# Patient Record
Sex: MALE | Race: WHITE | NOT HISPANIC OR LATINO | Employment: UNEMPLOYED | URBAN - METROPOLITAN AREA
[De-identification: names, ages, dates, MRNs, and addresses within clinical notes are randomized per-mention and may not be internally consistent; named-entity substitution may affect disease eponyms.]

---

## 2017-03-04 ENCOUNTER — HOSPITAL ENCOUNTER (EMERGENCY)
Facility: MEDICAL CENTER | Age: 52
End: 2017-03-04
Attending: EMERGENCY MEDICINE
Payer: MEDICAID

## 2017-03-04 VITALS
RESPIRATION RATE: 16 BRPM | HEIGHT: 76 IN | DIASTOLIC BLOOD PRESSURE: 64 MMHG | WEIGHT: 260.36 LBS | TEMPERATURE: 98.1 F | OXYGEN SATURATION: 97 % | HEART RATE: 93 BPM | BODY MASS INDEX: 31.71 KG/M2 | SYSTOLIC BLOOD PRESSURE: 109 MMHG

## 2017-03-04 DIAGNOSIS — F41.9 ANXIETY: ICD-10-CM

## 2017-03-04 PROCEDURE — 99284 EMERGENCY DEPT VISIT MOD MDM: CPT

## 2017-03-04 PROCEDURE — A9270 NON-COVERED ITEM OR SERVICE: HCPCS | Performed by: EMERGENCY MEDICINE

## 2017-03-04 PROCEDURE — 700102 HCHG RX REV CODE 250 W/ 637 OVERRIDE(OP): Performed by: EMERGENCY MEDICINE

## 2017-03-04 RX ORDER — LORAZEPAM 1 MG/1
1 TABLET ORAL EVERY 6 HOURS PRN
Qty: 20 TAB | Refills: 0 | Status: SHIPPED | OUTPATIENT
Start: 2017-03-04 | End: 2018-03-30

## 2017-03-04 RX ORDER — LORAZEPAM 1 MG/1
2 TABLET ORAL ONCE
Status: COMPLETED | OUTPATIENT
Start: 2017-03-04 | End: 2017-03-04

## 2017-03-04 RX ADMIN — LORAZEPAM 2 MG: 1 TABLET ORAL at 12:17

## 2017-03-04 ASSESSMENT — PAIN SCALES - GENERAL: PAINLEVEL_OUTOF10: 0

## 2017-03-04 NOTE — ED NOTES
Chief Complaint   Patient presents with   • Anxiety     Pt BIB self to triage for c/o anxiety on and off and pt reports it to be interrupting sleep.      Explained to pt triage process, made pt aware to tell this RN of any changes/concerns, pt verbalized understanding of process and instructions given. Pt to ER markie.

## 2017-03-04 NOTE — ED AVS SNAPSHOT
Shopperception Access Code: [email or web address]  Expires: 4/3/2017 12:44 PM    Your email address is not on file at [facility].  Email Addresses are required for you to sign up for Shopperception, please contact [phone] to verify your personal information and to provide your email address prior to attempting to register for Shopperception.    Wilbert Kennith Yeomans .  [address]  [place] [address]    Shopperception  A secure, online tool to manage your health information     [facility]’s Shopperception® is a secure, online tool that connects you to your personalized health information from the privacy of your home -- day or night - making it very easy for you to manage your healthcare. Once the activation process is completed, you can even access your medical information using the Shopperception shivani, which is available for free in the Apple Shivani store or Google Play store.     To learn more about Shopperception, visit [email or web address]    There are two levels of access available (as shown below):   My Chart Features  [facility] Primary Care Doctor [facility]  Specialists [facility]  Urgent  Care Non-[facility] Primary Care Doctor   Email your healthcare team securely and privately 24/7 X X X    Manage appointments: schedule your next appointment; view details of past/upcoming appointments X      Request prescription refills. X      View recent personal medical records, including lab and immunizations X X X X   View health record, including health history, allergies, medications X X X X   Read reports about your outpatient visits, procedures, consult and ER notes X X X X   See your discharge summary, which is a recap of your hospital and/or ER visit that includes your diagnosis, lab results, and care plan X X  X     How to register for Shopperception:  Once your e-mail address has been verified, follow the following steps to sign up for Shopperception.     1. Go to  [email or web address]  2. Click on the Sign Up Now box, which takes you to the New Member Sign Up page.  You will need to provide the following information:  a. Enter your Appstarter Access Code exactly as it appears at the top of this page. (You will not need to use this code after you’ve completed the sign-up process. If you do not sign up before the expiration date, you must request a new code.)   b. Enter your date of birth.   c. Enter your home email address.   d. Click Submit, and follow the next screen’s instructions.  3. Create a Sportistict ID. This will be your Appstarter login ID and cannot be changed, so think of one that is secure and easy to remember.  4. Create a Appstarter password. You can change your password at any time.  5. Enter your Password Reset Question and Answer. This can be used at a later time if you forget your password.   6. Enter your e-mail address. This allows you to receive e-mail notifications when new information is available in Appstarter.  7. Click Sign Up. You can now view your health information.    For assistance activating your Appstarter account, call [phone]

## 2017-03-04 NOTE — ED AVS SNAPSHOT
Home Care Instructions                                                                                                                Wilbert Kennith Yeomans Jr.   MRN: [id number]    Department:  [facility], Emergency Dept   Date of Visit:  3/4/2017            [facility], Emergency Dept    [address] [address]    Phone:  [phone]      You were seen by     Zachariah Espino M.D.      Your Diagnosis Was     Anxiety     F41.9       These are the medications you received during your hospitalization from 03/04/2017 1111 to 03/04/2017 1244     Date/Time Order Dose Route Action    03/04/2017 1217 lorazepam (ATIVAN) tablet 2 mg 2 mg Oral Given      Follow-up Information     1. Follow up with [facility], Emergency Dept.    Specialty:  Emergency Medicine    Why:  If symptoms worsen    Contact information    [address] [place] [id number] [phone]        2. Follow up with [facility].    Why:  call for follow up    Contact information    [address] [place] [address] [phone]      Medication Information     Review all of your home medications and newly ordered medications with your primary doctor and/or pharmacist as soon as possible. Follow medication instructions as directed by your doctor and/or pharmacist.     Please keep your complete medication list with you and share with your physician. Update the information when medications are discontinued, doses are changed, or new medications (including over-the-counter products) are added; and carry medication information at all times in the event of emergency situations.               Medication List      ASK your doctor about these medications        Instructions    ciprofloxacin 750 MG Tabs   Commonly known as:  CIPRO    Take 1 Tab by mouth 2 times a day.   Dose:  750 mg       hydrochlorothiazide 25 MG Tabs   Commonly known as:  HYDRODIURIL    Take 25 mg by  mouth every day.   Dose:  25 mg       ibuprofen 800 MG Tabs   Commonly known as:  MOTRIN    Take 800 mg by mouth every 8 hours as needed for Moderate Pain.   Dose:  800 mg       * lorazepam 1 MG Tabs   What changed:  Another medication with the same name was added. Make sure you understand how and when to take each.   Commonly known as:  ATIVAN   Ask about: Which instructions should I use?    Take 1 Tab by mouth every 6 hours as needed for Anxiety.   Dose:  1 mg       * lorazepam 1 MG Tabs   What changed:  Another medication with the same name was added. Make sure you understand how and when to take each.   Commonly known as:  ATIVAN   Ask about: Which instructions should I use?    Take 1 Tab by mouth every four hours as needed for Anxiety.   Dose:  1 mg       * lorazepam 1 MG Tabs   What changed:  You were already taking a medication with the same name, and this prescription was added. Make sure you understand how and when to take each.   Commonly known as:  ATIVAN   Ask about: Which instructions should I use?    Take 1 Tab by mouth every 6 hours as needed for Anxiety.   Dose:  1 mg       naltrexone 50 MG Tabs   Commonly known as:  KARIN    Doctor's comments:  Hold this medicine for 4 days then resume per your primary care doctor   Take 1 Tab by mouth every day.   Dose:  50 mg       naproxen 500 MG Tabs   Commonly known as:  NAPROSYN    Take 500 mg by mouth every day.   Dose:  500 mg       oxycodone-acetaminophen 7.5-325 MG per tablet   Commonly known as:  PERCOCET    Take 1 Tab by mouth every four hours as needed.   Dose:  1 Tab       simvastatin 40 MG Tabs   Commonly known as:  ZOCOR    Take 40 mg by mouth every evening.   Dose:  40 mg       * Notice:  This list has 3 medication(s) that are the same as other medications prescribed for you. Read the directions carefully, and ask your doctor or other care provider to review them with you.              Discharge Instructions       Panic Attacks  Panic attacks are  sudden, short feelings of great fear or discomfort. You may have them for no reason when you are relaxed, when you are uneasy (anxious), or when you are sleeping.   HOME CARE  · Take all your medicines as told.  · Check with your doctor before starting new medicines.  · Keep all doctor visits.  GET HELP IF:  · You are not able to take your medicines as told.  · Your symptoms do not get better.  · Your symptoms get worse.  GET HELP RIGHT AWAY IF:  · Your attacks seem different than your normal attacks.  · You have thoughts about hurting yourself or others.  · You take panic attack medicine and you have a side effect.  MAKE SURE YOU:  · Understand these instructions.  · Will watch your condition.  · Will get help right away if you are not doing well or get worse.     This information is not intended to replace advice given to you by your health care provider. Make sure you discuss any questions you have with your health care provider.     Document Released: 01/20/2012 Document Revised: 10/08/2014 Document Reviewed: 08/01/2014  ElseFanzila Interactive Patient Education ©2016 [employer]            Patient Information     Patient Information    Following emergency treatment: all patient requiring follow-up care must return either to a private physician or a clinic if your condition worsens before you are able to obtain further medical attention, please return to the emergency room.     Billing Information    At [facility], we work to make the billing process streamlined for our patients.  Our Representatives are here to answer any questions you may have regarding your hospital bill.  If you have insurance coverage and have supplied your insurance information to us, we will submit a claim to your insurer on your behalf.  Should you have any questions regarding your bill, we can be reached online or by phone as follows:  Online: You are able pay your bills online or live chat with our representatives about any billing  questions you may have. We are here to help Monday - Friday from 8:00am to 7:30pm and 9:00am - 12:00pm on Saturdays.  Please visit [email or web address]  for more information.   Phone:  [phone] or [phone]    Please note that your emergency physician, surgeon, pathologist, radiologist, anesthesiologist, and other specialists are not employed by [facility] and will therefore bill separately for their services.  Please contact them directly for any questions concerning their bills at the numbers below:     Emergency Physician Services:  [phone]  [facility]:  [phone]  [facility]:  [phone]  [facility]:  [phone]    1. Your final bill may vary from the amount quoted upon discharge if all procedures are not complete at that time, or if your doctor has additional procedures of which we are not aware. You will receive an additional bill if you return to the Emergency Department at [facility] for suture removal regardless of the facility of which the sutures were placed.     2. Please arrange for settlement of this account at the emergency registration.    3. All self-pay accounts are due in full at the time of treatment.  If you are unable to meet this obligation then payment is expected within 4-5 days.     4. If you have had radiology studies (CT, X-ray, Ultrasound, MRI), you have received a preliminary result during your emergency department visit. Please contact the radiology department [phone] to receive a copy of your final result. Please discuss the Final result with your primary physician or with the follow up physician provided.     Crisis Hotline:  National Crisis Hotline:  4-996-HGCHTLA or 1-494.554.1497  [place] Crisis Hotline:    1-182.442.7262 or 820-738-6639         ED Discharge Follow Up Questions    1. In order to provide you with very good care, we would like to follow up with a phone call  in the next few days.  May we have your permission to contact you?     YES /  NO    2. What is the best phone number to call you? (       )_____-__________    3. What is the best time to call you?      Morning  /  Afternoon  /  Evening                   Patient Signature:  ____________________________________________________________    Date:  ____________________________________________________________

## 2017-03-04 NOTE — DISCHARGE INSTRUCTIONS
Panic Attacks  Panic attacks are sudden, short feelings of great fear or discomfort. You may have them for no reason when you are relaxed, when you are uneasy (anxious), or when you are sleeping.   HOME CARE  · Take all your medicines as told.  · Check with your doctor before starting new medicines.  · Keep all doctor visits.  GET HELP IF:  · You are not able to take your medicines as told.  · Your symptoms do not get better.  · Your symptoms get worse.  GET HELP RIGHT AWAY IF:  · Your attacks seem different than your normal attacks.  · You have thoughts about hurting yourself or others.  · You take panic attack medicine and you have a side effect.  MAKE SURE YOU:  · Understand these instructions.  · Will watch your condition.  · Will get help right away if you are not doing well or get worse.     This information is not intended to replace advice given to you by your health care provider. Make sure you discuss any questions you have with your health care provider.     Document Released: 01/20/2012 Document Revised: 10/08/2014 Document Reviewed: 08/01/2014  Elsevier Interactive Patient Education ©2016 Elsevier Inc.

## 2017-03-04 NOTE — ED PROVIDER NOTES
ED Provider Note    Scribed for Zachariah Espino M.D. by Bronwyn Macias. 3/4/2017, 12:00 PM.    Primary care provider: Pcp Pt States None  Means of arrival: walk-in  History obtained from: patient  History limited by: none    CHIEF COMPLAINT  Chief Complaint   Patient presents with   • Anxiety     Pt BIB self to triage for c/o anxiety on and off and pt reports it to be interrupting sleep.        HPI  Wilbert Kennith Yeomans Jr. is a 51 y.o. male who presents to the Emergency Department with complaints of anxiety. The patient reports anxiety on and off for the past several months and came in today because he is having difficulty sleeping. He denies any suicidal ideation or homicidal ideation. The patient does not have a primary care provider and does not see a psychologist. He has scheduled an appointment with the [facility] for 03/13/2017.    REVIEW OF SYSTEMS  Pertinent positives include anxiety, insomnia. Pertinent negatives include no suicidal ideation, homicidal ideation. E  See HPI for further details.     PAST MEDICAL HISTORY   has a past medical history of Hypertension; Hypercholesteremia; Hepatitis C (1997); Bronchitis; Diabetes (CMS-HCC); and Dental disorder.    SURGICAL HISTORY   has past surgical history that includes ventral hernia repair laparoscopic (3/30/2016).    SOCIAL HISTORY  Social History   Substance Use Topics   • Smoking status: Current Every Day Smoker -- 1.00 packs/day for 30 years     Types: Cigarettes   • Smokeless tobacco: Never Used   • Alcohol Use: No      Comment: None since 1-5-2016      History   Drug Use No     Comment: denies       FAMILY HISTORY  No family history noted    CURRENT MEDICATIONS  No current facility-administered medications on file prior to encounter.     Current Outpatient Prescriptions on File Prior to Encounter   Medication Sig Dispense Refill   • lorazepam (ATIVAN) 1 MG Tab Take 1 Tab by mouth every four hours as needed for Anxiety. 20 Tab 0   • lorazepam  "(ATIVAN) 1 MG Tab Take 1 Tab by mouth every 6 hours as needed for Anxiety. 15 Tab 0   • naproxen (NAPROSYN) 500 MG Tab Take 500 mg by mouth every day.     • naltrexone (DEPADE) 50 MG Tab Take 1 Tab by mouth every day. 30 Tab 0   • oxycodone-acetaminophen (PERCOCET) 7.5-325 MG per tablet Take 1 Tab by mouth every four hours as needed. 45 Tab 0   • ciprofloxacin (CIPRO) 750 MG Tab Take 1 Tab by mouth 2 times a day. 14 Tab 0   • ibuprofen (MOTRIN) 800 MG Tab Take 800 mg by mouth every 8 hours as needed for Moderate Pain.     • hydrochlorothiazide (HYDRODIURIL) 25 MG Tab Take 25 mg by mouth every day.     • simvastatin (ZOCOR) 40 MG Tab Take 40 mg by mouth every evening.         ALLERGIES  No Known Allergies    PHYSICAL EXAM  VITAL SIGNS: /80 mmHg  Pulse 118  Temp(Src) 36.7 °C (98.1 °F)  Resp 18  Ht 1.93 m (6' 3.98\")  Wt 118.1 kg (260 lb 5.8 oz)  BMI 31.71 kg/m2  SpO2 97%  Vitals reviewed.  Constitutional: Alert in no apparent distress.  HENT: No signs of trauma, Bilateral external ears normal, Nose normal.   Eyes: Pupils are equal and reactive, Conjunctiva normal, Non-icteric.   Neck: Normal range of motion, No tenderness, Supple, No stridor.   Lymphatic: No lymphadenopathy noted.   Cardiovascular: Regular rate and rhythm, no murmurs.   Thorax & Lungs: Normal breath sounds, No respiratory distress, No wheezing, No chest tenderness.   Abdomen: Soft, No tenderness, No peritoneal signs, No masses, No pulsatile masses.   Skin: Warm, Dry, No erythema, No rash.   Extremities: Intact distal pulses, No edema, No tenderness, No cyanosis  Musculoskeletal: Good range of motion in all major joints. No tenderness to palpation or major deformities noted.   Neurologic: Alert , Normal motor function, Normal sensory function, No focal deficits noted.   Psychiatric: Anxious affect, Judgment normal, Mood normal.       COURSE & MEDICAL DECISION MAKING  Nursing notes, VS, PMSFHx reviewed in chart.    Obtained and reviewed past " medical records, which show he has been seen for anxiety twice before in the past 4 months, once by myself.    12:00 PM Patient seen and examined at bedside. He presents with anxiety. He does not have any thoughts of suicide. Patient will be treated with 2 mg Ativan PO for his symptoms. The patient has made an appointment at the [facility], where they also have counseling, as I suggested the first time I saw him. Since his appointment is not for another nine days, however, I will prescribe Ativan. The patient will be discharged and will follow up with the [facility] as scheduled. He will return for any new or worsening symptoms. He verbalizes understanding and agrees.    I reviewed prescription monitoring program for patient's narcotic use before prescribing a scheduled drug.The patient will not drink alcohol nor drive with prescribed medications. The patient will return for new or worsening symptoms and is stable at the time of discharge.    The patient is referred to a primary physician for blood pressure management, diabetic screening, and for all other preventative health concerns.    DISPOSITION:  Patient will be discharged home in stable condition.    FOLLOW UP:  No follow-up provider specified.    OUTPATIENT MEDICATIONS:  New Prescriptions    LORAZEPAM (ATIVAN) 1 MG TAB    Take 1 Tab by mouth every 6 hours as needed for Anxiety.     FINAL IMPRESSION  1. Anxiety          Bronwyn GAUTAM (C[employer]), am scribing for, and in the presence of, Zachariah Espino M.D..    Electronically signed by: Bronwyn Macias (Cricket[employer]), 3/4/2017    Zachariah GAUTAM M.D. personally performed the services described in this documentation, as scribed by Bronwyn Macias in my presence, and it is both accurate and complete.    The note accurately reflects work and decisions made by me.  Zachariah Espino  3/4/2017  12:12 PM

## 2017-03-04 NOTE — ED AVS SNAPSHOT
3/4/2017          Wilbert Kennith Yeomans Jr.  [address]  [place] [address]    Dear Louie:    [facility] wants to ensure your discharge home is safe and you or your loved ones have had all your questions answered regarding your care after you leave the hospital.    You may receive a telephone call within two days of your discharge.  This call is to make certain you understand your discharge instructions as well as ensure we provided you with the best care possible during your stay with us.     The call will only last approximately 3-5 minutes and will be done by a nurse.    Once again, we want to ensure your discharge home is safe and that you have a clear understanding of any next steps in your care.  If you have any questions or concerns, please do not hesitate to contact us, we are here for you.  Thank you for choosing [facility] for your healthcare needs.    Sincerely,    Oli Kelly    [facility]

## 2017-03-04 NOTE — ED NOTES
Pt much more calm at this time.  States he is an established pt at the [facility] and will follow up.  Given d/c instructions and rx.  Ambulated out.

## 2017-03-09 ENCOUNTER — HOSPITAL ENCOUNTER (EMERGENCY)
Facility: MEDICAL CENTER | Age: 52
End: 2017-03-09
Attending: EMERGENCY MEDICINE
Payer: MEDICAID

## 2017-03-09 VITALS
WEIGHT: 257.94 LBS | BODY MASS INDEX: 31.41 KG/M2 | DIASTOLIC BLOOD PRESSURE: 86 MMHG | OXYGEN SATURATION: 97 % | HEART RATE: 114 BPM | SYSTOLIC BLOOD PRESSURE: 102 MMHG | TEMPERATURE: 98.2 F | RESPIRATION RATE: 20 BRPM

## 2017-03-09 DIAGNOSIS — F41.9 ANXIETY: ICD-10-CM

## 2017-03-09 DIAGNOSIS — F11.10: ICD-10-CM

## 2017-03-09 PROCEDURE — 99283 EMERGENCY DEPT VISIT LOW MDM: CPT

## 2017-03-09 PROCEDURE — 700102 HCHG RX REV CODE 250 W/ 637 OVERRIDE(OP): Performed by: EMERGENCY MEDICINE

## 2017-03-09 PROCEDURE — 36415 COLL VENOUS BLD VENIPUNCTURE: CPT

## 2017-03-09 PROCEDURE — A9270 NON-COVERED ITEM OR SERVICE: HCPCS | Performed by: EMERGENCY MEDICINE

## 2017-03-09 RX ORDER — LORAZEPAM 1 MG/1
1 TABLET ORAL EVERY 6 HOURS PRN
Qty: 4 TAB | Refills: 0 | Status: SHIPPED | OUTPATIENT
Start: 2017-03-09 | End: 2018-03-30

## 2017-03-09 RX ORDER — LORAZEPAM 1 MG/1
1 TABLET ORAL ONCE
Status: COMPLETED | OUTPATIENT
Start: 2017-03-09 | End: 2017-03-09

## 2017-03-09 RX ADMIN — LORAZEPAM 1 MG: 1 TABLET ORAL at 17:15

## 2017-03-09 ASSESSMENT — PAIN SCALES - GENERAL: PAINLEVEL_OUTOF10: 0

## 2017-03-09 ASSESSMENT — ENCOUNTER SYMPTOMS: NERVOUS/ANXIOUS: 1

## 2017-03-09 NOTE — ED AVS SNAPSHOT
Flavours Access Code: [email or web address]  Expires: 4/3/2017 12:44 PM    Your email address is not on file at [facility].  Email Addresses are required for you to sign up for Flavours, please contact [phone] to verify your personal information and to provide your email address prior to attempting to register for Flavours.    Wilbert Kennith Yeomans .  [address]  [place] [address]    Flavours  A secure, online tool to manage your health information     [facility]’s Flavours® is a secure, online tool that connects you to your personalized health information from the privacy of your home -- day or night - making it very easy for you to manage your healthcare. Once the activation process is completed, you can even access your medical information using the Flavours shivani, which is available for free in the Apple Shivani store or Google Play store.     To learn more about Flavours, visit [email or web address]    There are two levels of access available (as shown below):   My Chart Features  [facility] Primary Care Doctor [facility]  Specialists [facility]  Urgent  Care Non-[facility] Primary Care Doctor   Email your healthcare team securely and privately 24/7 X X X    Manage appointments: schedule your next appointment; view details of past/upcoming appointments X      Request prescription refills. X      View recent personal medical records, including lab and immunizations X X X X   View health record, including health history, allergies, medications X X X X   Read reports about your outpatient visits, procedures, consult and ER notes X X X X   See your discharge summary, which is a recap of your hospital and/or ER visit that includes your diagnosis, lab results, and care plan X X  X     How to register for Flavours:  Once your e-mail address has been verified, follow the following steps to sign up for Flavours.     1. Go to  [email or web address]  2. Click on the Sign Up Now box, which takes you to the New Member Sign Up page.  You will need to provide the following information:  a. Enter your PolicyGenius Access Code exactly as it appears at the top of this page. (You will not need to use this code after you’ve completed the sign-up process. If you do not sign up before the expiration date, you must request a new code.)   b. Enter your date of birth.   c. Enter your home email address.   d. Click Submit, and follow the next screen’s instructions.  3. Create a Saffron Technologyt ID. This will be your PolicyGenius login ID and cannot be changed, so think of one that is secure and easy to remember.  4. Create a PolicyGenius password. You can change your password at any time.  5. Enter your Password Reset Question and Answer. This can be used at a later time if you forget your password.   6. Enter your e-mail address. This allows you to receive e-mail notifications when new information is available in PolicyGenius.  7. Click Sign Up. You can now view your health information.    For assistance activating your PolicyGenius account, call [phone]

## 2017-03-09 NOTE — ED AVS SNAPSHOT
Home Care Instructions                                                                                                                Wilbert Kennith Yeomans Jr.   MRN: [id number]    Department:  [facility], Emergency Dept   Date of Visit:  3/9/2017            [facility], Emergency Dept    [address] [facility] [address] [address]    Phone:  [phone]      You were seen by     Zachariah Correa M.D.      Your Diagnosis Was     Anxiety     F41.9 Without homicidal or suicidal ideation      Follow-up Information     1. Follow up with [facility] ([facility]) In 1 day.    Specialties:  Psychiatry, Behavioral Health    Contact information    [address] [place] [address]  [phone]      Medication Information     Review all of your home medications and newly ordered medications with your primary doctor and/or pharmacist as soon as possible. Follow medication instructions as directed by your doctor and/or pharmacist.     Please keep your complete medication list with you and share with your physician. Update the information when medications are discontinued, doses are changed, or new medications (including over-the-counter products) are added; and carry medication information at all times in the event of emergency situations.               Medication List      ASK your doctor about these medications        Instructions    Morning Afternoon Evening Bedtime    ciprofloxacin 750 MG Tabs   Commonly known as:  CIPRO        Take 1 Tab by mouth 2 times a day.   Dose:  750 mg                        hydrochlorothiazide 25 MG Tabs   Commonly known as:  HYDRODIURIL        Take 25 mg by mouth every day.   Dose:  25 mg                        ibuprofen 800 MG Tabs   Commonly known as:  MOTRIN        Take 800 mg by mouth every 8 hours as needed for Moderate Pain.   Dose:  800 mg                        * lorazepam 1 MG Tabs   What changed:  Another  medication with the same name was added. Make sure you understand how and when to take each.   Commonly known as:  ATIVAN   Ask about: Which instructions should I use?        Take 1 Tab by mouth every 6 hours as needed for Anxiety.   Dose:  1 mg                        * lorazepam 1 MG Tabs   What changed:  Another medication with the same name was added. Make sure you understand how and when to take each.   Commonly known as:  ATIVAN   Ask about: Which instructions should I use?        Take 1 Tab by mouth every four hours as needed for Anxiety.   Dose:  1 mg                        * lorazepam 1 MG Tabs   What changed:  Another medication with the same name was added. Make sure you understand how and when to take each.   Commonly known as:  ATIVAN   Ask about: Which instructions should I use?        Take 1 Tab by mouth every 6 hours as needed for Anxiety.   Dose:  1 mg                        * lorazepam 1 MG Tabs   What changed:  You were already taking a medication with the same name, and this prescription was added. Make sure you understand how and when to take each.   Commonly known as:  ATIVAN   Ask about: Which instructions should I use?        Take 1 Tab by mouth every 6 hours as needed for Anxiety.   Dose:  1 mg                        naltrexone 50 MG Tabs   Commonly known as:  KARIN        Doctor's comments:  Hold this medicine for 4 days then resume per your primary care doctor   Take 1 Tab by mouth every day.   Dose:  50 mg                        naproxen 500 MG Tabs   Commonly known as:  NAPROSYN        Take 500 mg by mouth every day.   Dose:  500 mg                        oxycodone-acetaminophen 7.5-325 MG per tablet   Commonly known as:  PERCOCET        Take 1 Tab by mouth every four hours as needed.   Dose:  1 Tab                        simvastatin 40 MG Tabs   Commonly known as:  ZOCOR        Take 40 mg by mouth every evening.   Dose:  40 mg                        * Notice:  This list has 4  medication(s) that are the same as other medications prescribed for you. Read the directions carefully, and ask your doctor or other care provider to review them with you.         Where to Get Your Medications      You can get these medications from any pharmacy     Bring a paper prescription for each of these medications    - lorazepam 1 MG Tabs              Discharge Instructions       Panic Attacks  Panic attacks are sudden, short feelings of great fear or discomfort. You may have them for no reason when you are relaxed, when you are uneasy (anxious), or when you are sleeping.   HOME CARE  · Take all your medicines as told.  · Check with your doctor before starting new medicines.  · Keep all doctor visits.  GET HELP IF:  · You are not able to take your medicines as told.  · Your symptoms do not get better.  · Your symptoms get worse.  GET HELP RIGHT AWAY IF:  · Your attacks seem different than your normal attacks.  · You have thoughts about hurting yourself or others.  · You take panic attack medicine and you have a side effect.  MAKE SURE YOU:  · Understand these instructions.  · Will watch your condition.  · Will get help right away if you are not doing well or get worse.     This information is not intended to replace advice given to you by your health care provider. Make sure you discuss any questions you have with your health care provider.    Walk-in appointment at [facility] tomorrow morning.     Document Released: 01/20/2012 Document Revised: 10/08/2014 Document Reviewed: 08/01/2014  Elsevier Interactive Patient Education ©2016 [employer]            Patient Information     Patient Information    Following emergency treatment: all patient requiring follow-up care must return either to a private physician or a clinic if your condition worsens before you are able to obtain further medical attention, please return to the emergency room.     Billing Information    At [facility], we work to make  the billing process streamlined for our patients.  Our Representatives are here to answer any questions you may have regarding your hospital bill.  If you have insurance coverage and have supplied your insurance information to us, we will submit a claim to your insurer on your behalf.  Should you have any questions regarding your bill, we can be reached online or by phone as follows:  Online: You are able pay your bills online or live chat with our representatives about any billing questions you may have. We are here to help Monday - Friday from 8:00am to 7:30pm and 9:00am - 12:00pm on Saturdays.  Please visit [email or web address]  for more information.   Phone:  [phone] or [phone]    Please note that your emergency physician, surgeon, pathologist, radiologist, anesthesiologist, and other specialists are not employed by [facility] and will therefore bill separately for their services.  Please contact them directly for any questions concerning their bills at the numbers below:     Emergency Physician Services:  [phone]  [facility]:  [phone]  [facility]:  [phone]  [facility]:  [phone]    1. Your final bill may vary from the amount quoted upon discharge if all procedures are not complete at that time, or if your doctor has additional procedures of which we are not aware. You will receive an additional bill if you return to the Emergency Department at [facility] for suture removal regardless of the facility of which the sutures were placed.     2. Please arrange for settlement of this account at the emergency registration.    3. All self-pay accounts are due in full at the time of treatment.  If you are unable to meet this obligation then payment is expected within 4-5 days.     4. If you have had radiology studies (CT, X-ray, Ultrasound, MRI), you have received a preliminary result during your emergency  department visit. Please contact the radiology department [phone] to receive a copy of your final result. Please discuss the Final result with your primary physician or with the follow up physician provided.     Crisis Hotline:  [place] Crisis Hotline:  3-038-GXYCVGB or 1-878.919.9632  [place] Crisis Hotline:    1-823.483.6374 or 415-792-1534         ED Discharge Follow Up Questions    1. In order to provide you with very good care, we would like to follow up with a phone call in the next few days.  May we have your permission to contact you?     YES /  NO    2. What is the best phone number to call you? (       )_____-__________    3. What is the best time to call you?      Morning  /  Afternoon  /  Evening                   Patient Signature:  ____________________________________________________________    Date:  ____________________________________________________________

## 2017-03-09 NOTE — ED AVS SNAPSHOT
3/9/2017          Wilbert Kennith Yeomans Jr.  [address]  [place] [address]    Dear Louie:    [facility] wants to ensure your discharge home is safe and you or your loved ones have had all your questions answered regarding your care after you leave the hospital.    You may receive a telephone call within two days of your discharge.  This call is to make certain you understand your discharge instructions as well as ensure we provided you with the best care possible during your stay with us.     The call will only last approximately 3-5 minutes and will be done by a nurse.    Once again, we want to ensure your discharge home is safe and that you have a clear understanding of any next steps in your care.  If you have any questions or concerns, please do not hesitate to contact us, we are here for you.  Thank you for choosing [facility] for your healthcare needs.    Sincerely,    Oli Kelly    [facility]

## 2017-03-10 NOTE — DISCHARGE INSTRUCTIONS
Panic Attacks  Panic attacks are sudden, short feelings of great fear or discomfort. You may have them for no reason when you are relaxed, when you are uneasy (anxious), or when you are sleeping.   HOME CARE  · Take all your medicines as told.  · Check with your doctor before starting new medicines.  · Keep all doctor visits.  GET HELP IF:  · You are not able to take your medicines as told.  · Your symptoms do not get better.  · Your symptoms get worse.  GET HELP RIGHT AWAY IF:  · Your attacks seem different than your normal attacks.  · You have thoughts about hurting yourself or others.  · You take panic attack medicine and you have a side effect.  MAKE SURE YOU:  · Understand these instructions.  · Will watch your condition.  · Will get help right away if you are not doing well or get worse.     This information is not intended to replace advice given to you by your health care provider. Make sure you discuss any questions you have with your health care provider.    Walk-in appointment at [facility] tomorrow morning.     Document Released: 01/20/2012 Document Revised: 10/08/2014 Document Reviewed: 08/01/2014  Elsevier Interactive Patient Education ©2016 [employer]

## 2017-03-10 NOTE — ED PROVIDER NOTES
"ED Provider Note    Scribed for Zachariah Correa M.D. by Dane Robles. 3/9/2017, 4:42 PM.    Primary care provider: Pcp Pt States None  Means of arrival: Private vehicle  History obtained from: Patient  History limited by: None    CHIEF COMPLAINT  Chief Complaint   Patient presents with   • Anxiety       HPI  Wilbert Kennith Yeomans Jr. is a 51 y.o. male who presents to the Emergency Department with worsened anxiety. The patient was seen in this ED and [place]'s previously for similar symptoms and has been given Ativan medication. He states he has been compliant with medications, taking it every 6 hours, but is experiencing worsened anxiety, stating he cannot stand or sit still and \"needs something stronger\". He has been seen at [facility] and has an appointment on 3/13/2017. Patient states he has been given narcotic prescriptions for \"bone spurs\". He denies any suicidal or homicidal ideation.     Review of chart shows this is patient's 4th visit for anxiety since December 13. Seen for anxiety December 31 and 5 days ago.  He was given 20x 1mg Ativan 4 days ago and 15x 1mg Ativan on 2/19/2017. There are 535 narcotic prescriptions in mid-December 2016, which he states he takes for \"bone spurs\".      REVIEW OF SYSTEMS  Review of Systems   Psychiatric/Behavioral: Negative for suicidal ideas. The patient is nervous/anxious.         Denies homicidal ideation   All other systems reviewed and are negative.  C.    PAST MEDICAL HISTORY   has a past medical history of Hypertension; Hypercholesteremia; Hepatitis C (1997); Bronchitis; Diabetes (CMS-HCC); and Dental disorder.    SURGICAL HISTORY   has past surgical history that includes ventral hernia repair laparoscopic (3/30/2016).    SOCIAL HISTORY  Social History   Substance Use Topics   • Smoking status: Current Every Day Smoker -- 0.25 packs/day for 30 years     Types: Cigarettes   • Smokeless tobacco: Never Used   • Alcohol Use: No      Comment: None since 1-5-2016    "   History   Drug Use No     Comment: denies       FAMILY HISTORY  No family history noted    CURRENT MEDICATIONS  No current facility-administered medications for this encounter.    Current outpatient prescriptions:   •  lorazepam (ATIVAN) 1 MG Tab, Take 1 Tab by mouth every 6 hours as needed for Anxiety., Disp: 20 Tab, Rfl: 0  •  lorazepam (ATIVAN) 1 MG Tab, Take 1 Tab by mouth every four hours as needed for Anxiety., Disp: 20 Tab, Rfl: 0  •  lorazepam (ATIVAN) 1 MG Tab, Take 1 Tab by mouth every 6 hours as needed for Anxiety., Disp: 15 Tab, Rfl: 0  •  naproxen (NAPROSYN) 500 MG Tab, Take 500 mg by mouth every day., Disp: , Rfl:   •  naltrexone (DEPADE) 50 MG Tab, Take 1 Tab by mouth every day., Disp: 30 Tab, Rfl: 0  •  oxycodone-acetaminophen (PERCOCET) 7.5-325 MG per tablet, Take 1 Tab by mouth every four hours as needed., Disp: 45 Tab, Rfl: 0  •  ciprofloxacin (CIPRO) 750 MG Tab, Take 1 Tab by mouth 2 times a day., Disp: 14 Tab, Rfl: 0  •  ibuprofen (MOTRIN) 800 MG Tab, Take 800 mg by mouth every 8 hours as needed for Moderate Pain., Disp: , Rfl:   •  hydrochlorothiazide (HYDRODIURIL) 25 MG Tab, Take 25 mg by mouth every day., Disp: , Rfl:   •  simvastatin (ZOCOR) 40 MG Tab, Take 40 mg by mouth every evening., Disp: , Rfl:     ALLERGIES  No Known Allergies    PHYSICAL EXAM  VITAL SIGNS: /86 mmHg  Pulse 114  Temp(Src) 36.8 °C (98.2 °F)  Resp 20  Wt 117 kg (257 lb 15 oz)  SpO2 97%    Constitutional: Alert and oriented x3. Non-toxic appearance.   HENT: Normocephalic, atraumatic, ears normal bilaterally, normal TMs, posterior pharynx clear with no exudate  Eyes: Conjunctiva normal, No discharge.   Neck: Supple, normal ROM, no adenopathy  Cardiovascular: Normal heart rate, Normal rhythm, No murmurs, No rubs, No gallops.   Thorax & Lungs: Normal breath sounds, No respiratory distress, No wheezing, No chest tenderness.   Abdomen: Soft, No tenderness, No masses, No pulsatile masses.   Skin: Warm, Dry, No  "erythema, No rash.   Extremities: Intact distal pulses, No edema, No tenderness, No cyanosis, No clubbing.   Musculoskeletal: Normal ROM, no deformities  Neurologic: Alert & oriented x 3, Normal motor function, No focal deficits noted.  Psychiatric: Pacing around room, poor eye contact, denies SI and HI. Does not believe he needs admission.     COURSE & MEDICAL DECISION MAKING  Nursing notes, VS, PMSFHx reviewed in chart.    Review of chart shows this is patient's 4th visit for anxiety since December 13. Seen for anxiety December 31 and 5 days ago. He was given 20x 1mg Ativan 4 days ago and 15x 1mg Ativan on 2/19/2017. There are 535 narcotic prescriptions in mid-December 2016, which he states he takes for \"bone spurs\".      4:42 PM - Patient seen and examined at bedside. Recommended the patient follow up with [facility] tomorrow for follow up regarding his anxiety, which he understands. The patient will be discharged with Ativan and should return if symptoms worsen or if new symptoms arise. The patient understands and agrees to plan.      I reviewed prescription monitoring program for patient's narcotic use before prescribing a scheduled drug.The patient will not drink alcohol nor drive with prescribed medications.} The patient will return for new or worsening symptoms and is stable at the time of discharge.    The patient is referred to a primary physician for blood pressure management, diabetic screening, and for all other preventative health concerns.    DISPOSITION:  Patient will be discharged home in stable condition.    FOLLOW UP:  [facility] ([facility])  [address] [address] [phone]  In 1 day        OUTPATIENT MEDICATIONS:  New Prescriptions    LORAZEPAM (ATIVAN) 1 MG TAB    Take 1 Tab by mouth every 6 hours as needed for Anxiety.     FINAL IMPRESSION  1. Anxiety    2. Narcotic abuse, episodic       I, Dane Robles (Scribe), am scribing for, and in the " presence of, Zachariah Correa M.D..    Electronically signed by: Dane Robles (Scribe), 3/9/2017    I, Zachariah Correa M.D. personally performed the services described in this documentation, as scribed by Dane Robles in my presence, and it is both accurate and complete.    The note accurately reflects work and decisions made by me.  Zachariah Correa  3/9/2017  7:01 PM

## 2017-03-10 NOTE — ED NOTES
"PT ambulated to triage c/o anxiety.  PT has been seen here several times for the same. PT reports he is out of medications. \"I need something really strong that's going to help me\"  Chief Complaint   Patient presents with   • Anxiety     Blood pressure 102/86, pulse 114, temperature 36.8 °C (98.2 °F), resp. rate 20, weight 117 kg (257 lb 15 oz), SpO2 97 %.    "

## 2017-03-22 ENCOUNTER — HOSPITAL ENCOUNTER (EMERGENCY)
Facility: MEDICAL CENTER | Age: 52
End: 2017-03-22
Attending: EMERGENCY MEDICINE
Payer: MEDICAID

## 2017-03-22 VITALS
TEMPERATURE: 98.1 F | HEART RATE: 95 BPM | RESPIRATION RATE: 16 BRPM | OXYGEN SATURATION: 98 % | WEIGHT: 254.63 LBS | HEIGHT: 76 IN | SYSTOLIC BLOOD PRESSURE: 121 MMHG | BODY MASS INDEX: 31.01 KG/M2 | DIASTOLIC BLOOD PRESSURE: 81 MMHG

## 2017-03-22 DIAGNOSIS — G89.29 OTHER CHRONIC PAIN: ICD-10-CM

## 2017-03-22 DIAGNOSIS — F41.9 ANXIETY: ICD-10-CM

## 2017-03-22 PROCEDURE — 99284 EMERGENCY DEPT VISIT MOD MDM: CPT

## 2017-03-22 PROCEDURE — 700102 HCHG RX REV CODE 250 W/ 637 OVERRIDE(OP): Performed by: EMERGENCY MEDICINE

## 2017-03-22 PROCEDURE — A9270 NON-COVERED ITEM OR SERVICE: HCPCS | Performed by: EMERGENCY MEDICINE

## 2017-03-22 RX ORDER — DIAZEPAM 2 MG/1
2 TABLET ORAL EVERY 6 HOURS PRN
COMMUNITY
End: 2018-03-30

## 2017-03-22 RX ORDER — LORAZEPAM 1 MG/1
2 TABLET ORAL ONCE
Status: COMPLETED | OUTPATIENT
Start: 2017-03-22 | End: 2017-03-22

## 2017-03-22 RX ADMIN — LORAZEPAM 2 MG: 1 TABLET ORAL at 08:37

## 2017-03-22 ASSESSMENT — PAIN SCALES - GENERAL: PAINLEVEL_OUTOF10: ASSUMED PAIN PRESENT

## 2017-03-22 ASSESSMENT — LIFESTYLE VARIABLES: DO YOU DRINK ALCOHOL: NO

## 2017-03-22 NOTE — ED AVS SNAPSHOT
Home Care Instructions                                                                                                                Wilbert Kennith Yeomans Jr.   MRN: [id number]    Department:  [facility], Emergency Dept   Date of Visit:  3/22/2017            [facility], Emergency Dept    [address]    [place] [address]    Phone:  [phone]      You were seen by     Nicanor Ramirez M.D.      Your Diagnosis Was     Anxiety     F41.9       These are the medications you received during your hospitalization from 03/22/2017 0750 to 03/22/2017 1033     Date/Time Order Dose Route Action    03/22/2017 0837 lorazepam (ATIVAN) tablet 2 mg 2 mg Oral Given      Follow-up Information     1. Follow up with Your Physician. Schedule an appointment as soon as possible for a visit in 2 days.    Specialty:  Emergency Medicine    Contact information    Varies        Medication Information     Review all of your home medications and newly ordered medications with your primary doctor and/or pharmacist as soon as possible. Follow medication instructions as directed by your doctor and/or pharmacist.     Please keep your complete medication list with you and share with your physician. Update the information when medications are discontinued, doses are changed, or new medications (including over-the-counter products) are added; and carry medication information at all times in the event of emergency situations.               Medication List      ASK your doctor about these medications        Instructions    Morning Afternoon Evening Bedtime    ciprofloxacin 750 MG Tabs   Commonly known as:  CIPRO        Take 1 Tab by mouth 2 times a day.   Dose:  750 mg                        diazepam 2 MG Tabs   Commonly known as:  VALIUM        Take 2 mg by mouth every 6 hours as needed for Anxiety.   Dose:  2 mg                        hydrochlorothiazide 25 MG Tabs   Commonly known as:  HYDRODIURIL        Take 25 mg by mouth every day.   Dose:  25 mg                        ibuprofen 800 MG Tabs   Commonly known as:  MOTRIN        Take 800 mg by mouth every 8 hours as needed for Moderate Pain.   Dose:  800 mg                        * lorazepam 1 MG Tabs   Last time this was given:  2 mg on 3/22/2017  8:37 AM   Commonly known as:  ATIVAN        Take 1 Tab by mouth every 6 hours as needed for Anxiety.   Dose:  1 mg                        * lorazepam 1 MG Tabs   Last time this was given:  2 mg on 3/22/2017  8:37 AM   Commonly known as:  ATIVAN        Take 1 Tab by mouth every four hours as needed for Anxiety.   Dose:  1 mg                        * lorazepam 1 MG Tabs   Last time this was given:  2 mg on 3/22/2017  8:37 AM   Commonly known as:  ATIVAN        Take 1 Tab by mouth every 6 hours as needed for Anxiety.   Dose:  1 mg                        * lorazepam 1 MG Tabs   Last time this was given:  2 mg on 3/22/2017  8:37 AM   Commonly known as:  ATIVAN        Take 1 Tab by mouth every 6 hours as needed for Anxiety.   Dose:  1 mg                        naltrexone 50 MG Tabs   Commonly known as:  KARIN        Doctor's comments:  Hold this medicine for 4 days then resume per your primary care doctor   Take 1 Tab by mouth every day.   Dose:  50 mg                        naproxen 500 MG Tabs   Commonly known as:  NAPROSYN        Take 500 mg by mouth every day.   Dose:  500 mg                        oxycodone-acetaminophen 7.5-325 MG per tablet   Commonly known as:  PERCOCET        Take 1 Tab by mouth every four hours as needed.   Dose:  1 Tab                        simvastatin 40 MG Tabs   Commonly known as:  ZOCOR        Take 40 mg by mouth every evening.   Dose:  40 mg                        * Notice:  This list has 4 medication(s) that are the same as other medications prescribed for you. Read the directions carefully, and ask your doctor or other care provider to review them with you.              Discharge  Instructions       Take your medications as prescribed. Return to the ER for any new medical problems or concerns. See your doctor.      Generalized Anxiety Disorder  Generalized anxiety disorder (MIKE) is a mental disorder. It interferes with life functions, including relationships, work, and school.  MIKE is different from normal anxiety, which everyone experiences at some point in their lives in response to specific life events and activities. Normal anxiety actually helps us prepare for and get through these life events and activities. Normal anxiety goes away after the event or activity is over.   MIKE causes anxiety that is not necessarily related to specific events or activities. It also causes excess anxiety in proportion to specific events or activities. The anxiety associated with MIKE is   also difficult to control. MIKE can vary from mild to severe. People with severe MIKE can have intense waves of anxiety with physical symptoms (panic attacks).   SYMPTOMS  The anxiety and worry associated with MIKE are difficult to control. This anxiety and worry are related to many life events and activities and also occur more days than not for 6 months or longer. People with MIKE also have three or more of the following symptoms (one or more in children):  · Restlessness.    · Fatigue.  · Difficulty concentrating.    · Irritability.  · Muscle tension.  · Difficulty sleeping or unsatisfying sleep.  DIAGNOSIS  MIKE is diagnosed through an assessment by your health care provider. Your health care provider will ask you questions about your mood, physical symptoms, and events in your life. Your health care provider may ask you about your medical history and use of alcohol or drugs, including prescription medicines. Your health care provider may also do a physical exam and blood tests. Certain medical conditions and the use of certain substances can cause symptoms similar to those associated with MIKE. Your health care provider may  refer you to a mental health specialist for further evaluation.  TREATMENT  The following therapies are usually used to treat MIKE:   · Medication. Antidepressant medication usually is prescribed for long-term daily control. Antianxiety medicines may be added in severe cases, especially when panic attacks occur.    · Talk therapy (psychotherapy). Certain types of talk therapy can be helpful in treating MIKE by providing support, education, and guidance. A form of talk therapy called cognitive behavioral therapy can teach you healthy ways to think about and react to daily life events and activities.  · Stress management techniques. These include yoga, meditation, and exercise and can be very helpful when they are practiced regularly.  A mental health specialist can help determine which treatment is best for you. Some people see improvement with one therapy. However, other people require a combination of therapies.     This information is not intended to replace advice given to you by your health care provider. Make sure you discuss any questions you have with your health care provider.     Document Released: 04/14/2014 Document Revised: 01/08/2016 Document Reviewed: 04/14/2014  [employer] Patient Education ©2016 [employer]            Patient Information     Patient Information    Following emergency treatment: all patient requiring follow-up care must return either to a private physician or a clinic if your condition worsens before you are able to obtain further medical attention, please return to the emergency room.     Billing Information    [facility] we work to make the billing process streamlined for our patients.  Our Representatives are here to answer any questions you may have regarding your hospital bill.  If you have insurance coverage and have supplied your insurance information to us, we will submit a claim to your insurer on your behalf.  Should you have any questions regarding your  bill, we can be reached online or by phone as follows:  Online: You are able pay your bills online or live chat with our representatives about any billing questions you may have. We are here to help Monday - Friday from 8:00am to 7:30pm and 9:00am - 12:00pm on Saturdays.  Please visit [email or web address]  for more information.   Phone:  [phone] or [phone]    Please note that your emergency physician, surgeon, pathologist, radiologist, anesthesiologist, and other specialists are not employed by [facility] and will therefore bill separately for their services.  Please contact them directly for any questions concerning their bills at the numbers below:     Emergency Physician Services:  [phone]  [facility]:  [phone]  [facility]:  [phone]  [facility]:  [phone]    1. Your final bill may vary from the amount quoted upon discharge if all procedures are not complete at that time, or if your doctor has additional procedures of which we are not aware. You will receive an additional bill if you return to the Emergency Department at [facility] for suture removal regardless of the facility of which the sutures were placed.     2. Please arrange for settlement of this account at the emergency registration.    3. All self-pay accounts are due in full at the time of treatment.  If you are unable to meet this obligation then payment is expected within 4-5 days.     4. If you have had radiology studies (CT, X-ray, Ultrasound, MRI), you have received a preliminary result during your emergency department visit. Please contact the radiology department [phone] to receive a copy of your final result. Please discuss the Final result with your primary physician or with the follow up physician provided.     Crisis Hotline:  [place] Crisis Hotline:  0-826-XCZKPQP or 1-611.914.1642  [place] Crisis Hotline:     9-492-317-5421 or 740-281-5623         ED Discharge Follow Up Questions    1. In order to provide you with very good care, we would like to follow up with a phone call in the next few days.  May we have your permission to contact you?     YES /  NO    2. What is the best phone number to call you? (       )_____-__________    3. What is the best time to call you?      Morning  /  Afternoon  /  Evening                   Patient Signature:  ____________________________________________________________    Date:  ____________________________________________________________

## 2017-03-22 NOTE — ED PROVIDER NOTES
"ED Provider Note    CHIEF COMPLAINT  Chief Complaint   Patient presents with   • Anxiety       HPI  Wilbert Kennith Yeomans Jr. is a 51 y.o. male who presents to the ER complaining of anxiety.  The patient long history of anxiety.  Prescribed Valium is not working, is requesting something for anxiety.  Denies any other acute medical problems or complaints.  Denies suicidal or homicidal ideation.    REVIEW OF SYSTEMS  See HPI for further details.  Constitutional: No fevers or chills.  Cardiovascular chest pain or shortness of breath.  PAST MEDICAL HISTORY  Past Medical History   Diagnosis Date   • Hypertension    • Hypercholesteremia    • Hepatitis C 1997   • Bronchitis      has had 3-4 times   • Diabetes (CMS-HCC)      \"borderline\"   • Dental disorder      upper partial, but lost it       FAMILY HISTORY  History reviewed. No pertinent family history.    SOCIAL HISTORY  Social History     Social History   • Marital Status: Single     Spouse Name: N/A   • Number of Children: N/A   • Years of Education: N/A     Social History Main Topics   • Smoking status: Current Every Day Smoker -- 0.25 packs/day for 30 years     Types: Cigarettes   • Smokeless tobacco: Never Used   • Alcohol Use: No      Comment: None since 1-5-2016   • Drug Use: No      Comment: denies   • Sexual Activity: Not Asked     Other Topics Concern   • None     Social History Narrative       SURGICAL HISTORY  Past Surgical History   Procedure Laterality Date   • Ventral hernia repair laparoscopic  3/30/2016     Procedure: VENTRAL HERNIA REPAIR LAPAROSCOPIC WITH MESH;  Surgeon: Caden Cat M.D.;  Location: SURGERY [facility];  Service:        CURRENT MEDICATIONS  Home Medications     Reviewed by Johnie Woodward R.N. (Registered Nurse) on 03/22/17 at 0812  Med List Status: Partial    Medication Last Dose Status    ciprofloxacin (CIPRO) 750 MG Tab  Active    diazepam (VALIUM) 2 MG Tab  Active    hydrochlorothiazide (HYDRODIURIL) 25 MG Tab  Active    " "ibuprofen (MOTRIN) 800 MG Tab  Active    lorazepam (ATIVAN) 1 MG Tab  Active    lorazepam (ATIVAN) 1 MG Tab  Active    lorazepam (ATIVAN) 1 MG Tab  Active    lorazepam (ATIVAN) 1 MG Tab  Active    naltrexone (DEPADE) 50 MG Tab  Active    naproxen (NAPROSYN) 500 MG Tab  Active    oxycodone-acetaminophen (PERCOCET) 7.5-325 MG per tablet  Active    simvastatin (ZOCOR) 40 MG Tab  Active                ALLERGIES  No Known Allergies    PHYSICAL EXAM  VITAL SIGNS: /91 mmHg  Pulse 117  Temp(Src) 36.7 °C (98.1 °F) (Temporal)  Resp 16  Ht 1.93 m (6' 3.98\")  Wt 115.5 kg (254 lb 10.1 oz)  BMI 31.01 kg/m2  SpO2 98%     Constitutional: Awake, alert, nontoxic No acute distress, Non-toxic appearance.   HENT: Normocephalic, Atraumatic,  Eyes: PERRL, EOMI, Conjunctiva normal, No discharge.   Neck: Normal range of motion, No tenderness, Supple, No stridor.    Cardiovascular: Normal heart rate, Normal rhythm, No murmurs, No rubs, No gallops.   Thorax & Lungs: Normal breath sounds, No respiratory distress, No wheezing,   Abdomen: Bowel sounds normal, Soft, No tenderness,  Skin: Warm, Dry, No erythema, No rash.   Back: No tenderness, No CVA tenderness. .   Musculoskeletal: Good range of motion in all major joints.   Neurologic: Alert & oriented x 3, No focal deficits noted.   Psychiatric: Affect anxious      COURSE & MEDICAL DECISION MAKING  Pertinent Labs & Imaging studies reviewed. (See chart for details)  The patient has anxiety is a chronic problem.  He is given a dose of Ativan.  His  is reviewed.  He is receiving frequent numbers of prescriptions for benzodiazepines, that should be lasting longer than they are.  I'm concerned about the potential for abuse.  He will be referred back to his doctor.    The patient is quite unhappy that we are not giving him IM Ativan.  Her prescribing him additional benzos.  He has a prescription for Valium and Risperdal, which she is prescribed by his doctor.  This is fairly recently " filled.  He has appropriate supply of medications.  I counseled her that we will not continue to prescribe additional Ativan here in the ER.  He is follow-up with his doctor for change in medications.  The ER physician U medical problems or complaints.  Has not prescribed additional Ativan.  His  is reviewed.  He has multiple Ativan prescriptions and multiple other prescriptions from other providers.  We will not continue to support the dangers however.  He is aware of this.  He is medically stable and he has a physician's appointment and an adequate supply of his medications.    FINAL IMPRESSION  1. Anxiety    2. Other chronic pain        2.   3.         Electronically signed by: Nicanor Ramirez, 3/22/2017 8:30 AM

## 2017-03-22 NOTE — ED NOTES
"Pt comes in complaining of \"shakiness\" pt stating trouble sleeping as well due to anxiety.  Stating hx of the same, denies SI/HI  "

## 2017-03-22 NOTE — ED NOTES
"Pt presents with c/o anxiety and shaking in his feet for months. Pt recently seen at [facility] on 3-13 and reports he was given valium but that its not strong enough. Pt has follow up scheduled with [facility] on 3-30. Pt asking for \"something given in my bloodstream to calm me down\".   "

## 2017-03-22 NOTE — DISCHARGE INSTRUCTIONS
Take your medications as prescribed. Return to the ER for any new medical problems or concerns. See your doctor.      Generalized Anxiety Disorder  Generalized anxiety disorder (MIKE) is a mental disorder. It interferes with life functions, including relationships, work, and school.  MIKE is different from normal anxiety, which everyone experiences at some point in their lives in response to specific life events and activities. Normal anxiety actually helps us prepare for and get through these life events and activities. Normal anxiety goes away after the event or activity is over.   MIKE causes anxiety that is not necessarily related to specific events or activities. It also causes excess anxiety in proportion to specific events or activities. The anxiety associated with MIKE is   also difficult to control. MIKE can vary from mild to severe. People with severe MIKE can have intense waves of anxiety with physical symptoms (panic attacks).   SYMPTOMS  The anxiety and worry associated with MIKE are difficult to control. This anxiety and worry are related to many life events and activities and also occur more days than not for 6 months or longer. People with MIKE also have three or more of the following symptoms (one or more in children):  · Restlessness.    · Fatigue.  · Difficulty concentrating.    · Irritability.  · Muscle tension.  · Difficulty sleeping or unsatisfying sleep.  DIAGNOSIS  MIKE is diagnosed through an assessment by your health care provider. Your health care provider will ask you questions about your mood, physical symptoms, and events in your life. Your health care provider may ask you about your medical history and use of alcohol or drugs, including prescription medicines. Your health care provider may also do a physical exam and blood tests. Certain medical conditions and the use of certain substances can cause symptoms similar to those associated with MIKE. Your health care provider may refer you to a  mental health specialist for further evaluation.  TREATMENT  The following therapies are usually used to treat MIKE:   · Medication. Antidepressant medication usually is prescribed for long-term daily control. Antianxiety medicines may be added in severe cases, especially when panic attacks occur.    · Talk therapy (psychotherapy). Certain types of talk therapy can be helpful in treating MIKE by providing support, education, and guidance. A form of talk therapy called cognitive behavioral therapy can teach you healthy ways to think about and react to daily life events and activities.  · Stress management techniques. These include yoga, meditation, and exercise and can be very helpful when they are practiced regularly.  A mental health specialist can help determine which treatment is best for you. Some people see improvement with one therapy. However, other people require a combination of therapies.     This information is not intended to replace advice given to you by your health care provider. Make sure you discuss any questions you have with your health care provider.     Document Released: 04/14/2014 Document Revised: 01/08/2016 Document Reviewed: 04/14/2014  [employer] Interactive Patient Education ©2016 [employer]

## 2017-03-22 NOTE — ED AVS SNAPSHOT
[employer] Access Code: [email or web address]  Expires: 4/3/2017  1:44 PM    Your email address is not on file at [employer].  Email Addresses are required for you to sign up for [employer], please contact [phone] to verify your personal information and to provide your email address prior to attempting to register for [employer].    Wilbert Kennith Yeomans .  [address]  [place] [address]    [employer]  A secure, online tool to manage your health information     [employer]’s [employer]® is a secure, online tool that connects you to your personalized health information from the privacy of your home -- day or night - making it very easy for you to manage your healthcare. Once the activation process is completed, you can even access your medical information using the [employer] shivani, which is available for free in the Apple Shivani store or Google Play store.     To learn more about Unique Solutions, visit [email or web address]    There are two levels of access available (as shown below):   My Chart Features  [facility] Primary Care Doctor [facility]  Specialists [facility]  Urgent  Care Non-[facility] Primary Care Doctor   Email your healthcare team securely and privately 24/7 X X X    Manage appointments: schedule your next appointment; view details of past/upcoming appointments X      Request prescription refills. X      View recent personal medical records, including lab and immunizations X X X X   View health record, including health history, allergies, medications X X X X   Read reports about your outpatient visits, procedures, consult and ER notes X X X X   See your discharge summary, which is a recap of your hospital and/or ER visit that includes your diagnosis, lab results, and care plan X X  X     How to register for [employer]:  Once your e-mail address has been verified, follow the following steps to sign up for [employer].     1. Go to  [email or web address]  2. Click on the Sign Up Now box, which takes you to the New Member Sign Up page.  You will need to provide the following information:  a. Enter your [employer] Access Code exactly as it appears at the top of this page. (You will not need to use this code after you’ve completed the sign-up process. If you do not sign up before the expiration date, you must request a new code.)   b. Enter your date of birth.   c. Enter your home email address.   d. Click Submit, and follow the next screen’s instructions.  3. Create a [employer] ID. This will be your [employer] login ID and cannot be changed, so think of one that is secure and easy to remember.  4. Create a [employer] password. You can change your password at any time.  5. Enter your Password Reset Question and Answer. This can be used at a later time if you forget your password.   6. Enter your e-mail address. This allows you to receive e-mail notifications when new information is available in [employer].  7. Click Sign Up. You can now view your health information.    For assistance activating your [employer] account, call [phone]

## 2017-03-22 NOTE — ED AVS SNAPSHOT
3/22/2017          Wilbert Kennith Yeomans Jr.  [address]  [place] [address]    Dear Louie:    [facility] wants to ensure your discharge home is safe and you or your loved ones have had all your questions answered regarding your care after you leave the hospital.    You may receive a telephone call within two days of your discharge.  This call is to make certain you understand your discharge instructions as well as ensure we provided you with the best care possible during your stay with us.     The call will only last approximately 3-5 minutes and will be done by a nurse.    Once again, we want to ensure your discharge home is safe and that you have a clear understanding of any next steps in your care.  If you have any questions or concerns, please do not hesitate to contact us, we are here for you.  Thank you for choosing [facility] for your healthcare needs.    Sincerely,    Oli Kelly    [facility]

## 2017-03-29 ENCOUNTER — HOSPITAL ENCOUNTER (EMERGENCY)
Facility: MEDICAL CENTER | Age: 52
End: 2017-03-29
Attending: EMERGENCY MEDICINE
Payer: MEDICAID

## 2017-03-29 VITALS
WEIGHT: 253.53 LBS | RESPIRATION RATE: 16 BRPM | TEMPERATURE: 97.2 F | OXYGEN SATURATION: 98 % | HEART RATE: 79 BPM | HEIGHT: 76 IN | DIASTOLIC BLOOD PRESSURE: 91 MMHG | BODY MASS INDEX: 30.87 KG/M2 | SYSTOLIC BLOOD PRESSURE: 131 MMHG

## 2017-03-29 DIAGNOSIS — F41.9 ANXIETY: ICD-10-CM

## 2017-03-29 PROCEDURE — 700102 HCHG RX REV CODE 250 W/ 637 OVERRIDE(OP): Performed by: EMERGENCY MEDICINE

## 2017-03-29 PROCEDURE — A9270 NON-COVERED ITEM OR SERVICE: HCPCS | Performed by: EMERGENCY MEDICINE

## 2017-03-29 PROCEDURE — 99284 EMERGENCY DEPT VISIT MOD MDM: CPT

## 2017-03-29 RX ORDER — HYDROXYZINE HYDROCHLORIDE 25 MG/1
25 TABLET, FILM COATED ORAL ONCE
Status: COMPLETED | OUTPATIENT
Start: 2017-03-29 | End: 2017-03-29

## 2017-03-29 RX ADMIN — HYDROXYZINE HYDROCHLORIDE 25 MG: 25 TABLET ORAL at 07:13

## 2017-03-29 ASSESSMENT — LIFESTYLE VARIABLES: DO YOU DRINK ALCOHOL: NO

## 2017-03-29 NOTE — ED AVS SNAPSHOT
Outcome Referrals Access Code: [email or web address]  Expires: 4/3/2017  1:44 PM    Your email address is not on file at [facility].  Email Addresses are required for you to sign up for Outcome Referrals, please contact [phone] to verify your personal information and to provide your email address prior to attempting to register for Outcome Referrals.    Wilbert Kennith Yeomans .  [address]  [place] [address]    Outcome Referrals  A secure, online tool to manage your health information     [facility]’s Outcome Referrals® is a secure, online tool that connects you to your personalized health information from the privacy of your home -- day or night - making it very easy for you to manage your healthcare. Once the activation process is completed, you can even access your medical information using the Outcome Referrals shivani, which is available for free in the Apple Shivani store or Google Play store.     To learn more about Outcome Referrals, visit [email or web address]    There are two levels of access available (as shown below):   My Chart Features  [facility] Primary Care Doctor [facility]  Specialists [facility]  Urgent  Care Non-[facility] Primary Care Doctor   Email your healthcare team securely and privately 24/7 X X X    Manage appointments: schedule your next appointment; view details of past/upcoming appointments X      Request prescription refills. X      View recent personal medical records, including lab and immunizations X X X X   View health record, including health history, allergies, medications X X X X   Read reports about your outpatient visits, procedures, consult and ER notes X X X X   See your discharge summary, which is a recap of your hospital and/or ER visit that includes your diagnosis, lab results, and care plan X X  X     How to register for Outcome Referrals:  Once your e-mail address has been verified, follow the following steps to sign up for Outcome Referrals.     1. Go to  [email or web address]  2. Click on the Sign Up Now box, which takes you to the New Member Sign Up page.  You will need to provide the following information:  a. Enter your [employer] Access Code exactly as it appears at the top of this page. (You will not need to use this code after you’ve completed the sign-up process. If you do not sign up before the expiration date, you must request a new code.)   b. Enter your date of birth.   c. Enter your home email address.   d. Click Submit, and follow the next screen’s instructions.  3. Create a [employer] ID. This will be your Aircrm login ID and cannot be changed, so think of one that is secure and easy to remember.  4. Create a Aircrm password. You can change your password at any time.  5. Enter your Password Reset Question and Answer. This can be used at a later time if you forget your password.   6. Enter your e-mail address. This allows you to receive e-mail notifications when new information is available in [employer].  7. Click Sign Up. You can now view your health information.    For assistance activating your [employer] account, call [phone]

## 2017-03-29 NOTE — ED AVS SNAPSHOT
Home Care Instructions                                                                                                                Wilbert Kennith Yeomans Jr.   MRN: [id number]    Department:  [facility], Emergency Dept   Date of Visit:  3/29/2017            [facility], Emergency Dept    [address] [address]    Phone:  [phone]      You were seen by     Avi Lehman M.D.      Your Diagnosis Was     Anxiety     F41.9       Follow-up Information     1. Follow up with [facility].    Contact information    [address] [address] [phone]      Medication Information     Review all of your home medications and newly ordered medications with your primary doctor and/or pharmacist as soon as possible. Follow medication instructions as directed by your doctor and/or pharmacist.     Please keep your complete medication list with you and share with your physician. Update the information when medications are discontinued, doses are changed, or new medications (including over-the-counter products) are added; and carry medication information at all times in the event of emergency situations.               Medication List      ASK your doctor about these medications        Instructions    Morning Afternoon Evening Bedtime    ciprofloxacin 750 MG Tabs   Commonly known as:  CIPRO        Take 1 Tab by mouth 2 times a day.   Dose:  750 mg                        diazepam 2 MG Tabs   Commonly known as:  VALIUM        Take 2 mg by mouth every 6 hours as needed for Anxiety.   Dose:  2 mg                        hydrochlorothiazide 25 MG Tabs   Commonly known as:  HYDRODIURIL        Take 25 mg by mouth every day.   Dose:  25 mg                        ibuprofen 800 MG Tabs   Commonly known as:  MOTRIN        Take 800 mg by mouth every 8 hours as needed for Moderate Pain.   Dose:  800 mg                        * lorazepam 1 MG Tabs   Commonly  known as:  ATIVAN        Take 1 Tab by mouth every 6 hours as needed for Anxiety.   Dose:  1 mg                        * lorazepam 1 MG Tabs   Commonly known as:  ATIVAN        Take 1 Tab by mouth every four hours as needed for Anxiety.   Dose:  1 mg                        * lorazepam 1 MG Tabs   Commonly known as:  ATIVAN        Take 1 Tab by mouth every 6 hours as needed for Anxiety.   Dose:  1 mg                        * lorazepam 1 MG Tabs   Commonly known as:  ATIVAN        Take 1 Tab by mouth every 6 hours as needed for Anxiety.   Dose:  1 mg                        naltrexone 50 MG Tabs   Commonly known as:  ADELEADE        Doctor's comments:  Hold this medicine for 4 days then resume per your primary care doctor   Take 1 Tab by mouth every day.   Dose:  50 mg                        naproxen 500 MG Tabs   Commonly known as:  NAPROSYN        Take 500 mg by mouth every day.   Dose:  500 mg                        oxycodone-acetaminophen 7.5-325 MG per tablet   Commonly known as:  PERCOCET        Take 1 Tab by mouth every four hours as needed.   Dose:  1 Tab                        simvastatin 40 MG Tabs   Commonly known as:  ZOCOR        Take 40 mg by mouth every evening.   Dose:  40 mg                        * Notice:  This list has 4 medication(s) that are the same as other medications prescribed for you. Read the directions carefully, and ask your doctor or other care provider to review them with you.              Discharge Instructions       Panic Attacks  Panic attacks are sudden, short feelings of great fear or discomfort. You may have them for no reason when you are relaxed, when you are uneasy (anxious), or when you are sleeping.   HOME CARE  · Take all your medicines as told.  · Check with your doctor before starting new medicines.  · Keep all doctor visits.  GET HELP IF:  · You are not able to take your medicines as told.  · Your symptoms do not get better.  · Your symptoms get worse.  GET HELP RIGHT AWAY  IF:  · Your attacks seem different than your normal attacks.  · You have thoughts about hurting yourself or others.  · You take panic attack medicine and you have a side effect.  MAKE SURE YOU:  · Understand these instructions.  · Will watch your condition.  · Will get help right away if you are not doing well or get worse.     This information is not intended to replace advice given to you by your health care provider. Make sure you discuss any questions you have with your health care provider.     Document Released: 01/20/2012 Document Revised: 10/08/2014 Document Reviewed: 08/01/2014  [employer] Interactive Patient Education ©2016 [employer]            Patient Information     Patient Information    Following emergency treatment: all patient requiring follow-up care must return either to a private physician or a clinic if your condition worsens before you are able to obtain further medical attention, please return to the emergency room.     Billing Information    At [facility], we work to make the billing process streamlined for our patients.  Our Representatives are here to answer any questions you may have regarding your hospital bill.  If you have insurance coverage and have supplied your insurance information to us, we will submit a claim to your insurer on your behalf.  Should you have any questions regarding your bill, we can be reached online or by phone as follows:  Online: You are able pay your bills online or live chat with our representatives about any billing questions you may have. We are here to help Monday - Friday from 8:00am to 7:30pm and 9:00am - 12:00pm on Saturdays.  Please visit [email or web address]  for more information.   Phone:  [phone] or [phone]    Please note that your emergency physician, surgeon, pathologist, radiologist, anesthesiologist, and other specialists are not employed by [facility] and will therefore bill separately for their  services.  Please contact them directly for any questions concerning their bills at the numbers below:     Emergency Physician Services:  [phone]  [facility]:  [phone]  [facility]:  [phone]  [facility]:  [phone]    1. Your final bill may vary from the amount quoted upon discharge if all procedures are not complete at that time, or if your doctor has additional procedures of which we are not aware. You will receive an additional bill if you return to the Emergency Department at [facility] for suture removal regardless of the facility of which the sutures were placed.     2. Please arrange for settlement of this account at the emergency registration.    3. All self-pay accounts are due in full at the time of treatment.  If you are unable to meet this obligation then payment is expected within 4-5 days.     4. If you have had radiology studies (CT, X-ray, Ultrasound, MRI), you have received a preliminary result during your emergency department visit. Please contact the radiology department [phone] to receive a copy of your final result. Please discuss the Final result with your primary physician or with the follow up physician provided.     Crisis Hotline:  [place] Crisis Hotline:  9-840-TKLFAVO or 1-823.586.9092  [place] Crisis Hotline:    1-967.973.9605 or 325-765-2795         ED Discharge Follow Up Questions    1. In order to provide you with very good care, we would like to follow up with a phone call in the next few days.  May we have your permission to contact you?     YES /  NO    2. What is the best phone number to call you? (       )_____-__________    3. What is the best time to call you?      Morning  /  Afternoon  /  Evening                   Patient Signature:  ____________________________________________________________    Date:  ____________________________________________________________

## 2017-03-29 NOTE — ED AVS SNAPSHOT
3/29/2017          Wilbert Kennith Yeomans Jr.  [address]  [place] [address]    Dear Louie:    [facility] wants to ensure your discharge home is safe and you or your loved ones have had all your questions answered regarding your care after you leave the hospital.    You may receive a telephone call within two days of your discharge.  This call is to make certain you understand your discharge instructions as well as ensure we provided you with the best care possible during your stay with us.     The call will only last approximately 3-5 minutes and will be done by a nurse.    Once again, we want to ensure your discharge home is safe and that you have a clear understanding of any next steps in your care.  If you have any questions or concerns, please do not hesitate to contact us, we are here for you.  Thank you for choosing [facility] for your healthcare needs.    Sincerely,    Oli Kelly    [facility]

## 2017-03-29 NOTE — ED NOTES
"Wilbert Kennith Yeomans Jr.  51 y.o. male  Chief Complaint   Patient presents with   • Panic Attack     Since 2000 last night.      Pt amb to triage with steady gait for above complaint. Pt speaking in an even tone, pt is calm and cooperative, resp are even and unlabored. NAD.  Pt occasionally moans and states, \"I just can't feel good, the doctor doesn't do anything.\"   Pt placed in lobby. Pt educated on triage process. Pt encouraged to alert staff for any changes.    "

## 2017-03-29 NOTE — ED NOTES
"Pt states \"I need something to help me really bad, I got an appointment with [facility] tomorrow to get me something different to help me sleep, I can't stay in my room no more so I had to come get some help, I'm really tore up and you need to get me something now\", explained to pt that he has to be seen by the doctor and the doctor is the only person that can order medications, pt upset stating \"I been waiting out there for hours and now you want me to wait longer, this is ridiculous cause I need something to help me now\".  Pt states that he is \"out of my ativan\".  Pt closed curtain to room and is laying on bed with eyes closed.  "

## 2017-03-29 NOTE — ED PROVIDER NOTES
"ED Provider Note    CHIEF COMPLAINT  Chief Complaint   Patient presents with   • Panic Attack     Since 2000 last night.        HPI  Wilbert Kennith Yeomans Jr. is a 51 y.o. male who presents to the emergency Avi reporting panic attack. Patient has a history of anxiety and frequent panic attacks. He states he couldn't sleep. He just had to walk around was anxious nervous and just could not feel good. This is his 4th visit to the emergency department this month for anxiety. He denies having any chest pain or shortness of breath. No abdominal pain nausea or vomiting he just feels nervous. He ran out of his Ativan and requests this.    Chart reviewed. CMP yesterday was normal. Patient was advised during his most recent visit that we will not continue to prescribe Ativan from the emergency department.    Patient has an appointment tomorrow at the [facility].    REVIEW OF SYSTEMS  Pertinent negative: As above    PAST MEDICAL HISTORY  Past Medical History   Diagnosis Date   • Hypertension    • Hypercholesteremia    • Hepatitis C 1997   • Bronchitis      has had 3-4 times   • Diabetes (CMS-HCC)      \"borderline\"   • Dental disorder      upper partial, but lost it   • Anxiety        SOCIAL HISTORY  Social History   Substance Use Topics   • Smoking status: Current Every Day Smoker -- 0.25 packs/day for 30 years     Types: Cigarettes   • Smokeless tobacco: Never Used   • Alcohol Use: No      Comment: None since 1-5-2016       SURGICAL HISTORY  Past Surgical History   Procedure Laterality Date   • Ventral hernia repair laparoscopic  3/30/2016     Procedure: VENTRAL HERNIA REPAIR LAPAROSCOPIC WITH MESH;  Surgeon: Caden Cat M.D.;  Location: SURGERY [facility];  Service:        ALLERGIES  No Known Allergies    PHYSICAL EXAM  VITAL SIGNS: /91 mmHg  Pulse 79  Temp(Src) 36.2 °C (97.2 °F)  Resp 16  Ht 1.93 m (6' 4\")  Wt 115 kg (253 lb 8.5 oz)  BMI 30.87 kg/m2  SpO2 98%   Constitutional: Awake and alert. " Nontoxic  HENT:  Grossly normal  Eyes: Grossly normal  Neck: Normal range of motion  Cardiovascular: Normal heart rate   Thorax & Lungs: No respiratory distress  Abdomen: Nontender  Skin:  No pathologic rash.   Extremities: Well perfused  Psychiatric: Anxious    COURSE & MEDICAL DECISION MAKING  Patient presents with anxiety. His vital signs are normal. Physical exam is unremarkable. Chart reviewed as summarized above. He does not have any acute medical complaints. No indication for emergency workup. He was given Atarax orally in the ER. He has an appointment tomorrow. I encouraged him to keep this appointment. He was advised to return to the ER for any emergent medical concerns.    Patient referred to primary provider for blood pressure management    FINAL IMPRESSION  1. Anxiety      Disposition: home in good condition      This dictation was created using voice recognition software. The accuracy of the dictation is limited to the abilities of the software.  The nursing notes were reviewed and certain aspects of this information were incorporated into this note.      Electronically signed by: Avi Lehman, 3/29/2017 6:07 AM

## 2018-03-30 ENCOUNTER — RESOLUTE PROFESSIONAL BILLING HOSPITAL PROF FEE (OUTPATIENT)
Dept: HOSPITALIST | Facility: MEDICAL CENTER | Age: 53
End: 2018-03-30
Payer: MEDICAID

## 2018-03-30 ENCOUNTER — APPOINTMENT (OUTPATIENT)
Dept: RADIOLOGY | Facility: MEDICAL CENTER | Age: 53
DRG: 854 | End: 2018-03-30
Attending: ORTHOPAEDIC SURGERY
Payer: MEDICAID

## 2018-03-30 ENCOUNTER — APPOINTMENT (OUTPATIENT)
Dept: RADIOLOGY | Facility: MEDICAL CENTER | Age: 53
DRG: 854 | End: 2018-03-30
Attending: EMERGENCY MEDICINE
Payer: MEDICAID

## 2018-03-30 ENCOUNTER — HOSPITAL ENCOUNTER (INPATIENT)
Facility: MEDICAL CENTER | Age: 53
LOS: 5 days | DRG: 854 | End: 2018-04-04
Attending: EMERGENCY MEDICINE | Admitting: INTERNAL MEDICINE
Payer: MEDICAID

## 2018-03-30 DIAGNOSIS — E11.42 TYPE 2 DIABETES MELLITUS WITH DIABETIC POLYNEUROPATHY, WITHOUT LONG-TERM CURRENT USE OF INSULIN (HCC): ICD-10-CM

## 2018-03-30 DIAGNOSIS — M86.9 TOE OSTEOMYELITIS (HCC): ICD-10-CM

## 2018-03-30 PROBLEM — E87.1 HYPONATREMIA: Status: ACTIVE | Noted: 2018-03-30

## 2018-03-30 PROBLEM — A41.9 SEPSIS (HCC): Status: ACTIVE | Noted: 2018-03-30

## 2018-03-30 PROBLEM — N17.9 ACUTE KIDNEY INJURY (HCC): Status: ACTIVE | Noted: 2018-03-30

## 2018-03-30 PROBLEM — E11.9 TYPE 2 DIABETES MELLITUS (HCC): Status: ACTIVE | Noted: 2018-03-30

## 2018-03-30 LAB
ALBUMIN SERPL BCP-MCNC: 3.6 G/DL (ref 3.2–4.9)
ALBUMIN/GLOB SERPL: 1.1 G/DL
ALP SERPL-CCNC: 62 U/L (ref 30–99)
ALT SERPL-CCNC: 15 U/L (ref 2–50)
ANION GAP SERPL CALC-SCNC: 12 MMOL/L (ref 0–11.9)
APPEARANCE UR: CLEAR
AST SERPL-CCNC: 26 U/L (ref 12–45)
BASOPHILS # BLD AUTO: 0.3 % (ref 0–1.8)
BASOPHILS # BLD: 0.05 K/UL (ref 0–0.12)
BILIRUB SERPL-MCNC: 2 MG/DL (ref 0.1–1.5)
BILIRUB UR QL STRIP.AUTO: NEGATIVE
BUN SERPL-MCNC: 23 MG/DL (ref 8–22)
CALCIUM SERPL-MCNC: 9 MG/DL (ref 8.5–10.5)
CHLORIDE SERPL-SCNC: 98 MMOL/L (ref 96–112)
CO2 SERPL-SCNC: 17 MMOL/L (ref 20–33)
COLOR UR: YELLOW
CREAT SERPL-MCNC: 1.72 MG/DL (ref 0.5–1.4)
EOSINOPHIL # BLD AUTO: 0 K/UL (ref 0–0.51)
EOSINOPHIL NFR BLD: 0 % (ref 0–6.9)
ERYTHROCYTE [DISTWIDTH] IN BLOOD BY AUTOMATED COUNT: 37.7 FL (ref 35.9–50)
ERYTHROCYTE [SEDIMENTATION RATE] IN BLOOD BY WESTERGREN METHOD: 75 MM/HOUR (ref 0–20)
EST. AVERAGE GLUCOSE BLD GHB EST-MCNC: 120 MG/DL
GLOBULIN SER CALC-MCNC: 3.2 G/DL (ref 1.9–3.5)
GLUCOSE SERPL-MCNC: 258 MG/DL (ref 65–99)
GLUCOSE UR STRIP.AUTO-MCNC: NEGATIVE MG/DL
HBA1C MFR BLD: 5.8 % (ref 0–5.6)
HCT VFR BLD AUTO: 35.2 % (ref 42–52)
HGB BLD-MCNC: 12 G/DL (ref 14–18)
IMM GRANULOCYTES # BLD AUTO: 0.19 K/UL (ref 0–0.11)
IMM GRANULOCYTES NFR BLD AUTO: 1.3 % (ref 0–0.9)
KETONES UR STRIP.AUTO-MCNC: NEGATIVE MG/DL
LACTATE BLD-SCNC: 1.1 MMOL/L (ref 0.5–2)
LACTATE BLD-SCNC: 2.5 MMOL/L (ref 0.5–2)
LEUKOCYTE ESTERASE UR QL STRIP.AUTO: NEGATIVE
LYMPHOCYTES # BLD AUTO: 0.98 K/UL (ref 1–4.8)
LYMPHOCYTES NFR BLD: 6.8 % (ref 22–41)
MCH RBC QN AUTO: 30.3 PG (ref 27–33)
MCHC RBC AUTO-ENTMCNC: 34.1 G/DL (ref 33.7–35.3)
MCV RBC AUTO: 88.9 FL (ref 81.4–97.8)
MICRO URNS: NORMAL
MONOCYTES # BLD AUTO: 1.16 K/UL (ref 0–0.85)
MONOCYTES NFR BLD AUTO: 8 % (ref 0–13.4)
NEUTROPHILS # BLD AUTO: 12.07 K/UL (ref 1.82–7.42)
NEUTROPHILS NFR BLD: 83.6 % (ref 44–72)
NITRITE UR QL STRIP.AUTO: NEGATIVE
NRBC # BLD AUTO: 0 K/UL
NRBC BLD-RTO: 0 /100 WBC
PH UR STRIP.AUTO: 5.5 [PH]
PLATELET # BLD AUTO: 202 K/UL (ref 164–446)
PMV BLD AUTO: 10.2 FL (ref 9–12.9)
POTASSIUM SERPL-SCNC: 4 MMOL/L (ref 3.6–5.5)
PROT SERPL-MCNC: 6.8 G/DL (ref 6–8.2)
PROT UR QL STRIP: NEGATIVE MG/DL
RBC # BLD AUTO: 3.96 M/UL (ref 4.7–6.1)
RBC UR QL AUTO: NEGATIVE
SODIUM SERPL-SCNC: 127 MMOL/L (ref 135–145)
SP GR UR STRIP.AUTO: 1.01
UROBILINOGEN UR STRIP.AUTO-MCNC: 2 MG/DL
WBC # BLD AUTO: 14.5 K/UL (ref 4.8–10.8)

## 2018-03-30 PROCEDURE — 99285 EMERGENCY DEPT VISIT HI MDM: CPT

## 2018-03-30 PROCEDURE — 80053 COMPREHEN METABOLIC PANEL: CPT

## 2018-03-30 PROCEDURE — 700102 HCHG RX REV CODE 250 W/ 637 OVERRIDE(OP): Performed by: INTERNAL MEDICINE

## 2018-03-30 PROCEDURE — 71045 X-RAY EXAM CHEST 1 VIEW: CPT

## 2018-03-30 PROCEDURE — A9270 NON-COVERED ITEM OR SERVICE: HCPCS | Performed by: INTERNAL MEDICINE

## 2018-03-30 PROCEDURE — 87086 URINE CULTURE/COLONY COUNT: CPT

## 2018-03-30 PROCEDURE — 73660 X-RAY EXAM OF TOE(S): CPT | Mod: LT

## 2018-03-30 PROCEDURE — 99223 1ST HOSP IP/OBS HIGH 75: CPT | Performed by: INTERNAL MEDICINE

## 2018-03-30 PROCEDURE — 83605 ASSAY OF LACTIC ACID: CPT | Mod: 91

## 2018-03-30 PROCEDURE — 700105 HCHG RX REV CODE 258: Performed by: INTERNAL MEDICINE

## 2018-03-30 PROCEDURE — 96367 TX/PROPH/DG ADDL SEQ IV INF: CPT

## 2018-03-30 PROCEDURE — 770001 HCHG ROOM/CARE - MED/SURG/GYN PRIV*

## 2018-03-30 PROCEDURE — 94760 N-INVAS EAR/PLS OXIMETRY 1: CPT

## 2018-03-30 PROCEDURE — A9270 NON-COVERED ITEM OR SERVICE: HCPCS | Performed by: EMERGENCY MEDICINE

## 2018-03-30 PROCEDURE — 700102 HCHG RX REV CODE 250 W/ 637 OVERRIDE(OP): Performed by: EMERGENCY MEDICINE

## 2018-03-30 PROCEDURE — 84300 ASSAY OF URINE SODIUM: CPT

## 2018-03-30 PROCEDURE — 96365 THER/PROPH/DIAG IV INF INIT: CPT

## 2018-03-30 PROCEDURE — 700111 HCHG RX REV CODE 636 W/ 250 OVERRIDE (IP): Performed by: INTERNAL MEDICINE

## 2018-03-30 PROCEDURE — 700111 HCHG RX REV CODE 636 W/ 250 OVERRIDE (IP): Performed by: EMERGENCY MEDICINE

## 2018-03-30 PROCEDURE — 82962 GLUCOSE BLOOD TEST: CPT

## 2018-03-30 PROCEDURE — 87040 BLOOD CULTURE FOR BACTERIA: CPT

## 2018-03-30 PROCEDURE — 81003 URINALYSIS AUTO W/O SCOPE: CPT

## 2018-03-30 PROCEDURE — 36415 COLL VENOUS BLD VENIPUNCTURE: CPT

## 2018-03-30 PROCEDURE — 96366 THER/PROPH/DIAG IV INF ADDON: CPT

## 2018-03-30 PROCEDURE — 83036 HEMOGLOBIN GLYCOSYLATED A1C: CPT

## 2018-03-30 PROCEDURE — 85025 COMPLETE CBC W/AUTO DIFF WBC: CPT

## 2018-03-30 PROCEDURE — 85652 RBC SED RATE AUTOMATED: CPT

## 2018-03-30 PROCEDURE — 83935 ASSAY OF URINE OSMOLALITY: CPT

## 2018-03-30 PROCEDURE — 700105 HCHG RX REV CODE 258: Performed by: EMERGENCY MEDICINE

## 2018-03-30 RX ORDER — POLYETHYLENE GLYCOL 3350 17 G/17G
1 POWDER, FOR SOLUTION ORAL
Status: DISCONTINUED | OUTPATIENT
Start: 2018-03-30 | End: 2018-04-04 | Stop reason: HOSPADM

## 2018-03-30 RX ORDER — NICOTINE 21 MG/24HR
21 PATCH, TRANSDERMAL 24 HOURS TRANSDERMAL
Status: DISCONTINUED | OUTPATIENT
Start: 2018-03-31 | End: 2018-04-04 | Stop reason: HOSPADM

## 2018-03-30 RX ORDER — SODIUM CHLORIDE 9 MG/ML
INJECTION, SOLUTION INTRAVENOUS CONTINUOUS
Status: DISCONTINUED | OUTPATIENT
Start: 2018-03-30 | End: 2018-03-31

## 2018-03-30 RX ORDER — HEPARIN SODIUM 5000 [USP'U]/ML
5000 INJECTION, SOLUTION INTRAVENOUS; SUBCUTANEOUS EVERY 12 HOURS
Status: DISCONTINUED | OUTPATIENT
Start: 2018-03-31 | End: 2018-04-04 | Stop reason: HOSPADM

## 2018-03-30 RX ORDER — ACETAMINOPHEN 325 MG/1
650 TABLET ORAL EVERY 6 HOURS PRN
Status: DISCONTINUED | OUTPATIENT
Start: 2018-03-30 | End: 2018-04-04 | Stop reason: HOSPADM

## 2018-03-30 RX ORDER — HYDROCODONE BITARTRATE AND ACETAMINOPHEN 5; 325 MG/1; MG/1
2 TABLET ORAL ONCE
Status: COMPLETED | OUTPATIENT
Start: 2018-03-30 | End: 2018-03-30

## 2018-03-30 RX ORDER — BISACODYL 10 MG
10 SUPPOSITORY, RECTAL RECTAL
Status: DISCONTINUED | OUTPATIENT
Start: 2018-03-30 | End: 2018-04-04 | Stop reason: HOSPADM

## 2018-03-30 RX ORDER — L. ACIDOPHILUS/L.BULGARICUS 100MM CELL
1 GRANULES IN PACKET (EA) ORAL
Status: DISCONTINUED | OUTPATIENT
Start: 2018-03-30 | End: 2018-04-04 | Stop reason: HOSPADM

## 2018-03-30 RX ORDER — SODIUM CHLORIDE 9 MG/ML
500 INJECTION, SOLUTION INTRAVENOUS
Status: COMPLETED | OUTPATIENT
Start: 2018-03-30 | End: 2018-03-31

## 2018-03-30 RX ORDER — SODIUM CHLORIDE 9 MG/ML
1000 INJECTION, SOLUTION INTRAVENOUS ONCE
Status: COMPLETED | OUTPATIENT
Start: 2018-03-30 | End: 2018-03-30

## 2018-03-30 RX ORDER — AMOXICILLIN 250 MG
2 CAPSULE ORAL 2 TIMES DAILY
Status: DISCONTINUED | OUTPATIENT
Start: 2018-03-30 | End: 2018-04-04 | Stop reason: HOSPADM

## 2018-03-30 RX ORDER — DEXTROSE MONOHYDRATE 25 G/50ML
25 INJECTION, SOLUTION INTRAVENOUS
Status: DISCONTINUED | OUTPATIENT
Start: 2018-03-30 | End: 2018-04-04 | Stop reason: HOSPADM

## 2018-03-30 RX ADMIN — SODIUM CHLORIDE 1000 ML: 9 INJECTION, SOLUTION INTRAVENOUS at 16:22

## 2018-03-30 RX ADMIN — AMPICILLIN SODIUM AND SULBACTAM SODIUM 3 G: 2; 1 INJECTION, POWDER, FOR SOLUTION INTRAMUSCULAR; INTRAVENOUS at 16:34

## 2018-03-30 RX ADMIN — HYDROCODONE BITARTRATE AND ACETAMINOPHEN 2 TABLET: 5; 325 TABLET ORAL at 16:21

## 2018-03-30 RX ADMIN — VANCOMYCIN HYDROCHLORIDE 2800 MG: 100 INJECTION, POWDER, LYOPHILIZED, FOR SOLUTION INTRAVENOUS at 17:10

## 2018-03-30 RX ADMIN — SODIUM CHLORIDE 1000 ML: 9 INJECTION, SOLUTION INTRAVENOUS at 16:17

## 2018-03-30 RX ADMIN — INSULIN HUMAN 2 UNITS: 100 INJECTION, SOLUTION PARENTERAL at 23:30

## 2018-03-30 RX ADMIN — AMPICILLIN SODIUM AND SULBACTAM SODIUM 3 G: 2; 1 INJECTION, POWDER, FOR SOLUTION INTRAMUSCULAR; INTRAVENOUS at 23:36

## 2018-03-30 RX ADMIN — SODIUM CHLORIDE: 9 INJECTION, SOLUTION INTRAVENOUS at 21:48

## 2018-03-30 ASSESSMENT — PAIN SCALES - GENERAL: PAINLEVEL_OUTOF10: 0

## 2018-03-30 NOTE — ED PROVIDER NOTES
ED Provider Note    CHIEF COMPLAINT  Chief Complaint   Patient presents with   • Dizziness   • Fall     denies injury   • Toe Pain     left foot   • Wound Infection       HPI  Wilbert Kennith Yeomans Jr. is a 52 y.o. male who presents to the emergency department she complained of one month of worsening left toe injury and infection. He states that the pain has become unbearable over the last few days and he started to feel feverish. He denies any nausea or vomiting but states he felt his heart racing and felt very anxious. She denies any medications being only takes her blood pressure and anxiety. Not currently smoking cigarettes a known history of diabetes that he has been diagnosed here. Patient states the wound started started very small and continues to worsen over the last few weeks to the point that it's now begun to climb up his leg. Pain is sharp and worse with any movement or palpation of the left great toe radiating up the leg and currently is 6 out of 10 nothing is making it better home in it is not radiating    REVIEW OF SYSTEMS  Positives as above. Pertinent negatives include weakness numbness tingling headache nausea vomiting easy bleeding or bruising weight loss weight gain vision changes  All other review of systems are negative    PAST MEDICAL HISTORY   has a past medical history of Anxiety; Bronchitis; Dental disorder; Diabetes (CMS-[facility]); Hepatitis C (1997); Hypercholesteremia; and Hypertension.    SOCIAL HISTORY  Social History     Social History Main Topics   • Smoking status: Current Every Day Smoker     Packs/day: 0.25     Years: 30.00     Types: Cigarettes   • Smokeless tobacco: Never Used   • Alcohol use No      Comment: None since 1-5-2016   • Drug use: No      Comment: denies   • Sexual activity: Not on file       SURGICAL HISTORY   has a past surgical history that includes ventral hernia repair laparoscopic (3/30/2016).    CURRENT MEDICATIONS  Home Medications     Reviewed by Alondra DEL ROSARIO  CARIDAD Lei (Registered Nurse) on 03/30/18 at 1557  Med List Status: Partial   Medication Last Dose Status   ciprofloxacin (CIPRO) 750 MG Tab  Active   diazepam (VALIUM) 2 MG Tab  Active   hydrochlorothiazide (HYDRODIURIL) 25 MG Tab  Active   ibuprofen (MOTRIN) 800 MG Tab  Active   lorazepam (ATIVAN) 1 MG Tab  Active   lorazepam (ATIVAN) 1 MG Tab  Active   lorazepam (ATIVAN) 1 MG Tab  Active   lorazepam (ATIVAN) 1 MG Tab  Active   naltrexone (DEPADE) 50 MG Tab  Active   naproxen (NAPROSYN) 500 MG Tab  Active   oxycodone-acetaminophen (PERCOCET) 7.5-325 MG per tablet  Active   simvastatin (ZOCOR) 40 MG Tab  Active                ALLERGIES  No Known Allergies    PHYSICAL EXAM  VITAL SIGNS: /83   Pulse (!) 111   Temp (!) 38.2 °C (100.7 °F) (Oral)   Resp 18   Wt 113.4 kg (250 lb)   SpO2 96%   BMI 30.43 kg/m²    Pulse ox interpretation: I interpret this pulse ox as normal.  Constitutional: Alert in mild distress, warm to touch  HENT: Normocephalic atraumatic, MMM  Eyes: PER, Conjunctiva normal, Non-icteric.   Neck: Normal range of motion, No tenderness, Supple, No stridor.   Lymphatic: No lymphadenopathy noted.   Cardiovascular: Regular, mild tachycardia no murmurs.   Thorax & Lungs: Normal breath sounds, No respiratory distress, No wheezing, No chest tenderness.   Abdomen: Bowel sounds normal, Soft, No tenderness, No pulsatile masses. No peritoneal signs.  Skin: Warm, Dry, No erythema, No rash.   Back: No bony tenderness, No CVA tenderness.   Extremities: Right DP pulse 2+, left DP pulse. He sequentially 1+ but PT pulses 2+ on that side, open wound at the left great toe with questionable small amount of bone exposed the toe is erythematous swollen edematous without sensation to light touch, Refill about 3 seconds at the base of the toe there are no black or ecchymotic areas, erythema extends up the dorsal aspect of the foot to the mid shin  Musculoskeletal: Good range of motion in all major joints. No  tenderness to palpation or major deformities noted.   Neurologic: Alert and oriented x3, No focal deficits noted.   Psychiatric: Affect normal, Judgment normal, Mood normal.       DIFFERENTIAL DIAGNOSIS AND WORK UP PLAN    This is a 52 y.o. male who presents with chief complaint evidence likely of a necrotic toe with possible osteo-with any ascending cellulitis of the lower extremity. We'll perform laboratory analysis including ESR x-ray of the lower extremity treated with antibiotics him in the Hospital with likely orthopedic consult    DIAGNOSTIC STUDIES / PROCEDURES      LABS  Pertinent Lab Findings  White blood cell count elevated with a left shift with a mildly elevated lactate at 2.5, sodium of 127 and with a low bicarb and elevated anion gap likely secondary lactate with evidence of elevated glucose and a mild HUGO sed rate also elevated at 75      RADIOLOGY  DX-TOE(S) 2+ LEFT   Final Result      Cortical irregularity in the lateral base of the first distal phalanx with overlying soft tissue swelling and foci of air. Findings are highly suspicious for osteomyelitis. If clinically indicated, MRI may be helpful for confirmation.      DX-CHEST-PORTABLE (1 VIEW)   Final Result      1.  No acute cardiac or pulmonary abnormality is noted.      2.  Cardiomegaly. Prominence of the ascending aorta.      DX-FOOT-COMPLETE 3+ LEFT    (Results Pending)     The radiologist's interpretation of all radiological studies have been reviewed by me.      COURSE & MEDICAL DECISION MAKING  Pertinent Labs & Imaging studies reviewed. (See chart for details)    5:25 PM  Spoke babs Claros for admission and he has accepted the patient for admission - after xray will discus the case with orthopedics     5:45 PM  Spoke w Dr Talbot with orthopedics - he will come to assess the patient this evening.     6:34 PM  Re-paged ortho to discus small foci of air on xray - [place] out and did not hear back from him however he did discuss filled come see  the patient the hospital this evening.    DISPOSITION:  Patient will be admitted to Dr Claros in guarded condition.    Patient presented with evidence of likely osteo-of the left toe as well and has small foci of air there is no blistering he is doing better after IV fluids but he will need debridement and I did repeat orthopedics. They will come to see the patient at bedside this evening. Broad spectrum antibiotics and fluids secondary to his mild sepsis secondary to his osteo    /75   Pulse 66   Temp 36 °C (96.8 °F)   Resp 18   Wt 113.4 kg (250 lb)   SpO2 99%   BMI 30.43 kg/m²       FINAL IMPRESSION  1. Osteomyelitis L great toe  2. Acute renal injury  3. Lactic acidosis  4. Hyponatremia        Electronically signed by: Awa Hood, 3/30/2018 4:00 PM    This dictation has been created using voice recognition software and/or scribes. The accuracy of the dictation is limited by the abilities of the software and the expertise of the scribes. I expect there may be some errors of grammar and possibly content. I made every attempt to manually correct the errors within my dictation. However, errors related to voice recognition software and/or scribes may still exist and should be interpreted within the appropriate context.

## 2018-03-30 NOTE — ED TRIAGE NOTES
.  Chief Complaint   Patient presents with   • Dizziness   • Fall     denies injury   • Toe Pain     left foot   • Wound Infection     Ambulated to triage pt anxious. Reports became dizzy and fell. Pt also concerned about wound to toe x 3 days. Pt febrile, sepsis score 3.

## 2018-03-31 ENCOUNTER — APPOINTMENT (OUTPATIENT)
Dept: RADIOLOGY | Facility: MEDICAL CENTER | Age: 53
DRG: 854 | End: 2018-03-31
Attending: ORTHOPAEDIC SURGERY
Payer: MEDICAID

## 2018-03-31 ENCOUNTER — APPOINTMENT (OUTPATIENT)
Dept: RADIOLOGY | Facility: MEDICAL CENTER | Age: 53
DRG: 854 | End: 2018-03-31
Attending: NURSE PRACTITIONER
Payer: MEDICAID

## 2018-03-31 LAB
ANION GAP SERPL CALC-SCNC: 7 MMOL/L (ref 0–11.9)
BUN SERPL-MCNC: 17 MG/DL (ref 8–22)
CALCIUM SERPL-MCNC: 8.4 MG/DL (ref 8.5–10.5)
CHLORIDE SERPL-SCNC: 106 MMOL/L (ref 96–112)
CO2 SERPL-SCNC: 21 MMOL/L (ref 20–33)
CREAT SERPL-MCNC: 1.03 MG/DL (ref 0.5–1.4)
CRP SERPL HS-MCNC: 17.02 MG/DL (ref 0–0.75)
ERYTHROCYTE [DISTWIDTH] IN BLOOD BY AUTOMATED COUNT: 40.1 FL (ref 35.9–50)
GLUCOSE BLD-MCNC: 118 MG/DL (ref 65–99)
GLUCOSE BLD-MCNC: 121 MG/DL (ref 65–99)
GLUCOSE BLD-MCNC: 128 MG/DL (ref 65–99)
GLUCOSE BLD-MCNC: 142 MG/DL (ref 65–99)
GLUCOSE BLD-MCNC: 196 MG/DL (ref 65–99)
GLUCOSE SERPL-MCNC: 149 MG/DL (ref 65–99)
GRAM STN SPEC: NORMAL
HCT VFR BLD AUTO: 33.8 % (ref 42–52)
HGB BLD-MCNC: 11.2 G/DL (ref 14–18)
MCH RBC QN AUTO: 30.9 PG (ref 27–33)
MCHC RBC AUTO-ENTMCNC: 33.1 G/DL (ref 33.7–35.3)
MCV RBC AUTO: 93.1 FL (ref 81.4–97.8)
OSMOLALITY SERPL: 287 MOSM/KG H2O (ref 278–298)
OSMOLALITY UR: 254 MOSM/KG H2O (ref 300–900)
PLATELET # BLD AUTO: 148 K/UL (ref 164–446)
PMV BLD AUTO: 10.1 FL (ref 9–12.9)
POTASSIUM SERPL-SCNC: 3.9 MMOL/L (ref 3.6–5.5)
RBC # BLD AUTO: 3.63 M/UL (ref 4.7–6.1)
SIGNIFICANT IND 70042: NORMAL
SITE SITE: NORMAL
SODIUM SERPL-SCNC: 134 MMOL/L (ref 135–145)
SODIUM UR-SCNC: 16 MMOL/L
SOURCE SOURCE: NORMAL
TSH SERPL DL<=0.005 MIU/L-ACNC: 0.55 UIU/ML (ref 0.38–5.33)
VANCOMYCIN SERPL-MCNC: 12.1 UG/ML
WBC # BLD AUTO: 9.2 K/UL (ref 4.8–10.8)

## 2018-03-31 PROCEDURE — 700105 HCHG RX REV CODE 258: Performed by: HOSPITALIST

## 2018-03-31 PROCEDURE — 770001 HCHG ROOM/CARE - MED/SURG/GYN PRIV*

## 2018-03-31 PROCEDURE — 87070 CULTURE OTHR SPECIMN AEROBIC: CPT | Mod: 91

## 2018-03-31 PROCEDURE — 87186 SC STD MICRODIL/AGAR DIL: CPT

## 2018-03-31 PROCEDURE — 82962 GLUCOSE BLOOD TEST: CPT | Mod: 91

## 2018-03-31 PROCEDURE — 87077 CULTURE AEROBIC IDENTIFY: CPT

## 2018-03-31 PROCEDURE — 700102 HCHG RX REV CODE 250 W/ 637 OVERRIDE(OP): Performed by: HOSPITALIST

## 2018-03-31 PROCEDURE — 87205 SMEAR GRAM STAIN: CPT

## 2018-03-31 PROCEDURE — 700111 HCHG RX REV CODE 636 W/ 250 OVERRIDE (IP): Performed by: HOSPITALIST

## 2018-03-31 PROCEDURE — 80202 ASSAY OF VANCOMYCIN: CPT

## 2018-03-31 PROCEDURE — 80048 BASIC METABOLIC PNL TOTAL CA: CPT

## 2018-03-31 PROCEDURE — 83930 ASSAY OF BLOOD OSMOLALITY: CPT

## 2018-03-31 PROCEDURE — 700102 HCHG RX REV CODE 250 W/ 637 OVERRIDE(OP): Performed by: INTERNAL MEDICINE

## 2018-03-31 PROCEDURE — 73630 X-RAY EXAM OF FOOT: CPT | Mod: LT

## 2018-03-31 PROCEDURE — A9270 NON-COVERED ITEM OR SERVICE: HCPCS | Performed by: INTERNAL MEDICINE

## 2018-03-31 PROCEDURE — 84443 ASSAY THYROID STIM HORMONE: CPT

## 2018-03-31 PROCEDURE — 36415 COLL VENOUS BLD VENIPUNCTURE: CPT

## 2018-03-31 PROCEDURE — 85027 COMPLETE CBC AUTOMATED: CPT

## 2018-03-31 PROCEDURE — A9270 NON-COVERED ITEM OR SERVICE: HCPCS | Performed by: HOSPITALIST

## 2018-03-31 PROCEDURE — 73630 X-RAY EXAM OF FOOT: CPT | Mod: RT

## 2018-03-31 PROCEDURE — 99232 SBSQ HOSP IP/OBS MODERATE 35: CPT | Performed by: HOSPITALIST

## 2018-03-31 PROCEDURE — 700111 HCHG RX REV CODE 636 W/ 250 OVERRIDE (IP): Performed by: INTERNAL MEDICINE

## 2018-03-31 PROCEDURE — 86140 C-REACTIVE PROTEIN: CPT

## 2018-03-31 PROCEDURE — 700105 HCHG RX REV CODE 258: Performed by: INTERNAL MEDICINE

## 2018-03-31 RX ORDER — HYDROCODONE BITARTRATE AND ACETAMINOPHEN 5; 325 MG/1; MG/1
1-2 TABLET ORAL EVERY 6 HOURS PRN
Status: DISCONTINUED | OUTPATIENT
Start: 2018-03-31 | End: 2018-04-04 | Stop reason: HOSPADM

## 2018-03-31 RX ADMIN — LACTOBACILLUS ACIDOPHILUS / LACTOBACILLUS BULGARICUS 1 PACKET: 100 MILLION CFU STRENGTH GRANULES at 16:59

## 2018-03-31 RX ADMIN — HYDROCODONE BITARTRATE AND ACETAMINOPHEN 1 TABLET: 5; 325 TABLET ORAL at 10:29

## 2018-03-31 RX ADMIN — LACTOBACILLUS ACIDOPHILUS / LACTOBACILLUS BULGARICUS 1 PACKET: 100 MILLION CFU STRENGTH GRANULES at 12:24

## 2018-03-31 RX ADMIN — ACETAMINOPHEN 650 MG: 325 TABLET, FILM COATED ORAL at 18:37

## 2018-03-31 RX ADMIN — LACTOBACILLUS ACIDOPHILUS / LACTOBACILLUS BULGARICUS 1 PACKET: 100 MILLION CFU STRENGTH GRANULES at 09:28

## 2018-03-31 RX ADMIN — HYDROCODONE BITARTRATE AND ACETAMINOPHEN 2 TABLET: 5; 325 TABLET ORAL at 21:21

## 2018-03-31 RX ADMIN — VANCOMYCIN HYDROCHLORIDE 1600 MG: 100 INJECTION, POWDER, LYOPHILIZED, FOR SOLUTION INTRAVENOUS at 12:59

## 2018-03-31 RX ADMIN — ACETAMINOPHEN 650 MG: 325 TABLET, FILM COATED ORAL at 09:28

## 2018-03-31 RX ADMIN — AMPICILLIN SODIUM AND SULBACTAM SODIUM 3 G: 2; 1 INJECTION, POWDER, FOR SOLUTION INTRAMUSCULAR; INTRAVENOUS at 17:00

## 2018-03-31 RX ADMIN — HEPARIN SODIUM 5000 UNITS: 5000 INJECTION, SOLUTION INTRAVENOUS; SUBCUTANEOUS at 09:28

## 2018-03-31 RX ADMIN — AMPICILLIN SODIUM AND SULBACTAM SODIUM 3 G: 2; 1 INJECTION, POWDER, FOR SOLUTION INTRAMUSCULAR; INTRAVENOUS at 05:32

## 2018-03-31 RX ADMIN — AMPICILLIN SODIUM AND SULBACTAM SODIUM 3 G: 2; 1 INJECTION, POWDER, FOR SOLUTION INTRAMUSCULAR; INTRAVENOUS at 12:24

## 2018-03-31 RX ADMIN — NICOTINE TRANSDERMAL SYSTEM 21 MG: 21 PATCH, EXTENDED RELEASE TRANSDERMAL at 05:33

## 2018-03-31 RX ADMIN — STANDARDIZED SENNA CONCENTRATE AND DOCUSATE SODIUM 2 TABLET: 8.6; 5 TABLET, FILM COATED ORAL at 09:28

## 2018-03-31 ASSESSMENT — ENCOUNTER SYMPTOMS
NAUSEA: 0
HEADACHES: 0
PALPITATIONS: 0
FEVER: 0
NERVOUS/ANXIOUS: 1
FOCAL WEAKNESS: 0
SENSORY CHANGE: 1
ABDOMINAL PAIN: 0
CHILLS: 0
DIZZINESS: 1
VOMITING: 0
SHORTNESS OF BREATH: 0

## 2018-03-31 ASSESSMENT — PATIENT HEALTH QUESTIONNAIRE - PHQ9
8. MOVING OR SPEAKING SO SLOWLY THAT OTHER PEOPLE COULD HAVE NOTICED. OR THE OPPOSITE, BEING SO FIGETY OR RESTLESS THAT YOU HAVE BEEN MOVING AROUND A LOT MORE THAN USUAL: NOT AT ALL
5. POOR APPETITE OR OVEREATING: NOT AT ALL
6. FEELING BAD ABOUT YOURSELF - OR THAT YOU ARE A FAILURE OR HAVE LET YOURSELF OR YOUR FAMILY DOWN: NOT AL ALL
2. FEELING DOWN, DEPRESSED, IRRITABLE, OR HOPELESS: NEARLY EVERY DAY
1. LITTLE INTEREST OR PLEASURE IN DOING THINGS: NOT AT ALL
SUM OF ALL RESPONSES TO PHQ9 QUESTIONS 1 AND 2: 3
7. TROUBLE CONCENTRATING ON THINGS, SUCH AS READING THE NEWSPAPER OR WATCHING TELEVISION: NOT AT ALL
4. FEELING TIRED OR HAVING LITTLE ENERGY: SEVERAL DAYS
SUM OF ALL RESPONSES TO PHQ QUESTIONS 1-9: 4
9. THOUGHTS THAT YOU WOULD BE BETTER OFF DEAD, OR OF HURTING YOURSELF: NOT AT ALL
3. TROUBLE FALLING OR STAYING ASLEEP OR SLEEPING TOO MUCH: NOT AT ALL

## 2018-03-31 ASSESSMENT — LIFESTYLE VARIABLES
ALCOHOL_USE: NO
EVER_SMOKED: YES

## 2018-03-31 ASSESSMENT — PAIN SCALES - GENERAL
PAINLEVEL_OUTOF10: 8
PAINLEVEL_OUTOF10: 7

## 2018-03-31 NOTE — ASSESSMENT & PLAN NOTE
Resolved. Na low at 127 on admission, hypovolemic hyponatremia. Improved with normal saline and now normalized. Asymptomatic.  - continue to trend

## 2018-03-31 NOTE — ASSESSMENT & PLAN NOTE
Toe osteo with gangrene of multiple other toes bilaterally. Leukocytosis resolved. CRP and ESR elevated. S/p toe amputation on 4/1.  - Ortho and ID recs greatly appreciated  - abx ceftriaxone per ID recs  - pain control  - LPS following

## 2018-03-31 NOTE — PROGRESS NOTES
Pharmacy Kinetics 52 y.o. male on vancomycin day # 2   3/31/2018    Currently on Vancomycin Pulse Dosing:   Vancomycin 2,800 mg iv x1; 3/30/18@1710     Indication for Treatment: osteomyelitis of the left toe      Pertinent history per medical record: Admitted on 3/30/2018 for left toe osteomyelitis. The patient is a 52 y.o. male who presented to the [facility] ER with left toe pain and a wound infection s/p injury. The patient also reports subjective fevers. His PMHx includes DM. His foot X-Ray was suspicious for osteomyelitis of the L great toe. He was noted to have a leukocytosis with a mild lactic acidosis on presentation.       Other antibiotics: ampicillin/sulbactam 3 g IV Q6h     Allergies: Patient has no known allergies.      List concerns for renal function: mild HUGO (SCr above baseline), age, BMI ~30.4 m2     Pertinent cultures to date:   18:PBCx2:NGTD    Recent Labs      18   1504  18   0034   WBC  14.5*  9.2   NEUTSPOLYS  83.60*   --      Recent Labs      18   1504  18   0034   BUN  23*  17   CREATININE  1.72*  1.03   ALBUMIN  3.6   --      No results for input(s): VANCOTROUGH, VANCOPEAK, VANCORANDOM in the last 72 hours.No intake or output data in the 24 hours ending 18 0829   Blood pressure 103/64, pulse 86, temperature 37.3 °C (99.1 °F), resp. rate 17, weight 113.4 kg (250 lb), SpO2 93 %. Temp (24hrs), Av.9 °C (98.4 °F), Min:36 °C (96.8 °F), Max:38.2 °C (100.7 °F)      A/P   1. Vancomycin dose change: Pulse dosing   2. Next vancomycin level: 18@1100  3. Goal trough: 12-16 mcg/mL   4. Comments: Renal indices improving, level today. WBC decreased over interval, tmax 38.2. Cx NGTD. Continue same.    Sandoval Reynolds PharmD BCPS     Pateint cleared load , vancomycin 1600 mg iv q12hr (1230 0030)    Sandoval AlbertsD BCPS

## 2018-03-31 NOTE — PROGRESS NOTES
"LIMB PRESERVATION SERVICE NOTE:    Wound(s): Bilateral Feet Partial Thickness Wounds with Necrotic Edges, Left Great Toe Fx with +OM. Right Foot - 1st MTH Medial Aspect - DFU  Allergies: Patient has no known allergies.  Start of Care: 3/31/2018  Room/Bed  T337/00      Subjective:      History of Present Illness:   Past Medical History:   Diagnosis Date   • Anxiety    • Bronchitis     has had 3-4 times   • Dental disorder     upper partial, but lost it   • Diabetes (CMS-HCC)     \"borderline\"   • Hepatitis C 1997   • Hypercholesteremia    • Hypertension        Patient is a 52 y.o. male. Admitted for Toe osteomyelitis (CMS-HCC),        Patient presented with Bilateral Feet Partial Thickness Wounds with Necrotic Edges, Left Great Toe Fx with highly probable OM, Right Foot - 1st MTH Medial Aspect - DFU. Pt states the bilateral necrosis appeared in days and that he did not hit the left great toe or injure it in any way. Pt says the Left Toe has been infected for a month though. Pt states they are occasional smoker \" Once in awhile\".   Small necrotic areas with surrounding erythema and swelling both feet on the distal aspects of the toes. Eschar is stable and dry.                  Patient denies fevers chills, nausea, vomiting.     Pain:        Patient resting comfortably        Objective:        PHYSICAL EXAMINATION:     General Appearance:  Well developed,  nourished, in no acute distress    Sensory Assessment       Patient sensation insensate bilaterally with light touch 10 of 10 points        Vascular Assessment       +2 PT bilaterally  +1 DP on Left Foot  Unable to Palpate DP on Right Foot - Found on Doppler      Orthotic, protective, supportive devices:     Offloading  Offloading Shoe ordered - offloading Left Great Toe/1st MTH - Mens size 13    Vitals    /64   Pulse 86   Temp 37.3 °C (99.1 °F)   Resp 17   Wt 113.4 kg (250 lb)   SpO2 93%   BMI 30.43 kg/m²      Wound Characteristics                         "                            Location: Left Great Toe - Necrotic distal tip and Toe nail beds Initial Evaluation  Date:3/31/2018   Tissue Type and %: %100 Stable Black Eschar   Periwound: Red/Callus   Drainage: Scant purulent   Exposed structures None   Wound Edges:   Open   Odor: Malodorous   S&S of Infection:   Edema/Erythema   Edema: Localized at Site   Sensation: Insensate               Measurements: Initial Evaluation  Date:3/31/2018   Length (cm)    Width (cm)    Depth (cm)    Tract/undermine      Location: Left Foot - Digits 2-5 - Necrotic distal tips, webbing between Toes and Toe nail beds Initial Evaluation  Date:3/31/2018   Tissue Type and %: %100 Stable Black Eschar   Periwound: Red/Callus   Drainage: None   Exposed structures None   Wound Edges:   Open   Odor: None   S&S of Infection:   Edema/Erythema   Edema: Localized at Site   Sensation: Insensate               Measurements: Initial Evaluation  Date:3/31/2018   Length (cm)    Width (cm)    Depth (cm)    Tract/undermine              Location: Right Great Toe - Necrotic medial aspect and Toe nail beds Initial Evaluation  Date:3/31/2018   Tissue Type and %: %100 Stable Black Eschar   Periwound: Red/Callus   Drainage: Scant serosanginous   Exposed structures None   Wound Edges:   Open   Odor: None   S&S of Infection:   Edema/Erythema   Edema: Localized at Site   Sensation: Insensate               Measurements: Initial Evaluation  Date:3/31/2018   Length (cm)    Width (cm)    Depth (cm)    Tract/undermine            Location: Right Foot - Digits 2-5 - Necrotic distal tip and Toe nail beds Initial Evaluation  Date:3/31/2018   Tissue Type and %: %100 Stable Black Eschar   Periwound: Red/Callus   Drainage: None   Exposed structures None   Wound Edges:   Open   Odor: None   S&S of Infection:   Edema/Erythema   Edema: Localized at Site   Sensation: Insensate               Measurements: Initial Evaluation  Date:3/31/2018   Length (cm) 0.3   Width (cm) 1   Depth  (cm) 0.2   Tract/undermine                Location: Right Foot - 1st MTH Medial Aspect - DFU Initial Evaluation  Date:3/31/2018   Tissue Type and %: %100 Yellow/Brown Slough   Periwound: Callus   Drainage: None   Exposed structures None   Wound Edges:   Attached   Odor: None   S&S of Infection:   None   Edema: Localized at Site   Sensation: Insensate               Measurements: Initial Evaluation  Date:3/31/2018   Length (cm) 1.5   Width (cm) 1.5   Depth (cm)    Tract/undermine                Tests and Measures:    Labs  Recent Labs      03/30/18   1504  03/31/18   0034   WBC  14.5*  9.2   RBC  3.96*  3.63*   HEMOGLOBIN  12.0*  11.2*   HEMATOCRIT  35.2*  33.8*   MCV  88.9  93.1   MCH  30.3  30.9   MCHC  34.1  33.1*   RDW  37.7  40.1   PLATELETCT  202  148*   MPV  10.2  10.1     Recent Labs      03/30/18   1504  03/31/18   0034   SODIUM  127*  134*   POTASSIUM  4.0  3.9   CHLORIDE  98  106   CO2  17*  21   GLUCOSE  258*  149*   BUN  23*  17       A1C 5.8% controlled diabetic  ESR: 75  CRP: NA    CELIO    ordered  R:  L:     RLE:    LLE:      Imaging    X-Ray: Left Foot 3/31/18    Impression       Fracture of the base of the distal phalanx of the first digit.    Lucency within the distal phalanx could be related to osteomyelitis.    Soft tissue swelling and soft tissue air is seen within the first digit.    Soft tissue swelling extends into the foot.     Right Foot Ordered    Infection Management  Microbiology pending    Antibiotics per ID, unasyn, vanco       Procedures:     Debridement :  NA   Cleansed with:     NS                                                                     Periwound protected with: skin prep   Primary dressing: betadine 3%, AQAG   Secondary Dressing: foam   Other:     Procedures/Frequency:    NURSING TO CHANGE LEFT GREAT TOE DRESSING EVERY 72 HOURS AND PRN FOR SATURATION OR DISLODGEMENT  Nursing to cleanse wound/periwound with Normal Saline (NS).  Pat periwound dry and apply skin prep/No  Sting to periwound.  Let air dry for 1-2 minutes.  Apply a piece of AqAg (Hydrofiber Silver), cut to size, to the wound bed.  Cover with non adhesive foam, cut to size, and secure with hypafix tape.  Please take Weekly Wound Photos. Notify wound team if wound deteriorates or fails to progress.    NURSING TO PAINT BETADINE 3% SOLUTION TO BILATERAL FEET NECROTIC WOUND BEDs EVERY SHIFT AND PRN   Nursing to cleanse wound/periwound with Normal Saline (NS).  Paint Wound Beds with betadine 3% Solution. Leave Open to Air, Cover with non adhesive foam, cut to size, and secure with hypafix tape if drainage occurs.  Please take Weekly Wound Photos. Notify wound team if wound deteriorates or fails to progress.     Patient Education: Implications of loss of protective sensation (LOPS) discussed with patient- including increased risk for amputation.  Advised to check foot at least daily, moisturize foot, and to always wear protective foot wear.    Professional Collaboration: IM, Bedside RN, Dr Talbot and Dr Baez, ID Dr Feng      Assessment:      Wound etiology: Cellulitis, DFUs    Wound Progress:  Initial Assessment    Rationale for Treatment:Aquacel Ag (Silver):  AgAq - Hydrofiber Silver to manage bioburden, absorb exudate, and maintain moist wound environment without laterally wicking exudate therefore reducing sen-wound maceration. Betadine 3%: Decrease Bioburden- stabilize and dry eschar    Patient tolerance/compliance: pt willing to comply and agrees to surgical Tx of OM and Toes    Complicating factors: homelessness, infection     Need for ongoing  Wound Care: Nursing to complete wound care, LPS to follow    Goals: Decreased wound size 2% each week -- 100% granulation in 2 weeks    Plan:      Treatment Plan and Recommendations:    1. Wound:     Bilateral Feet Partial Thickness Wounds with Necrotic Edges, Left Great Toe Fx with   +OM. Right Foot - 1st MTH Medial Aspect - DFU    2. Labs\Imaging:    A1C -  Reviewed    ESR - Reviewed    CRP - Ordered    CELIO - Reviewed    X-Ray(s) - Left Foot Reviewed    Right Foot ordered    3. Treatment:  Pt NPO at midnight for Left Great Toe/possible Ray Amputation with [place] at   0830 hours On 4/1/18    Wound Care by Nursing, LPS to Follow.                          Dressing Orders Updated. Skin Care Updated.     Nursing to change every 72 and PRN for Saturation or Dislodgement pt betadine 3%   every shift      4. Neuropathy:      Implications of loss of protective sensation (LOPS) discussed with    patient- including increased risk for amputation.  Advised to check feet at     least daily, moisturize feet, and to always wear protective foot wear.     5. Collaboration:     Ortho Techs involved: Offloading shoe ordered    IV Antibiotics per ID    6. Orthotics/Prosthetics:     pt to be evaluated for orthotics as OP Abilty will be involved at LPS rounds, pt to wear   shoes that do not rub on Wound sites. Offloading shoe ordered    Anticipated discharge plans (X):  SNF:           Home Care:           Outpatient Wound Center:          X LPS ROUNDS 4/20/18 for suture removal  Self Care:            Other:           TBD:    Patient requires skilled therapeutic intervention for debridement, product selection and application, education, wound bed preparation and assessment.    D/C with OP Wound Care and LPS rounds

## 2018-03-31 NOTE — WOUND TEAM
LPS service has been consulted for this patient and will oversee wound care as indicated.  No involvement by wound team at this time.

## 2018-03-31 NOTE — H&P
"Hospital Medicine History and Physical    Date of Service  3/30/2018    Chief Complaint  Chief Complaint   Patient presents with   • Dizziness   • Fall     denies injury   • Toe Pain     left foot   • Wound Infection       History of Presenting Illness  52 y.o. male who presented 3/30/2018 with Dizziness; Fall (denies injury); Toe Pain (left foot); and Wound Infection  Poor historian. He mentioned he does not know what happened, he denied fall or dizziness he admits that he has problems with his toes and foot wounds. He is very guarded in his history. He was uninterested with answering questions but when I told him he can eat, he perked up. Staff notes reveal that he has had L toe injury and infection for about a month and that it is worsening, he is starting to feel malaised and has subjective fevers. He has diabetes (he denied that to me) and does smoke.   At the ED, he had a low grade temp but hemodynamically stable. CXR showed no acute process. Foot Xray high suspicious for osteomyelitis L great toe. Leukocytosis. Mild lactic acidosis. Hyponatremia. ED physician consulted Orthopedics (see her note).  When I saw him at the ED, no acute distress. Seems to be malaised and tired.   Small ischemic areas with surrounding erythema and swelling both L and R great toes and base of those toes, but L more swollen, more tender and more ischemic areas than the right.  Primary Care Physician  Pcp Pt States None    Consultants  Orthopedics    Code Status  full    Review of Systems  Review of Systems   Unable to perform ROS: Other   Unreliable.     Past Medical History  Past Medical History:   Diagnosis Date   • Hepatitis C 1997   • Anxiety    • Bronchitis     has had 3-4 times   • Dental disorder     upper partial, but lost it   • Diabetes (CMS-HCC)     \"borderline\"   • Hypercholesteremia    • Hypertension        Surgical History  Past Surgical History:   Procedure Laterality Date   • VENTRAL HERNIA REPAIR LAPAROSCOPIC  " 3/30/2016    Procedure: VENTRAL HERNIA REPAIR LAPAROSCOPIC WITH MESH;  Surgeon: Caden Cat M.D.;  Location: SURGERY [facility];  Service:        Medications  No current facility-administered medications on file prior to encounter.      No current outpatient prescriptions on file prior to encounter.     Current Facility-Administered Medications:   •  senna-docusate **AND** polyethylene glycol/lytes **AND** magnesium hydroxide **AND** bisacodyl  •  Respiratory Care per Protocol  •  heparin  •  acetaminophen  •  INITIATE NICOTINE REPLACEMENT PROTOCOL  **AND** nicotine **AND** Protocol 205 PATIENT EDUCATION MATERIALS **AND** Protocol 205 Rotate nicotine patch application sites daily  **AND** nicotine polacrilex  •  NS  •  ampicillin-sulbactam (UNASYN) IV  •  MD ALERT... vancomycin  •  lactobacillus granules  •  insulin regular **AND** Accu-Chek ACHS **AND** NOTIFY MD and PharmD **AND** glucose 4 g **AND** dextrose 50%      Family History  History reviewed. No pertinent family history.    Social History  Social History   Substance Use Topics   • Smoking status: Current Every Day Smoker     Packs/day: 0.25     Years: 30.00     Types: Cigarettes   • Smokeless tobacco: Never Used   • Alcohol use No      Comment: None since 2016       Allergies  No Known Allergies     Physical Exam  Laboratory   Hemodynamics  Temp (24hrs), Av.6 °C (99.7 °F), Min:37 °C (98.6 °F), Max:38.2 °C (100.7 °F)   Temperature: 37 °C (98.6 °F)  Pulse  Av.8  Min: 66  Max: 111    Blood Pressure: 104/62      Respiratory      Respiration: 18, Pulse Oximetry: 100 %, O2 Daily Delivery Respiratory : Silicone Nasal Cannula     Work Of Breathing / Effort: Mild  RUL Breath Sounds: Clear, RML Breath Sounds: Diminished, RLL Breath Sounds: Diminished, LORENA Breath Sounds: Clear, LLL Breath Sounds: Diminished    Physical Exam   Constitutional: He appears well-developed and well-nourished.   HENT:   Head: Normocephalic and atraumatic.   Eyes:  Conjunctivae and EOM are normal. No scleral icterus.   Neck: Normal range of motion. Neck supple.   Cardiovascular: Normal rate and regular rhythm.  Exam reveals no gallop and no friction rub.    No murmur heard.  Pulmonary/Chest: Effort normal and breath sounds normal. No respiratory distress. He has no wheezes. He has no rales.   Abdominal: Soft. Bowel sounds are normal. He exhibits no distension. There is no tenderness. There is no rebound and no guarding.   Musculoskeletal: He exhibits edema and tenderness.   L great toe especially, some areas on R great toe   Neurological: He is alert.   Cognitive deficit   Skin: Skin is warm. There is erythema.   L>R great toe  Ischemic area distally  No purulence   Psychiatric:   Flat affect       Recent Labs      03/30/18   1504   WBC  14.5*   RBC  3.96*   HEMOGLOBIN  12.0*   HEMATOCRIT  35.2*   MCV  88.9   MCH  30.3   MCHC  34.1   RDW  37.7   PLATELETCT  202   MPV  10.2     Recent Labs      03/30/18   1504   SODIUM  127*   POTASSIUM  4.0   CHLORIDE  98   CO2  17*   GLUCOSE  258*   BUN  23*   CREATININE  1.72*   CALCIUM  9.0     Recent Labs      03/30/18   1504   ALTSGPT  15   ASTSGOT  26   ALKPHOSPHAT  62   TBILIRUBIN  2.0*   GLUCOSE  258*                 No results found for: TROPONINI  Urinalysis:    Lab Results  Component Value Date/Time   SPECGRAVITY 1.011 03/30/2018 1832   GLUCOSEUR Negative 03/30/2018 1832   KETONES Negative 03/30/2018 1832   NITRITE Negative 03/30/2018 1832        Imaging  Dx-chest-portable (1 View)    Result Date: 3/30/2018  3/30/2018 5:57 PM HISTORY/REASON FOR EXAM:  Shortness of breath. Sepsis. Left foot open wounds. Diabetes. TECHNIQUE/EXAM DESCRIPTION AND NUMBER OF VIEWS: Single portable view of the chest. COMPARISON:  None FINDINGS: The chest is slightly rotated. The heart is enlarged. There is prominence of the ascending aorta which may be accentuated by rotation. No lobar consolidation is present. No pleural effusion is noted.     1.  No  acute cardiac or pulmonary abnormality is noted. 2.  Cardiomegaly. Prominence of the ascending aorta.    Dx-toe(s) 2+ Left    Result Date: 3/30/2018  3/30/2018 5:57 PM HISTORY/REASON FOR EXAM:  Open left foot wounds. TECHNIQUE/EXAM DESCRIPTION AND NUMBER OF VIEWS:  3 views of the LEFT toes. COMPARISON: None FINDINGS: There is irregularity in the lateral aspect of the base of the first distal phalanx. There is extensive soft tissue edema with evidence of an overlying soft tissue plane in soft tissue air. Radiopaque densities may be external to the patient.     Cortical irregularity in the lateral base of the first distal phalanx with overlying soft tissue swelling and foci of air. Findings are highly suspicious for osteomyelitis. If clinically indicated, MRI may be helpful for confirmation.     Assessment/Plan     I anticipate this patient will require at least two midnights for appropriate medical management, necessitating inpatient admission.    * Toe osteomyelitis (CMS-HCC)   Assessment & Plan    Ordered ESR, CRP  IV antibiotics, IVF  Orthopedics consulted by Dr. Hood, ED  Ordered LPS, wound consult  Consider ID consultation in the AM          Type 2 diabetes mellitus (CMS-HCC)   Assessment & Plan    Add correctional insulin  Order A1c        Hyponatremia   Assessment & Plan    COuld be acute hyponatremia since 2 days ago normal sodium  Ordered serum sodium studies.   Ordered STAT serum sodium; nursing communication given to report to hospitalist result of follow up sodium  Ordered TSH        Acute kidney injury (CMS-HCC)   Assessment & Plan    Mildly elevated creatinine  Ordered urinalysis  Giving IVF  Trend Cr-        Sepsis (CMS-HCC)   Assessment & Plan    This is sepsis (without associated acute organ dysfunction).   Leukocytosis, mild lactic acidosis  May have toe osteomyelitis or infection  IVF, IV antibiotics  Resolved with IVF                VTE prophylaxis: pharmacologic.    I spent 73 minutes, reviewing  the chart, notes, vitals, labs, imaging, ordering labs, evaluating Wilbert Kennith Yeomans Jr. for assessment, enacting the plan above. 50% of the time was spent in counseling Wilbert Kennith Yeomans Jr. And answering questions. Discussed with ED physician. Time was devoted to counseling and coordinating care including review of records, pertinent lab data and studies, as well as discussing diagnostic evaluation and work up, planned therapeutic interventions and future disposition of care. Where indicated, the assessment and plan reflect discussion of patient with consultants, other healthcare providers, family members, and additional research needed to obtain further information in formulating the plan of care for Wilbert Kennith Yeomans Jr..

## 2018-03-31 NOTE — ASSESSMENT & PLAN NOTE
Resolved. This is sepsis (without associated acute organ dysfunction).  Leukocytosis, mild lactic acidosis resolved. 2/2 osteomyelitis   - management as above

## 2018-03-31 NOTE — PROGRESS NOTES
Pharmacy Kinetics 52 y.o. male on vancomycin day # 1 3/30/2018    Receive Vancomycin 2,800 mg IV loading dose on 3/30/18 at 17:10 PM    Indication for Treatment: osteomyelitis of the left toe     Pertinent history per medical record: Admitted on 3/30/2018 for left toe osteomyelitis. The patient is a 52 y.o. male who presented to the [facility] ER with left toe pain and a wound infection s/p injury. The patient also reports subjective fevers. His PMHx includes DM. His foot X-Ray was suspicious for osteomyelitis of the L great toe. He was noted to have a leukocytosis with a mild lactic acidosis on presentation.      Other antibiotics: ampicillin/sulbactam 3 g IV Q6h    Allergies: Patient has no known allergies.     List concerns for renal function: mild HUGO (SCr above baseline), age, BMI ~30.4 m2    Pertinent cultures to date:   3/30/2018 Blood culture x2 - in process  3/30/2018 Urine culture - in process    Recent Labs      18   1504   WBC  14.5*   NEUTSPOLYS  83.60*     Recent Labs      18   1504   BUN  23*   CREATININE  1.72*   ALBUMIN  3.6     Blood pressure 108/75, pulse 66, temperature 36 °C (96.8 °F), resp. rate 18, weight 113.4 kg (250 lb), SpO2 99 %. Temp (24hrs), Av.8 °C (98.3 °F), Min:36 °C (96.8 °F), Max:38.2 °C (100.7 °F)    A/P   1. Vancomycin dose change: No other doses ordered at this time due to mild HUGO   2. Next vancomycin level: Random level ordered for 11 AM (~18h post loading dose)  3. Goal trough: 12-16 mcg/mL  4. Comments: Anticipate improvement in patient's renal function with fluids. If renal function improves, recommend a vancomycin maintenance dose of 17 mg/kg Q12h (~1,900 mg). Pharmacy will continue to follow and recommend de-escalation of antibiotics as appropriate.     Shelly Kim, PharmD, BCPS

## 2018-03-31 NOTE — ED NOTES
Med rec updated and complete.  Allergies reviewed.  Pt has multi little manila envelopes that are sealed  With what appears to have tablets in them.  All the manila envelopes have handwritten days and times  On them.  Pt unable to state what are in the manila envelopes.  Pt states that he got them from his home pharmacy that way. Then pt states  That he is currently taking his medications.  Pharmacist/nurse informed.

## 2018-03-31 NOTE — ED NOTES
Patient out of bed and urinated over bed sheet. Patient redirected and clean up. Back on monitor. All needs met.

## 2018-03-31 NOTE — PROGRESS NOTES
Pt arrived to floor at 2135. Pt very lethargic. Pt would only stay awake about 15 secs. Now pt is more with it. Pt was orientated to the floor procedure. Pt taught how to use call light remote.

## 2018-03-31 NOTE — ASSESSMENT & PLAN NOTE
Resolved. Elevated creatinine on admission, likely pre-renal. Improved with IVF. Cr up to 1.72 and now normalized to 0.71  - encourage PO fluid intake  - avoid nephrotoxic agents, renally dose medications

## 2018-03-31 NOTE — CARE PLAN
Problem: Venous Thromboembolism (VTW)/Deep Vein Thrombosis (DVT) Prevention:  Goal: Patient will participate in Venous Thrombosis (VTE)/Deep Vein Thrombosis (DVT)Prevention Measures  Outcome: PROGRESSING AS EXPECTED  No s/s of DVT. Pt has SCDs onm. Pt will start heparin tomorrow. Pt moves around in bed.    Problem: Bowel/Gastric:  Goal: Normal bowel function is maintained or improved  Outcome: PROGRESSING AS EXPECTED  Pts last BM PTA. Unable to assess when. Pts belly is not distended or firm.

## 2018-04-01 PROBLEM — E87.6 HYPOKALEMIA: Status: ACTIVE | Noted: 2018-04-01

## 2018-04-01 LAB
ANION GAP SERPL CALC-SCNC: 5 MMOL/L (ref 0–11.9)
BACTERIA UR CULT: NORMAL
BUN SERPL-MCNC: 13 MG/DL (ref 8–22)
CALCIUM SERPL-MCNC: 7.9 MG/DL (ref 8.5–10.5)
CHLORIDE SERPL-SCNC: 108 MMOL/L (ref 96–112)
CO2 SERPL-SCNC: 24 MMOL/L (ref 20–33)
CREAT SERPL-MCNC: 0.78 MG/DL (ref 0.5–1.4)
ERYTHROCYTE [DISTWIDTH] IN BLOOD BY AUTOMATED COUNT: 41.3 FL (ref 35.9–50)
GLUCOSE BLD-MCNC: 126 MG/DL (ref 65–99)
GLUCOSE BLD-MCNC: 133 MG/DL (ref 65–99)
GLUCOSE SERPL-MCNC: 116 MG/DL (ref 65–99)
GRAM STN SPEC: NORMAL
HCT VFR BLD AUTO: 30.9 % (ref 42–52)
HGB BLD-MCNC: 10 G/DL (ref 14–18)
MCH RBC QN AUTO: 30.1 PG (ref 27–33)
MCHC RBC AUTO-ENTMCNC: 32.4 G/DL (ref 33.7–35.3)
MCV RBC AUTO: 93.1 FL (ref 81.4–97.8)
PLATELET # BLD AUTO: 143 K/UL (ref 164–446)
PMV BLD AUTO: 10.4 FL (ref 9–12.9)
POTASSIUM SERPL-SCNC: 3.4 MMOL/L (ref 3.6–5.5)
RBC # BLD AUTO: 3.32 M/UL (ref 4.7–6.1)
SIGNIFICANT IND 70042: NORMAL
SIGNIFICANT IND 70042: NORMAL
SITE SITE: NORMAL
SITE SITE: NORMAL
SODIUM SERPL-SCNC: 137 MMOL/L (ref 135–145)
SOURCE SOURCE: NORMAL
SOURCE SOURCE: NORMAL
VANCOMYCIN TROUGH SERPL-MCNC: 6.8 UG/ML (ref 10–20)
WBC # BLD AUTO: 6.9 K/UL (ref 4.8–10.8)

## 2018-04-01 PROCEDURE — A9270 NON-COVERED ITEM OR SERVICE: HCPCS | Performed by: INTERNAL MEDICINE

## 2018-04-01 PROCEDURE — 99232 SBSQ HOSP IP/OBS MODERATE 35: CPT | Performed by: HOSPITALIST

## 2018-04-01 PROCEDURE — 700111 HCHG RX REV CODE 636 W/ 250 OVERRIDE (IP)

## 2018-04-01 PROCEDURE — 160039 HCHG SURGERY MINUTES - EA ADDL 1 MIN LEVEL 3: Performed by: ORTHOPAEDIC SURGERY

## 2018-04-01 PROCEDURE — 501838 HCHG SUTURE GENERAL: Performed by: ORTHOPAEDIC SURGERY

## 2018-04-01 PROCEDURE — 85027 COMPLETE CBC AUTOMATED: CPT

## 2018-04-01 PROCEDURE — 87070 CULTURE OTHR SPECIMN AEROBIC: CPT

## 2018-04-01 PROCEDURE — A9270 NON-COVERED ITEM OR SERVICE: HCPCS

## 2018-04-01 PROCEDURE — 160009 HCHG ANES TIME/MIN: Performed by: ORTHOPAEDIC SURGERY

## 2018-04-01 PROCEDURE — 700111 HCHG RX REV CODE 636 W/ 250 OVERRIDE (IP): Performed by: HOSPITALIST

## 2018-04-01 PROCEDURE — 500881 HCHG PACK, EXTREMITY: Performed by: ORTHOPAEDIC SURGERY

## 2018-04-01 PROCEDURE — 700105 HCHG RX REV CODE 258: Performed by: INTERNAL MEDICINE

## 2018-04-01 PROCEDURE — 80048 BASIC METABOLIC PNL TOTAL CA: CPT

## 2018-04-01 PROCEDURE — 88305 TISSUE EXAM BY PATHOLOGIST: CPT

## 2018-04-01 PROCEDURE — 93922 UPR/L XTREMITY ART 2 LEVELS: CPT

## 2018-04-01 PROCEDURE — 700105 HCHG RX REV CODE 258: Performed by: HOSPITALIST

## 2018-04-01 PROCEDURE — 93922 UPR/L XTREMITY ART 2 LEVELS: CPT | Mod: 26 | Performed by: SURGERY

## 2018-04-01 PROCEDURE — 82962 GLUCOSE BLOOD TEST: CPT

## 2018-04-01 PROCEDURE — 160035 HCHG PACU - 1ST 60 MINS PHASE I: Performed by: ORTHOPAEDIC SURGERY

## 2018-04-01 PROCEDURE — 700111 HCHG RX REV CODE 636 W/ 250 OVERRIDE (IP): Performed by: INTERNAL MEDICINE

## 2018-04-01 PROCEDURE — 160036 HCHG PACU - EA ADDL 30 MINS PHASE I: Performed by: ORTHOPAEDIC SURGERY

## 2018-04-01 PROCEDURE — 0Y6Q0Z1 DETACHMENT AT LEFT 1ST TOE, HIGH, OPEN APPROACH: ICD-10-PCS | Performed by: ORTHOPAEDIC SURGERY

## 2018-04-01 PROCEDURE — 87075 CULTR BACTERIA EXCEPT BLOOD: CPT

## 2018-04-01 PROCEDURE — 160048 HCHG OR STATISTICAL LEVEL 1-5: Performed by: ORTHOPAEDIC SURGERY

## 2018-04-01 PROCEDURE — 700102 HCHG RX REV CODE 250 W/ 637 OVERRIDE(OP)

## 2018-04-01 PROCEDURE — 770001 HCHG ROOM/CARE - MED/SURG/GYN PRIV*

## 2018-04-01 PROCEDURE — 700102 HCHG RX REV CODE 250 W/ 637 OVERRIDE(OP): Performed by: HOSPITALIST

## 2018-04-01 PROCEDURE — 36415 COLL VENOUS BLD VENIPUNCTURE: CPT

## 2018-04-01 PROCEDURE — 700101 HCHG RX REV CODE 250

## 2018-04-01 PROCEDURE — 160002 HCHG RECOVERY MINUTES (STAT): Performed by: ORTHOPAEDIC SURGERY

## 2018-04-01 PROCEDURE — A9270 NON-COVERED ITEM OR SERVICE: HCPCS | Performed by: HOSPITALIST

## 2018-04-01 PROCEDURE — 700102 HCHG RX REV CODE 250 W/ 637 OVERRIDE(OP): Performed by: INTERNAL MEDICINE

## 2018-04-01 PROCEDURE — 160028 HCHG SURGERY MINUTES - 1ST 30 MINS LEVEL 3: Performed by: ORTHOPAEDIC SURGERY

## 2018-04-01 PROCEDURE — 88311 DECALCIFY TISSUE: CPT

## 2018-04-01 PROCEDURE — 80202 ASSAY OF VANCOMYCIN: CPT

## 2018-04-01 PROCEDURE — 87205 SMEAR GRAM STAIN: CPT

## 2018-04-01 RX ORDER — MIDAZOLAM HYDROCHLORIDE 1 MG/ML
INJECTION INTRAMUSCULAR; INTRAVENOUS
Status: DISPENSED
Start: 2018-04-01 | End: 2018-04-01

## 2018-04-01 RX ORDER — OXYCODONE HCL 5 MG/5 ML
SOLUTION, ORAL ORAL
Status: COMPLETED
Start: 2018-04-01 | End: 2018-04-01

## 2018-04-01 RX ADMIN — AMPICILLIN SODIUM AND SULBACTAM SODIUM 3 G: 2; 1 INJECTION, POWDER, FOR SOLUTION INTRAMUSCULAR; INTRAVENOUS at 17:50

## 2018-04-01 RX ADMIN — VANCOMYCIN HYDROCHLORIDE 1400 MG: 100 INJECTION, POWDER, LYOPHILIZED, FOR SOLUTION INTRAVENOUS at 20:38

## 2018-04-01 RX ADMIN — OXYCODONE HYDROCHLORIDE 10 MG: 5 SOLUTION ORAL at 11:35

## 2018-04-01 RX ADMIN — AMPICILLIN SODIUM AND SULBACTAM SODIUM 3 G: 2; 1 INJECTION, POWDER, FOR SOLUTION INTRAMUSCULAR; INTRAVENOUS at 05:48

## 2018-04-01 RX ADMIN — AMPICILLIN SODIUM AND SULBACTAM SODIUM 3 G: 2; 1 INJECTION, POWDER, FOR SOLUTION INTRAMUSCULAR; INTRAVENOUS at 00:00

## 2018-04-01 RX ADMIN — VANCOMYCIN HYDROCHLORIDE 1600 MG: 100 INJECTION, POWDER, LYOPHILIZED, FOR SOLUTION INTRAVENOUS at 13:59

## 2018-04-01 RX ADMIN — VANCOMYCIN HYDROCHLORIDE 1600 MG: 100 INJECTION, POWDER, LYOPHILIZED, FOR SOLUTION INTRAVENOUS at 00:30

## 2018-04-01 RX ADMIN — AMPICILLIN SODIUM AND SULBACTAM SODIUM 3 G: 2; 1 INJECTION, POWDER, FOR SOLUTION INTRAMUSCULAR; INTRAVENOUS at 12:29

## 2018-04-01 RX ADMIN — LACTOBACILLUS ACIDOPHILUS / LACTOBACILLUS BULGARICUS 1 PACKET: 100 MILLION CFU STRENGTH GRANULES at 17:50

## 2018-04-01 RX ADMIN — ACETAMINOPHEN 650 MG: 325 TABLET, FILM COATED ORAL at 18:44

## 2018-04-01 RX ADMIN — LACTOBACILLUS ACIDOPHILUS / LACTOBACILLUS BULGARICUS 1 PACKET: 100 MILLION CFU STRENGTH GRANULES at 12:29

## 2018-04-01 RX ADMIN — HEPARIN SODIUM 5000 UNITS: 5000 INJECTION, SOLUTION INTRAVENOUS; SUBCUTANEOUS at 20:38

## 2018-04-01 RX ADMIN — HYDROCODONE BITARTRATE AND ACETAMINOPHEN 2 TABLET: 5; 325 TABLET ORAL at 05:48

## 2018-04-01 RX ADMIN — HYDROCODONE BITARTRATE AND ACETAMINOPHEN 2 TABLET: 5; 325 TABLET ORAL at 18:35

## 2018-04-01 ASSESSMENT — ENCOUNTER SYMPTOMS
SENSORY CHANGE: 1
SHORTNESS OF BREATH: 0
HEADACHES: 0
NAUSEA: 0
DIZZINESS: 0
CHILLS: 0
PALPITATIONS: 0
NERVOUS/ANXIOUS: 1
FOCAL WEAKNESS: 0
FEVER: 0
VOMITING: 0
ABDOMINAL PAIN: 0

## 2018-04-01 ASSESSMENT — PAIN SCALES - GENERAL
PAINLEVEL_OUTOF10: 4
PAINLEVEL_OUTOF10: 8
PAINLEVEL_OUTOF10: 0
PAINLEVEL_OUTOF10: 0
PAINLEVEL_OUTOF10: 4
PAINLEVEL_OUTOF10: 0

## 2018-04-01 NOTE — PROGRESS NOTES
Pharmacy Kinetics 52 y.o. male on vancomycin day # 3     2018    Currently on vancomycin 1600 mg iv q12hr (1230 0030)    Indication for Treatment: osteomyelitis of the left toe      Pertinent history per medical record: Admitted on 3/30/2018 for left toe osteomyelitis. The patient is a 52 y.o. male who presented to the [facility] [place] ER with left toe pain and a wound infection s/p injury. The patient also reports subjective fevers. His PMHx includes DM. His foot X-Ray was suspicious for osteomyelitis of the L great toe. He was noted to have a leukocytosis with a mild lactic acidosis on presentation.       Other antibiotics: ampicillin/sulbactam 3 g IV Q6h     Allergies: Patient has no known allergies.      List concerns for renal function: mild HUGO (SCr above baseline), age, BMI ~30.4 m2     Pertinent cultures to date:   18:PBCx2:NGTD  18:UR:NGTD  18:Lt Toe:Many Gram positive cocci. Many Gram positive rods.    Recent Labs      18   1504  18   0034  18   0350   WBC  14.5*  9.2  6.9   NEUTSPOLYS  83.60*   --    --      Recent Labs      18   1504  18   0034  18   0350   BUN  23*  17  13   CREATININE  1.72*  1.03  0.78   ALBUMIN  3.6   --    --      Recent Labs      18   1102   VANCORANDOM  12.1     Intake/Output Summary (Last 24 hours) at 18 1246  Last data filed at 18 1130   Gross per 24 hour   Intake             1150 ml   Output                0 ml   Net             1150 ml      Blood pressure 108/80, pulse 86, temperature 36.3 °C (97.3 °F), resp. rate (!) 22, weight 113.4 kg (250 lb), SpO2 100 %. Temp (24hrs), Av.4 °C (99.4 °F), Min:36.3 °C (97.3 °F), Max:38.7 °C (101.6 °F)      A/P        1. Vancomycin dose change: Pulse dosing   2. Next vancomycin level: 18@1200  3. Goal trough: 12-16 mcg/mL   4. Comments: Renal indices improving, level today. Febrile.  WBC wnl, Cx NGTD, s/p amputation. ID following, continue same.     Sandoval AlbertsD  BCPS      4/1/2018 12:49   Vancomycin Trough 6.8 (L)       Increase vancomycin 1400 mg iv q8hr ([id number])  Level: 04/02/18@1130      Sandoval Reynolds PharmD BCPS

## 2018-04-01 NOTE — CONSULTS
DATE OF SERVICE:  03/31/2018    REASON FOR CONSULT:  Gangrene and cellulitis.    CONSULTING PHYSICIAN:  Dr. Ellington and Limb Preservation Service.    HISTORY OF PRESENT ILLNESS:  Please note majority of history is obtained from   the chart as patient is a poor historian, but this is a 52-year-old white male   who was admitted to the hospital on 03/30/2018 with dizziness, ground level   fall and foot pain.  He states he has no history of diabetes or vascular   disease, but does smoke.  He is not sure exactly what happened to his feet,   but was told by a friend of his, he needed to go to the hospital.  Per   admission H and P, he has left toe injury approximately a month prior to   admission that has progressed, apparently he does actually have diabetes even   though he denies it, he was noted to have a low grade temperature and x-ray   was suspicious as well as physical findings were suspicious for osteomyelitis.    He did have leukocytosis, mild lactic acidosis.  He has been started   empirically on Unasyn and vancomycin and infectious disease is consulted for   antibiotic recommendations and management.  Limb preservation   service/orthopedics is also to see and evaluate the patient.    REVIEW OF SYSTEMS:  Unobtainable due to patient's psychiatric disorder.    ALLERGIES:  He has no known antibiotic allergies.    PAST MEDICAL HISTORY:  Hepatitis C, diabetes, hyperlipidemia, hypertension.    FAMILY HISTORY:  Unknown.    SOCIAL HISTORY:  He is a smoker.  He says his use varies from a pack a day to   a pack a week.  Denies any current alcohol or illicit drug use.    PAST SURGICAL HISTORY:  He does have a past surgical history of ventral hernia   repair.  He does perseverate on the amount of pain that he had after his   prior surgery.    PHYSICAL EXAMINATION:  VITAL SIGNS:  He was febrile at 100.7, current temperature is 99.1.  Blood   pressure 103/64, pulse is 86, respiratory rate 17, oxygen saturation 93% on 2    liters nasal cannula and weighs 113 kilos.  GENERAL:  He is disheveled and looks significantly older than his stated age.  HEENT:  Normocephalic, atraumatic.  Pupils are equal, round, reactive to   light.  Extraocular movements intact.  Oropharynx is clear.  He has poor   dentition.  He is missing most of his teeth.  NECK:  Supple.  CARDIOVASCULAR:  Regular rate and rhythm.  CHEST:  Grossly clear to auscultation bilaterally, unlabored.  EXTREMITIES:  Show no cyanosis or clubbing.  He has foul smelling lesions of   bilateral feet.  He has medial ulceration on his right great toe measuring   approximately 1 cm with some associated what appears to be more superficial   necrosis.  In addition to that, he has a medial first MTP ulceration and   multiple necrotic appearing ulcerations on his other digits on his left foot.    His right great toe has a deeper more chronic appearing ulceration infecting   the entire distal tip and dorsum.  Has a tinea pedis and fissures.  NEUROLOGIC:  He is awake.  He perseverates Evasive    CURRENT LABORATORY DATA:  Initial white blood cell count 14.5, now 9.2, H and   H 11.2 and 33.8, and platelets of 148.  Sodium 134, potassium is 3.9, chloride   106, bicarbonate 21, glucose 141, BUN 17 and creatinine of 1.  Lactic acid of   2.5 on admission, now resolved at 1.1.  Glycosylated hemoglobin was 5.8.  His   admitting hemoglobin was _____.  Urinalysis was negative.  Blood cultures and   urine culture are negative.  X-ray of the left foot showed fracture at the   base of the first digit, lucency in the distal phalanx consistent with   osteomyelitis.    ASSESSMENT AND PLAN:  1.  A 52-year-old male admitted with chronic diabetic foot ulcerations and   findings.  X-rays concerning for osteomyelitis.  His initial fever has   resolved.  His initial leukocytosis and lactic acidosis have resolved.    Orthopedics and limb preservation service are to see the patient.  He is   currently receiving  vancomycin and Unasyn, which should cover the most common   pathogens, and monitor renal function closely as he had evidence of acute   kidney injury on admission while he was on vancomycin.  2.  Diabetes.  His blood sugars were elevated on admission; however, his   glycosylated hemoglobin was only 5.8, continue aggressive blood sugar control.  3.  Acute kidney injury, this is consistent with prerenal azotemia, has   resolved with hydration.  Continue to monitor closely while on vancomycin and   final antibiotic recommendations per culture results and extent of surgical   intervention.  He is not a candidate for outpatient infusion due to his   underlying neuropsychiatric disorder.    Thank you and we will follow with you. Discussed with LPS       ____________________________________     MD LEESA BOYKIN / BRANDY    DD:  03/31/2018 15:52:10  DT:  03/31/2018 20:47:18    D#:  [id number]  Job#:  [id number]

## 2018-04-01 NOTE — PROGRESS NOTES
[facility] Hospitalist Progress Note    Date of Service: 3/31/2018    Chief Complaint  52 y.o. male admitted 3/30/2018 with left toe pain.    Interval Problem Update  Anxious about being in pain and undergoing an amputation. No acute overnight events.     Consultants/Specialty  Ortho  ID    Disposition  Pending medical and surgical clearance.        Review of Systems   Constitutional: Negative for chills and fever.   Respiratory: Negative for shortness of breath.    Cardiovascular: Negative for chest pain, palpitations and leg swelling.   Gastrointestinal: Negative for abdominal pain, nausea and vomiting.   Genitourinary: Negative for dysuria.   Musculoskeletal: Positive for joint pain.   Neurological: Positive for dizziness and sensory change. Negative for focal weakness and headaches.   Psychiatric/Behavioral: The patient is nervous/anxious.    All other systems reviewed and are negative.     Physical Exam  Laboratory/Imaging   Hemodynamics  Temp (24hrs), Av.4 °C (99.3 °F), Min:36 °C (96.8 °F), Max:38.7 °C (101.6 °F)   Temperature: (!) 38.7 °C (101.6 °F) (RN notified)  Pulse  Av.3  Min: 66  Max: 111    Blood Pressure: 112/74      Respiratory      Respiration: 16, Pulse Oximetry: 94 %        RUL Breath Sounds: Clear, RML Breath Sounds: Diminished, RLL Breath Sounds: Diminished, LORENA Breath Sounds: Clear, LLL Breath Sounds: Diminished    Fluids  No intake or output data in the 24 hours ending 18 2208    Nutrition  Orders Placed This Encounter   Procedures   • Diet Order     Standing Status:   Standing     Number of Occurrences:   1     Order Specific Question:   Diet:     Answer:   Diabetic [3]   • DIET NPO     Standing Status:   Standing     Number of Occurrences:   8     Order Specific Question:   Restrict to:     Answer:   Sips with Medications [3]     Physical Exam   Constitutional: He is oriented to person, place, and time. He appears well-developed. No distress.   HENT:   Head: Normocephalic.   Eyes:  EOM are normal. No scleral icterus.   Neck: Normal range of motion. Neck supple.   Cardiovascular: Normal rate, regular rhythm and intact distal pulses.    Pulmonary/Chest: Breath sounds normal. No respiratory distress. He has no rales.   Abdominal: Soft. He exhibits no distension. There is no tenderness.   Musculoskeletal: He exhibits no edema.   Neurological: He is alert and oriented to person, place, and time.   Skin: Skin is warm and dry.   Psychiatric:   Odd affect   Vitals reviewed.      Recent Labs      03/30/18   1504  03/31/18   0034   WBC  14.5*  9.2   RBC  3.96*  3.63*   HEMOGLOBIN  12.0*  11.2*   HEMATOCRIT  35.2*  33.8*   MCV  88.9  93.1   MCH  30.3  30.9   MCHC  34.1  33.1*   RDW  37.7  40.1   PLATELETCT  202  148*   MPV  10.2  10.1     Recent Labs      03/30/18   1504  03/31/18   0034   SODIUM  127*  134*   POTASSIUM  4.0  3.9   CHLORIDE  98  106   CO2  17*  21   GLUCOSE  258*  149*   BUN  23*  17   CREATININE  1.72*  1.03   CALCIUM  9.0  8.4*                      Assessment/Plan     * Toe osteomyelitis (CMS-HCC)- (present on admission)   Assessment & Plan    Toe osteo with gangrene of multiple other toes bilaterally. Leukocytosis resolved. CRP elevated  - IV antibiotics, IVF  - Ortho and ID consulted, greatly appreciate  - LPS following  - surgery tomorrow        Type 2 diabetes mellitus (CMS-HCC)- (present on admission)   Assessment & Plan    A1C 5.8%  - correctional insulin        Hyponatremia- (present on admission)   Assessment & Plan    Na low at 127 on admission, hypovolemic hyponatremia. Improved with normal saline.  - continue to trend        Acute kidney injury (CMS-[facility])- (present on admission)   Assessment & Plan    Elevated creatinine on admission, likely pre-renal. Improved with IVF  - encourage PO fluid intake  - avoid nephrotoxic agents        Sepsis (CMS-[facility])- (present on admission)   Assessment & Plan    Resolving. This is sepsis (without associated acute organ dysfunction).   Leukocytosis, mild lactic acidosis resolved. 2/2 osteomyelitis   - management as above          Quality-Core Measures   Reviewed items::  Radiology images reviewed, Labs reviewed and Medications reviewed  Olsen catheter::  No Olsen  DVT prophylaxis pharmacological::  Heparin  DVT prophylaxis - mechanical:  SCDs  Ulcer Prophylaxis::  Not indicated

## 2018-04-01 NOTE — PROGRESS NOTES
[facility] Hospitalist Progress Note    Date of Service: 2018    Chief Complaint  52 y.o. male admitted 3/30/2018 with left toe pain.    Interval Problem Update  Going down for amputation this morning. Asks about pain as he is leaving. Overnight, had a temp of 38.7.    Consultants/Specialty  Ortho  ID    Disposition  Pending medical and surgical clearance.        Review of Systems   Constitutional: Negative for chills and fever.   Respiratory: Negative for shortness of breath.    Cardiovascular: Negative for chest pain, palpitations and leg swelling.   Gastrointestinal: Negative for abdominal pain, nausea and vomiting.   Genitourinary: Negative for dysuria.   Musculoskeletal: Positive for joint pain.   Neurological: Positive for sensory change. Negative for dizziness, focal weakness and headaches.   Psychiatric/Behavioral: The patient is nervous/anxious.    All other systems reviewed and are negative.     Physical Exam  Laboratory/Imaging   Hemodynamics  Temp (24hrs), Av.7 °C (99.8 °F), Min:37 °C (98.6 °F), Max:38.7 °C (101.6 °F)   Temperature: 37.2 °C (98.9 °F)  Pulse  Av.7  Min: 66  Max: 111 Heart Rate (Monitored): 87  Blood Pressure: 108/80      Respiratory      Respiration: 18, Pulse Oximetry: 95 %        RUL Breath Sounds: Clear, RML Breath Sounds: Diminished, RLL Breath Sounds: Diminished, LORENA Breath Sounds: Clear, LLL Breath Sounds: Diminished    Fluids  No intake or output data in the 24 hours ending 18 1023    Nutrition  Orders Placed This Encounter   Procedures   • DIET NPO     Standing Status:   Standing     Number of Occurrences:   8     Order Specific Question:   Restrict to:     Answer:   Sips with Medications [3]     Physical Exam   Constitutional: He is oriented to person, place, and time. He appears well-developed. No distress.   HENT:   Head: Normocephalic.   Mouth/Throat: Oropharynx is clear and moist.   Eyes: EOM are normal. No scleral icterus.   Neck: Normal range of motion. Neck  supple.   Cardiovascular: Normal rate, regular rhythm and intact distal pulses.    Pulmonary/Chest: Breath sounds normal. No respiratory distress. He has no rales.   Abdominal: Soft. He exhibits no distension. There is no tenderness.   Musculoskeletal: He exhibits no edema.   Neurological: He is alert and oriented to person, place, and time.   Skin: Skin is warm and dry.   Psychiatric:   Odd affect   Vitals reviewed.      Recent Labs      03/30/18   1504  03/31/18   0034  04/01/18   0350   WBC  14.5*  9.2  6.9   RBC  3.96*  3.63*  3.32*   HEMOGLOBIN  12.0*  11.2*  10.0*   HEMATOCRIT  35.2*  33.8*  30.9*   MCV  88.9  93.1  93.1   MCH  30.3  30.9  30.1   MCHC  34.1  33.1*  32.4*   RDW  37.7  40.1  41.3   PLATELETCT  202  148*  143*   MPV  10.2  10.1  10.4     Recent Labs      03/30/18   1504  03/31/18   0034  04/01/18   0350   SODIUM  127*  134*  137   POTASSIUM  4.0  3.9  3.4*   CHLORIDE  98  106  108   CO2  17*  21  24   GLUCOSE  258*  149*  116*   BUN  23*  17  13   CREATININE  1.72*  1.03  0.78   CALCIUM  9.0  8.4*  7.9*                      Assessment/Plan     * Toe osteomyelitis (CMS-HCC)- (present on admission)   Assessment & Plan    Toe osteo with gangrene of multiple other toes bilaterally. Leukocytosis resolved. CRP and ESR elevated  - Ortho and ID consulted, greatly appreciate   - continue with vanc and unasyn  - pain control  - LPS following  - surgery today        Hypokalemia   Assessment & Plan    K low at 3.4 this AM.   - monitor and replete   - check magnesium in the AM        Type 2 diabetes mellitus (CMS-HCC)- (present on admission)   Assessment & Plan    A1C 5.8%  - sliding scale and hypoglycemia protocol         Hyponatremia- (present on admission)   Assessment & Plan    Resolved. Na low at 127 on admission, hypovolemic hyponatremia. Improved with normal saline and now normalized. Asymptomatic.  - continue to trend        Acute kidney injury (CMS-HCC)- (present on admission)   Assessment & Plan     Elevated creatinine on admission, likely pre-renal. Improved with IVF. Cr up to 1.72 and now normalized to 0.78  - encourage PO fluid intake  - avoid nephrotoxic agents, renally dose medications        Sepsis (CMS-HCC)- (present on admission)   Assessment & Plan    Resolving. This is sepsis (without associated acute organ dysfunction).  Leukocytosis, mild lactic acidosis resolved. 2/2 osteomyelitis   - management as above          Quality-Core Measures   Reviewed items::  Labs reviewed and Medications reviewed  Olsen catheter::  No Olsen  DVT prophylaxis pharmacological::  Heparin  DVT prophylaxis - mechanical:  SCDs  Ulcer Prophylaxis::  Not indicated

## 2018-04-01 NOTE — OP REPORT
DATE OF SERVICE:  04/01/2018    PREOPERATIVE DIAGNOSIS:  Left great toe osteomyelitis.    POSTOPERATIVE DIAGNOSIS:  Left great toe osteomyelitis.    PROCEDURE:  Left great toe amputation through proximal phalanx.    SURGEON:  Salomón Rodríguez MD    ASSISTANT:  Walt Benedict PA-C    ESTIMATED BLOOD LOSS:  Minimal.    INDICATIONS:  This is a 52-year-old male who presented with osteomyelitis and   a purulent-draining, foul-smelling great toe.  Risks and benefits of   amputation were discussed which include but not limited to bleeding,   infection, neurovascular damage, pain, stiffness, and need for further   surgery.  He understands all these risks and wishes to proceed.    DESCRIPTION OF PROCEDURE:  Patient was sedated with LMA anesthesia and left   lower extremity was prepped in the usual sterile fashion.  His great toe was   amputated through the proximal phalanx to good healthy bone.  There was a   large amount of surrounding purulence which was debrided in excisional fashion   and skin, subcutaneous tissue, and underlying muscle, and bone with a knife   and rongeur, then irrigated with copious amounts of normal saline solution and   closed without tension using 2-0 nylon suture.  Sterile dressings were   applied.  Patient tolerated the procedure well.    POSTOPERATIVE PLAN:  Patient will be admitted under medicine service, IV   antibiotics per the infectious disease service.  He may or may not need future   surgery depending on how he recovers from this.       ____________________________________     SALOMÓN RODRÍGUEZ MD PLA / NTS    DD:  04/01/2018 10:31:51  DT:  04/01/2018 10:56:03    D#:  [id number]  Job#:  [id number]

## 2018-04-02 LAB
ANION GAP SERPL CALC-SCNC: 5 MMOL/L (ref 0–11.9)
BACTERIA WND AEROBE CULT: ABNORMAL
BUN SERPL-MCNC: 10 MG/DL (ref 8–22)
CALCIUM SERPL-MCNC: 8 MG/DL (ref 8.5–10.5)
CHLORIDE SERPL-SCNC: 107 MMOL/L (ref 96–112)
CO2 SERPL-SCNC: 24 MMOL/L (ref 20–33)
CREAT SERPL-MCNC: 0.71 MG/DL (ref 0.5–1.4)
ERYTHROCYTE [DISTWIDTH] IN BLOOD BY AUTOMATED COUNT: 42.5 FL (ref 35.9–50)
GLUCOSE BLD-MCNC: 123 MG/DL (ref 65–99)
GLUCOSE BLD-MCNC: 128 MG/DL (ref 65–99)
GLUCOSE SERPL-MCNC: 119 MG/DL (ref 65–99)
GRAM STN SPEC: ABNORMAL
HCT VFR BLD AUTO: 31.4 % (ref 42–52)
HGB BLD-MCNC: 10.2 G/DL (ref 14–18)
MAGNESIUM SERPL-MCNC: 1.9 MG/DL (ref 1.5–2.5)
MCH RBC QN AUTO: 30.8 PG (ref 27–33)
MCHC RBC AUTO-ENTMCNC: 32.5 G/DL (ref 33.7–35.3)
MCV RBC AUTO: 94.9 FL (ref 81.4–97.8)
PLATELET # BLD AUTO: 141 K/UL (ref 164–446)
PMV BLD AUTO: 10.3 FL (ref 9–12.9)
POTASSIUM SERPL-SCNC: 3.4 MMOL/L (ref 3.6–5.5)
RBC # BLD AUTO: 3.31 M/UL (ref 4.7–6.1)
SIGNIFICANT IND 70042: ABNORMAL
SIGNIFICANT IND 70042: ABNORMAL
SITE SITE: ABNORMAL
SITE SITE: ABNORMAL
SODIUM SERPL-SCNC: 136 MMOL/L (ref 135–145)
SOURCE SOURCE: ABNORMAL
SOURCE SOURCE: ABNORMAL
VANCOMYCIN TROUGH SERPL-MCNC: 9.9 UG/ML (ref 10–20)
WBC # BLD AUTO: 5.1 K/UL (ref 4.8–10.8)

## 2018-04-02 PROCEDURE — 700111 HCHG RX REV CODE 636 W/ 250 OVERRIDE (IP): Performed by: INTERNAL MEDICINE

## 2018-04-02 PROCEDURE — 700105 HCHG RX REV CODE 258: Performed by: INTERNAL MEDICINE

## 2018-04-02 PROCEDURE — G8980 MOBILITY D/C STATUS: HCPCS | Mod: CI

## 2018-04-02 PROCEDURE — G8978 MOBILITY CURRENT STATUS: HCPCS | Mod: CI

## 2018-04-02 PROCEDURE — 82962 GLUCOSE BLOOD TEST: CPT

## 2018-04-02 PROCEDURE — 36415 COLL VENOUS BLD VENIPUNCTURE: CPT

## 2018-04-02 PROCEDURE — 700102 HCHG RX REV CODE 250 W/ 637 OVERRIDE(OP): Performed by: HOSPITALIST

## 2018-04-02 PROCEDURE — 770001 HCHG ROOM/CARE - MED/SURG/GYN PRIV*

## 2018-04-02 PROCEDURE — 700105 HCHG RX REV CODE 258

## 2018-04-02 PROCEDURE — G8979 MOBILITY GOAL STATUS: HCPCS | Mod: CI

## 2018-04-02 PROCEDURE — 700111 HCHG RX REV CODE 636 W/ 250 OVERRIDE (IP): Performed by: HOSPITALIST

## 2018-04-02 PROCEDURE — A9270 NON-COVERED ITEM OR SERVICE: HCPCS | Performed by: HOSPITALIST

## 2018-04-02 PROCEDURE — 80048 BASIC METABOLIC PNL TOTAL CA: CPT

## 2018-04-02 PROCEDURE — 700105 HCHG RX REV CODE 258: Performed by: HOSPITALIST

## 2018-04-02 PROCEDURE — 99232 SBSQ HOSP IP/OBS MODERATE 35: CPT | Performed by: HOSPITALIST

## 2018-04-02 PROCEDURE — 80202 ASSAY OF VANCOMYCIN: CPT

## 2018-04-02 PROCEDURE — 700102 HCHG RX REV CODE 250 W/ 637 OVERRIDE(OP): Performed by: INTERNAL MEDICINE

## 2018-04-02 PROCEDURE — 97162 PT EVAL MOD COMPLEX 30 MIN: CPT

## 2018-04-02 PROCEDURE — 85027 COMPLETE CBC AUTOMATED: CPT

## 2018-04-02 PROCEDURE — A9270 NON-COVERED ITEM OR SERVICE: HCPCS | Performed by: INTERNAL MEDICINE

## 2018-04-02 PROCEDURE — 83735 ASSAY OF MAGNESIUM: CPT

## 2018-04-02 RX ORDER — SODIUM CHLORIDE 9 MG/ML
INJECTION, SOLUTION INTRAVENOUS
Status: COMPLETED
Start: 2018-04-02 | End: 2018-04-02

## 2018-04-02 RX ADMIN — HEPARIN SODIUM 5000 UNITS: 5000 INJECTION, SOLUTION INTRAVENOUS; SUBCUTANEOUS at 21:49

## 2018-04-02 RX ADMIN — AMPICILLIN SODIUM AND SULBACTAM SODIUM 3 G: 2; 1 INJECTION, POWDER, FOR SOLUTION INTRAMUSCULAR; INTRAVENOUS at 00:00

## 2018-04-02 RX ADMIN — HYDROCODONE BITARTRATE AND ACETAMINOPHEN 2 TABLET: 5; 325 TABLET ORAL at 21:48

## 2018-04-02 RX ADMIN — NICOTINE TRANSDERMAL SYSTEM 21 MG: 21 PATCH, EXTENDED RELEASE TRANSDERMAL at 06:00

## 2018-04-02 RX ADMIN — HYDROCODONE BITARTRATE AND ACETAMINOPHEN 2 TABLET: 5; 325 TABLET ORAL at 15:47

## 2018-04-02 RX ADMIN — VANCOMYCIN HYDROCHLORIDE 1400 MG: 100 INJECTION, POWDER, LYOPHILIZED, FOR SOLUTION INTRAVENOUS at 04:00

## 2018-04-02 RX ADMIN — HYDROCODONE BITARTRATE AND ACETAMINOPHEN 2 TABLET: 5; 325 TABLET ORAL at 09:26

## 2018-04-02 RX ADMIN — AMPICILLIN SODIUM AND SULBACTAM SODIUM 3 G: 2; 1 INJECTION, POWDER, FOR SOLUTION INTRAMUSCULAR; INTRAVENOUS at 11:08

## 2018-04-02 RX ADMIN — CEFTRIAXONE 2 G: 2 INJECTION, POWDER, FOR SOLUTION INTRAMUSCULAR; INTRAVENOUS at 15:47

## 2018-04-02 RX ADMIN — HEPARIN SODIUM 5000 UNITS: 5000 INJECTION, SOLUTION INTRAVENOUS; SUBCUTANEOUS at 09:27

## 2018-04-02 RX ADMIN — VANCOMYCIN HYDROCHLORIDE 1400 MG: 100 INJECTION, POWDER, LYOPHILIZED, FOR SOLUTION INTRAVENOUS at 13:45

## 2018-04-02 RX ADMIN — SODIUM CHLORIDE 500 ML: 9 INJECTION, SOLUTION INTRAVENOUS at 11:31

## 2018-04-02 RX ADMIN — AMPICILLIN SODIUM AND SULBACTAM SODIUM 3 G: 2; 1 INJECTION, POWDER, FOR SOLUTION INTRAMUSCULAR; INTRAVENOUS at 06:00

## 2018-04-02 ASSESSMENT — GAIT ASSESSMENTS
DISTANCE (FEET): 100
GAIT LEVEL OF ASSIST: STAND BY ASSIST
DEVIATION: STEP TO

## 2018-04-02 ASSESSMENT — ENCOUNTER SYMPTOMS
SHORTNESS OF BREATH: 0
VOMITING: 0
DIARRHEA: 0
HEADACHES: 0
PALPITATIONS: 0
MYALGIAS: 1
NERVOUS/ANXIOUS: 1
ABDOMINAL PAIN: 0
SENSORY CHANGE: 1
DIZZINESS: 0
CHILLS: 0
NAUSEA: 0
FEVER: 1
FOCAL WEAKNESS: 0

## 2018-04-02 ASSESSMENT — COPD QUESTIONNAIRES
COPD SCREENING SCORE: 3
DO YOU EVER COUGH UP ANY MUCUS OR PHLEGM?: NO/ONLY WITH OCCASIONAL COLDS OR INFECTIONS
DURING THE PAST 4 WEEKS HOW MUCH DID YOU FEEL SHORT OF BREATH: NONE/LITTLE OF THE TIME
HAVE YOU SMOKED AT LEAST 100 CIGARETTES IN YOUR ENTIRE LIFE: YES

## 2018-04-02 ASSESSMENT — PAIN SCALES - GENERAL
PAINLEVEL_OUTOF10: 8
PAINLEVEL_OUTOF10: 7

## 2018-04-02 ASSESSMENT — COGNITIVE AND FUNCTIONAL STATUS - GENERAL
CLIMB 3 TO 5 STEPS WITH RAILING: A LITTLE
SUGGESTED CMS G CODE MODIFIER MOBILITY: CI
MOBILITY SCORE: 23
SUGGESTED CMS G CODE MODIFIER DAILY ACTIVITY: CH
DAILY ACTIVITIY SCORE: 24

## 2018-04-02 NOTE — PROGRESS NOTES
Patient refused bed alarms . Educated patient on fall risk. Walks without walker.Calls out appropriately for medication.

## 2018-04-02 NOTE — THERAPY
Occupational therapy order received. Attempted to see pt for eval.  Pt initally appeared agreeable, but then refused eval. Pt reports that he is able to get to the bathroom and complete ADLs while heel weightbearing. Pt reports he has no problems completing any of his ADLs and refuses OT eval. No OT eval completed and will cancel OT order at this time

## 2018-04-02 NOTE — PROGRESS NOTES
Patient non-compliant with alarms and impulsive with getting up. Educated patient with fall risk.Had procedure on 4/1/18 Left great toe amputation through proximal phalanx, Left great toe osteomyelitis. Dressing clean and dry and intact.

## 2018-04-02 NOTE — THERAPY
"Physical Therapy Evaluation completed.   Bed Mobility:  Supine to Sit: Supervised  Transfers: Sit to Stand: Stand by Assist  Gait: Level Of Assist: Stand by Assist with No Equipment Needed       Plan of Care: Patient with no further skilled PT needs in the acute care setting at this time  Discharge Recommendations: Equipment: No Equipment Needed. Post-acute therapy Currently anticipate no further skilled therapy needs once patient is discharged from the inpatient setting.    See \"Rehab Therapy-Acute\" Patient Summary Report for complete documentation.     "

## 2018-04-02 NOTE — PROGRESS NOTES
Infectious Disease Progress Note    Author: Juliane Vaughn M.D. Date & Time of service: 2018  1:53 PM    Chief Complaint:  FU left great toe osteomyelitis    Interval History:   Tmax 102.8, WBC 5.1, s/p left great toe amp yesterday, pain controlled, tolerating abx without issues  Labs Reviewed, Medications Reviewed, Radiology Reviewed and Wound Reviewed.    Review of Systems:  Review of Systems   Constitutional: Positive for fever. Negative for chills.   Gastrointestinal: Negative for abdominal pain, diarrhea, nausea and vomiting.   Musculoskeletal: Positive for joint pain and myalgias.       Hemodynamics:  Temp (24hrs), Av.8 °C (100.1 °F), Min:36.2 °C (97.2 °F), Max:39.3 °C (102.8 °F)  Temperature: 37.6 °C (99.6 °F)  Pulse  Av.3  Min: 66  Max: 111   Blood Pressure: 104/72       Physical Exam:  Physical Exam   Constitutional: He appears well-developed.   HENT:   Head: Normocephalic and atraumatic.   Eyes: EOM are normal. Pupils are equal, round, and reactive to light.   Neck: Neck supple.   Cardiovascular: Normal rate and regular rhythm.    Pulmonary/Chest: Effort normal and breath sounds normal.   Abdominal: Soft. There is no tenderness.   Musculoskeletal:   Evidence of left great toe amp. Surgical dressing in place   Neurological: He is alert.   Skin: No rash noted.       Meds:    Current Facility-Administered Medications:   •  vancomycin  •  HYDROcodone-acetaminophen  •  senna-docusate **AND** polyethylene glycol/lytes **AND** magnesium hydroxide **AND** bisacodyl  •  Respiratory Care per Protocol  •  heparin  •  acetaminophen  •  INITIATE NICOTINE REPLACEMENT PROTOCOL  **AND** nicotine **AND** Protocol 205 PATIENT EDUCATION MATERIALS **AND** Protocol 205 Rotate nicotine patch application sites daily  **AND** nicotine polacrilex  •  ampicillin-sulbactam (UNASYN) IV  •  MD ALERT... vancomycin  •  lactobacillus granules  •  insulin regular **AND** Accu-Chek ACHS **AND** NOTIFY MD and PharmD  **AND** glucose 4 g **AND** dextrose 50%    Labs:  Recent Labs      03/30/18   1504  03/31/18   0034  04/01/18   0350  04/02/18   0524   WBC  14.5*  9.2  6.9  5.1   RBC  3.96*  3.63*  3.32*  3.31*   HEMOGLOBIN  12.0*  11.2*  10.0*  10.2*   HEMATOCRIT  35.2*  33.8*  30.9*  31.4*   MCV  88.9  93.1  93.1  94.9   MCH  30.3  30.9  30.1  30.8   RDW  37.7  40.1  41.3  42.5   PLATELETCT  202  148*  143*  141*   MPV  10.2  10.1  10.4  10.3   NEUTSPOLYS  83.60*   --    --    --    LYMPHOCYTES  6.80*   --    --    --    MONOCYTES  8.00   --    --    --    EOSINOPHILS  0.00   --    --    --    BASOPHILS  0.30   --    --    --      Recent Labs      03/31/18   0034  04/01/18   0350  04/02/18   0524   SODIUM  134*  137  136   POTASSIUM  3.9  3.4*  3.4*   CHLORIDE  106  108  107   CO2  21  24  24   GLUCOSE  149*  116*  119*   BUN  17  13  10     Recent Labs      03/30/18   1504  03/31/18   0034  04/01/18 0350 04/02/18   0524   ALBUMIN  3.6   --    --    --    TBILIRUBIN  2.0*   --    --    --    ALKPHOSPHAT  62   --    --    --    TOTPROTEIN  6.8   --    --    --    ALTSGPT  15   --    --    --    ASTSGOT  26   --    --    --    CREATININE  1.72*  1.03  0.78  0.71       Imaging:  Dx-chest-portable (1 View)    Result Date: 3/30/2018  3/30/2018 5:57 PM HISTORY/REASON FOR EXAM:  Shortness of breath. Sepsis. Left foot open wounds. Diabetes. TECHNIQUE/EXAM DESCRIPTION AND NUMBER OF VIEWS: Single portable view of the chest. COMPARISON:  None FINDINGS: The chest is slightly rotated. The heart is enlarged. There is prominence of the ascending aorta which may be accentuated by rotation. No lobar consolidation is present. No pleural effusion is noted.     1.  No acute cardiac or pulmonary abnormality is noted. 2.  Cardiomegaly. Prominence of the ascending aorta.    Dx-foot-complete 3+ Right    Result Date: 3/31/2018  3/31/2018 8:49 PM HISTORY/REASON FOR EXAM:  RIGHT great toe infection. TECHNIQUE/EXAM DESCRIPTION AND NUMBER OF VIEWS:  3  views of the  RIGHT foot. COMPARISON: None FINDINGS: MINERALIZATION: Mineralization is unremarkable for age. INJURY: No acute fracture or gross malalignment is seen. JOINTS: No erosive arthropathy is evident.     1.  No radiographic evidence of bony erosion or aggressive periosteal reaction. 2.  Soft tissue irregularity along the medial aspect of the great toe compatible with the provided clinical history of soft tissue wound    Dx-foot-complete 3+ Left    Result Date: 3/31/2018  3/31/2018 10:15 AM HISTORY/REASON FOR EXAM:  Evaluate for osteoarthritis TECHNIQUE/EXAM DESCRIPTION AND NUMBER OF VIEWS: 3 nonweightbearing views of the LEFT foot. COMPARISON:  3/30/2018 FINDINGS: There is an intra-articular fracture of the distal phalanx of the first digit. There is lucency within the distal phalanx of the first digit with may be related to osteomyelitis. There is soft tissue swelling and soft tissue air. No dislocation is identified. Radiopaque densities may be external to the patient.     Fracture of the base of the distal phalanx of the first digit. Lucency within the distal phalanx could be related to osteomyelitis. Soft tissue swelling and soft tissue air is seen within the first digit. Soft tissue swelling extends into the foot.    Dx-toe(s) 2+ Left    Result Date: 3/30/2018  3/30/2018 5:57 PM HISTORY/REASON FOR EXAM:  Open left foot wounds. TECHNIQUE/EXAM DESCRIPTION AND NUMBER OF VIEWS:  3 views of the LEFT toes. COMPARISON: None FINDINGS: There is irregularity in the lateral aspect of the base of the first distal phalanx. There is extensive soft tissue edema with evidence of an overlying soft tissue plane in soft tissue air. Radiopaque densities may be external to the patient.     Cortical irregularity in the lateral base of the first distal phalanx with overlying soft tissue swelling and foci of air. Findings are highly suspicious for osteomyelitis. If clinically indicated, MRI may be helpful for  confirmation.    Le Art/kaila    Result Date: 2018   Vascular Laboratory  Conclusions  Bilateral.  No evidence of arterial insufficiency.  YEOMANS, WILBERT  Age:    52    Gender:     M  MRN:    [id number]  :    1965      BSA:  Exam Date:     2018 12:33  Room #:     Inpatient  Priority:     Routine  Ht (in):             Wt (lb):  Ordering Physician:        NIR PIMENTEL  Referring Physician:       NIR PIMENTEL  Sonographer:               Maribel Roberts RVT  Study Type:                Limited Bilateral  Technical Quality:         Adequate  Indications:     Ulceration of LE  CPT Codes:       72940  ICD Codes:       L97  History:         Ulcerations of the bilateral great toes. Left great toe                   amputation 2018. No prior exam for comparison.  Limitations:                 RIGHT  Waveform            Systolic BPs (mmHg)                             124           Brachial  Triphasic                                Common Femoral  Triphasic                  168           Posterior Tibial  Triphasic                  140           Dorsalis Pedis                                           Peroneal                             1.32          KAILA                                           TBI                       LEFT  Waveform        Systolic BPs (mmHg)                             127           Brachial  Triphasic                                Common Femoral  Triphasic                  162           Posterior Tibial  Triphasic                  124           Dorsalis Pedis                                           Peroneal                             1.28          KAILA                                           TBI  Findings  Right.  There is no evidence of arterial disease demonstrated (KAILA is .9-1.0) on  the right side.  Doppler waveform of the common femoral and popliteal arteries are of high  amplitude and triphasic.  Doppler waveforms at the ankle are brisk and triphasic.  Left.  There is no  evidence of arterial disease demonstrated (CELIO is .9-1.0) on  the left side.  Doppler waveform of the common femoral and popliteal arteries are of high  amplitude and triphasic.  Doppler waveforms at the ankle are brisk and triphasic.  Additional testing was not performed in accordance with lower extremity  arterial evaluation protocol.  Wilberto Damon M.D.  (Electronically Signed)  Final Date:      01 April 2018                   22:27      Micro:  Results     Procedure Component Value Units Date/Time    CULTURE WOUND W/ GRAM STAIN [[id number]]  (Abnormal) Collected:  04/01/18 1012    Order Status:  Completed Specimen:  Wound Updated:  04/02/18 1239     Significant Indicator POS (POS)     Source WND     Site Left Great Toe     Culture Result Wound Moderate growth mixed skin christine. (A)     Gram Stain Result Many WBCs.  Moderate Gram positive cocci.  Few Gram positive rods.       Culture Result Wound Beta Streptococcus Group G  ;* Moderate growth   (A)    CULTURE WOUND W/ GRAM STAIN [[id number]]  (Abnormal)  (Susceptibility) Collected:  03/31/18 1230    Order Status:  Completed Specimen:  Wound from Toe Updated:  04/02/18 1125     Significant Indicator POS (POS)     Source WND     Site Left Toe     Culture Result Wound Heavy growth mixed skin christine. (A)     Gram Stain Result Rare WBCs.  Many Gram positive cocci.  Many Gram positive rods.       Culture Result Wound Beta Streptococcus Group G  Heavy growth   (A)      Morganella morganii  Light growth   (A)    Narrative:       Collected By:[id number] PRISCILLA LYMAN  Left Foot - Great Toe    Culture & Susceptibility     MORGANELLA MORGANII     Antibiotic Sensitivity Microscan Unit Status    Ampicillin Resistant >16 mcg/mL Final    Cefepime Sensitive <=8 mcg/mL Final    Cefotaxime Sensitive 8 mcg/mL Final    Cefotetan Sensitive <=16 mcg/mL Final    Ceftazidime Sensitive <=1 mcg/mL Final    Ceftriaxone Sensitive <=8 mcg/mL Final    Cefuroxime Resistant >16 mcg/mL Final     Ciprofloxacin Sensitive <=1 mcg/mL Final    Ertapenem Sensitive <=1 mcg/mL Final    Gentamicin Sensitive <=4 mcg/mL Final    Pip/Tazobactam Sensitive <=16 mcg/mL Final    Tigecycline Sensitive <=2 mcg/mL Final    Tobramycin Sensitive <=4 mcg/mL Final    Trimeth/Sulfa Sensitive <=2/38 mcg/mL Final                       CULTURE WOUND W/O GRAM STAIN [[id number]]  (Abnormal) Collected:  03/31/18 1230    Order Status:  Completed Specimen:  Wound from Toe Updated:  04/02/18 1123     Significant Indicator POS (POS)     Source WND     Site Right toe     Culture Result Wound -- (A)      Beta Streptococcus Group G  Heavy growth   (A)    Narrative:       Collected By:[id number] PRISCILLA LYMAN  Right Foot - Great Toe    GRAM STAIN [[id number]] Collected:  04/01/18 1012    Order Status:  Completed Specimen:  Wound Updated:  04/01/18 1622     Significant Indicator .     Source WND     Site Left Great Toe     Gram Stain Result Many WBCs.  Moderate Gram positive cocci.  Few Gram positive rods.      ANAEROBIC CULTURE [[id number]] Collected:  04/01/18 1012    Order Status:  Completed Specimen:  Other Updated:  04/01/18 1046    URINE CULTURE(NEW) [[id number]] Collected:  03/30/18 1832    Order Status:  Completed Specimen:  Urine Updated:  04/01/18 0822     Significant Indicator NEG     Source UR     Site --     Urine Culture No growth at 48 hours.    Narrative:       Indication for culture:->Emergency Room Patient    GRAM STAIN [[id number]] Collected:  03/31/18 1230    Order Status:  Completed Specimen:  Wound Updated:  03/31/18 1803     Significant Indicator .     Source WND     Site Left Toe     Gram Stain Result Rare WBCs.  Many Gram positive cocci.  Many Gram positive rods.      Narrative:       Collected By:[id number] PRISCILLA LYMAN  Left Foot - Great Toe    BLOOD CULTURE [[id number]] Collected:  03/30/18 1504    Order Status:  Completed Specimen:  Blood from Peripheral Updated:  03/31/18 0656     Significant Indicator NEG     Source  "BLD     Site PERIPHERAL     Blood Culture No Growth    Note: Blood cultures are incubated for 5 days and  are monitored continuously.Positive blood cultures  are called to the RN and reported as soon as  they are identified.      Narrative:       Per Hospital Policy: Only change Specimen Src: to \"Line\" if  specified by physician order.    BLOOD CULTURE [[id number]] Collected:  03/30/18 1614    Order Status:  Completed Specimen:  Blood from Peripheral Updated:  03/31/18 0656     Significant Indicator NEG     Source BLD     Site PERIPHERAL     Blood Culture No Growth    Note: Blood cultures are incubated for 5 days and  are monitored continuously.Positive blood cultures  are called to the RN and reported as soon as  they are identified.      Narrative:       Per Hospital Policy: Only change Specimen Src: to \"Line\" if  specified by physician order.    URINALYSIS [[id number]] Collected:  03/30/18 1832    Order Status:  Completed Specimen:  Urine Updated:  03/30/18 1843     Color Yellow     Character Clear     Specific Gravity 1.011     Ph 5.5     Glucose Negative mg/dL      Ketones Negative mg/dL      Protein Negative mg/dL      Bilirubin Negative     Urobilinogen, Urine 2.0     Nitrite Negative     Leukocyte Esterase Negative     Occult Blood Negative     Micro Urine Req see below     Comment: Microscopic examination not performed when specimen is clear  and chemically negative for protein, blood, leukocyte esterase  and nitrite.         Narrative:       Indication for culture:->Emergency Room Patient    Blood Culture [[id number]]     Order Status:  Canceled Specimen:  Blood from Peripheral     Blood Culture [[id number]]     Order Status:  Canceled Specimen:  Blood from Peripheral     Urinalysis [[id number]]     Order Status:  Sent Specimen:  Urine           Assessment:  Active Hospital Problems    Diagnosis   • *Toe osteomyelitis (CMS-HCC) [M86.9]   • Hypokalemia [E87.6]   • Sepsis (CMS-HCC) [A41.9]   • Acute kidney injury " (CMS-[facility]) [N17.9]   • Hyponatremia [E87.1]   • Type 2 diabetes mellitus (CMS-HCC) [E11.9]       Plan:  Left great toe osteomyelitis  Febrile  Leukocytosis resolved  S/p left great amp through proximal phalanx on 4/1 for source control  Wound cx - group G strep, morganella   OR gram stain +GPC, GPR  DC unasyn given amp R  DC vanco as no MRSA isolated  Transition to ceftriaxone  Anticipate short course of abx post op if no signs of surgical site infection    DM2  HgA1c 5.8  Maintain BS less than 150       Discussed with internal medicine.

## 2018-04-02 NOTE — PROGRESS NOTES
Patient seen and examined  Very purulent infection yesterday  Polymicrobial infection from microbiology    Blood pressure 100/68, pulse 83, temperature 36.2 °C (97.2 °F), resp. rate 16, weight 113.4 kg (250 lb), SpO2 91 %.    Recent Labs      03/31/18   0034  04/01/18   0350  04/02/18   0524   WBC  9.2  6.9  5.1   RBC  3.63*  3.32*  3.31*   HEMOGLOBIN  11.2*  10.0*  10.2*   HEMATOCRIT  33.8*  30.9*  31.4*   MCV  93.1  93.1  94.9   MCH  30.9  30.1  30.8   MCHC  33.1*  32.4*  32.5*   RDW  40.1  41.3  42.5   PLATELETCT  148*  143*  141*   MPV  10.1  10.4  10.3       No acute distress  Dressing clean dry and intact  Neurovascularly intact    POD#1    Plan:  DVT Prophylaxis- TEDS/SCDs  Weight Bearing Status-WBAT through heel  PT/OT  Antibiotics: per ID  Case Coordination

## 2018-04-02 NOTE — PROGRESS NOTES
Ambulated patient to bathroom. Patient refusing to use FWW. Patient refusing for staff to be present in bathroom with him. Education provided by bedside RN and Charge RN regarding staff present in bathroom for his safety. Patient becoming angry and agitated and continues to refuse for staff to be in bathroom with him.

## 2018-04-03 LAB
ANION GAP SERPL CALC-SCNC: 5 MMOL/L (ref 0–11.9)
BUN SERPL-MCNC: 10 MG/DL (ref 8–22)
CALCIUM SERPL-MCNC: 8.1 MG/DL (ref 8.4–10.2)
CHLORIDE SERPL-SCNC: 109 MMOL/L (ref 96–112)
CO2 SERPL-SCNC: 26 MMOL/L (ref 20–33)
CREAT SERPL-MCNC: 0.68 MG/DL (ref 0.5–1.4)
GLUCOSE BLD-MCNC: 120 MG/DL (ref 65–99)
GLUCOSE BLD-MCNC: 125 MG/DL (ref 65–99)
GLUCOSE BLD-MCNC: 136 MG/DL (ref 65–99)
GLUCOSE SERPL-MCNC: 114 MG/DL (ref 65–99)
POTASSIUM SERPL-SCNC: 3.3 MMOL/L (ref 3.6–5.5)
SODIUM SERPL-SCNC: 140 MMOL/L (ref 135–145)

## 2018-04-03 PROCEDURE — 700102 HCHG RX REV CODE 250 W/ 637 OVERRIDE(OP): Performed by: HOSPITALIST

## 2018-04-03 PROCEDURE — 700111 HCHG RX REV CODE 636 W/ 250 OVERRIDE (IP): Performed by: INTERNAL MEDICINE

## 2018-04-03 PROCEDURE — 82962 GLUCOSE BLOOD TEST: CPT | Mod: 91

## 2018-04-03 PROCEDURE — 700102 HCHG RX REV CODE 250 W/ 637 OVERRIDE(OP): Performed by: INTERNAL MEDICINE

## 2018-04-03 PROCEDURE — 99232 SBSQ HOSP IP/OBS MODERATE 35: CPT | Performed by: INTERNAL MEDICINE

## 2018-04-03 PROCEDURE — 36415 COLL VENOUS BLD VENIPUNCTURE: CPT

## 2018-04-03 PROCEDURE — A9270 NON-COVERED ITEM OR SERVICE: HCPCS | Performed by: INTERNAL MEDICINE

## 2018-04-03 PROCEDURE — 770001 HCHG ROOM/CARE - MED/SURG/GYN PRIV*

## 2018-04-03 PROCEDURE — A9270 NON-COVERED ITEM OR SERVICE: HCPCS | Performed by: HOSPITALIST

## 2018-04-03 PROCEDURE — 80048 BASIC METABOLIC PNL TOTAL CA: CPT

## 2018-04-03 RX ORDER — POTASSIUM CHLORIDE 20 MEQ/1
40 TABLET, EXTENDED RELEASE ORAL DAILY
Status: DISCONTINUED | OUTPATIENT
Start: 2018-04-03 | End: 2018-04-04 | Stop reason: HOSPADM

## 2018-04-03 RX ADMIN — HYDROCODONE BITARTRATE AND ACETAMINOPHEN 2 TABLET: 5; 325 TABLET ORAL at 07:00

## 2018-04-03 RX ADMIN — HEPARIN SODIUM 5000 UNITS: 5000 INJECTION, SOLUTION INTRAVENOUS; SUBCUTANEOUS at 10:15

## 2018-04-03 RX ADMIN — NICOTINE TRANSDERMAL SYSTEM 21 MG: 21 PATCH, EXTENDED RELEASE TRANSDERMAL at 06:21

## 2018-04-03 RX ADMIN — POTASSIUM CHLORIDE 40 MEQ: 1500 TABLET, EXTENDED RELEASE ORAL at 10:14

## 2018-04-03 RX ADMIN — HYDROCODONE BITARTRATE AND ACETAMINOPHEN 2 TABLET: 5; 325 TABLET ORAL at 16:33

## 2018-04-03 RX ADMIN — ACETAMINOPHEN 650 MG: 325 TABLET, FILM COATED ORAL at 10:16

## 2018-04-03 RX ADMIN — STANDARDIZED SENNA CONCENTRATE AND DOCUSATE SODIUM 2 TABLET: 8.6; 5 TABLET, FILM COATED ORAL at 23:06

## 2018-04-03 RX ADMIN — CEFTRIAXONE 2 G: 2 INJECTION, POWDER, FOR SOLUTION INTRAMUSCULAR; INTRAVENOUS at 10:15

## 2018-04-03 RX ADMIN — HEPARIN SODIUM 5000 UNITS: 5000 INJECTION, SOLUTION INTRAVENOUS; SUBCUTANEOUS at 23:06

## 2018-04-03 RX ADMIN — HYDROCODONE BITARTRATE AND ACETAMINOPHEN 2 TABLET: 5; 325 TABLET ORAL at 23:06

## 2018-04-03 ASSESSMENT — ENCOUNTER SYMPTOMS
MYALGIAS: 0
NERVOUS/ANXIOUS: 0
CHILLS: 0
DIZZINESS: 0
FOCAL WEAKNESS: 0
FEVER: 0
NAUSEA: 0
ABDOMINAL PAIN: 0
SENSORY CHANGE: 1
SHORTNESS OF BREATH: 0
DIARRHEA: 0
PALPITATIONS: 0
HEADACHES: 0
VOMITING: 0

## 2018-04-03 ASSESSMENT — PAIN SCALES - GENERAL
PAINLEVEL_OUTOF10: 5
PAINLEVEL_OUTOF10: 8
PAINLEVEL_OUTOF10: 6
PAINLEVEL_OUTOF10: 7

## 2018-04-03 NOTE — PROGRESS NOTES
Orthopaedic PA Progress Note    Interval changes:did well overnight    ROS - Patient denies any new issues. No chest pain, dyspnea, or fever.  Pain well controlled.    Blood pressure 121/67, pulse 65, temperature 37.3 °C (99.2 °F), resp. rate 15, weight 113.4 kg (250 lb), SpO2 95 %.    Patient seen and examined  No acute distress  Breathing non labored  RRR  Left foot dressing dry and intact. Toes DF/PF/SILT/immediate cap refill    Recent Labs      04/01/18   0350  04/02/18   0524   WBC  6.9  5.1   RBC  3.32*  3.31*   HEMOGLOBIN  10.0*  10.2*   HEMATOCRIT  30.9*  31.4*   MCV  93.1  94.9   MCH  30.1  30.8   MCHC  32.4*  32.5*   RDW  41.3  42.5   PLATELETCT  143*  141*   MPV  10.4  10.3       Active Hospital Problems    Diagnosis   • Toe osteomyelitis (CMS-[facility]) [M86.9]     Priority: High   • Sepsis (CMS-[facility]) [A41.9]     Priority: High   • Hypokalemia [E87.6]   • Acute kidney injury (CMS-[facility]) [N17.9]   • Hyponatremia [E87.1]   • Type 2 diabetes mellitus (CMS-[facility]) [E11.9]       Assessment/Plan:  POD#2 S/P Left great toe amputation through proximal phalanx  Wt bearing status - AT through the heel  PT/OT-initiated  Wound care:dressing left in place  Drains - no  Olsen-no  Sutures/Staples out- 10-14 days post operatively  Antibiotics: per ID  DVT Prophylaxis- TEDS/SCDs/Foot pumps.   UFH q8h per med team  Future Procedures - not anticipated  Case Coordination for Discharge Planning - Disposition per med team/ID

## 2018-04-03 NOTE — CARE PLAN
Problem: Pain Management  Goal: Pain level will decrease to patient's comfort goal    Intervention: Educate and implement non-pharmacologic comfort measures. Examples: relaxation, distration, play therapy, activity therapy, massage, etc.  Room dark and door closed to promote relaxing environment.

## 2018-04-03 NOTE — PROGRESS NOTES
Infectious Disease Progress Note    Author: Juliane Vaughn M.D. Date & Time of service: 4/3/2018  12:40 PM    Chief Complaint:  FU left great toe osteomyelitis    Interval History:   Tmax 102.8, WBC 5.1, s/p left great toe amp yesterday, pain controlled, tolerating abx without issues  4/3 AF, no CBC, resting comfortably, denies any post op pain, tolerating abx without issues  Labs Reviewed, Medications Reviewed, Radiology Reviewed and Wound Reviewed.    Review of Systems:  Review of Systems   Constitutional: Negative for chills and fever.   Gastrointestinal: Negative for abdominal pain, diarrhea, nausea and vomiting.   Musculoskeletal: Negative for joint pain and myalgias.       Hemodynamics:  Temp (24hrs), Av.9 °C (98.5 °F), Min:36.5 °C (97.7 °F), Max:37.4 °C (99.3 °F)  Temperature: 36.5 °C (97.7 °F)  Pulse  Av.7  Min: 65  Max: 111   Blood Pressure: 124/74       Physical Exam:  Physical Exam   Constitutional: He appears well-developed.   Older than stated age   HENT:   Head: Normocephalic and atraumatic.   Eyes: EOM are normal. Pupils are equal, round, and reactive to light.   Neck: Neck supple.   Cardiovascular: Normal rate and regular rhythm.    Pulmonary/Chest: Effort normal and breath sounds normal.   Abdominal: Soft. There is no tenderness.   Musculoskeletal:   Evidence of left great toe amp. Surgical dressing in place   Neurological: He is alert.   Skin: No rash noted.   Psychiatric:   Flat affect       Meds:    Current Facility-Administered Medications:   •  potassium chloride SA  •  cefTRIAXone (ROCEPHIN) IV  •  HYDROcodone-acetaminophen  •  senna-docusate **AND** polyethylene glycol/lytes **AND** magnesium hydroxide **AND** bisacodyl  •  Respiratory Care per Protocol  •  heparin  •  acetaminophen  •  INITIATE NICOTINE REPLACEMENT PROTOCOL  **AND** nicotine **AND** Protocol 205 PATIENT EDUCATION MATERIALS **AND** Protocol 205 Rotate nicotine patch application sites daily  **AND** nicotine  polacrilex  •  lactobacillus granules  •  insulin regular **AND** Accu-Chek ACHS **AND** NOTIFY MD and PharmD **AND** glucose 4 g **AND** dextrose 50%    Labs:  Recent Labs      04/01/18   0350  04/02/18   0524   WBC  6.9  5.1   RBC  3.32*  3.31*   HEMOGLOBIN  10.0*  10.2*   HEMATOCRIT  30.9*  31.4*   MCV  93.1  94.9   MCH  30.1  30.8   RDW  41.3  42.5   PLATELETCT  143*  141*   MPV  10.4  10.3     Recent Labs      04/01/18   0350  04/02/18   0524  04/03/18   0346   SODIUM  137  136  140   POTASSIUM  3.4*  3.4*  3.3*   CHLORIDE  108  107  109   CO2  24  24  26   GLUCOSE  116*  119*  114*   BUN  13  10  10     Recent Labs      04/01/18   0350 04/02/18   0524  04/03/18   0346   CREATININE  0.78  0.71  0.68       Imaging:  Dx-chest-portable (1 View)    Result Date: 3/30/2018  3/30/2018 5:57 PM HISTORY/REASON FOR EXAM:  Shortness of breath. Sepsis. Left foot open wounds. Diabetes. TECHNIQUE/EXAM DESCRIPTION AND NUMBER OF VIEWS: Single portable view of the chest. COMPARISON:  None FINDINGS: The chest is slightly rotated. The heart is enlarged. There is prominence of the ascending aorta which may be accentuated by rotation. No lobar consolidation is present. No pleural effusion is noted.     1.  No acute cardiac or pulmonary abnormality is noted. 2.  Cardiomegaly. Prominence of the ascending aorta.    Dx-foot-complete 3+ Right    Result Date: 3/31/2018  3/31/2018 8:49 PM HISTORY/REASON FOR EXAM:  RIGHT great toe infection. TECHNIQUE/EXAM DESCRIPTION AND NUMBER OF VIEWS:  3 views of the  RIGHT foot. COMPARISON: None FINDINGS: MINERALIZATION: Mineralization is unremarkable for age. INJURY: No acute fracture or gross malalignment is seen. JOINTS: No erosive arthropathy is evident.     1.  No radiographic evidence of bony erosion or aggressive periosteal reaction. 2.  Soft tissue irregularity along the medial aspect of the great toe compatible with the provided clinical history of soft tissue wound    Dx-foot-complete 3+  Left    Result Date: 3/31/2018  3/31/2018 10:15 AM HISTORY/REASON FOR EXAM:  Evaluate for osteoarthritis TECHNIQUE/EXAM DESCRIPTION AND NUMBER OF VIEWS: 3 nonweightbearing views of the LEFT foot. COMPARISON:  3/30/2018 FINDINGS: There is an intra-articular fracture of the distal phalanx of the first digit. There is lucency within the distal phalanx of the first digit with may be related to osteomyelitis. There is soft tissue swelling and soft tissue air. No dislocation is identified. Radiopaque densities may be external to the patient.     Fracture of the base of the distal phalanx of the first digit. Lucency within the distal phalanx could be related to osteomyelitis. Soft tissue swelling and soft tissue air is seen within the first digit. Soft tissue swelling extends into the foot.    Dx-toe(s) 2+ Left    Result Date: 3/30/2018  3/30/2018 5:57 PM HISTORY/REASON FOR EXAM:  Open left foot wounds. TECHNIQUE/EXAM DESCRIPTION AND NUMBER OF VIEWS:  3 views of the LEFT toes. COMPARISON: None FINDINGS: There is irregularity in the lateral aspect of the base of the first distal phalanx. There is extensive soft tissue edema with evidence of an overlying soft tissue plane in soft tissue air. Radiopaque densities may be external to the patient.     Cortical irregularity in the lateral base of the first distal phalanx with overlying soft tissue swelling and foci of air. Findings are highly suspicious for osteomyelitis. If clinically indicated, MRI may be helpful for confirmation.    Le Art/kaila    Result Date: 2018   Vascular Laboratory  Conclusions  Bilateral.  No evidence of arterial insufficiency.  YEOMANS, WILBERT  Age:    52    Gender:     M  MRN:    [id number]  :    1965      BSA:  Exam Date:     2018 12:33  Room #:     Inpatient  Priority:     Routine  Ht (in):             Wt (lb):  Ordering Physician:        NIR PIMENTEL  Referring Physician:       NIR PIMENTEL  Sonographer:               Maribel  EPIFANIO Roberts  Study Type:                Limited Bilateral  Technical Quality:         Adequate  Indications:     Ulceration of LE  CPT Codes:       48546  ICD Codes:       L97  History:         Ulcerations of the bilateral great toes. Left great toe                   amputation 4/1/2018. No prior exam for comparison.  Limitations:                 RIGHT  Waveform            Systolic BPs (mmHg)                             124           Brachial  Triphasic                                Common Femoral  Triphasic                  168           Posterior Tibial  Triphasic                  140           Dorsalis Pedis                                           Peroneal                             1.32          CELIO                                           TBI                       LEFT  Waveform        Systolic BPs (mmHg)                             127           Brachial  Triphasic                                Common Femoral  Triphasic                  162           Posterior Tibial  Triphasic                  124           Dorsalis Pedis                                           Peroneal                             1.28          CELIO                                           TBI  Findings  Right.  There is no evidence of arterial disease demonstrated (CELOI is .9-1.0) on  the right side.  Doppler waveform of the common femoral and popliteal arteries are of high  amplitude and triphasic.  Doppler waveforms at the ankle are brisk and triphasic.  Left.  There is no evidence of arterial disease demonstrated (CELIO is .9-1.0) on  the left side.  Doppler waveform of the common femoral and popliteal arteries are of high  amplitude and triphasic.  Doppler waveforms at the ankle are brisk and triphasic.  Additional testing was not performed in accordance with lower extremity  arterial evaluation protocol.  Wilberto Damon M.D.  (Electronically Signed)  Final Date:      01 April 2018                   22:27      Micro:  Results      Procedure Component Value Units Date/Time    CULTURE WOUND W/ GRAM STAIN [[id number]]  (Abnormal) Collected:  04/01/18 1012    Order Status:  Completed Specimen:  Wound Updated:  04/03/18 1052     Significant Indicator POS (POS)     Source WND     Site Left Great Toe     Culture Result Wound Light growth mixed skin christine. (A)     Gram Stain Result Many WBCs.  Moderate Gram positive cocci.  Few Gram positive rods.       Culture Result Wound Beta Streptococcus Group G  ;* Moderate growth   (A)      Diphtheroids  ;* Heavy growth   (A)    ANAEROBIC CULTURE [[id number]] Collected:  04/01/18 1012    Order Status:  Completed Specimen:  Wound Updated:  04/03/18 1052     Significant Indicator NEG     Source WND     Site Left Great Toe     Anaerobic Culture, Culture Res Culture in progress.    CULTURE WOUND W/ GRAM STAIN [[id number]]  (Abnormal)  (Susceptibility) Collected:  03/31/18 1230    Order Status:  Completed Specimen:  Wound from Toe Updated:  04/02/18 1125     Significant Indicator POS (POS)     Source WND     Site Left Toe     Culture Result Wound Heavy growth mixed skin christine. (A)     Gram Stain Result Rare WBCs.  Many Gram positive cocci.  Many Gram positive rods.       Culture Result Wound Beta Streptococcus Group G  Heavy growth   (A)      Morganella morganii  Light growth   (A)    Narrative:       Collected By:[id number] PRISCILLA LYMAN  Left Foot - Great Toe    Culture & Susceptibility     MORGANELLA MORGANII     Antibiotic Sensitivity Microscan Unit Status    Ampicillin Resistant >16 mcg/mL Final    Cefepime Sensitive <=8 mcg/mL Final    Cefotaxime Sensitive 8 mcg/mL Final    Cefotetan Sensitive <=16 mcg/mL Final    Ceftazidime Sensitive <=1 mcg/mL Final    Ceftriaxone Sensitive <=8 mcg/mL Final    Cefuroxime Resistant >16 mcg/mL Final    Ciprofloxacin Sensitive <=1 mcg/mL Final    Ertapenem Sensitive <=1 mcg/mL Final    Gentamicin Sensitive <=4 mcg/mL Final    Pip/Tazobactam Sensitive <=16 mcg/mL Final     Tigecycline Sensitive <=2 mcg/mL Final    Tobramycin Sensitive <=4 mcg/mL Final    Trimeth/Sulfa Sensitive <=2/38 mcg/mL Final                       CULTURE WOUND W/O GRAM STAIN [[id number]]  (Abnormal) Collected:  03/31/18 1230    Order Status:  Completed Specimen:  Wound from Toe Updated:  04/02/18 1123     Significant Indicator POS (POS)     Source WND     Site Right toe     Culture Result Wound -- (A)      Beta Streptococcus Group G  Heavy growth   (A)    Narrative:       Collected By:[id number] PRISCILLA LYMAN  Right Foot - Great Toe    GRAM STAIN [[id number]] Collected:  04/01/18 1012    Order Status:  Completed Specimen:  Wound Updated:  04/01/18 1622     Significant Indicator .     Source WND     Site Left Great Toe     Gram Stain Result Many WBCs.  Moderate Gram positive cocci.  Few Gram positive rods.      URINE CULTURE(NEW) [[id number]] Collected:  03/30/18 1832    Order Status:  Completed Specimen:  Urine Updated:  04/01/18 0822     Significant Indicator NEG     Source UR     Site --     Urine Culture No growth at 48 hours.    Narrative:       Indication for culture:->Emergency Room Patient    GRAM STAIN [[id number]] Collected:  03/31/18 1230    Order Status:  Completed Specimen:  Wound Updated:  03/31/18 1803     Significant Indicator .     Source WND     Site Left Toe     Gram Stain Result Rare WBCs.  Many Gram positive cocci.  Many Gram positive rods.      Narrative:       Collected By:[id number] PRISCILLA LYMAN  Left Foot - Great Toe    BLOOD CULTURE [[id number]] Collected:  03/30/18 1504    Order Status:  Completed Specimen:  Blood from Peripheral Updated:  03/31/18 0656     Significant Indicator NEG     Source BLD     Site PERIPHERAL     Blood Culture No Growth    Note: Blood cultures are incubated for 5 days and  are monitored continuously.Positive blood cultures  are called to the RN and reported as soon as  they are identified.      Narrative:       Per Hospital Policy: Only change Specimen Src: to  "\"Line\" if  specified by physician order.    BLOOD CULTURE [[id number]] Collected:  03/30/18 1614    Order Status:  Completed Specimen:  Blood from Peripheral Updated:  03/31/18 0656     Significant Indicator NEG     Source BLD     Site PERIPHERAL     Blood Culture No Growth    Note: Blood cultures are incubated for 5 days and  are monitored continuously.Positive blood cultures  are called to the RN and reported as soon as  they are identified.      Narrative:       Per Hospital Policy: Only change Specimen Src: to \"Line\" if  specified by physician order.    URINALYSIS [[id number]] Collected:  03/30/18 1832    Order Status:  Completed Specimen:  Urine Updated:  03/30/18 1843     Color Yellow     Character Clear     Specific Gravity 1.011     Ph 5.5     Glucose Negative mg/dL      Ketones Negative mg/dL      Protein Negative mg/dL      Bilirubin Negative     Urobilinogen, Urine 2.0     Nitrite Negative     Leukocyte Esterase Negative     Occult Blood Negative     Micro Urine Req see below     Comment: Microscopic examination not performed when specimen is clear  and chemically negative for protein, blood, leukocyte esterase  and nitrite.         Narrative:       Indication for culture:->Emergency Room Patient    Blood Culture [[id number]]     Order Status:  Canceled Specimen:  Blood from Peripheral     Blood Culture [[id number]]     Order Status:  Canceled Specimen:  Blood from Peripheral     Urinalysis [[id number]] Collected:  03/30/18 0000    Order Status:  Canceled Specimen:  Urine           Assessment:  Active Hospital Problems    Diagnosis   • *Toe osteomyelitis (CMS-[facility]) [M86.9]   • Hypokalemia [E87.6]   • Sepsis (CMS-[facility]) [A41.9]   • Acute kidney injury (CMS-[facility]) [N17.9]   • Hyponatremia [E87.1]   • Type 2 diabetes mellitus (CMS-[facility]) [E11.9]       Plan:  Left great toe osteomyelitis  Fever resolved  Leukocytosis resolved  S/p left great amp through proximal phalanx on 4/1 for source control  Wound cx - group G " strep, morganella   OR gram stain +GPC, GPR  Continue ceftriaxone  Anticipate short course of abx post op if no signs of surgical site infection    DM2  HgA1c 5.8  Maintain BS less than 150     Discussed with internal medicine.

## 2018-04-03 NOTE — PROGRESS NOTES
[facility] Hospitalist Progress Note    Date of Service: 4/3/2018    Chief Complaint  52 y.o. male admitted 3/30/2018 with left toe pain.    Interval Problem Update  No fever or chills overnight.  Pain controlled from left foot.     Consultants/Specialty  Ortho  ID    Disposition  Pending medical and surgical clearance.        Review of Systems   Constitutional: Positive for malaise/fatigue. Negative for chills and fever.   Respiratory: Negative for shortness of breath.    Cardiovascular: Negative for chest pain, palpitations and leg swelling.   Gastrointestinal: Negative for abdominal pain, nausea and vomiting.   Genitourinary: Negative for dysuria.   Musculoskeletal: Positive for joint pain.   Neurological: Positive for sensory change. Negative for dizziness, focal weakness and headaches.   Psychiatric/Behavioral: The patient is not nervous/anxious.    All other systems reviewed and are negative.     Physical Exam  Laboratory/Imaging   Hemodynamics  Temp (24hrs), Av.1 °C (98.7 °F), Min:36.5 °C (97.7 °F), Max:37.4 °C (99.3 °F)   Temperature: 36.5 °C (97.7 °F)  Pulse  Av.7  Min: 65  Max: 111    Blood Pressure: 124/74      Respiratory      Respiration: 18, Pulse Oximetry: 94 %     Work Of Breathing / Effort: Mild  RUL Breath Sounds: Clear, RML Breath Sounds: Diminished, RLL Breath Sounds: Diminished, LORENA Breath Sounds: Clear, LLL Breath Sounds: Diminished    Fluids    Intake/Output Summary (Last 24 hours) at 18 1513  Last data filed at 18 0500   Gross per 24 hour   Intake             1150 ml   Output              600 ml   Net              550 ml       Nutrition  Orders Placed This Encounter   Procedures   • DIET ORDER     Standing Status:   Standing     Number of Occurrences:   1     Order Specific Question:   Diet:     Answer:   Diabetic [3]     Physical Exam   Constitutional: He is oriented to person, place, and time. He appears well-developed. No distress.   HENT:   Head: Normocephalic.    Mouth/Throat: Oropharynx is clear and moist.   Eyes: EOM are normal. No scleral icterus.   Neck: Normal range of motion. Neck supple.   Cardiovascular: Normal rate, regular rhythm and intact distal pulses.    Pulmonary/Chest: Breath sounds normal. No respiratory distress. He has no rales.   Abdominal: Soft. He exhibits no distension. There is no tenderness.   Musculoskeletal: He exhibits no edema.   Neurological: He is alert and oriented to person, place, and time.   Skin: Skin is warm and dry.   Left foot bandaged, no drainage; right great toe with dried blood and ?gangrene   Psychiatric:   Odd affect   Vitals reviewed.      Recent Labs      04/01/18   0350  04/02/18   0524   WBC  6.9  5.1   RBC  3.32*  3.31*   HEMOGLOBIN  10.0*  10.2*   HEMATOCRIT  30.9*  31.4*   MCV  93.1  94.9   MCH  30.1  30.8   MCHC  32.4*  32.5*   RDW  41.3  42.5   PLATELETCT  143*  141*   MPV  10.4  10.3     Recent Labs      04/01/18   0350  04/02/18   0524  04/03/18   0346   SODIUM  137  136  140   POTASSIUM  3.4*  3.4*  3.3*   CHLORIDE  108  107  109   CO2  24  24  26   GLUCOSE  116*  119*  114*   BUN  13  10  10   CREATININE  0.78  0.71  0.68   CALCIUM  7.9*  8.0*  8.1*                      Assessment/Plan     * Toe osteomyelitis (CMS-HCC)- (present on admission)   Assessment & Plan    Toe osteo with gangrene of multiple other toes bilaterally. Leukocytosis resolved. CRP and ESR elevated. S/p toe amputation on 4/1.  - Ortho and ID recs greatly appreciated  - abx ceftriaxone per ID recs  - pain control  - LPS following        Sepsis (CMS-HCC)- (present on admission)   Assessment & Plan    Resolved. This is sepsis (without associated acute organ dysfunction).  Leukocytosis, mild lactic acidosis resolved. 2/2 osteomyelitis   - management as above        Hypokalemia   Assessment & Plan    - monitor and replete         Type 2 diabetes mellitus (CMS-HCC)- (present on admission)   Assessment & Plan    A1C 5.8%  - sliding scale and hypoglycemia  protocol         Hyponatremia- (present on admission)   Assessment & Plan    Resolved. Na low at 127 on admission, hypovolemic hyponatremia. Improved with normal saline and now normalized. Asymptomatic.  - continue to trend        Acute kidney injury (CMS-HCC)- (present on admission)   Assessment & Plan    Resolved. Elevated creatinine on admission, likely pre-renal. Improved with IVF. Cr up to 1.72 and now normalized to 0.71  - encourage PO fluid intake  - avoid nephrotoxic agents, renally dose medications          Quality-Core Measures   Reviewed items::  Labs reviewed and Medications reviewed  Olsen catheter::  No Olsen  DVT prophylaxis pharmacological::  Heparin  DVT prophylaxis - mechanical:  SCDs  Ulcer Prophylaxis::  Not indicated

## 2018-04-03 NOTE — PROGRESS NOTES
Patient seen and examined    Blood pressure 124/74, pulse 67, temperature 36.5 °C (97.7 °F), resp. rate 18, weight 113.4 kg (250 lb), SpO2 94 %.    Recent Labs      04/01/18   0350  04/02/18   0524   WBC  6.9  5.1   RBC  3.32*  3.31*   HEMOGLOBIN  10.0*  10.2*   HEMATOCRIT  30.9*  31.4*   MCV  93.1  94.9   MCH  30.1  30.8   MCHC  32.4*  32.5*   RDW  41.3  42.5   PLATELETCT  143*  141*   MPV  10.4  10.3       No acute distress  Dressing clean dry and intact  Neurovascularly intact    POD#2    Plan:  DVT Prophylaxis- TEDS/SCDs  Weight Bearing Status-WBAT through heel  PT/OT  Antibiotics: per ID  Case Coordination

## 2018-04-03 NOTE — CARE PLAN
Problem: Fluid Volume:  Goal: Will maintain balanced intake and output    Intervention: Monitor, educate, and encourage compliance with therapeutic intake of liquids  Fresh ice water to bedside. Patient encouraged to drink.

## 2018-04-03 NOTE — PROGRESS NOTES
[facility] Hospitalist Progress Note    Date of Service: 2018    Chief Complaint  52 y.o. male admitted 3/30/2018 with left toe pain.    Interval Problem Update  S/p toe amputation yesterday. Doing well today, denies any significant pain. Had a temp of 39.3.    Consultants/Specialty  Ortho  ID    Disposition  Pending medical and surgical clearance.        Review of Systems   Constitutional: Positive for fever and malaise/fatigue. Negative for chills.   Respiratory: Negative for shortness of breath.    Cardiovascular: Negative for chest pain, palpitations and leg swelling.   Gastrointestinal: Negative for abdominal pain, nausea and vomiting.   Genitourinary: Negative for dysuria.   Musculoskeletal: Positive for joint pain.   Neurological: Positive for sensory change. Negative for dizziness, focal weakness and headaches.   Psychiatric/Behavioral: The patient is nervous/anxious.    All other systems reviewed and are negative.     Physical Exam  Laboratory/Imaging   Hemodynamics  Temp (24hrs), Av.9 °C (98.5 °F), Min:36.2 °C (97.2 °F), Max:37.6 °C (99.6 °F)   Temperature: 37.4 °C (99.3 °F)  Pulse  Av.2  Min: 66  Max: 111    Blood Pressure: 116/88      Respiratory      Respiration: 16, Pulse Oximetry: 93 %     Work Of Breathing / Effort: Mild  RUL Breath Sounds: Clear, RML Breath Sounds: Diminished, RLL Breath Sounds: Diminished, LORENA Breath Sounds: Clear, LLL Breath Sounds: Diminished    Fluids    Intake/Output Summary (Last 24 hours) at 18 2306  Last data filed at 18 1600   Gross per 24 hour   Intake             1320 ml   Output              600 ml   Net              720 ml       Nutrition  Orders Placed This Encounter   Procedures   • DIET ORDER     Standing Status:   Standing     Number of Occurrences:   1     Order Specific Question:   Diet:     Answer:   Diabetic [3]     Physical Exam   Constitutional: He is oriented to person, place, and time. He appears well-developed. No distress.   HENT:    Head: Normocephalic.   Mouth/Throat: Oropharynx is clear and moist.   Eyes: EOM are normal. No scleral icterus.   Neck: Normal range of motion. Neck supple.   Cardiovascular: Normal rate, regular rhythm and intact distal pulses.    Pulmonary/Chest: Breath sounds normal. No respiratory distress. He has no rales.   Abdominal: Soft. He exhibits no distension. There is no tenderness.   Musculoskeletal: He exhibits no edema.   Neurological: He is alert and oriented to person, place, and time.   Skin: Skin is warm and dry.   Left foot bandaged, no drainage; right great toe with dried blood and ?gangrene   Psychiatric:   Odd affect   Vitals reviewed.      Recent Labs      03/31/18 0034 04/01/18   0350  04/02/18   0524   WBC  9.2  6.9  5.1   RBC  3.63*  3.32*  3.31*   HEMOGLOBIN  11.2*  10.0*  10.2*   HEMATOCRIT  33.8*  30.9*  31.4*   MCV  93.1  93.1  94.9   MCH  30.9  30.1  30.8   MCHC  33.1*  32.4*  32.5*   RDW  40.1  41.3  42.5   PLATELETCT  148*  143*  141*   MPV  10.1  10.4  10.3     Recent Labs      03/31/18 0034  04/01/18 0350  04/02/18   0524   SODIUM  134*  137  136   POTASSIUM  3.9  3.4*  3.4*   CHLORIDE  106  108  107   CO2  21  24  24   GLUCOSE  149*  116*  119*   BUN  17  13  10   CREATININE  1.03  0.78  0.71   CALCIUM  8.4*  7.9*  8.0*                      Assessment/Plan     * Toe osteomyelitis (CMS-[facility], located within [facility])- (present on admission)   Assessment & Plan    Toe osteo with gangrene of multiple other toes bilaterally. Leukocytosis resolved. CRP and ESR elevated. S/p toe amputation on 4/1.  - Ortho and ID consulted, greatly appreciate   - abx changed to ceftriaxone  - pain control  - LPS following        Hypokalemia   Assessment & Plan    K low at 3.4 this AM.   - monitor and replete         Type 2 diabetes mellitus (CMS-[facility] located within [facility])- (present on admission)   Assessment & Plan    A1C 5.8%  - sliding scale and hypoglycemia protocol         Hyponatremia- (present on admission)   Assessment & Plan    Resolved. Na low at  127 on admission, hypovolemic hyponatremia. Improved with normal saline and now normalized. Asymptomatic.  - continue to trend        Acute kidney injury (CMS-[facility])- (present on admission)   Assessment & Plan    Resolved. Elevated creatinine on admission, likely pre-renal. Improved with IVF. Cr up to 1.72 and now normalized to 0.71  - encourage PO fluid intake  - avoid nephrotoxic agents, renally dose medications        Sepsis (CMS-HCC)- (present on admission)   Assessment & Plan    Resolving. This is sepsis (without associated acute organ dysfunction).  Leukocytosis, mild lactic acidosis resolved. 2/2 osteomyelitis   - management as above          Quality-Core Measures   Reviewed items::  Labs reviewed and Medications reviewed  Olsen catheter::  No Olsen  DVT prophylaxis pharmacological::  Heparin  DVT prophylaxis - mechanical:  SCDs  Ulcer Prophylaxis::  Not indicated

## 2018-04-04 VITALS
SYSTOLIC BLOOD PRESSURE: 145 MMHG | WEIGHT: 250 LBS | BODY MASS INDEX: 30.43 KG/M2 | TEMPERATURE: 98.8 F | OXYGEN SATURATION: 96 % | RESPIRATION RATE: 17 BRPM | DIASTOLIC BLOOD PRESSURE: 94 MMHG | HEART RATE: 59 BPM

## 2018-04-04 PROBLEM — M86.9 TOE OSTEOMYELITIS (HCC): Status: RESOLVED | Noted: 2018-03-30 | Resolved: 2018-04-04

## 2018-04-04 PROBLEM — N17.9 ACUTE KIDNEY INJURY (HCC): Status: RESOLVED | Noted: 2018-03-30 | Resolved: 2018-04-04

## 2018-04-04 PROBLEM — A41.9 SEPSIS (HCC): Status: RESOLVED | Noted: 2018-03-30 | Resolved: 2018-04-04

## 2018-04-04 PROBLEM — E87.1 HYPONATREMIA: Status: RESOLVED | Noted: 2018-03-30 | Resolved: 2018-04-04

## 2018-04-04 PROBLEM — Z89.412 STATUS POST AMPUTATION OF GREAT TOE, LEFT (HCC): Status: ACTIVE | Noted: 2018-04-04

## 2018-04-04 LAB
ANION GAP SERPL CALC-SCNC: 6 MMOL/L (ref 0–11.9)
ANISOCYTOSIS BLD QL SMEAR: ABNORMAL
BACTERIA BLD CULT: NORMAL
BACTERIA BLD CULT: NORMAL
BACTERIA SPEC ANAEROBE CULT: NORMAL
BACTERIA WND AEROBE CULT: ABNORMAL
BASOPHILS # BLD AUTO: 0 % (ref 0–1.8)
BASOPHILS # BLD: 0 K/UL (ref 0–0.12)
BUN SERPL-MCNC: 14 MG/DL (ref 8–22)
CALCIUM SERPL-MCNC: 8.2 MG/DL (ref 8.5–10.5)
CHLORIDE SERPL-SCNC: 110 MMOL/L (ref 96–112)
CO2 SERPL-SCNC: 23 MMOL/L (ref 20–33)
CREAT SERPL-MCNC: 0.66 MG/DL (ref 0.5–1.4)
EOSINOPHIL # BLD AUTO: 0.25 K/UL (ref 0–0.51)
EOSINOPHIL NFR BLD: 5.2 % (ref 0–6.9)
ERYTHROCYTE [DISTWIDTH] IN BLOOD BY AUTOMATED COUNT: 41.7 FL (ref 35.9–50)
GLUCOSE BLD-MCNC: 105 MG/DL (ref 65–99)
GLUCOSE BLD-MCNC: 113 MG/DL (ref 65–99)
GLUCOSE BLD-MCNC: 118 MG/DL (ref 65–99)
GLUCOSE SERPL-MCNC: 112 MG/DL (ref 65–99)
GRAM STN SPEC: ABNORMAL
HCT VFR BLD AUTO: 31.7 % (ref 42–52)
HGB BLD-MCNC: 10.1 G/DL (ref 14–18)
LYMPHOCYTES # BLD AUTO: 1.32 K/UL (ref 1–4.8)
LYMPHOCYTES NFR BLD: 26.9 % (ref 22–41)
MACROCYTES BLD QL SMEAR: ABNORMAL
MANUAL DIFF BLD: NORMAL
MCH RBC QN AUTO: 30 PG (ref 27–33)
MCHC RBC AUTO-ENTMCNC: 31.9 G/DL (ref 33.7–35.3)
MCV RBC AUTO: 94.1 FL (ref 81.4–97.8)
MONOCYTES # BLD AUTO: 0.04 K/UL (ref 0–0.85)
MONOCYTES NFR BLD AUTO: 0.9 % (ref 0–13.4)
MORPHOLOGY BLD-IMP: NORMAL
MYELOCYTES NFR BLD MANUAL: 0.9 %
NEUTROPHILS # BLD AUTO: 3.24 K/UL (ref 1.82–7.42)
NEUTROPHILS NFR BLD: 66.1 % (ref 44–72)
NRBC # BLD AUTO: 0 K/UL
NRBC BLD-RTO: 0 /100 WBC
PLATELET # BLD AUTO: 171 K/UL (ref 164–446)
PLATELET BLD QL SMEAR: NORMAL
PMV BLD AUTO: 10.6 FL (ref 9–12.9)
POTASSIUM SERPL-SCNC: 3.4 MMOL/L (ref 3.6–5.5)
RBC # BLD AUTO: 3.37 M/UL (ref 4.7–6.1)
RBC BLD AUTO: PRESENT
SIGNIFICANT IND 70042: ABNORMAL
SIGNIFICANT IND 70042: NORMAL
SITE SITE: ABNORMAL
SITE SITE: NORMAL
SODIUM SERPL-SCNC: 139 MMOL/L (ref 135–145)
SOURCE SOURCE: ABNORMAL
SOURCE SOURCE: NORMAL
WBC # BLD AUTO: 4.9 K/UL (ref 4.8–10.8)

## 2018-04-04 PROCEDURE — A9270 NON-COVERED ITEM OR SERVICE: HCPCS | Performed by: HOSPITALIST

## 2018-04-04 PROCEDURE — 36415 COLL VENOUS BLD VENIPUNCTURE: CPT

## 2018-04-04 PROCEDURE — 82962 GLUCOSE BLOOD TEST: CPT | Mod: 91

## 2018-04-04 PROCEDURE — 700102 HCHG RX REV CODE 250 W/ 637 OVERRIDE(OP): Performed by: HOSPITALIST

## 2018-04-04 PROCEDURE — A9270 NON-COVERED ITEM OR SERVICE: HCPCS | Performed by: INTERNAL MEDICINE

## 2018-04-04 PROCEDURE — 700111 HCHG RX REV CODE 636 W/ 250 OVERRIDE (IP): Performed by: INTERNAL MEDICINE

## 2018-04-04 PROCEDURE — 99239 HOSP IP/OBS DSCHRG MGMT >30: CPT | Performed by: INTERNAL MEDICINE

## 2018-04-04 PROCEDURE — 700102 HCHG RX REV CODE 250 W/ 637 OVERRIDE(OP): Performed by: INTERNAL MEDICINE

## 2018-04-04 PROCEDURE — 85007 BL SMEAR W/DIFF WBC COUNT: CPT

## 2018-04-04 PROCEDURE — 85027 COMPLETE CBC AUTOMATED: CPT

## 2018-04-04 PROCEDURE — 80048 BASIC METABOLIC PNL TOTAL CA: CPT

## 2018-04-04 RX ORDER — AMOXICILLIN AND CLAVULANATE POTASSIUM 875; 125 MG/1; MG/1
1 TABLET, FILM COATED ORAL 2 TIMES DAILY
Qty: 14 TAB | Refills: 0 | Status: SHIPPED | OUTPATIENT
Start: 2018-04-04 | End: 2018-04-11

## 2018-04-04 RX ORDER — HYDROCODONE BITARTRATE AND ACETAMINOPHEN 5; 325 MG/1; MG/1
1-2 TABLET ORAL EVERY 6 HOURS PRN
Qty: 20 TAB | Refills: 0 | Status: SHIPPED | OUTPATIENT
Start: 2018-04-04 | End: 2018-04-09

## 2018-04-04 RX ADMIN — NICOTINE TRANSDERMAL SYSTEM 21 MG: 21 PATCH, EXTENDED RELEASE TRANSDERMAL at 06:32

## 2018-04-04 RX ADMIN — HYDROCODONE BITARTRATE AND ACETAMINOPHEN 2 TABLET: 5; 325 TABLET ORAL at 18:42

## 2018-04-04 RX ADMIN — STANDARDIZED SENNA CONCENTRATE AND DOCUSATE SODIUM 2 TABLET: 8.6; 5 TABLET, FILM COATED ORAL at 10:20

## 2018-04-04 RX ADMIN — HYDROCODONE BITARTRATE AND ACETAMINOPHEN 2 TABLET: 5; 325 TABLET ORAL at 13:36

## 2018-04-04 RX ADMIN — CEFTRIAXONE 2 G: 2 INJECTION, POWDER, FOR SOLUTION INTRAMUSCULAR; INTRAVENOUS at 10:21

## 2018-04-04 RX ADMIN — POTASSIUM CHLORIDE 40 MEQ: 1500 TABLET, EXTENDED RELEASE ORAL at 10:20

## 2018-04-04 RX ADMIN — HYDROCODONE BITARTRATE AND ACETAMINOPHEN 2 TABLET: 5; 325 TABLET ORAL at 06:33

## 2018-04-04 RX ADMIN — HEPARIN SODIUM 5000 UNITS: 5000 INJECTION, SOLUTION INTRAVENOUS; SUBCUTANEOUS at 10:21

## 2018-04-04 ASSESSMENT — ENCOUNTER SYMPTOMS
MYALGIAS: 0
CHILLS: 0
VOMITING: 0
ABDOMINAL PAIN: 0
FEVER: 0
DIARRHEA: 0
NAUSEA: 0

## 2018-04-04 ASSESSMENT — PAIN SCALES - GENERAL
PAINLEVEL_OUTOF10: 8
PAINLEVEL_OUTOF10: 0

## 2018-04-04 NOTE — PROGRESS NOTES
Infectious Disease Progress Note    Author: Juliane Vaughn M.D. Date & Time of service: 2018  11:28 AM    Chief Complaint:  FU left great toe osteomyelitis    Interval History:   Tmax 102.8, WBC 5.1, s/p left great toe amp yesterday, pain controlled, tolerating abx without issues  4/3 AF, no CBC, resting comfortably, denies any post op pain, tolerating abx without issues  4/4 AF WBC 4.9, anxious to go home, no pain, asking when sutures will be removed  Labs Reviewed, Medications Reviewed, Radiology Reviewed and Wound Reviewed.    Review of Systems:  Review of Systems   Constitutional: Negative for chills and fever.   Gastrointestinal: Negative for abdominal pain, diarrhea, nausea and vomiting.   Musculoskeletal: Negative for joint pain and myalgias.       Hemodynamics:  Temp (24hrs), Av.1 °C (98.8 °F), Min:37.1 °C (98.8 °F), Max:37.1 °C (98.8 °F)  Temperature: 37.1 °C (98.8 °F)  Pulse  Av  Min: 59  Max: 111   Blood Pressure: 145/94       Physical Exam:  Physical Exam   Constitutional: He appears well-developed.   Older than stated age   HENT:   Head: Normocephalic and atraumatic.   Eyes: EOM are normal. Pupils are equal, round, and reactive to light.   Neck: Neck supple.   Cardiovascular: Normal rate and regular rhythm.    Pulmonary/Chest: Effort normal and breath sounds normal.   Abdominal: Soft. There is no tenderness.   Musculoskeletal:   Left great toe amp site with sutures in place. No drainage  +Edema   Neurological: He is alert.   Skin: No rash noted.   Psychiatric:   Flat affect       Meds:    Current Facility-Administered Medications:   •  potassium chloride SA  •  cefTRIAXone (ROCEPHIN) IV  •  HYDROcodone-acetaminophen  •  senna-docusate **AND** polyethylene glycol/lytes **AND** magnesium hydroxide **AND** bisacodyl  •  Respiratory Care per Protocol  •  heparin  •  acetaminophen  •  INITIATE NICOTINE REPLACEMENT PROTOCOL  **AND** nicotine **AND** Protocol 205 PATIENT EDUCATION MATERIALS  **AND** Protocol 205 Rotate nicotine patch application sites daily  **AND** nicotine polacrilex  •  lactobacillus granules  •  insulin regular **AND** Accu-Chek ACHS **AND** NOTIFY MD and PharmD **AND** glucose 4 g **AND** dextrose 50%    Labs:  Recent Labs      04/02/18   0524  04/04/18   0136   WBC  5.1  4.9   RBC  3.31*  3.37*   HEMOGLOBIN  10.2*  10.1*   HEMATOCRIT  31.4*  31.7*   MCV  94.9  94.1   MCH  30.8  30.0   RDW  42.5  41.7   PLATELETCT  141*  171   MPV  10.3  10.6   NEUTSPOLYS   --   66.10   LYMPHOCYTES   --   26.90   MONOCYTES   --   0.90   EOSINOPHILS   --   5.20   BASOPHILS   --   0.00   RBCMORPHOLO   --   Present     Recent Labs      04/02/18   0524  04/03/18   0346  04/04/18   0136   SODIUM  136  140  139   POTASSIUM  3.4*  3.3*  3.4*   CHLORIDE  107  109  110   CO2  24  26  23   GLUCOSE  119*  114*  112*   BUN  10  10  14     Recent Labs      04/02/18   0524  04/03/18   0346  04/04/18   0136   CREATININE  0.71  0.68  0.66       Imaging:  Dx-chest-portable (1 View)    Result Date: 3/30/2018  3/30/2018 5:57 PM HISTORY/REASON FOR EXAM:  Shortness of breath. Sepsis. Left foot open wounds. Diabetes. TECHNIQUE/EXAM DESCRIPTION AND NUMBER OF VIEWS: Single portable view of the chest. COMPARISON:  None FINDINGS: The chest is slightly rotated. The heart is enlarged. There is prominence of the ascending aorta which may be accentuated by rotation. No lobar consolidation is present. No pleural effusion is noted.     1.  No acute cardiac or pulmonary abnormality is noted. 2.  Cardiomegaly. Prominence of the ascending aorta.    Dx-foot-complete 3+ Right    Result Date: 3/31/2018  3/31/2018 8:49 PM HISTORY/REASON FOR EXAM:  RIGHT great toe infection. TECHNIQUE/EXAM DESCRIPTION AND NUMBER OF VIEWS:  3 views of the  RIGHT foot. COMPARISON: None FINDINGS: MINERALIZATION: Mineralization is unremarkable for age. INJURY: No acute fracture or gross malalignment is seen. JOINTS: No erosive arthropathy is evident.     1.   No radiographic evidence of bony erosion or aggressive periosteal reaction. 2.  Soft tissue irregularity along the medial aspect of the great toe compatible with the provided clinical history of soft tissue wound    Dx-foot-complete 3+ Left    Result Date: 3/31/2018  3/31/2018 10:15 AM HISTORY/REASON FOR EXAM:  Evaluate for osteoarthritis TECHNIQUE/EXAM DESCRIPTION AND NUMBER OF VIEWS: 3 nonweightbearing views of the LEFT foot. COMPARISON:  3/30/2018 FINDINGS: There is an intra-articular fracture of the distal phalanx of the first digit. There is lucency within the distal phalanx of the first digit with may be related to osteomyelitis. There is soft tissue swelling and soft tissue air. No dislocation is identified. Radiopaque densities may be external to the patient.     Fracture of the base of the distal phalanx of the first digit. Lucency within the distal phalanx could be related to osteomyelitis. Soft tissue swelling and soft tissue air is seen within the first digit. Soft tissue swelling extends into the foot.    Dx-toe(s) 2+ Left    Result Date: 3/30/2018  3/30/2018 5:57 PM HISTORY/REASON FOR EXAM:  Open left foot wounds. TECHNIQUE/EXAM DESCRIPTION AND NUMBER OF VIEWS:  3 views of the LEFT toes. COMPARISON: None FINDINGS: There is irregularity in the lateral aspect of the base of the first distal phalanx. There is extensive soft tissue edema with evidence of an overlying soft tissue plane in soft tissue air. Radiopaque densities may be external to the patient.     Cortical irregularity in the lateral base of the first distal phalanx with overlying soft tissue swelling and foci of air. Findings are highly suspicious for osteomyelitis. If clinically indicated, MRI may be helpful for confirmation.    Le Art/kaila    Result Date: 2018   Vascular Laboratory  Conclusions  Bilateral.  No evidence of arterial insufficiency.  YEOMANS, WILBERT  Age:    52    Gender:     M  MRN:    [id number]  :    1965      BSA:   Exam Date:     04/01/2018 12:33  Room #:     Inpatient  Priority:     Routine  Ht (in):             Wt (lb):  Ordering Physician:        NIR PIMENTEL  Referring Physician:       NIR PIMENTEL  Sonographer:               Maribel Roberts RVT  Study Type:                Limited Bilateral  Technical Quality:         Adequate  Indications:     Ulceration of LE  CPT Codes:       27790  ICD Codes:       L97  History:         Ulcerations of the bilateral great toes. Left great toe                   amputation 4/1/2018. No prior exam for comparison.  Limitations:                 RIGHT  Waveform            Systolic BPs (mmHg)                             124           Brachial  Triphasic                                Common Femoral  Triphasic                  168           Posterior Tibial  Triphasic                  140           Dorsalis Pedis                                           Peroneal                             1.32          CELIO                                           TBI                       LEFT  Waveform        Systolic BPs (mmHg)                             127           Brachial  Triphasic                                Common Femoral  Triphasic                  162           Posterior Tibial  Triphasic                  124           Dorsalis Pedis                                           Peroneal                             1.28          CELIO                                           TBI  Findings  Right.  There is no evidence of arterial disease demonstrated (CELIO is .9-1.0) on  the right side.  Doppler waveform of the common femoral and popliteal arteries are of high  amplitude and triphasic.  Doppler waveforms at the ankle are brisk and triphasic.  Left.  There is no evidence of arterial disease demonstrated (CELIO is .9-1.0) on  the left side.  Doppler waveform of the common femoral and popliteal arteries are of high  amplitude and triphasic.  Doppler waveforms at the ankle are brisk and triphasic.   Additional testing was not performed in accordance with lower extremity  arterial evaluation protocol.  Wilberto Damon M.D.  (Electronically Signed)  Final Date:      01 April 2018                   22:27      Micro:  Results     Procedure Component Value Units Date/Time    ANAEROBIC CULTURE [[id number]] Collected:  04/01/18 1012    Order Status:  Completed Specimen:  Wound Updated:  04/04/18 1006     Significant Indicator NEG     Source WND     Site Left Great Toe     Anaerobic Culture, Culture Res Culture in progress.    CULTURE WOUND W/ GRAM STAIN [[id number]]  (Abnormal) Collected:  04/01/18 1012    Order Status:  Completed Specimen:  Wound Updated:  04/04/18 1006     Significant Indicator POS (POS)     Source WND     Site Left Great Toe     Culture Result Wound Light growth mixed skin christine. (A)     Gram Stain Result Many WBCs.  Moderate Gram positive cocci.  Few Gram positive rods.       Culture Result Wound Beta Streptococcus Group G  ;* Moderate growth   (A)      Diphtheroids  ;* Heavy growth   (A)    CULTURE WOUND W/ GRAM STAIN [[id number]]  (Abnormal)  (Susceptibility) Collected:  03/31/18 1230    Order Status:  Completed Specimen:  Wound from Toe Updated:  04/02/18 1125     Significant Indicator POS (POS)     Source WND     Site Left Toe     Culture Result Wound Heavy growth mixed skin christine. (A)     Gram Stain Result Rare WBCs.  Many Gram positive cocci.  Many Gram positive rods.       Culture Result Wound Beta Streptococcus Group G  Heavy growth   (A)      Morganella morganii  Light growth   (A)    Narrative:       Collected By:[id number] PRISCILLA LYMAN  Left Foot - Great Toe    Culture & Susceptibility     MORGANELLA MORGANII     Antibiotic Sensitivity Microscan Unit Status    Ampicillin Resistant >16 mcg/mL Final    Cefepime Sensitive <=8 mcg/mL Final    Cefotaxime Sensitive 8 mcg/mL Final    Cefotetan Sensitive <=16 mcg/mL Final    Ceftazidime Sensitive <=1 mcg/mL Final    Ceftriaxone Sensitive <=8  mcg/mL Final    Cefuroxime Resistant >16 mcg/mL Final    Ciprofloxacin Sensitive <=1 mcg/mL Final    Ertapenem Sensitive <=1 mcg/mL Final    Gentamicin Sensitive <=4 mcg/mL Final    Pip/Tazobactam Sensitive <=16 mcg/mL Final    Tigecycline Sensitive <=2 mcg/mL Final    Tobramycin Sensitive <=4 mcg/mL Final    Trimeth/Sulfa Sensitive <=2/38 mcg/mL Final                       CULTURE WOUND W/O GRAM STAIN [[id number]]  (Abnormal) Collected:  03/31/18 1230    Order Status:  Completed Specimen:  Wound from Toe Updated:  04/02/18 1123     Significant Indicator POS (POS)     Source WND     Site Right toe     Culture Result Wound -- (A)      Beta Streptococcus Group G  Heavy growth   (A)    Narrative:       Collected By:[id number] PRISCILLA LYMAN  Right Foot - Great Toe    GRAM STAIN [[id number]] Collected:  04/01/18 1012    Order Status:  Completed Specimen:  Wound Updated:  04/01/18 1622     Significant Indicator .     Source WND     Site Left Great Toe     Gram Stain Result Many WBCs.  Moderate Gram positive cocci.  Few Gram positive rods.      URINE CULTURE(NEW) [[id number]] Collected:  03/30/18 1832    Order Status:  Completed Specimen:  Urine Updated:  04/01/18 0822     Significant Indicator NEG     Source UR     Site --     Urine Culture No growth at 48 hours.    Narrative:       Indication for culture:->Emergency Room Patient    GRAM STAIN [[id number]] Collected:  03/31/18 1230    Order Status:  Completed Specimen:  Wound Updated:  03/31/18 1803     Significant Indicator .     Source WND     Site Left Toe     Gram Stain Result Rare WBCs.  Many Gram positive cocci.  Many Gram positive rods.      Narrative:       Collected By:[id number] PRISCILLA LYMAN  Left Foot - Great Toe    BLOOD CULTURE [[id number]] Collected:  03/30/18 1504    Order Status:  Completed Specimen:  Blood from Peripheral Updated:  03/31/18 0656     Significant Indicator NEG     Source BLD     Site PERIPHERAL     Blood Culture No Growth    Note: Blood  "cultures are incubated for 5 days and  are monitored continuously.Positive blood cultures  are called to the RN and reported as soon as  they are identified.      Narrative:       Per Hospital Policy: Only change Specimen Src: to \"Line\" if  specified by physician order.    BLOOD CULTURE [[id number]] Collected:  03/30/18 1614    Order Status:  Completed Specimen:  Blood from Peripheral Updated:  03/31/18 0656     Significant Indicator NEG     Source BLD     Site PERIPHERAL     Blood Culture No Growth    Note: Blood cultures are incubated for 5 days and  are monitored continuously.Positive blood cultures  are called to the RN and reported as soon as  they are identified.      Narrative:       Per Hospital Policy: Only change Specimen Src: to \"Line\" if  specified by physician order.    URINALYSIS [[id number]] Collected:  03/30/18 1832    Order Status:  Completed Specimen:  Urine Updated:  03/30/18 1843     Color Yellow     Character Clear     Specific Gravity 1.011     Ph 5.5     Glucose Negative mg/dL      Ketones Negative mg/dL      Protein Negative mg/dL      Bilirubin Negative     Urobilinogen, Urine 2.0     Nitrite Negative     Leukocyte Esterase Negative     Occult Blood Negative     Micro Urine Req see below     Comment: Microscopic examination not performed when specimen is clear  and chemically negative for protein, blood, leukocyte esterase  and nitrite.         Narrative:       Indication for culture:->Emergency Room Patient    Blood Culture [[id number]]     Order Status:  Canceled Specimen:  Blood from Peripheral     Blood Culture [[id number]]     Order Status:  Canceled Specimen:  Blood from Peripheral     Urinalysis [[id number]] Collected:  03/30/18 0000    Order Status:  Canceled Specimen:  Urine           Assessment:  Active Hospital Problems    Diagnosis   • *Toe osteomyelitis (CMS-HCC) [M86.9]   • Hypokalemia [E87.6]   • Sepsis (CMS-HCC) [A41.9]   • Acute kidney injury (CMS-HCC) [N17.9]   • Hyponatremia " [E87.1]   • Type 2 diabetes mellitus (CMS-HCC) [E11.9]       Plan:  Left great toe osteomyelitis  Fever resolved  Leukocytosis resolved  S/p left great amp through proximal phalanx on 4/1 for source control  Wound cx - group G strep, morganella , diphtheroids  OR gram stain +GPC, GPR  OR cx (left toe)  -group C strep, diphth  Continue ceftriaxone inpatient  Transition to PO augmentin x 7 days    DM2  HgA1c 5.8  Maintain BS less than 150     No ID clinic FU needed    Discussed with internal medicine/Dr Lechuga. ID signing off.

## 2018-04-04 NOTE — FACE TO FACE
Face to Face Supporting Documentation - Home Health    The encounter with this patient was in whole or in part the primary reason for home health admission.    Date of encounter:   Patient:                    MRN:                       YOB: 2019  Wilbert Kennith Yeomans Jr.  [id number]  1965     Home health to see patient for:  Skilled Nursing care for assessment, interventions & education and Wound Care    Skilled need for:  Surgical Aftercare Left toe Amputation    Skilled nursing interventions to include:  Wound Care    Homebound status evidenced by:  Need the aid of supportive devices such as crutches, canes, wheelchairs or walkers. Leaving home requires a considerable and taxing effort. There is a normal inability to leave the home.    Community Physician to provide follow up care: Pcp Pt States None     Optional Interventions? No      I certify the face to face encounter for this home health care referral meets the CMS requirements and the encounter/clinical assessment with the patient was, in whole, or in part, for the medical condition(s) listed above, which is the primary reason for home health care. Based on my clinical findings: the service(s) are medically necessary, support the need for home health care, and the homebound criteria are met.  I certify that this patient has had a face to face encounter by myself.  Gregor Lechuga M.D. - NPI: [id number]

## 2018-04-04 NOTE — PROGRESS NOTES
"Assumed care of pt at 1845. Pt in bed watching tv. Bed is low and locked. Call light is in reach. Pt denies need. Pt complains of not seeing doctors and seemingly having no plan of care. Plan of care explained to pt and this RN assured pt that doctors would see him tomorrow.     Pt was questioned about his medications and could not tell me why he was taking a single med. Education provided. Pt's demeanor is pleasant. Pt needs more education on plan of care and how diabetes can affect his health. Pt states he has \"borderline\" diabetes and obviously does not know that it is causing his foot ulcers and the subsequent amputation that he is here for. Diabetic nurse consult ordered.    "

## 2018-04-04 NOTE — CARE PLAN
Problem: Safety  Goal: Will remain free from falls  Outcome: PROGRESSING AS EXPECTED      Problem: Venous Thromboembolism (VTW)/Deep Vein Thrombosis (DVT) Prevention:  Goal: Patient will participate in Venous Thrombosis (VTE)/Deep Vein Thrombosis (DVT)Prevention Measures  Outcome: PROGRESSING AS EXPECTED      Problem: Skin Integrity  Goal: Risk for impaired skin integrity will decrease  Outcome: PROGRESSING AS EXPECTED      Problem: Pain Management  Goal: Pain level will decrease to patient's comfort goal  Outcome: PROGRESSING AS EXPECTED

## 2018-04-04 NOTE — CARE PLAN
Problem: Safety  Goal: Will remain free from injury    Intervention: Provide assistance with mobility  Pt encouraged to call when in need of assistance. Pt verbalizes understanding.      Problem: Pain Management  Goal: Pain level will decrease to patient's comfort goal    Intervention: Follow pain managment plan developed in collaboration with patient and Interdisciplinary Team  Pt encouraged to verbalize need for pain medication. Pt verbalizes understanding.

## 2018-04-04 NOTE — DISCHARGE SUMMARY
DISCHARGE SUMMARY     ADMIT DATE:  3/30/2018         DISCHARGE DATE:  4/4/2018    PATIENT ID:  Name: Yeomans , Wilbert Kennith   YOB: 1965  Age: 52 y.o. male   MRN: [id number]  Address: [address] [place] [address]  Phone: [phone] (home)    DISCHARGE DIAGNOSIS:  Severe Sepsis due to Left Great Toe Osteomyelitis  Acute Kidney Injury  Diabetes Mellitus Type II  Hyponatremia  Hypokalemia    CONSULTANTS:  Orthopedic Surgery  Infectious Disease    CONDITION:Stable    DISPOSITION:Home    DIET: ADA    ACTIVITY: As tolerated     HPI/HOSPITAL COURSE:  Please see H&P for details regarding initial hospital presentation.  In summary, 53yo M with PMHx of borderline DM II was admitted to the hospital for severe sepsis due to left great toe osteomyelitis.  Workup was completed including X-ray of left foot which confirmed diagnosis of OM.  Patient was started on broad-spec antibitotics.  ID and Ortho were consulted.  Orthopedics performed left great toe amputation through proximal phalanx on 4/1 with no complications.  Wound care was provided for patient post operatively.  ID managed antibiotics and de-escalated as appropriate.  Patient will complete antibiotic course with oral Augmentin for 7d upon discharge.  HHC was attempted to be arranged prior to discharge home to assist with wound care management but patient declined and educated on how to take care of surgical wound.  Weight bearing recommendations were provided by Orthopedics.  Patient was started on Metformin upon discharge and will f/u with [facility] regarding mgmt of DM II.  At this time, patient is stable for discharge home.      Physical exam:  Vitals:    04/03/18 0400 04/03/18 0700 04/03/18 1110 04/04/18 0700   BP: 122/76 121/67 124/74 145/94   Pulse: 67 65 67 (!) 59   Resp: 16 15 18 17   Temp: 36.9 °C (98.4 °F) 37.3 °C (99.2 °F) 36.5 °C (97.7 °F) 37.1 °C (98.8 °F)   TempSrc:       SpO2: 93% 95% 94% 96%   Weight:         Weight/BMI: Body mass  index is 30.43 kg/m².  Pulse Oximetry: 96 %, O2 (LPM): 0, O2 Delivery: None (Room Air)     Constitutional: He is oriented to person, place, and time. He appears well-developed. No distress.   HENT:   Head: Normocephalic.   Mouth/Throat: Oropharynx is clear and moist.   Eyes: EOM are normal. No scleral icterus.   Neck: Normal range of motion. Neck supple.   Cardiovascular: Normal rate, regular rhythm and intact distal pulses.    Pulmonary/Chest: Breath sounds normal. No respiratory distress. He has no rales.   Abdominal: Soft. He exhibits no distension. There is no tenderness.   Musculoskeletal: He exhibits no edema.   Neurological: He is alert and oriented to person, place, and time.   Skin: Skin is warm and dry.   Left foot bandaged, no drainage; right great toe with dried blood   Psychiatric:   Odd affect     PROCEDURES:  Left great toe amputation through proximal phalanx (4/1/18)    RADIOLOGY:  LE ART/CELIO   Final Result      DX-FOOT-COMPLETE 3+ RIGHT   Final Result      1.  No radiographic evidence of bony erosion or aggressive periosteal reaction.   2.  Soft tissue irregularity along the medial aspect of the great toe compatible with the provided clinical history of soft tissue wound      DX-FOOT-COMPLETE 3+ LEFT   Final Result      Fracture of the base of the distal phalanx of the first digit.      Lucency within the distal phalanx could be related to osteomyelitis.      Soft tissue swelling and soft tissue air is seen within the first digit.      Soft tissue swelling extends into the foot.      DX-TOE(S) 2+ LEFT   Final Result      Cortical irregularity in the lateral base of the first distal phalanx with overlying soft tissue swelling and foci of air. Findings are highly suspicious for osteomyelitis. If clinically indicated, MRI may be helpful for confirmation.      DX-CHEST-PORTABLE (1 VIEW)   Final Result      1.  No acute cardiac or pulmonary abnormality is noted.      2.  Cardiomegaly. Prominence of the  ascending aorta.          DISCHARGE LABS:    Recent Labs      04/02/18 0524 04/04/18 0136   WBC  5.1  4.9   RBC  3.31*  3.37*   HEMOGLOBIN  10.2*  10.1*   HEMATOCRIT  31.4*  31.7*   MCV  94.9  94.1   MCH  30.8  30.0   RDW  42.5  41.7   PLATELETCT  141*  171   MPV  10.3  10.6   NEUTSPOLYS   --   66.10   LYMPHOCYTES   --   26.90   MONOCYTES   --   0.90   EOSINOPHILS   --   5.20   BASOPHILS   --   0.00   RBCMORPHOLO   --   Present     No results found for: [id number], FOLATE, FERRITIN, IRON, TOTIRONBC    Estimated GFR/CRCL = CrCl cannot be calculated (Unknown ideal weight.).  Recent Labs      04/02/18 0524 04/03/18 0346  04/04/18 0136   SODIUM  136  140  139   POTASSIUM  3.4*  3.3*  3.4*   CHLORIDE  107  109  110   CO2  24  26  23   BUN  10  10  14   CREATININE  0.71  0.68  0.66   CALCIUM  8.0*  8.1*  8.2*   MAGNESIUM  1.9   --    --        Recent Labs      04/04/18 0136   MACROCYTOSIS  1+       @PNLABRCNT(GLUCOSE:3,POCGLUCOSE:3)  )  Lab Results   Component Value Date    HBA1C 5.8 (H) 03/30/2018       DISCHARGE MEDICATIONS:  (X)  Medication Reconciliation Completed     Medication List      START taking these medications      Instructions   amoxicillin-clavulanate 875-125 MG Tabs  Commonly known as:  AUGMENTIN   Take 1 Tab by mouth 2 times a day for 7 days.  Dose:  1 Tab     HYDROcodone-acetaminophen 5-325 MG Tabs per tablet  Commonly known as:  NORCO   Take 1-2 Tabs by mouth every 6 hours as needed for up to 5 days.  Dose:  1-2 Tab     metFORMIN 500 MG Tabs  Commonly known as:  GLUCOPHAGE   Take 1 Tab by mouth 2 times a day, with meals.  Dose:  500 mg            INSTRUCTIONS:   The patient was instructed to return to the ER in the event of worsening symptoms. I have counseled the patient on the importance of compliance and the patient has agreed to proceed with all medical recommendations and follow up plan indicated above.   The patient understands that all medications come with benefits and risks. Risks  may include permanent injury or death and these risks can be minimized with close reassessment and monitoring.        Follow up appointment details :     F/U with Orthopedics in 1 week  F/U with PCP in 1-2 weeks    PENDING STUDIES:  NONE    Primary Care Provider: [facility]      Time spent on discharge day patient visit, preparing discharge paperwork and arranging for patient follow up 45 mins.

## 2018-04-04 NOTE — PROGRESS NOTES
Patient seen and examined  Complains of pain    Blood pressure 124/74, pulse 67, temperature 36.5 °C (97.7 °F), resp. rate 18, weight 113.4 kg (250 lb), SpO2 94 %.    Recent Labs      04/02/18   0524  04/04/18   0136   WBC  5.1  4.9   RBC  3.31*  3.37*   HEMOGLOBIN  10.2*  10.1*   HEMATOCRIT  31.4*  31.7*   MCV  94.9  94.1   MCH  30.8  30.0   MCHC  32.5*  31.9*   RDW  42.5  41.7   PLATELETCT  141*  171   MPV  10.3  10.6       No acute distress  Dressing clean dry and intact  Neurovascularly intact    POD# 3 left great toe amp    Plan:  DVT Prophylaxis- TEDS/SCDs  Weight Bearing Status- WBAT thru heel  PT/OT  Antibiotics: per ID  Case Coordination
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
## 2018-04-05 NOTE — DISCHARGE INSTRUCTIONS
Discharge Instructions    Discharged to home by car with self. Discharged via wheelchair, hospital escort: Yes.  Special equipment needed: Off-loading shoe    Be sure to schedule a follow-up appointment with your primary care doctor or any specialists as instructed.     Discharge Plan:   Influenza Vaccine Indication: Not indicated: Previously immunized this influenza season and > 8 years of age    I understand that a diet low in cholesterol, fat, and sodium is recommended for good health. Unless I have been given specific instructions below for another diet, I accept this instruction as my diet prescription.   Other diet: Diabetic    Special Instructions: Discharge instructions for the Orthopedic Patient    Follow up with Primary Care Physician within 2 weeks of discharge to home, regarding:  Review of medications and diagnostic testing.  Surveillance for medical complications.  Workup and treatment of osteoporosis, if appropriate.     -Is this a Joint Replacement patient? No    -Is this patient being discharged with medication to prevent blood clots?  No    · Is patient discharged on Warfarin / Coumadin?   No     Depression / Suicide Risk    As you are discharged from this [facility] facility, it is important to learn how to keep safe from harming yourself.    Recognize the warning signs:  · Abrupt changes in personality, positive or negative- including increase in energy   · Giving away possessions  · Change in eating patterns- significant weight changes-  positive or negative  · Change in sleeping patterns- unable to sleep or sleeping all the time   · Unwillingness or inability to communicate  · Depression  · Unusual sadness, discouragement and loneliness  · Talk of wanting to die  · Neglect of personal appearance   · Rebelliousness- reckless behavior  · Withdrawal from people/activities they love  · Confusion- inability to concentrate     If you or a loved one observes any of these behaviors or has concerns  about self-harm, here's what you can do:  · Talk about it- your feelings and reasons for harming yourself  · Remove any means that you might use to hurt yourself (examples: pills, rope, extension cords, firearm)  · Get professional help from the community (Mental Health, Substance Abuse, psychological counseling)  · Do not be alone:Call your Safe Contact- someone whom you trust who will be there for you.  · Call your local CRISIS HOTLINE 345-1828 or 601-311-8494  · Call your local Children's Mobile Crisis Response Team [place] (863) 594-4545 or www."[employer]  · Call the toll free National Suicide Prevention Hotlines   · National Suicide Prevention Lifeline 300-026-SWTO (7982)  · National Hope Line Network 800-SUICIDE (883-2695)

## 2018-04-05 NOTE — PROGRESS NOTES
"Gave discharge education to patient, including new prescriptions, wound care, and follow-up visits with repeated emphasis on importance of [facility] up with both [facility] and the [facility] clinic with somewhat limited understanding noted. Pt is very shaky, anxious, pacing. Pt refuses to take extra wound care supplies with him, stating \"i dont want to look like I just got out of the hospital, I already have these shoes on.\" Pt refuses wheelchair; this nurse escorts patient downstairs to exit hospital via [facility] entrance. Pt is anxious but medically stable and has discharge paperwork with prescriptions in pants pocket.  "

## 2018-04-05 NOTE — PROGRESS NOTES
LIMB PRESERVATION SERVICE     53 y/o male with DM, HTN, peripheral neuropathy, depression-sees psychiatrist at [facility]. Admitted for left great toe infection. Also presented with ulcer to R great toe.   a1c 5.8%      POD # 3 s/p L great toe amputation by Dr. Baez  /94   Pulse (!) 59   Temp 37.1 °C (98.8 °F)   Resp 17   Wt 113.4 kg (250 lb)   SpO2 96%   BMI 30.43 kg/m²   Denies pain   anxious, pacing, repeating phrases    Bilateral pedal pulses +2  insensate to both feet to light touch        L great toe amp:   Mod sang drainage on dressing previously changed this am  Incision approximated, but surrounding periskin macerated and incisional discoloration noted to distal aspect of incision    Incision cleaned with NS, patted dry with gauze. Applied aquacel ag to incision, followed by non adhesive foam and dry gauze next to 2nd toe. Covered with kerlix and ace wrap.      R great toe:   Thick black callus with fissure  Using scissors and forceps, debrided callus revealing wound.  total area debrided ~12cm2 to dermis tissue layer. Scant sang drainage, subsided with manual pressure. Wound bed 100% red. Measures 1.5 x 1.5 x 0.2cm.   Wound cleaned with NS, applied aquacel ag, adhesive foam and hypafix tape to secure.           Pt with cognitive deficit. Wound care instructions given several times. Pt unable to comprehend. Pt stated he will f/u with [facility] clinic tomorrow for wound check.   He also does not have a phone, so will be unable to receive wound care appt time at wound clinic. Address given to pt and phone number. Pt plans to come to clinic in person to schedule appts.       PLAN  Dressings changed to L great toe amp site and applied to R great toe wound  Pt to f/u [facility] clinic with RN tomorrow for dressing check  Referral to [facility] wound clinic in place  Bilateral offloading shoes given.   HWB to LLE  abx per ID  Rx given for diabetic shoes and inserts to [facility] once incision and wound have  healed.

## 2018-04-06 NOTE — DOCUMENTATION QUERY
"DOCUMENTATION QUERY    PROVIDERS: Please select “Cosign w/ note”to reply to query.    To better represent the severity of illness of your patient, please review the following information and exercise your independent professional judgment in responding to this query.     \"Severe sepsis\" and \"acute kidney injury\" is documented in the Discharge Summary. Based upon the clinical findings, risk factors, and treatment, can the relationship between these two conditions be further specified?    • Acute kidney injury is related to sepsis (severe sepsis)  • Acute kidney injury is not related to sepsis (sepsis)  • Other explanation of clinical findings  • Unable to determine    The medical record reflects the following:   Clinical Findings 3/30 Admit WBC's 14.5; BUN: 23; Creatinine: 1.72; GFR: 42; Lactic Acid: 2.5  3/31 Wound Culture Right Toe: Positive - beta streptococcus group G - heavy growth  3/31 Wound Culture Left Toe: Positive - beta streptococcus group G - heavy growth and morganella morganii - light growth   Treatment IVNS; unasyn; rocephin; vancomycin; lab testing   Risk Factors Sepsis; left great toe osteomyelitis; diabetes   Location within medical record Discharge Summary, Lab Results and MAR     Thank you,   Monica Reyes RN  Clinical   [phone]          "

## 2018-04-20 ENCOUNTER — RESOLUTE PROFESSIONAL BILLING HOSPITAL PROF FEE (OUTPATIENT)
Dept: HOSPITALIST | Facility: MEDICAL CENTER | Age: 53
End: 2018-04-20
Payer: MEDICAID

## 2018-04-20 ENCOUNTER — HOSPITAL ENCOUNTER (INPATIENT)
Facility: MEDICAL CENTER | Age: 53
LOS: 5 days | DRG: 464 | End: 2018-04-25
Attending: EMERGENCY MEDICINE | Admitting: HOSPITALIST
Payer: MEDICAID

## 2018-04-20 ENCOUNTER — OFFICE VISIT (OUTPATIENT)
Dept: WOUND CARE | Facility: MEDICAL CENTER | Age: 53
End: 2018-04-20
Attending: NURSE PRACTITIONER
Payer: MEDICAID

## 2018-04-20 ENCOUNTER — APPOINTMENT (OUTPATIENT)
Dept: RADIOLOGY | Facility: MEDICAL CENTER | Age: 53
DRG: 464 | End: 2018-04-20
Attending: EMERGENCY MEDICINE
Payer: MEDICAID

## 2018-04-20 DIAGNOSIS — L97.529 DIABETIC ULCER OF TOE OF LEFT FOOT ASSOCIATED WITH TYPE 2 DIABETES MELLITUS, UNSPECIFIED ULCER STAGE (HCC): ICD-10-CM

## 2018-04-20 DIAGNOSIS — E11.621 DIABETIC ULCER OF TOE OF LEFT FOOT ASSOCIATED WITH TYPE 2 DIABETES MELLITUS, UNSPECIFIED ULCER STAGE (HCC): ICD-10-CM

## 2018-04-20 DIAGNOSIS — L03.119 CELLULITIS OF FOOT: ICD-10-CM

## 2018-04-20 DIAGNOSIS — Z89.412 STATUS POST AMPUTATION OF GREAT TOE, LEFT (HCC): ICD-10-CM

## 2018-04-20 DIAGNOSIS — M86.9 OSTEOMYELITIS OF LEFT FOOT, UNSPECIFIED TYPE (HCC): ICD-10-CM

## 2018-04-20 PROBLEM — F99 PSYCHIATRIC DISORDER: Status: ACTIVE | Noted: 2018-04-20

## 2018-04-20 PROBLEM — L97.509 DIABETIC FOOT ULCER (HCC): Status: ACTIVE | Noted: 2018-04-20

## 2018-04-20 LAB
ALBUMIN SERPL BCP-MCNC: 3.5 G/DL (ref 3.2–4.9)
ALBUMIN/GLOB SERPL: 1.1 G/DL
ALP SERPL-CCNC: 92 U/L (ref 30–99)
ALT SERPL-CCNC: 18 U/L (ref 2–50)
ANION GAP SERPL CALC-SCNC: 5 MMOL/L (ref 0–11.9)
APTT PPP: 29.6 SEC (ref 24.7–36)
AST SERPL-CCNC: 18 U/L (ref 12–45)
BASOPHILS # BLD AUTO: 1 % (ref 0–1.8)
BASOPHILS # BLD: 0.05 K/UL (ref 0–0.12)
BILIRUB SERPL-MCNC: 0.5 MG/DL (ref 0.1–1.5)
BUN SERPL-MCNC: 13 MG/DL (ref 8–22)
CALCIUM SERPL-MCNC: 8.7 MG/DL (ref 8.5–10.5)
CHLORIDE SERPL-SCNC: 109 MMOL/L (ref 96–112)
CO2 SERPL-SCNC: 25 MMOL/L (ref 20–33)
CREAT SERPL-MCNC: 0.8 MG/DL (ref 0.5–1.4)
CRP SERPL HS-MCNC: 0.34 MG/DL (ref 0–0.75)
EOSINOPHIL # BLD AUTO: 0.1 K/UL (ref 0–0.51)
EOSINOPHIL NFR BLD: 2 % (ref 0–6.9)
ERYTHROCYTE [DISTWIDTH] IN BLOOD BY AUTOMATED COUNT: 43 FL (ref 35.9–50)
ERYTHROCYTE [SEDIMENTATION RATE] IN BLOOD BY WESTERGREN METHOD: 26 MM/HOUR (ref 0–20)
GLOBULIN SER CALC-MCNC: 3.2 G/DL (ref 1.9–3.5)
GLUCOSE BLD-MCNC: 116 MG/DL (ref 65–99)
GLUCOSE SERPL-MCNC: 91 MG/DL (ref 65–99)
HCT VFR BLD AUTO: 37.5 % (ref 42–52)
HGB BLD-MCNC: 12.3 G/DL (ref 14–18)
IMM GRANULOCYTES # BLD AUTO: 0.03 K/UL (ref 0–0.11)
IMM GRANULOCYTES NFR BLD AUTO: 0.6 % (ref 0–0.9)
INR PPP: 1.07 (ref 0.87–1.13)
LACTATE BLD-SCNC: 0.9 MMOL/L (ref 0.5–2)
LYMPHOCYTES # BLD AUTO: 2.39 K/UL (ref 1–4.8)
LYMPHOCYTES NFR BLD: 48.5 % (ref 22–41)
MCH RBC QN AUTO: 30.1 PG (ref 27–33)
MCHC RBC AUTO-ENTMCNC: 32.8 G/DL (ref 33.7–35.3)
MCV RBC AUTO: 91.7 FL (ref 81.4–97.8)
MONOCYTES # BLD AUTO: 0.33 K/UL (ref 0–0.85)
MONOCYTES NFR BLD AUTO: 6.7 % (ref 0–13.4)
NEUTROPHILS # BLD AUTO: 2.03 K/UL (ref 1.82–7.42)
NEUTROPHILS NFR BLD: 41.2 % (ref 44–72)
NRBC # BLD AUTO: 0 K/UL
NRBC BLD-RTO: 0 /100 WBC
PLATELET # BLD AUTO: 162 K/UL (ref 164–446)
PMV BLD AUTO: 9.7 FL (ref 9–12.9)
POTASSIUM SERPL-SCNC: 4 MMOL/L (ref 3.6–5.5)
PROT SERPL-MCNC: 6.7 G/DL (ref 6–8.2)
PROTHROMBIN TIME: 13.6 SEC (ref 12–14.6)
RBC # BLD AUTO: 4.09 M/UL (ref 4.7–6.1)
SODIUM SERPL-SCNC: 139 MMOL/L (ref 135–145)
WBC # BLD AUTO: 4.9 K/UL (ref 4.8–10.8)

## 2018-04-20 PROCEDURE — 85730 THROMBOPLASTIN TIME PARTIAL: CPT

## 2018-04-20 PROCEDURE — 700102 HCHG RX REV CODE 250 W/ 637 OVERRIDE(OP): Performed by: HOSPITALIST

## 2018-04-20 PROCEDURE — 86140 C-REACTIVE PROTEIN: CPT

## 2018-04-20 PROCEDURE — 99221 1ST HOSP IP/OBS SF/LOW 40: CPT | Performed by: HOSPITALIST

## 2018-04-20 PROCEDURE — 87040 BLOOD CULTURE FOR BACTERIA: CPT

## 2018-04-20 PROCEDURE — 99213 OFFICE O/P EST LOW 20 MIN: CPT | Performed by: ORTHOPAEDIC SURGERY

## 2018-04-20 PROCEDURE — 36415 COLL VENOUS BLD VENIPUNCTURE: CPT

## 2018-04-20 PROCEDURE — 700105 HCHG RX REV CODE 258: Performed by: HOSPITALIST

## 2018-04-20 PROCEDURE — 96366 THER/PROPH/DIAG IV INF ADDON: CPT

## 2018-04-20 PROCEDURE — 83605 ASSAY OF LACTIC ACID: CPT

## 2018-04-20 PROCEDURE — 85610 PROTHROMBIN TIME: CPT

## 2018-04-20 PROCEDURE — 96367 TX/PROPH/DG ADDL SEQ IV INF: CPT

## 2018-04-20 PROCEDURE — 96365 THER/PROPH/DIAG IV INF INIT: CPT

## 2018-04-20 PROCEDURE — 96375 TX/PRO/DX INJ NEW DRUG ADDON: CPT

## 2018-04-20 PROCEDURE — 85025 COMPLETE CBC W/AUTO DIFF WBC: CPT

## 2018-04-20 PROCEDURE — 700111 HCHG RX REV CODE 636 W/ 250 OVERRIDE (IP): Performed by: EMERGENCY MEDICINE

## 2018-04-20 PROCEDURE — A9270 NON-COVERED ITEM OR SERVICE: HCPCS | Performed by: HOSPITALIST

## 2018-04-20 PROCEDURE — 770006 HCHG ROOM/CARE - MED/SURG/GYN SEMI*

## 2018-04-20 PROCEDURE — 99213 OFFICE O/P EST LOW 20 MIN: CPT

## 2018-04-20 PROCEDURE — 99285 EMERGENCY DEPT VISIT HI MDM: CPT

## 2018-04-20 PROCEDURE — 73620 X-RAY EXAM OF FOOT: CPT | Mod: LT

## 2018-04-20 PROCEDURE — 700111 HCHG RX REV CODE 636 W/ 250 OVERRIDE (IP): Performed by: HOSPITALIST

## 2018-04-20 PROCEDURE — 80053 COMPREHEN METABOLIC PANEL: CPT

## 2018-04-20 PROCEDURE — 700105 HCHG RX REV CODE 258: Performed by: EMERGENCY MEDICINE

## 2018-04-20 PROCEDURE — 85652 RBC SED RATE AUTOMATED: CPT

## 2018-04-20 PROCEDURE — 82962 GLUCOSE BLOOD TEST: CPT

## 2018-04-20 RX ORDER — SODIUM CHLORIDE 9 MG/ML
INJECTION, SOLUTION INTRAVENOUS CONTINUOUS
Status: DISCONTINUED | OUTPATIENT
Start: 2018-04-20 | End: 2018-04-21

## 2018-04-20 RX ORDER — BISACODYL 10 MG
10 SUPPOSITORY, RECTAL RECTAL
Status: DISCONTINUED | OUTPATIENT
Start: 2018-04-20 | End: 2018-04-25 | Stop reason: HOSPADM

## 2018-04-20 RX ORDER — PALIPERIDONE 3 MG/1
3 TABLET, EXTENDED RELEASE ORAL EVERY MORNING
COMMUNITY
End: 2018-08-10

## 2018-04-20 RX ORDER — FLUOXETINE HYDROCHLORIDE 20 MG/1
60 CAPSULE ORAL DAILY
COMMUNITY
End: 2018-08-10

## 2018-04-20 RX ORDER — ONDANSETRON 2 MG/ML
4 INJECTION INTRAMUSCULAR; INTRAVENOUS EVERY 4 HOURS PRN
Status: DISCONTINUED | OUTPATIENT
Start: 2018-04-20 | End: 2018-04-25 | Stop reason: HOSPADM

## 2018-04-20 RX ORDER — LORAZEPAM 0.5 MG/1
0.5 TABLET ORAL 2 TIMES DAILY PRN
Status: DISCONTINUED | OUTPATIENT
Start: 2018-04-20 | End: 2018-04-25 | Stop reason: HOSPADM

## 2018-04-20 RX ORDER — DEXTROSE MONOHYDRATE 25 G/50ML
25 INJECTION, SOLUTION INTRAVENOUS
Status: DISCONTINUED | OUTPATIENT
Start: 2018-04-20 | End: 2018-04-25 | Stop reason: HOSPADM

## 2018-04-20 RX ORDER — OXYCODONE HYDROCHLORIDE 5 MG/1
5 TABLET ORAL
Status: DISCONTINUED | OUTPATIENT
Start: 2018-04-20 | End: 2018-04-25 | Stop reason: HOSPADM

## 2018-04-20 RX ORDER — OXYCODONE HYDROCHLORIDE 5 MG/1
2.5 TABLET ORAL
Status: DISCONTINUED | OUTPATIENT
Start: 2018-04-20 | End: 2018-04-25 | Stop reason: HOSPADM

## 2018-04-20 RX ORDER — BENZTROPINE MESYLATE 1 MG/1
1 TABLET ORAL 2 TIMES DAILY
Status: DISCONTINUED | OUTPATIENT
Start: 2018-04-20 | End: 2018-04-25 | Stop reason: HOSPADM

## 2018-04-20 RX ORDER — PALIPERIDONE 3 MG/1
3 TABLET, EXTENDED RELEASE ORAL EVERY MORNING
Status: DISCONTINUED | OUTPATIENT
Start: 2018-04-20 | End: 2018-04-20

## 2018-04-20 RX ORDER — PROMETHAZINE HYDROCHLORIDE 25 MG/1
12.5-25 TABLET ORAL EVERY 4 HOURS PRN
Status: DISCONTINUED | OUTPATIENT
Start: 2018-04-20 | End: 2018-04-25 | Stop reason: HOSPADM

## 2018-04-20 RX ORDER — BENZTROPINE MESYLATE 1 MG/1
1 TABLET ORAL 2 TIMES DAILY
COMMUNITY
End: 2018-08-10

## 2018-04-20 RX ORDER — AMOXICILLIN 250 MG
2 CAPSULE ORAL 2 TIMES DAILY
Status: DISCONTINUED | OUTPATIENT
Start: 2018-04-20 | End: 2018-04-25 | Stop reason: HOSPADM

## 2018-04-20 RX ORDER — ACETAMINOPHEN 325 MG/1
650 TABLET ORAL EVERY 6 HOURS PRN
Status: DISCONTINUED | OUTPATIENT
Start: 2018-04-20 | End: 2018-04-25 | Stop reason: HOSPADM

## 2018-04-20 RX ORDER — QUETIAPINE FUMARATE 100 MG/1
100 TABLET, FILM COATED ORAL 2 TIMES DAILY
Status: DISCONTINUED | OUTPATIENT
Start: 2018-04-20 | End: 2018-04-25 | Stop reason: HOSPADM

## 2018-04-20 RX ORDER — QUETIAPINE FUMARATE 100 MG/1
100 TABLET, FILM COATED ORAL 3 TIMES DAILY
COMMUNITY

## 2018-04-20 RX ORDER — PROMETHAZINE HYDROCHLORIDE 25 MG/1
12.5-25 SUPPOSITORY RECTAL EVERY 4 HOURS PRN
Status: DISCONTINUED | OUTPATIENT
Start: 2018-04-20 | End: 2018-04-25 | Stop reason: HOSPADM

## 2018-04-20 RX ORDER — LORAZEPAM 0.5 MG/1
0.5 TABLET ORAL 2 TIMES DAILY PRN
COMMUNITY

## 2018-04-20 RX ORDER — METRONIDAZOLE 500 MG/1
500 TABLET ORAL EVERY 8 HOURS
Status: DISCONTINUED | OUTPATIENT
Start: 2018-04-20 | End: 2018-04-25

## 2018-04-20 RX ORDER — ONDANSETRON 4 MG/1
4 TABLET, ORALLY DISINTEGRATING ORAL EVERY 4 HOURS PRN
Status: DISCONTINUED | OUTPATIENT
Start: 2018-04-20 | End: 2018-04-25 | Stop reason: HOSPADM

## 2018-04-20 RX ORDER — FLUOXETINE 10 MG/1
60 CAPSULE ORAL DAILY
Status: DISCONTINUED | OUTPATIENT
Start: 2018-04-21 | End: 2018-04-25 | Stop reason: HOSPADM

## 2018-04-20 RX ORDER — POLYETHYLENE GLYCOL 3350 17 G/17G
1 POWDER, FOR SOLUTION ORAL
Status: DISCONTINUED | OUTPATIENT
Start: 2018-04-20 | End: 2018-04-25 | Stop reason: HOSPADM

## 2018-04-20 RX ORDER — QUETIAPINE FUMARATE 100 MG/1
200 TABLET, FILM COATED ORAL EVERY EVENING
Status: DISCONTINUED | OUTPATIENT
Start: 2018-04-20 | End: 2018-04-25 | Stop reason: HOSPADM

## 2018-04-20 RX ADMIN — VANCOMYCIN HYDROCHLORIDE 2700 MG: 100 INJECTION, POWDER, LYOPHILIZED, FOR SOLUTION INTRAVENOUS at 11:32

## 2018-04-20 RX ADMIN — QUETIAPINE FUMARATE 100 MG: 100 TABLET ORAL at 17:46

## 2018-04-20 RX ADMIN — CEFTRIAXONE 2 G: 2 INJECTION, POWDER, FOR SOLUTION INTRAMUSCULAR; INTRAVENOUS at 15:00

## 2018-04-20 RX ADMIN — METRONIDAZOLE 500 MG: 500 TABLET ORAL at 21:29

## 2018-04-20 RX ADMIN — QUETIAPINE FUMARATE 200 MG: 100 TABLET ORAL at 20:13

## 2018-04-20 RX ADMIN — OXYCODONE HYDROCHLORIDE 5 MG: 5 TABLET ORAL at 21:29

## 2018-04-20 RX ADMIN — AMPICILLIN SODIUM AND SULBACTAM SODIUM 3 G: 2; 1 INJECTION, POWDER, FOR SOLUTION INTRAMUSCULAR; INTRAVENOUS at 10:50

## 2018-04-20 RX ADMIN — ENOXAPARIN SODIUM 40 MG: 100 INJECTION SUBCUTANEOUS at 20:14

## 2018-04-20 RX ADMIN — SODIUM CHLORIDE: 9 INJECTION, SOLUTION INTRAVENOUS at 15:01

## 2018-04-20 RX ADMIN — OXYCODONE HYDROCHLORIDE 5 MG: 5 TABLET ORAL at 17:54

## 2018-04-20 RX ADMIN — SENNOSIDES AND DOCUSATE SODIUM 2 TABLET: 8.6; 5 TABLET ORAL at 20:14

## 2018-04-20 RX ADMIN — BENZTROPINE MESYLATE 1 MG: 1 TABLET ORAL at 20:14

## 2018-04-20 RX ADMIN — METRONIDAZOLE 500 MG: 500 TABLET ORAL at 15:06

## 2018-04-20 ASSESSMENT — PATIENT HEALTH QUESTIONNAIRE - PHQ9
SUM OF ALL RESPONSES TO PHQ9 QUESTIONS 1 AND 2: 3
9. THOUGHTS THAT YOU WOULD BE BETTER OFF DEAD, OR OF HURTING YOURSELF: NOT AT ALL
8. MOVING OR SPEAKING SO SLOWLY THAT OTHER PEOPLE COULD HAVE NOTICED. OR THE OPPOSITE, BEING SO FIGETY OR RESTLESS THAT YOU HAVE BEEN MOVING AROUND A LOT MORE THAN USUAL: NOT AT ALL
SUM OF ALL RESPONSES TO PHQ QUESTIONS 1-9: 15
5. POOR APPETITE OR OVEREATING: NOT AT ALL
4. FEELING TIRED OR HAVING LITTLE ENERGY: NEARLY EVERY DAY
7. TROUBLE CONCENTRATING ON THINGS, SUCH AS READING THE NEWSPAPER OR WATCHING TELEVISION: NEARLY EVERY DAY
1. LITTLE INTEREST OR PLEASURE IN DOING THINGS: NOT AT ALL
3. TROUBLE FALLING OR STAYING ASLEEP OR SLEEPING TOO MUCH: NEARLY EVERY DAY
2. FEELING DOWN, DEPRESSED, IRRITABLE, OR HOPELESS: NEARLY EVERY DAY
6. FEELING BAD ABOUT YOURSELF - OR THAT YOU ARE A FAILURE OR HAVE LET YOURSELF OR YOUR FAMILY DOWN: NEARLY EVERY DAY

## 2018-04-20 ASSESSMENT — LIFESTYLE VARIABLES
ALCOHOL_USE: NO
EVER_SMOKED: YES
DO YOU DRINK ALCOHOL: NO

## 2018-04-20 ASSESSMENT — PAIN SCALES - GENERAL
PAINLEVEL_OUTOF10: 0
PAINLEVEL_OUTOF10: 6
PAINLEVEL_OUTOF10: 6

## 2018-04-20 NOTE — WOUND TEAM
Pt seen during ortho rounds with [facility] team for left great toe amputation incision/wound and right dorsal foot wound.  Following physician assessment, pt was advised to be seen in ER for left forefoot infection.     Left great toe amputation site: Wound was evaluated and dressed with skin prep to sen wound, aquacel ag, secured with kerlix/hypafix.  Pt applied own footwear.        Right dorsal foot wound: Wound was evaluated and dressed with skin prep to sen wound, small silicone adhesive foam to cover wound.

## 2018-04-20 NOTE — PROGRESS NOTES
"Pharmacy Kinetics 52 y.o. male on vancomycin day # 1 2018    Currently on Vancomycin 2,700 mg IV x1dose at 11:30    Indication for Treatment: Skin and soft tissue infection    Pertinent history per medical record:  Comes to ED from wound care today for evaluation for possible infection of left big toe.  Patient was discharged 2 weeks ago following left great toe amputation due to osteomyeltiis and severe sepsis.    Other antibiotics: ceftriaxone 2 gm IV q24h  ( to current) Metronidazole 500mg OP Q8h (-current), Unasyn 3g IV x1dose in ED    Allergies: Patient has no known allergies.       Pertinent cultures to date:   18 Blood Culture x2: Pending      Recent Labs      18   1008   WBC  4.9   NEUTSPOLYS  41.20*     Recent Labs      18   1008   BUN  13   CREATININE  0.80   ALBUMIN  3.5     No intake or output data in the 24 hours ending 18 1331   Blood pressure 129/75, pulse (!) 51, temperature 36.1 °C (97 °F), temperature source Temporal, resp. rate 15, height 1.93 m (6' 4\"), weight 107.2 kg (236 lb 5.3 oz), SpO2 99 %. Temp (24hrs), Av.1 °C (97 °F), Min:36.1 °C (97 °F), Max:36.1 °C (97 °F)    A/P   1. Vancomycin dose change: 1,800mg IV q12h  2. Next vancomycin level:  at 10:30, prior to 3rd dose.    3. Goal trough: 12-16mcg/ml  4. Comments: Close monitoring of renal function, as patient recently had acute kidney injury with Scr 1.72mg/dl on 3/30.    Awa Landin Pharm.D.    Discussed with Padmaja Moore Pharm.D.    "

## 2018-04-20 NOTE — ED PROVIDER NOTES
"ED Provider Note    CHIEF COMPLAINT  Chief Complaint   Patient presents with   • Wound Infection     left big toe. pt seen by wound care today and told to come to ED for possible admission        HPI  Wilbert Kennith Yeomans Jr. is a 52 y.o. male who indicates to the emergency department from wound clinic for further evaluation of possible wound infection. Patient was recently discharged after left great toe amputation for osteomyelitis and severe sepsis. Patient has not filled or taken antibiotics since discharge. Today was his 1st day since discharge more than 2 weeks ago to follow-up at the wound clinic. Patient describes swelling and redness worsen discharge. Mild persistent discomfort. Denies drainage. Denies fevers.    REVIEW OF SYSTEMS  See HPI for further details. All other systems are negative.     PAST MEDICAL HISTORY   has a past medical history of Anxiety; Bronchitis; Dental disorder; Diabetes (CMS-[facility]); Hepatitis C (1997); Hypercholesteremia; and Hypertension.    SOCIAL HISTORY  Social History     Social History Main Topics   • Smoking status: Current Some Day Smoker     Packs/day: 0.25     Years: 30.00     Types: Cigarettes   • Smokeless tobacco: Never Used   • Alcohol use No   • Drug use: No   • Sexual activity: Not on file       SURGICAL HISTORY   has a past surgical history that includes ventral hernia repair laparoscopic (3/30/2016) and toe amputation (Left, 4/1/2018).    CURRENT MEDICATIONS  Home Medications     Reviewed by Bre Ramirez R.N. (Registered Nurse) on 04/20/18 at 0927  Med List Status: Partial   Medication Last Dose Status   metFORMIN (GLUCOPHAGE) 500 MG Tab 4/20/2018 Active                ALLERGIES  No Known Allergies    PHYSICAL EXAM  VITAL SIGNS: /75   Pulse 62   Temp 36.1 °C (97 °F) (Temporal)   Resp 18   Ht 1.93 m (6' 4\")   Wt 107.2 kg (236 lb 5.3 oz)   SpO2 98%   BMI 28.77 kg/m²   Pulse ox interpretation: I interpret this pulse ox as normal.  Constitutional: Alert " in no apparent distress.  HENT: Normocephalic, atraumatic. Bilateral external ears normal, Nose normal. Moist mucous membranes.    Eyes: Pupils are equal and reactive, Conjunctiva normal.   Neck: Normal range of motion, Supple.   Lymphatic: No lymphadenopathy noted.   Cardiovascular: Normal peripheral perfusion.  Thorax & Lungs: Nonlabored respirations.  Skin: Warm, Dry.  Left 1st distal metatarsal stump.  Musculoskeletal: Good range of motion in all major joints. No major deformities noted.   Neurologic: Alert , no gross focal deficit noted.  Psychiatric: Affect normal, Judgment normal, Mood normal.       DIAGNOSTIC STUDIES / PROCEDURES    LABS  Results for orders placed or performed during the hospital encounter of 04/20/18   CBC WITH DIFFERENTIAL   Result Value Ref Range    WBC 4.9 4.8 - 10.8 K/uL    RBC 4.09 (L) 4.70 - 6.10 M/uL    Hemoglobin 12.3 (L) 14.0 - 18.0 g/dL    Hematocrit 37.5 (L) 42.0 - 52.0 %    MCV 91.7 81.4 - 97.8 fL    MCH 30.1 27.0 - 33.0 pg    MCHC 32.8 (L) 33.7 - 35.3 g/dL    RDW 43.0 35.9 - 50.0 fL    Platelet Count 162 (L) 164 - 446 K/uL    MPV 9.7 9.0 - 12.9 fL    Neutrophils-Polys 41.20 (L) 44.00 - 72.00 %    Lymphocytes 48.50 (H) 22.00 - 41.00 %    Monocytes 6.70 0.00 - 13.40 %    Eosinophils 2.00 0.00 - 6.90 %    Basophils 1.00 0.00 - 1.80 %    Immature Granulocytes 0.60 0.00 - 0.90 %    Nucleated RBC 0.00 /100 WBC    Neutrophils (Absolute) 2.03 1.82 - 7.42 K/uL    Lymphs (Absolute) 2.39 1.00 - 4.80 K/uL    Monos (Absolute) 0.33 0.00 - 0.85 K/uL    Eos (Absolute) 0.10 0.00 - 0.51 K/uL    Baso (Absolute) 0.05 0.00 - 0.12 K/uL    Immature Granulocytes (abs) 0.03 0.00 - 0.11 K/uL    NRBC (Absolute) 0.00 K/uL   COMP METABOLIC PANEL   Result Value Ref Range    Sodium 139 135 - 145 mmol/L    Potassium 4.0 3.6 - 5.5 mmol/L    Chloride 109 96 - 112 mmol/L    Co2 25 20 - 33 mmol/L    Anion Gap 5.0 0.0 - 11.9    Glucose 91 65 - 99 mg/dL    Bun 13 8 - 22 mg/dL    Creatinine 0.80 0.50 - 1.40 mg/dL     Calcium 8.7 8.5 - 10.5 mg/dL    AST(SGOT) 18 12 - 45 U/L    ALT(SGPT) 18 2 - 50 U/L    Alkaline Phosphatase 92 30 - 99 U/L    Total Bilirubin 0.5 0.1 - 1.5 mg/dL    Albumin 3.5 3.2 - 4.9 g/dL    Total Protein 6.7 6.0 - 8.2 g/dL    Globulin 3.2 1.9 - 3.5 g/dL    A-G Ratio 1.1 g/dL   LACTIC ACID   Result Value Ref Range    Lactic Acid 0.9 0.5 - 2.0 mmol/L   APTT   Result Value Ref Range    APTT 29.6 24.7 - 36.0 sec   PROTHROMBIN TIME   Result Value Ref Range    PT 13.6 12.0 - 14.6 sec    INR 1.07 0.87 - 1.13   CRP QUANTITIVE (NON-CARDIAC)   Result Value Ref Range    Stat C-Reactive Protein 0.34 0.00 - 0.75 mg/dL   WESTERGREN SED RATE   Result Value Ref Range    Sed Rate Westergren 26 (H) 0 - 20 mm/hour   ESTIMATED GFR   Result Value Ref Range    GFR If African American >60 >60 mL/min/1.73 m 2    GFR If Non African American >60 >60 mL/min/1.73 m 2     RADIOLOGY  DX-FOOT-2- LEFT   Final Result      Post surgical changes status post amputation at the level of the proximal phalanx of the first digit. Linear amorphous calcification adjacent to the amputation may be dystrophic. There is minimal osseous irregularity at the level of the amputation where    it is difficult to exclude osteomyelitis.      Soft tissue swelling of the forefoot and remainder of the first digit.             COURSE & MEDICAL DECISION MAKING  Nursing notes and vital signs were reviewed. (See chart for details)  The patients records were reviewed, history was obtained from the patient ;     Medical record review: Discharge from this facility 4/4/18 after treatment for severe sepsis due to left great toe osteomyelitis requiring left great toe amputation. Acute kidney injury, diabetes, hyponatremia, hypokalemia. Discharge for wound care follow-up, refused home health, and with Augmentin.    ED evaluation was consistent with left great toe stump infection, cellulitis, cannot exclude extending osteomyelitis. No clinical evidence for sepsis today. No  leukocytosis, left shift or bandemia. No lactic acidosis. Normal CRP.No electronic derangement. Patient given Unasyn and vancomycin on arrival. Blood cultures pending. Vital signs stable without fever or tachycardia. Patient was never hypotensive. He will be admitted to the hospital for IV antibiotics until cultures result, wound care. He is wearing findings and agreeable to the disposition plan.    11:31 AM Dr. Burris is aware the patient is agreeable to admission.    FINAL IMPRESSION  (T81.4XXA) Postoperative wound infection, initial encounter  (Z89.412) Status post amputation of great toe, left (CMS-HCC)  (M86.9) Osteomyelitis of left foot, unspecified type (CMS-[facility])      Electronically signed by: Awa Grover, 4/20/2018 11:06 AM      This dictation was created using voice recognition software. The accuracy of the dictation is limited to the abilities of the software. I expect there may be some errors of grammar and possibly content. The nursing notes were reviewed and certain aspects of this information were incorporated into this note.

## 2018-04-20 NOTE — ED TRIAGE NOTES
Pt to triage .  Chief Complaint   Patient presents with   • Wound Infection     left big toe. pt seen by wound care today and told to come to ED for possible admission

## 2018-04-20 NOTE — ED NOTES
1300> Pt watching tv. No needs at this time.  1400> Continue to await bed assignment. Pt updated to POC.

## 2018-04-21 LAB
ANION GAP SERPL CALC-SCNC: 4 MMOL/L (ref 0–11.9)
BASOPHILS # BLD AUTO: 1.3 % (ref 0–1.8)
BASOPHILS # BLD: 0.06 K/UL (ref 0–0.12)
BUN SERPL-MCNC: 17 MG/DL (ref 8–22)
CALCIUM SERPL-MCNC: 8.5 MG/DL (ref 8.5–10.5)
CHLORIDE SERPL-SCNC: 108 MMOL/L (ref 96–112)
CO2 SERPL-SCNC: 26 MMOL/L (ref 20–33)
CREAT SERPL-MCNC: 0.88 MG/DL (ref 0.5–1.4)
EOSINOPHIL # BLD AUTO: 0.12 K/UL (ref 0–0.51)
EOSINOPHIL NFR BLD: 2.7 % (ref 0–6.9)
ERYTHROCYTE [DISTWIDTH] IN BLOOD BY AUTOMATED COUNT: 43.9 FL (ref 35.9–50)
GLUCOSE BLD-MCNC: 114 MG/DL (ref 65–99)
GLUCOSE BLD-MCNC: 114 MG/DL (ref 65–99)
GLUCOSE BLD-MCNC: 127 MG/DL (ref 65–99)
GLUCOSE BLD-MCNC: 99 MG/DL (ref 65–99)
GLUCOSE SERPL-MCNC: 122 MG/DL (ref 65–99)
HCT VFR BLD AUTO: 36.3 % (ref 42–52)
HGB BLD-MCNC: 11.7 G/DL (ref 14–18)
IMM GRANULOCYTES # BLD AUTO: 0.01 K/UL (ref 0–0.11)
IMM GRANULOCYTES NFR BLD AUTO: 0.2 % (ref 0–0.9)
LYMPHOCYTES # BLD AUTO: 2.31 K/UL (ref 1–4.8)
LYMPHOCYTES NFR BLD: 51.4 % (ref 22–41)
MCH RBC QN AUTO: 29.8 PG (ref 27–33)
MCHC RBC AUTO-ENTMCNC: 32.2 G/DL (ref 33.7–35.3)
MCV RBC AUTO: 92.6 FL (ref 81.4–97.8)
MONOCYTES # BLD AUTO: 0.28 K/UL (ref 0–0.85)
MONOCYTES NFR BLD AUTO: 6.2 % (ref 0–13.4)
NEUTROPHILS # BLD AUTO: 1.71 K/UL (ref 1.82–7.42)
NEUTROPHILS NFR BLD: 38.2 % (ref 44–72)
NRBC # BLD AUTO: 0 K/UL
NRBC BLD-RTO: 0 /100 WBC
PLATELET # BLD AUTO: 130 K/UL (ref 164–446)
PMV BLD AUTO: 10.1 FL (ref 9–12.9)
POTASSIUM SERPL-SCNC: 3.8 MMOL/L (ref 3.6–5.5)
RBC # BLD AUTO: 3.92 M/UL (ref 4.7–6.1)
SODIUM SERPL-SCNC: 138 MMOL/L (ref 135–145)
VANCOMYCIN TROUGH SERPL-MCNC: 17.8 UG/ML (ref 10–20)
WBC # BLD AUTO: 4.5 K/UL (ref 4.8–10.8)

## 2018-04-21 PROCEDURE — 700111 HCHG RX REV CODE 636 W/ 250 OVERRIDE (IP): Performed by: FAMILY MEDICINE

## 2018-04-21 PROCEDURE — A9270 NON-COVERED ITEM OR SERVICE: HCPCS | Performed by: HOSPITALIST

## 2018-04-21 PROCEDURE — 770006 HCHG ROOM/CARE - MED/SURG/GYN SEMI*

## 2018-04-21 PROCEDURE — 700105 HCHG RX REV CODE 258: Performed by: FAMILY MEDICINE

## 2018-04-21 PROCEDURE — 700111 HCHG RX REV CODE 636 W/ 250 OVERRIDE (IP): Performed by: HOSPITALIST

## 2018-04-21 PROCEDURE — 85025 COMPLETE CBC W/AUTO DIFF WBC: CPT

## 2018-04-21 PROCEDURE — 700102 HCHG RX REV CODE 250 W/ 637 OVERRIDE(OP): Performed by: HOSPITALIST

## 2018-04-21 PROCEDURE — 80048 BASIC METABOLIC PNL TOTAL CA: CPT

## 2018-04-21 PROCEDURE — 82962 GLUCOSE BLOOD TEST: CPT | Mod: 91

## 2018-04-21 PROCEDURE — 700105 HCHG RX REV CODE 258: Performed by: HOSPITALIST

## 2018-04-21 PROCEDURE — 36415 COLL VENOUS BLD VENIPUNCTURE: CPT

## 2018-04-21 PROCEDURE — 80202 ASSAY OF VANCOMYCIN: CPT

## 2018-04-21 PROCEDURE — 99232 SBSQ HOSP IP/OBS MODERATE 35: CPT | Performed by: FAMILY MEDICINE

## 2018-04-21 PROCEDURE — A6209 FOAM DRSG <=16 SQ IN W/O BDR: HCPCS | Performed by: NURSE PRACTITIONER

## 2018-04-21 RX ADMIN — OXYCODONE HYDROCHLORIDE 5 MG: 5 TABLET ORAL at 06:05

## 2018-04-21 RX ADMIN — OXYCODONE HYDROCHLORIDE 5 MG: 5 TABLET ORAL at 08:39

## 2018-04-21 RX ADMIN — FLUOXETINE 60 MG: 10 CAPSULE ORAL at 08:39

## 2018-04-21 RX ADMIN — SENNOSIDES AND DOCUSATE SODIUM 2 TABLET: 8.6; 5 TABLET ORAL at 20:03

## 2018-04-21 RX ADMIN — VANCOMYCIN HYDROCHLORIDE 1800 MG: 100 INJECTION, POWDER, LYOPHILIZED, FOR SOLUTION INTRAVENOUS at 11:59

## 2018-04-21 RX ADMIN — BENZTROPINE MESYLATE 1 MG: 1 TABLET ORAL at 20:02

## 2018-04-21 RX ADMIN — VANCOMYCIN HYDROCHLORIDE 1800 MG: 100 INJECTION, POWDER, LYOPHILIZED, FOR SOLUTION INTRAVENOUS at 00:34

## 2018-04-21 RX ADMIN — BENZTROPINE MESYLATE 1 MG: 1 TABLET ORAL at 08:40

## 2018-04-21 RX ADMIN — OXYCODONE HYDROCHLORIDE 5 MG: 5 TABLET ORAL at 17:54

## 2018-04-21 RX ADMIN — QUETIAPINE FUMARATE 100 MG: 100 TABLET ORAL at 15:00

## 2018-04-21 RX ADMIN — METRONIDAZOLE 500 MG: 500 TABLET ORAL at 06:05

## 2018-04-21 RX ADMIN — METRONIDAZOLE 500 MG: 500 TABLET ORAL at 21:12

## 2018-04-21 RX ADMIN — VANCOMYCIN HYDROCHLORIDE 1400 MG: 100 INJECTION, POWDER, LYOPHILIZED, FOR SOLUTION INTRAVENOUS at 21:13

## 2018-04-21 RX ADMIN — SODIUM CHLORIDE: 9 INJECTION, SOLUTION INTRAVENOUS at 00:34

## 2018-04-21 RX ADMIN — QUETIAPINE FUMARATE 100 MG: 100 TABLET ORAL at 08:39

## 2018-04-21 RX ADMIN — OXYCODONE HYDROCHLORIDE 5 MG: 5 TABLET ORAL at 14:10

## 2018-04-21 RX ADMIN — SENNOSIDES AND DOCUSATE SODIUM 2 TABLET: 8.6; 5 TABLET ORAL at 08:40

## 2018-04-21 RX ADMIN — QUETIAPINE FUMARATE 200 MG: 100 TABLET ORAL at 20:02

## 2018-04-21 RX ADMIN — CEFTRIAXONE 2 G: 2 INJECTION, POWDER, FOR SOLUTION INTRAMUSCULAR; INTRAVENOUS at 08:40

## 2018-04-21 RX ADMIN — OXYCODONE HYDROCHLORIDE 5 MG: 5 TABLET ORAL at 21:12

## 2018-04-21 RX ADMIN — ENOXAPARIN SODIUM 40 MG: 100 INJECTION SUBCUTANEOUS at 21:00

## 2018-04-21 RX ADMIN — METRONIDAZOLE 500 MG: 500 TABLET ORAL at 14:00

## 2018-04-21 ASSESSMENT — ENCOUNTER SYMPTOMS
HEADACHES: 0
PALPITATIONS: 0
DOUBLE VISION: 0
VOMITING: 0
ORTHOPNEA: 0
NECK PAIN: 0
CHILLS: 0
HEMOPTYSIS: 0
BACK PAIN: 0
WEIGHT LOSS: 0
BLURRED VISION: 0
PHOTOPHOBIA: 0
NAUSEA: 0
DIZZINESS: 0
FEVER: 0
COUGH: 0
SPUTUM PRODUCTION: 0
TINGLING: 0
HEARTBURN: 0

## 2018-04-21 ASSESSMENT — PAIN SCALES - GENERAL
PAINLEVEL_OUTOF10: 4
PAINLEVEL_OUTOF10: 4
PAINLEVEL_OUTOF10: 7
PAINLEVEL_OUTOF10: 8
PAINLEVEL_OUTOF10: 6
PAINLEVEL_OUTOF10: 7
PAINLEVEL_OUTOF10: 7
PAINLEVEL_OUTOF10: 3

## 2018-04-21 ASSESSMENT — LIFESTYLE VARIABLES: DO YOU DRINK ALCOHOL: NO

## 2018-04-21 NOTE — CARE PLAN
Problem: Venous Thromboembolism (VTW)/Deep Vein Thrombosis (DVT) Prevention:  Goal: Patient will participate in Venous Thrombosis (VTE)/Deep Vein Thrombosis (DVT)Prevention Measures  Ensuring pt is mobile as a preventative measure.     Problem: Knowledge Deficit  Goal: Knowledge of disease process/condition, treatment plan, diagnostic tests, and medications will improve  POC discussed. All questions and concerns addressed.

## 2018-04-21 NOTE — PROGRESS NOTES
Infectious Disease Progress Note    Author: Amanda Feng M.D. Date & Time of service: 2018  2:57 PM    Chief Complaint:  left foot osteomyelitis    Interval History:  52 y.o. WM admitted 2018 for left foot infection at amp site   AF irritable today    Labs Reviewed, Medications Reviewed, Radiology Reviewed and Wound Reviewed.    Review of Systems:  Review of Systems   All other systems reviewed and are negative.  limited by mood    Hemodynamics:  Temp (24hrs), Av.7 °C (98 °F), Min:36 °C (96.8 °F), Max:37.4 °C (99.3 °F)  Temperature: 36.1 °C (97 °F)  Pulse  Av.6  Min: 51  Max: 111   Blood Pressure: 156/70       Physical Exam:  Physical Exam   Constitutional: He appears well-developed. No distress.   HENT:   Head: Normocephalic and atraumatic.   Eyes: EOM are normal. Pupils are equal, round, and reactive to light.   Neck: Neck supple.   Cardiovascular: Normal rate.    Pulmonary/Chest: Effort normal and breath sounds normal.   Abdominal: Bowel sounds are normal.   Musculoskeletal: He exhibits edema.   Neurological: He is alert.   Skin: No rash noted.   Psychiatric:   Flat affect   Nursing note and vitals reviewed.      Meds:    Current Facility-Administered Medications:   •  benztropine  •  FLUoxetine  •  LORazepam  •  QUEtiapine  •  senna-docusate **AND** polyethylene glycol/lytes **AND** magnesium hydroxide **AND** bisacodyl  •  enoxaparin  •  acetaminophen  •  Notify provider if pain remains uncontrolled **AND** Use the numeric rating scale (NRS-11) on regular floors and Critical-Care Pain Observation Tool (CPOT) on ICUs/Trauma to assess pain **AND** Pulse Ox (Oximetry) **AND** Pharmacy Consult Request **AND** If patient difficult to arouse and/or has respiratory depression, stop any opiates that are currently infusing and call a Rapid Response. **AND** oxyCODONE immediate-release **AND** oxyCODONE immediate-release **AND** HYDROmorphone  •  MD ALERT... vancomycin **AND** cefTRIAXone  (ROCEPHIN) IV **AND** metroNIDAZOLE  •  insulin regular **AND** Accu-Chek ACHS **AND** NOTIFY MD and PharmD **AND** glucose 4 g **AND** dextrose 50%  •  ondansetron  •  ondansetron  •  promethazine  •  promethazine  •  prochlorperazine  •  QUEtiapine  •  vancomycin    Labs:  Recent Labs      04/20/18   1008  04/21/18   0133   WBC  4.9  4.5*   RBC  4.09*  3.92*   HEMOGLOBIN  12.3*  11.7*   HEMATOCRIT  37.5*  36.3*   MCV  91.7  92.6   MCH  30.1  29.8   RDW  43.0  43.9   PLATELETCT  162*  130*   MPV  9.7  10.1   NEUTSPOLYS  41.20*  38.20*   LYMPHOCYTES  48.50*  51.40*   MONOCYTES  6.70  6.20   EOSINOPHILS  2.00  2.70   BASOPHILS  1.00  1.30     Recent Labs      04/20/18   1008  04/21/18   0133   SODIUM  139  138   POTASSIUM  4.0  3.8   CHLORIDE  109  108   CO2  25  26   GLUCOSE  91  122*   BUN  13  17     Recent Labs      04/20/18   1008  04/21/18   0133   ALBUMIN  3.5   --    TBILIRUBIN  0.5   --    ALKPHOSPHAT  92   --    TOTPROTEIN  6.7   --    ALTSGPT  18   --    ASTSGOT  18   --    CREATININE  0.80  0.88       Imaging:  Dx-chest-portable (1 View)    Result Date: 3/30/2018  3/30/2018 5:57 PM HISTORY/REASON FOR EXAM:  Shortness of breath. Sepsis. Left foot open wounds. Diabetes. TECHNIQUE/EXAM DESCRIPTION AND NUMBER OF VIEWS: Single portable view of the chest. COMPARISON:  None FINDINGS: The chest is slightly rotated. The heart is enlarged. There is prominence of the ascending aorta which may be accentuated by rotation. No lobar consolidation is present. No pleural effusion is noted.     1.  No acute cardiac or pulmonary abnormality is noted. 2.  Cardiomegaly. Prominence of the ascending aorta.    Dx-foot-2- Left    Result Date: 4/20/2018 4/20/2018 10:19 AM HISTORY/REASON FOR EXAM:  Pain/Deformity Following Trauma Weeping great toe TECHNIQUE/EXAM DESCRIPTION AND NUMBER OF VIEWS: 2 nonweightbearing views of the LEFT foot. COMPARISON:  3/31/2018 FINDINGS: Patient is status post amputation of the first digit at the  level of the proximal phalanx. There is minimal osseous irregularity at the level of the amputation. There is faint adjacent calcification which may be dystrophic.  There are mild degenerative changes of the first tarsometatarsal joint. No acute fracture or dislocation is seen. There is soft tissue swelling about the medial forefoot and remainder of the first digit.     Post surgical changes status post amputation at the level of the proximal phalanx of the first digit. Linear amorphous calcification adjacent to the amputation may be dystrophic. There is minimal osseous irregularity at the level of the amputation where it is difficult to exclude osteomyelitis. Soft tissue swelling of the forefoot and remainder of the first digit.     Dx-foot-complete 3+ Right    Result Date: 3/31/2018  3/31/2018 8:49 PM HISTORY/REASON FOR EXAM:  RIGHT great toe infection. TECHNIQUE/EXAM DESCRIPTION AND NUMBER OF VIEWS:  3 views of the  RIGHT foot. COMPARISON: None FINDINGS: MINERALIZATION: Mineralization is unremarkable for age. INJURY: No acute fracture or gross malalignment is seen. JOINTS: No erosive arthropathy is evident.     1.  No radiographic evidence of bony erosion or aggressive periosteal reaction. 2.  Soft tissue irregularity along the medial aspect of the great toe compatible with the provided clinical history of soft tissue wound    Dx-foot-complete 3+ Left    Result Date: 3/31/2018  3/31/2018 10:15 AM HISTORY/REASON FOR EXAM:  Evaluate for osteoarthritis TECHNIQUE/EXAM DESCRIPTION AND NUMBER OF VIEWS: 3 nonweightbearing views of the LEFT foot. COMPARISON:  3/30/2018 FINDINGS: There is an intra-articular fracture of the distal phalanx of the first digit. There is lucency within the distal phalanx of the first digit with may be related to osteomyelitis. There is soft tissue swelling and soft tissue air. No dislocation is identified. Radiopaque densities may be external to the patient.     Fracture of the base of the  distal phalanx of the first digit. Lucency within the distal phalanx could be related to osteomyelitis. Soft tissue swelling and soft tissue air is seen within the first digit. Soft tissue swelling extends into the foot.    Dx-toe(s) 2+ Left    Result Date: 3/30/2018  3/30/2018 5:57 PM HISTORY/REASON FOR EXAM:  Open left foot wounds. TECHNIQUE/EXAM DESCRIPTION AND NUMBER OF VIEWS:  3 views of the LEFT toes. COMPARISON: None FINDINGS: There is irregularity in the lateral aspect of the base of the first distal phalanx. There is extensive soft tissue edema with evidence of an overlying soft tissue plane in soft tissue air. Radiopaque densities may be external to the patient.     Cortical irregularity in the lateral base of the first distal phalanx with overlying soft tissue swelling and foci of air. Findings are highly suspicious for osteomyelitis. If clinically indicated, MRI may be helpful for confirmation.    Le Art/kaila    Result Date: 2018   Vascular Laboratory  Conclusions  Bilateral.  No evidence of arterial insufficiency.  YEOMANS, WILBERT  Age:    52    Gender:     M  MRN:    [id number]  :    1965      BSA:  Exam Date:     2018 12:33  Room #:     Inpatient  Priority:     Routine  Ht (in):             Wt (lb):  Ordering Physician:        NIR PIMENTEL  Referring Physician:       NIR PIMENTEL  Sonographer:               Maribel Roberts RVT  Study Type:                Limited Bilateral  Technical Quality:         Adequate  Indications:     Ulceration of LE  CPT Codes:       48498  ICD Codes:       L97  History:         Ulcerations of the bilateral great toes. Left great toe                   amputation 2018. No prior exam for comparison.  Limitations:                 RIGHT  Waveform            Systolic BPs (mmHg)                             124           Brachial  Triphasic                                Common Femoral  Triphasic                  168           Posterior Tibial  Triphasic    "               140           Dorsalis Pedis                                           Peroneal                             1.32          CELIO                                           TBI                       LEFT  Waveform        Systolic BPs (mmHg)                             127           Brachial  Triphasic                                Common Femoral  Triphasic                  162           Posterior Tibial  Triphasic                  124           Dorsalis Pedis                                           Peroneal                             1.28          CELIO                                           TBI  Findings  Right.  There is no evidence of arterial disease demonstrated (CELIO is .9-1.0) on  the right side.  Doppler waveform of the common femoral and popliteal arteries are of high  amplitude and triphasic.  Doppler waveforms at the ankle are brisk and triphasic.  Left.  There is no evidence of arterial disease demonstrated (CELIO is .9-1.0) on  the left side.  Doppler waveform of the common femoral and popliteal arteries are of high  amplitude and triphasic.  Doppler waveforms at the ankle are brisk and triphasic.  Additional testing was not performed in accordance with lower extremity  arterial evaluation protocol.  Wilberto Damon M.D.  (Electronically Signed)  Final Date:      01 April 2018                   22:27      Micro:  Results     Procedure Component Value Units Date/Time    BLOOD CULTURE [[id number]] Collected:  04/20/18 1008    Order Status:  Completed Specimen:  Blood from Peripheral Updated:  04/21/18 0850     Significant Indicator NEG     Source BLD     Site PERIPHERAL     Blood Culture No Growth    Note: Blood cultures are incubated for 5 days and  are monitored continuously.Positive blood cultures  are called to the RN and reported as soon as  they are identified.      Narrative:       1 of 2 for Blood Culture x 2 sites order. Per Hospital  Policy: Only change Specimen Src: to \"Line\" if " "specified by  physician order.    BLOOD CULTURE [[id number]] Collected:  04/20/18 1027    Order Status:  Completed Specimen:  Blood from Peripheral Updated:  04/21/18 0850     Significant Indicator NEG     Source BLD     Site PERIPHERAL     Blood Culture No Growth    Note: Blood cultures are incubated for 5 days and  are monitored continuously.Positive blood cultures  are called to the RN and reported as soon as  they are identified.      Narrative:       2 of 2 blood culture x2  Sites order. Per Hospital Policy:  Only change Specimen Src: to \"Line\" if specified by physician  order.          Assessment:  Active Hospital Problems    Diagnosis   • Psychiatric disorder [F99]   • Diabetic foot ulcer (CMS-HCC) [E11.621, L97.509]   • Status post amputation of great toe, left (CMS-HCC) [Z89.412]   • Type 2 diabetes mellitus (CMS-HCC) [E11.9]       Plan:  Left foot osteomyelitis  Afebrile  No leukocytosis   S/p left great amp through proximal phalanx on 4/1  Wound cx - group G and C strep, morganella , diphtheroids  Blood cxs neg  Likely will require additional debridement, possiblyTMA  Continue current abx for now  Not a candidate for outpatient treatment due to noncompliance     DM2  HgA1c 5.8  Maintain BS less than 150      "

## 2018-04-21 NOTE — PROGRESS NOTES
[facility] Hospitalist Progress Note    Date of Service: 2018    Chief Complaint  52 y.o. male admitted 2018 with left 1st toe osteomyelitis    Interval Problem Update  none    Consultants/Specialty  I.D    Disposition  none        Review of Systems   Constitutional: Negative for chills, fever and weight loss.   HENT: Negative for ear pain, hearing loss and tinnitus.    Eyes: Negative for blurred vision, double vision and photophobia.   Respiratory: Negative for cough, hemoptysis and sputum production.    Cardiovascular: Negative for chest pain, palpitations and orthopnea.   Gastrointestinal: Negative for heartburn, nausea and vomiting.   Genitourinary: Negative for dysuria, frequency and urgency.   Musculoskeletal: Positive for joint pain (on the 1st toe). Negative for back pain and neck pain.   Skin: Negative for itching and rash.   Neurological: Negative for dizziness, tingling and headaches.      Physical Exam  Laboratory/Imaging   Hemodynamics  Temp (24hrs), Av.7 °C (98 °F), Min:36 °C (96.8 °F), Max:37.4 °C (99.3 °F)   Temperature: 36.1 °C (97 °F)  Pulse  Av.6  Min: 51  Max: 111    Blood Pressure: 156/70, NIBP: (!) 99/57      Respiratory      Respiration: 17, Pulse Oximetry: 94 %        RUL Breath Sounds: Clear, RML Breath Sounds: Clear, RLL Breath Sounds: Diminished, LORENA Breath Sounds: Clear, LLL Breath Sounds: Diminished    Fluids  No intake or output data in the 24 hours ending 18 1230    Nutrition  Orders Placed This Encounter   Procedures   • DIET ORDER     Standing Status:   Standing     Number of Occurrences:   1     Order Specific Question:   Diet:     Answer:   Diabetic [3]   • DIET NPO     Standing Status:   Standing     Number of Occurrences:   8     Order Specific Question:   Restrict to:     Answer:   Sips with Medications [3]     Physical Exam   Constitutional: He is oriented to person, place, and time. He appears well-developed and well-nourished.   HENT:   Head:  Normocephalic and atraumatic.   Eyes: Conjunctivae are normal. Pupils are equal, round, and reactive to light.   Neck: Normal range of motion. Neck supple.   Cardiovascular: Normal rate and regular rhythm.    Pulmonary/Chest: Effort normal and breath sounds normal.   Abdominal: Soft. Bowel sounds are normal.   Musculoskeletal:   Dressing looks clean,the amputation site on the 1st toe is noted   Neurological: He is alert and oriented to person, place, and time.   Skin: Skin is warm and dry.       Recent Labs      04/20/18   1008  04/21/18   0133   WBC  4.9  4.5*   RBC  4.09*  3.92*   HEMOGLOBIN  12.3*  11.7*   HEMATOCRIT  37.5*  36.3*   MCV  91.7  92.6   MCH  30.1  29.8   MCHC  32.8*  32.2*   RDW  43.0  43.9   PLATELETCT  162*  130*   MPV  9.7  10.1     Recent Labs      04/20/18   1008  04/21/18   0133   SODIUM  139  138   POTASSIUM  4.0  3.8   CHLORIDE  109  108   CO2  25  26   GLUCOSE  91  122*   BUN  13  17   CREATININE  0.80  0.88   CALCIUM  8.7  8.5     Recent Labs      04/20/18   1008   APTT  29.6   INR  1.07                  Assessment/Plan     Diabetic foot ulcer (CMS-HCC)   Assessment & Plan    S/p amputation  I.D input is noted  Flagyl  Rocephin  Vancomycin  Possibly needs further  Amputation  Foot xray is noted          Psychiatric disorder   Assessment & Plan    Pt stated that he feels bad that  He had to come back to the hospital  He denied any depression issues  He also denied any suicidal ideation  Will continue with home meds        Type 2 diabetes mellitus (CMS-HCC)- (present on admission)   Assessment & Plan    S.s.inbsulin  hema1c 5.8          Quality-Core Measures   Olsen catheter::  No Olsen  DVT prophylaxis pharmacological::  Enoxaparin (Lovenox)

## 2018-04-21 NOTE — PROGRESS NOTES
"LIMB PRESERVATION SERVICE NOTE:    Subjective:      Reason for Consultation:  L great toe amputation site dehiscence and a 1st MTH Dorsal wound.    Patient is well known to [facility] and outpatient wound care services.    Patient is a 52 y.o. male with a past medical history that includes borderline diabetes, anxiety, HTN, Hep C and is admitted to [facility] via [facility] for his S/P L great toe amputation site and a new R great toe wound. The s/p left great toe amputation site was from 4/1/18 by Dr. Baez. Pt did not follow up with the [facility] clinic for dressing care and he did not receive oral antibiotics. He also did not follow up with his PCP at [facility].   The R dorsal 1st MTH ulcer is new and started a couple of days ago. Pt is not wearing the previously provided offloading shoes.  A1c this admission is 5.8.  Pt adamantly does not want additional amputations or invasive procedures. It was explained to pt that a TMA could be a possibility and that debridement will probably be necessary to decrease the bioburden and that antibiotics alone will not suffice when the wound probes to bone.     Patient denies fevers chills, nausea, vomiting.     Pain:        Patient resting comfortably    Vitals  /79   Pulse (!) 58   Temp 36 °C (96.8 °F)   Resp 17   Ht 1.93 m (6' 3.98\")   Wt 107.2 kg (236 lb 5.3 oz)   SpO2 92%   BMI 28.78 kg/m²       Objective:         PHYSICAL EXAMINATION:      General Appearance:  Well developed,  nourished, in no acute distress     Sensory Assessment        Patient sensation insensate bilaterally with light touch 10 of 10 points        Vascular Assessment        +2 PT bilaterally  +1 DP on Left Foot  Unable to Palpate DP on Right Foot - Found on Doppler      Orthotic, protective, supportive devices:      Offloading    Offloading Shoe ordered but does not like to wear them    Wound Characteristics                                                    Location: Right Dorsal 1st MTH Partial Thickness  " Initial Evaluation  Date:4/21/2018   Tissue Type and %: 100% Red   Periwound: Red   Drainage: Scant Serous   Exposed structures None   Wound Edges:   Open   Odor: None   S&S of Infection:   Edema/Erythema   Edema: Localized at Site   Sensation: Insensate               Measurements: Initial Evaluation  Date:4/21/2018   Length (cm) 1.5   Width (cm) 1.5   Depth (cm) 0.1   Tract/undermine          Wound Characteristics                                                    Location:Left Great Toe Amp Dehiscence Initial Evaluation  Date:4/21/2018   Tissue Type and %: 100% Black   Periwound: Red   Drainage: Scant Purulent   Exposed structures Probes to Bone   Wound Edges:   Open   Odor: Malodorous   S&S of Infection:   Edema/Erythema   Edema: Localized at Site   Sensation: Insensate               Measurements: Initial Evaluation  Date:4/21/2018   Length (cm) 4.5   Width (cm) 6.5   Depth (cm) Bone   Tract/undermine              Tests and Measures:    Labs  Recent Labs      04/20/18   1008  04/21/18   0133   WBC  4.9  4.5*   RBC  4.09*  3.92*   HEMOGLOBIN  12.3*  11.7*   HEMATOCRIT  37.5*  36.3*   MCV  91.7  92.6   MCH  30.1  29.8   MCHC  32.8*  32.2*   RDW  43.0  43.9   PLATELETCT  162*  130*   MPV  9.7  10.1     Recent Labs      04/20/18   1008  04/21/18   0133   SODIUM  139  138   POTASSIUM  4.0  3.8   CHLORIDE  109  108   CO2  25  26   GLUCOSE  91  122*   BUN  13  17       A1C 5.8%  ESR: 26  CRP: 0.34    CELIO    R: 1.32  L: 1.28    RLE: Triphasic/triphasic   LLE: Triphasic/triphasic     Imaging    X-Ray: Left 4/20/18  Impression       Post surgical changes status post amputation at the level of the proximal phalanx of the first digit. Linear amorphous calcification adjacent to the amputation may be dystrophic. There is minimal osseous irregularity at the level of the amputation where   it is difficult to exclude osteomyelitis.    Soft tissue swelling of the forefoot and remainder of the first digit.     Infection  Management  Microbiology Neg Blood 4/20/18    Procedures:        Debridement :  NA     Cleansed with:     NS                                                                       Periwound protected with: skin prep     Primary dressing: hydro fera blue     Secondary Dressing: foam     Other:     NURSING TO CHANGE BILATERAL FEET DRESSINGS EVERY 72 HOURS AND PRN FOR SATURATION OR DISLODGEMENT  Nursing to cleanse wound/periwound with Normal Saline.  Pat periwound dry and apply skin prep/No Sting to periwound.  Let air dry for 1-2 minutes. Apply Hydrofera Blue Ready with print side facing away from the patient.  Cover with non adhesive foam, cut to size, and secure with hypafix tape.  Please take Weekly Wound Photos. Notify wound team if wound deteriorates or fails to progress.    Patient Education: Implications of loss of protective sensation (LOPS) discussed with patient- including increased risk for amputation.  Advised to check foot at least daily, moisturize foot, and to always wear protective foot wear.    Plan:       Treatment Plan and Recommendations:    1. Labs\Imaging: Reviewed       2. Treatment:   Pt NPO @ Midnight 4/22/18 pending possible I and D/TMA 4/23/18 Will D/W Dr Easton Monday. Pt. Refusing TMA at this point.      Wound Care by Nursing, LPS to Follow.                           Dressing Orders Updated.       Nursing to change every 72 hrs and PRN for Saturation or Dislodgement     Antibiotics per ID, flagyl and rocephin    Anticipated discharge plans (X):   SNF:  X        Home Care:            Outpatient Wound Center:        Self Care:             Other:            TBD:    Professional Collaboration: D/W:  Dr Easton

## 2018-04-21 NOTE — ASSESSMENT & PLAN NOTE
Pt stated that he feels bad that  He had to come back to the hospital  He denied any depression issues  He also denied any suicidal ideation  Will continue with home meds

## 2018-04-21 NOTE — PROGRESS NOTES
AOx4. VS stable. Reports pain on left foot medicated with oxy. Dressing on foot in place. Denies nausea. Denies SOB. Calls appropriately. Ambulates self to the bathroom steady. Sleeping. No other concerns at this time.

## 2018-04-21 NOTE — CARE PLAN
Problem: Infection  Goal: Will remain free from infection  Outcome: PROGRESSING AS EXPECTED  Antibiotics administered. Pt's wound cleaned and dressing changed.    Problem: Pain Management  Goal: Pain level will decrease to patient's comfort goal  Outcome: PROGRESSING AS EXPECTED  Pain on left great toe medicated with oxy 5

## 2018-04-21 NOTE — PROGRESS NOTES
Pt is AAO x4.  Pt reports a 7/10 L foot pain level.  Medicated per MAR.  VS WNL.  L foot dressing in place, CDI.  R medial foot dressing in place, CDI.  R PIV patent, running fluids.  POC discussed.  All needs met at this time.  Bed in low position.  Call light within reach.  Rounding in place.

## 2018-04-21 NOTE — PROGRESS NOTES
"Pharmacy Kinetics 52 y.o. male on vancomycin day # 2   2018    Currently on Vancomycin 1,400mg iv q12hr (1100, 2300)    Indication for Treatment: SSTI    Pertinent history per medical record: Admitted on 2018 for left toe wound infection s/p L toe amputation secondary to osteomyelitis.     Other antibiotics: Ceftriaxone and Metronidazole     Allergies: Patient has no known allergies.     List concerns for renal function: Diabetes     Pertinent cultures to date:   18: PBCs x 2 = NGTD     Recent Labs      18   1008  18   0133   WBC  4.9  4.5*   NEUTSPOLYS  41.20*  38.20*     Recent Labs      18   1008  18   0133   BUN  13  17   CREATININE  0.80  0.88   ALBUMIN  3.5   --      Recent Labs      18   1033   VANCOTROUGH  17.8   No intake or output data in the 24 hours ending 18 1605   Blood pressure 156/70, pulse 86, temperature 36.1 °C (97 °F), resp. rate 17, height 1.93 m (6' 3.98\"), weight 107.2 kg (236 lb 5.3 oz), SpO2 94 %. Temp (24hrs), Av.7 °C (98 °F), Min:36 °C (96.8 °F), Max:37.4 °C (99.3 °F)      A/P   1. Vancomycin dose change: Decreased dose 1,800mg IV q12h to 1,400mg IV q12h   2. Next vancomycin level: ~ 2 days (not yet ordered)   3. Goal trough: 12 - 16 mcg/mL   Comments: Vancomycin trough level resulted this morning at 17.8 mcg/mL which is above desired range as outlined above. Not quite a true trough level with previous dose administered ~ 1.5 hours late, however, patient still indicated for dose decrease to target lower end of goal range to reduce risk of adverse effects and associated toxicities. Level was also drawn prior to drug achieving steady state concentrations, so drug will soon begin to accumulate. Anticipate a normal to higher volume of distribution of drug d/t overweight BMI.  Reduced dose from 1,800mg to 1,400mg every 12 hours based on linear kinetics. Peripheral blood cultures are no growth to date. No other cultures currently in process " - may need additional debridement possible TMA per ID consult - continue current antibiotics. Renal indices with a slight increase today, will continue to monitor closely - CMP already ordered for tomorrow.     Yue Scherer, PharmD

## 2018-04-21 NOTE — ED NOTES
1615> Pt given milk and crackers as requested.  1714> Report to RN. Pt transferred to T3 by ED tech.

## 2018-04-21 NOTE — PROGRESS NOTES
LIMB PRESERVATION SERVICE ROUNDS    Patient seen in collaboration with interdisciplinary team during LPS rounds in wound clinic for L great toe amputation site and R great toe wound. Pt s/p left great toe amputation site 4/1/18 by Dr. Baez. Pt did not follow up with [facility] for dressing care and he did not receive oral antibiotics. He also did not follow up with PCP at [facility] when the pharmacy told him they did not have a prescription for antibiotics. Pt has been washing his foot daily and leaving it open to air.   The right great toe ulcer has resolved but he has developed a new R dorsal 1st MTH ulcer that he said started about 2 days ago. He is wearing tennis shoes. Says offloading shoes in hospital were too loose.      Denies fevers, chills    OBJECTIVE FINDINGS:   L great toe amp:  Sutures in place, slough, eschar, and s/s drainage from wound. Erythema and edema extending proximally up foot. Foot hot to touch.    R dorsal 1st MTH ulcer:   Red, partial thickness      Labs/diagnostic reviewed: n/a    PROCEDURE   Wound care completed by  Hamida Bray RN      PLAN:   pt to go to ED for L great toe amputation infection for IV abx and possible surgery.   Recommend pt go to SNF at discharge as he has failed outpt treatment.  Pt concerned about paying his rent. Recommend he be seen by social work.

## 2018-04-21 NOTE — PROGRESS NOTES
Pt accepted from the E.R at 1717, a&ox4, vss, wound to left great toe with gauze dressing, wound to right dorsal foot, pictures in media. Went over POC.

## 2018-04-21 NOTE — ASSESSMENT & PLAN NOTE
S/p amputation of the left big toe amputation site dehiscence with surrounding cellulitis and necrosis  I.D following.  On Flagyl, Rocephin, Vancomycin.  As per I.D pt is not a candidate for outpt IV therapy because of compliance.  F/u OR culture of Big toe.

## 2018-04-21 NOTE — DIETARY
Nutrition Services: Consult DM diet education    Pt is a 52 y.o. Male with Dx: Diabetic foot ulcer (CMS-HCC)    Admit day 1.   H/o borderline DM with HA1c 5.8% last month.  S/p recent L great toe amputation but did not F/u @ [facility] per note. Pt has new DM ulcer to R great toe.   Pt on a diabetic diet. Negative nutritional admit screen.  Attempted diet education, but pt was asleep.    Left diet handouts on bedside table; information includes recommended food lists, sample menu, healthy snacks, and MyPlate portions.  RD will F/u to discuss dietary recommendations.

## 2018-04-22 LAB
ALBUMIN SERPL BCP-MCNC: 3.2 G/DL (ref 3.2–4.9)
ALBUMIN/GLOB SERPL: 1 G/DL
ALP SERPL-CCNC: 81 U/L (ref 30–99)
ALT SERPL-CCNC: 14 U/L (ref 2–50)
ANION GAP SERPL CALC-SCNC: 6 MMOL/L (ref 0–11.9)
AST SERPL-CCNC: 14 U/L (ref 12–45)
BASOPHILS # BLD AUTO: 1.3 % (ref 0–1.8)
BASOPHILS # BLD: 0.06 K/UL (ref 0–0.12)
BILIRUB SERPL-MCNC: 0.4 MG/DL (ref 0.1–1.5)
BUN SERPL-MCNC: 14 MG/DL (ref 8–22)
CALCIUM SERPL-MCNC: 8.6 MG/DL (ref 8.5–10.5)
CHLORIDE SERPL-SCNC: 108 MMOL/L (ref 96–112)
CO2 SERPL-SCNC: 25 MMOL/L (ref 20–33)
CREAT SERPL-MCNC: 0.81 MG/DL (ref 0.5–1.4)
CRP SERPL HS-MCNC: 0.15 MG/DL (ref 0–0.75)
EOSINOPHIL # BLD AUTO: 0.17 K/UL (ref 0–0.51)
EOSINOPHIL NFR BLD: 3.7 % (ref 0–6.9)
ERYTHROCYTE [DISTWIDTH] IN BLOOD BY AUTOMATED COUNT: 44.8 FL (ref 35.9–50)
ERYTHROCYTE [SEDIMENTATION RATE] IN BLOOD BY WESTERGREN METHOD: 22 MM/HOUR (ref 0–20)
GLOBULIN SER CALC-MCNC: 3.1 G/DL (ref 1.9–3.5)
GLUCOSE BLD-MCNC: 103 MG/DL (ref 65–99)
GLUCOSE BLD-MCNC: 106 MG/DL (ref 65–99)
GLUCOSE BLD-MCNC: 113 MG/DL (ref 65–99)
GLUCOSE BLD-MCNC: 91 MG/DL (ref 65–99)
GLUCOSE SERPL-MCNC: 105 MG/DL (ref 65–99)
HCT VFR BLD AUTO: 38.3 % (ref 42–52)
HGB BLD-MCNC: 12.3 G/DL (ref 14–18)
IMM GRANULOCYTES # BLD AUTO: 0.01 K/UL (ref 0–0.11)
IMM GRANULOCYTES NFR BLD AUTO: 0.2 % (ref 0–0.9)
LYMPHOCYTES # BLD AUTO: 2.26 K/UL (ref 1–4.8)
LYMPHOCYTES NFR BLD: 49.8 % (ref 22–41)
MCH RBC QN AUTO: 29.6 PG (ref 27–33)
MCHC RBC AUTO-ENTMCNC: 32.1 G/DL (ref 33.7–35.3)
MCV RBC AUTO: 92.3 FL (ref 81.4–97.8)
MONOCYTES # BLD AUTO: 0.27 K/UL (ref 0–0.85)
MONOCYTES NFR BLD AUTO: 5.9 % (ref 0–13.4)
NEUTROPHILS # BLD AUTO: 1.77 K/UL (ref 1.82–7.42)
NEUTROPHILS NFR BLD: 39.1 % (ref 44–72)
NRBC # BLD AUTO: 0 K/UL
NRBC BLD-RTO: 0 /100 WBC
PLATELET # BLD AUTO: 129 K/UL (ref 164–446)
PMV BLD AUTO: 9.5 FL (ref 9–12.9)
POTASSIUM SERPL-SCNC: 4.1 MMOL/L (ref 3.6–5.5)
PREALB SERPL-MCNC: 20 MG/DL (ref 18–38)
PROT SERPL-MCNC: 6.3 G/DL (ref 6–8.2)
RBC # BLD AUTO: 4.15 M/UL (ref 4.7–6.1)
SODIUM SERPL-SCNC: 139 MMOL/L (ref 135–145)
WBC # BLD AUTO: 4.5 K/UL (ref 4.8–10.8)

## 2018-04-22 PROCEDURE — 85652 RBC SED RATE AUTOMATED: CPT

## 2018-04-22 PROCEDURE — 86140 C-REACTIVE PROTEIN: CPT

## 2018-04-22 PROCEDURE — 700102 HCHG RX REV CODE 250 W/ 637 OVERRIDE(OP): Performed by: HOSPITALIST

## 2018-04-22 PROCEDURE — 82962 GLUCOSE BLOOD TEST: CPT

## 2018-04-22 PROCEDURE — 700105 HCHG RX REV CODE 258: Performed by: FAMILY MEDICINE

## 2018-04-22 PROCEDURE — 80053 COMPREHEN METABOLIC PANEL: CPT

## 2018-04-22 PROCEDURE — 700111 HCHG RX REV CODE 636 W/ 250 OVERRIDE (IP): Performed by: FAMILY MEDICINE

## 2018-04-22 PROCEDURE — 84134 ASSAY OF PREALBUMIN: CPT

## 2018-04-22 PROCEDURE — 99232 SBSQ HOSP IP/OBS MODERATE 35: CPT | Performed by: FAMILY MEDICINE

## 2018-04-22 PROCEDURE — 770006 HCHG ROOM/CARE - MED/SURG/GYN SEMI*

## 2018-04-22 PROCEDURE — 36415 COLL VENOUS BLD VENIPUNCTURE: CPT

## 2018-04-22 PROCEDURE — 700111 HCHG RX REV CODE 636 W/ 250 OVERRIDE (IP): Performed by: HOSPITALIST

## 2018-04-22 PROCEDURE — 85025 COMPLETE CBC W/AUTO DIFF WBC: CPT

## 2018-04-22 PROCEDURE — A9270 NON-COVERED ITEM OR SERVICE: HCPCS | Performed by: HOSPITALIST

## 2018-04-22 RX ADMIN — BENZTROPINE MESYLATE 1 MG: 1 TABLET ORAL at 22:49

## 2018-04-22 RX ADMIN — VANCOMYCIN HYDROCHLORIDE 1400 MG: 100 INJECTION, POWDER, LYOPHILIZED, FOR SOLUTION INTRAVENOUS at 21:39

## 2018-04-22 RX ADMIN — OXYCODONE HYDROCHLORIDE 5 MG: 5 TABLET ORAL at 10:28

## 2018-04-22 RX ADMIN — METRONIDAZOLE 500 MG: 500 TABLET ORAL at 15:10

## 2018-04-22 RX ADMIN — ENOXAPARIN SODIUM 40 MG: 100 INJECTION SUBCUTANEOUS at 21:38

## 2018-04-22 RX ADMIN — OXYCODONE HYDROCHLORIDE 5 MG: 5 TABLET ORAL at 15:10

## 2018-04-22 RX ADMIN — SENNOSIDES AND DOCUSATE SODIUM 2 TABLET: 8.6; 5 TABLET ORAL at 21:35

## 2018-04-22 RX ADMIN — METRONIDAZOLE 500 MG: 500 TABLET ORAL at 06:14

## 2018-04-22 RX ADMIN — OXYCODONE HYDROCHLORIDE 5 MG: 5 TABLET ORAL at 21:34

## 2018-04-22 RX ADMIN — QUETIAPINE FUMARATE 100 MG: 100 TABLET ORAL at 15:11

## 2018-04-22 RX ADMIN — QUETIAPINE FUMARATE 200 MG: 100 TABLET ORAL at 21:34

## 2018-04-22 RX ADMIN — OXYCODONE HYDROCHLORIDE 5 MG: 5 TABLET ORAL at 06:13

## 2018-04-22 RX ADMIN — METRONIDAZOLE 500 MG: 500 TABLET ORAL at 21:35

## 2018-04-22 RX ADMIN — CEFTRIAXONE 2 G: 2 INJECTION, POWDER, FOR SOLUTION INTRAMUSCULAR; INTRAVENOUS at 10:28

## 2018-04-22 ASSESSMENT — ENCOUNTER SYMPTOMS
BACK PAIN: 0
NECK PAIN: 0
PHOTOPHOBIA: 0
WHEEZING: 0
SENSORY CHANGE: 0
ORTHOPNEA: 0
CLAUDICATION: 0
SPUTUM PRODUCTION: 0
FLANK PAIN: 0
DIAPHORESIS: 0
TREMORS: 0
CONSTIPATION: 0
EYE DISCHARGE: 0
EYE PAIN: 0
SHORTNESS OF BREATH: 0
WEAKNESS: 0
DIARRHEA: 0
ABDOMINAL PAIN: 0
SPEECH CHANGE: 0

## 2018-04-22 ASSESSMENT — PAIN SCALES - GENERAL
PAINLEVEL_OUTOF10: 3
PAINLEVEL_OUTOF10: 3
PAINLEVEL_OUTOF10: 5
PAINLEVEL_OUTOF10: 7
PAINLEVEL_OUTOF10: 3
PAINLEVEL_OUTOF10: 7

## 2018-04-22 ASSESSMENT — PATIENT HEALTH QUESTIONNAIRE - PHQ9
1. LITTLE INTEREST OR PLEASURE IN DOING THINGS: NOT AT ALL
SUM OF ALL RESPONSES TO PHQ9 QUESTIONS 1 AND 2: 0

## 2018-04-22 ASSESSMENT — LIFESTYLE VARIABLES: DO YOU DRINK ALCOHOL: NO

## 2018-04-22 NOTE — PROGRESS NOTES
Aox4. Flat affect. VS stable. Pain controlled with oxy 5. NPO since midnight. Dressing to BLE clean, dry and intact. Sleeping. No other concerns at this time.

## 2018-04-22 NOTE — PROGRESS NOTES
A&Ox4, flat affect. Pt reports anxiety at times. R PIV assessed, patent, no s/s of infection/infiltration,fluids running. L toe dressing, CDI. R lateral foot dressing CDI. Pt report mild pain in L toe, no need for medication at this time. POC discussed, including need to Ortho surgeon to come and assess pt, and to continue IV abx at this time, all questions and concerns were addressed. Pt drinking an voiding adequately. Last BM 04/19/18, pt decline stool softener despite education. Up self, tolerates well. All needs met at this time per pt.

## 2018-04-22 NOTE — CARE PLAN
Problem: Communication  Goal: The ability to communicate needs accurately and effectively will improve  Outcome: PROGRESSING AS EXPECTED  Educated on the plan of care for NPO midnight and possible surgery tomorrow. As well as dressing changes.    Problem: Pain Management  Goal: Pain level will decrease to patient's comfort goal  Outcome: PROGRESSING AS EXPECTED  Pain on BLE controlled with oxy 5 mg.

## 2018-04-22 NOTE — PROGRESS NOTES
"Pharmacy Kinetics 52 y.o. male on vancomycin day # 3   2018    Currently on Vancomycin 1,400mg iv q12hr (1100, 2300)     Indication for Treatment: SSTI     Pertinent history per medical record: Admitted on 2018 for left toe wound infection s/p L toe amputation secondary to osteomyelitis.      Other antibiotics: Ceftriaxone and Metronidazole      Allergies: Patient has no known allergies.      List concerns for renal function: Diabetes      Pertinent cultures to date:   18: PBCs x 2 = NGTD     Recent Labs      18   1008  18   0133  18   0159   WBC  4.9  4.5*  4.5*   NEUTSPOLYS  41.20*  38.20*  39.10*     Recent Labs      18   1008  18   0133  18   0159   BUN  13  17  14   CREATININE  0.80  0.88  0.81   ALBUMIN  3.5   --   3.2     Recent Labs      18   1033   VANCOTROUGH  17.8     Intake/Output Summary (Last 24 hours) at 18 1010  Last data filed at 18 1700   Gross per 24 hour   Intake             1879 ml   Output                0 ml   Net             1879 ml      Blood pressure 141/86, pulse 66, temperature 36.8 °C (98.3 °F), resp. rate 17, height 1.93 m (6' 3.98\"), weight 107.2 kg (236 lb 5.3 oz), SpO2 94 %. Temp (24hrs), Av.4 °C (97.6 °F), Min:36.1 °C (97 °F), Max:36.8 °C (98.3 °F)      A/P   1. Vancomycin dose change: not indicated, dose changed yesterday 18.   2. Next vancomycin level: Tomorrow,  at 1030 AM   3. Goal trough: 12 - 16 mcg/mL   4. Comments: Vancomycin trough level resulted yesterday morning at 17.8 mcg/mL which is above desired range as outlined above. Not quite a true trough level with previous dose administered ~ 1.5 hours late, however, patient still indicated for dose decrease to target lower end of goal range to reduce risk of adverse effects and associated toxicities. Peripheral blood cultures remain no growth to date. No other cultures currently in process - may need additional debridement possible TMA per " ID consult - awaiting ortho surgery consult. Renal indices are stable, trend downward in renal indices today.     Yue Scherer, PharmD

## 2018-04-22 NOTE — PROGRESS NOTES
[facility] Hospitalist Progress Note    Date of Service: 2018    Chief Complaint  52 y.o. male admitted 2018 with left 1st toe osteomyelitis    Interval Problem Update  none    Consultants/Specialty  I.D    Disposition  none        Review of Systems   Constitutional: Negative for diaphoresis and malaise/fatigue.   HENT: Negative for ear discharge, ear pain and nosebleeds.    Eyes: Negative for photophobia, pain and discharge.   Respiratory: Negative for sputum production, shortness of breath and wheezing.    Cardiovascular: Negative for orthopnea and claudication.   Gastrointestinal: Negative for abdominal pain, constipation and diarrhea.   Genitourinary: Negative for flank pain, frequency and hematuria.   Musculoskeletal: Positive for joint pain (on the 1st toe). Negative for back pain and neck pain.   Skin: Negative for itching and rash.   Neurological: Negative for tremors, sensory change, speech change and weakness.      Physical Exam  Laboratory/Imaging   Hemodynamics  Temp (24hrs), Av.6 °C (97.8 °F), Min:36.3 °C (97.3 °F), Max:36.8 °C (98.3 °F)   Temperature: 36.8 °C (98.3 °F)  Pulse  Av.4  Min: 51  Max: 111    Blood Pressure: 141/86      Respiratory      Respiration: 17, Pulse Oximetry: 94 %     Work Of Breathing / Effort: Mild  RUL Breath Sounds: Clear, RML Breath Sounds: Clear, RLL Breath Sounds: Diminished, LORENA Breath Sounds: Clear, LLL Breath Sounds: Diminished    Fluids    Intake/Output Summary (Last 24 hours) at 18 1142  Last data filed at 18 1700   Gross per 24 hour   Intake             1879 ml   Output                0 ml   Net             1879 ml       Nutrition  Orders Placed This Encounter   Procedures   • DIET ORDER     Standing Status:   Standing     Number of Occurrences:   1     Order Specific Question:   Diet:     Answer:   Diabetic [3]   • DIET NPO     Standing Status:   Standing     Number of Occurrences:   8     Order Specific Question:   Restrict to:     Answer:    Sips with Medications [3]     Physical Exam   Constitutional: He is oriented to person, place, and time. No distress.   HENT:   Right Ear: External ear normal.   Left Ear: External ear normal.   Eyes: Right eye exhibits no discharge. Left eye exhibits no discharge.   Neck: No tracheal deviation present. No thyromegaly present.   Cardiovascular: Normal heart sounds.    Pulmonary/Chest: No respiratory distress. He has no wheezes. He has no rales.   Abdominal: He exhibits no distension. There is no tenderness. There is no rebound.   Musculoskeletal:   Dressing looks clean,the amputation site on the 1st toe is noted   Neurological: He is alert and oriented to person, place, and time.   Skin: Skin is warm and dry. He is not diaphoretic.       Recent Labs      04/20/18   1008  04/21/18   0133  04/22/18   0159   WBC  4.9  4.5*  4.5*   RBC  4.09*  3.92*  4.15*   HEMOGLOBIN  12.3*  11.7*  12.3*   HEMATOCRIT  37.5*  36.3*  38.3*   MCV  91.7  92.6  92.3   MCH  30.1  29.8  29.6   MCHC  32.8*  32.2*  32.1*   RDW  43.0  43.9  44.8   PLATELETCT  162*  130*  129*   MPV  9.7  10.1  9.5     Recent Labs      04/20/18   1008  04/21/18   0133  04/22/18   0159   SODIUM  139  138  139   POTASSIUM  4.0  3.8  4.1   CHLORIDE  109  108  108   CO2  25  26  25   GLUCOSE  91  122*  105*   BUN  13  17  14   CREATININE  0.80  0.88  0.81   CALCIUM  8.7  8.5  8.6     Recent Labs      04/20/18   1008   APTT  29.6   INR  1.07                  Assessment/Plan     Diabetic foot ulcer (CMS-[facility])   Assessment & Plan    S/p amputation of the toe  I.D input is noted  Flagyl  Rocephin  Vancomycin  Will consult ortho/dr parada for further amputation  Foot xray is noted  D/w the pt about further amputation          Psychiatric disorder   Assessment & Plan    Pt stated that he feels bad that  He had to come back to the hospital  He denied any depression issues  He also denied any suicidal ideation  Will continue with home meds        Type 2 diabetes mellitus  (CMS-HCC)- (present on admission)   Assessment & Plan    S.s.insulin  hema1c 5.8          Quality-Core Measures   Olsen catheter::  No Olsen  DVT prophylaxis pharmacological::  Enoxaparin (Lovenox)

## 2018-04-22 NOTE — PROGRESS NOTES
Infectious Disease Progress Note    Author: Amanda Feng M.D. Date & Time of service: 2018  3:09 PM    Chief Complaint:  left foot osteomyelitis    Interval History:  52 y.o. WM admitted 2018 for left foot infection at amp site   AF irritable today   AF WBC 4.5 remains irritable-refused TMA  Labs Reviewed, Medications Reviewed, Radiology Reviewed and Wound Reviewed.    Review of Systems:  Review of Systems   All other systems reviewed and are negative.  limited by mood    Hemodynamics:  Temp (24hrs), Av.6 °C (97.8 °F), Min:36.3 °C (97.3 °F), Max:36.8 °C (98.3 °F)  Temperature: 36.8 °C (98.3 °F)  Pulse  Av.4  Min: 51  Max: 111   Blood Pressure: 141/86       Physical Exam:  Physical Exam   Constitutional: He appears well-developed. No distress.   HENT:   Head: Normocephalic and atraumatic.   Eyes: EOM are normal. Pupils are equal, round, and reactive to light.   Neck: Neck supple.   Cardiovascular: Normal rate.    Pulmonary/Chest: Effort normal and breath sounds normal.   Abdominal: Bowel sounds are normal.   Musculoskeletal: He exhibits edema.   Neurological: He is alert.   Skin: No rash noted.   Psychiatric:   Flat affect   Nursing note and vitals reviewed.      Meds:    Current Facility-Administered Medications:   •  vancomycin  •  benztropine  •  FLUoxetine  •  LORazepam  •  QUEtiapine  •  senna-docusate **AND** polyethylene glycol/lytes **AND** magnesium hydroxide **AND** bisacodyl  •  enoxaparin  •  acetaminophen  •  Notify provider if pain remains uncontrolled **AND** Use the numeric rating scale (NRS-11) on regular floors and Critical-Care Pain Observation Tool (CPOT) on ICUs/Trauma to assess pain **AND** Pulse Ox (Oximetry) **AND** Pharmacy Consult Request **AND** If patient difficult to arouse and/or has respiratory depression, stop any opiates that are currently infusing and call a Rapid Response. **AND** oxyCODONE immediate-release **AND** oxyCODONE immediate-release  **AND** HYDROmorphone  •  MD ALERT... vancomycin **AND** cefTRIAXone (ROCEPHIN) IV **AND** metroNIDAZOLE  •  insulin regular **AND** Accu-Chek ACHS **AND** NOTIFY MD and PharmD **AND** glucose 4 g **AND** dextrose 50%  •  ondansetron  •  ondansetron  •  promethazine  •  promethazine  •  prochlorperazine  •  QUEtiapine    Labs:  Recent Labs      04/20/18   1008  04/21/18   0133  04/22/18   0159   WBC  4.9  4.5*  4.5*   RBC  4.09*  3.92*  4.15*   HEMOGLOBIN  12.3*  11.7*  12.3*   HEMATOCRIT  37.5*  36.3*  38.3*   MCV  91.7  92.6  92.3   MCH  30.1  29.8  29.6   RDW  43.0  43.9  44.8   PLATELETCT  162*  130*  129*   MPV  9.7  10.1  9.5   NEUTSPOLYS  41.20*  38.20*  39.10*   LYMPHOCYTES  48.50*  51.40*  49.80*   MONOCYTES  6.70  6.20  5.90   EOSINOPHILS  2.00  2.70  3.70   BASOPHILS  1.00  1.30  1.30     Recent Labs      04/20/18   1008  04/21/18   0133  04/22/18   0159   SODIUM  139  138  139   POTASSIUM  4.0  3.8  4.1   CHLORIDE  109  108  108   CO2  25  26  25   GLUCOSE  91  122*  105*   BUN  13  17  14     Recent Labs      04/20/18   1008  04/21/18   0133  04/22/18   0159   ALBUMIN  3.5   --   3.2   TBILIRUBIN  0.5   --   0.4   ALKPHOSPHAT  92   --   81   TOTPROTEIN  6.7   --   6.3   ALTSGPT  18   --   14   ASTSGOT  18   --   14   CREATININE  0.80  0.88  0.81       Imaging:  Dx-chest-portable (1 View)    Result Date: 3/30/2018  3/30/2018 5:57 PM HISTORY/REASON FOR EXAM:  Shortness of breath. Sepsis. Left foot open wounds. Diabetes. TECHNIQUE/EXAM DESCRIPTION AND NUMBER OF VIEWS: Single portable view of the chest. COMPARISON:  None FINDINGS: The chest is slightly rotated. The heart is enlarged. There is prominence of the ascending aorta which may be accentuated by rotation. No lobar consolidation is present. No pleural effusion is noted.     1.  No acute cardiac or pulmonary abnormality is noted. 2.  Cardiomegaly. Prominence of the ascending aorta.    Dx-foot-2- Left    Result Date: 4/20/2018 4/20/2018 10:19 AM  HISTORY/REASON FOR EXAM:  Pain/Deformity Following Trauma Weeping great toe TECHNIQUE/EXAM DESCRIPTION AND NUMBER OF VIEWS: 2 nonweightbearing views of the LEFT foot. COMPARISON:  3/31/2018 FINDINGS: Patient is status post amputation of the first digit at the level of the proximal phalanx. There is minimal osseous irregularity at the level of the amputation. There is faint adjacent calcification which may be dystrophic.  There are mild degenerative changes of the first tarsometatarsal joint. No acute fracture or dislocation is seen. There is soft tissue swelling about the medial forefoot and remainder of the first digit.     Post surgical changes status post amputation at the level of the proximal phalanx of the first digit. Linear amorphous calcification adjacent to the amputation may be dystrophic. There is minimal osseous irregularity at the level of the amputation where it is difficult to exclude osteomyelitis. Soft tissue swelling of the forefoot and remainder of the first digit.     Dx-foot-complete 3+ Right    Result Date: 3/31/2018  3/31/2018 8:49 PM HISTORY/REASON FOR EXAM:  RIGHT great toe infection. TECHNIQUE/EXAM DESCRIPTION AND NUMBER OF VIEWS:  3 views of the  RIGHT foot. COMPARISON: None FINDINGS: MINERALIZATION: Mineralization is unremarkable for age. INJURY: No acute fracture or gross malalignment is seen. JOINTS: No erosive arthropathy is evident.     1.  No radiographic evidence of bony erosion or aggressive periosteal reaction. 2.  Soft tissue irregularity along the medial aspect of the great toe compatible with the provided clinical history of soft tissue wound    Dx-foot-complete 3+ Left    Result Date: 3/31/2018  3/31/2018 10:15 AM HISTORY/REASON FOR EXAM:  Evaluate for osteoarthritis TECHNIQUE/EXAM DESCRIPTION AND NUMBER OF VIEWS: 3 nonweightbearing views of the LEFT foot. COMPARISON:  3/30/2018 FINDINGS: There is an intra-articular fracture of the distal phalanx of the first digit. There is  lucency within the distal phalanx of the first digit with may be related to osteomyelitis. There is soft tissue swelling and soft tissue air. No dislocation is identified. Radiopaque densities may be external to the patient.     Fracture of the base of the distal phalanx of the first digit. Lucency within the distal phalanx could be related to osteomyelitis. Soft tissue swelling and soft tissue air is seen within the first digit. Soft tissue swelling extends into the foot.    Dx-toe(s) 2+ Left    Result Date: 3/30/2018  3/30/2018 5:57 PM HISTORY/REASON FOR EXAM:  Open left foot wounds. TECHNIQUE/EXAM DESCRIPTION AND NUMBER OF VIEWS:  3 views of the LEFT toes. COMPARISON: None FINDINGS: There is irregularity in the lateral aspect of the base of the first distal phalanx. There is extensive soft tissue edema with evidence of an overlying soft tissue plane in soft tissue air. Radiopaque densities may be external to the patient.     Cortical irregularity in the lateral base of the first distal phalanx with overlying soft tissue swelling and foci of air. Findings are highly suspicious for osteomyelitis. If clinically indicated, MRI may be helpful for confirmation.    Le Art/kaila    Result Date: 2018   Vascular Laboratory  Conclusions  Bilateral.  No evidence of arterial insufficiency.  YEOMANS, WILBERT  Age:    52    Gender:     M  MRN:    [id number]  :    1965      BSA:  Exam Date:     2018 12:33  Room #:     Inpatient  Priority:     Routine  Ht (in):             Wt (lb):  Ordering Physician:        NIR PIMENTEL  Referring Physician:       NIR PIMENTEL  Sonographer:               Maribel Roberts RVT  Study Type:                Limited Bilateral  Technical Quality:         Adequate  Indications:     Ulceration of LE  CPT Codes:       15651  ICD Codes:       L97  History:         Ulcerations of the bilateral great toes. Left great toe                   amputation 2018. No prior exam for comparison.   Limitations:                 RIGHT  Waveform            Systolic BPs (mmHg)                             124           Brachial  Triphasic                                Common Femoral  Triphasic                  168           Posterior Tibial  Triphasic                  140           Dorsalis Pedis                                           Peroneal                             1.32          CELIO                                           TBI                       LEFT  Waveform        Systolic BPs (mmHg)                             127           Brachial  Triphasic                                Common Femoral  Triphasic                  162           Posterior Tibial  Triphasic                  124           Dorsalis Pedis                                           Peroneal                             1.28          CELIO                                           TBI  Findings  Right.  There is no evidence of arterial disease demonstrated (CELIO is .9-1.0) on  the right side.  Doppler waveform of the common femoral and popliteal arteries are of high  amplitude and triphasic.  Doppler waveforms at the ankle are brisk and triphasic.  Left.  There is no evidence of arterial disease demonstrated (CELIO is .9-1.0) on  the left side.  Doppler waveform of the common femoral and popliteal arteries are of high  amplitude and triphasic.  Doppler waveforms at the ankle are brisk and triphasic.  Additional testing was not performed in accordance with lower extremity  arterial evaluation protocol.  Wilberto Damon M.D.  (Electronically Signed)  Final Date:      01 April 2018                   22:27      Micro:  Results     Procedure Component Value Units Date/Time    BLOOD CULTURE [[id number]] Collected:  04/20/18 1008    Order Status:  Completed Specimen:  Blood from Peripheral Updated:  04/21/18 0850     Significant Indicator NEG     Source BLD     Site PERIPHERAL     Blood Culture No Growth    Note: Blood cultures are incubated for 5  "days and  are monitored continuously.Positive blood cultures  are called to the RN and reported as soon as  they are identified.      Narrative:       1 of 2 for Blood Culture x 2 sites order. Per Hospital  Policy: Only change Specimen Src: to \"Line\" if specified by  physician order.    BLOOD CULTURE [[id number]] Collected:  04/20/18 1027    Order Status:  Completed Specimen:  Blood from Peripheral Updated:  04/21/18 0850     Significant Indicator NEG     Source BLD     Site PERIPHERAL     Blood Culture No Growth    Note: Blood cultures are incubated for 5 days and  are monitored continuously.Positive blood cultures  are called to the RN and reported as soon as  they are identified.      Narrative:       2 of 2 blood culture x2  Sites order. Per Hospital Policy:  Only change Specimen Src: to \"Line\" if specified by physician  order.          Assessment:  Active Hospital Problems    Diagnosis   • Psychiatric disorder [F99]   • Diabetic foot ulcer (CMS-HCC) [E11.621, L97.509]   • Status post amputation of great toe, left (CMS-HCC) [Z89.412]   • Type 2 diabetes mellitus (CMS-HCC) [E11.9]       Plan:  Left foot osteomyelitis  Afebrile  No leukocytosis   S/p left great amp through proximal phalanx on 4/1  Wound cx - group G and C strep, morganella , diphtheroids  Blood cxs neg  Likely will require additional debridement, possiblyTMA  Continue current abx for now  Not a candidate for outpatient treatment due to noncompliance     Leukopenia,  Monitor    DM2  HgA1c 5.8  Maintain BS less than 150    DW RN  "

## 2018-04-23 LAB
ALBUMIN SERPL BCP-MCNC: 3.2 G/DL (ref 3.2–4.9)
ALBUMIN/GLOB SERPL: 1 G/DL
ALP SERPL-CCNC: 68 U/L (ref 30–99)
ALT SERPL-CCNC: 11 U/L (ref 2–50)
ANION GAP SERPL CALC-SCNC: 7 MMOL/L (ref 0–11.9)
AST SERPL-CCNC: 15 U/L (ref 12–45)
BASOPHILS # BLD AUTO: 0.7 % (ref 0–1.8)
BASOPHILS # BLD: 0.03 K/UL (ref 0–0.12)
BILIRUB SERPL-MCNC: 0.4 MG/DL (ref 0.1–1.5)
BUN SERPL-MCNC: 16 MG/DL (ref 8–22)
CALCIUM SERPL-MCNC: 8.8 MG/DL (ref 8.5–10.5)
CHLORIDE SERPL-SCNC: 106 MMOL/L (ref 96–112)
CO2 SERPL-SCNC: 23 MMOL/L (ref 20–33)
CREAT SERPL-MCNC: 0.72 MG/DL (ref 0.5–1.4)
EKG IMPRESSION: NORMAL
EOSINOPHIL # BLD AUTO: 0.2 K/UL (ref 0–0.51)
EOSINOPHIL NFR BLD: 4.4 % (ref 0–6.9)
ERYTHROCYTE [DISTWIDTH] IN BLOOD BY AUTOMATED COUNT: 42.2 FL (ref 35.9–50)
GLOBULIN SER CALC-MCNC: 3.2 G/DL (ref 1.9–3.5)
GLUCOSE BLD-MCNC: 115 MG/DL (ref 65–99)
GLUCOSE BLD-MCNC: 117 MG/DL (ref 65–99)
GLUCOSE BLD-MCNC: 90 MG/DL (ref 65–99)
GLUCOSE BLD-MCNC: 96 MG/DL (ref 65–99)
GLUCOSE SERPL-MCNC: 116 MG/DL (ref 65–99)
HCT VFR BLD AUTO: 37.9 % (ref 42–52)
HGB BLD-MCNC: 12.5 G/DL (ref 14–18)
IMM GRANULOCYTES # BLD AUTO: 0.01 K/UL (ref 0–0.11)
IMM GRANULOCYTES NFR BLD AUTO: 0.2 % (ref 0–0.9)
LYMPHOCYTES # BLD AUTO: 2.21 K/UL (ref 1–4.8)
LYMPHOCYTES NFR BLD: 48.7 % (ref 22–41)
MCH RBC QN AUTO: 30 PG (ref 27–33)
MCHC RBC AUTO-ENTMCNC: 33 G/DL (ref 33.7–35.3)
MCV RBC AUTO: 90.9 FL (ref 81.4–97.8)
MONOCYTES # BLD AUTO: 0.32 K/UL (ref 0–0.85)
MONOCYTES NFR BLD AUTO: 7 % (ref 0–13.4)
NEUTROPHILS # BLD AUTO: 1.77 K/UL (ref 1.82–7.42)
NEUTROPHILS NFR BLD: 39 % (ref 44–72)
NRBC # BLD AUTO: 0 K/UL
NRBC BLD-RTO: 0 /100 WBC
PLATELET # BLD AUTO: 135 K/UL (ref 164–446)
PMV BLD AUTO: 9.7 FL (ref 9–12.9)
POTASSIUM SERPL-SCNC: 3.9 MMOL/L (ref 3.6–5.5)
PROT SERPL-MCNC: 6.4 G/DL (ref 6–8.2)
RBC # BLD AUTO: 4.17 M/UL (ref 4.7–6.1)
SODIUM SERPL-SCNC: 136 MMOL/L (ref 135–145)
VANCOMYCIN TROUGH SERPL-MCNC: 16.2 UG/ML (ref 10–20)
WBC # BLD AUTO: 4.5 K/UL (ref 4.8–10.8)

## 2018-04-23 PROCEDURE — 93010 ELECTROCARDIOGRAM REPORT: CPT | Performed by: INTERNAL MEDICINE

## 2018-04-23 PROCEDURE — A9270 NON-COVERED ITEM OR SERVICE: HCPCS

## 2018-04-23 PROCEDURE — 0Y6Q0Z0 DETACHMENT AT LEFT 1ST TOE, COMPLETE, OPEN APPROACH: ICD-10-PCS | Performed by: ORTHOPAEDIC SURGERY

## 2018-04-23 PROCEDURE — 160035 HCHG PACU - 1ST 60 MINS PHASE I: Performed by: ORTHOPAEDIC SURGERY

## 2018-04-23 PROCEDURE — 700105 HCHG RX REV CODE 258: Performed by: FAMILY MEDICINE

## 2018-04-23 PROCEDURE — 82962 GLUCOSE BLOOD TEST: CPT | Mod: 91

## 2018-04-23 PROCEDURE — 500881 HCHG PACK, EXTREMITY: Performed by: ORTHOPAEDIC SURGERY

## 2018-04-23 PROCEDURE — 160048 HCHG OR STATISTICAL LEVEL 1-5: Performed by: ORTHOPAEDIC SURGERY

## 2018-04-23 PROCEDURE — 160009 HCHG ANES TIME/MIN: Performed by: ORTHOPAEDIC SURGERY

## 2018-04-23 PROCEDURE — 700111 HCHG RX REV CODE 636 W/ 250 OVERRIDE (IP): Performed by: FAMILY MEDICINE

## 2018-04-23 PROCEDURE — 85025 COMPLETE CBC W/AUTO DIFF WBC: CPT

## 2018-04-23 PROCEDURE — 770006 HCHG ROOM/CARE - MED/SURG/GYN SEMI*

## 2018-04-23 PROCEDURE — 160002 HCHG RECOVERY MINUTES (STAT): Performed by: ORTHOPAEDIC SURGERY

## 2018-04-23 PROCEDURE — 87070 CULTURE OTHR SPECIMN AEROBIC: CPT

## 2018-04-23 PROCEDURE — A6223 GAUZE >16<=48 NO W/SAL W/O B: HCPCS | Performed by: ORTHOPAEDIC SURGERY

## 2018-04-23 PROCEDURE — 0JBR0ZZ EXCISION OF LEFT FOOT SUBCUTANEOUS TISSUE AND FASCIA, OPEN APPROACH: ICD-10-PCS | Performed by: ORTHOPAEDIC SURGERY

## 2018-04-23 PROCEDURE — A9270 NON-COVERED ITEM OR SERVICE: HCPCS | Performed by: HOSPITALIST

## 2018-04-23 PROCEDURE — 80202 ASSAY OF VANCOMYCIN: CPT

## 2018-04-23 PROCEDURE — 700111 HCHG RX REV CODE 636 W/ 250 OVERRIDE (IP): Performed by: HOSPITALIST

## 2018-04-23 PROCEDURE — 160039 HCHG SURGERY MINUTES - EA ADDL 1 MIN LEVEL 3: Performed by: ORTHOPAEDIC SURGERY

## 2018-04-23 PROCEDURE — 87015 SPECIMEN INFECT AGNT CONCNTJ: CPT

## 2018-04-23 PROCEDURE — 501838 HCHG SUTURE GENERAL: Performed by: ORTHOPAEDIC SURGERY

## 2018-04-23 PROCEDURE — 700101 HCHG RX REV CODE 250

## 2018-04-23 PROCEDURE — 93005 ELECTROCARDIOGRAM TRACING: CPT | Performed by: ORTHOPAEDIC SURGERY

## 2018-04-23 PROCEDURE — 99232 SBSQ HOSP IP/OBS MODERATE 35: CPT | Performed by: FAMILY MEDICINE

## 2018-04-23 PROCEDURE — 500423 HCHG DRESSING, ABD COMBINE: Performed by: ORTHOPAEDIC SURGERY

## 2018-04-23 PROCEDURE — 700102 HCHG RX REV CODE 250 W/ 637 OVERRIDE(OP)

## 2018-04-23 PROCEDURE — 36415 COLL VENOUS BLD VENIPUNCTURE: CPT

## 2018-04-23 PROCEDURE — 160028 HCHG SURGERY MINUTES - 1ST 30 MINS LEVEL 3: Performed by: ORTHOPAEDIC SURGERY

## 2018-04-23 PROCEDURE — 700111 HCHG RX REV CODE 636 W/ 250 OVERRIDE (IP)

## 2018-04-23 PROCEDURE — 87205 SMEAR GRAM STAIN: CPT

## 2018-04-23 PROCEDURE — 87075 CULTR BACTERIA EXCEPT BLOOD: CPT

## 2018-04-23 PROCEDURE — 700102 HCHG RX REV CODE 250 W/ 637 OVERRIDE(OP): Performed by: HOSPITALIST

## 2018-04-23 PROCEDURE — 80053 COMPREHEN METABOLIC PANEL: CPT

## 2018-04-23 RX ORDER — LIDOCAINE HYDROCHLORIDE 10 MG/ML
INJECTION, SOLUTION INFILTRATION; PERINEURAL
Status: DISCONTINUED | OUTPATIENT
Start: 2018-04-23 | End: 2018-04-23 | Stop reason: HOSPADM

## 2018-04-23 RX ORDER — OXYCODONE HCL 5 MG/5 ML
SOLUTION, ORAL ORAL
Status: COMPLETED
Start: 2018-04-23 | End: 2018-04-23

## 2018-04-23 RX ORDER — BUPIVACAINE HYDROCHLORIDE 5 MG/ML
INJECTION, SOLUTION EPIDURAL; INTRACAUDAL
Status: DISCONTINUED | OUTPATIENT
Start: 2018-04-23 | End: 2018-04-23 | Stop reason: HOSPADM

## 2018-04-23 RX ORDER — VANCOMYCIN HYDROCHLORIDE 500 MG/10ML
INJECTION, POWDER, LYOPHILIZED, FOR SOLUTION INTRAVENOUS
Status: DISCONTINUED | OUTPATIENT
Start: 2018-04-23 | End: 2018-04-23 | Stop reason: HOSPADM

## 2018-04-23 RX ADMIN — VANCOMYCIN HYDROCHLORIDE 1400 MG: 100 INJECTION, POWDER, LYOPHILIZED, FOR SOLUTION INTRAVENOUS at 11:42

## 2018-04-23 RX ADMIN — OXYCODONE HYDROCHLORIDE 5 MG: 5 TABLET ORAL at 22:09

## 2018-04-23 RX ADMIN — OXYCODONE HYDROCHLORIDE 5 MG: 5 TABLET ORAL at 08:47

## 2018-04-23 RX ADMIN — QUETIAPINE FUMARATE 200 MG: 100 TABLET ORAL at 21:54

## 2018-04-23 RX ADMIN — OXYCODONE HYDROCHLORIDE 10 MG: 5 SOLUTION ORAL at 19:40

## 2018-04-23 RX ADMIN — VANCOMYCIN HYDROCHLORIDE 1300 MG: 100 INJECTION, POWDER, LYOPHILIZED, FOR SOLUTION INTRAVENOUS at 22:49

## 2018-04-23 RX ADMIN — ENOXAPARIN SODIUM 40 MG: 100 INJECTION SUBCUTANEOUS at 21:54

## 2018-04-23 RX ADMIN — OXYCODONE HYDROCHLORIDE 5 MG: 5 TABLET ORAL at 05:41

## 2018-04-23 RX ADMIN — CEFTRIAXONE 2 G: 2 INJECTION, POWDER, FOR SOLUTION INTRAMUSCULAR; INTRAVENOUS at 08:23

## 2018-04-23 RX ADMIN — METRONIDAZOLE 500 MG: 500 TABLET ORAL at 05:41

## 2018-04-23 RX ADMIN — BENZTROPINE MESYLATE 1 MG: 1 TABLET ORAL at 21:54

## 2018-04-23 RX ADMIN — METRONIDAZOLE 500 MG: 500 TABLET ORAL at 21:54

## 2018-04-23 ASSESSMENT — ENCOUNTER SYMPTOMS
FEVER: 0
NECK PAIN: 0
CHILLS: 0
VOMITING: 0
NAUSEA: 0
TINGLING: 1
DIARRHEA: 0
MYALGIAS: 1
DIZZINESS: 0
SPUTUM PRODUCTION: 0
PALPITATIONS: 0
BLURRED VISION: 0
PHOTOPHOBIA: 0
BACK PAIN: 0
DOUBLE VISION: 0
TINGLING: 0
COUGH: 0
HEMOPTYSIS: 0
ABDOMINAL PAIN: 0
HEARTBURN: 0
ORTHOPNEA: 0
WEIGHT LOSS: 0
HEADACHES: 0

## 2018-04-23 ASSESSMENT — PAIN SCALES - GENERAL
PAINLEVEL_OUTOF10: 0
PAINLEVEL_OUTOF10: 5
PAINLEVEL_OUTOF10: 0
PAINLEVEL_OUTOF10: 7

## 2018-04-23 NOTE — THERAPY
OT orders received. Per RN, pt is up self and currently does not have any acute OT needs. OT eval not completed at this time.

## 2018-04-23 NOTE — PROGRESS NOTES
[facility]    Date of Service: 2018    Chief Complaint  52 y.o. male admitted 2018 with left 1st toe osteomyelitis    Interval Problem Update  none    Consultants/Specialty  I.D  Ortho    Disposition  none        Review of Systems   Constitutional: Negative for chills, fever and weight loss.   HENT: Negative for ear pain, hearing loss and tinnitus.    Eyes: Negative for blurred vision, double vision and photophobia.   Respiratory: Negative for cough, hemoptysis and sputum production.    Cardiovascular: Negative for chest pain, palpitations and orthopnea.   Gastrointestinal: Negative for heartburn, nausea and vomiting.   Genitourinary: Negative for dysuria, frequency and urgency.   Musculoskeletal: Negative for back pain, joint pain (on the 1st toe) and neck pain.   Skin: Negative for itching and rash.   Neurological: Negative for dizziness, tingling and headaches.      Physical Exam  Laboratory/Imaging   Hemodynamics  Temp (24hrs), Av.8 °C (98.2 °F), Min:36.6 °C (97.8 °F), Max:37.1 °C (98.8 °F)   Temperature: 36.6 °C (97.9 °F)  Pulse  Av.5  Min: 51  Max: 111    Blood Pressure: 126/75      Respiratory      Respiration: 16, Pulse Oximetry: 93 %     Work Of Breathing / Effort: Mild  RUL Breath Sounds: Clear, RML Breath Sounds: Clear, RLL Breath Sounds: Diminished, LORENA Breath Sounds: Clear, LLL Breath Sounds: Diminished    Fluids  No intake or output data in the 24 hours ending 18 1229    Nutrition  Orders Placed This Encounter   Procedures   • DIET NPO     Standing Status:   Standing     Number of Occurrences:   8     Order Specific Question:   Restrict to:     Answer:   Sips with Medications [3]     Physical Exam   Constitutional: He is oriented to person, place, and time. He appears well-developed and well-nourished.   HENT:   Head: Normocephalic and atraumatic.   Eyes: Conjunctivae are normal. Pupils are equal, round, and reactive to light.   Neck: Normal range of  motion. Neck supple.   Cardiovascular: Normal rate and regular rhythm.    Pulmonary/Chest: Effort normal and breath sounds normal.   Abdominal: Soft. Bowel sounds are normal.   Musculoskeletal: He exhibits no tenderness.   Wound looks clean on the left  1st toe   Neurological: He is alert and oriented to person, place, and time.   Skin: Skin is warm and dry.       Recent Labs      04/21/18 0133  04/22/18   0159  04/23/18   0118   WBC  4.5*  4.5*  4.5*   RBC  3.92*  4.15*  4.17*   HEMOGLOBIN  11.7*  12.3*  12.5*   HEMATOCRIT  36.3*  38.3*  37.9*   MCV  92.6  92.3  90.9   MCH  29.8  29.6  30.0   MCHC  32.2*  32.1*  33.0*   RDW  43.9  44.8  42.2   PLATELETCT  130*  129*  135*   MPV  10.1  9.5  9.7     Recent Labs      04/21/18 0133 04/22/18   0159  04/23/18   0118   SODIUM  138  139  136   POTASSIUM  3.8  4.1  3.9   CHLORIDE  108  108  106   CO2  26  25  23   GLUCOSE  122*  105*  116*   BUN  17  14  16   CREATININE  0.88  0.81  0.72   CALCIUM  8.5  8.6  8.8                      Assessment/Plan     Diabetic foot ulcer (CMS-HCC)   Assessment & Plan    S/p amputation of the toe  I.D input is noted  Flagyl  Rocephin  Vancomycin  Called ortho/dr parada for further amputation  Foot xray is noted  D/w the pt about further amputation this morning  As per I.D pt is not a candidate for outpt IV therapy          Psychiatric disorder   Assessment & Plan    Pt stated that he feels bad that  He had to come back to the hospital  He denied any depression issues  He also denied any suicidal ideation  Will continue with home meds        Type 2 diabetes mellitus (CMS-HCC)- (present on admission)   Assessment & Plan    S.s.insulin  hema1c 5.8          Quality-Core Measures   Olsen catheter::  No Olsen  DVT prophylaxis pharmacological::  Enoxaparin (Lovenox)

## 2018-04-23 NOTE — PROGRESS NOTES
Infectious Disease Progress Note    Author: Juliane Vaughn M.D. Date & Time of service: 2018  10:19 AM    Chief Complaint:  left foot osteomyelitis    Interval History:  52 y.o. WM admitted 2018 for left foot infection at amp site   AF irritable today   AF WBC 4.5 remains irritable-refused TMA   AFWBC 4.5, has tingling in his left foot, states he is going to surgery but does not appear to be scheduled in [facility]  Labs Reviewed, Medications Reviewed, Radiology Reviewed and Wound Reviewed.    Review of Systems:  Review of Systems   Constitutional: Negative for chills and fever.   Gastrointestinal: Negative for abdominal pain, diarrhea, nausea and vomiting.   Musculoskeletal: Positive for myalgias.   Neurological: Positive for tingling.   All other systems reviewed and are negative.      Hemodynamics:  Temp (24hrs), Av.8 °C (98.2 °F), Min:36.6 °C (97.8 °F), Max:37.1 °C (98.8 °F)  Temperature: 36.6 °C (97.9 °F)  Pulse  Av.5  Min: 51  Max: 111   Blood Pressure: 126/75       Physical Exam:  Physical Exam   Constitutional: He appears well-developed. No distress.   HENT:   Head: Normocephalic and atraumatic.   Eyes: EOM are normal. Pupils are equal, round, and reactive to light.   Neck: Neck supple.   Cardiovascular: Normal rate.    Pulmonary/Chest: Effort normal and breath sounds normal.   Abdominal: Bowel sounds are normal.   Musculoskeletal: He exhibits edema.   Neurological: He is alert.   Skin: No rash noted.   Psychiatric:   Flat affect   Nursing note and vitals reviewed.      Meds:    Current Facility-Administered Medications:   •  vancomycin  •  benztropine  •  FLUoxetine  •  LORazepam  •  QUEtiapine  •  senna-docusate **AND** polyethylene glycol/lytes **AND** magnesium hydroxide **AND** bisacodyl  •  enoxaparin  •  acetaminophen  •  Notify provider if pain remains uncontrolled **AND** Use the numeric rating scale (NRS-11) on regular floors and Critical-Care Pain Observation Tool  (CPOT) on ICUs/Trauma to assess pain **AND** Pulse Ox (Oximetry) **AND** Pharmacy Consult Request **AND** If patient difficult to arouse and/or has respiratory depression, stop any opiates that are currently infusing and call a Rapid Response. **AND** oxyCODONE immediate-release **AND** oxyCODONE immediate-release **AND** HYDROmorphone  •  MD ALERT... vancomycin **AND** cefTRIAXone (ROCEPHIN) IV **AND** metroNIDAZOLE  •  insulin regular **AND** Accu-Chek ACHS **AND** NOTIFY MD and PharmD **AND** glucose 4 g **AND** dextrose 50%  •  ondansetron  •  ondansetron  •  promethazine  •  promethazine  •  prochlorperazine  •  QUEtiapine    Labs:  Recent Labs      04/21/18 0133 04/22/18 0159 04/23/18   0118   WBC  4.5*  4.5*  4.5*   RBC  3.92*  4.15*  4.17*   HEMOGLOBIN  11.7*  12.3*  12.5*   HEMATOCRIT  36.3*  38.3*  37.9*   MCV  92.6  92.3  90.9   MCH  29.8  29.6  30.0   RDW  43.9  44.8  42.2   PLATELETCT  130*  129*  135*   MPV  10.1  9.5  9.7   NEUTSPOLYS  38.20*  39.10*  39.00*   LYMPHOCYTES  51.40*  49.80*  48.70*   MONOCYTES  6.20  5.90  7.00   EOSINOPHILS  2.70  3.70  4.40   BASOPHILS  1.30  1.30  0.70     Recent Labs      04/21/18 0133 04/22/18   0159 04/23/18   0118   SODIUM  138  139  136   POTASSIUM  3.8  4.1  3.9   CHLORIDE  108  108  106   CO2  26  25  23   GLUCOSE  122*  105*  116*   BUN  17  14  16     Recent Labs      04/21/18 0133 04/22/18   0159 04/23/18   0118   ALBUMIN   --   3.2  3.2   TBILIRUBIN   --   0.4  0.4   ALKPHOSPHAT   --   81  68   TOTPROTEIN   --   6.3  6.4   ALTSGPT   --   14  11   ASTSGOT   --   14  15   CREATININE  0.88  0.81  0.72       Imaging:  Dx-chest-portable (1 View)    Result Date: 3/30/2018  3/30/2018 5:57 PM HISTORY/REASON FOR EXAM:  Shortness of breath. Sepsis. Left foot open wounds. Diabetes. TECHNIQUE/EXAM DESCRIPTION AND NUMBER OF VIEWS: Single portable view of the chest. COMPARISON:  None FINDINGS: The chest is slightly rotated. The heart is enlarged. There is  prominence of the ascending aorta which may be accentuated by rotation. No lobar consolidation is present. No pleural effusion is noted.     1.  No acute cardiac or pulmonary abnormality is noted. 2.  Cardiomegaly. Prominence of the ascending aorta.    Dx-foot-2- Left    Result Date: 4/20/2018 4/20/2018 10:19 AM HISTORY/REASON FOR EXAM:  Pain/Deformity Following Trauma Weeping great toe TECHNIQUE/EXAM DESCRIPTION AND NUMBER OF VIEWS: 2 nonweightbearing views of the LEFT foot. COMPARISON:  3/31/2018 FINDINGS: Patient is status post amputation of the first digit at the level of the proximal phalanx. There is minimal osseous irregularity at the level of the amputation. There is faint adjacent calcification which may be dystrophic.  There are mild degenerative changes of the first tarsometatarsal joint. No acute fracture or dislocation is seen. There is soft tissue swelling about the medial forefoot and remainder of the first digit.     Post surgical changes status post amputation at the level of the proximal phalanx of the first digit. Linear amorphous calcification adjacent to the amputation may be dystrophic. There is minimal osseous irregularity at the level of the amputation where it is difficult to exclude osteomyelitis. Soft tissue swelling of the forefoot and remainder of the first digit.     Dx-foot-complete 3+ Right    Result Date: 3/31/2018  3/31/2018 8:49 PM HISTORY/REASON FOR EXAM:  RIGHT great toe infection. TECHNIQUE/EXAM DESCRIPTION AND NUMBER OF VIEWS:  3 views of the  RIGHT foot. COMPARISON: None FINDINGS: MINERALIZATION: Mineralization is unremarkable for age. INJURY: No acute fracture or gross malalignment is seen. JOINTS: No erosive arthropathy is evident.     1.  No radiographic evidence of bony erosion or aggressive periosteal reaction. 2.  Soft tissue irregularity along the medial aspect of the great toe compatible with the provided clinical history of soft tissue wound    Dx-foot-complete 3+  Left    Result Date: 3/31/2018  3/31/2018 10:15 AM HISTORY/REASON FOR EXAM:  Evaluate for osteoarthritis TECHNIQUE/EXAM DESCRIPTION AND NUMBER OF VIEWS: 3 nonweightbearing views of the LEFT foot. COMPARISON:  3/30/2018 FINDINGS: There is an intra-articular fracture of the distal phalanx of the first digit. There is lucency within the distal phalanx of the first digit with may be related to osteomyelitis. There is soft tissue swelling and soft tissue air. No dislocation is identified. Radiopaque densities may be external to the patient.     Fracture of the base of the distal phalanx of the first digit. Lucency within the distal phalanx could be related to osteomyelitis. Soft tissue swelling and soft tissue air is seen within the first digit. Soft tissue swelling extends into the foot.    Dx-toe(s) 2+ Left    Result Date: 3/30/2018  3/30/2018 5:57 PM HISTORY/REASON FOR EXAM:  Open left foot wounds. TECHNIQUE/EXAM DESCRIPTION AND NUMBER OF VIEWS:  3 views of the LEFT toes. COMPARISON: None FINDINGS: There is irregularity in the lateral aspect of the base of the first distal phalanx. There is extensive soft tissue edema with evidence of an overlying soft tissue plane in soft tissue air. Radiopaque densities may be external to the patient.     Cortical irregularity in the lateral base of the first distal phalanx with overlying soft tissue swelling and foci of air. Findings are highly suspicious for osteomyelitis. If clinically indicated, MRI may be helpful for confirmation.    Le Art/kaila    Result Date: 2018   Vascular Laboratory  Conclusions  Bilateral.  No evidence of arterial insufficiency.  YEOMANS, WILBERT  Age:    52    Gender:     M  MRN:    [id number]  :    1965      BSA:  Exam Date:     2018 12:33  Room #:     Inpatient  Priority:     Routine  Ht (in):             Wt (lb):  Ordering Physician:        NIR PIMENTEL  Referring Physician:       NIR PIMENTEL  Sonographer:               Maribel  EPIFANIO Roberts  Study Type:                Limited Bilateral  Technical Quality:         Adequate  Indications:     Ulceration of LE  CPT Codes:       99114  ICD Codes:       L97  History:         Ulcerations of the bilateral great toes. Left great toe                   amputation 4/1/2018. No prior exam for comparison.  Limitations:                 RIGHT  Waveform            Systolic BPs (mmHg)                             124           Brachial  Triphasic                                Common Femoral  Triphasic                  168           Posterior Tibial  Triphasic                  140           Dorsalis Pedis                                           Peroneal                             1.32          CELIO                                           TBI                       LEFT  Waveform        Systolic BPs (mmHg)                             127           Brachial  Triphasic                                Common Femoral  Triphasic                  162           Posterior Tibial  Triphasic                  124           Dorsalis Pedis                                           Peroneal                             1.28          CELIO                                           TBI  Findings  Right.  There is no evidence of arterial disease demonstrated (CELIO is .9-1.0) on  the right side.  Doppler waveform of the common femoral and popliteal arteries are of high  amplitude and triphasic.  Doppler waveforms at the ankle are brisk and triphasic.  Left.  There is no evidence of arterial disease demonstrated (CELIO is .9-1.0) on  the left side.  Doppler waveform of the common femoral and popliteal arteries are of high  amplitude and triphasic.  Doppler waveforms at the ankle are brisk and triphasic.  Additional testing was not performed in accordance with lower extremity  arterial evaluation protocol.  Wilberto Damon M.D.  (Electronically Signed)  Final Date:      01 April 2018                   22:27      Micro:  Results      "Procedure Component Value Units Date/Time    BLOOD CULTURE [[id number]] Collected:  04/20/18 1008    Order Status:  Completed Specimen:  Blood from Peripheral Updated:  04/21/18 0850     Significant Indicator NEG     Source BLD     Site PERIPHERAL     Blood Culture No Growth    Note: Blood cultures are incubated for 5 days and  are monitored continuously.Positive blood cultures  are called to the RN and reported as soon as  they are identified.      Narrative:       1 of 2 for Blood Culture x 2 sites order. Per Hospital  Policy: Only change Specimen Src: to \"Line\" if specified by  physician order.    BLOOD CULTURE [[id number]] Collected:  04/20/18 1027    Order Status:  Completed Specimen:  Blood from Peripheral Updated:  04/21/18 0850     Significant Indicator NEG     Source BLD     Site PERIPHERAL     Blood Culture No Growth    Note: Blood cultures are incubated for 5 days and  are monitored continuously.Positive blood cultures  are called to the RN and reported as soon as  they are identified.      Narrative:       2 of 2 blood culture x2  Sites order. Per Hospital Policy:  Only change Specimen Src: to \"Line\" if specified by physician  order.          Assessment:  Active Hospital Problems    Diagnosis   • Psychiatric disorder [F99]   • Diabetic foot ulcer (CMS-HCC) [E11.621, L97.509]   • Status post amputation of great toe, left (CMS-HCC) [Z89.412]   • Type 2 diabetes mellitus (CMS-HCC) [E11.9]       Plan:  Left foot osteomyelitis  Afebrile  No leukocytosis   S/p left great amp through proximal phalanx on 4/1  Wound cx - group G and C strep, morganella , diphtheroids  Blood cxs neg  Likely will require additional debridement, possibly TMA ? TBD  Continue current abx for now  Not a candidate for outpatient treatment due to noncompliance     Leukopenia  Monitor    DM2  HgA1c 5.8  Maintain BS less than 150    DW RN  "

## 2018-04-23 NOTE — PROGRESS NOTES
Flat affect. Irritable at times especially when educated on diabetes. Pain on right foot medicated with oxy 5. Up to the bathroom independently. Dressing to BLE clean, dry, and intact. NPO midnight. Sleeping.

## 2018-04-23 NOTE — THERAPY
PT eval not complete. Per RN and chart, pt is up self in room. Should he undergo amputation, please re-order. As of now, pt does not have skilled need and order will be DC'd.

## 2018-04-24 LAB
ALBUMIN SERPL BCP-MCNC: 3.4 G/DL (ref 3.2–4.9)
ALBUMIN/GLOB SERPL: 1.1 G/DL
ALP SERPL-CCNC: 78 U/L (ref 30–99)
ALT SERPL-CCNC: 13 U/L (ref 2–50)
ANION GAP SERPL CALC-SCNC: 7 MMOL/L (ref 0–11.9)
AST SERPL-CCNC: 15 U/L (ref 12–45)
BASOPHILS # BLD AUTO: 0.7 % (ref 0–1.8)
BASOPHILS # BLD: 0.03 K/UL (ref 0–0.12)
BILIRUB SERPL-MCNC: 0.5 MG/DL (ref 0.1–1.5)
BUN SERPL-MCNC: 15 MG/DL (ref 8–22)
CALCIUM SERPL-MCNC: 8.7 MG/DL (ref 8.5–10.5)
CHLORIDE SERPL-SCNC: 106 MMOL/L (ref 96–112)
CO2 SERPL-SCNC: 25 MMOL/L (ref 20–33)
CREAT SERPL-MCNC: 0.86 MG/DL (ref 0.5–1.4)
EOSINOPHIL # BLD AUTO: 0.09 K/UL (ref 0–0.51)
EOSINOPHIL NFR BLD: 2.1 % (ref 0–6.9)
ERYTHROCYTE [DISTWIDTH] IN BLOOD BY AUTOMATED COUNT: 41.5 FL (ref 35.9–50)
GLOBULIN SER CALC-MCNC: 3.1 G/DL (ref 1.9–3.5)
GLUCOSE BLD-MCNC: 118 MG/DL (ref 65–99)
GLUCOSE BLD-MCNC: 122 MG/DL (ref 65–99)
GLUCOSE BLD-MCNC: 150 MG/DL (ref 65–99)
GLUCOSE BLD-MCNC: 197 MG/DL (ref 65–99)
GLUCOSE SERPL-MCNC: 109 MG/DL (ref 65–99)
GRAM STN SPEC: NORMAL
HCT VFR BLD AUTO: 35.7 % (ref 42–52)
HGB BLD-MCNC: 12.2 G/DL (ref 14–18)
IMM GRANULOCYTES # BLD AUTO: 0.01 K/UL (ref 0–0.11)
IMM GRANULOCYTES NFR BLD AUTO: 0.2 % (ref 0–0.9)
LYMPHOCYTES # BLD AUTO: 1.79 K/UL (ref 1–4.8)
LYMPHOCYTES NFR BLD: 42.4 % (ref 22–41)
MCH RBC QN AUTO: 30.3 PG (ref 27–33)
MCHC RBC AUTO-ENTMCNC: 34.2 G/DL (ref 33.7–35.3)
MCV RBC AUTO: 88.8 FL (ref 81.4–97.8)
MONOCYTES # BLD AUTO: 0.32 K/UL (ref 0–0.85)
MONOCYTES NFR BLD AUTO: 7.6 % (ref 0–13.4)
NEUTROPHILS # BLD AUTO: 1.98 K/UL (ref 1.82–7.42)
NEUTROPHILS NFR BLD: 47 % (ref 44–72)
NRBC # BLD AUTO: 0 K/UL
NRBC BLD-RTO: 0 /100 WBC
PLATELET # BLD AUTO: 139 K/UL (ref 164–446)
PMV BLD AUTO: 10.2 FL (ref 9–12.9)
POTASSIUM SERPL-SCNC: 3.7 MMOL/L (ref 3.6–5.5)
PROT SERPL-MCNC: 6.5 G/DL (ref 6–8.2)
RBC # BLD AUTO: 4.02 M/UL (ref 4.7–6.1)
SIGNIFICANT IND 70042: NORMAL
SITE SITE: NORMAL
SODIUM SERPL-SCNC: 138 MMOL/L (ref 135–145)
SOURCE SOURCE: NORMAL
VANCOMYCIN TROUGH SERPL-MCNC: 17.9 UG/ML (ref 10–20)
WBC # BLD AUTO: 4.2 K/UL (ref 4.8–10.8)

## 2018-04-24 PROCEDURE — A9270 NON-COVERED ITEM OR SERVICE: HCPCS | Performed by: HOSPITALIST

## 2018-04-24 PROCEDURE — 770006 HCHG ROOM/CARE - MED/SURG/GYN SEMI*

## 2018-04-24 PROCEDURE — 700105 HCHG RX REV CODE 258: Performed by: HOSPITALIST

## 2018-04-24 PROCEDURE — 85025 COMPLETE CBC W/AUTO DIFF WBC: CPT

## 2018-04-24 PROCEDURE — 36415 COLL VENOUS BLD VENIPUNCTURE: CPT

## 2018-04-24 PROCEDURE — 700102 HCHG RX REV CODE 250 W/ 637 OVERRIDE(OP): Performed by: HOSPITALIST

## 2018-04-24 PROCEDURE — 80053 COMPREHEN METABOLIC PANEL: CPT

## 2018-04-24 PROCEDURE — 700111 HCHG RX REV CODE 636 W/ 250 OVERRIDE (IP): Performed by: HOSPITALIST

## 2018-04-24 PROCEDURE — 82962 GLUCOSE BLOOD TEST: CPT

## 2018-04-24 PROCEDURE — 80202 ASSAY OF VANCOMYCIN: CPT

## 2018-04-24 RX ADMIN — OXYCODONE HYDROCHLORIDE 5 MG: 5 TABLET ORAL at 21:42

## 2018-04-24 RX ADMIN — BENZTROPINE MESYLATE 1 MG: 1 TABLET ORAL at 08:37

## 2018-04-24 RX ADMIN — CEFTRIAXONE 2 G: 2 INJECTION, POWDER, FOR SOLUTION INTRAMUSCULAR; INTRAVENOUS at 09:34

## 2018-04-24 RX ADMIN — QUETIAPINE FUMARATE 100 MG: 100 TABLET ORAL at 08:39

## 2018-04-24 RX ADMIN — QUETIAPINE FUMARATE 100 MG: 100 TABLET ORAL at 14:10

## 2018-04-24 RX ADMIN — SENNOSIDES AND DOCUSATE SODIUM 2 TABLET: 8.6; 5 TABLET ORAL at 08:39

## 2018-04-24 RX ADMIN — METRONIDAZOLE 500 MG: 500 TABLET ORAL at 14:09

## 2018-04-24 RX ADMIN — MAGNESIUM HYDROXIDE 30 ML: 400 SUSPENSION ORAL at 08:43

## 2018-04-24 RX ADMIN — OXYCODONE HYDROCHLORIDE 5 MG: 5 TABLET ORAL at 08:43

## 2018-04-24 RX ADMIN — VANCOMYCIN HYDROCHLORIDE 2000 MG: 100 INJECTION, POWDER, LYOPHILIZED, FOR SOLUTION INTRAVENOUS at 21:43

## 2018-04-24 RX ADMIN — METRONIDAZOLE 500 MG: 500 TABLET ORAL at 21:42

## 2018-04-24 RX ADMIN — QUETIAPINE FUMARATE 200 MG: 100 TABLET ORAL at 21:43

## 2018-04-24 RX ADMIN — METRONIDAZOLE 500 MG: 500 TABLET ORAL at 05:32

## 2018-04-24 RX ADMIN — FLUOXETINE 60 MG: 10 CAPSULE ORAL at 08:38

## 2018-04-24 RX ADMIN — BENZTROPINE MESYLATE 1 MG: 1 TABLET ORAL at 21:42

## 2018-04-24 ASSESSMENT — ENCOUNTER SYMPTOMS
CHILLS: 0
NAUSEA: 0
BACK PAIN: 0
TINGLING: 1
SHORTNESS OF BREATH: 0
FEVER: 0
SEIZURES: 0
SPUTUM PRODUCTION: 0
VOMITING: 0
EYE DISCHARGE: 0
PHOTOPHOBIA: 0
PALPITATIONS: 0
TINGLING: 0
WEAKNESS: 0
HEADACHES: 0
MYALGIAS: 1
DIARRHEA: 0
ABDOMINAL PAIN: 0
SINUS PAIN: 0
WHEEZING: 0
NECK PAIN: 0

## 2018-04-24 ASSESSMENT — PAIN SCALES - GENERAL
PAINLEVEL_OUTOF10: 2
PAINLEVEL_OUTOF10: 7
PAINLEVEL_OUTOF10: 2
PAINLEVEL_OUTOF10: 4
PAINLEVEL_OUTOF10: 2
PAINLEVEL_OUTOF10: 6
PAINLEVEL_OUTOF10: 0
PAINLEVEL_OUTOF10: 2
PAINLEVEL_OUTOF10: 3
PAINLEVEL_OUTOF10: 3

## 2018-04-24 NOTE — H&P
"Physician H&P    Patient ID:  Wilbert Kennith Yeomans Jr.  [id number]  52 y.o. male  1965    History:  Primary Diagnosis: L toe infection    HPI:  S/p I+D, with worsening wound    Past Medical History:  has a past medical history of Anxiety; Bronchitis; Dental disorder; Diabetes (CMS-[facility]); Hepatitis C (1997); Hypercholesteremia; and Hypertension.  Past Surgical History:  has a past surgical history that includes ventral hernia repair laparoscopic (3/30/2016) and toe amputation (Left, 4/1/2018).  Past Social History:  reports that he has been smoking Cigarettes.  He has a 7.50 pack-year smoking history. He has never used smokeless tobacco. He reports that he does not drink alcohol or use drugs.  Past Family History: History reviewed. No pertinent family history.  Allergies: Patient has no known allergies.    Current Medications:  Prior to Admission medications    Medication Sig Start Date End Date Taking? Authorizing Provider   benztropine (COGENTIN) 1 MG Tab Take 1 mg by mouth 2 times a day.   Yes Physician Outpatient   FLUoxetine (PROZAC) 20 MG Cap Take 60 mg by mouth every day.   Yes Physician Outpatient   paliperidone (INVEGA) 3 MG ER tablet Take 3 mg by mouth every morning.   Yes Physician Outpatient   LORazepam (ATIVAN) 0.5 MG Tab Take 0.5 mg by mouth 2 times a day as needed for Anxiety.   Yes Physician Outpatient   QUEtiapine (SEROQUEL) 100 MG Tab Take 100-200 mg by mouth 3 times a day. 100 mg in morning  100 mg at noon  200 mg at bedtime   Yes Physician Outpatient   metFORMIN (GLUCOPHAGE) 500 MG Tab Take 1 Tab by mouth 2 times a day, with meals. 4/4/18   Gregor Lechuga M.D.       Review of Systems:  ROS  Blood pressure 147/99, pulse 78, temperature 37.5 °C (99.5 °F), resp. rate 20, height 1.93 m (6' 3.98\"), weight 107.2 kg (236 lb 5.3 oz), SpO2 96 %.    Physical Examination:  Physical Exam    Impression:  L great toe infection    Plan:  Revision amputation, I+D.  R/B discussed    Pre Procedure " Assessment:  <EXAMSHORT2>      Pre Procedure update:   No changes.    Aramis Easton  4/23/2018

## 2018-04-24 NOTE — CARE PLAN
Problem: Safety  Goal: Will remain free from falls    Intervention: Implement fall precautions  Call light in reach, hourly rounding in practice.       Problem: Mobility  Goal: Risk for activity intolerance will decrease    Intervention: Encourage patient to increase activity level in collaboration with Interdisciplinary Team  Pt will get OOB for all meals.

## 2018-04-24 NOTE — OP REPORT
DATE OF SERVICE:  04/20/2018    PREOPERATIVE DIAGNOSES:  Left great toe amputation site dehiscence with   surrounding cellulitis and necrosis.    POSTOPERATIVE  DIAGNOSIS:  Left great toe amputation site dehiscence with   surrounding cellulitis and necrosis.    PROCEDURE PERFORMED:  1.  Left first toe amputation through the metatarsophalangeal joint.  2.  Left toe irrigation and debridement dorsal and plantar components.    SURGEON:  Aramis Easton MD    FIRST ASSISTANT:  Jaswinder Reynolds MD    SECOND ASSISTANT:  Yolanda Gasac.    ANESTHESIA:  General endotracheal with popliteal block per my request for   postoperative pain management.    ESTIMATED BLOOD LOSS:  None.    COMPLICATIONS:  None.    POSTOPERATIVE PLAN:  1.  Offloading sandal.  2.  Antibiotics per infectious disease.    INDICATIONS:  The patient is a 52-year-old male who has had problems with his   left foot.  The above diagnoses were made and options were discussed with him   including operative and nonoperative.  Risks and benefits were all discussed.    Site was marked by myself prior to receiving psychotropic medicines.    PROCEDURE IN DETAIL:  The patient was brought into the operating room.  He   underwent sedation.  His left lower extremity was prepped and draped in a   standard fashion.  Positive site verification confirmed his left lower   extremity as well as the above procedure and confirmation that he received   preoperative antibiotics.  Esmarch was used to exsanguinate and was used as an   ankle tourniquet.  Using a 15 blade, this was used directly down to bone   around his great toe circumferentially.  This went back into good clean   margins.  Once all the necrotic tissue was removed, further debridement was   necessary in the deeper layer to get down to good quality tissue.  At that   time disarticulation was performed.  Additional debridement of the skin and   deep tissue was performed, which did leave _____ nice clean margins.     Tourniquet was released.  Hemostasis was obtained.  His wound was then   meticulously closed with interrupted 3-0 nylon.  Sterile dressings were   applied.  Deep tissue that was sent after the initial debridement was sent off   as culture.       ____________________________________     MD BASILIO GARCIA / BRANDY    DD:  04/23/2018 19:49:23  DT:  04/23/2018 20:32:55    D#:  [id number]  Job#:  [id number]    cc: [facility]

## 2018-04-24 NOTE — OP REPORT
DATE OF SERVICE:  04/23/2018    PREOPERATIVE DIAGNOSES:  Left great toe amputation site dehiscence with   surrounding cellulitis and necrosis.    POSTOPERATIVE  DIAGNOSIS:  Left great toe amputation site dehiscence with   surrounding cellulitis and necrosis.    PROCEDURE PERFORMED:  1.  Left first toe amputation through the metatarsophalangeal joint.  2.  Left toe irrigation and debridement dorsal and plantar components.    SURGEON:  Aramis Easton MD    FIRST ASSISTANT:  Jaswinder Reyonlds MD    SECOND ASSISTANT:  Yolanda Gasca.    ANESTHESIA:  General endotracheal with popliteal block per my request for   postoperative pain management.    ESTIMATED BLOOD LOSS:  None.    COMPLICATIONS:  None.    POSTOPERATIVE PLAN:  1.  Offloading sandal.  2.  Antibiotics per infectious disease.    INDICATIONS:  The patient is a 52-year-old male who has had problems with his   left foot.  The above diagnoses were made and options were discussed with him   including operative and nonoperative.  Risks and benefits were all discussed.    Site was marked by myself prior to receiving psychotropic medicines.    PROCEDURE IN DETAIL:  The patient was brought into the operating room.  He   underwent sedation.  His left lower extremity was prepped and draped in a   standard fashion.  Positive site verification confirmed his left lower   extremity as well as the above procedure and confirmation that he received   preoperative antibiotics.  Esmarch was used to exsanguinate and was used as an   ankle tourniquet.  Using a 15 blade, this was used directly down to bone   around his great toe circumferentially.  This went back into good clean   margins.  Once all the necrotic tissue was removed, further debridement was   necessary in the deeper layer to get down to good quality tissue.  At that   time disarticulation was performed.  Additional debridement of the skin and   deep tissue was performed, which did leave _____ nice clean margins.     Tourniquet was released.  Hemostasis was obtained.  His wound was then   meticulously closed with interrupted 3-0 nylon.  Sterile dressings were   applied.  Deep tissue that was sent after the initial debridement was sent off   as culture.       ____________________________________     MD BASILIO GARCIA / BRANDY    DD:  04/23/2018 19:49:23  DT:  04/23/2018 20:32:55    D#:  [id number]  Job#:  [id number]    cc: [facility]

## 2018-04-24 NOTE — CARE PLAN
Problem: Infection  Goal: Will remain free from infection    Intervention: Assess signs and symptoms of infection  Wound assessed, IV abx given per MAR, lab monitored, VSS, CTM      Problem: Venous Thromboembolism (VTW)/Deep Vein Thrombosis (DVT) Prevention:  Goal: Patient will participate in Venous Thrombosis (VTE)/Deep Vein Thrombosis (DVT)Prevention Measures  Outcome: PROGRESSING AS EXPECTED  Pt back from surgery, lovenox given per MAR, no s/s complication, ctm

## 2018-04-24 NOTE — PROGRESS NOTES
"Pharmacy Kinetics 52 y.o. male on vancomycin day # 4    2018    Currently on Vancomycin 1,300mg iv q12hr (1100, 2300)     Indication for Treatment: SSTI     Pertinent history per medical record: Admitted on 2018 for left toe wound infection s/p L toe amputation secondary to osteomyelitis.      Other antibiotics: Ceftriaxone and Metronidazole      Allergies: Patient has no known allergies.      List concerns for renal function: Diabetes      Pertinent cultures to date:   18: PBCs x 2 = NGTD     Recent Labs      18   0133  18   0159  18   0118   WBC  4.5*  4.5*  4.5*   NEUTSPOLYS  38.20*  39.10*  39.00*     Recent Labs      18   0133  18   0159  18   0118   BUN  17  14  16   CREATININE  0.88  0.81  0.72   ALBUMIN   --   3.2  3.2     Recent Labs      18   1033  18   1028   VANCOTROUGH  17.8  16.2   No intake or output data in the 24 hours ending 18 1722   Blood pressure 136/88, pulse 77, temperature 36.6 °C (97.9 °F), resp. rate 18, height 1.93 m (6' 3.98\"), weight 107.2 kg (236 lb 5.3 oz), SpO2 94 %. Temp (24hrs), Av.6 °C (97.9 °F), Min:36.6 °C (97.8 °F), Max:36.6 °C (97.9 °F)      A/P   1. Vancomycin dose change: yes   2. Next vancomycin level: Tomorrow,  at 1030 AM   3. Goal trough: 12 - 16 mcg/mL   4. Comments: Repeat trough level following dose decrease on 18 resulted @ 16.2 mcg/mL. Very minor dose decrease to 1,300mg IV q12h. Will need a repeat trough level in a day or two d/t missed dose mistakenly held by nursing on the morning of 18.     Yue Scherer, PharmD      "

## 2018-04-24 NOTE — OR NURSING
The pt is awake and oriented. Respirations are regular and easy. Pain is controlled, the pt is comfortable. Dressing dry and intact.

## 2018-04-24 NOTE — CONSULTS
"Orthopedic Physician Assistant Note    Subjective:  Patient doing well at this time, no pain.    Objective:  LLE: bandage clean and dry  BCR to toes    Blood pressure 133/76, pulse 67, temperature 36.4 °C (97.5 °F), resp. rate 16, height 1.93 m (6' 3.98\"), weight 107.2 kg (236 lb 5.3 oz), SpO2 99 %.    Labs:  Recent Labs      04/22/18   0159  04/23/18   0118  04/24/18   0205   WBC  4.5*  4.5*  4.2*   RBC  4.15*  4.17*  4.02*   HEMOGLOBIN  12.3*  12.5*  12.2*   HEMATOCRIT  38.3*  37.9*  35.7*   MCV  92.3  90.9  88.8   MCH  29.6  30.0  30.3   RDW  44.8  42.2  41.5   PLATELETCT  129*  135*  139*   MPV  9.5  9.7  10.2   NEUTSPOLYS  39.10*  39.00*  47.00   LYMPHOCYTES  49.80*  48.70*  42.40*   MONOCYTES  5.90  7.00  7.60   EOSINOPHILS  3.70  4.40  2.10   BASOPHILS  1.30  0.70  0.70         Recent Labs      04/22/18   0159  04/23/18   0118  04/24/18   0205   SODIUM  139  136  138   POTASSIUM  4.1  3.9  3.7   CHLORIDE  108  106  106   CO2  25  23  25   GLUCOSE  105*  116*  109*   BUN  14  16  15       Results     Procedure Component Value Units Date/Time    GRAM STAIN [[id number]] Collected:  04/23/18 1900    Order Status:  Completed Specimen:  Tissue Updated:  04/24/18 0649     Significant Indicator .     Source TISS     Site Proximal Phalynx     Gram Stain Result Few WBCs.  No organisms seen.      ANAEROBIC CULTURE [[id number]] Collected:  04/23/18 1900    Order Status:  Completed Specimen:  Other Updated:  04/23/18 1931    CULTURE TISSUE W/ GRM STAIN [[id number]] Collected:  04/23/18 1900    Order Status:  Completed Specimen:  Other Updated:  04/23/18 1931    BLOOD CULTURE [[id number]] Collected:  04/20/18 1008    Order Status:  Completed Specimen:  Blood from Peripheral Updated:  04/21/18 0850     Significant Indicator NEG     Source BLD     Site PERIPHERAL     Blood Culture No Growth    Note: Blood cultures are incubated for 5 days and  are monitored continuously.Positive blood cultures  are called to the RN and reported " "as soon as  they are identified.      Narrative:       1 of 2 for Blood Culture x 2 sites order. Per Hospital  Policy: Only change Specimen Src: to \"Line\" if specified by  physician order.    BLOOD CULTURE [[id number]] Collected:  04/20/18 1027    Order Status:  Completed Specimen:  Blood from Peripheral Updated:  04/21/18 0850     Significant Indicator NEG     Source BLD     Site PERIPHERAL     Blood Culture No Growth    Note: Blood cultures are incubated for 5 days and  are monitored continuously.Positive blood cultures  are called to the RN and reported as soon as  they are identified.      Narrative:       2 of 2 blood culture x2  Sites order. Per Hospital Policy:  Only change Specimen Src: to \"Line\" if specified by physician  order.          Assessment:  S/p L great toe revision    Plan:  Will allow patient heel weight bearing only for now  F/u wound care clinic Friday for wound check  Increase nutrition/dietary supplementation  Will follow      Jaswinder Conde F&A fellow  Cell: [phone]  04/24/18            "

## 2018-04-24 NOTE — CONSULTS
DATE OF SERVICE:  04/23/2018    CHIEF COMPLAINT:  Left toe wound.    HISTORY OF PRESENT ILLNESS:  The patient presented to the emergency room and   was subsequently admitted for worsening left toe wound.  He had diabetes.  He   has osteomyelitis.  He underwent amputation of the left toe.  He was getting   better, but then he was discharged home on Augmentin.  He ultimately did not   fill this prescription.  He has had no followup with his primary care or   wound.  He has been wearing his offloading boot, he states.  He did show up at   the wound care and they noted his wound had dehisced, concerning for   infection.  He was referred to the emergency room.  He denies any   constitutional symptoms such as fevers or chills.  He denies any nausea or   vomiting.  Since he has been in the hospital, he states he has been feeling   better.    Review of systems, past medical history, past surgical history, social   history, family history, current medications and allergies are reviewed on his   admit note dated 04/20/2018 in his hospital chart.    PHYSICAL EXAMINATION:  VITAL SIGNS:  Afebrile, vital signs are stable.  GENERAL:  Well-appearing male, in no acute distress.  PSYCHIATRIC:  Pleasant demeanor, normal affect.  EYES:  Pupils are equal and round.  RESPIRATIONS:  Regular rate, unlabored breathing.  CARDIOVASCULAR:  He does have palpable pulses.  NEUROLOGIC:  Sensation is intact.  He has diminished sensation in his toes.    Muscle strength is globally intact.  SKIN:  Shows a wound dehiscence over his great toe.  He has some erythema in   the area.  No bone is exposed.  MUSCULOSKELETAL:  He has a toe amputation on the left side.  This has a little   bit of clawing on his toes.  He has no other ulcerations or risks.  He has   good overall foot alignment with good range of motion.  There is no gross   instability.  He is nonambulatory.    LABORATORY DATA:  Reviewed and demonstrate glucose of 116, white blood cell   count  4.5, sed rate 22, C-reactive protein 15 and prealbumin 20.  Foot x-rays dated 04/20/2018 reveals a toe amputation, left great toe.  There   is no evidence for active osteomyelitis.    IMPRESSION:  1.  Diabetes.  2.  Left great toe wound dehiscence with some difficulty with followup.    PLAN:  I had a long discussion with the patient regarding his options   including operative and nonoperative.  He elected to proceed with operative   intervention.  I recommended a left revision great toe amputation at this time   with disarticulation of his MTPJ.  The procedure and postoperative course   were discussed in detail.  All questions were answered.  Risks of surgery were   explained which include but not limited to wound problems, infection, nerve   injury, vascular injury, need for further surgery.  He understands and accepts   these risks and agrees to proceed.  I will schedule this on the next   available.       ____________________________________     MD BASILIO GARCIA / BRANDY    DD:  04/23/2018 19:45:59  DT:  04/23/2018 22:21:14    D#:  [id number]  Job#:  [id number]    cc: [facility]

## 2018-04-24 NOTE — PROGRESS NOTES
Infectious Disease Progress Note    Author: Juliane Vaughn M.D. Date & Time of service: 2018  11:45 AM    Chief Complaint:  left foot osteomyelitis    Interval History:  52 y.o. WM admitted 2018 for left foot infection at amp site   AF irritable today   AF WBC 4.5 remains irritable-refused TMA   AFWBC 4.5, has tingling in his left foot, states he is going to surgery but does not appear to be scheduled in [facility]   Tmax 99.5 WBC 4.2. Denies any post op pain, s/p left 1st toe amp down to MTP joint, clean margins achieved per op note  Labs Reviewed, Medications Reviewed, Radiology Reviewed and Wound Reviewed.    Review of Systems:  Review of Systems   Constitutional: Negative for chills and fever.   Gastrointestinal: Negative for abdominal pain, diarrhea, nausea and vomiting.   Musculoskeletal: Positive for myalgias.   Neurological: Positive for tingling.   All other systems reviewed and are negative.      Hemodynamics:  Temp (24hrs), Av.8 °C (98.3 °F), Min:36.3 °C (97.3 °F), Max:37.5 °C (99.5 °F)  Temperature: 36.3 °C (97.3 °F)  Pulse  Av.9  Min: 51  Max: 111Heart Rate (Monitored): 63  Blood Pressure: 120/81, NIBP: 147/89       Physical Exam:  Physical Exam   Constitutional: He appears well-developed. No distress.   HENT:   Head: Normocephalic and atraumatic.   Eyes: EOM are normal. Pupils are equal, round, and reactive to light.   Neck: Neck supple.   Cardiovascular: Normal rate.    Pulmonary/Chest: Effort normal and breath sounds normal.   Abdominal: Bowel sounds are normal.   Musculoskeletal: He exhibits edema.   Left great toe amp site with sutures in place. Well approximated. Some bleeding at site with edema   Neurological: He is alert.   Skin: No rash noted.   Psychiatric:   Flat affect   Nursing note and vitals reviewed.      Meds:    Current Facility-Administered Medications:   •  vancomycin  •  benztropine  •  FLUoxetine  •  LORazepam  •  QUEtiapine  •  senna-docusate  **AND** polyethylene glycol/lytes **AND** magnesium hydroxide **AND** bisacodyl  •  enoxaparin  •  acetaminophen  •  Notify provider if pain remains uncontrolled **AND** Use the numeric rating scale (NRS-11) on regular floors and Critical-Care Pain Observation Tool (CPOT) on ICUs/Trauma to assess pain **AND** Pulse Ox (Oximetry) **AND** Pharmacy Consult Request **AND** If patient difficult to arouse and/or has respiratory depression, stop any opiates that are currently infusing and call a Rapid Response. **AND** oxyCODONE immediate-release **AND** oxyCODONE immediate-release **AND** HYDROmorphone  •  MD ALERT... vancomycin **AND** cefTRIAXone (ROCEPHIN) IV **AND** metroNIDAZOLE  •  insulin regular **AND** Accu-Chek ACHS **AND** NOTIFY MD and PharmD **AND** glucose 4 g **AND** dextrose 50%  •  ondansetron  •  ondansetron  •  promethazine  •  promethazine  •  prochlorperazine  •  QUEtiapine    Labs:  Recent Labs      04/22/18 0159 04/23/18 0118 04/24/18   0205   WBC  4.5*  4.5*  4.2*   RBC  4.15*  4.17*  4.02*   HEMOGLOBIN  12.3*  12.5*  12.2*   HEMATOCRIT  38.3*  37.9*  35.7*   MCV  92.3  90.9  88.8   MCH  29.6  30.0  30.3   RDW  44.8  42.2  41.5   PLATELETCT  129*  135*  139*   MPV  9.5  9.7  10.2   NEUTSPOLYS  39.10*  39.00*  47.00   LYMPHOCYTES  49.80*  48.70*  42.40*   MONOCYTES  5.90  7.00  7.60   EOSINOPHILS  3.70  4.40  2.10   BASOPHILS  1.30  0.70  0.70     Recent Labs      04/22/18 0159 04/23/18   0118  04/24/18   0205   SODIUM  139  136  138   POTASSIUM  4.1  3.9  3.7   CHLORIDE  108  106  106   CO2  25  23  25   GLUCOSE  105*  116*  109*   BUN  14  16  15     Recent Labs      04/22/18 0159  04/23/18   0118  04/24/18   0205   ALBUMIN  3.2  3.2  3.4   TBILIRUBIN  0.4  0.4  0.5   ALKPHOSPHAT  81  68  78   TOTPROTEIN  6.3  6.4  6.5   ALTSGPT  14  11  13   ASTSGOT  14  15  15   CREATININE  0.81  0.72  0.86       Imaging:  Dx-chest-portable (1 View)    Result Date: 3/30/2018  3/30/2018 5:57 PM  HISTORY/REASON FOR EXAM:  Shortness of breath. Sepsis. Left foot open wounds. Diabetes. TECHNIQUE/EXAM DESCRIPTION AND NUMBER OF VIEWS: Single portable view of the chest. COMPARISON:  None FINDINGS: The chest is slightly rotated. The heart is enlarged. There is prominence of the ascending aorta which may be accentuated by rotation. No lobar consolidation is present. No pleural effusion is noted.     1.  No acute cardiac or pulmonary abnormality is noted. 2.  Cardiomegaly. Prominence of the ascending aorta.    Dx-foot-2- Left    Result Date: 4/20/2018 4/20/2018 10:19 AM HISTORY/REASON FOR EXAM:  Pain/Deformity Following Trauma Weeping great toe TECHNIQUE/EXAM DESCRIPTION AND NUMBER OF VIEWS: 2 nonweightbearing views of the LEFT foot. COMPARISON:  3/31/2018 FINDINGS: Patient is status post amputation of the first digit at the level of the proximal phalanx. There is minimal osseous irregularity at the level of the amputation. There is faint adjacent calcification which may be dystrophic.  There are mild degenerative changes of the first tarsometatarsal joint. No acute fracture or dislocation is seen. There is soft tissue swelling about the medial forefoot and remainder of the first digit.     Post surgical changes status post amputation at the level of the proximal phalanx of the first digit. Linear amorphous calcification adjacent to the amputation may be dystrophic. There is minimal osseous irregularity at the level of the amputation where it is difficult to exclude osteomyelitis. Soft tissue swelling of the forefoot and remainder of the first digit.     Dx-foot-complete 3+ Right    Result Date: 3/31/2018  3/31/2018 8:49 PM HISTORY/REASON FOR EXAM:  RIGHT great toe infection. TECHNIQUE/EXAM DESCRIPTION AND NUMBER OF VIEWS:  3 views of the  RIGHT foot. COMPARISON: None FINDINGS: MINERALIZATION: Mineralization is unremarkable for age. INJURY: No acute fracture or gross malalignment is seen. JOINTS: No erosive  arthropathy is evident.     1.  No radiographic evidence of bony erosion or aggressive periosteal reaction. 2.  Soft tissue irregularity along the medial aspect of the great toe compatible with the provided clinical history of soft tissue wound    Dx-foot-complete 3+ Left    Result Date: 3/31/2018  3/31/2018 10:15 AM HISTORY/REASON FOR EXAM:  Evaluate for osteoarthritis TECHNIQUE/EXAM DESCRIPTION AND NUMBER OF VIEWS: 3 nonweightbearing views of the LEFT foot. COMPARISON:  3/30/2018 FINDINGS: There is an intra-articular fracture of the distal phalanx of the first digit. There is lucency within the distal phalanx of the first digit with may be related to osteomyelitis. There is soft tissue swelling and soft tissue air. No dislocation is identified. Radiopaque densities may be external to the patient.     Fracture of the base of the distal phalanx of the first digit. Lucency within the distal phalanx could be related to osteomyelitis. Soft tissue swelling and soft tissue air is seen within the first digit. Soft tissue swelling extends into the foot.    Dx-toe(s) 2+ Left    Result Date: 3/30/2018  3/30/2018 5:57 PM HISTORY/REASON FOR EXAM:  Open left foot wounds. TECHNIQUE/EXAM DESCRIPTION AND NUMBER OF VIEWS:  3 views of the LEFT toes. COMPARISON: None FINDINGS: There is irregularity in the lateral aspect of the base of the first distal phalanx. There is extensive soft tissue edema with evidence of an overlying soft tissue plane in soft tissue air. Radiopaque densities may be external to the patient.     Cortical irregularity in the lateral base of the first distal phalanx with overlying soft tissue swelling and foci of air. Findings are highly suspicious for osteomyelitis. If clinically indicated, MRI may be helpful for confirmation.    Le Art/kaila    Result Date: 4/1/2018   Vascular Laboratory  Conclusions  Bilateral.  No evidence of arterial insufficiency.  YEOMANSVERONIQUE DEL TORO  Age:    52    Gender:     M  MRN:     [id number]  :    1965      BSA:  Exam Date:     2018 12:33  Room #:     Inpatient  Priority:     Routine  Ht (in):             Wt (lb):  Ordering Physician:        NIR PIMENTEL  Referring Physician:       NIR PIMENTEL  Sonographer:               Maribel Roberts RVT  Study Type:                Limited Bilateral  Technical Quality:         Adequate  Indications:     Ulceration of LE  CPT Codes:       49232  ICD Codes:       L97  History:         Ulcerations of the bilateral great toes. Left great toe                   amputation 2018. No prior exam for comparison.  Limitations:                 RIGHT  Waveform            Systolic BPs (mmHg)                             124           Brachial  Triphasic                                Common Femoral  Triphasic                  168           Posterior Tibial  Triphasic                  140           Dorsalis Pedis                                           Peroneal                             1.32          CELIO                                           TBI                       LEFT  Waveform        Systolic BPs (mmHg)                             127           Brachial  Triphasic                                Common Femoral  Triphasic                  162           Posterior Tibial  Triphasic                  124           Dorsalis Pedis                                           Peroneal                             1.28          CELIO                                           TBI  Findings  Right.  There is no evidence of arterial disease demonstrated (CELIO is .9-1.0) on  the right side.  Doppler waveform of the common femoral and popliteal arteries are of high  amplitude and triphasic.  Doppler waveforms at the ankle are brisk and triphasic.  Left.  There is no evidence of arterial disease demonstrated (CELIO is .9-1.0) on  the left side.  Doppler waveform of the common femoral and popliteal arteries are of high  amplitude and triphasic.  Doppler waveforms  "at the ankle are brisk and triphasic.  Additional testing was not performed in accordance with lower extremity  arterial evaluation protocol.  Wilberto Damon M.D.  (Electronically Signed)  Final Date:      01 April 2018                   22:27      Micro:  Results     Procedure Component Value Units Date/Time    GRAM STAIN [[id number]] Collected:  04/23/18 1900    Order Status:  Completed Specimen:  Tissue Updated:  04/24/18 0649     Significant Indicator .     Source TISS     Site Proximal Phalynx     Gram Stain Result Few WBCs.  No organisms seen.      ANAEROBIC CULTURE [[id number]] Collected:  04/23/18 1900    Order Status:  Completed Specimen:  Other Updated:  04/23/18 1931    CULTURE TISSUE W/ GRM STAIN [[id number]] Collected:  04/23/18 1900    Order Status:  Completed Specimen:  Other Updated:  04/23/18 1931    BLOOD CULTURE [[id number]] Collected:  04/20/18 1008    Order Status:  Completed Specimen:  Blood from Peripheral Updated:  04/21/18 0850     Significant Indicator NEG     Source BLD     Site PERIPHERAL     Blood Culture No Growth    Note: Blood cultures are incubated for 5 days and  are monitored continuously.Positive blood cultures  are called to the RN and reported as soon as  they are identified.      Narrative:       1 of 2 for Blood Culture x 2 sites order. Per Hospital  Policy: Only change Specimen Src: to \"Line\" if specified by  physician order.    BLOOD CULTURE [[id number]] Collected:  04/20/18 1027    Order Status:  Completed Specimen:  Blood from Peripheral Updated:  04/21/18 0850     Significant Indicator NEG     Source BLD     Site PERIPHERAL     Blood Culture No Growth    Note: Blood cultures are incubated for 5 days and  are monitored continuously.Positive blood cultures  are called to the RN and reported as soon as  they are identified.      Narrative:       2 of 2 blood culture x2  Sites order. Per Hospital Policy:  Only change Specimen Src: to \"Line\" if specified by physician  order.    "       Assessment:  Active Hospital Problems    Diagnosis   • Psychiatric disorder [F99]   • Diabetic foot ulcer (CMS-HCC) [E11.621, L97.509]   • Status post amputation of great toe, left (CMS-HCC) [Z89.412]   • Type 2 diabetes mellitus (CMS-HCC) [E11.9]       Plan:  Left foot osteomyelitis  Afebrile  No leukocytosis   S/p left great amp through proximal phalanx on 4/1  Wound cx - group G and C strep, morganella , diphtheroids  Blood cxs neg  S/p left great toe amp down to MTP joint on 4/23. Clean margins obtained per OP note  OR cx (proximal bone) - pending, no organism on gram stain  DC vanco  Continue ceftriaxone and flagyl for now pending OR cultures  Not a candidate for outpatient treatment due to noncompliance  Transition to PO abx (augmentin) for one week post op if proximal bone cx neg  Wound care     Leukopenia  Monitor    DM2  HgA1c 5.8  Maintain BS less than 150    FU with [facility]    FU ID clinic PRN    DW Dr Casey

## 2018-04-24 NOTE — PROGRESS NOTES
Internal Medicine Interval Note  Note Author: Constance Enciso M.D.     Name Yeomans, Wilbert Kennith     1965   Age/Sex 52 y.o. male   MRN [id number]   Code Status FULL     After 5PM or if no immediate response to page, please call for cross-coverage  Attending:  Dr Casey    Resident : Dr Enciso          Reason for interval visit  (Principal Problem)   <principal problem not specified>     Left Foot  Osteomyelitis.      Interval Problem Daily Status Update  (24 hours)     No acute events overnight.  S/p left  Big Toe Amputation site dehiscence with surrounding cellulitis and necrosis. Day #1.  He denies any nauseau, vomiting , fevers, abdominal pain or chest pain.  ID recs: to continue C3 and flagyl pending  OR bone cultures of left big Toe.        Review of Systems   Constitutional: Negative for chills and fever.   HENT: Negative for congestion and sinus pain.    Eyes: Negative for photophobia and discharge.   Respiratory: Negative for sputum production, shortness of breath and wheezing.    Cardiovascular: Negative for chest pain, palpitations and leg swelling.   Gastrointestinal: Negative for abdominal pain, nausea and vomiting.   Genitourinary: Negative for frequency and urgency.   Musculoskeletal: Negative for back pain, joint pain and neck pain.   Skin: Negative for itching.   Neurological: Negative for tingling, seizures, weakness and headaches.     Consultants/Specialty  Orthopedic surgery  Infectious diseases.    Disposition  Floor    Quality Measures  Quality-Core Measures        Physical Exam       Vitals:    18 0000 18 0400 18 0800 18 1542   BP: 116/74 133/76 120/81 106/88   Pulse: 60 67 87 68   Resp: 16 16 16 18   Temp: 36.6 °C (97.9 °F) 36.4 °C (97.5 °F) 36.3 °C (97.3 °F) 36.8 °C (98.2 °F)   TempSrc:       SpO2: 94% 99% 91% 95%   Weight:       Height:         Body mass index is 28.78 kg/m².    Oxygen Therapy:  Pulse Oximetry: 95 %, O2 (LPM): 0, O2 Delivery: None (Room  Air)    Physical Exam   Constitutional: He is oriented to person, place, and time and well-developed, well-nourished, and in no distress.   HENT:   Head: Normocephalic and atraumatic.   Eyes: Conjunctivae and EOM are normal. Pupils are equal, round, and reactive to light.   Neck: Neck supple. No JVD present.   Cardiovascular: Normal rate, regular rhythm and normal heart sounds.  Exam reveals no gallop.    No murmur heard.  Pulmonary/Chest: Effort normal and breath sounds normal. No respiratory distress. He has no wheezes. He has no rales.   Abdominal: Soft. Bowel sounds are normal. He exhibits no distension. There is no tenderness. There is no rebound.   Musculoskeletal:   Left foot dressing clean dry and intact   Neurological: He is alert and oriented to person, place, and time.         Lab Data Review:         4/24/2018  4:41 PM    Recent Labs      04/22/18 0159 04/23/18 0118 04/24/18 0205   SODIUM  139  136  138   POTASSIUM  4.1  3.9  3.7   CHLORIDE  108  106  106   CO2  25  23  25   BUN  14  16  15   CREATININE  0.81  0.72  0.86   CALCIUM  8.6  8.8  8.7       Recent Labs      04/22/18 0159 04/22/18   0902  04/23/18   0118  04/24/18   0205   ALTSGPT  14   --   11  13   ASTSGOT  14   --   15  15   ALKPHOSPHAT  81   --   68  78   TBILIRUBIN  0.4   --   0.4  0.5   PREALBUMIN   --   20.0   --    --    GLUCOSE  105*   --   116*  109*       Recent Labs      04/22/18 0159 04/23/18 0118 04/24/18   0205   RBC  4.15*  4.17*  4.02*   HEMOGLOBIN  12.3*  12.5*  12.2*   HEMATOCRIT  38.3*  37.9*  35.7*   PLATELETCT  129*  135*  139*       Recent Labs      04/22/18 0159 04/23/18 0118 04/24/18   0205   WBC  4.5*  4.5*  4.2*   NEUTSPOLYS  39.10*  39.00*  47.00   LYMPHOCYTES  49.80*  48.70*  42.40*   MONOCYTES  5.90  7.00  7.60   EOSINOPHILS  3.70  4.40  2.10   BASOPHILS  1.30  0.70  0.70   ASTSGOT  14  15  15   ALTSGPT  14  11  13   ALKPHOSPHAT  81  68  78   TBILIRUBIN  0.4  0.4  0.5            Assessment/Plan     Diabetic foot ulcer (CMS-HCC)   Assessment & Plan    S/p amputation of the left big toe amputation site dehiscence with surrounding cellulitis and necrosis  I.D following.  On Flagyl, Rocephin, Vancomycin.  As per I.D pt is not a candidate for outpt IV therapy because of compliance.  F/u OR culture of Big toe.           Psychiatric disorder   Assessment & Plan    Pt stated that he feels bad that  He had to come back to the hospital  He denied any depression issues  He also denied any suicidal ideation  Will continue with home meds        Type 2 diabetes mellitus (CMS-HCC)- (present on admission)   Assessment & Plan    S.s.insulin  hema1c 5.8

## 2018-04-24 NOTE — PROGRESS NOTES
"Pharmacy Kinetics 52 y.o. male on vancomycin day # 5  4/24/2018    Currently Dose: Vancomycin 1300 mg iv q12hr (~12 mg/kg/dose)    Indication for Treatment: SSTI  ID Service Following: Yes    Pertinent history per medical record: Admitted on 4/20/2018 for diabetic foot ulcer after recent amputation (secondary to OM). ID and surgery services consulted. Admitted to orthopedics.    Other antibiotics: ceftriaxone 2 gm iv q24h; metronidazole 500 mg po q8h    Allergies: Patient has no known allergies.     List concerns for accumulation of vancomycin: age 52, DM    Pertinent cultures to date:   Results     Procedure Component Value Units Date/Time    GRAM STAIN [[id number]] Collected:  04/23/18 1900    Order Status:  Completed Specimen:  Tissue Updated:  04/24/18 0649     Significant Indicator .     Source TISS     Site Proximal Phalynx     Gram Stain Result Few WBCs.  No organisms seen.      ANAEROBIC CULTURE [[id number]] Collected:  04/23/18 1900    Order Status:  Completed Specimen:  Other Updated:  04/23/18 1931    CULTURE TISSUE W/ GRM STAIN [[id number]] Collected:  04/23/18 1900    Order Status:  Completed Specimen:  Other Updated:  04/23/18 1931    BLOOD CULTURE [[id number]] Collected:  04/20/18 1008    Order Status:  Completed Specimen:  Blood from Peripheral Updated:  04/21/18 0850     Significant Indicator NEG     Source BLD     Site PERIPHERAL     Blood Culture No Growth    Note: Blood cultures are incubated for 5 days and  are monitored continuously.Positive blood cultures  are called to the RN and reported as soon as  they are identified.      Narrative:       1 of 2 for Blood Culture x 2 sites order. Per Hospital  Policy: Only change Specimen Src: to \"Line\" if specified by  physician order.    BLOOD CULTURE [[id number]] Collected:  04/20/18 1027    Order Status:  Completed Specimen:  Blood from Peripheral Updated:  04/21/18 0850     Significant Indicator NEG     Source BLD     Site PERIPHERAL     Blood Culture No " "Growth    Note: Blood cultures are incubated for 5 days and  are monitored continuously.Positive blood cultures  are called to the RN and reported as soon as  they are identified.      Narrative:       2 of 2 blood culture x2  Sites order. Per Hospital Policy:  Only change Specimen Src: to \"Line\" if specified by physician  order.        Recent Labs      18   0159  18   0118  18   0205   WBC  4.5*  4.5*  4.2*   NEUTSPOLYS  39.10*  39.00*  47.00     Recent Labs      18   0159  18   0118  18   0205   BUN  14  16  15   CREATININE  0.81  0.72  0.86   ALBUMIN  3.2  3.2  3.4     Recent Labs      18   1033  18   1028   VANCOTROUGH  17.8  16.2      2018 10:10   Vancomycin Trough 17.9     Intake/Output Summary (Last 24 hours) at 18 0904  Last data filed at 18 0800   Gross per 24 hour   Intake              830 ml   Output             1425 ml   Net             -595 ml      Blood pressure 120/81, pulse 87, temperature 36.3 °C (97.3 °F), resp. rate 16, height 1.93 m (6' 3.98\"), weight 107.2 kg (236 lb 5.3 oz), SpO2 91 %. Temp (24hrs), Av.8 °C (98.3 °F), Min:36.3 °C (97.3 °F), Max:37.5 °C (99.5 °F)    Estimated Creatinine Clearance: 133.5 mL/min (by C-G formula based on SCr of 0.86 mg/dL).    A/P   1. Vancomycin dose change: 2000 mg iv q24h  2. Next vancomycin level: 18 @  3. Goal trough: 12-16 mcg/mL  4. Comments: VS stable. Afebrile. WBC stable/low. CrCl ~134 mL/min (likely inaccurate due to body habitus). No new microbiology. ID consulted. Factors for accumulation noted. Most recent trough noted and above goal. Will dose adjust via linear kinetics for goal trough of ~13.5 mcg/mL. Likely to repeat trough in ~2-3 days. Pharmacy will follow and continue to adjust as appropriate.    Min Meza, PharmD  "

## 2018-04-25 VITALS
TEMPERATURE: 98.8 F | OXYGEN SATURATION: 94 % | RESPIRATION RATE: 16 BRPM | HEART RATE: 68 BPM | WEIGHT: 236.33 LBS | HEIGHT: 76 IN | SYSTOLIC BLOOD PRESSURE: 93 MMHG | DIASTOLIC BLOOD PRESSURE: 64 MMHG | BODY MASS INDEX: 28.78 KG/M2

## 2018-04-25 LAB
ANION GAP SERPL CALC-SCNC: 14 MMOL/L (ref 0–11.9)
BACTERIA BLD CULT: NORMAL
BACTERIA BLD CULT: NORMAL
BACTERIA TISS AEROBE CULT: NORMAL
BASOPHILS # BLD AUTO: 0.8 % (ref 0–1.8)
BASOPHILS # BLD: 0.04 K/UL (ref 0–0.12)
BUN SERPL-MCNC: 17 MG/DL (ref 8–22)
CALCIUM SERPL-MCNC: 9.3 MG/DL (ref 8.5–10.5)
CHLORIDE SERPL-SCNC: 110 MMOL/L (ref 96–112)
CO2 SERPL-SCNC: 15 MMOL/L (ref 20–33)
CREAT SERPL-MCNC: 1.08 MG/DL (ref 0.5–1.4)
EOSINOPHIL # BLD AUTO: 0.17 K/UL (ref 0–0.51)
EOSINOPHIL NFR BLD: 3.5 % (ref 0–6.9)
ERYTHROCYTE [DISTWIDTH] IN BLOOD BY AUTOMATED COUNT: 43.7 FL (ref 35.9–50)
GLUCOSE BLD-MCNC: 105 MG/DL (ref 65–99)
GLUCOSE BLD-MCNC: 98 MG/DL (ref 65–99)
GLUCOSE SERPL-MCNC: 115 MG/DL (ref 65–99)
GRAM STN SPEC: NORMAL
HCT VFR BLD AUTO: 39.6 % (ref 42–52)
HGB BLD-MCNC: 13.2 G/DL (ref 14–18)
IMM GRANULOCYTES # BLD AUTO: 0.01 K/UL (ref 0–0.11)
IMM GRANULOCYTES NFR BLD AUTO: 0.2 % (ref 0–0.9)
LYMPHOCYTES # BLD AUTO: 2.25 K/UL (ref 1–4.8)
LYMPHOCYTES NFR BLD: 46.2 % (ref 22–41)
MCH RBC QN AUTO: 30.6 PG (ref 27–33)
MCHC RBC AUTO-ENTMCNC: 33.3 G/DL (ref 33.7–35.3)
MCV RBC AUTO: 91.7 FL (ref 81.4–97.8)
MONOCYTES # BLD AUTO: 0.33 K/UL (ref 0–0.85)
MONOCYTES NFR BLD AUTO: 6.8 % (ref 0–13.4)
NEUTROPHILS # BLD AUTO: 2.07 K/UL (ref 1.82–7.42)
NEUTROPHILS NFR BLD: 42.5 % (ref 44–72)
NRBC # BLD AUTO: 0 K/UL
NRBC BLD-RTO: 0 /100 WBC
PLATELET # BLD AUTO: 140 K/UL (ref 164–446)
PMV BLD AUTO: 10.2 FL (ref 9–12.9)
POTASSIUM SERPL-SCNC: 4.4 MMOL/L (ref 3.6–5.5)
RBC # BLD AUTO: 4.32 M/UL (ref 4.7–6.1)
SIGNIFICANT IND 70042: NORMAL
SITE SITE: NORMAL
SODIUM SERPL-SCNC: 139 MMOL/L (ref 135–145)
SOURCE SOURCE: NORMAL
VANCOMYCIN TROUGH SERPL-MCNC: 31.2 UG/ML (ref 10–20)
WBC # BLD AUTO: 4.9 K/UL (ref 4.8–10.8)

## 2018-04-25 PROCEDURE — 700102 HCHG RX REV CODE 250 W/ 637 OVERRIDE(OP): Performed by: HOSPITALIST

## 2018-04-25 PROCEDURE — A9270 NON-COVERED ITEM OR SERVICE: HCPCS | Performed by: INTERNAL MEDICINE

## 2018-04-25 PROCEDURE — 700102 HCHG RX REV CODE 250 W/ 637 OVERRIDE(OP): Performed by: INTERNAL MEDICINE

## 2018-04-25 PROCEDURE — A9270 NON-COVERED ITEM OR SERVICE: HCPCS | Performed by: HOSPITALIST

## 2018-04-25 PROCEDURE — 80048 BASIC METABOLIC PNL TOTAL CA: CPT

## 2018-04-25 PROCEDURE — 85025 COMPLETE CBC W/AUTO DIFF WBC: CPT

## 2018-04-25 PROCEDURE — 80202 ASSAY OF VANCOMYCIN: CPT

## 2018-04-25 PROCEDURE — 82962 GLUCOSE BLOOD TEST: CPT

## 2018-04-25 PROCEDURE — 36415 COLL VENOUS BLD VENIPUNCTURE: CPT

## 2018-04-25 PROCEDURE — 700111 HCHG RX REV CODE 636 W/ 250 OVERRIDE (IP): Performed by: HOSPITALIST

## 2018-04-25 RX ORDER — AMOXICILLIN AND CLAVULANATE POTASSIUM 875; 125 MG/1; MG/1
1 TABLET, FILM COATED ORAL EVERY 12 HOURS
Qty: 14 TAB | Refills: 0 | Status: SHIPPED | OUTPATIENT
Start: 2018-04-25 | End: 2018-05-02

## 2018-04-25 RX ORDER — AMOXICILLIN AND CLAVULANATE POTASSIUM 875; 125 MG/1; MG/1
1 TABLET, FILM COATED ORAL EVERY 12 HOURS
Qty: 14 TAB | Refills: 0 | Status: SHIPPED | OUTPATIENT
Start: 2018-04-25 | End: 2018-04-25

## 2018-04-25 RX ORDER — AMOXICILLIN AND CLAVULANATE POTASSIUM 875; 125 MG/1; MG/1
1 TABLET, FILM COATED ORAL EVERY 12 HOURS
Status: DISCONTINUED | OUTPATIENT
Start: 2018-04-25 | End: 2018-04-25 | Stop reason: HOSPADM

## 2018-04-25 RX ADMIN — QUETIAPINE FUMARATE 100 MG: 100 TABLET ORAL at 08:28

## 2018-04-25 RX ADMIN — OXYCODONE HYDROCHLORIDE 5 MG: 5 TABLET ORAL at 06:22

## 2018-04-25 RX ADMIN — CEFTRIAXONE 2 G: 2 INJECTION, POWDER, FOR SOLUTION INTRAMUSCULAR; INTRAVENOUS at 08:38

## 2018-04-25 RX ADMIN — FLUOXETINE 60 MG: 10 CAPSULE ORAL at 08:31

## 2018-04-25 RX ADMIN — BENZTROPINE MESYLATE 1 MG: 1 TABLET ORAL at 08:28

## 2018-04-25 RX ADMIN — AMOXICILLIN AND CLAVULANATE POTASSIUM 1 TABLET: 875; 125 TABLET, FILM COATED ORAL at 11:16

## 2018-04-25 RX ADMIN — METRONIDAZOLE 500 MG: 500 TABLET ORAL at 06:22

## 2018-04-25 ASSESSMENT — ENCOUNTER SYMPTOMS
NAUSEA: 0
ABDOMINAL PAIN: 0
CHILLS: 0
FEVER: 0
TINGLING: 1
VOMITING: 0
MYALGIAS: 1
DIARRHEA: 0

## 2018-04-25 NOTE — PROGRESS NOTES
Infectious Disease Progress Note    Author: Juliane Vaughn M.D. Date & Time of service: 2018  10:56 AM    Chief Complaint:  left foot osteomyelitis    Interval History:  52 y.o. WM admitted 2018 for left foot infection at amp site   AF irritable today   AF WBC 4.5 remains irritable-refused TMA   AFWBC 4.5, has tingling in his left foot, states he is going to surgery but does not appear to be scheduled in [facility]   Tmax 99.5 WBC 4.2. Denies any post op pain, s/p left 1st toe amp down to MTP joint, clean margins achieved per op note   AF WBC 4.9, feels well, OR cx skin christine, no new issues, states he will try to keep surgical site clean at home   Labs Reviewed, Medications Reviewed, Radiology Reviewed and Wound Reviewed.    Review of Systems:  Review of Systems   Constitutional: Negative for chills and fever.   Gastrointestinal: Negative for abdominal pain, diarrhea, nausea and vomiting.   Musculoskeletal: Positive for myalgias.   Neurological: Positive for tingling.   All other systems reviewed and are negative.      Hemodynamics:  Temp (24hrs), Av.8 °C (98.3 °F), Min:36.3 °C (97.3 °F), Max:37.1 °C (98.8 °F)  Temperature: 37.1 °C (98.8 °F)  Pulse  Av.2  Min: 51  Max: 111   Blood Pressure: (!) 93/64       Physical Exam:  Physical Exam   Constitutional: He appears well-developed. No distress.   HENT:   Head: Normocephalic and atraumatic.   Eyes: EOM are normal. Pupils are equal, round, and reactive to light.   Neck: Neck supple.   Cardiovascular: Normal rate.    Pulmonary/Chest: Effort normal and breath sounds normal.   Abdominal: Bowel sounds are normal.   Musculoskeletal: He exhibits edema.   Left great toe amp site with sutures in place. Well approximated. +edema   Neurological: He is alert.   Skin: No rash noted.   Psychiatric:   Flat affect   Nursing note and vitals reviewed.      Meds:    Current Facility-Administered Medications:   •  vancomycin  •  benztropine  •   FLUoxetine  •  LORazepam  •  QUEtiapine  •  senna-docusate **AND** polyethylene glycol/lytes **AND** magnesium hydroxide **AND** bisacodyl  •  enoxaparin  •  acetaminophen  •  Notify provider if pain remains uncontrolled **AND** Use the numeric rating scale (NRS-11) on regular floors and Critical-Care Pain Observation Tool (CPOT) on ICUs/Trauma to assess pain **AND** Pulse Ox (Oximetry) **AND** Pharmacy Consult Request **AND** If patient difficult to arouse and/or has respiratory depression, stop any opiates that are currently infusing and call a Rapid Response. **AND** oxyCODONE immediate-release **AND** oxyCODONE immediate-release **AND** HYDROmorphone  •  MD ALERT... vancomycin **AND** cefTRIAXone (ROCEPHIN) IV **AND** metroNIDAZOLE  •  insulin regular **AND** Accu-Chek ACHS **AND** NOTIFY MD and PharmD **AND** glucose 4 g **AND** dextrose 50%  •  ondansetron  •  ondansetron  •  promethazine  •  promethazine  •  prochlorperazine  •  QUEtiapine    Labs:  Recent Labs      04/23/18 0118 04/24/18 0205 04/25/18   0256   WBC  4.5*  4.2*  4.9   RBC  4.17*  4.02*  4.32*   HEMOGLOBIN  12.5*  12.2*  13.2*   HEMATOCRIT  37.9*  35.7*  39.6*   MCV  90.9  88.8  91.7   MCH  30.0  30.3  30.6   RDW  42.2  41.5  43.7   PLATELETCT  135*  139*  140*   MPV  9.7  10.2  10.2   NEUTSPOLYS  39.00*  47.00  42.50*   LYMPHOCYTES  48.70*  42.40*  46.20*   MONOCYTES  7.00  7.60  6.80   EOSINOPHILS  4.40  2.10  3.50   BASOPHILS  0.70  0.70  0.80     Recent Labs      04/23/18 0118 04/24/18 0205 04/25/18   0256   SODIUM  136  138  139   POTASSIUM  3.9  3.7  4.4   CHLORIDE  106  106  110   CO2  23  25  15*   GLUCOSE  116*  109*  115*   BUN  16  15  17     Recent Labs      04/23/18   0118  04/24/18   0205  04/25/18   0256   ALBUMIN  3.2  3.4   --    TBILIRUBIN  0.4  0.5   --    ALKPHOSPHAT  68  78   --    TOTPROTEIN  6.4  6.5   --    ALTSGPT  11  13   --    ASTSGOT  15  15   --    CREATININE  0.72  0.86  1.08        Imaging:  Dx-chest-portable (1 View)    Result Date: 3/30/2018  3/30/2018 5:57 PM HISTORY/REASON FOR EXAM:  Shortness of breath. Sepsis. Left foot open wounds. Diabetes. TECHNIQUE/EXAM DESCRIPTION AND NUMBER OF VIEWS: Single portable view of the chest. COMPARISON:  None FINDINGS: The chest is slightly rotated. The heart is enlarged. There is prominence of the ascending aorta which may be accentuated by rotation. No lobar consolidation is present. No pleural effusion is noted.     1.  No acute cardiac or pulmonary abnormality is noted. 2.  Cardiomegaly. Prominence of the ascending aorta.    Dx-foot-2- Left    Result Date: 4/20/2018 4/20/2018 10:19 AM HISTORY/REASON FOR EXAM:  Pain/Deformity Following Trauma Weeping great toe TECHNIQUE/EXAM DESCRIPTION AND NUMBER OF VIEWS: 2 nonweightbearing views of the LEFT foot. COMPARISON:  3/31/2018 FINDINGS: Patient is status post amputation of the first digit at the level of the proximal phalanx. There is minimal osseous irregularity at the level of the amputation. There is faint adjacent calcification which may be dystrophic.  There are mild degenerative changes of the first tarsometatarsal joint. No acute fracture or dislocation is seen. There is soft tissue swelling about the medial forefoot and remainder of the first digit.     Post surgical changes status post amputation at the level of the proximal phalanx of the first digit. Linear amorphous calcification adjacent to the amputation may be dystrophic. There is minimal osseous irregularity at the level of the amputation where it is difficult to exclude osteomyelitis. Soft tissue swelling of the forefoot and remainder of the first digit.     Dx-foot-complete 3+ Right    Result Date: 3/31/2018  3/31/2018 8:49 PM HISTORY/REASON FOR EXAM:  RIGHT great toe infection. TECHNIQUE/EXAM DESCRIPTION AND NUMBER OF VIEWS:  3 views of the  RIGHT foot. COMPARISON: None FINDINGS: MINERALIZATION: Mineralization is unremarkable for  age. INJURY: No acute fracture or gross malalignment is seen. JOINTS: No erosive arthropathy is evident.     1.  No radiographic evidence of bony erosion or aggressive periosteal reaction. 2.  Soft tissue irregularity along the medial aspect of the great toe compatible with the provided clinical history of soft tissue wound    Dx-foot-complete 3+ Left    Result Date: 3/31/2018  3/31/2018 10:15 AM HISTORY/REASON FOR EXAM:  Evaluate for osteoarthritis TECHNIQUE/EXAM DESCRIPTION AND NUMBER OF VIEWS: 3 nonweightbearing views of the LEFT foot. COMPARISON:  3/30/2018 FINDINGS: There is an intra-articular fracture of the distal phalanx of the first digit. There is lucency within the distal phalanx of the first digit with may be related to osteomyelitis. There is soft tissue swelling and soft tissue air. No dislocation is identified. Radiopaque densities may be external to the patient.     Fracture of the base of the distal phalanx of the first digit. Lucency within the distal phalanx could be related to osteomyelitis. Soft tissue swelling and soft tissue air is seen within the first digit. Soft tissue swelling extends into the foot.    Dx-toe(s) 2+ Left    Result Date: 3/30/2018  3/30/2018 5:57 PM HISTORY/REASON FOR EXAM:  Open left foot wounds. TECHNIQUE/EXAM DESCRIPTION AND NUMBER OF VIEWS:  3 views of the LEFT toes. COMPARISON: None FINDINGS: There is irregularity in the lateral aspect of the base of the first distal phalanx. There is extensive soft tissue edema with evidence of an overlying soft tissue plane in soft tissue air. Radiopaque densities may be external to the patient.     Cortical irregularity in the lateral base of the first distal phalanx with overlying soft tissue swelling and foci of air. Findings are highly suspicious for osteomyelitis. If clinically indicated, MRI may be helpful for confirmation.    Le Art/kaila    Result Date: 4/1/2018   Vascular Laboratory  Conclusions  Bilateral.  No evidence of  arterial insufficiency.  YEOMANS, WILBERT  Age:    52    Gender:     M  MRN:    [id number]  :    1965      BSA:  Exam Date:     2018 12:33  Room #:     Inpatient  Priority:     Routine  Ht (in):             Wt (lb):  Ordering Physician:        NIR PIMENTEL  Referring Physician:       NIR PIMENTEL  Sonographer:               Maribel Roberts RVT  Study Type:                Limited Bilateral  Technical Quality:         Adequate  Indications:     Ulceration of LE  CPT Codes:       95814  ICD Codes:       L97  History:         Ulcerations of the bilateral great toes. Left great toe                   amputation 2018. No prior exam for comparison.  Limitations:                 RIGHT  Waveform            Systolic BPs (mmHg)                             124           Brachial  Triphasic                                Common Femoral  Triphasic                  168           Posterior Tibial  Triphasic                  140           Dorsalis Pedis                                           Peroneal                             1.32          CELIO                                           TBI                       LEFT  Waveform        Systolic BPs (mmHg)                             127           Brachial  Triphasic                                Common Femoral  Triphasic                  162           Posterior Tibial  Triphasic                  124           Dorsalis Pedis                                           Peroneal                             1.28          CELIO                                           TBI  Findings  Right.  There is no evidence of arterial disease demonstrated (CELIO is .9-1.0) on  the right side.  Doppler waveform of the common femoral and popliteal arteries are of high  amplitude and triphasic.  Doppler waveforms at the ankle are brisk and triphasic.  Left.  There is no evidence of arterial disease demonstrated (CELIO is .9-1.0) on  the left side.  Doppler waveform of the common femoral  "and popliteal arteries are of high  amplitude and triphasic.  Doppler waveforms at the ankle are brisk and triphasic.  Additional testing was not performed in accordance with lower extremity  arterial evaluation protocol.  Wilberto Damon M.D.  (Electronically Signed)  Final Date:      01 April 2018                   22:27      Micro:  Results     Procedure Component Value Units Date/Time    ANAEROBIC CULTURE [[id number]] Collected:  04/23/18 1900    Order Status:  Completed Specimen:  Tissue Updated:  04/25/18 1040     Significant Indicator NEG     Source TISS     Site Proximal Phalynx     Anaerobic Culture, Culture Res Culture in progress.    CULTURE TISSUE W/ GRM STAIN [[id number]] Collected:  04/23/18 1900    Order Status:  Completed Specimen:  Tissue Updated:  04/25/18 1040     Gram Stain Result Few WBCs.  No organisms seen.       Significant Indicator NEG     Source TISS     Site Proximal Phalynx     Tissue Culture Rare growth  mixed skin christine, consisting of coagulase  negative Staphylococcus species, mixed morphologies.      GRAM STAIN [[id number]] Collected:  04/23/18 1900    Order Status:  Completed Specimen:  Tissue Updated:  04/24/18 0649     Significant Indicator .     Source TISS     Site Proximal Phalynx     Gram Stain Result Few WBCs.  No organisms seen.      BLOOD CULTURE [[id number]] Collected:  04/20/18 1008    Order Status:  Completed Specimen:  Blood from Peripheral Updated:  04/21/18 0850     Significant Indicator NEG     Source BLD     Site PERIPHERAL     Blood Culture No Growth    Note: Blood cultures are incubated for 5 days and  are monitored continuously.Positive blood cultures  are called to the RN and reported as soon as  they are identified.      Narrative:       1 of 2 for Blood Culture x 2 sites order. Per Hospital  Policy: Only change Specimen Src: to \"Line\" if specified by  physician order.    BLOOD CULTURE [[id number]] Collected:  04/20/18 1027    Order Status:  Completed Specimen:  " "Blood from Peripheral Updated:  04/21/18 0850     Significant Indicator NEG     Source BLD     Site PERIPHERAL     Blood Culture No Growth    Note: Blood cultures are incubated for 5 days and  are monitored continuously.Positive blood cultures  are called to the RN and reported as soon as  they are identified.      Narrative:       2 of 2 blood culture x2  Sites order. Per Hospital Policy:  Only change Specimen Src: to \"Line\" if specified by physician  order.          Assessment:  Active Hospital Problems    Diagnosis   • Psychiatric disorder [F99]   • Diabetic foot ulcer (CMS-HCC) [E11.621, L97.509]   • Status post amputation of great toe, left (CMS-HCC) [Z89.412]   • Type 2 diabetes mellitus (CMS-HCC) [E11.9]       Plan:  Left foot osteomyelitis  Afebrile  No leukocytosis   S/p left great amp through proximal phalanx on 4/1  Wound cx - group G and C strep, morganella , diphtheroids  Blood cxs neg  S/p left great toe amp down to MTP joint on 4/23. Clean margins obtained per OP note  OR cx (proximal bone) - mixed skin christine  DC ceftriaxone and flagyl  Transition to PO abx (augmentin) at discharge for one week post op   Wound care     Leukopenia. Resolved  Monitor    DM2  HgA1c 5.8  Maintain BS less than 150    FU with [facility]    FU ID clinic PRN    Plan for DC home today    RAFFAELE Casey. ID signing off  "

## 2018-04-25 NOTE — DISCHARGE SUMMARY
CHIEF COMPLAINT ON ADMISSION  Chief Complaint   Patient presents with   • Wound Infection     left big toe. pt seen by wound care today and told to come to ED for possible admission        CODE STATUS  Full Code    HPI & HOSPITAL COURSE  This is a 52 y.o. male with a history of diabetes and osteomyelitis for which he had an amputation of the left  Big toe on 04/01/2018. He was clinically improving, discharged home on PO  Augmentin. He presented on 4/20/2018 with left toe infection due to noncompliance with follow-up with the wound clinic and his antibiotic. He had dehiscence of the amputated site with surrounding cellulitis and necrosis. He was started on  broad-spectrum antibiotics, IV vancomycin, ceftriaxone and Flagyl . Ortho was consulted, and patient had  amputation of the left toe through the metatarsophalangeal joint ,with toe irrigation and debridement of the dorsal and plantar component. Cultures  Were collected in the OR.    Patient was continued on IV antibiotics. Cultures from wound site and bone collected in the OR were nbegative . Blood cultures also  had  no growth.    Patient is being discharged on PO abx Augmentin for 1 week per ID recs and to follow up with wound clinic.        The patient met 2-midnight criteria for an inpatient stay at the time of discharge.    Therefore, he is discharged in good and stable condition with close outpatient follow-up.    SPECIFIC OUTPATIENT FOLLOW-UP  Follow up with wound clinic on 04/26/18. He has a scheduled appointment.  Follow up with [facility] PCP at [facility].  Called the clinic to schedule the appointment. Patient is scheduled for follow up on  05/05/2018 at 9.20 am.    DISCHARGE PROBLEM LIST.    Active Problems:    Type 2 diabetes mellitus (CMS-HCC) POA: Yes    Status post amputation of great toe, left ([facility]) POA: Yes    Psychiatric disorder POA: Unknown    Diabetic foot ulcer (CMS-HCC) POA: Unknown  Resolved Problems:    * No resolved hospital problems.  *      FOLLOW UP  Scheduled for 04/26/ 2018.  [facility]  [address]  [place] [address]  [phone]    In 1 week  For wound re-check      MEDICATIONS ON DISCHARGE   Yeomans, Wilbert Kennith Jr.   Home Medication Instructions BI:[id number]    Printed on:04/25/18 104   Medication Information                      benztropine (COGENTIN) 1 MG Tab  Take 1 mg by mouth 2 times a day.             FLUoxetine (PROZAC) 20 MG Cap  Take 60 mg by mouth every day.             LORazepam (ATIVAN) 0.5 MG Tab  Take 0.5 mg by mouth 2 times a day as needed for Anxiety.             metFORMIN (GLUCOPHAGE) 500 MG Tab  Take 1 Tab by mouth 2 times a day, with meals.             paliperidone (INVEGA) 3 MG ER tablet  Take 3 mg by mouth every morning.             QUEtiapine (SEROQUEL) 100 MG Tab  Take 100-200 mg by mouth 3 times a day. 100 mg in morning  100 mg at noon  200 mg at bedtime               - Augmentin 1 BID for 7 days.        DIET  Orders Placed This Encounter   Procedures   • DIET ORDER     Standing Status:   Standing     Number of Occurrences:   1     Order Specific Question:   Diet:     Answer:   Diabetic [3]       ACTIVITY  As tolerated.  Non-weight bearing LEFT leg. Heel weight bearing only for now as per ortho recs.      CONSULTATIONS  Orthopedic surgery    PROCEDURES.  1.  Left first toe amputation through the metatarsophalangeal joint.  2.  Left toe irrigation and debridement dorsal and plantar components.      LABORATORY  Lab Results   Component Value Date/Time    SODIUM 139 04/25/2018 02:56 AM    POTASSIUM 4.4 04/25/2018 02:56 AM    CHLORIDE 110 04/25/2018 02:56 AM    CO2 15 (L) 04/25/2018 02:56 AM    GLUCOSE 115 (H) 04/25/2018 02:56 AM    BUN 17 04/25/2018 02:56 AM    CREATININE 1.08 04/25/2018 02:56 AM        Lab Results   Component Value Date/Time    WBC 4.9 04/25/2018 02:56 AM    HEMOGLOBIN 13.2 (L) 04/25/2018 02:56 AM    HEMATOCRIT 39.6 (L) 04/25/2018 02:56 AM    PLATELETCT 140 (L) 04/25/2018 02:56 AM         Total time of the discharge process exceeds 32 minutes

## 2018-04-25 NOTE — PROGRESS NOTES
RN went back into pt room after d/c and saw script for po antibiotic on bedside table. Script placed in pt chart. No home phone number listed to call pt to come get script. Will call hospitalist and let them know about antibiotic. Will ask doctor if they can call in the script to the patient's pharmacy.

## 2018-04-25 NOTE — PROGRESS NOTES
Spoke with Dr. Maurice, hospitalist, and let him know that pt did not take is antibiotic script with him.

## 2018-04-25 NOTE — DISCHARGE INSTRUCTIONS
Discharge Instructions    Discharged to home by car with relative. Discharged via wheelchair, hospital escort: Refused.  Special equipment needed: Not Applicable    Be sure to schedule a follow-up appointment with your primary care doctor or any specialists as instructed.     Discharge Plan:   Influenza Vaccine Indication: Patient Refuses    I understand that a diet low in cholesterol, fat, and sodium is recommended for good health. Unless I have been given specific instructions below for another diet, I accept this instruction as my diet prescription.   Other diet: Diabetic    Special Instructions: None    · Is patient discharged on Warfarin / Coumadin?   No     Depression / Suicide Risk    As you are discharged from this [facility] facility, it is important to learn how to keep safe from harming yourself.    Recognize the warning signs:  · Abrupt changes in personality, positive or negative- including increase in energy   · Giving away possessions  · Change in eating patterns- significant weight changes-  positive or negative  · Change in sleeping patterns- unable to sleep or sleeping all the time   · Unwillingness or inability to communicate  · Depression  · Unusual sadness, discouragement and loneliness  · Talk of wanting to die  · Neglect of personal appearance   · Rebelliousness- reckless behavior  · Withdrawal from people/activities they love  · Confusion- inability to concentrate     If you or a loved one observes any of these behaviors or has concerns about self-harm, here's what you can do:  · Talk about it- your feelings and reasons for harming yourself  · Remove any means that you might use to hurt yourself (examples: pills, rope, extension cords, firearm)  · Get professional help from the community (Mental Health, Substance Abuse, psychological counseling)  · Do not be alone:Call your Safe Contact- someone whom you trust who will be there for you.  · Call your local CRISIS HOTLINE 289-4485 or  934-149-8702  · Call your local Children's Mobile Crisis Response Team [place] (607) 819-5509 or www.[employer].SongAfter  · Call the toll free National Suicide Prevention Hotlines   · National Suicide Prevention Lifeline 770-624-CWCV (8409)  · National [employer] Line Network 800-SUICIDE (494-6298)

## 2018-04-25 NOTE — PROGRESS NOTES
"LIMB PRESERVATION SERVICE     Date of Consultation: 4/24/2018    Interval History:    52 y.o. male with a past medical history that includes borderline diabetes, anxiety, HTN, Hep C, and recent left great toe amputation , admitted to [facility] from LPS rounds at wound clinic on 4/20 due to deterioration of his  L great toe amputation site and a new R great toe wound.     4/24- POD #1 s/p Left first toe amputation through the metatarsophalangeal joint, and Left toe irrigation and debridement dorsal and plantar components.  ID managing abx    Review of Systems:   GENERAL:  he denies any fever, chills.  Pain well controlled   PULMONARY: he denies shortness of breath or cough  GASTROINTESTINAL: he has no nausea, vomiting, diarrhea or constipation.   LOWER EXTREMITIES: he denies claudication. He has had some swelling of his foot  NEUROLOGIC: denies numbness or tingling in feet,    Physical Exam:   VITAL SIGNS: Blood pressure 106/88, pulse 68, temperature 36.8 °C (98.2 °F), resp. rate 18, height 1.93 m (6' 3.98\"), weight 107.2 kg (236 lb 5.3 oz), SpO2 95 %.  GENERAL: Well developed, well nourished, in no acute distress.   LOWER EXTREMITIES: Dressing intact to left foot, toes warm, brisk capillary refill   NEUROLOGIC: He is alert and oriented. Flat affect     Labs:   Recent Labs      04/22/18 0159 04/23/18   0118  04/24/18   0205   WBC  4.5*  4.5*  4.2*   RBC  4.15*  4.17*  4.02*   HEMOGLOBIN  12.3*  12.5*  12.2*   HEMATOCRIT  38.3*  37.9*  35.7*   MCV  92.3  90.9  88.8   MCH  29.6  30.0  30.3   MCHC  32.1*  33.0*  34.2   RDW  44.8  42.2  41.5   PLATELETCT  129*  135*  139*   MPV  9.5  9.7  10.2     Recent Labs      04/22/18   0159 04/23/18   0118  04/24/18   0205   SODIUM  139  136  138   POTASSIUM  4.1  3.9  3.7   CHLORIDE  108  106  106   CO2  25  23  25   GLUCOSE  105*  116*  109*   BUN  14  16  15         Assessment/Plan:      1. Surgical wound dehiscence- Dehiscence of L great toe amputation site, s/p amputation " through proximal phalanx on 4/1 by Dr. Baez.  Amputation through MTPJ and I&D on 4/23 by Dr. Easton.  Surgical dressing change tomorrow.  Offloading shoe ordered. HWB in shoe    2. Wound infection- Abx per ID.  Op cultures pending.

## 2018-04-25 NOTE — PROGRESS NOTES
Pt discharged to home today. Discharge paperwork gone over with patient and script given for po antibiotic. Pt has no questions at this time. Pt refused to wear the post op shoe, and will not take it with him.

## 2018-04-25 NOTE — PROGRESS NOTES
Pharmacy Kinetics 52 y.o. male on vancomycin day # 6  4/25/2018    Currently Dose: Vancomycin 2000 mg iv q24hr (~19 mg/kg/dose)    Indication for Treatment: SSTI  ID Service Following: Yes     Pertinent history per medical record: Admitted on 4/20/2018 for diabetic foot ulcer after recent amputation (secondary to OM). ID and surgery services consulted. Admitted to orthopedics.     Other antibiotics: ceftriaxone 2 gm iv q24h; metronidazole 500 mg po q8h     Allergies: Patient has no known allergies.      List concerns for accumulation of vancomycin: age 52, DM     Pertinent cultures to date:   Results     Procedure Component Value Units Date/Time    CULTURE TISSUE W/ GRM STAIN [[id number]] Collected:  04/23/18 1900    Order Status:  Completed Specimen:  Tissue Updated:  04/24/18 1608     Significant Indicator NEG     Source TISS     Site Proximal Phalynx     Tissue Culture Rare growth  mixed skin christine.     Gram Stain Result Few WBCs.  No organisms seen.      ANAEROBIC CULTURE [[id number]] Collected:  04/23/18 1900    Order Status:  Completed Specimen:  Tissue Updated:  04/24/18 1608     Significant Indicator NEG     Source TISS     Site Proximal Phalynx     Anaerobic Culture, Culture Res Culture in progress.    GRAM STAIN [[id number]] Collected:  04/23/18 1900    Order Status:  Completed Specimen:  Tissue Updated:  04/24/18 0649     Significant Indicator .     Source TISS     Site Proximal Phalynx     Gram Stain Result Few WBCs.  No organisms seen.      BLOOD CULTURE [[id number]] Collected:  04/20/18 1008    Order Status:  Completed Specimen:  Blood from Peripheral Updated:  04/21/18 0850     Significant Indicator NEG     Source BLD     Site PERIPHERAL     Blood Culture No Growth    Note: Blood cultures are incubated for 5 days and  are monitored continuously.Positive blood cultures  are called to the RN and reported as soon as  they are identified.      Narrative:       1 of 2 for Blood Culture x 2 sites order. Per  "Hospital  Policy: Only change Specimen Src: to \"Line\" if specified by  physician order.    BLOOD CULTURE [[id number]] Collected:  18 1027    Order Status:  Completed Specimen:  Blood from Peripheral Updated:  18 0850     Significant Indicator NEG     Source BLD     Site PERIPHERAL     Blood Culture No Growth    Note: Blood cultures are incubated for 5 days and  are monitored continuously.Positive blood cultures  are called to the RN and reported as soon as  they are identified.      Narrative:       2 of 2 blood culture x2  Sites order. Per Hospital Policy:  Only change Specimen Src: to \"Line\" if specified by physician  order.        Recent Labs      18   0118  18   0205  18   0256   WBC  4.5*  4.2*  4.9   NEUTSPOLYS  39.00*  47.00  42.50*     Recent Labs      18   0118  18   0205  18   0256   BUN  16  15  17   CREATININE  0.72  0.86  1.08   ALBUMIN  3.2  3.4   --      Recent Labs      18   1028  18   1010  18   0256   VANCOTROUGH  16.2  17.9  31.2* (drawn ~4 hours post infusion)     Intake/Output Summary (Last 24 hours) at 18 0847  Last data filed at 18 1806   Gross per 24 hour   Intake              480 ml   Output             1300 ml   Net             -820 ml      Blood pressure (!) 93/64, pulse 68, temperature 37.1 °C (98.8 °F), resp. rate 16, height 1.93 m (6' 3.98\"), weight 107.2 kg (236 lb 5.3 oz), SpO2 94 %. Temp (24hrs), Av.8 °C (98.3 °F), Min:36.3 °C (97.3 °F), Max:37.1 °C (98.8 °F)    Estimated Creatinine Clearance: 106.3 mL/min (by C-G formula based on SCr of 1.08 mg/dL).    A/P   1. Vancomycin dose change: not indicated   2. Next vancomycin level: 18 @  3. Goal trough: 12-16 mcg/mL  4. Comments: Hypotensive. Afebrile. WBC stable. CrCl ~106 mL/min (SCr with another large increase overnight). No new microbiology. Factors for accumulation noted. Large body habitus noted. Errant trough level noted (drawn 18). Given " SCr trend repeat trough ordered 4/25/18 prior to PM dose. Pharmacy will follow and continue to adjust as appropriate.    Min Meza, PharmD

## 2018-04-25 NOTE — PROGRESS NOTES
Off-loading shoe was delivered and fitted to patient LLE.  If any further assistance needed, please call extension [phone] or place order for Ortho Technician assistance as a communication order in [place].

## 2018-04-26 LAB
BACTERIA SPEC ANAEROBE CULT: NORMAL
SIGNIFICANT IND 70042: NORMAL
SITE SITE: NORMAL
SOURCE SOURCE: NORMAL

## 2018-05-09 ENCOUNTER — HOSPITAL ENCOUNTER (EMERGENCY)
Facility: MEDICAL CENTER | Age: 53
End: 2018-05-09
Attending: EMERGENCY MEDICINE
Payer: MEDICAID

## 2018-05-09 VITALS
HEART RATE: 102 BPM | DIASTOLIC BLOOD PRESSURE: 92 MMHG | HEIGHT: 75 IN | TEMPERATURE: 96.5 F | RESPIRATION RATE: 18 BRPM | WEIGHT: 228.4 LBS | BODY MASS INDEX: 28.4 KG/M2 | SYSTOLIC BLOOD PRESSURE: 138 MMHG | OXYGEN SATURATION: 98 %

## 2018-05-09 DIAGNOSIS — F41.9 ANXIETY: Primary | ICD-10-CM

## 2018-05-09 PROCEDURE — 99283 EMERGENCY DEPT VISIT LOW MDM: CPT

## 2018-05-09 PROCEDURE — A9270 NON-COVERED ITEM OR SERVICE: HCPCS | Performed by: EMERGENCY MEDICINE

## 2018-05-09 PROCEDURE — 700102 HCHG RX REV CODE 250 W/ 637 OVERRIDE(OP): Performed by: EMERGENCY MEDICINE

## 2018-05-09 RX ORDER — LORAZEPAM 1 MG/1
1 TABLET ORAL ONCE
Status: COMPLETED | OUTPATIENT
Start: 2018-05-09 | End: 2018-05-09

## 2018-05-09 RX ADMIN — LORAZEPAM 1 MG: 1 TABLET ORAL at 04:33

## 2018-05-09 NOTE — ED PROVIDER NOTES
"ED Provider Note    CHIEF COMPLAINT  Chief Complaint   Patient presents with   • Anxiety     started late this afternoon, pt is out of lorazepam       HPI  Wilbert Kennith Yeomans Jr. is a 52 y.o. male who ambulates to the emergency department for anxiety. Patient describes long history of anxiety states worse over the last few days as he is out of his \"Ativan and propranolol.\"  Patient states he has not been able to sleep \"I walk a lot anyway but now I feel like I have to keep moving.\" He is awaiting refill date the middle of this month for his medications, his very poor historian and admittedly only poorly compliant with his other antipsychotic medications. He does have follow-up at the [facility]. He denies any suicidal or homicidal ideation. Denies hallucinations.    REVIEW OF SYSTEMS  See HPI for further details.     PAST MEDICAL HISTORY   has a past medical history of Anxiety; Bronchitis; Dental disorder; Diabetes (HCC); Hepatitis C (1997); Hypercholesteremia; Hypertension; and Psychiatric disorder.    SOCIAL HISTORY  Social History     Social History Main Topics   • Smoking status: Current Some Day Smoker     Packs/day: 0.25     Years: 30.00     Types: Cigarettes   • Smokeless tobacco: Never Used   • Alcohol use No   • Drug use: No      Comment: last time used 10+ years   • Sexual activity: Not on file       SURGICAL HISTORY   has a past surgical history that includes ventral hernia repair laparoscopic (3/30/2016); toe amputation (Left, 4/1/2018); and toe amputation (Left, 4/23/2018).    CURRENT MEDICATIONS  Home Medications    **Home medications have not yet been reviewed for this encounter**         ALLERGIES  No Known Allergies    PHYSICAL EXAM  VITAL SIGNS: /122 Comment: Pt is shaking and unable to hold still  Pulse (!) 116   Temp 35.8 °C (96.5 °F)   Resp 18   Ht 1.905 m (6' 3\")   Wt 103.6 kg (228 lb 6.3 oz)   SpO2 98%   BMI 28.55 kg/m²   Pulse ox interpretation: I interpret this pulse ox " as normal.  Constitutional: Alert. Restless, anxious. Pacing in the room. Fidgets with his hands.  HENT: Normocephalic, atraumatic. Bilateral external ears normal, Nose normal. Moist mucous membranes.    Eyes: Pupils are equal and reactive, Conjunctiva normal.   Neck: Normal range of motion, Supple  Cardiovascular: Normal peripheral perfusion.  Thorax & Lungs: Nonlabored respirations.  Skin: Warm, Dry  Musculoskeletal: Good range of motion in all major joints. Ambulates without difficulty.  Neurologic: Alert. Moves 4 extremities spontaneously. Ambulates without difficulty.  Psychiatric: Restless, anxious. Pacing and room. Fidgets with hands. Pressured speech. Cooperative. Denies suicidal or homicidal ideation.        COURSE & MEDICAL DECISION MAKING  Evaluation for anxiety is clinically appropriate with this. However patient describes long history for the same, and although he is a poor historian, suspect other behavioral or psychiatric diagnoses given his list of medications to include Seroquel, Cogentin, Invega and Prozac.  reviewed, patient does have recurring monthly prescriptions for Ativan, however it is noted that his dose was decreased at 0.5 mg twice daily on the last fill and I suspect this is why he ran out early. I do not think is appropriate to refill this medication and not others at this time, therefore patient has been given a 1 time oral dose 1 mg Ativan and is encouraged to walk into the [facility] clinic for additional evaluation today.    Patient reevaluated approximately 45 minutes after Ativan was given, he is no longer pacing in the room, he does continue to fidget with his hands but resting in bed. He appears quite competent to care for himself and agrees to follow up today with primary care. No indication for legal hold. No suicidal or homicidal ideation. Active hallucination.    Patient is stable for discharge at this time, anticipatory guidance provided, close follow-up is encouraged, and  strict ED return instructions have been detailed. Patient is agreeable to the disposition and plan.    Patient's blood pressure was elevated in the emergency department, and has been referred to primary care for close monitoring.      FINAL IMPRESSION  (F41.9) Anxiety  (primary encounter diagnosis)      Electronically signed by: Awa Grover, 5/9/2018 5:05 AM      This dictation was created using voice recognition software. The accuracy of the dictation is limited to the abilities of the software. I expect there may be some errors of grammar and possibly content. The nursing notes were reviewed and certain aspects of this information were incorporated into this note.

## 2018-05-09 NOTE — ED NOTES
"Wilbert Kennith Yeomans Jr.  52 y.o.  Blood Pressure: 142/122 (Pt is shaking and unable to hold still), Pulse: (!) 116, Respiration: 18, Temperature: 35.8 °C (96.5 °F), Height: 190.5 cm (6' 3\"), Weight: 103.6 kg (228 lb 6.3 oz), BMI (Calculated): 28.55, BSA (Calculated): 2.3, Pulse Oximetry: 98 %, O2 Delivery: None (Room Air)    Chief Complaint   Patient presents with   • Anxiety     started late this afternoon, pt is out of lorazepam     Pt is anxious with increased arm and leg movements. Pt speaks full sentences, A&Ox4. Pt safe to be returned to lobby, educated on triage process and wait times, instructed to notify any staff of worsening/changing of symptoms.        "

## 2018-05-09 NOTE — ED NOTES
Pt Given discharge instructions/  home care instructions, Pt verbalized understanding of instructions given, pt ambulatory to [facility].

## 2018-05-09 NOTE — DISCHARGE INSTRUCTIONS
Follow-up with primary care at the [facility] TODAY for reevaluation and medication management as well as close blood pressure monitoring.    Return to the emergency department for intractable anxiety, suicidal or homicidal ideation, hallucinations or other new concerns.    Panic Attacks  Panic attacks are sudden, short feelings of great fear or discomfort. You may have them for no reason when you are relaxed, when you are uneasy (anxious), or when you are sleeping.  Follow these instructions at home:  · Take all your medicines as told.  · Check with your doctor before starting new medicines.  · Keep all doctor visits.  Contact a doctor if:  · You are not able to take your medicines as told.  · Your symptoms do not get better.  · Your symptoms get worse.  Get help right away if:  · Your attacks seem different than your normal attacks.  · You have thoughts about hurting yourself or others.  · You take panic attack medicine and you have a side effect.  This information is not intended to replace advice given to you by your health care provider. Make sure you discuss any questions you have with your health care provider.  Document Released: 01/20/2012 Document Revised: 05/25/2017 Document Reviewed: 08/01/2014  Elsevier Interactive Patient Education © 2017 [employer]

## 2018-05-11 ENCOUNTER — HOSPITAL ENCOUNTER (INPATIENT)
Facility: MEDICAL CENTER | Age: 53
LOS: 46 days | DRG: 516 | End: 2018-06-26
Attending: HOSPITALIST | Admitting: HOSPITALIST
Payer: MEDICAID

## 2018-05-11 ENCOUNTER — HOSPITAL ENCOUNTER (OUTPATIENT)
Facility: MEDICAL CENTER | Age: 53
End: 2018-05-11

## 2018-05-11 ENCOUNTER — OFFICE VISIT (OUTPATIENT)
Dept: WOUND CARE | Facility: MEDICAL CENTER | Age: 53
End: 2018-05-11
Attending: NURSE PRACTITIONER
Payer: MEDICAID

## 2018-05-11 DIAGNOSIS — E11.621 DIABETIC ULCER OF LEFT MIDFOOT ASSOCIATED WITH TYPE 2 DIABETES MELLITUS, WITH MUSCLE INVOLVEMENT WITHOUT EVIDENCE OF NECROSIS (HCC): ICD-10-CM

## 2018-05-11 DIAGNOSIS — T14.8XXA WOUND INFECTION: ICD-10-CM

## 2018-05-11 DIAGNOSIS — L97.529 DIABETIC ULCER OF TOE OF LEFT FOOT ASSOCIATED WITH TYPE 2 DIABETES MELLITUS, UNSPECIFIED ULCER STAGE (HCC): ICD-10-CM

## 2018-05-11 DIAGNOSIS — Z89.412 STATUS POST AMPUTATION OF GREAT TOE, LEFT (HCC): ICD-10-CM

## 2018-05-11 DIAGNOSIS — T81.31XD POSTOPERATIVE WOUND DEHISCENCE, SUBSEQUENT ENCOUNTER: ICD-10-CM

## 2018-05-11 DIAGNOSIS — L97.425 DIABETIC ULCER OF LEFT MIDFOOT ASSOCIATED WITH TYPE 2 DIABETES MELLITUS, WITH MUSCLE INVOLVEMENT WITHOUT EVIDENCE OF NECROSIS (HCC): ICD-10-CM

## 2018-05-11 DIAGNOSIS — E11.40 TYPE 2 DIABETES MELLITUS WITH DIABETIC NEUROPATHY, WITHOUT LONG-TERM CURRENT USE OF INSULIN (HCC): ICD-10-CM

## 2018-05-11 DIAGNOSIS — E11.42 DIABETIC PERIPHERAL NEUROPATHY (HCC): ICD-10-CM

## 2018-05-11 DIAGNOSIS — L08.9 WOUND INFECTION: ICD-10-CM

## 2018-05-11 DIAGNOSIS — F79 MENTAL DISABILITY: ICD-10-CM

## 2018-05-11 DIAGNOSIS — E11.621 DIABETIC ULCER OF TOE OF LEFT FOOT ASSOCIATED WITH TYPE 2 DIABETES MELLITUS, UNSPECIFIED ULCER STAGE (HCC): ICD-10-CM
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
## 2018-05-11 PROBLEM — L97.525 DIABETIC ULCER OF LEFT FOOT ASSOCIATED WITH TYPE 2 DIABETES MELLITUS, WITH MUSCLE INVOLVEMENT WITHOUT EVIDENCE OF NECROSIS (HCC): Status: ACTIVE | Noted: 2018-04-20

## 2018-05-11 PROBLEM — T81.31XA DEHISCENCE OF SURGICAL WOUND: Status: ACTIVE | Noted: 2018-05-11

## 2018-05-11 PROBLEM — E11.49 TYPE 2 DIABETES MELLITUS WITH NEUROLOGIC COMPLICATION, WITHOUT LONG-TERM CURRENT USE OF INSULIN (HCC): Status: ACTIVE | Noted: 2018-03-30

## 2018-05-11 LAB — GLUCOSE BLD-MCNC: 144 MG/DL (ref 65–99)

## 2018-05-11 PROCEDURE — 99223 1ST HOSP IP/OBS HIGH 75: CPT | Performed by: HOSPITALIST

## 2018-05-11 PROCEDURE — 700105 HCHG RX REV CODE 258: Performed by: HOSPITALIST

## 2018-05-11 PROCEDURE — 99213 OFFICE O/P EST LOW 20 MIN: CPT | Performed by: NURSE PRACTITIONER

## 2018-05-11 PROCEDURE — 770006 HCHG ROOM/CARE - MED/SURG/GYN SEMI*

## 2018-05-11 PROCEDURE — 82962 GLUCOSE BLOOD TEST: CPT

## 2018-05-11 PROCEDURE — 700102 HCHG RX REV CODE 250 W/ 637 OVERRIDE(OP): Performed by: HOSPITALIST

## 2018-05-11 PROCEDURE — A9270 NON-COVERED ITEM OR SERVICE: HCPCS | Performed by: HOSPITALIST

## 2018-05-11 PROCEDURE — 700111 HCHG RX REV CODE 636 W/ 250 OVERRIDE (IP): Performed by: HOSPITALIST

## 2018-05-11 RX ORDER — POLYETHYLENE GLYCOL 3350 17 G/17G
1 POWDER, FOR SOLUTION ORAL
Status: DISCONTINUED | OUTPATIENT
Start: 2018-05-11 | End: 2018-06-12

## 2018-05-11 RX ORDER — OXYCODONE HYDROCHLORIDE 5 MG/1
5 TABLET ORAL EVERY 4 HOURS PRN
Status: DISCONTINUED | OUTPATIENT
Start: 2018-05-11 | End: 2018-05-16

## 2018-05-11 RX ORDER — SODIUM CHLORIDE 9 MG/ML
INJECTION, SOLUTION INTRAVENOUS CONTINUOUS
Status: DISCONTINUED | OUTPATIENT
Start: 2018-05-11 | End: 2018-05-18

## 2018-05-11 RX ORDER — LORAZEPAM 0.5 MG/1
0.5 TABLET ORAL 2 TIMES DAILY PRN
Status: DISCONTINUED | OUTPATIENT
Start: 2018-05-11 | End: 2018-05-13

## 2018-05-11 RX ORDER — BISACODYL 10 MG
10 SUPPOSITORY, RECTAL RECTAL
Status: DISCONTINUED | OUTPATIENT
Start: 2018-05-11 | End: 2018-06-12

## 2018-05-11 RX ORDER — QUETIAPINE FUMARATE 100 MG/1
100-200 TABLET, FILM COATED ORAL 3 TIMES DAILY
Status: DISCONTINUED | OUTPATIENT
Start: 2018-05-11 | End: 2018-05-11

## 2018-05-11 RX ORDER — QUETIAPINE FUMARATE 100 MG/1
100 TABLET, FILM COATED ORAL 2 TIMES DAILY
Status: DISCONTINUED | OUTPATIENT
Start: 2018-05-12 | End: 2018-06-12

## 2018-05-11 RX ORDER — DEXTROSE MONOHYDRATE 25 G/50ML
25 INJECTION, SOLUTION INTRAVENOUS
Status: DISCONTINUED | OUTPATIENT
Start: 2018-05-11 | End: 2018-05-29

## 2018-05-11 RX ORDER — OXYCODONE HYDROCHLORIDE 10 MG/1
10 TABLET ORAL EVERY 4 HOURS PRN
Status: DISCONTINUED | OUTPATIENT
Start: 2018-05-11 | End: 2018-05-16

## 2018-05-11 RX ORDER — AMOXICILLIN 250 MG
2 CAPSULE ORAL 2 TIMES DAILY
Status: DISCONTINUED | OUTPATIENT
Start: 2018-05-11 | End: 2018-06-12

## 2018-05-11 RX ORDER — HYDROCODONE BITARTRATE AND ACETAMINOPHEN 5; 325 MG/1; MG/1
1-2 TABLET ORAL EVERY 4 HOURS PRN
COMMUNITY
End: 2018-08-10

## 2018-05-11 RX ORDER — QUETIAPINE FUMARATE 100 MG/1
200 TABLET, FILM COATED ORAL EVERY EVENING
Status: DISCONTINUED | OUTPATIENT
Start: 2018-05-11 | End: 2018-06-12

## 2018-05-11 RX ORDER — BENZTROPINE MESYLATE 1 MG/1
1 TABLET ORAL 2 TIMES DAILY
Status: DISCONTINUED | OUTPATIENT
Start: 2018-05-11 | End: 2018-06-12

## 2018-05-11 RX ORDER — GABAPENTIN 100 MG/1
100 CAPSULE ORAL 3 TIMES DAILY
Status: DISCONTINUED | OUTPATIENT
Start: 2018-05-11 | End: 2018-05-16

## 2018-05-11 RX ORDER — MORPHINE SULFATE 4 MG/ML
2 INJECTION, SOLUTION INTRAMUSCULAR; INTRAVENOUS EVERY 4 HOURS PRN
Status: DISCONTINUED | OUTPATIENT
Start: 2018-05-11 | End: 2018-05-16

## 2018-05-11 RX ORDER — FLUOXETINE HYDROCHLORIDE 20 MG/1
60 CAPSULE ORAL DAILY
Status: DISCONTINUED | OUTPATIENT
Start: 2018-05-12 | End: 2018-06-12

## 2018-05-11 RX ORDER — PALIPERIDONE 3 MG/1
3 TABLET, EXTENDED RELEASE ORAL EVERY MORNING
Status: DISCONTINUED | OUTPATIENT
Start: 2018-05-12 | End: 2018-05-11

## 2018-05-11 RX ADMIN — METFORMIN HYDROCHLORIDE 500 MG: 500 TABLET, FILM COATED ORAL at 22:31

## 2018-05-11 RX ADMIN — MORPHINE SULFATE 2 MG: 4 INJECTION INTRAVENOUS at 20:33

## 2018-05-11 RX ADMIN — SODIUM CHLORIDE 1000 ML: 900 INJECTION INTRAVENOUS at 18:30

## 2018-05-11 RX ADMIN — AMPICILLIN SODIUM AND SULBACTAM SODIUM 3 G: 2; 1 INJECTION, POWDER, FOR SOLUTION INTRAMUSCULAR; INTRAVENOUS at 20:18

## 2018-05-11 RX ADMIN — SENNOSIDES AND DOCUSATE SODIUM 2 TABLET: 8.6; 5 TABLET ORAL at 22:32

## 2018-05-11 RX ADMIN — BENZTROPINE MESYLATE 1 MG: 1 TABLET ORAL at 22:32

## 2018-05-11 RX ADMIN — QUETIAPINE FUMARATE 200 MG: 100 TABLET ORAL at 22:31

## 2018-05-11 RX ADMIN — GABAPENTIN 100 MG: 100 CAPSULE ORAL at 22:31

## 2018-05-11 RX ADMIN — VANCOMYCIN HYDROCHLORIDE 2600 MG: 100 INJECTION, POWDER, LYOPHILIZED, FOR SOLUTION INTRAVENOUS at 18:30

## 2018-05-11 RX ADMIN — OXYCODONE HYDROCHLORIDE 10 MG: 10 TABLET ORAL at 18:20

## 2018-05-11 RX ADMIN — GABAPENTIN 100 MG: 100 CAPSULE ORAL at 18:20

## 2018-05-11 ASSESSMENT — PATIENT HEALTH QUESTIONNAIRE - PHQ9
SUM OF ALL RESPONSES TO PHQ9 QUESTIONS 1 AND 2: 0
2. FEELING DOWN, DEPRESSED, IRRITABLE, OR HOPELESS: NOT AT ALL
2. FEELING DOWN, DEPRESSED, IRRITABLE, OR HOPELESS: NOT AT ALL
1. LITTLE INTEREST OR PLEASURE IN DOING THINGS: NOT AT ALL
SUM OF ALL RESPONSES TO PHQ9 QUESTIONS 1 AND 2: 0
1. LITTLE INTEREST OR PLEASURE IN DOING THINGS: NOT AT ALL

## 2018-05-11 ASSESSMENT — PAIN SCALES - GENERAL
PAINLEVEL_OUTOF10: 3
PAINLEVEL_OUTOF10: 7
PAINLEVEL_OUTOF10: 5

## 2018-05-11 ASSESSMENT — LIFESTYLE VARIABLES: EVER_SMOKED: YES

## 2018-05-11 NOTE — PROGRESS NOTES
LIMB PRESERVATION SERVICE ROUNDS    Patient seen in collaboration with interdisciplinary team during LPS rounds in wound clinic for evaluation of his left great toe amputation site.  Sent to clinic from [facility] d/t deterioration of the surgical incision. The toe was first amputated on 4/1/18 by Dr. Baez, and then because of dehiscence, was revised on 4/23 by Dr. Easton.       Patient states blood sugars are averaging - He does not check his blood sugars.  A1c during recent hospitalization was 5.8    Past medical history includes:   Patient Active Problem List   Diagnosis   • Hernia, ventral   • Type 2 diabetes mellitus (HCC)   • Hypokalemia   • Status post amputation of great toe, left (HCC)   • Psychiatric disorder   • Diabetic foot ulcer ([facility])       OBJECTIVE FINDINGS: Left great toe amputation site macerated, incision dehisced., open wound probes to bone.  Periwound erythemic and edematous    Labs/diagnostic reviewed    PROCEDURE: sutures removed, wound irrigated with NS, filled with Aquacel Ag, covered with gauze, secured with kerlix  TC to Bed control nurse to arrange for admission.  Spoke with Dr. Thornton, pt to be accepted as soon as a bed is available    PLAN:   Wound Care: Aquacel Ag placed today, pt needs surgical revision.  Admit to [facility]  Imaging: MRI once admitted  Offloading: Continue to wear offloading shoe  Antibiotics: ID to consult once inpatient  Surgery: Will require revision.  Dr. Baez aware  Referral: N/A      LPS Follow up: Post-op

## 2018-05-11 NOTE — PROGRESS NOTES
Patient being admitted for infected diabetic toe ulcer from [facility] Wound clinic by Dr. Thornton. Please page admitting hospitalist upon patient's arrival to floor and release sign and held admission order.

## 2018-05-12 ENCOUNTER — APPOINTMENT (OUTPATIENT)
Dept: RADIOLOGY | Facility: MEDICAL CENTER | Age: 53
DRG: 516 | End: 2018-05-12
Attending: NURSE PRACTITIONER
Payer: MEDICAID

## 2018-05-12 LAB
ANION GAP SERPL CALC-SCNC: 6 MMOL/L (ref 0–11.9)
BASOPHILS # BLD AUTO: 0.8 % (ref 0–1.8)
BASOPHILS # BLD: 0.04 K/UL (ref 0–0.12)
BUN SERPL-MCNC: 17 MG/DL (ref 8–22)
CALCIUM SERPL-MCNC: 8.6 MG/DL (ref 8.5–10.5)
CHLORIDE SERPL-SCNC: 109 MMOL/L (ref 96–112)
CO2 SERPL-SCNC: 23 MMOL/L (ref 20–33)
CREAT SERPL-MCNC: 0.84 MG/DL (ref 0.5–1.4)
EOSINOPHIL # BLD AUTO: 0.09 K/UL (ref 0–0.51)
EOSINOPHIL NFR BLD: 1.8 % (ref 0–6.9)
ERYTHROCYTE [DISTWIDTH] IN BLOOD BY AUTOMATED COUNT: 46.9 FL (ref 35.9–50)
GLUCOSE BLD-MCNC: 103 MG/DL (ref 65–99)
GLUCOSE BLD-MCNC: 122 MG/DL (ref 65–99)
GLUCOSE BLD-MCNC: 125 MG/DL (ref 65–99)
GLUCOSE BLD-MCNC: 137 MG/DL (ref 65–99)
GLUCOSE SERPL-MCNC: 114 MG/DL (ref 65–99)
HCT VFR BLD AUTO: 33.5 % (ref 42–52)
HGB BLD-MCNC: 11.3 G/DL (ref 14–18)
IMM GRANULOCYTES # BLD AUTO: 0.01 K/UL (ref 0–0.11)
IMM GRANULOCYTES NFR BLD AUTO: 0.2 % (ref 0–0.9)
LYMPHOCYTES # BLD AUTO: 2.01 K/UL (ref 1–4.8)
LYMPHOCYTES NFR BLD: 40.6 % (ref 22–41)
MCH RBC QN AUTO: 30.6 PG (ref 27–33)
MCHC RBC AUTO-ENTMCNC: 33.7 G/DL (ref 33.7–35.3)
MCV RBC AUTO: 90.8 FL (ref 81.4–97.8)
MONOCYTES # BLD AUTO: 0.53 K/UL (ref 0–0.85)
MONOCYTES NFR BLD AUTO: 10.7 % (ref 0–13.4)
NEUTROPHILS # BLD AUTO: 2.27 K/UL (ref 1.82–7.42)
NEUTROPHILS NFR BLD: 45.9 % (ref 44–72)
NRBC # BLD AUTO: 0 K/UL
NRBC BLD-RTO: 0 /100 WBC
PLATELET # BLD AUTO: 138 K/UL (ref 164–446)
PMV BLD AUTO: 9.4 FL (ref 9–12.9)
POTASSIUM SERPL-SCNC: 3.7 MMOL/L (ref 3.6–5.5)
RBC # BLD AUTO: 3.69 M/UL (ref 4.7–6.1)
SODIUM SERPL-SCNC: 138 MMOL/L (ref 135–145)
WBC # BLD AUTO: 5 K/UL (ref 4.8–10.8)

## 2018-05-12 PROCEDURE — 700111 HCHG RX REV CODE 636 W/ 250 OVERRIDE (IP): Performed by: INTERNAL MEDICINE

## 2018-05-12 PROCEDURE — 87077 CULTURE AEROBIC IDENTIFY: CPT | Mod: 91

## 2018-05-12 PROCEDURE — 160027 HCHG SURGERY MINUTES - 1ST 30 MINS LEVEL 2: Performed by: ORTHOPAEDIC SURGERY

## 2018-05-12 PROCEDURE — 87076 CULTURE ANAEROBE IDENT EACH: CPT

## 2018-05-12 PROCEDURE — 700102 HCHG RX REV CODE 250 W/ 637 OVERRIDE(OP): Performed by: HOSPITALIST

## 2018-05-12 PROCEDURE — 80048 BASIC METABOLIC PNL TOTAL CA: CPT

## 2018-05-12 PROCEDURE — 160009 HCHG ANES TIME/MIN: Performed by: ORTHOPAEDIC SURGERY

## 2018-05-12 PROCEDURE — 87186 SC STD MICRODIL/AGAR DIL: CPT | Mod: 91

## 2018-05-12 PROCEDURE — 36415 COLL VENOUS BLD VENIPUNCTURE: CPT

## 2018-05-12 PROCEDURE — 700111 HCHG RX REV CODE 636 W/ 250 OVERRIDE (IP)

## 2018-05-12 PROCEDURE — 160048 HCHG OR STATISTICAL LEVEL 1-5: Performed by: ORTHOPAEDIC SURGERY

## 2018-05-12 PROCEDURE — 82962 GLUCOSE BLOOD TEST: CPT | Mod: 91

## 2018-05-12 PROCEDURE — 0QBR0ZZ EXCISION OF LEFT TOE PHALANX, OPEN APPROACH: ICD-10-PCS | Performed by: ORTHOPAEDIC SURGERY

## 2018-05-12 PROCEDURE — 87015 SPECIMEN INFECT AGNT CONCNTJ: CPT

## 2018-05-12 PROCEDURE — 73720 MRI LWR EXTREMITY W/O&W/DYE: CPT | Mod: LT

## 2018-05-12 PROCEDURE — 700111 HCHG RX REV CODE 636 W/ 250 OVERRIDE (IP): Performed by: HOSPITALIST

## 2018-05-12 PROCEDURE — 700105 HCHG RX REV CODE 258: Performed by: HOSPITALIST

## 2018-05-12 PROCEDURE — 700102 HCHG RX REV CODE 250 W/ 637 OVERRIDE(OP): Performed by: INTERNAL MEDICINE

## 2018-05-12 PROCEDURE — 160036 HCHG PACU - EA ADDL 30 MINS PHASE I: Performed by: ORTHOPAEDIC SURGERY

## 2018-05-12 PROCEDURE — 160002 HCHG RECOVERY MINUTES (STAT): Performed by: ORTHOPAEDIC SURGERY

## 2018-05-12 PROCEDURE — 85025 COMPLETE CBC W/AUTO DIFF WBC: CPT

## 2018-05-12 PROCEDURE — 160035 HCHG PACU - 1ST 60 MINS PHASE I: Performed by: ORTHOPAEDIC SURGERY

## 2018-05-12 PROCEDURE — 87070 CULTURE OTHR SPECIMN AEROBIC: CPT

## 2018-05-12 PROCEDURE — 87205 SMEAR GRAM STAIN: CPT

## 2018-05-12 PROCEDURE — 770006 HCHG ROOM/CARE - MED/SURG/GYN SEMI*

## 2018-05-12 PROCEDURE — 99233 SBSQ HOSP IP/OBS HIGH 50: CPT | Performed by: INTERNAL MEDICINE

## 2018-05-12 PROCEDURE — 500891 HCHG PACK, ORTHO MAJOR: Performed by: ORTHOPAEDIC SURGERY

## 2018-05-12 PROCEDURE — A6550 NEG PRES WOUND THER DRSG SET: HCPCS | Performed by: ORTHOPAEDIC SURGERY

## 2018-05-12 PROCEDURE — 501488 HCHG SUCTION CANN, WOUNDVAC TRAC: Performed by: ORTHOPAEDIC SURGERY

## 2018-05-12 PROCEDURE — A9270 NON-COVERED ITEM OR SERVICE: HCPCS | Performed by: HOSPITALIST

## 2018-05-12 PROCEDURE — 700101 HCHG RX REV CODE 250

## 2018-05-12 PROCEDURE — 87075 CULTR BACTERIA EXCEPT BLOOD: CPT

## 2018-05-12 RX ORDER — INSULIN GLARGINE 100 [IU]/ML
5 INJECTION, SOLUTION SUBCUTANEOUS 2 TIMES DAILY
Status: DISCONTINUED | OUTPATIENT
Start: 2018-05-12 | End: 2018-05-16

## 2018-05-12 RX ADMIN — INSULIN GLARGINE 5 UNITS: 100 INJECTION, SOLUTION SUBCUTANEOUS at 16:00

## 2018-05-12 RX ADMIN — GABAPENTIN 100 MG: 100 CAPSULE ORAL at 08:30

## 2018-05-12 RX ADMIN — QUETIAPINE FUMARATE 100 MG: 100 TABLET ORAL at 13:38

## 2018-05-12 RX ADMIN — BENZTROPINE MESYLATE 1 MG: 1 TABLET ORAL at 21:26

## 2018-05-12 RX ADMIN — GABAPENTIN 100 MG: 100 CAPSULE ORAL at 21:26

## 2018-05-12 RX ADMIN — QUETIAPINE FUMARATE 100 MG: 100 TABLET ORAL at 08:31

## 2018-05-12 RX ADMIN — AMPICILLIN SODIUM AND SULBACTAM SODIUM 3 G: 2; 1 INJECTION, POWDER, FOR SOLUTION INTRAMUSCULAR; INTRAVENOUS at 23:12

## 2018-05-12 RX ADMIN — AMPICILLIN SODIUM AND SULBACTAM SODIUM 3 G: 2; 1 INJECTION, POWDER, FOR SOLUTION INTRAMUSCULAR; INTRAVENOUS at 00:55

## 2018-05-12 RX ADMIN — ENOXAPARIN SODIUM 40 MG: 100 INJECTION SUBCUTANEOUS at 17:57

## 2018-05-12 RX ADMIN — INSULIN GLARGINE 5 UNITS: 100 INJECTION, SOLUTION SUBCUTANEOUS at 21:31

## 2018-05-12 RX ADMIN — FLUOXETINE HYDROCHLORIDE 60 MG: 20 CAPSULE ORAL at 08:30

## 2018-05-12 RX ADMIN — VANCOMYCIN HYDROCHLORIDE 2000 MG: 100 INJECTION, POWDER, LYOPHILIZED, FOR SOLUTION INTRAVENOUS at 08:36

## 2018-05-12 RX ADMIN — BENZTROPINE MESYLATE 1 MG: 1 TABLET ORAL at 08:31

## 2018-05-12 RX ADMIN — SENNOSIDES AND DOCUSATE SODIUM 2 TABLET: 8.6; 5 TABLET ORAL at 21:27

## 2018-05-12 RX ADMIN — QUETIAPINE FUMARATE 200 MG: 100 TABLET ORAL at 21:26

## 2018-05-12 RX ADMIN — SODIUM CHLORIDE: 900 INJECTION INTRAVENOUS at 21:27

## 2018-05-12 RX ADMIN — LORAZEPAM 0.5 MG: 0.5 TABLET ORAL at 08:54

## 2018-05-12 RX ADMIN — AMPICILLIN SODIUM AND SULBACTAM SODIUM 3 G: 2; 1 INJECTION, POWDER, FOR SOLUTION INTRAMUSCULAR; INTRAVENOUS at 18:18

## 2018-05-12 RX ADMIN — AMPICILLIN SODIUM AND SULBACTAM SODIUM 3 G: 2; 1 INJECTION, POWDER, FOR SOLUTION INTRAMUSCULAR; INTRAVENOUS at 05:33

## 2018-05-12 RX ADMIN — AMPICILLIN SODIUM AND SULBACTAM SODIUM 3 G: 2; 1 INJECTION, POWDER, FOR SOLUTION INTRAMUSCULAR; INTRAVENOUS at 15:56

## 2018-05-12 RX ADMIN — GABAPENTIN 100 MG: 100 CAPSULE ORAL at 13:38

## 2018-05-12 RX ADMIN — OXYCODONE HYDROCHLORIDE 10 MG: 10 TABLET ORAL at 05:36

## 2018-05-12 ASSESSMENT — ENCOUNTER SYMPTOMS
HEARTBURN: 0
FOCAL WEAKNESS: 0
CHILLS: 0
DIZZINESS: 0
NAUSEA: 0
FEVER: 0
MYALGIAS: 0
SHORTNESS OF BREATH: 0
COUGH: 0
BRUISES/BLEEDS EASILY: 0
BLURRED VISION: 0

## 2018-05-12 ASSESSMENT — PAIN SCALES - GENERAL
PAINLEVEL_OUTOF10: 0
PAINLEVEL_OUTOF10: 2
PAINLEVEL_OUTOF10: 3
PAINLEVEL_OUTOF10: 0
PAINLEVEL_OUTOF10: 1
PAINLEVEL_OUTOF10: 0

## 2018-05-12 ASSESSMENT — PATIENT HEALTH QUESTIONNAIRE - PHQ9
1. LITTLE INTEREST OR PLEASURE IN DOING THINGS: NOT AT ALL
SUM OF ALL RESPONSES TO PHQ9 QUESTIONS 1 AND 2: 0
2. FEELING DOWN, DEPRESSED, IRRITABLE, OR HOPELESS: NOT AT ALL

## 2018-05-12 NOTE — ASSESSMENT & PLAN NOTE
Controlled and without hyperglycemia. Last A1c 5.8.  - Continue home metformin  - Continue Neurontin for neuropathy  - ASA 81 and Atorvastatin 20 mg

## 2018-05-12 NOTE — PROGRESS NOTES
2 RN skin check performed. All bony prominences assessed. Foot wounds documented in epic with photos. Right foot open surgical wound.

## 2018-05-12 NOTE — ASSESSMENT & PLAN NOTE
"Hx of diabetic nonhealing ulcer, hx of previous left first toe amputation. Failed outpatient antibiotics. CBC, CMP, and CPK were checked today and are normal.   - ID and surgery evaluated, greatly appreciate  - s/p I&D 05/12 & 05/14 with Dr Baez   - continue IV ceftriaxone and daptomycin through 6/26  - continue wound care  - weekly CBC, CMP, CPK (while on daptomycin):    MRI revealing \"Large open ulcer extending from the great toe amputation site into the first metatarsal head with osteomyelitis of the first metatarsal head\"  "

## 2018-05-12 NOTE — PROGRESS NOTES
"Pharmacy Kinetics 52 y.o. male on vancomycin day # 1 2018    Received vancomycin loading dose    Indication for Treatment: diabetic ulcer    Pertinent history per medical record: Admitted on 2018. History of recent left big toe amputation who was seen in wound clinic. As per patient, they were able to express pus from the wound and the wound was becoming . Wound care nurse has notified dr parada of ortho. He will take patient to OR tomorrow. Seen by [facility] ID previously, was discharged on Augmentin.    Other antibiotics: Unasyn    Allergies: Patient has no known allergies.     List concerns for renal function: Previous high trough on  q12hr dosing, obesity    Pertinent cultures to date:   Expect cultures to be obtained in OR on 18    Many previous cultures   18 left great toe - Diphtheroids, streptococcus   3/31/18 left great toe - streptococcus, Morganella morganii  3/31/18 right great toe - Streptococcus       No results for input(s): WBC, NEUTSPOLYS, BANDSSTABS in the last 72 hours.  No results for input(s): BUN, CREATININE, ALBUMIN in the last 72 hours.  No results for input(s): VANCOTROUGH, VANCOPEAK, VANCORANDOM in the last 72 hours.  Intake/Output Summary (Last 24 hours) at 18 0144  Last data filed at 18 1800   Gross per 24 hour   Intake              400 ml   Output                0 ml   Net              400 ml      Blood pressure 106/68, pulse 66, temperature 37.2 °C (99 °F), resp. rate 17, height 1.93 m (6' 4\"), weight 103.1 kg (227 lb 4.7 oz), SpO2 95 %. Temp (24hrs), Av.1 °C (98.8 °F), Min:36.9 °C (98.5 °F), Max:37.2 °C (99 °F)      A/P   1. Vancomycin dose change: 2000mg q24hr (19 mg/kg), first dose ~12 hours post loading dose to load the patient.  2. Next vancomycin level: Prior to the 4th dose or sooner if clinically indicated (not ordered)  3. Goal trough: 12-16 mcg/ml  4. Comments: Patient on vancomycin at [facility] on previous admissions. Vancomycin " accumilated due to body habitus and had a trough of ~18 on q12 hour dosing. Will dose q24hr dosing. Pharmacy will follow. Narrow therapy as able. Consider ID consult since they have seen pt previously.   5. Labs have not yet resulted. No concerns for HUGO. Will have my colleague follow up results of BMP and adjust if needed    Sunil Mitchell, PharmD, BCPS    Addendum 5/12/18 0448  Renal indices stable compared to baseline values - no change to current vancomycin dosing warranted.      Recent Labs      05/12/18   0150   BUN  17   CREATININE  0.84     Monica Hart, PharmD, BCPS

## 2018-05-12 NOTE — H&P
"HOSPITAL MEDICINE HISTORY/ PHYSICAL    Date & Time note created:    5/11/2018   9:10 PM       Date & Time Patient was seen:   5/11/2018  430 pm    Patient ID:   Name: Yeomans, Wilbert Kennith. YOB: 1965. Age: 52 y.o. male. MRN: [id number]    Admitting Attending:  Kevyn Thornton     PCP : Pcp Pt States None        Chief Complaint:       Foot pain    History of Present Illness:    Yeomans is a 52 y.o. male with history of recent left big toe amputation who was seen in wound clinic. As per patient, they were able to express pus from the wound and the wound was becoming . He is c/o of both feet/ legs having intermittent numbness. He states left foot pain intensity is 7/10, throbbing pain, intermittent, no radiation, improved with vicodin. He does not believe he has diabetes.no fever or chills. No nausea of vomiting. No skin rash. No swollen nodes. No chest pain or sob.Wound care nurse has notified dr parada of ortho. He will take patient to OR tomorrow.    Review of Systems:      per HPI otherwise 14 points reviewed 12 systems neg per AMA/CMS criteria.        Past Medical/ Family / Social history (PFSH):   Past Medical History:   Diagnosis Date   • Hepatitis C 1997   • Anxiety    • Bronchitis     has had 3-4 times   • Dental disorder     upper partial, but lost it   • Diabetes (HCC)     \"borderline\"   • Hypercholesteremia    • Hypertension    • Psychiatric disorder     anxiety     Past Surgical History:   Procedure Laterality Date   • TOE AMPUTATION Left 4/23/2018    Procedure: TOE AMPUTATION GREAT TOE;  Surgeon: Aramis Easton M.D.;  Location: SURGERY [facility];  Service: Orthopedics   • TOE AMPUTATION Left 4/1/2018    Procedure: TOE AMPUTATION-GREAT TOE;  Surgeon: Salomón Parada M.D.;  Location: SURGERY [facility];  Service: Orthopedics   • VENTRAL HERNIA REPAIR LAPAROSCOPIC  3/30/2016    Procedure: VENTRAL HERNIA REPAIR LAPAROSCOPIC WITH MESH;  Surgeon: Caden Cat M.D.;  " "Location: SURGERY [facility];  Service:      Current Outpatient Medications:  No current facility-administered medications on file prior to encounter.      Current Outpatient Prescriptions on File Prior to Encounter   Medication Sig Dispense Refill   • benztropine (COGENTIN) 1 MG Tab Take 1 mg by mouth 2 times a day.     • FLUoxetine (PROZAC) 20 MG Cap Take 60 mg by mouth every day.     • paliperidone (INVEGA) 3 MG ER tablet Take 3 mg by mouth every morning.     • LORazepam (ATIVAN) 0.5 MG Tab Take 0.5 mg by mouth 2 times a day as needed for Anxiety.     • QUEtiapine (SEROQUEL) 100 MG Tab Take 100-200 mg by mouth 3 times a day. 100 mg in morning  100 mg at noon  200 mg at bedtime     • metFORMIN (GLUCOPHAGE) 500 MG Tab Take 1 Tab by mouth 2 times a day, with meals. 60 Tab 0     Medication Allergy/Sensitivities:  No Known Allergies  Family History:  No family history on file.   reviewed and felt non pertinent to this encounter     Social History:  Social History   Substance Use Topics   • Smoking status: Current Some Day Smoker     Packs/day: 0.25     Years: 30.00     Types: Cigarettes   • Smokeless tobacco: Never Used   • Alcohol use No     #################################################################  Physical Exam:   Vitals/ General Appearance:   Weight/BMI: Body mass index is 27.77 kg/m².  Blood pressure 110/72, pulse 70, temperature 36.9 °C (98.5 °F), resp. rate 16, height 1.93 m (6' 4\"), weight 103.5 kg (228 lb 2.8 oz), SpO2 97 %.   Vitals:    05/11/18 1504   BP: 110/72   Pulse: 70   Resp: 16   Temp: 36.9 °C (98.5 °F)   SpO2: 97%   Weight: 103.5 kg (228 lb 2.8 oz)   Height: 1.93 m (6' 4\")    Oxygen Therapy:  Pulse Oximetry: 97 %, O2 (LPM): 0    Constitutional:  well developed, well nourished, non-toxic, no acute distress  HENMT: Normocephalic, atraumatic, b/l ears normal, nose normal  Eyes:  EOMI, conjunctiva normal, no discharge  Neck: no tracheal deviation, supple  Cardiovascular: normal heart rate, " normal rhythm, no murmurs, no rubs or gallops; no cyanosis, clubbing or edema  Lungs: Respiratory effort is normal, normal breath sounds, breath sounds clear to auscultation b/l, no rales, rhonchi or wheezing  Abdomen: soft, non-tender, no guarding or rebound, active BS, no mass  Skin: warm, dry, no erythema, no rash  Neurologic: Alert and oriented, strength equal, no focal deficits, CN II-XII normal  Psychiatric: depressed mood  Lymph node: No lymphadenopathy appreciated in the neck groin and axillary area.   Extremities: Bilateral lower extremities no pitting edema, bilateral pulses symmetric  #################################################################  Lab Data Review:    Objective   No results found for this or any previous visit (from the past 24 hour(s)).    (click the triangle to expand results)    Imaging/Procedures Review:    MR-FOOT-WITH & W/O LEFT    (Results Pending)       Assessment and Plan:    Dehiscence of surgical wound  Wound care    Empiric iv unasyn and iv vanc    Diabetic ulcer of left foot associated with type 2 diabetes mellitus, with muscle involvement without evidence of necrosis (HCC)  Wound care    Iv abx    Surgery dr parada- tomorrow    Psychiatric disorder  Cont congentin    Type 2 diabetes mellitus with neurologic complication, without long-term current use of insulin (HCC)    Controlled    Cont metformijn    fs with ssi    Diabetic diet      Diabetic peripheral neuropathy (HCC)  Started po neurontin 100mg tid    titrate        At this time, I believe he will require atleast 2 midnight hospital stay.

## 2018-05-12 NOTE — OP REPORT
DATE OF SERVICE:  05/12/2018    PREOPERATIVE DIAGNOSIS:  Left foot wound dehiscence.    POSTOPERATIVE DIAGNOSIS:  Left foot wound dehiscence.    PROCEDURE:  1.  Irrigation and debridement of left foot wound of skin, subcutaneous   tissue, muscle, and bone.  2.  Wound VAC placed in the left foot.    SURGEON:  Salomón Baez MD    ASSISTANT:  Edwin Joyce PA-C    ESTIMATED BLOOD LOSS:  Minimal.    INDICATIONS:  This is a gentleman who is status post initial great toe   amputation followed by a revision amputation by Dr. Easton who has dehisced   his wound after several weeks.  Risks and benefits of repeat irrigation and   debridement and wound VAC placement were discussed at length, which include   but not limited to bleeding, infection, neurovascular damage, pain, stiffness,   and need for the surgery.  He understands all these risks and wished to   proceed.    DESCRIPTION OF PROCEDURE:  Patient was sedated with LMA anesthesia and   administered preoperative antibiotics.  Left lower extremity was prepped in   the usual sterile fashion.  His wound was reopened and I debrided the skin,   subcutaneous tissue, muscle, and bone in an excisional fashion with a knife   and rongeur and then irrigated with copious amounts of normal saline solution    A wound VAC was placed under sterile conditions to allow for drainage.  The   patient tolerated the procedure well.    POSTOPERATIVE PLAN:  The patient to be maintained on wound VAC over the next   several days and plan for potential revision surgery on Monday.       ____________________________________     SALOMÓN BAEZ MD PLA / NTS    DD:  05/12/2018 11:25:30  DT:  05/12/2018 11:44:45    D#:  [id number]  Job#:  [id number]

## 2018-05-12 NOTE — PROGRESS NOTES
LIMB PRESERVATION SERVICE     Patient admitted to S534-2  MRI of left foot w/without ordere  Surgery tomorrow morning with Dr. Baez  NPO @ [facility]

## 2018-05-12 NOTE — CONSULTS
"INFECTIOUS DISEASES INPATIENT CONSULT NOTE     Date of Service: 5/12/2018    Consult Requested By: Sandoval Kauffman M.D.    Reason for Consultation:Left foot wound dehiscence      History of Present Illness:   Wilbert Kennith Yeomans Jr. is a 52 y.o. WM admitted 5/11/2018 due to left foot wound dehiscence at amp site  Sent by wound clinic-\"Left great toe amputation site macerated, incision dehisced., open wound probes to bone.  Periwound erythemic and edematous\"  Per patient, they were able to express pus from the wound c/o of both feet/ legs having intermittent numbness, pain in foot pain intensity is 7/10, described as throbbing, intermittent, no radiation.  Improved with vicodin. He does not believe he has diabetes.  No fever, chills, nausea, or vomiting.s/o I and D with wound VAC this morning  Currently on vancomycin and Unasyn  Infectious Diseases consulted for antibiotic recommendation and management      Review Of Systems:  All other systems are reviewed and are negative     PMH:   Past Medical History:   Diagnosis Date   • Anxiety    • Bronchitis     has had 3-4 times   • Dental disorder     upper partial, but lost it   • Diabetes (HCC)     \"borderline\"   • Hepatitis C 1997   • Hypercholesteremia    • Hypertension    • Psychiatric disorder     anxiety         PSH:  Past Surgical History:   Procedure Laterality Date   • TOE AMPUTATION Left 4/23/2018    Procedure: TOE AMPUTATION GREAT TOE;  Surgeon: Aramis Easton M.D.;  Location: [facility];  Service: Orthopedics   • TOE AMPUTATION Left 4/1/2018    Procedure: TOE AMPUTATION-GREAT TOE;  Surgeon: Salomón Baez M.D.;  Location: [facility];  Service: Orthopedics   • VENTRAL HERNIA REPAIR LAPAROSCOPIC  3/30/2016    Procedure: VENTRAL HERNIA REPAIR LAPAROSCOPIC WITH MESH;  Surgeon: Caden Cat M.D.;  Location: SURGERY [facility];  Service:        FAMILY HX:  Denies    SOCIAL HX:  Social History     Social History   • " Marital status: Single     Spouse name: N/A   • Number of children: N/A   • Years of education: N/A     Occupational History   • Not on file.     Social History Main Topics   • Smoking status: Current Some Day Smoker     Packs/day: 0.25     Years: 30.00     Types: Cigarettes   • Smokeless tobacco: Never Used   • Alcohol use No   • Drug use: No      Comment: last time used 10+ years   • Sexual activity: Not on file     Other Topics Concern   • Not on file     Social History Narrative   • No narrative on file     History   Smoking Status   • Current Some Day Smoker   • Packs/day: 0.25   • Years: 30.00   • Types: Cigarettes   Smokeless Tobacco   • Never Used     History   Alcohol Use No       Allergies/Intolerances:  No Known Allergies    History reviewed with the patient    Other Current Medications:    Current Facility-Administered Medications:   •  vancomycin 2,000 mg in  mL IVPB, 2,000 mg, Intravenous, Q24HR, Kevyn Thornton M.D., Last Rate: 250 mL/hr at 05/12/18 1310, 2,000 mg at 05/12/18 1310  •  insulin glargine (LANTUS) injection 5 Units, 5 Units, Subcutaneous, BID, Sandoval Kauffman M.D.  •  ampicillin/sulbactam (UNASYN) 3 g in  mL IVPB, 3 g, Intravenous, Q6HRS, Kevyn Thornton M.D., Stopped at 05/12/18 0603  •  MD ALERT... vancomycin per pharmacy protocol, , Other, pharmacy to dose, Kevyn Thornton M.D.  •  senna-docusate (PERICOLACE or SENOKOT S) 8.6-50 MG per tablet 2 Tab, 2 Tab, Oral, BID, 2 Tab at 05/11/18 2232 **AND** polyethylene glycol/lytes (MIRALAX) PACKET 1 Packet, 1 Packet, Oral, QDAY PRN **AND** magnesium hydroxide (MILK OF MAGNESIA) suspension 30 mL, 30 mL, Oral, QDAY PRN **AND** bisacodyl (DULCOLAX) suppository 10 mg, 10 mg, Rectal, QDAY PRN, Kevyn Thornton M.D.  •  Respiratory Care per Protocol, , Nebulization, Continuous RT, Kevyn Thornton M.D.  •  NS infusion, , Intravenous, Continuous, Kevyn Thornton M.D., Last Rate: 100 mL/hr at 05/11/18 1830, 1,000 mL at 05/11/18  "  •  insulin regular (HUMULIN R) injection 2-9 Units, 2-9 Units, Subcutaneous, 4X/DAY ACHS, Stopped at 18 2100 **AND** Accu-Chek ACHS, , , Q AC AND BEDTIME(S) **AND** NOTIFY MD and PharmD, , , Once **AND** glucose 4 g chewable tablet 16 g, 16 g, Oral, Q15 MIN PRN **AND** dextrose 50% (D50W) injection 25 mL, 25 mL, Intravenous, Q15 MIN PRN, Kevyn Thornton M.D.  •  oxyCODONE immediate-release (ROXICODONE) tablet 5 mg, 5 mg, Oral, Q4HRS PRN **OR** oxyCODONE immediate release (ROXICODONE) tablet 10 mg, 10 mg, Oral, Q4HRS PRN, Kevyn Thornton M.D., 10 mg at 18 0536  •  gabapentin (NEURONTIN) capsule 100 mg, 100 mg, Oral, TID, Kevyn Thornton M.D., 100 mg at 18 1338  •  morphine (pf) 4 mg/ml injection 2 mg, 2 mg, Intravenous, Q4HRS PRN, Kevyn Thornton M.D., 2 mg at 18 2033  •  benztropine (COGENTIN) tablet 1 mg, 1 mg, Oral, BID, Kevyn Thornton M.D., 1 mg at 18 0831  •  FLUoxetine (PROZAC) capsule 60 mg, 60 mg, Oral, DAILY, Kevyn Thornton M.D., 60 mg at 18 0830  •  LORazepam (ATIVAN) tablet 0.5 mg, 0.5 mg, Oral, BID PRN, Kevyn Thornton M.D., 0.5 mg at 18 0854  •  QUEtiapine (SEROQUEL) tablet 100 mg, 100 mg, Oral, BID, 100 mg at 18 1338 **AND** QUEtiapine (SEROQUEL) tablet 200 mg, 200 mg, Oral, Q EVENING, Kevyn Thornton M.D., 200 mg at 18 2231      Most Recent Vital Signs:  /67   Pulse 65   Temp 37 °C (98.6 °F)   Resp 14   Ht 1.93 m (6' 4\")   Wt 103.1 kg (227 lb 4.7 oz)   SpO2 97%   BMI 27.67 kg/m²   Temp  Av.8 °C (98.3 °F)  Min: 36.4 °C (97.6 °F)  Max: 37.2 °C (99 °F)    Physical Exam:  General: Obese, well nourished no acute distress  HEENT: NCAT, PERRLA, sclera anicteric, PERRL, EOMI,  no oral lesions Neck: supple, no lymphadenopathy  Chest: CTAB,  unlabored.  Cardiac: RRR  Abdomen: + bowel sounds, soft, non-tender  Extremities: No cyanosis, clubbing LLE VAC  Skin: no rashes   Neuro: Awake, Speech fluent CN intact ORTEGA    Pertinent " Lab Results:  Recent Labs      05/12/18   0150   WBC  5.0      Recent Labs      05/12/18 0150   HEMOGLOBIN  11.3*   HEMATOCRIT  33.5*   MCV  90.8   MCH  30.6   PLATELETCT  138*         Recent Labs      05/12/18 0150   SODIUM  138   POTASSIUM  3.7   CHLORIDE  109   CO2  23   CREATININE  0.84        No results for input(s): ALBUMIN in the last 72 hours.    Invalid input(s): AST, ALT, ALKPHOS, BILITOT, TOTALBILIRUB, BILIRUBINTOT, BILIRUBINDIR, BILIRUBININD, ALKALINEPHOS     Pertinent Micro:  Results     Procedure Component Value Units Date/Time    CULTURE TISSUE W/ GRM STAIN [[id number]] Collected:  05/12/18 1113    Order Status:  Completed Specimen:  Other Updated:  05/12/18 1324    ANAEROBIC CULTURE [[id number]] Collected:  05/12/18 1113    Order Status:  Completed Specimen:  Other Updated:  05/12/18 1324        Blood Culture   Date Value Ref Range Status   04/20/2018 No growth after 5 days of incubation.  Final        Studies:    IMPRESSION:   Diabetic foot infection with OM  Diabetes  Bipolar    PLAN:   Left foot osteomyelitis  Afebrile  No leukocytosis   S/p left great amp through proximal phalanx on 4/1-Wound cx - group G and C strep, morganella , diphtheroids  S/p left great toe amp down to MTP joint on 4/23. Clean margins obtained per OP note- OR cx (proximal bone) - mixed skin christine  Failed outpatient therapy-clinical osteomyelitis as probed to bone  s/p another debridement 5/12-cxs pending   Continue vanco  Change Unasyn to Zosyn due to prior resistance  Endpoint clinical    DM2  HgA1c 5.8  Maintain BS less than 150    Psychiatric disorder  Suspect contributing to prior noncompliance    Plan of care discussed with IM Dr Gray M.D. Will continue to follow    Amanda Feng M.D.

## 2018-05-12 NOTE — ASSESSMENT & PLAN NOTE
intermittent agitation but typically cooperative   \- continue congentin, seroquel, prozac  - continue valproate for mood control  - ativan PO BID PRN   outpatient follow-up

## 2018-05-12 NOTE — PROGRESS NOTES
[facility] Hospitalist Progress Note    Date of Service: 2018    Chief Complaint  52 y.o. male admitted 2018 with left foot wound. History remarkable for s/p left foot great toe amputation, followed by a revision amputation b y , the wound gradually dehisced requiring admission again.     Interval Problem Update  : patient was seen post operatively, he has no complaints. We discussed tight blood sugar control. He is s/p left foot wound irrigation and debridement. Surgery recommends wound vac and revision surgery on Monday. dc'd metformin, will do insulin only    Consultants/Specialty  Surgery ( )    Disposition  Continue wound vac, revision surgery on  Monday.         Review of Systems   Constitutional: Negative for chills and fever.   HENT: Negative for hearing loss.    Eyes: Negative for blurred vision.   Respiratory: Negative for cough and shortness of breath.    Cardiovascular: Negative for chest pain.   Gastrointestinal: Negative for heartburn and nausea.   Genitourinary: Negative for dysuria.   Musculoskeletal: Negative for myalgias.   Skin: Negative for rash.        Left foot wound s/p debridement with wound vac   Neurological: Negative for dizziness and focal weakness.   Endo/Heme/Allergies: Does not bruise/bleed easily.      Physical Exam  Laboratory/Imaging   Hemodynamics  Temp (24hrs), Av.7 °C (98.1 °F), Min:36.2 °C (97.1 °F), Max:37.2 °C (99 °F)   Temperature: 36.2 °C (97.1 °F)  Pulse  Av  Min: 65  Max: 70 Heart Rate (Monitored): 65  Blood Pressure: 113/73, NIBP: 113/81      Respiratory      Respiration: 16, Pulse Oximetry: 90 %             Fluids    Intake/Output Summary (Last 24 hours) at 18 1718  Last data filed at 18 1230   Gross per 24 hour   Intake             1600 ml   Output              900 ml   Net              700 ml       Nutrition  Orders Placed This Encounter   Procedures   • DIET ORDER     Standing Status:   Standing     Number of  Occurrences:   1     Order Specific Question:   Diet:     Answer:   Regular [1]     Physical Exam   Constitutional: No distress.   Eyes: Left eye exhibits no discharge.   Neck: Neck supple.   Cardiovascular: Normal rate, regular rhythm, normal heart sounds and intact distal pulses.    Pulmonary/Chest: Effort normal and breath sounds normal. No respiratory distress.   Abdominal: Soft. Bowel sounds are normal.   Musculoskeletal: He exhibits tenderness. He exhibits no edema.   Skin: Skin is warm.   Flushed appearance, left foot wound s/p debridement with wound vac   Psychiatric: He has a normal mood and affect.       Recent Labs      05/12/18   0150   WBC  5.0   RBC  3.69*   HEMOGLOBIN  11.3*   HEMATOCRIT  33.5*   MCV  90.8   MCH  30.6   MCHC  33.7   RDW  46.9   PLATELETCT  138*   MPV  9.4     Recent Labs      05/12/18   0150   SODIUM  138   POTASSIUM  3.7   CHLORIDE  109   CO2  23   GLUCOSE  114*   BUN  17   CREATININE  0.84   CALCIUM  8.6                      Assessment/Plan     Diabetic ulcer of left foot associated with type 2 diabetes mellitus, with muscle involvement without evidence of necrosis (HCC)- (present on admission)   Assessment & Plan    On unasyn and vanc  s/p debridement 5/12  Revision surgery for monday        Diabetic peripheral neuropathy (HCC)- (present on admission)   Assessment & Plan    Started po neurontin 100mg tid    titrate        Dehiscence of surgical wound- (present on admission)   Assessment & Plan    Wound care    Empiric iv unasyn and iv vanc        Psychiatric disorder- (present on admission)   Assessment & Plan    Cont congentin        Type 2 diabetes mellitus with neurologic complication, without long-term current use of insulin (HCC)- (present on admission)   Assessment & Plan    Controlled  Dc metformin  Do ssi, will add levemir  Diabetic diet            Quality-Core Measures   Reviewed items::  Labs reviewed, Radiology images reviewed and Medications reviewed  DVT prophylaxis  pharmacological::  Enoxaparin (Lovenox)

## 2018-05-12 NOTE — PROGRESS NOTES
Alert to staff and able to make basic needs known. No distress noted in general condition. Vital signs stable. Medicated as needed for pain management with good results. Eating and drinking well. Up to bathroom with steady gait as needed. Cooperative toward staff and current plan of care. Blood sugar monitored as ordered. Call button and personal items within reach. IV therapy continues as ordered.

## 2018-05-12 NOTE — CARE PLAN
Problem: Safety  Goal: Will remain free from injury  Outcome: PROGRESSING AS EXPECTED  Patient knows to call for assist before getting out of bed. Cooperative toward current safety plan of care. Call button and personal items within reach.

## 2018-05-13 LAB
GLUCOSE BLD-MCNC: 102 MG/DL (ref 65–99)
GLUCOSE BLD-MCNC: 108 MG/DL (ref 65–99)
GLUCOSE BLD-MCNC: 124 MG/DL (ref 65–99)
GLUCOSE BLD-MCNC: 134 MG/DL (ref 65–99)
GLUCOSE BLD-MCNC: 139 MG/DL (ref 65–99)
GRAM STN SPEC: ABNORMAL
SIGNIFICANT IND 70042: ABNORMAL
SITE SITE: ABNORMAL
SOURCE SOURCE: ABNORMAL

## 2018-05-13 PROCEDURE — 3E0234Z INTRODUCTION OF SERUM, TOXOID AND VACCINE INTO MUSCLE, PERCUTANEOUS APPROACH: ICD-10-PCS | Performed by: HOSPITALIST

## 2018-05-13 PROCEDURE — 82962 GLUCOSE BLOOD TEST: CPT | Mod: 91

## 2018-05-13 PROCEDURE — 700102 HCHG RX REV CODE 250 W/ 637 OVERRIDE(OP): Performed by: HOSPITALIST

## 2018-05-13 PROCEDURE — 90732 PPSV23 VACC 2 YRS+ SUBQ/IM: CPT | Performed by: INTERNAL MEDICINE

## 2018-05-13 PROCEDURE — A9270 NON-COVERED ITEM OR SERVICE: HCPCS | Performed by: HOSPITALIST

## 2018-05-13 PROCEDURE — 700111 HCHG RX REV CODE 636 W/ 250 OVERRIDE (IP): Performed by: INTERNAL MEDICINE

## 2018-05-13 PROCEDURE — 700105 HCHG RX REV CODE 258: Performed by: HOSPITALIST

## 2018-05-13 PROCEDURE — 700111 HCHG RX REV CODE 636 W/ 250 OVERRIDE (IP): Performed by: HOSPITALIST

## 2018-05-13 PROCEDURE — 90471 IMMUNIZATION ADMIN: CPT

## 2018-05-13 PROCEDURE — 99233 SBSQ HOSP IP/OBS HIGH 50: CPT | Performed by: INTERNAL MEDICINE

## 2018-05-13 PROCEDURE — 700105 HCHG RX REV CODE 258: Performed by: INTERNAL MEDICINE

## 2018-05-13 PROCEDURE — 97605 NEG PRS WND THER DME<=50SQCM: CPT

## 2018-05-13 PROCEDURE — 700102 HCHG RX REV CODE 250 W/ 637 OVERRIDE(OP): Performed by: INTERNAL MEDICINE

## 2018-05-13 PROCEDURE — A9270 NON-COVERED ITEM OR SERVICE: HCPCS | Performed by: INTERNAL MEDICINE

## 2018-05-13 PROCEDURE — 770006 HCHG ROOM/CARE - MED/SURG/GYN SEMI*

## 2018-05-13 RX ORDER — CHLORDIAZEPOXIDE HYDROCHLORIDE 5 MG/1
5 CAPSULE, GELATIN COATED ORAL EVERY 8 HOURS
Status: DISCONTINUED | OUTPATIENT
Start: 2018-05-13 | End: 2018-05-16

## 2018-05-13 RX ADMIN — INSULIN GLARGINE 5 UNITS: 100 INJECTION, SOLUTION SUBCUTANEOUS at 07:58

## 2018-05-13 RX ADMIN — ENOXAPARIN SODIUM 40 MG: 100 INJECTION SUBCUTANEOUS at 07:47

## 2018-05-13 RX ADMIN — PIPERACILLIN SODIUM AND TAZOBACTAM SODIUM 3.38 G: 3; .375 INJECTION, POWDER, FOR SOLUTION INTRAVENOUS at 17:19

## 2018-05-13 RX ADMIN — GABAPENTIN 100 MG: 100 CAPSULE ORAL at 07:40

## 2018-05-13 RX ADMIN — PNEUMOCOCCAL VACCINE POLYVALENT 25 MCG
25; 25; 25; 25; 25; 25; 25; 25; 25; 25; 25; 25; 25; 25; 25; 25; 25; 25; 25; 25; 25; 25; 25 INJECTION, SOLUTION INTRAMUSCULAR; SUBCUTANEOUS at 15:29

## 2018-05-13 RX ADMIN — OXYCODONE HYDROCHLORIDE 5 MG: 5 TABLET ORAL at 07:54

## 2018-05-13 RX ADMIN — BENZTROPINE MESYLATE 1 MG: 1 TABLET ORAL at 20:14

## 2018-05-13 RX ADMIN — SENNOSIDES AND DOCUSATE SODIUM 2 TABLET: 8.6; 5 TABLET ORAL at 07:41

## 2018-05-13 RX ADMIN — LORAZEPAM 0.5 MG: 0.5 TABLET ORAL at 15:26

## 2018-05-13 RX ADMIN — VANCOMYCIN HYDROCHLORIDE 2000 MG: 100 INJECTION, POWDER, LYOPHILIZED, FOR SOLUTION INTRAVENOUS at 07:50

## 2018-05-13 RX ADMIN — AMPICILLIN SODIUM AND SULBACTAM SODIUM 3 G: 2; 1 INJECTION, POWDER, FOR SOLUTION INTRAMUSCULAR; INTRAVENOUS at 12:32

## 2018-05-13 RX ADMIN — SENNOSIDES AND DOCUSATE SODIUM 2 TABLET: 8.6; 5 TABLET ORAL at 20:15

## 2018-05-13 RX ADMIN — SODIUM CHLORIDE: 900 INJECTION INTRAVENOUS at 05:52

## 2018-05-13 RX ADMIN — CHLORDIAZEPOXIDE HYDROCHLORIDE 5 MG: 5 CAPSULE ORAL at 22:35

## 2018-05-13 RX ADMIN — BENZTROPINE MESYLATE 1 MG: 1 TABLET ORAL at 07:41

## 2018-05-13 RX ADMIN — SODIUM CHLORIDE: 900 INJECTION INTRAVENOUS at 20:25

## 2018-05-13 RX ADMIN — FLUOXETINE HYDROCHLORIDE 60 MG: 20 CAPSULE ORAL at 07:40

## 2018-05-13 RX ADMIN — GABAPENTIN 100 MG: 100 CAPSULE ORAL at 15:26

## 2018-05-13 RX ADMIN — AMPICILLIN SODIUM AND SULBACTAM SODIUM 3 G: 2; 1 INJECTION, POWDER, FOR SOLUTION INTRAMUSCULAR; INTRAVENOUS at 05:49

## 2018-05-13 RX ADMIN — PIPERACILLIN SODIUM AND TAZOBACTAM SODIUM 3.38 G: 3; .375 INJECTION, POWDER, FOR SOLUTION INTRAVENOUS at 20:16

## 2018-05-13 RX ADMIN — QUETIAPINE FUMARATE 100 MG: 100 TABLET ORAL at 12:26

## 2018-05-13 RX ADMIN — LORAZEPAM 0.5 MG: 0.5 TABLET ORAL at 07:47

## 2018-05-13 RX ADMIN — QUETIAPINE FUMARATE 200 MG: 100 TABLET ORAL at 20:15

## 2018-05-13 RX ADMIN — CHLORDIAZEPOXIDE HYDROCHLORIDE 5 MG: 5 CAPSULE ORAL at 17:16

## 2018-05-13 RX ADMIN — QUETIAPINE FUMARATE 100 MG: 100 TABLET ORAL at 07:40

## 2018-05-13 RX ADMIN — INSULIN GLARGINE 5 UNITS: 100 INJECTION, SOLUTION SUBCUTANEOUS at 20:18

## 2018-05-13 RX ADMIN — GABAPENTIN 100 MG: 100 CAPSULE ORAL at 20:15

## 2018-05-13 RX ADMIN — OXYCODONE HYDROCHLORIDE 5 MG: 5 TABLET ORAL at 15:26

## 2018-05-13 ASSESSMENT — ENCOUNTER SYMPTOMS
COUGH: 0
ABDOMINAL PAIN: 0
BLURRED VISION: 0
FEVER: 0
DEPRESSION: 0
CHILLS: 0
NAUSEA: 0
HEARTBURN: 0
HEMOPTYSIS: 0
DIZZINESS: 0
VOMITING: 0
MYALGIAS: 0
SENSORY CHANGE: 1

## 2018-05-13 ASSESSMENT — PAIN SCALES - GENERAL
PAINLEVEL_OUTOF10: 0
PAINLEVEL_OUTOF10: 5
PAINLEVEL_OUTOF10: 2

## 2018-05-13 NOTE — PROGRESS NOTES
Assumed care of pt at 1930. Report received and bedside rounding completed with day RN. Pt in bed resting comfortably denies any pain at this time and is calm. Pt lethargic, not answering RN's orientation questions. RN unable to determine if pt disoriented or pt just choosing to not participate. No SOB, or in any acute distress. Wound vac in place. Fall precautions in place,  bed alarm placed. -  Call light and pt belongings within reach - pt makes needs known - hourly rounding in place. See flowsheets for further assessment.

## 2018-05-13 NOTE — PROGRESS NOTES
Infectious Disease Progress Note    Author: Amanda Feng M.D. Date & Time of service: 2018  11:34 AM    Chief Complaint:  Left foot wound dehiscence      Interval History:  52 y.o. WM admitted 2018 due to left foot wound dehiscence at amp site   AF WBC 5 denies any pain-ate all of breakfast  Labs Reviewed, Medications Reviewed, Radiology Reviewed and Wound Reviewed.    Review of Systems:  Review of Systems   Constitutional: Negative for chills and fever.   Gastrointestinal: Negative for abdominal pain, nausea and vomiting.   Musculoskeletal: Negative for joint pain.   Neurological: Positive for sensory change.   All other systems reviewed and are negative.      Hemodynamics:  Temp (24hrs), Av.7 °C (98.1 °F), Min:36.2 °C (97.1 °F), Max:37 °C (98.6 °F)  Temperature: 37 °C (98.6 °F)  Pulse  Av.9  Min: 65  Max: 76Heart Rate (Monitored): 65  Blood Pressure: 132/76, NIBP: 113/81       Physical Exam:  Physical Exam   Constitutional: He is oriented to person, place, and time. He appears well-developed. No distress.   disheveled   HENT:   Head: Atraumatic.   Poor dentition   Eyes: EOM are normal. Pupils are equal, round, and reactive to light.   Neck: Neck supple.   Cardiovascular: Normal rate.    Pulmonary/Chest: Effort normal. No respiratory distress.   Abdominal: Soft. He exhibits no distension. There is no tenderness.   Musculoskeletal: He exhibits edema.   Left foot edema/erythema  VAC   Neurological: He is alert and oriented to person, place, and time.   Skin: There is erythema.   Nursing note and vitals reviewed.      Meds:    Current Facility-Administered Medications:   •  pneumococcal vaccine  •  vancomycin  •  insulin glargine  •  enoxaparin (LOVENOX) injection  •  ampicillin-sulbactam (UNASYN) IV  •  MD ALERT... vancomycin  •  senna-docusate **AND** polyethylene glycol/lytes **AND** magnesium hydroxide **AND** bisacodyl  •  Respiratory Care per Protocol  •  NS  •  insulin regular  **AND** Accu-Chek ACHS **AND** NOTIFY MD and PharmD **AND** glucose 4 g **AND** dextrose 50%  •  oxyCODONE immediate-release **OR** oxyCODONE immediate-release  •  gabapentin  •  morphine injection  •  benztropine  •  FLUoxetine  •  LORazepam  •  QUEtiapine **AND** QUEtiapine    Labs:  Recent Labs      05/12/18   0150   WBC  5.0   RBC  3.69*   HEMOGLOBIN  11.3*   HEMATOCRIT  33.5*   MCV  90.8   MCH  30.6   RDW  46.9   PLATELETCT  138*   MPV  9.4   NEUTSPOLYS  45.90   LYMPHOCYTES  40.60   MONOCYTES  10.70   EOSINOPHILS  1.80   BASOPHILS  0.80     Recent Labs      05/12/18   0150   SODIUM  138   POTASSIUM  3.7   CHLORIDE  109   CO2  23   GLUCOSE  114*   BUN  17     Recent Labs      05/12/18   0150   CREATININE  0.84       Imaging:  Dx-foot-2- Left    Result Date: 4/20/2018 4/20/2018 10:19 AM HISTORY/REASON FOR EXAM:  Pain/Deformity Following Trauma Weeping great toe TECHNIQUE/EXAM DESCRIPTION AND NUMBER OF VIEWS: 2 nonweightbearing views of the LEFT foot. COMPARISON:  3/31/2018 FINDINGS: Patient is status post amputation of the first digit at the level of the proximal phalanx. There is minimal osseous irregularity at the level of the amputation. There is faint adjacent calcification which may be dystrophic.  There are mild degenerative changes of the first tarsometatarsal joint. No acute fracture or dislocation is seen. There is soft tissue swelling about the medial forefoot and remainder of the first digit.     Post surgical changes status post amputation at the level of the proximal phalanx of the first digit. Linear amorphous calcification adjacent to the amputation may be dystrophic. There is minimal osseous irregularity at the level of the amputation where it is difficult to exclude osteomyelitis. Soft tissue swelling of the forefoot and remainder of the first digit.     Mr-foot-with & W/o Left    Result Date: 5/12/2018 5/12/2018 10:44 AM HISTORY/REASON FOR EXAM:  Open wound. Open wound,?surgical site at [facility]  toe amp site, please eval for OM and/ or abscess. TECHNIQUE/EXAM DESCRIPTION: MRI of the LEFT foot with and without contrast. The study was performed on a Diurnal Signa 1.5 Steph MRI scanner. T1 axial, T1 sagittal, T1 coronal, STIR sagittal, T2 fast spin-echo fat-suppressed coronal, sagittal inversion recovery, and T1 postcontrast coronal images were obtained. 20 mL Omniscan contrast was administered intravenously. COMPARISON: Foot radiograph 4/20/2018. FINDINGS: Diffuse soft tissue swelling about the dorsum of foot. Increased signal within the intrinsic foot muscles, likely related to neuropathic change. There is an open ulcer extending from the great toe amputation site into the first metatarsal head with marrow replacement. No walled fluid collection seen.     Large open ulcer extending from the great toe amputation site into the first metatarsal head with osteomyelitis of the first metatarsal head. No walled fluid collection seen.      Micro:  Results     Procedure Component Value Units Date/Time    GRAM STAIN [[id number]] Collected:  05/12/18 1113    Order Status:  Completed Specimen:  Tissue Updated:  05/13/18 0824     Significant Indicator .     Source TISS     Site Left Foot     Gram Stain Result Few WBCs.  Moderate Gram positive cocci.  Rare Gram positive rods.      CULTURE TISSUE W/ GRM STAIN [[id number]] Collected:  05/12/18 1113    Order Status:  Completed Specimen:  Other Updated:  05/12/18 1324    ANAEROBIC CULTURE [[id number]] Collected:  05/12/18 1113    Order Status:  Completed Specimen:  Other Updated:  05/12/18 1324          Assessment:  Active Hospital Problems    Diagnosis   • Diabetic ulcer of left foot associated with type 2 diabetes mellitus, with muscle involvement without evidence of necrosis (HCC) [E11.621, L97.525]   • Dehiscence of surgical wound [T81.31XA]   • Diabetic peripheral neuropathy (HCC) [E11.42]   • Psychiatric disorder [F99]   • Type 2 diabetes mellitus with neurologic  complication, without long-term current use of insulin (HCC) [E11.49]       Plan:  Left foot osteomyelitis, additional work  Afebrile  No leukocytosis   S/p left great amp through proximal phalanx on 4/1-Wound cx - group G and C strep, morganella , diphtheroids  S/p left great toe amp down to MTP joint on 4/23. Clean margins obtained per OP note- OR cx (proximal bone) - mixed skin christine  Failed outpatient therapy-clinical osteomyelitis as probed to bone  s/p another debridement 5/12-cxs pending  Continue vanco and Zosyn  MRI +OM  Endpoint clinical    DM2  HgA1c 5.8  Maintain BS less than 150    Psychiatric disorder  Suspect contributing to prior noncompliance

## 2018-05-13 NOTE — PROGRESS NOTES
[facility] Hospitalist Progress Note    Date of Service: 2018    Chief Complaint  52 y.o. male admitted 2018 with  left foot wound. History remarkable for s/p left foot great toe amputation, followed by a revision amputation b y , the wound gradually dehisced requiring admission again.        Interval Problem Update  : patient was seen post operatively, he has no complaints. We discussed tight blood sugar control. He is s/p left foot wound irrigation and debridement. Surgery recommends wound vac and revision surgery on Monday. dc'd metformin, will do insulin only    : patient is stable. Discussed pending plan. Id recommends switching to zosyn, vanc. Patient has no complaints    Consultants/Specialty  Surgery, ID    Disposition  Pending revision surgery tomorrow.         Review of Systems   Constitutional: Negative for chills and fever.   HENT: Negative for hearing loss.    Eyes: Negative for blurred vision.   Respiratory: Negative for cough and hemoptysis.    Cardiovascular: Negative for chest pain.   Gastrointestinal: Negative for heartburn.   Genitourinary: Negative for dysuria.   Musculoskeletal: Negative for myalgias.   Skin: Negative for rash.   Neurological: Negative for dizziness.   Psychiatric/Behavioral: Negative for depression.      Physical Exam  Laboratory/Imaging   Hemodynamics  Temp (24hrs), Av.7 °C (98.1 °F), Min:36.2 °C (97.1 °F), Max:37 °C (98.6 °F)   Temperature: 37 °C (98.6 °F)  Pulse  Av.9  Min: 65  Max: 76    Blood Pressure: 132/76      Respiratory      Respiration: 16, Pulse Oximetry: 96 %             Fluids  No intake or output data in the 24 hours ending 18 1558    Nutrition  Orders Placed This Encounter   Procedures   • DIET ORDER     Standing Status:   Standing     Number of Occurrences:   1     Order Specific Question:   Diet:     Answer:   Regular [1]     Physical Exam   Constitutional: No distress.   Laying in bed   Eyes: Left eye exhibits no  discharge.   Neck: Neck supple.   Cardiovascular: Normal rate, regular rhythm, normal heart sounds and intact distal pulses.    Pulmonary/Chest: Effort normal and breath sounds normal. No respiratory distress.   Abdominal: Soft. Bowel sounds are normal. He exhibits no distension.   Musculoskeletal: He exhibits no edema.   Neurological: He is alert.   Skin: Skin is warm.   Left foot wound s/p debridement with vac, right foot noted superficial wounds scabbed       Recent Labs      05/12/18   0150   WBC  5.0   RBC  3.69*   HEMOGLOBIN  11.3*   HEMATOCRIT  33.5*   MCV  90.8   MCH  30.6   MCHC  33.7   RDW  46.9   PLATELETCT  138*   MPV  9.4     Recent Labs      05/12/18   0150   SODIUM  138   POTASSIUM  3.7   CHLORIDE  109   CO2  23   GLUCOSE  114*   BUN  17   CREATININE  0.84   CALCIUM  8.6                      Assessment/Plan     Diabetic ulcer of left foot associated with type 2 diabetes mellitus, with muscle involvement without evidence of necrosis (HCC)- (present on admission)   Assessment & Plan    MRI left foot showed: Large open ulcer extending from the great toe amputation site into the first metatarsal head with osteomyelitis of the first metatarsal head.    On unasyn and vanc, per id recs will change unasyn to zosyn  s/p debridement 5/12  Revision surgery for monday        Diabetic peripheral neuropathy (HCC)- (present on admission)   Assessment & Plan    Started po neurontin 100mg tid    titrate        Dehiscence of surgical wound- (present on admission)   Assessment & Plan    Wound care    Empiric iv unasyn and iv vanc        Psychiatric disorder- (present on admission)   Assessment & Plan    Cont congentin        Type 2 diabetes mellitus with neurologic complication, without long-term current use of insulin (HCC)- (present on admission)   Assessment & Plan    Controlled  Dc metformin  Do ssi, added lantus  Diabetic diet  Maintain tight blood sugar control            Quality-Core Measures   Reviewed items::   Labs reviewed, Radiology images reviewed and Medications reviewed  Olsen catheter::  No Olsen  DVT prophylaxis pharmacological::  Enoxaparin (Lovenox)

## 2018-05-13 NOTE — CARE PLAN
Problem: Communication  Goal: The ability to communicate needs accurately and effectively will improve  Pt not answering orientation questions. Somewhat lethargic. Unable to fully assess orientation.     Problem: Safety  Goal: Will remain free from injury  Wound vac and IV pole in use. Instructed pt to call prior to getting OOB. Pt requiring reinforcement. Impulsive when requiring to use restroom. Urinal at bedside. Strip bed alarm placed.

## 2018-05-13 NOTE — CARE PLAN
Problem: Infection  Goal: Will remain free from infection    Intervention: Assess signs and symptoms of infection  No new s/s of infection. WBC wnl, IV site CDI. Will continue to monitor.      Problem: Knowledge Deficit  Goal: Knowledge of disease process/condition, treatment plan, diagnostic tests, and medications will improve    Intervention: Assess knowledge level of disease process/condition, treatment plan, diagnostic tests, and medications  Educated pt on importance of blood sugar management for wound healing. No evidence of learning. Pt flat affect and rarely answers any questions. Will continue to emphasize educational points.

## 2018-05-13 NOTE — PROGRESS NOTES
"LIMB PRESERVATION SERVICE NOTE:    Subjective:      Reason for Consultation: S/P I and D Left Great Toe 5/12/18 Dr Baez    History of Present Illness:      Patient is well known to [facility] and outpatient wound care services.     Patient is a 52 y.o. male with a past medical history that includes borderline diabetes, anxiety, HTN, Hep C and is admitted to [facility] via [facility] for his S/P I and D Left Great Toe 5/12/18 Dr Baez.  S/P Left Great Toe Amp by Dr Easton 4/23/18. The s/p left great toe amputation site was from 4/1/18 by Dr. Baez. Pt did not follow up with the [facility] clinic for dressing care and he did not receive oral antibiotics. He also did not follow up with his PCP at [facility]. Pt has Hx of noncompliance and psychiatric disorder which is likely contributory. Pt is not wearing the previously provided offloading shoes.  A1c this admission is 5.8.    Upon assessment pt was found to have VAC trac pad removed and vac foam exposed. When questioned, pt replied \"I had to go to the bathroom so I pulled it off.\" Pt was educated about the importance of the VAC and keeping the site Clean, dry and intact. The patient was shown the call light and instructed on its use. The Pt had just come up from the procedure and the VAC suction had only been stopped for less than 2 hours. The patient VAC foam and dressing site looked intact.     Patient denies fevers chills, nausea, vomiting.      Pain:        Patient resting comfortably    Vitals  /74   Pulse 76   Temp 36.8 °C (98.2 °F)   Resp 18   Ht 1.93 m (6' 4\")   Wt 103.1 kg (227 lb 4.7 oz)   SpO2 96%   BMI 27.67 kg/m²       Objective:         PHYSICAL EXAMINATION:      General Appearance:  Well developed,  nourished, in no acute distress     Sensory Assessment        Patient sensation insensate bilaterally with light touch 10 of 10 points        Vascular Assessment        +2 PT bilaterally  +1 DP on Left Foot  Unable to Palpate DP on Right Foot - Found on " Doppler      Orthotic, protective, supportive devices:      Offloading     Offloading Shoe ordered but does not like to wear them    Wound Characteristics                                                    Location:Right Great toe I and D site Initial Evaluation  Date:5/12/2018   Tissue Type and %: VAC in place   Periwound:    Drainage: Mod SeroSanginous   Exposed structures    Wound Edges:      Odor: None   S&S of Infection:      Edema:    Sensation:                Measurements: Initial Evaluation  Date:5/12/2018   Length (cm)    Width (cm)    Depth (cm)    Tract/undermine      Tests and Measures:    Labs  Recent Labs      05/12/18   0150   WBC  5.0   RBC  3.69*   HEMOGLOBIN  11.3*   HEMATOCRIT  33.5*   MCV  90.8   MCH  30.6   MCHC  33.7   RDW  46.9   PLATELETCT  138*   MPV  9.4     Recent Labs      05/12/18   0150   SODIUM  138   POTASSIUM  3.7   CHLORIDE  109   CO2  23   GLUCOSE  114*   BUN  17        A1C 5.8%  ESR: 22  CRP: 0.15     CELIO 4/1/18     R: 1.32             L: 1.28     RLE: Triphasic/triphasic   LLE: Triphasic/triphasic      Imaging     X-Ray: Left 4/20/18  Impression        Post surgical changes status post amputation at the level of the proximal phalanx of the first digit. Linear amorphous calcification adjacent to the amputation may be dystrophic. There is minimal osseous irregularity at the level of the amputation where   it is difficult to exclude osteomyelitis.    Soft tissue swelling of the forefoot and remainder of the first digit.      Infection Management  Microbiology Neg Blood 4/23/18 tiss Neg    Procedures:        Debridement :       Cleansed with:                                                                            Periwound protected with:     Primary dressing:     Secondary Dressing:     Other:   NPWT repair  Cleansed drape, periwound, Foam with Wound Cleanser.  Applied Benzoin/Skin Prep to periwound and drape.  Let air dry for 1-2 minutes.  Applied Tract Pad over existing Black  Foam. Reinforced with drape.     Settings: 125 mmHg continuous.      Patient Education: Implications of loss of protective sensation (LOPS) discussed with patient- including increased risk for amputation.  Advised to check foot at least daily, moisturize foot, and to always wear protective foot wear.    Negative Pressure Wound Therapy (NPWT) promotes wound healing by applying a vacuum through a nonadherent foam. The suction draws out drainage from the wound and increases blood flow to the area facilitating granulation and healing.    Plan:       Treatment Plan and Recommendations:    1. Labs\Imaging: Reviewed    2. Treatment:   Wound Care by Wound Team, LPS to Follow.                           Dressing Orders Updated       3.Collaboration:      IV Antibiotics per ID, Zosyn    4. Orthotics/Prosthetics:      pt to get orthotics/prosthetics  as OP, pt to wear shoes that do not rub on Wound sites     Anticipated discharge plans (X):   SNF:            Home Care:            Outpatient Wound Center: X        Self Care:             Other:            TBD:    Patient requires skilled therapeutic intervention for debridement, product selection and application, education, wound bed preparation and assessment.    Professional Collaboration: D/W:   Dr Feng ID, Bedside RN

## 2018-05-13 NOTE — PROGRESS NOTES
Pt went to MRI in morning, then straight to surgery for I&D and wound vac placement. Pt drowsy and un-engaged all day. Urinal was given for voiding as pt pulled tube from wound vac within an hour after returning without noticing or notifying nurse. Wound vac needs to be monitored and maintained at 125 mmhg per orders.

## 2018-05-13 NOTE — PROGRESS NOTES
Pt more awake and interactive than prior day, however extremely fidgety upon beginning of shift. Lorazepam given with good results. Pt became extremely fidgety again about 1500 and another Lorazepam was given. Unasyn was d/c'd and Pippercillin was added to MAR. Pain in L foot is about 7-8/10. Oxycodone given with good results. Pt received Pnuemovax today. Call light in reach, bed low and locked, hourly rounding performed.

## 2018-05-13 NOTE — CARE PLAN
Problem: Safety  Goal: Will remain free from falls    Intervention: Assess risk factors for falls  Pt educated on risk of falling d/t wound vac line and IV line. Pt indicated understanding and implied compliance. Will continue to monitor.      Problem: Knowledge Deficit  Goal: Knowledge of disease process/condition, treatment plan, diagnostic tests, and medications will improve    Intervention: Assess knowledge level of disease process/condition, treatment plan, diagnostic tests, and medications  Educated patient on purpose of medications. Pt has poor memory recall with no indication of learning. Will continue to emphasize purpose of medications.

## 2018-05-13 NOTE — PROGRESS NOTES
"Pharmacy Kinetics 52 y.o. male on vancomycin day # 2 2018    Currently on Vancomycin 2000 mg iv q24hr (19mg/kg)    Indication for Treatment: diabetic ulcer    Pertinent history per medical record:  Admitted on 2018 for pus from previous left toe amputation while seen at wound clinic.  As per patient, they were able to express pus from the wound and the wound was becoming . I&D on  by Dr. Baez.  Seen by [facility] ID previously, was discharged on Augmentin.  ID consulting.    Other antibiotics: Unasyn 3g q6hr    Allergies: Patient has no known allergies.     List concerns for renal function: hx of accumulation on q12hr dosing, obesity    Pertinent cultures to date:   : wound cx: in process     Many previous cultures   18 left great toe - Diphtheroids, streptococcus   3/31/18 left great toe - streptococcus, Morganella morganii  3/31/18 right great toe - Streptococcus     Recent Labs      18   0150   WBC  5.0   NEUTSPOLYS  45.90     Recent Labs      18   0150   BUN  17   CREATININE  0.84     No results for input(s): VANCOTROUGH, VANCOPEAK, VANCORANDOM in the last 72 hours.  Intake/Output Summary (Last 24 hours) at 18 0808  Last data filed at 18 1400   Gross per 24 hour   Intake             1080 ml   Output              900 ml   Net              180 ml      Blood pressure 123/74, pulse 76, temperature 36.8 °C (98.2 °F), resp. rate 18, height 1.93 m (6' 4\"), weight 103.1 kg (227 lb 4.7 oz), SpO2 96 %. Temp (24hrs), Av.7 °C (98.1 °F), Min:36.2 °C (97.1 °F), Max:37 °C (98.6 °F)      A/P   1. Vancomycin dose change: None  2. Next vancomycin level:  at 0700 (ordered)  3. Goal trough: 12-16 mcg/mL  4. Comments: No changes in renal function.  ID consulted.  Trough ordered for tomorrow AM, prior to 4th dose.  Will continue to follow wound culture results and de-escalate accordingly.     Derick Ortiz, Pharm.D.  Pharmacy Practice Resident, PGY1      "

## 2018-05-14 LAB
GLUCOSE BLD-MCNC: 100 MG/DL (ref 65–99)
GLUCOSE BLD-MCNC: 110 MG/DL (ref 65–99)
GLUCOSE BLD-MCNC: 80 MG/DL (ref 65–99)
GLUCOSE BLD-MCNC: 85 MG/DL (ref 65–99)
VANCOMYCIN TROUGH SERPL-MCNC: 9.6 UG/ML (ref 10–20)

## 2018-05-14 PROCEDURE — 700105 HCHG RX REV CODE 258: Performed by: HOSPITALIST

## 2018-05-14 PROCEDURE — A9270 NON-COVERED ITEM OR SERVICE: HCPCS | Performed by: INTERNAL MEDICINE

## 2018-05-14 PROCEDURE — 82962 GLUCOSE BLOOD TEST: CPT

## 2018-05-14 PROCEDURE — 700111 HCHG RX REV CODE 636 W/ 250 OVERRIDE (IP): Performed by: HOSPITALIST

## 2018-05-14 PROCEDURE — A9270 NON-COVERED ITEM OR SERVICE: HCPCS | Performed by: HOSPITALIST

## 2018-05-14 PROCEDURE — 500891 HCHG PACK, ORTHO MAJOR: Performed by: ORTHOPAEDIC SURGERY

## 2018-05-14 PROCEDURE — 160002 HCHG RECOVERY MINUTES (STAT): Performed by: ORTHOPAEDIC SURGERY

## 2018-05-14 PROCEDURE — 160048 HCHG OR STATISTICAL LEVEL 1-5: Performed by: ORTHOPAEDIC SURGERY

## 2018-05-14 PROCEDURE — 0HQNXZZ REPAIR LEFT FOOT SKIN, EXTERNAL APPROACH: ICD-10-PCS | Performed by: ORTHOPAEDIC SURGERY

## 2018-05-14 PROCEDURE — 160009 HCHG ANES TIME/MIN: Performed by: ORTHOPAEDIC SURGERY

## 2018-05-14 PROCEDURE — 36415 COLL VENOUS BLD VENIPUNCTURE: CPT

## 2018-05-14 PROCEDURE — 160036 HCHG PACU - EA ADDL 30 MINS PHASE I: Performed by: ORTHOPAEDIC SURGERY

## 2018-05-14 PROCEDURE — 160035 HCHG PACU - 1ST 60 MINS PHASE I: Performed by: ORTHOPAEDIC SURGERY

## 2018-05-14 PROCEDURE — 501838 HCHG SUTURE GENERAL: Performed by: ORTHOPAEDIC SURGERY

## 2018-05-14 PROCEDURE — 99233 SBSQ HOSP IP/OBS HIGH 50: CPT | Performed by: INTERNAL MEDICINE

## 2018-05-14 PROCEDURE — 700102 HCHG RX REV CODE 250 W/ 637 OVERRIDE(OP)

## 2018-05-14 PROCEDURE — A6222 GAUZE <=16 IN NO W/SAL W/O B: HCPCS | Performed by: ORTHOPAEDIC SURGERY

## 2018-05-14 PROCEDURE — 0QBR0ZZ EXCISION OF LEFT TOE PHALANX, OPEN APPROACH: ICD-10-PCS | Performed by: ORTHOPAEDIC SURGERY

## 2018-05-14 PROCEDURE — 700101 HCHG RX REV CODE 250

## 2018-05-14 PROCEDURE — 700111 HCHG RX REV CODE 636 W/ 250 OVERRIDE (IP): Performed by: INTERNAL MEDICINE

## 2018-05-14 PROCEDURE — 700105 HCHG RX REV CODE 258: Performed by: INTERNAL MEDICINE

## 2018-05-14 PROCEDURE — 770021 HCHG ROOM/CARE - ISO PRIVATE

## 2018-05-14 PROCEDURE — 700111 HCHG RX REV CODE 636 W/ 250 OVERRIDE (IP)

## 2018-05-14 PROCEDURE — 700102 HCHG RX REV CODE 250 W/ 637 OVERRIDE(OP): Performed by: INTERNAL MEDICINE

## 2018-05-14 PROCEDURE — 80202 ASSAY OF VANCOMYCIN: CPT

## 2018-05-14 PROCEDURE — A9270 NON-COVERED ITEM OR SERVICE: HCPCS

## 2018-05-14 PROCEDURE — 700102 HCHG RX REV CODE 250 W/ 637 OVERRIDE(OP): Performed by: HOSPITALIST

## 2018-05-14 PROCEDURE — 160027 HCHG SURGERY MINUTES - 1ST 30 MINS LEVEL 2: Performed by: ORTHOPAEDIC SURGERY

## 2018-05-14 RX ORDER — MIDAZOLAM HYDROCHLORIDE 1 MG/ML
INJECTION INTRAMUSCULAR; INTRAVENOUS
Status: COMPLETED
Start: 2018-05-14 | End: 2018-05-14

## 2018-05-14 RX ORDER — OXYCODONE HCL 5 MG/5 ML
SOLUTION, ORAL ORAL
Status: COMPLETED
Start: 2018-05-14 | End: 2018-05-14

## 2018-05-14 RX ADMIN — CHLORDIAZEPOXIDE HYDROCHLORIDE 5 MG: 5 CAPSULE ORAL at 16:15

## 2018-05-14 RX ADMIN — SENNOSIDES AND DOCUSATE SODIUM 2 TABLET: 8.6; 5 TABLET ORAL at 21:01

## 2018-05-14 RX ADMIN — SODIUM CHLORIDE: 900 INJECTION INTRAVENOUS at 21:17

## 2018-05-14 RX ADMIN — GABAPENTIN 100 MG: 100 CAPSULE ORAL at 21:01

## 2018-05-14 RX ADMIN — QUETIAPINE FUMARATE 100 MG: 100 TABLET ORAL at 16:15

## 2018-05-14 RX ADMIN — OXYCODONE HYDROCHLORIDE 10 MG: 5 SOLUTION ORAL at 15:00

## 2018-05-14 RX ADMIN — FENTANYL CITRATE 50 MCG: 50 INJECTION, SOLUTION INTRAMUSCULAR; INTRAVENOUS at 15:00

## 2018-05-14 RX ADMIN — BENZTROPINE MESYLATE 1 MG: 1 TABLET ORAL at 10:02

## 2018-05-14 RX ADMIN — SODIUM CHLORIDE: 900 INJECTION INTRAVENOUS at 06:04

## 2018-05-14 RX ADMIN — QUETIAPINE FUMARATE 100 MG: 100 TABLET ORAL at 10:02

## 2018-05-14 RX ADMIN — FLUOXETINE HYDROCHLORIDE 60 MG: 20 CAPSULE ORAL at 10:02

## 2018-05-14 RX ADMIN — VANCOMYCIN HYDROCHLORIDE 2000 MG: 100 INJECTION, POWDER, LYOPHILIZED, FOR SOLUTION INTRAVENOUS at 09:00

## 2018-05-14 RX ADMIN — GABAPENTIN 100 MG: 100 CAPSULE ORAL at 16:15

## 2018-05-14 RX ADMIN — SODIUM CHLORIDE: 900 INJECTION INTRAVENOUS at 21:02

## 2018-05-14 RX ADMIN — PIPERACILLIN SODIUM AND TAZOBACTAM SODIUM 3.38 G: 3; .375 INJECTION, POWDER, FOR SOLUTION INTRAVENOUS at 06:02

## 2018-05-14 RX ADMIN — QUETIAPINE FUMARATE 200 MG: 100 TABLET ORAL at 21:00

## 2018-05-14 RX ADMIN — OXYCODONE HYDROCHLORIDE 5 MG: 5 TABLET ORAL at 10:09

## 2018-05-14 RX ADMIN — BENZTROPINE MESYLATE 1 MG: 1 TABLET ORAL at 21:01

## 2018-05-14 RX ADMIN — INSULIN GLARGINE 5 UNITS: 100 INJECTION, SOLUTION SUBCUTANEOUS at 21:07

## 2018-05-14 RX ADMIN — CHLORDIAZEPOXIDE HYDROCHLORIDE 5 MG: 5 CAPSULE ORAL at 06:02

## 2018-05-14 RX ADMIN — PIPERACILLIN SODIUM AND TAZOBACTAM SODIUM 3.38 G: 3; .375 INJECTION, POWDER, FOR SOLUTION INTRAVENOUS at 21:17

## 2018-05-14 RX ADMIN — GABAPENTIN 100 MG: 100 CAPSULE ORAL at 10:02

## 2018-05-14 RX ADMIN — CHLORDIAZEPOXIDE HYDROCHLORIDE 5 MG: 5 CAPSULE ORAL at 21:01

## 2018-05-14 ASSESSMENT — ENCOUNTER SYMPTOMS
VOMITING: 0
CHILLS: 0
ABDOMINAL PAIN: 0
FEVER: 0
SENSORY CHANGE: 1
NAUSEA: 0

## 2018-05-14 ASSESSMENT — PAIN SCALES - GENERAL
PAINLEVEL_OUTOF10: 5
PAINLEVEL_OUTOF10: 0
PAINLEVEL_OUTOF10: 2
PAINLEVEL_OUTOF10: 4
PAINLEVEL_OUTOF10: 0
PAINLEVEL_OUTOF10: 0
PAINLEVEL_OUTOF10: 5

## 2018-05-14 NOTE — PROGRESS NOTES
Infectious Disease Progress Note    Author: Amanda Feng M.D. Date & Time of service: 2018  12:28 PM    Chief Complaint:  Left foot wound dehiscence      Interval History:  52 y.o. WM admitted 2018 due to left foot wound dehiscence at amp site   AF WBC 5 denies any pain-ate all of breakfast   AF plan for repeat surgery today. No new complaints  Labs Reviewed, Medications Reviewed, Radiology Reviewed and Wound Reviewed.    Review of Systems:  Review of Systems   Constitutional: Negative for chills and fever.   Gastrointestinal: Negative for abdominal pain, nausea and vomiting.   Musculoskeletal: Negative for joint pain.   Neurological: Positive for sensory change.   All other systems reviewed and are negative.      Hemodynamics:  Temp (24hrs), Av.2 °C (97.1 °F), Min:36 °C (96.8 °F), Max:36.4 °C (97.6 °F)  Temperature: 36.1 °C (97 °F)  Pulse  Av.8  Min: 52  Max: 76   Blood Pressure: 138/74       Physical Exam:  Physical Exam   Constitutional: He is oriented to person, place, and time. He appears well-developed. No distress.   disheveled   HENT:   Head: Atraumatic.   Poor dentition   Eyes: EOM are normal. Pupils are equal, round, and reactive to light.   Neck: Neck supple.   Cardiovascular: Normal rate.    Pulmonary/Chest: Effort normal. No respiratory distress.   Abdominal: Soft. He exhibits no distension. There is no tenderness.   Musculoskeletal: He exhibits edema.   Left foot edema/erythema  VAC   Neurological: He is alert and oriented to person, place, and time.   Skin: There is erythema.   Nursing note and vitals reviewed.      Meds:    Current Facility-Administered Medications:   •  [COMPLETED] piperacillin-tazobactam **AND** piperacillin-tazobactam  •  chlordiazePOXIDE  •  vancomycin  •  insulin glargine  •  enoxaparin (LOVENOX) injection  •  MD ALERT... vancomycin  •  senna-docusate **AND** polyethylene glycol/lytes **AND** magnesium hydroxide **AND** bisacodyl  •  Respiratory  Care per Protocol  •  NS  •  insulin regular **AND** Accu-Chek ACHS **AND** NOTIFY MD and PharmD **AND** glucose 4 g **AND** dextrose 50%  •  oxyCODONE immediate-release **OR** oxyCODONE immediate-release  •  gabapentin  •  morphine injection  •  benztropine  •  FLUoxetine  •  QUEtiapine **AND** QUEtiapine    Labs:  Recent Labs      05/12/18   0150   WBC  5.0   RBC  3.69*   HEMOGLOBIN  11.3*   HEMATOCRIT  33.5*   MCV  90.8   MCH  30.6   RDW  46.9   PLATELETCT  138*   MPV  9.4   NEUTSPOLYS  45.90   LYMPHOCYTES  40.60   MONOCYTES  10.70   EOSINOPHILS  1.80   BASOPHILS  0.80     Recent Labs      05/12/18   0150   SODIUM  138   POTASSIUM  3.7   CHLORIDE  109   CO2  23   GLUCOSE  114*   BUN  17     Recent Labs      05/12/18   0150   CREATININE  0.84       Imaging:  Dx-foot-2- Left    Result Date: 4/20/2018 4/20/2018 10:19 AM HISTORY/REASON FOR EXAM:  Pain/Deformity Following Trauma Weeping great toe TECHNIQUE/EXAM DESCRIPTION AND NUMBER OF VIEWS: 2 nonweightbearing views of the LEFT foot. COMPARISON:  3/31/2018 FINDINGS: Patient is status post amputation of the first digit at the level of the proximal phalanx. There is minimal osseous irregularity at the level of the amputation. There is faint adjacent calcification which may be dystrophic.  There are mild degenerative changes of the first tarsometatarsal joint. No acute fracture or dislocation is seen. There is soft tissue swelling about the medial forefoot and remainder of the first digit.     Post surgical changes status post amputation at the level of the proximal phalanx of the first digit. Linear amorphous calcification adjacent to the amputation may be dystrophic. There is minimal osseous irregularity at the level of the amputation where it is difficult to exclude osteomyelitis. Soft tissue swelling of the forefoot and remainder of the first digit.     Mr-foot-with & W/o Left    Result Date: 5/12/2018 5/12/2018 10:44 AM HISTORY/REASON FOR EXAM:  Open wound. Open  wound,?surgical site at great toe amp site, please eval for OM and/ or abscess. TECHNIQUE/EXAM DESCRIPTION: MRI of the LEFT foot with and without contrast. The study was performed on a [employer] Signa 1.5 Steph MRI scanner. T1 axial, T1 sagittal, T1 coronal, STIR sagittal, T2 fast spin-echo fat-suppressed coronal, sagittal inversion recovery, and T1 postcontrast coronal images were obtained. 20 mL Omniscan contrast was administered intravenously. COMPARISON: Foot radiograph 4/20/2018. FINDINGS: Diffuse soft tissue swelling about the dorsum of foot. Increased signal within the intrinsic foot muscles, likely related to neuropathic change. There is an open ulcer extending from the great toe amputation site into the first metatarsal head with marrow replacement. No walled fluid collection seen.     Large open ulcer extending from the great toe amputation site into the first metatarsal head with osteomyelitis of the first metatarsal head. No walled fluid collection seen.      Micro:  Results     Procedure Component Value Units Date/Time    CULTURE TISSUE W/ GRM STAIN [[id number]]  (Abnormal)  (Susceptibility) Collected:  05/12/18 1113    Order Status:  Completed Specimen:  Tissue Updated:  05/14/18 1011     Significant Indicator POS (POS)     Source TISS     Site Left Foot     Tissue Culture -- (A)     Gram Stain Result Few WBCs.  Moderate Gram positive cocci.  Rare Gram positive rods.       Tissue Culture Methicillin Resistant Staphylococcus aureus  Moderate growth   (A)      Coagulase-negative Staphylococcus species  Light growth   (A)    Narrative:       CALL  Sandoval  MS5 tel. [phone],    Culture & Susceptibility     METHICILLIN RESISTANT STAPHYLOCOCCUS AUREUS     Antibiotic Sensitivity Microscan Unit Status    Ampicillin/sulbactam Resistant >16/8 mcg/mL Preliminary    Method: SENSITIVITY, JUNIOR    Daptomycin Sensitive 1 mcg/mL Preliminary    Method: SENSITIVITY, JUNIOR    Erythromycin Resistant >4 mcg/mL Preliminary    Method:  SENSITIVITY, JUNIOR    Moxifloxacin Sensitive 2 mcg/mL Preliminary    Method: SENSITIVITY, JUNIOR    Oxacillin Resistant >2 mcg/mL Preliminary    Method: SENSITIVITY, JUNIOR    Penicillin Resistant >8 mcg/mL Preliminary    Method: SENSITIVITY, JUNIOR    Tetracycline Sensitive <=4 mcg/mL Preliminary    Method: SENSITIVITY, JUNIOR    Trimeth/Sulfa Sensitive <=0.5/9.5 mcg/mL Preliminary    Method: SENSITIVITY, JUNIOR    Vancomycin Sensitive 2 mcg/mL Preliminary    Method: SENSITIVITY, JUNIOR                       ANAEROBIC CULTURE [[id number]] Collected:  05/12/18 1113    Order Status:  Completed Specimen:  Tissue Updated:  05/14/18 1011     Significant Indicator NEG     Source TISS     Site Left Foot     Anaerobic Culture, Culture Res Culture in progress.    Narrative:       CALL  [facility]oval  MS5 tel. [phone],    GRAM STAIN [[id number]] Collected:  05/12/18 1113    Order Status:  Completed Specimen:  Tissue Updated:  05/13/18 0824     Significant Indicator .     Source TISS     Site Left Foot     Gram Stain Result Few WBCs.  Moderate Gram positive cocci.  Rare Gram positive rods.            Assessment:  Active Hospital Problems    Diagnosis   • Diabetic ulcer of left foot associated with type 2 diabetes mellitus, with muscle involvement without evidence of necrosis (HCC) [E11.621, L97.525]   • Dehiscence of surgical wound [T81.31XA]   • Diabetic peripheral neuropathy (HCC) [E11.42]   • Psychiatric disorder [F99]   • Type 2 diabetes mellitus with neurologic complication, without long-term current use of insulin (HCC) [E11.49]       Plan:  Left foot osteomyelitis, additional work  Afebrile  No leukocytosis   S/p left great amp through proximal phalanx on 4/1-Wound cx - group G and C strep, morganella , diphtheroids  S/p left great toe amp down to MTP joint on 4/23. Clean margins obtained per OP note- OR cx (proximal bone) - mixed skin christine  Failed outpatient therapy-clinical osteomyelitis as probed to bone  s/p another debridement 5/12-MRSA and  stap epi so far  Continue vanco and Zosyn  MRI +OM  Endpoint clinical-plan for repeat I and D today  Ordered contact isolation    DM2  HgA1c 5.8  Maintain BS less than 150    Psychiatric disorder  Suspect contributing to prior noncompliance

## 2018-05-14 NOTE — PROGRESS NOTES
"   Orthopaedic PA Progress Note    Interval changes:NPO at present , wound looks good, vac functioning well    ROS - Patient denies any new issues. No chest pain, dyspnea, or fever.  Pain well controlled.    Blood pressure 138/74, pulse (!) 52, temperature 36.1 °C (97 °F), resp. rate 16, height 1.93 m (6' 4\"), weight 103.1 kg (227 lb 4.7 oz), SpO2 98 %.    Patient seen and examined  No acute distress  Breathing non labored  RRR  Surgical dressing is clean, dry, and intact. Patient clearly fires tibialis anterior, and gastrocnemius/soleus. Sensation is intact to light touch throughout superficial peroneal, deep peroneal, tibial, saphenous, and sural nerve distributions. Strong and palpable 2+ dorsalis pedis and posterior tibial pulses with capillary refill less than 2 seconds. No lower leg tenderness or discomfort.    Recent Labs      05/12/18   0150   WBC  5.0   RBC  3.69*   HEMOGLOBIN  11.3*   HEMATOCRIT  33.5*   MCV  90.8   MCH  30.6   MCHC  33.7   RDW  46.9   PLATELETCT  138*   MPV  9.4       Active Hospital Problems    Diagnosis   • Diabetic ulcer of left foot associated with type 2 diabetes mellitus, with muscle involvement without evidence of necrosis (HCC) [E11.621, L97.525]     Priority: High   • Dehiscence of surgical wound [T81.31XA]   • Diabetic peripheral neuropathy (HCC) [E11.42]   • Psychiatric disorder [F99]   • Type 2 diabetes mellitus with neurologic complication, without long-term current use of insulin (HCC) [E11.49]     Assessment/Plan:  POD#12 s/p   Irrigation and debridement of left foot wound of skin, subcutaneous tissue, muscle, and bone with Wound VAC placed in the left foot  Wt bearing status - WBAT through the heel  PT/OT-initiated  Wound care:Vac dressing change in OR this week  Drains - no  Olsen-no  Sutures/Staples out- 10-14 days post operatively  Antibiotics: Zosyn per ID  DVT Prophylaxis- TEDS/SCDs/Foot pumps.   Lovenox: Start 40 mg daily, Duration-until ambulatory > 150'  Future " Procedures - wound revision later this week pending  Case Coordination for Discharge Planning - Disposition will follow, will coordinate vac change with surgery vs WCT at bedside

## 2018-05-14 NOTE — OP REPORT
DATE OF SERVICE:  05/14/2018    PREOPERATIVE DIAGNOSIS:  Left great toe surgical incision, status post wound   VAC placement.    POSTOPERATIVE DIAGNOSIS:  Left great toe surgical incision, status post wound   VAC placement.    PROCEDURE:  Irrigation and debridement and secondary closure of surgical   wound, 3 cm.    SURGEON:  Salomón Rodríguez MD    ASSISTANT:  Nicolas Benedict PA-C    ESTIMATED BLOOD LOSS:  None.    INDICATIONS:  This is a gentleman, status post toe amputation who had a wound   dehiscence with purulent drainage, which underwent irrigation and debridement   3 days ago and wound VAC placement.  He has been on IV antibiotics and   consultation by the infectious disease service and now his wound is granulated   nicely and he is scheduled for wound irrigation, debridement, and closure.    Risks and benefits were discussed which include but not limited to bleeding,   infection, neurovascular damage, pain, stiffness, and need for further surgery   including more proximal amputation.  He understands all these risks and   wished to proceed.    DESCRIPTION OF PROCEDURE:  Patient was sedated with LMA anesthesia and   administered preoperative antibiotics.  His left foot was prepped and draped   in usual sterile fashion.  His wound was debrided of skin, subcutaneous   tissue, and underlying muscle, and bone irrigated with copious amounts of   normal saline solution and closed with nylon sutures.  Sterile dressings were   applied.  Patient tolerated the procedure well.    POSTOPERATIVE PLAN:  Patient to be weightbearing as tolerated through the   heel.  We will keep him admitted for antibiotics per the infectious disease   service.       ____________________________________     SALOMÓN RODRÍGUEZ MD    [facility] / NTS    DD:  05/14/2018 14:20:49  DT:  05/14/2018 15:13:35    D#:  [id number]  Job#:  [id number]

## 2018-05-14 NOTE — PROGRESS NOTES
"Pharmacy Kinetics 52 y.o. male on vancomycin day #3   2018    Currently on Vancomycin 2000 mg IV q24h    Indication for Treatment: diabetic ulcer    Pertinent history per medical record: Admitted on 2018 for pus from previous left toe amputation while seen at wound clinic. As per patient, they were able to express pus from the wound and the wound was becoming . I&D on  by Dr. Baez. Seen by [facility] previously, was discharged on Augmentin. ID consulting.    Other antibiotics:     Allergies: Patient has no known allergies.     List concerns for renal function: MRI with contrast     Pertinent cultures to date:   18: Tissue, left foot = MRSA (vanco JUNIOR=2) and CoNS     Many previous cultures   18 left great toe - Diphtheroids, streptococcus   3/31/18 left great toe - streptococcus, Morganella morganii  3/31/18 right great toe - Streptococcus     Recent Labs      18   0150   WBC  5.0   NEUTSPOLYS  45.90     Recent Labs      18   0150   BUN  17   CREATININE  0.84     Recent Labs      18   0654   VANCOTROUGH  9.6*     Intake/Output Summary (Last 24 hours) at 18 1427  Last data filed at 18 1215   Gross per 24 hour   Intake                0 ml   Output             1400 ml   Net            -1400 ml      Blood pressure 146/90, pulse (!) 58, temperature 36.4 °C (97.6 °F), resp. rate 16, height 1.93 m (6' 4\"), weight 103.1 kg (227 lb 4.7 oz), SpO2 95 %. Temp (24hrs), Av.2 °C (97.2 °F), Min:36 °C (96.8 °F), Max:36.4 °C (97.6 °F)    A/P   1. Vancomycin dose change: continue same but move next dose forward a 4 hours  2. Next vancomycin level: 3-4 days (not ordered)  3. Goal trough: 12-16 mcg/mL  4. Comments: ID consulting. Back to OR today for repeat I&D. MRSA and CoNS from wound cx. Unasyn stopped. Trough just prior to steady state, likely to be on low end or just below, will continue same but move dose forward.     Gaurav Johnson, PharmD, BCPS        "

## 2018-05-14 NOTE — CARE PLAN
Problem: Knowledge Deficit  Goal: Knowledge of disease process/condition, treatment plan, diagnostic tests, and medications will improve  Outcome: PROGRESSING AS EXPECTED  Discussed plan of care for today w/ pt this am, he is A+O to self and place, he does not participate in conversation, he verbalizes understanding of his plan of care, no questions. Emotional support given. Will monitor for educational needs. Discussed pt's care with Hospitalist. Discussed pt's care with GENEVIEVE BARRETO. Pt remains NPO, possibly going to surgery.       Problem: Pain Management  Goal: Pain level will decrease to patient's comfort goal  Outcome: PROGRESSING AS EXPECTED  Assessing every four hrs for pain per protocol; see doc flowsheets, receives Oxycodone po prn for pain control.

## 2018-05-14 NOTE — PROGRESS NOTES
Pt denies c/o. Report given to pre - op RN. Pt taken off floor by bed to go to surgery. Surgical consent signed.

## 2018-05-14 NOTE — PROGRESS NOTES
ERNESTO from Lab called with critical result of WOUND CULTURE L FOOT MRSA + at 1022. Critical lab result read back to ERNESTO.   Dr. LORA notified of critical lab result at 1025.  Critical lab result read back by Dr. LORA .

## 2018-05-14 NOTE — PROGRESS NOTES
Received care of pt from NOC RN this am. Pt is A+O to self & place. Impulsive; bed alarm on. NPO, possibly going to surgery today. Bed bath done. Stands @ side of bed to void, pt is unsteady on his feet.

## 2018-05-14 NOTE — PROGRESS NOTES
Assumed care of pt at 1930. Report received and bedside rounding completed with day RN. Pt agitated and irritable,  Pt currently aaox2 self and place. No SOB, or in any acute distress. Wound vac in place reinforcement of dressing required. Fall precautions in place,  bed alarm placed. -  Call light and pt belongings within reach - pt makes needs known - hourly rounding in place. See flowsheets for further assessment.

## 2018-05-14 NOTE — PROGRESS NOTES
"   Orthopaedic PA Progress Note    Interval changes:Up eating dinner at edge of bed. Speaks with mouth full. Gram stain reveals rods and cocci, both gram +, Dr. Feng attending for ID    ROS - Patient denies any new issues. No chest pain, dyspnea, or fever.  Pain well controlled.    Blood pressure 124/74, pulse 61, temperature 36.4 °C (97.6 °F), resp. rate 16, height 1.93 m (6' 4\"), weight 103.1 kg (227 lb 4.7 oz), SpO2 96 %.    Patient seen and examined  No acute distress  Breathing non labored  RRR  Surgical vac dressing is clean, dry, and intact. Patient clearly fires tibialis anterior,  gastrocnemius/soleus. Sensation is intact to light touch throughout superficial peroneal, deep peroneal, tibial, saphenous, and sural nerve distributions. Strong and palpable 2+ dorsalis pedis and posterior tibial pulses with capillary refill less than 2 seconds. No lower leg tenderness or discomfort.    Recent Labs      05/12/18   0150   WBC  5.0   RBC  3.69*   HEMOGLOBIN  11.3*   HEMATOCRIT  33.5*   MCV  90.8   MCH  30.6   MCHC  33.7   RDW  46.9   PLATELETCT  138*   MPV  9.4     Active Hospital Problems    Diagnosis   • Diabetic ulcer of left foot associated with type 2 diabetes mellitus, with muscle involvement without evidence of necrosis (HCC) [E11.621, L97.525]     Priority: High   • Dehiscence of surgical wound [T81.31XA]   • Diabetic peripheral neuropathy (HCC) [E11.42]   • Psychiatric disorder [F99]   • Type 2 diabetes mellitus with neurologic complication, without long-term current use of insulin (HCC) [E11.49]     Assessment/Plan:  POD#1 s/p   Irrigation and debridement of left foot wound of skin, subcutaneous tissue, muscle, and bone with Wound VAC placed in the left foot  Wt bearing status - WBAT through the heel  PT/OT-initiated  Wound care:Vac dressing change in OR this week  Drains - no  Olsen-no  Sutures/Staples out- 10-14 days post operatively  Antibiotics: Zosyn per ID  DVT Prophylaxis- TEDS/SCDs/Foot pumps. "   Lovenox: Start 40 mg daily, Duration-until ambulatory > 150'  Future Procedures - wound revision later this week pending  Case Coordination for Discharge Planning - Disposition will follow

## 2018-05-15 PROBLEM — M86.072 ACUTE HEMATOGENOUS OSTEOMYELITIS OF LEFT FOOT (HCC): Status: ACTIVE | Noted: 2018-05-15

## 2018-05-15 LAB
ALBUMIN SERPL BCP-MCNC: 3.3 G/DL (ref 3.2–4.9)
ANION GAP SERPL CALC-SCNC: 6 MMOL/L (ref 0–11.9)
BACTERIA SPEC ANAEROBE CULT: ABNORMAL
BACTERIA SPEC ANAEROBE CULT: ABNORMAL
BACTERIA TISS AEROBE CULT: ABNORMAL
BASOPHILS # BLD AUTO: 0.9 % (ref 0–1.8)
BASOPHILS # BLD: 0.04 K/UL (ref 0–0.12)
BUN SERPL-MCNC: 9 MG/DL (ref 8–22)
CALCIUM SERPL-MCNC: 8.7 MG/DL (ref 8.5–10.5)
CHLORIDE SERPL-SCNC: 109 MMOL/L (ref 96–112)
CO2 SERPL-SCNC: 25 MMOL/L (ref 20–33)
CREAT SERPL-MCNC: 0.73 MG/DL (ref 0.5–1.4)
EOSINOPHIL # BLD AUTO: 0.08 K/UL (ref 0–0.51)
EOSINOPHIL NFR BLD: 1.7 % (ref 0–6.9)
ERYTHROCYTE [DISTWIDTH] IN BLOOD BY AUTOMATED COUNT: 44.7 FL (ref 35.9–50)
GLUCOSE BLD-MCNC: 122 MG/DL (ref 65–99)
GLUCOSE BLD-MCNC: 185 MG/DL (ref 65–99)
GLUCOSE BLD-MCNC: 96 MG/DL (ref 65–99)
GLUCOSE BLD-MCNC: 96 MG/DL (ref 65–99)
GLUCOSE SERPL-MCNC: 95 MG/DL (ref 65–99)
GRAM STN SPEC: ABNORMAL
HCT VFR BLD AUTO: 35.2 % (ref 42–52)
HGB BLD-MCNC: 11.8 G/DL (ref 14–18)
IMM GRANULOCYTES # BLD AUTO: 0 K/UL (ref 0–0.11)
IMM GRANULOCYTES NFR BLD AUTO: 0 % (ref 0–0.9)
LYMPHOCYTES # BLD AUTO: 1.53 K/UL (ref 1–4.8)
LYMPHOCYTES NFR BLD: 33.4 % (ref 22–41)
MAGNESIUM SERPL-MCNC: 1.7 MG/DL (ref 1.5–2.5)
MCH RBC QN AUTO: 30.3 PG (ref 27–33)
MCHC RBC AUTO-ENTMCNC: 33.5 G/DL (ref 33.7–35.3)
MCV RBC AUTO: 90.3 FL (ref 81.4–97.8)
MONOCYTES # BLD AUTO: 0.38 K/UL (ref 0–0.85)
MONOCYTES NFR BLD AUTO: 8.3 % (ref 0–13.4)
NEUTROPHILS # BLD AUTO: 2.55 K/UL (ref 1.82–7.42)
NEUTROPHILS NFR BLD: 55.7 % (ref 44–72)
NRBC # BLD AUTO: 0 K/UL
NRBC BLD-RTO: 0 /100 WBC
PHOSPHATE SERPL-MCNC: 4.2 MG/DL (ref 2.5–4.5)
PLATELET # BLD AUTO: 144 K/UL (ref 164–446)
PMV BLD AUTO: 9.1 FL (ref 9–12.9)
POTASSIUM SERPL-SCNC: 3.9 MMOL/L (ref 3.6–5.5)
RBC # BLD AUTO: 3.9 M/UL (ref 4.7–6.1)
SIGNIFICANT IND 70042: ABNORMAL
SIGNIFICANT IND 70042: ABNORMAL
SITE SITE: ABNORMAL
SITE SITE: ABNORMAL
SODIUM SERPL-SCNC: 140 MMOL/L (ref 135–145)
SOURCE SOURCE: ABNORMAL
SOURCE SOURCE: ABNORMAL
WBC # BLD AUTO: 4.6 K/UL (ref 4.8–10.8)

## 2018-05-15 PROCEDURE — 700105 HCHG RX REV CODE 258: Performed by: HOSPITALIST

## 2018-05-15 PROCEDURE — 99233 SBSQ HOSP IP/OBS HIGH 50: CPT | Performed by: HOSPITALIST

## 2018-05-15 PROCEDURE — 700105 HCHG RX REV CODE 258: Performed by: INTERNAL MEDICINE

## 2018-05-15 PROCEDURE — 36415 COLL VENOUS BLD VENIPUNCTURE: CPT

## 2018-05-15 PROCEDURE — 770021 HCHG ROOM/CARE - ISO PRIVATE

## 2018-05-15 PROCEDURE — 700111 HCHG RX REV CODE 636 W/ 250 OVERRIDE (IP): Performed by: INTERNAL MEDICINE

## 2018-05-15 PROCEDURE — 83735 ASSAY OF MAGNESIUM: CPT

## 2018-05-15 PROCEDURE — 85025 COMPLETE CBC W/AUTO DIFF WBC: CPT

## 2018-05-15 PROCEDURE — A9270 NON-COVERED ITEM OR SERVICE: HCPCS | Performed by: INTERNAL MEDICINE

## 2018-05-15 PROCEDURE — 700111 HCHG RX REV CODE 636 W/ 250 OVERRIDE (IP): Performed by: HOSPITALIST

## 2018-05-15 PROCEDURE — 700102 HCHG RX REV CODE 250 W/ 637 OVERRIDE(OP): Performed by: HOSPITALIST

## 2018-05-15 PROCEDURE — 80069 RENAL FUNCTION PANEL: CPT

## 2018-05-15 PROCEDURE — 700102 HCHG RX REV CODE 250 W/ 637 OVERRIDE(OP): Performed by: INTERNAL MEDICINE

## 2018-05-15 PROCEDURE — A9270 NON-COVERED ITEM OR SERVICE: HCPCS | Performed by: HOSPITALIST

## 2018-05-15 PROCEDURE — 82962 GLUCOSE BLOOD TEST: CPT | Mod: 91

## 2018-05-15 RX ADMIN — GABAPENTIN 100 MG: 100 CAPSULE ORAL at 08:48

## 2018-05-15 RX ADMIN — PIPERACILLIN SODIUM AND TAZOBACTAM SODIUM 3.38 G: 3; .375 INJECTION, POWDER, FOR SOLUTION INTRAVENOUS at 22:15

## 2018-05-15 RX ADMIN — CHLORDIAZEPOXIDE HYDROCHLORIDE 5 MG: 5 CAPSULE ORAL at 22:14

## 2018-05-15 RX ADMIN — SODIUM CHLORIDE 1000 ML: 900 INJECTION INTRAVENOUS at 22:31

## 2018-05-15 RX ADMIN — OXYCODONE HYDROCHLORIDE 5 MG: 5 TABLET ORAL at 12:58

## 2018-05-15 RX ADMIN — QUETIAPINE FUMARATE 100 MG: 100 TABLET ORAL at 08:48

## 2018-05-15 RX ADMIN — CHLORDIAZEPOXIDE HYDROCHLORIDE 5 MG: 5 CAPSULE ORAL at 12:58

## 2018-05-15 RX ADMIN — OXYCODONE HYDROCHLORIDE 10 MG: 10 TABLET ORAL at 06:39

## 2018-05-15 RX ADMIN — QUETIAPINE FUMARATE 100 MG: 100 TABLET ORAL at 12:58

## 2018-05-15 RX ADMIN — BENZTROPINE MESYLATE 1 MG: 1 TABLET ORAL at 08:48

## 2018-05-15 RX ADMIN — SENNOSIDES AND DOCUSATE SODIUM 2 TABLET: 8.6; 5 TABLET ORAL at 22:14

## 2018-05-15 RX ADMIN — INSULIN GLARGINE 5 UNITS: 100 INJECTION, SOLUTION SUBCUTANEOUS at 22:20

## 2018-05-15 RX ADMIN — INSULIN GLARGINE 5 UNITS: 100 INJECTION, SOLUTION SUBCUTANEOUS at 08:47

## 2018-05-15 RX ADMIN — FLUOXETINE HYDROCHLORIDE 60 MG: 20 CAPSULE ORAL at 08:48

## 2018-05-15 RX ADMIN — OXYCODONE HYDROCHLORIDE 5 MG: 5 TABLET ORAL at 22:14

## 2018-05-15 RX ADMIN — PIPERACILLIN SODIUM AND TAZOBACTAM SODIUM 3.38 G: 3; .375 INJECTION, POWDER, FOR SOLUTION INTRAVENOUS at 06:25

## 2018-05-15 RX ADMIN — ENOXAPARIN SODIUM 40 MG: 100 INJECTION SUBCUTANEOUS at 08:48

## 2018-05-15 RX ADMIN — QUETIAPINE FUMARATE 200 MG: 100 TABLET ORAL at 22:14

## 2018-05-15 RX ADMIN — PIPERACILLIN SODIUM AND TAZOBACTAM SODIUM 3.38 G: 3; .375 INJECTION, POWDER, FOR SOLUTION INTRAVENOUS at 14:22

## 2018-05-15 RX ADMIN — INSULIN HUMAN 2 UNITS: 100 INJECTION, SOLUTION PARENTERAL at 22:19

## 2018-05-15 RX ADMIN — VANCOMYCIN HYDROCHLORIDE 2000 MG: 100 INJECTION, POWDER, LYOPHILIZED, FOR SOLUTION INTRAVENOUS at 06:25

## 2018-05-15 RX ADMIN — GABAPENTIN 100 MG: 100 CAPSULE ORAL at 22:14

## 2018-05-15 RX ADMIN — BENZTROPINE MESYLATE 1 MG: 1 TABLET ORAL at 22:14

## 2018-05-15 RX ADMIN — CHLORDIAZEPOXIDE HYDROCHLORIDE 5 MG: 5 CAPSULE ORAL at 06:39

## 2018-05-15 RX ADMIN — DAPTOMYCIN 620 MG: 500 INJECTION, POWDER, LYOPHILIZED, FOR SOLUTION INTRAVENOUS at 12:58

## 2018-05-15 RX ADMIN — GABAPENTIN 100 MG: 100 CAPSULE ORAL at 14:22

## 2018-05-15 ASSESSMENT — PAIN SCALES - GENERAL
PAINLEVEL_OUTOF10: 4
PAINLEVEL_OUTOF10: 3
PAINLEVEL_OUTOF10: 6
PAINLEVEL_OUTOF10: 3
PAINLEVEL_OUTOF10: 8

## 2018-05-15 ASSESSMENT — ENCOUNTER SYMPTOMS
CHILLS: 0
MYALGIAS: 0
NAUSEA: 0
NERVOUS/ANXIOUS: 1
SEIZURES: 0
FEVER: 0
DEPRESSION: 0
HEADACHES: 0
DIZZINESS: 0
PALPITATIONS: 0
WEAKNESS: 0
COUGH: 0
ABDOMINAL PAIN: 0
NECK PAIN: 0
VOMITING: 0
BLOOD IN STOOL: 0
HEMOPTYSIS: 0
SENSORY CHANGE: 1
HEARTBURN: 0
WHEEZING: 0

## 2018-05-15 NOTE — PROGRESS NOTES
Infectious Disease Progress Note    Author: Amanda Feng M.D. Date & Time of service: 5/15/2018  11:26 AM    Chief Complaint:  Left foot wound dehiscence      Interval History:  52 y.o. WM admitted 2018 due to left foot wound dehiscence at amp site   AF WBC 5 denies any pain-ate all of breakfast   AF plan for repeat surgery today. No new complaints  5/15 AF WBC 4.6 cxs now polymicrobial somnolent  Labs Reviewed, Medications Reviewed, Radiology Reviewed and Wound Reviewed.    Review of Systems:  Review of Systems   Constitutional: Negative for chills and fever.   Gastrointestinal: Negative for abdominal pain, nausea and vomiting.   Musculoskeletal: Negative for joint pain.   Neurological: Positive for sensory change.   All other systems reviewed and are negative.      Hemodynamics:  Temp (24hrs), Av.6 °C (97.8 °F), Min:36.1 °C (97 °F), Max:36.9 °C (98.5 °F)  Temperature: 36.8 °C (98.2 °F)  Pulse  Av.6  Min: 52  Max: 76Heart Rate (Monitored): (!) 57  Blood Pressure: 155/96 (Informed RN), NIBP: 145/92       Physical Exam:  Physical Exam   Constitutional: He appears well-developed. No distress.   disheveled   HENT:   Head: Normocephalic and atraumatic.   Poor dentition   Neck: Neck supple.   Cardiovascular: Normal rate.    Pulmonary/Chest: Effort normal. No respiratory distress.   Abdominal: Soft. He exhibits no distension. There is no tenderness.   Musculoskeletal: He exhibits edema.   Left foot edema/erythema  dressed   Neurological:   somnolent   Skin: There is erythema.   Nursing note and vitals reviewed.      Meds:    Current Facility-Administered Medications:   •  [COMPLETED] piperacillin-tazobactam **AND** piperacillin-tazobactam  •  chlordiazePOXIDE  •  vancomycin  •  insulin glargine  •  enoxaparin (LOVENOX) injection  •  MD ALERT... vancomycin  •  senna-docusate **AND** polyethylene glycol/lytes **AND** magnesium hydroxide **AND** bisacodyl  •  Respiratory Care per Protocol  •   NS  •  insulin regular **AND** Accu-Chek ACHS **AND** NOTIFY MD and PharmD **AND** glucose 4 g **AND** dextrose 50%  •  oxyCODONE immediate-release **OR** oxyCODONE immediate-release  •  gabapentin  •  morphine injection  •  benztropine  •  FLUoxetine  •  QUEtiapine **AND** QUEtiapine    Labs:  Recent Labs      05/15/18   0817   WBC  4.6*   RBC  3.90*   HEMOGLOBIN  11.8*   HEMATOCRIT  35.2*   MCV  90.3   MCH  30.3   RDW  44.7   PLATELETCT  144*   MPV  9.1   NEUTSPOLYS  55.70   LYMPHOCYTES  33.40   MONOCYTES  8.30   EOSINOPHILS  1.70   BASOPHILS  0.90     Recent Labs      05/15/18   0817   SODIUM  140   POTASSIUM  3.9   CHLORIDE  109   CO2  25   GLUCOSE  95   BUN  9     Recent Labs      05/15/18   0817   ALBUMIN  3.3   CREATININE  0.73       Imaging:  Dx-foot-2- Left    Result Date: 4/20/2018 4/20/2018 10:19 AM HISTORY/REASON FOR EXAM:  Pain/Deformity Following Trauma Weeping great toe TECHNIQUE/EXAM DESCRIPTION AND NUMBER OF VIEWS: 2 nonweightbearing views of the LEFT foot. COMPARISON:  3/31/2018 FINDINGS: Patient is status post amputation of the first digit at the level of the proximal phalanx. There is minimal osseous irregularity at the level of the amputation. There is faint adjacent calcification which may be dystrophic.  There are mild degenerative changes of the first tarsometatarsal joint. No acute fracture or dislocation is seen. There is soft tissue swelling about the medial forefoot and remainder of the first digit.     Post surgical changes status post amputation at the level of the proximal phalanx of the first digit. Linear amorphous calcification adjacent to the amputation may be dystrophic. There is minimal osseous irregularity at the level of the amputation where it is difficult to exclude osteomyelitis. Soft tissue swelling of the forefoot and remainder of the first digit.     Mr-foot-with & W/o Left    Result Date: 5/12/2018 5/12/2018 10:44 AM HISTORY/REASON FOR EXAM:  Open wound. Open  wound,?surgical site at great toe amp site, please eval for OM and/ or abscess. TECHNIQUE/EXAM DESCRIPTION: MRI of the LEFT foot with and without contrast. The study was performed on a [employer] Signa 1.5 Steph MRI scanner. T1 axial, T1 sagittal, T1 coronal, STIR sagittal, T2 fast spin-echo fat-suppressed coronal, sagittal inversion recovery, and T1 postcontrast coronal images were obtained. 20 mL Omniscan contrast was administered intravenously. COMPARISON: Foot radiograph 4/20/2018. FINDINGS: Diffuse soft tissue swelling about the dorsum of foot. Increased signal within the intrinsic foot muscles, likely related to neuropathic change. There is an open ulcer extending from the great toe amputation site into the first metatarsal head with marrow replacement. No walled fluid collection seen.     Large open ulcer extending from the great toe amputation site into the first metatarsal head with osteomyelitis of the first metatarsal head. No walled fluid collection seen.      Micro:  Results     Procedure Component Value Units Date/Time    GRAM STAIN [[id number]]  (Abnormal) Collected:  05/12/18 1113    Order Status:  Completed Specimen:  Tissue Updated:  05/15/18 0859     Significant Indicator . (POS)     Source TISS     Site Left Foot     Gram Stain Result Few WBCs.  Moderate Gram positive cocci.  Rare Gram positive rods.   (A)    Narrative:       CALL  Sandoval  MS5 tel. [phone],  CALLED  MS5 tel. [phone] 05/14/2018, 10:22, RB PERF. RESULTS CALLED  TO:Fabiola [id number] Rn    CULTURE TISSUE W/ GRM STAIN [[id number]]  (Abnormal)  (Susceptibility) Collected:  05/12/18 1113    Order Status:  Completed Specimen:  Tissue Updated:  05/15/18 0859     Significant Indicator POS (POS)     Source TISS     Site Left Foot     Tissue Culture -- (A)     Gram Stain Result Few WBCs.  Moderate Gram positive cocci.  Rare Gram positive rods.   (A)     Tissue Culture Methicillin Resistant Staphylococcus aureus  Moderate growth   (A)       Staphylococcus haemolyticus  Light growth   (A)      Pasteurella multocida  Light growth   (A)    Narrative:       CALL  Sandoval  MS5 tel. [phone],  CALLED  MS5 tel. [phone] 05/14/2018, 10:22, RB PERF. RESULTS CALLED  TO:Fabiola [id number] Rn    Culture & Susceptibility     METHICILLIN RESISTANT STAPHYLOCOCCUS AUREUS     Antibiotic Sensitivity Microscan Unit Status    Ampicillin/sulbactam Resistant >16/8 mcg/mL Final    Method: SENSITIVITY, JUNIOR    Clindamycin Sensitive <=0.5 mcg/mL Final    Method: SENSITIVITY, JUNIOR    Daptomycin Sensitive 1 mcg/mL Final    Method: SENSITIVITY, JUNIOR    Erythromycin Resistant >4 mcg/mL Final    Method: SENSITIVITY, JUNIOR    Moxifloxacin Sensitive 2 mcg/mL Final    Method: SENSITIVITY, JUNIOR    Oxacillin Resistant >2 mcg/mL Final    Method: SENSITIVITY, JUNIOR    Penicillin Resistant >8 mcg/mL Final    Method: SENSITIVITY, JUNIOR    Tetracycline Sensitive <=4 mcg/mL Final    Method: SENSITIVITY, JUNIOR    Trimeth/Sulfa Sensitive <=0.5/9.5 mcg/mL Final    Method: SENSITIVITY, JUNIOR    Vancomycin Sensitive 2 mcg/mL Final    Method: SENSITIVITY, JUNIOR              STAPHYLOCOCCUS HAEMOLYTICUS     Antibiotic Sensitivity Microscan Unit Status    Ampicillin/sulbactam Resistant 16/8 mcg/mL Final    Method: SENSITIVITY, JUNIOR    Clindamycin Resistant >4 mcg/mL Final    Method: SENSITIVITY, JUNIOR    Daptomycin Sensitive <=0.5 mcg/mL Final    Method: SENSITIVITY, JUNIOR    Erythromycin Resistant >4 mcg/mL Final    Method: SENSITIVITY, JUNIOR    Moxifloxacin Intermediate 4 mcg/mL Final    Method: SENSITIVITY, JUNIOR    Oxacillin Resistant >2 mcg/mL Final    Method: SENSITIVITY, JUNIOR    Penicillin Resistant >8 mcg/mL Final    Method: SENSITIVITY, JUNIOR    Tetracycline Sensitive <=4 mcg/mL Final    Method: SENSITIVITY, JUNIOR    Trimeth/Sulfa Resistant >2/38 mcg/mL Final    Method: SENSITIVITY, JUNIOR    Vancomycin Sensitive 2 mcg/mL Final    Method: SENSITIVITY, JUNIOR                       ANAEROBIC CULTURE [[id number]] Collected:  05/12/18  1113    Order Status:  Completed Specimen:  Tissue Updated:  05/15/18 0859     Significant Indicator NEG     Source TISS     Site Left Foot     Anaerobic Culture, Culture Res Culture in progress.    Narrative:       CALL  Sandoval  MS5 tel. [phone],  CALLED  MS5 tel. [phone] 05/14/2018, 10:22, RB PERF. RESULTS CALLED  TO:Fabiola [id number] Rn          Assessment:  Active Hospital Problems    Diagnosis   • Diabetic ulcer of left foot associated with type 2 diabetes mellitus, with muscle involvement without evidence of necrosis (HCC) [E11.621, L97.525]   • Dehiscence of surgical wound [T81.31XA]   • Diabetic peripheral neuropathy (HCC) [E11.42]   • Psychiatric disorder [F99]   • Type 2 diabetes mellitus with neurologic complication, without long-term current use of insulin (HCC) [E11.49]       Plan:  Left foot osteomyelitis, additional work  Afebrile  No leukocytosis   S/p left great amp through proximal phalanx on 4/1-Wound cx - group G and C strep, morganella , diphtheroids  S/p left great toe amp down to MTP joint on 4/23. Clean margins obtained per OP note- OR cx (proximal bone) - mixed skin chrisitne  Failed outpatient therapy-clinical osteomyelitis as probed to bone  s/p another debridement 5/12-MRSA, staph haemolyticus(MRSE) and Pasteurella  s/p I and D 5/14  Continue Zosyn  Change vanco to dapto as vanco JUNIOR is 2  MRI +OM  Endpoint clinical-anticipate 6 weeks IV if no TMA/ BKA  Will need PICC if agrees    DM2  HgA1c 5.8  Maintain BS less than 150    Psychiatric disorder  Suspect contributing to prior noncompliance    Prognosis for limb salvage poor  Will need placement    RAFFAELE SEPULVEDA

## 2018-05-15 NOTE — CARE PLAN
Problem: Safety  Goal: Will remain free from falls  Outcome: PROGRESSING AS EXPECTED  Instructed pt to call nursing staff before getting out of bed. Pt verbalized understanding.    Problem: Psychosocial Needs:  Goal: Level of anxiety will decrease  Outcome: PROGRESSING SLOWER THAN EXPECTED  Pt stating frustration with lack on anxiety meds on MAR. Educated pt on non-pharmalogical anxiety reducing techniques.

## 2018-05-15 NOTE — PROGRESS NOTES
"Pharmacy Kinetics 52 y.o. male on vancomycin day # 4 5/15/2018    Currently on Vancomycin 2000 mg iv q24hr    Indication for Treatment: Diabetic Ulcer    Pertinent history per medical record: Admitted on 2018 for pus from previous left toe amputation while seen at wound clinic. As per patient, they were able to express pus from the wound and the wound was becoming . I&D on  by Dr. Baez. Seen by [facility] ID previously, was discharged on Augmentin. ID consulting.    Other antibiotics: Zosyn 3.375mg q8hr    Allergies: Patient has no known allergies.     List concerns for renal function: hx of accumulation on q12hr dosing, weight    Pertinent cultures to date:   18: Tissue, left foot = MRSA (vanco JUNIOR=2) and CoNS     Many previous cultures   18 left great toe - Diphtheroids, streptococcus   3/31/18 left great toe - streptococcus, Morganella morganii  3/31/18 right great toe - Streptococcus     Recent Labs      05/15/18   0817   WBC  4.6*   NEUTSPOLYS  55.70     Recent Labs      05/15/18   0817   BUN  9   CREATININE  0.73   ALBUMIN  3.3     Recent Labs      18   0654   VANCOTROUGH  9.6*     Intake/Output Summary (Last 24 hours) at 05/15/18 0958  Last data filed at 05/15/18 0821   Gross per 24 hour   Intake             2300 ml   Output             2950 ml   Net             -650 ml      Blood pressure 155/96, pulse 73, temperature 36.8 °C (98.2 °F), resp. rate 16, height 1.93 m (6' 4\"), weight 103.1 kg (227 lb 4.7 oz), SpO2 98 %. Temp (24hrs), Av.6 °C (97.8 °F), Min:36.1 °C (97 °F), Max:36.9 °C (98.5 °F)      A/P   1. Vancomycin dose change: none  2. Next vancomycin level:  at 0300 (prior to 6th dose, presumed steady state)  3. Goal trough: 12-16 mcg/mL  4. Comments: Patient to have I&D repeated today.  Cultures results MRSA with vancomycin JUNIOR 2 and CoNS.  ID consulted.  Will reorder trough to ensure patient is accumulating to goal.  Will follow for dosing " appropriateness.    Derick Ortiz, Pharm.D.  Pharmacy Practice Resident, PGY1

## 2018-05-15 NOTE — PROGRESS NOTES
Received handoff report from Isa GARAY and assumed pt care at 0700.  Pt resting in bed. Assessment complete. Labs reviewed.  Patient and RN discussed plan of care and questions were answered. Bed in lowest and locked position and bed alarm is in place.  Call light and personal belongings within reach.

## 2018-05-15 NOTE — DIETARY
"Nutrition services: Day 4 of admit.  Wilbert Kennith Yeomans Jr. is a 52 y.o. male with admitting DX of infected diabetic toe ulcer  Poor PO intake noted on admit screen    Assessment:  Height: 193 cm (6' 4\")  Weight: 103.1 kg (227 lb 4.7 oz)  Body mass index is 27.67 kg/m². - overweight  Diet/Intake: Regular diet - eating % of meals    Evaluation:   1. History of Diabetes; HbA1c 5.8 (3/30/18); POC glucoses have been   2. Good PO intake since admit    Recommendations/Plan:  1. Change diet to Diabetic   2. Encourage intake of meals  3. Document intake of all PO as % taken in ADL's to provide interdisciplinary communication across all shifts.   4. Monitor weight.  5. Nutrition rep will continue to see patient for ongoing meal and snack preferences.     RD following.      "

## 2018-05-15 NOTE — PROGRESS NOTES
"Patient seen and examined    Blood pressure 155/96, pulse 73, temperature 36.8 °C (98.2 °F), resp. rate 16, height 1.93 m (6' 4\"), weight 103.1 kg (227 lb 4.7 oz), SpO2 98 %.    Recent Labs      05/15/18   0817   WBC  4.6*   RBC  3.90*   HEMOGLOBIN  11.8*   HEMATOCRIT  35.2*   MCV  90.3   MCH  30.3   MCHC  33.5*   RDW  44.7   PLATELETCT  144*   MPV  9.1       No acute distress  Dressing clean dry and intact  Neurovascularly intact    POD#1    Plan:  DVT Prophylaxis- TEDS/SCDs  Weight Bearing Status-NWB LLE  PT/OT  Antibiotics: per ID  Case Coordination          "

## 2018-05-15 NOTE — PROGRESS NOTES
Assumed care of pt at 1930. Report received and bedside rounding completed with day RN. No SOB, or in any acute distress. Fall precautions in place,  bed alarm. - pt impulsive, requiring fall precaution reinforcement. Pt very forgetful. Call light and pt belongings within reach - pt makes needs known - hourly rounding in place. See flowsheets for further assessment.

## 2018-05-15 NOTE — PROGRESS NOTES
[facility] Hospitalist Progress Note    Date of Service: 2018    Chief Complaint  52 y.o. male admitted 2018 with left foot wound. History remarkable for s/p left foot great toe amputation, followed by a revision amputation b y , the wound gradually dehisced requiring admission again.        Interval Problem Update  : patient was seen post operatively, he has no complaints. We discussed tight blood sugar control. He is s/p left foot wound irrigation and debridement. Surgery recommends wound vac and revision surgery on Monday. dc'd metformin, will do insulin only     : patient is stable. Discussed pending plan. Id recommends switching to zosyn, vanc. Patient has no complaints    : underwent repeat irrigation and debridement and secondary closure of surgical wound. Refer to surgery op note. Continuing abx's with vanc/zosyn. ID recommended contact isolation. He is less anxious appearing, started on librium yesterday.     Consultants/Specialty  Surgery, ID, wound care    Disposition  Continuing vanc/zosyn, wound         ROS   Physical Exam  Laboratory/Imaging   Hemodynamics  Temp (24hrs), Av.3 °C (97.3 °F), Min:36 °C (96.8 °F), Max:36.9 °C (98.5 °F)   Temperature: 36.1 °C (97 °F)  Pulse  Av.6  Min: 52  Max: 76 Heart Rate (Monitored): (!) 57  Blood Pressure: 159/95, NIBP: 145/92      Respiratory      Respiration: 17, Pulse Oximetry: 99 %        RUL Breath Sounds: Clear, RML Breath Sounds: Clear, RLL Breath Sounds: Diminished, LORENA Breath Sounds: Clear, LLL Breath Sounds: Diminished    Fluids    Intake/Output Summary (Last 24 hours) at 18 1811  Last data filed at 18 1620   Gross per 24 hour   Intake              400 ml   Output             1800 ml   Net            -1400 ml       Nutrition  Orders Placed This Encounter   Procedures   • DIET ORDER     Standing Status:   Standing     Number of Occurrences:   1     Order Specific Question:   Diet:     Answer:   Regular [1]      Physical Exam   Constitutional: No distress.   Laying in bed   Eyes: Left eye exhibits no discharge.   Neck: Neck supple.   Cardiovascular: Normal rate, regular rhythm, normal heart sounds and intact distal pulses.    Pulmonary/Chest: Effort normal and breath sounds normal. No respiratory distress. He has no wheezes.   Abdominal: Soft. Bowel sounds are normal.   Musculoskeletal: He exhibits no edema.   Skin: Skin is warm and dry.   Left foot post debridement with dressing, right foot scabbed wounds   Psychiatric: He has a normal mood and affect.       Recent Labs      05/12/18   0150   WBC  5.0   RBC  3.69*   HEMOGLOBIN  11.3*   HEMATOCRIT  33.5*   MCV  90.8   MCH  30.6   MCHC  33.7   RDW  46.9   PLATELETCT  138*   MPV  9.4     Recent Labs      05/12/18   0150   SODIUM  138   POTASSIUM  3.7   CHLORIDE  109   CO2  23   GLUCOSE  114*   BUN  17   CREATININE  0.84   CALCIUM  8.6                      Assessment/Plan     Diabetic ulcer of left foot associated with type 2 diabetes mellitus, with muscle involvement without evidence of necrosis (HCC)- (present on admission)   Assessment & Plan    MRI left foot showed: Large open ulcer extending from the great toe amputation site into the first metatarsal head with osteomyelitis of the first metatarsal head.    On unasyn and vanc, per id recs will change unasyn to zosyn  s/p debridement 5/12  Revision surgery for monday        Diabetic peripheral neuropathy (HCC)- (present on admission)   Assessment & Plan    Started po neurontin 100mg tid    titrate        Dehiscence of surgical wound- (present on admission)   Assessment & Plan    Wound care    Empiric iv unasyn and iv vanc        Psychiatric disorder- (present on admission)   Assessment & Plan    Cont congentin        Type 2 diabetes mellitus with neurologic complication, without long-term current use of insulin (HCC)- (present on admission)   Assessment & Plan    Controlled  Dc metformin  Do ssi, added lantus  Diabetic  diet  Maintain tight blood sugar control            Quality-Core Measures   Reviewed items::  Labs reviewed, Radiology images reviewed and Medications reviewed  DVT prophylaxis pharmacological::  Enoxaparin (Lovenox)

## 2018-05-15 NOTE — PROGRESS NOTES
[facility]ist Progress Note    Date of Service: 5/15/2018    Chief Complaint  52 y.o. male admitted 2018 with left foot wound. History remarkable for s/p left foot great toe amputation, admitted for nonhealing, infected foot wound with osteomyelitis.       Interval Problem Update  : patient was seen post operatively, he has no complaints. We discussed tight blood sugar control. He is s/p left foot wound irrigation and debridement. Surgery recommends wound vac and revision surgery on Monday. dc'd metformin, will do insulin only     : patient is stable. Discussed pending plan. Id recommends switching to zosyn, vanc. Patient has no complaints    : underwent repeat irrigation and debridement and secondary closure of surgical wound. Refer to surgery op note. Continuing abx's with vanc/zosyn. ID recommended contact isolation. He is less anxious appearing, started on librium yesterday.     5/15: no acute events overnight. Vitals stable. Blood glucose well controlled, not req any additional insulin. OR yest for repeat I/D. No acute complaints.    Consultants/Specialty  Surgery, ID, wound care    Disposition  Continuing vanc/zosyn, wound         Review of Systems   Constitutional: Negative for chills and fever.   HENT: Negative for hearing loss.    Respiratory: Negative for cough, hemoptysis and wheezing.    Cardiovascular: Negative for chest pain, palpitations and leg swelling.   Gastrointestinal: Negative for abdominal pain, blood in stool, heartburn, melena and nausea.   Genitourinary: Negative for dysuria.   Musculoskeletal: Negative for myalgias and neck pain.   Skin: Negative for rash.   Neurological: Negative for dizziness, seizures, weakness and headaches.   Psychiatric/Behavioral: Negative for depression. The patient is nervous/anxious.       Physical Exam  Laboratory/Imaging   Hemodynamics  Temp (24hrs), Av.6 °C (97.8 °F), Min:36.1 °C (97 °F), Max:36.9 °C (98.5 °F)   Temperature: 36.8 °C  (98.2 °F)  Pulse  Av.6  Min: 52  Max: 76 Heart Rate (Monitored): (!) 57  Blood Pressure: 155/96 (Informed RN), NIBP: 145/92      Respiratory      Respiration: 16, Pulse Oximetry: 98 %        RUL Breath Sounds: Clear, RML Breath Sounds: Diminished, RLL Breath Sounds: Diminished, LORENA Breath Sounds: Clear, LLL Breath Sounds: Diminished    Fluids    Intake/Output Summary (Last 24 hours) at 05/15/18 0959  Last data filed at 05/15/18 0821   Gross per 24 hour   Intake             2300 ml   Output             2950 ml   Net             -650 ml       Nutrition  Orders Placed This Encounter   Procedures   • DIET ORDER     Standing Status:   Standing     Number of Occurrences:   1     Order Specific Question:   Diet:     Answer:   Regular [1]     Physical Exam   Constitutional: He is oriented to person, place, and time. He appears well-developed and well-nourished. No distress.   HENT:   Head: Normocephalic and atraumatic.   Eyes: Right eye exhibits no discharge. Left eye exhibits no discharge. No scleral icterus.   Neck: Neck supple.   Cardiovascular: Normal rate, regular rhythm, normal heart sounds and intact distal pulses.    Pulmonary/Chest: Effort normal and breath sounds normal. No respiratory distress. He has no wheezes.   Abdominal: Soft. Bowel sounds are normal. He exhibits no distension. There is no tenderness.   Musculoskeletal: Normal range of motion. He exhibits no edema.   Neurological: He is alert and oriented to person, place, and time.   Skin: Skin is warm and dry. He is not diaphoretic.   Left foot post debridement wrapped in dressing, right foot scabbed wounds   Psychiatric: His affect is blunt. He is slowed. He is not agitated and not aggressive.       Recent Labs      05/15/18   08   WBC  4.6*   RBC  3.90*   HEMOGLOBIN  11.8*   HEMATOCRIT  35.2*   MCV  90.3   MCH  30.3   MCHC  33.5*   RDW  44.7   PLATELETCT  144*   MPV  9.1     Recent Labs      05/15/18   0817   SODIUM  140   POTASSIUM  3.9    CHLORIDE  109   CO2  25   GLUCOSE  95   BUN  9   CREATININE  0.73   CALCIUM  8.7                      Assessment/Plan     Diabetic ulcer of left foot associated with type 2 diabetes mellitus, with muscle involvement without evidence of necrosis (HCC)- (present on admission)   Assessment & Plan    Hx of diabetic nonhealing ulcer, hx of previous Left first toe amputation, presented w. Draining, nonhealing infected foot wound. This admin: MRI left foot showed: Large open ulcer extending from the great toe amputation site into the first metatarsal head with osteomyelitis of the first metatarsal head.    On zosyn and vanc, f/u ID   s/p Irrigation and debridement of left foot wound 5/12  s/p repeat debridement 5/14 with secondary closure of surgical wound    Pertinent cultures to date:   5/12/18: Tissue, left foot = MRSA (vanco JUNIOR=2) and CoNS     Many previous cultures   4/1/18 left great toe - Diphtheroids, streptococcus   3/31/18 left great toe - streptococcus, Morganella morganii  3/31/18 right great toe - Streptococcus     - on vanc/zosyn  - pt is homeless, hx of psychiatric illness,  difficult discharge        Acute hematogenous osteomyelitis of left foot (HCC)- (present on admission)   Assessment & Plan    See above- diabetic ulcer        Diabetic peripheral neuropathy (HCC)- (present on admission)   Assessment & Plan    Pain controlled  Increase neurontin 300 tid        Dehiscence of surgical wound- (present on admission)   Assessment & Plan    Wound care  On Antibiotics as above        Psychiatric disorder- (present on admission)   Assessment & Plan    Cont congentin  Added librium tid        Type 2 diabetes mellitus with neurologic complication, without long-term current use of insulin (HCC)- (present on admission)   Assessment & Plan    Controlled  Dc metformin  Do ssi, added lantus  Diabetic diet  Maintain tight blood sugar control            Quality-Core Measures   Reviewed items::  Labs reviewed, Radiology  images reviewed and Medications reviewed  Olsen catheter::  No Olsen  DVT prophylaxis pharmacological::  Enoxaparin (Lovenox)

## 2018-05-16 LAB
ALBUMIN SERPL BCP-MCNC: 3.1 G/DL (ref 3.2–4.9)
BUN SERPL-MCNC: 12 MG/DL (ref 8–22)
CALCIUM SERPL-MCNC: 8.6 MG/DL (ref 8.5–10.5)
CHLORIDE SERPL-SCNC: 107 MMOL/L (ref 96–112)
CO2 SERPL-SCNC: 25 MMOL/L (ref 20–33)
CREAT SERPL-MCNC: 0.88 MG/DL (ref 0.5–1.4)
GLUCOSE BLD-MCNC: 110 MG/DL (ref 65–99)
GLUCOSE BLD-MCNC: 191 MG/DL (ref 65–99)
GLUCOSE BLD-MCNC: 84 MG/DL (ref 65–99)
GLUCOSE BLD-MCNC: 87 MG/DL (ref 65–99)
GLUCOSE SERPL-MCNC: 96 MG/DL (ref 65–99)
PHOSPHATE SERPL-MCNC: 4.8 MG/DL (ref 2.5–4.5)
POTASSIUM SERPL-SCNC: 3.8 MMOL/L (ref 3.6–5.5)
SODIUM SERPL-SCNC: 139 MMOL/L (ref 135–145)

## 2018-05-16 PROCEDURE — A9270 NON-COVERED ITEM OR SERVICE: HCPCS | Performed by: HOSPITALIST

## 2018-05-16 PROCEDURE — 770021 HCHG ROOM/CARE - ISO PRIVATE

## 2018-05-16 PROCEDURE — 700105 HCHG RX REV CODE 258: Performed by: INTERNAL MEDICINE

## 2018-05-16 PROCEDURE — 700111 HCHG RX REV CODE 636 W/ 250 OVERRIDE (IP): Performed by: INTERNAL MEDICINE

## 2018-05-16 PROCEDURE — 99232 SBSQ HOSP IP/OBS MODERATE 35: CPT | Performed by: HOSPITALIST

## 2018-05-16 PROCEDURE — 36415 COLL VENOUS BLD VENIPUNCTURE: CPT

## 2018-05-16 PROCEDURE — 700102 HCHG RX REV CODE 250 W/ 637 OVERRIDE(OP): Performed by: HOSPITALIST

## 2018-05-16 PROCEDURE — 80069 RENAL FUNCTION PANEL: CPT

## 2018-05-16 PROCEDURE — 82962 GLUCOSE BLOOD TEST: CPT | Mod: 91

## 2018-05-16 PROCEDURE — A9270 NON-COVERED ITEM OR SERVICE: HCPCS | Performed by: INTERNAL MEDICINE

## 2018-05-16 PROCEDURE — 700105 HCHG RX REV CODE 258: Performed by: HOSPITALIST

## 2018-05-16 PROCEDURE — 700102 HCHG RX REV CODE 250 W/ 637 OVERRIDE(OP): Performed by: INTERNAL MEDICINE

## 2018-05-16 RX ORDER — OXYCODONE HYDROCHLORIDE 5 MG/1
5 TABLET ORAL EVERY 6 HOURS PRN
Status: DISCONTINUED | OUTPATIENT
Start: 2018-05-16 | End: 2018-06-12

## 2018-05-16 RX ORDER — LORAZEPAM 1 MG/1
1 TABLET ORAL 2 TIMES DAILY PRN
Status: DISCONTINUED | OUTPATIENT
Start: 2018-05-16 | End: 2018-06-12

## 2018-05-16 RX ORDER — INSULIN GLARGINE 100 [IU]/ML
5 INJECTION, SOLUTION SUBCUTANEOUS EVERY EVENING
Status: DISCONTINUED | OUTPATIENT
Start: 2018-05-16 | End: 2018-05-18

## 2018-05-16 RX ORDER — GABAPENTIN 300 MG/1
300 CAPSULE ORAL 3 TIMES DAILY
Status: DISCONTINUED | OUTPATIENT
Start: 2018-05-16 | End: 2018-06-12

## 2018-05-16 RX ORDER — CHLORDIAZEPOXIDE HYDROCHLORIDE 5 MG/1
10 CAPSULE, GELATIN COATED ORAL EVERY 8 HOURS
Status: DISCONTINUED | OUTPATIENT
Start: 2018-05-16 | End: 2018-05-17

## 2018-05-16 RX ORDER — VALPROIC ACID 250 MG/1
250 CAPSULE, LIQUID FILLED ORAL EVERY 12 HOURS
Status: DISCONTINUED | OUTPATIENT
Start: 2018-05-16 | End: 2018-06-12

## 2018-05-16 RX ORDER — ACETAMINOPHEN 500 MG
500 TABLET ORAL EVERY 6 HOURS PRN
Status: DISCONTINUED | OUTPATIENT
Start: 2018-05-16 | End: 2018-06-12

## 2018-05-16 RX ORDER — KETOROLAC TROMETHAMINE 30 MG/ML
30 INJECTION, SOLUTION INTRAMUSCULAR; INTRAVENOUS EVERY 6 HOURS PRN
Status: ACTIVE | OUTPATIENT
Start: 2018-05-16 | End: 2018-05-21

## 2018-05-16 RX ORDER — LORAZEPAM 1 MG/1
1 TABLET ORAL EVERY 4 HOURS PRN
Status: DISCONTINUED | OUTPATIENT
Start: 2018-05-16 | End: 2018-05-16

## 2018-05-16 RX ADMIN — QUETIAPINE FUMARATE 100 MG: 100 TABLET ORAL at 08:47

## 2018-05-16 RX ADMIN — CHLORDIAZEPOXIDE HYDROCHLORIDE 5 MG: 5 CAPSULE ORAL at 06:03

## 2018-05-16 RX ADMIN — INSULIN GLARGINE 5 UNITS: 100 INJECTION, SOLUTION SUBCUTANEOUS at 08:45

## 2018-05-16 RX ADMIN — LORAZEPAM 1 MG: 1 TABLET ORAL at 11:56

## 2018-05-16 RX ADMIN — ENOXAPARIN SODIUM 40 MG: 100 INJECTION SUBCUTANEOUS at 08:44

## 2018-05-16 RX ADMIN — PIPERACILLIN SODIUM AND TAZOBACTAM SODIUM 3.38 G: 3; .375 INJECTION, POWDER, FOR SOLUTION INTRAVENOUS at 06:03

## 2018-05-16 RX ADMIN — PIPERACILLIN SODIUM AND TAZOBACTAM SODIUM 3.38 G: 3; .375 INJECTION, POWDER, FOR SOLUTION INTRAVENOUS at 13:53

## 2018-05-16 RX ADMIN — OXYCODONE HYDROCHLORIDE 5 MG: 5 TABLET ORAL at 18:00

## 2018-05-16 RX ADMIN — DAPTOMYCIN 620 MG: 500 INJECTION, POWDER, LYOPHILIZED, FOR SOLUTION INTRAVENOUS at 11:50

## 2018-05-16 RX ADMIN — GABAPENTIN 300 MG: 300 CAPSULE ORAL at 13:53

## 2018-05-16 RX ADMIN — INSULIN HUMAN 2 UNITS: 100 INJECTION, SOLUTION PARENTERAL at 21:29

## 2018-05-16 RX ADMIN — FLUOXETINE HYDROCHLORIDE 60 MG: 20 CAPSULE ORAL at 08:57

## 2018-05-16 RX ADMIN — QUETIAPINE FUMARATE 100 MG: 100 TABLET ORAL at 11:50

## 2018-05-16 RX ADMIN — GABAPENTIN 300 MG: 300 CAPSULE ORAL at 21:33

## 2018-05-16 RX ADMIN — CHLORDIAZEPOXIDE HYDROCHLORIDE 10 MG: 5 CAPSULE ORAL at 13:53

## 2018-05-16 RX ADMIN — PIPERACILLIN SODIUM AND TAZOBACTAM SODIUM 3.38 G: 3; .375 INJECTION, POWDER, FOR SOLUTION INTRAVENOUS at 21:35

## 2018-05-16 RX ADMIN — OXYCODONE HYDROCHLORIDE 5 MG: 5 TABLET ORAL at 08:44

## 2018-05-16 RX ADMIN — BENZTROPINE MESYLATE 1 MG: 1 TABLET ORAL at 21:34

## 2018-05-16 RX ADMIN — SODIUM CHLORIDE: 900 INJECTION INTRAVENOUS at 21:34

## 2018-05-16 RX ADMIN — INSULIN GLARGINE 5 UNITS: 100 INJECTION, SOLUTION SUBCUTANEOUS at 21:29

## 2018-05-16 RX ADMIN — QUETIAPINE FUMARATE 200 MG: 100 TABLET ORAL at 21:34

## 2018-05-16 RX ADMIN — GABAPENTIN 100 MG: 100 CAPSULE ORAL at 08:49

## 2018-05-16 RX ADMIN — CHLORDIAZEPOXIDE HYDROCHLORIDE 10 MG: 5 CAPSULE ORAL at 21:34

## 2018-05-16 RX ADMIN — SENNOSIDES AND DOCUSATE SODIUM 2 TABLET: 8.6; 5 TABLET ORAL at 21:34

## 2018-05-16 RX ADMIN — VALPROIC ACID 250 MG: 250 CAPSULE, LIQUID FILLED ORAL at 12:40

## 2018-05-16 RX ADMIN — VALPROIC ACID 250 MG: 250 CAPSULE, LIQUID FILLED ORAL at 21:34

## 2018-05-16 RX ADMIN — BENZTROPINE MESYLATE 1 MG: 1 TABLET ORAL at 08:44

## 2018-05-16 ASSESSMENT — ENCOUNTER SYMPTOMS
HEARTBURN: 0
HEADACHES: 0
ABDOMINAL PAIN: 0
SENSORY CHANGE: 1
SEIZURES: 0
VOMITING: 0
WEAKNESS: 0
BLOOD IN STOOL: 0
MYALGIAS: 0
COUGH: 0
HEMOPTYSIS: 0
TREMORS: 1
DIZZINESS: 0
PALPITATIONS: 0
NAUSEA: 0
WHEEZING: 0
NERVOUS/ANXIOUS: 1
DEPRESSION: 0
CHILLS: 0
FEVER: 0

## 2018-05-16 ASSESSMENT — PAIN SCALES - GENERAL
PAINLEVEL_OUTOF10: 4
PAINLEVEL_OUTOF10: 3
PAINLEVEL_OUTOF10: 8
PAINLEVEL_OUTOF10: 8
PAINLEVEL_OUTOF10: 7

## 2018-05-16 ASSESSMENT — LIFESTYLE VARIABLES: SUBSTANCE_ABUSE: 1

## 2018-05-16 NOTE — PROGRESS NOTES
[facility]ist Progress Note    Date of Service: 5/16/2018    Chief Complaint  52 y.o. male admitted 5/11/2018 with left foot wound. History remarkable for s/p left foot great toe amputation, admitted for nonhealing, infected foot wound with osteomyelitis.       Interval Problem Update  5/12: patient was seen post operatively, he has no complaints. We discussed tight blood sugar control. He is s/p left foot wound irrigation and debridement. Surgery recommends wound vac and revision surgery on Monday. dc'd metformin, will do insulin only     5/13: patient is stable. Discussed pending plan. Id recommends switching to zosyn, vanc. Patient has no complaints    5/14: underwent repeat irrigation and debridement and secondary closure of surgical wound. Refer to surgery op note. Continuing abx's with vanc/zosyn. ID recommended contact isolation. He is less anxious appearing, started on librium yesterday.     5/15: no acute events overnight. Vitals stable. Blood glucose well controlled, not req any additional insulin. OR yest for repeat I/D. No acute complaints.    5/16: patient c/o anxiety, vitals stable, afebrile. White count stable. On abx based on cx-switched to daptomycin. per ID will need picc and 6weeks of iv antibiotics. Patient declining picc line and long term iv abx. Not able to state clearly the risks and benefits, remains quiet, visibly anxious when asked again.Will obtain Psych consult to determine capacity, treat underlying severe anxiety    Consultants/Specialty  Surgery, ID, wound care    Disposition  Continuing daptomycin/zosyn, wound         Review of Systems   Constitutional: Negative for chills and fever.   HENT: Negative for hearing loss.    Respiratory: Negative for cough, hemoptysis and wheezing.    Cardiovascular: Negative for chest pain, palpitations and leg swelling.   Gastrointestinal: Negative for abdominal pain, blood in stool, heartburn, melena and nausea.   Genitourinary: Negative for  dysuria.   Musculoskeletal: Negative for myalgias.   Skin: Negative for rash.   Neurological: Positive for tremors. Negative for dizziness, seizures, weakness and headaches.   Psychiatric/Behavioral: Positive for substance abuse. Negative for depression. The patient is nervous/anxious.       Physical Exam  Laboratory/Imaging   Hemodynamics  Temp (24hrs), Av.6 °C (97.8 °F), Min:36.4 °C (97.5 °F), Max:36.6 °C (97.9 °F)   Temperature: 36.6 °C (97.9 °F)  Pulse  Av  Min: 52  Max: 76   Blood Pressure: 150/86      Respiratory      Respiration: 17, Pulse Oximetry: 92 %        RUL Breath Sounds: Clear, RML Breath Sounds: Diminished, RLL Breath Sounds: Diminished, LORENA Breath Sounds: Clear, LLL Breath Sounds: Diminished    Fluids    Intake/Output Summary (Last 24 hours) at 18 1012  Last data filed at 18 0700   Gross per 24 hour   Intake                0 ml   Output             2475 ml   Net            -2475 ml       Nutrition  Orders Placed This Encounter   Procedures   • DIET ORDER     Standing Status:   Standing     Number of Occurrences:   1     Order Specific Question:   Diet:     Answer:   Regular [1]     Physical Exam   Constitutional: He is oriented to person, place, and time. He appears well-developed and well-nourished. No distress.   HENT:   Head: Normocephalic and atraumatic.   Eyes: Right eye exhibits no discharge. Left eye exhibits no discharge. No scleral icterus.   Neck: Neck supple.   Cardiovascular: Normal rate, regular rhythm, normal heart sounds and intact distal pulses.    Pulmonary/Chest: Effort normal and breath sounds normal. No respiratory distress. He has no wheezes.   Abdominal: Soft. Bowel sounds are normal. He exhibits no distension. There is no tenderness.   Musculoskeletal: Normal range of motion. He exhibits no edema.   Neurological: He is alert and oriented to person, place, and time.   Skin: Skin is warm and dry. He is not diaphoretic.   Left foot post debridement wrapped  in dressing, right foot scabbed wounds   Psychiatric: His affect is blunt. He is slowed. He is not agitated and not aggressive.       Recent Labs      05/15/18   0817   WBC  4.6*   RBC  3.90*   HEMOGLOBIN  11.8*   HEMATOCRIT  35.2*   MCV  90.3   MCH  30.3   MCHC  33.5*   RDW  44.7   PLATELETCT  144*   MPV  9.1     Recent Labs      05/15/18   0817  05/16/18   0345   SODIUM  140  139   POTASSIUM  3.9  3.8   CHLORIDE  109  107   CO2  25  25   GLUCOSE  95  96   BUN  9  12   CREATININE  0.73  0.88   CALCIUM  8.7  8.6                      Assessment/Plan     Diabetic ulcer of left foot associated with type 2 diabetes mellitus, with muscle involvement without evidence of necrosis (HCC)- (present on admission)   Assessment & Plan    Hx of diabetic nonhealing ulcer, hx of previous Left first toe amputation, presented w. Draining, nonhealing infected foot wound. This admin: MRI left foot showed: Large open ulcer extending from the great toe amputation site into the first metatarsal head with osteomyelitis of the first metatarsal head.    s/p Irrigation and debridement of left foot wound 5/12  s/p repeat debridement 5/14 with secondary closure of surgical wound    Pertinent cultures to date:   5/12/18: Tissue, left foot = MRSA (vanco JUNIOR=2) and CoNS     Many previous cultures   4/1/18 left great toe - Diphtheroids, streptococcus   3/31/18 left great toe - streptococcus, Morganella morganii  3/31/18 right great toe - Streptococcus     - on daptomycin/zosyn. Will need picc and 6weeks of iv antibiotics  - pt is homeless, hx of psychiatric illness,  difficult discharge!    Patient declining picc line and long term iv abx.   Not able to state clearly the risks and benefits, remains quiet, visibly anxious when asked again  Will obtain Psych consult to determine capacity, treat underlying severe anxiety.  f/u psych, ID and ortho recs        Acute hematogenous osteomyelitis of left foot (HCC)- (present on admission)   Assessment &  Plan    See above- diabetic ulcer        Diabetic peripheral neuropathy (HCC)- (present on admission)   Assessment & Plan    Pain controlled  Increase neurontin 300 tid        Dehiscence of surgical wound- (present on admission)   Assessment & Plan    Wound care  On Antibiotics as above        Psychiatric disorder- (present on admission)   Assessment & Plan    Cont congentin, seroquel, prozac  Added librium tid, valproate for mood  Patient declining picc line and long term iv abx.   Not able to state clearly the risks and benefits, remains quiet, visibly anxious when asked again  Will obtain Psych consult to determine capacity, treat underlying severe anxiety        Type 2 diabetes mellitus with neurologic complication, without long-term current use of insulin (HCC)- (present on admission)   Assessment & Plan    Controlled  Dc metformin  Do ssi, added lantus  Diabetic diet  Maintain tight blood sugar control            Quality-Core Measures   Reviewed items::  Labs reviewed, Radiology images reviewed and Medications reviewed  Olsen catheter::  No Olsen  DVT prophylaxis pharmacological::  Enoxaparin (Lovenox)

## 2018-05-16 NOTE — PSYCHIATRY
"    PSYCHIATRIC CONSULTATION NOTE    Reason for Admission: osteomyelitis  Reason for Consult: Capacity to make medical decisions, and patient has severe anxiety  Requesting Provider: Dr. Maria   Supervising Psychiatric Attending: Dr. Gaspar  Source of Information: Patient and chart    Legal Status: no hold    Chief Complaint: \"I'm stuck here in the hospital.\"    HPI :   Patient is a 51-year-old male who presented to ED on 5/11/18 for osteomyelitis. He had a recent left big toe amputation and is being seen in wound clinic.patient underwent irrigation and debridement on 5/12 and 5/14.  Patient is on IV antibiotics. Per ID, patient will need PICC and 6 weeks of antibiotics. Patient told the primary team that he did not want this done and appeared anxious when asked about this procedure again. Psychiatrist consultation for capacity to make medical decisions.    On interview with patient, he appears to have some form of intellectual disability, reports that he was in \"special classes when he was young.\" On discussion of patient's medical problems he was able to state that he is on antibiotics for his foot, at first did not understand the medical process of infection, but appeared to understand after discussion. When discussing what patient needs antibiotics he stated that he would like to see on antibiotics so that he did not get any worse infection or have to lose his foot. Patient was also able to have a simple discussion about diabetes, and understood that it was a problem with \"your sugars.\" Patient appears this time to be able to have conversations about treatments, wants to be treated with antibiotics and receive a PICC line at this time.      Patient reports that he has a history of anxiety for years, unable to give very specifically detailed description of his anxiety. Reports that most his worries are financial, could not identify any other worries besides financial and being in the hospital. Patient denies " "symptoms typical of panic attacks. Denies most symptoms of depression other than occasional depressed mood while here in the hospital, and sometimes before. Denies neurovegetative symptoms of depression.    On psych ROS, pt denies depressive symptoms, denies manic symptoms including extended periods of decreased need for sleep or elevated mood , denies psychotic symptoms including AH / VH and denies OCD symptoms including turning locks or washing hands. Patient endorsed some mild symptoms of paranoia, though these symptoms were vague and unclear if there are actually psychotic.    Objective:  Blood pressure 150/86, pulse 61, temperature 36.6 °C (97.9 °F), resp. rate 17, height 1.93 m (6' 4\"), weight 103.1 kg (227 lb 4.7 oz), SpO2 92 %.      MSE :   Appearance: 52-year-old male, bandage on left foot, lying in bed   Behavior: calm, cooperative, pleasant, moving legs frequently throughout the exam, was not bothering the patient.   Speech : Speech had normal volume, at times speech was slow   Language: normal vocabulary   Mood: \"Angry\" (was thinking about a past roommate)   Affect: euthymic and incongruent with mood   Thought Process: [place]   Thought Content: no suicidal or homicidal ideation and no auditory or visual hallucinations    Attention/Concentration: Grossly intact, able to maintain conversation   Memory: no gross deficits   Orientation: oriented to person, place, situation, month, year   Neurological: Deferred   Fund of Knowledge: Limited, may have intellectual disability   Insight/Judgment: poor / fair (patient willing to accept treatment at this time during conversation)        Past Psych Hx:    Unclear when psychiatric symptoms started, but reports anxiety for at least the past couple of years  Patient reports that he was taking Seroquel, propanolol, Prozac. Was previously on Abilify, but reports it gave him side effects (possibly was feeling more lightheaded)  Denies psychiatric " "hospitalizations  Reports he sees a psychiatrist at [facility] as an outpatient                                    Family Psych Hx:   Denies     Social Hx:   Homeless for past 2 years, was previously living in group home but decided to leave because of conflicts with roommate. Out of school in 10th grade, no GED.   Patient is on Social Security disability since 84, he is not sure why. Gets paid about $708 a month. Has a rep payee    Social History     Social History   • Marital status: Single     Spouse name: N/A   • Number of children: N/A   • Years of education: N/A     Occupational History   • Not on file.     Social History Main Topics   • Smoking status: Current Some Day Smoker     Packs/day: 0.25     Years: 30.00     Types: Cigarettes   • Smokeless tobacco: Never Used   • Alcohol use No   • Drug use: No      Comment: last time used 10+ years   • Sexual activity: Not on file     Other Topics Concern   • Not on file     Social History Narrative   • No narrative on file         Drugs/Alcohol Hx:   Smokes half pack per day of cigarettes, no alcohol for the past 2 years, denies this being a previous problem, denies other drug use                        MEDICAL Hx: labs, MARS, medications, etc were reviewed. Findings of potential interest to psychiatry are noted below:    Past Medical History:   Diagnosis Date   • Anxiety    • Bronchitis     has had 3-4 times   • Dental disorder     upper partial, but lost it   • Diabetes (HCC)     \"borderline\"   • Hepatitis C 1997   • Hypercholesteremia    • Hypertension    • Psychiatric disorder     anxiety         Recent Results (from the past 48 hour(s))   ACCU-CHEK GLUCOSE    Collection Time: 05/14/18  1:01 PM   Result Value Ref Range    Glucose - Accu-Ck 80 65 - 99 mg/dL   ACCU-CHEK GLUCOSE    Collection Time: 05/14/18  5:53 PM   Result Value Ref Range    Glucose - Accu-Ck 110 (H) 65 - 99 mg/dL   ACCU-CHEK GLUCOSE    Collection Time: 05/14/18  9:06 PM   Result Value Ref " Range    Glucose - Accu-Ck 100 (H) 65 - 99 mg/dL   RENAL FUNCTION PANEL    Collection Time: 05/15/18  8:17 AM   Result Value Ref Range    Sodium 140 135 - 145 mmol/L    Potassium 3.9 3.6 - 5.5 mmol/L    Chloride 109 96 - 112 mmol/L    Co2 25 20 - 33 mmol/L    Glucose 95 65 - 99 mg/dL    Creatinine 0.73 0.50 - 1.40 mg/dL    Bun 9 8 - 22 mg/dL    Calcium 8.7 8.5 - 10.5 mg/dL    Phosphorus 4.2 2.5 - 4.5 mg/dL    Albumin 3.3 3.2 - 4.9 g/dL   CBC WITH DIFFERENTIAL    Collection Time: 05/15/18  8:17 AM   Result Value Ref Range    WBC 4.6 (L) 4.8 - 10.8 K/uL    RBC 3.90 (L) 4.70 - 6.10 M/uL    Hemoglobin 11.8 (L) 14.0 - 18.0 g/dL    Hematocrit 35.2 (L) 42.0 - 52.0 %    MCV 90.3 81.4 - 97.8 fL    MCH 30.3 27.0 - 33.0 pg    MCHC 33.5 (L) 33.7 - 35.3 g/dL    RDW 44.7 35.9 - 50.0 fL    Platelet Count 144 (L) 164 - 446 K/uL    MPV 9.1 9.0 - 12.9 fL    Neutrophils-Polys 55.70 44.00 - 72.00 %    Lymphocytes 33.40 22.00 - 41.00 %    Monocytes 8.30 0.00 - 13.40 %    Eosinophils 1.70 0.00 - 6.90 %    Basophils 0.90 0.00 - 1.80 %    Immature Granulocytes 0.00 0.00 - 0.90 %    Nucleated RBC 0.00 /100 WBC    Neutrophils (Absolute) 2.55 1.82 - 7.42 K/uL    Lymphs (Absolute) 1.53 1.00 - 4.80 K/uL    Monos (Absolute) 0.38 0.00 - 0.85 K/uL    Eos (Absolute) 0.08 0.00 - 0.51 K/uL    Baso (Absolute) 0.04 0.00 - 0.12 K/uL    Immature Granulocytes (abs) 0.00 0.00 - 0.11 K/uL    NRBC (Absolute) 0.00 K/uL   BASIC METABOLIC PANEL    Collection Time: 05/15/18  8:17 AM   Result Value Ref Range    Anion Gap 6.0 0.0 - 11.9   MAGNESIUM    Collection Time: 05/15/18  8:17 AM   Result Value Ref Range    Magnesium 1.7 1.5 - 2.5 mg/dL   ESTIMATED GFR    Collection Time: 05/15/18  8:17 AM   Result Value Ref Range    GFR If African American >60 >60 mL/min/1.73 m 2    GFR If Non African American >60 >60 mL/min/1.73 m 2   ACCU-CHEK GLUCOSE    Collection Time: 05/15/18  8:24 AM   Result Value Ref Range    Glucose - Accu-Ck 96 65 - 99 mg/dL   ACCU-CHEK GLUCOSE     Collection Time: 05/15/18 12:33 PM   Result Value Ref Range    Glucose - Accu-Ck 96 65 - 99 mg/dL   ACCU-CHEK GLUCOSE    Collection Time: 05/15/18  5:40 PM   Result Value Ref Range    Glucose - Accu-Ck 122 (H) 65 - 99 mg/dL   ACCU-CHEK GLUCOSE    Collection Time: 05/15/18 10:12 PM   Result Value Ref Range    Glucose - Accu-Ck 185 (H) 65 - 99 mg/dL   RENAL FUNCTION PANEL    Collection Time: 05/16/18  3:45 AM   Result Value Ref Range    Sodium 139 135 - 145 mmol/L    Potassium 3.8 3.6 - 5.5 mmol/L    Chloride 107 96 - 112 mmol/L    Co2 25 20 - 33 mmol/L    Glucose 96 65 - 99 mg/dL    Creatinine 0.88 0.50 - 1.40 mg/dL    Bun 12 8 - 22 mg/dL    Calcium 8.6 8.5 - 10.5 mg/dL    Phosphorus 4.8 (H) 2.5 - 4.5 mg/dL    Albumin 3.1 (L) 3.2 - 4.9 g/dL   ESTIMATED GFR    Collection Time: 05/16/18  3:45 AM   Result Value Ref Range    GFR If African American >60 >60 mL/min/1.73 m 2    GFR If Non African American >60 >60 mL/min/1.73 m 2   ACCU-CHEK GLUCOSE    Collection Time: 05/16/18  8:42 AM   Result Value Ref Range    Glucose - Accu-Ck 84 65 - 99 mg/dL         Medications:   Current Facility-Administered Medications   Medication Dose Route Frequency Provider Last Rate Last Dose   • gabapentin (NEURONTIN) capsule 300 mg  300 mg Oral TID Luke Maria M.D.       • chlordiazePOXIDE (LIBRIUM) capsule 10 mg  10 mg Oral Q8HRS Luke Maria M.D.       • Valproate Sodium (DEPAKENE) oral solution 250 mg  250 mg Oral BID Luke Maria M.D.       • LORazepam (ATIVAN) tablet 1 mg  1 mg Oral BID PRN Luke Maria M.D.       • oxyCODONE immediate-release (ROXICODONE) tablet 5 mg  5 mg Oral Q6HRS PRN Luke Maria M.D.       • ketorolac (TORADOL) injection 30 mg  30 mg Intravenous Q6HRS PRN Luke Maria M.D.       • acetaminophen (TYLENOL) tablet 500 mg  500 mg Oral Q6HRS PRN Luke Maria M.D.       • insulin glargine (LANTUS) injection 5 Units  5 Units Subcutaneous Q EVENING Luke Maria M.D.       • DAPTOmycin (CUBICIN)  620 mg in NS 50 mL IVPB  6 mg/kg Intravenous Q24HR Amanda Feng M.D. 100 mL/hr at 05/15/18 1258 620 mg at 05/15/18 1258   • piperacillin-tazobactam (ZOSYN) 3.375 g in  mL IVPB  3.375 g Intravenous Q8HRS Sandoval Kauffman M.D. 25 mL/hr at 05/16/18 0603 3.375 g at 05/16/18 0603   • enoxaparin (LOVENOX) inj 40 mg  40 mg Subcutaneous DAILY Sandoval Kauffman M.D.   40 mg at 05/16/18 0844   • senna-docusate (PERICOLACE or SENOKOT S) 8.6-50 MG per tablet 2 Tab  2 Tab Oral BID Kevyn Thornton M.D.   2 Tab at 05/15/18 2214    And   • polyethylene glycol/lytes (MIRALAX) PACKET 1 Packet  1 Packet Oral QDAY PRN Kevyn Thornton M.D.        And   • magnesium hydroxide (MILK OF MAGNESIA) suspension 30 mL  30 mL Oral QDAY PRN Kevyn Thornton M.D.        And   • bisacodyl (DULCOLAX) suppository 10 mg  10 mg Rectal QDAY PRN Kevyn Thornton M.D.       • Respiratory Care per Protocol   Nebulization Continuous RT Kevyn Thornton M.D.       • NS infusion   Intravenous Continuous Kevyn Thornton M.D. 100 mL/hr at 05/15/18 2231 1,000 mL at 05/15/18 2231   • insulin regular (HUMULIN R) injection 2-9 Units  2-9 Units Subcutaneous 4X/DAY RILEY Thornton M.D.   Stopped at 05/16/18 0800    And   • glucose 4 g chewable tablet 16 g  16 g Oral Q15 MIN PRN Kevyn Thornton M.D.        And   • dextrose 50% (D50W) injection 25 mL  25 mL Intravenous Q15 MIN PRN Kevyn Thornton M.D.       • benztropine (COGENTIN) tablet 1 mg  1 mg Oral BID Kevyn Thornton M.D.   1 mg at 05/16/18 0844   • FLUoxetine (PROZAC) capsule 60 mg  60 mg Oral DAILY Kevyn Thornton M.D.   60 mg at 05/16/18 0857   • QUEtiapine (SEROQUEL) tablet 100 mg  100 mg Oral BID Kevyn Thornton M.D.   100 mg at 05/16/18 0847    And   • QUEtiapine (SEROQUEL) tablet 200 mg  200 mg Oral Q EVENING Kevyn Thornton M.D.   200 mg at 05/15/18 6377       Allergies: Patient has no known allergies.    Imaging: No head, neck, or brain imaging    EKG: None from current  admission    Medical Review of Systems: as reported by pt. All systems reviewed. Only those found to be + are noted below. All others are negative.   Neurological:    TBIs: Denies current symptoms, denies past events   Szs: Denies current symptoms, denies past events   Strokes: Denies current symptoms   Other: Reports some pain in the lower extremities  Other medical symptoms:   Thyroid: Denies current symptoms   Diabetes: Denies current symptoms, currently on sliding scale insulin   Cardiovascular disease: Denies current symptoms       Assessment:  Patient is a 52-year-old male was admitted for osteomyelitis. Patient is likely to have intellectual disability, and used to be in special needs classes in high school before dropping out in the 10th grade. Patient does appear to have anxiety, but is a poor historian and vague about symptoms, also diagnosis at this time is unclear. Patient is willing to accept the PICC line and treatment at this time during interview when discussing risks and benefits. Patient was able to have an basic discussion about medical conditions.    At this time patient does appear to have capacity to make medical decisions, though his level of understanding appeared basic. Please reconsult if patient declines future treatment or attempts to leave AMA.    DDX:  Unspecified anxiety disorder  Unspecified mood disorder    Rule out intellectual disability/developmental delay    Plan:  -To help with patient's anxiety, continue his previous outpatient medications. It well also be helpful to have discussions using basic language about benefits of treatment for patient.  - Patient reported that he was previously on propanolol . This may have been for akathisia, patient appears to be restless. If this continues to be a problem, primary team may try propanolol.  - Follow-up with hopes clinic would eventually discharge from hospital.    Medical: as noted under Medical Hx and by Medical Team      Disposition:        Signing off, please reconsult if patient refuses further treatment even with encouragement, and if he attempts to leave AMA.      Thank you for the consult.       Discussed assessment and plan with the attending physician

## 2018-05-16 NOTE — PROGRESS NOTES
"Received bedside report from off going nurse. Assumed care of pt. @ 1900. Initial assessment completed. Patient in bed, A/O x 4. Patient in bed, Ambulates self. No acute distress. Denies pain, states muscle cramps. Reviewed care plan w/patient. Questions answered. Monitoring ongoing. Call lights w/in reach. Bed locked in lowest position. /88   Pulse 73   Temp 36.4 °C (97.5 °F)   Resp 18   Ht 1.93 m (6' 4\")   Wt 103.1 kg (227 lb 4.7 oz)   SpO2 96%   BMI 27.67 kg/m²     "

## 2018-05-16 NOTE — CARE PLAN
"Problem: Pain Management  Goal: Pain level will decrease to patient's comfort goal  Outcome: PROGRESSING AS EXPECTED  Patient experiencing pain, despite pain medication administration.     Problem: Psychosocial Needs:  Goal: Level of anxiety will decrease  Outcome: PROGRESSING AS EXPECTED  Pt states he's experiencing \"a lot\" of anxiety. Ativan administered.        "

## 2018-05-16 NOTE — PROGRESS NOTES
Received handoff report from Ministerio GARAY and assumed pt care at 0700.  Pt resting in bed. Assessment complete. Labs reviewed.  Patient and RN discussed plan of care and questions were answered. Bed in lowest and locked position and bed alarm is in place.  Call light and personal belongings within reach.

## 2018-05-16 NOTE — CARE PLAN
Problem: Respiratory:  Goal: Respiratory status will improve  Outcome: MET Date Met: 05/16/18  Patient lung sounds clear through out

## 2018-05-16 NOTE — PROGRESS NOTES
"Patient seen and examined  Cultures positive for peptostreptococcus art, MRSA, staph hemolyiticus, pasturella    Blood pressure 146/81, pulse 65, temperature 36.6 °C (97.9 °F), resp. rate 16, height 1.93 m (6' 4\"), weight 103.1 kg (227 lb 4.7 oz), SpO2 93 %.    Recent Labs      05/15/18   0817   WBC  4.6*   RBC  3.90*   HEMOGLOBIN  11.8*   HEMATOCRIT  35.2*   MCV  90.3   MCH  30.3   MCHC  33.5*   RDW  44.7   PLATELETCT  144*   MPV  9.1       No acute distress  Dressing clean dry and intact  Neurovascularly intact    POD#2    Plan:  DVT Prophylaxis- TEDS/SCDs  Weight Bearing Status-TTWB  PT/OT  Antibiotics: per ID  Case Coordination          "

## 2018-05-16 NOTE — PROGRESS NOTES
"Infectious Disease Progress Note    Author: Amanda Feng M.D. Date & Time of service: 2018  2:49 PM    Chief Complaint:  Left foot wound dehiscence with osteomyelitis f/u      Interval History:  52 y.o. WM admitted 2018 due to left foot wound dehiscence at amp site   AF WBC 5 denies any pain-ate all of breakfast   AF plan for repeat surgery today. No new complaints  5/15 AF WBC 4.6 cxs now polymicrobial somnolent   AF alert and anxious today, jittery states \"I don't want them to cut my leg off\"  Labs Reviewed, Medications Reviewed, Radiology Reviewed and Wound Reviewed.    Review of Systems:  Review of Systems   Constitutional: Negative for chills and fever.   Gastrointestinal: Negative for abdominal pain, nausea and vomiting.   Musculoskeletal: Negative for joint pain.   Neurological: Positive for sensory change.   Psychiatric/Behavioral: The patient is nervous/anxious.    All other systems reviewed and are negative.      Hemodynamics:  Temp (24hrs), Av.6 °C (97.8 °F), Min:36.4 °C (97.5 °F), Max:36.6 °C (97.9 °F)  Temperature: 36.6 °C (97.9 °F)  Pulse  Av  Min: 52  Max: 76  Blood Pressure: 150/86       Physical Exam:  Physical Exam   Constitutional: He appears well-nourished.   disheveled   HENT:   Mouth/Throat: No oropharyngeal exudate.   Poor dentition   Eyes: EOM are normal. Pupils are equal, round, and reactive to light.   Cardiovascular: Regular rhythm.    Pulmonary/Chest: He has no wheezes. He has no rales.   Abdominal: There is no rebound and no guarding.   Musculoskeletal:   Left foot dressed-dried blood     Neurological: He is alert.   Skin: He is not diaphoretic.   Psychiatric:   Agitated and anxious   Nursing note and vitals reviewed.      Meds:    Current Facility-Administered Medications:   •  gabapentin  •  chlordiazePOXIDE  •  LORazepam  •  oxyCODONE immediate-release  •  ketorolac  •  acetaminophen  •  insulin glargine  •  valproic acid  •  DAPTOmycin  •  " [COMPLETED] piperacillin-tazobactam **AND** piperacillin-tazobactam  •  enoxaparin (LOVENOX) injection  •  senna-docusate **AND** polyethylene glycol/lytes **AND** magnesium hydroxide **AND** bisacodyl  •  Respiratory Care per Protocol  •  NS  •  insulin regular **AND** Accu-Chek ACHS **AND** NOTIFY MD and PharmD **AND** glucose 4 g **AND** dextrose 50%  •  benztropine  •  FLUoxetine  •  QUEtiapine **AND** QUEtiapine    Labs:  Recent Labs      05/15/18   0817   WBC  4.6*   RBC  3.90*   HEMOGLOBIN  11.8*   HEMATOCRIT  35.2*   MCV  90.3   MCH  30.3   RDW  44.7   PLATELETCT  144*   MPV  9.1   NEUTSPOLYS  55.70   LYMPHOCYTES  33.40   MONOCYTES  8.30   EOSINOPHILS  1.70   BASOPHILS  0.90     Recent Labs      05/15/18   0817  05/16/18   0345   SODIUM  140  139   POTASSIUM  3.9  3.8   CHLORIDE  109  107   CO2  25  25   GLUCOSE  95  96   BUN  9  12     Recent Labs      05/15/18   0817  05/16/18   0345   ALBUMIN  3.3  3.1*   CREATININE  0.73  0.88       Imaging:  Dx-foot-2- Left    Result Date: 4/20/2018 4/20/2018 10:19 AM HISTORY/REASON FOR EXAM:  Pain/Deformity Following Trauma Weeping great toe TECHNIQUE/EXAM DESCRIPTION AND NUMBER OF VIEWS: 2 nonweightbearing views of the LEFT foot. COMPARISON:  3/31/2018 FINDINGS: Patient is status post amputation of the first digit at the level of the proximal phalanx. There is minimal osseous irregularity at the level of the amputation. There is faint adjacent calcification which may be dystrophic.  There are mild degenerative changes of the first tarsometatarsal joint. No acute fracture or dislocation is seen. There is soft tissue swelling about the medial forefoot and remainder of the first digit.     Post surgical changes status post amputation at the level of the proximal phalanx of the first digit. Linear amorphous calcification adjacent to the amputation may be dystrophic. There is minimal osseous irregularity at the level of the amputation where it is difficult to exclude  osteomyelitis. Soft tissue swelling of the forefoot and remainder of the first digit.     Mr-foot-with & W/o Left    Result Date: 5/12/2018 5/12/2018 10:44 AM HISTORY/REASON FOR EXAM:  Open wound. Open wound,?surgical site at great toe amp site, please eval for OM and/ or abscess. TECHNIQUE/EXAM DESCRIPTION: MRI of the LEFT foot with and without contrast. The study was performed on a [employer]a 1.5 Steph MRI scanner. T1 axial, T1 sagittal, T1 coronal, STIR sagittal, T2 fast spin-echo fat-suppressed coronal, sagittal inversion recovery, and T1 postcontrast coronal images were obtained. 20 mL Omniscan contrast was administered intravenously. COMPARISON: Foot radiograph 4/20/2018. FINDINGS: Diffuse soft tissue swelling about the dorsum of foot. Increased signal within the intrinsic foot muscles, likely related to neuropathic change. There is an open ulcer extending from the great toe amputation site into the first metatarsal head with marrow replacement. No walled fluid collection seen.     Large open ulcer extending from the great toe amputation site into the first metatarsal head with osteomyelitis of the first metatarsal head. No walled fluid collection seen.      Micro:  Results     Procedure Component Value Units Date/Time    GRAM STAIN [[id number]]  (Abnormal) Collected:  05/12/18 1113    Order Status:  Completed Specimen:  Tissue Updated:  05/15/18 1559     Significant Indicator . (POS)     Source TISS     Site Left Foot     Gram Stain Result Few WBCs.  Moderate Gram positive cocci.  Rare Gram positive rods.   (A)    Narrative:       CALL  Sandoval  MS5 tel. [phone],  CALLED  MS5 tel. [phone] 05/14/2018, 10:22, RB PERF. RESULTS CALLED  TO:Fabiola [id number] Rn    CULTURE TISSUE W/ GRM STAIN [[id number]]  (Abnormal)  (Susceptibility) Collected:  05/12/18 1113    Order Status:  Completed Specimen:  Tissue Updated:  05/15/18 1559     Significant Indicator POS (POS)     Source TISS     Site Left Foot     Tissue Culture --  (A)     Gram Stain Result Few WBCs.  Moderate Gram positive cocci.  Rare Gram positive rods.   (A)     Tissue Culture Methicillin Resistant Staphylococcus aureus  Moderate growth   (A)      Staphylococcus haemolyticus  Light growth   (A)      Pasteurella multocida  Light growth   (A)    Narrative:       CALL  Sandoval  MS5 tel. [phone],  CALLED  MS5 tel. [phone] 05/14/2018, 10:22, RB PERF. RESULTS CALLED  TO:Fabiola [id number] Rn    Culture & Susceptibility     METHICILLIN RESISTANT STAPHYLOCOCCUS AUREUS     Antibiotic Sensitivity Microscan Unit Status    Ampicillin/sulbactam Resistant >16/8 mcg/mL Final    Method: SENSITIVITY, JUNIOR    Clindamycin Sensitive <=0.5 mcg/mL Final    Method: SENSITIVITY, JUNIOR    Daptomycin Sensitive 1 mcg/mL Final    Method: SENSITIVITY, JUNIOR    Erythromycin Resistant >4 mcg/mL Final    Method: SENSITIVITY, JUNIOR    Moxifloxacin Sensitive 2 mcg/mL Final    Method: SENSITIVITY, JUNIOR    Oxacillin Resistant >2 mcg/mL Final    Method: SENSITIVITY, JUNIOR    Penicillin Resistant >8 mcg/mL Final    Method: SENSITIVITY, JUNIOR    Tetracycline Sensitive <=4 mcg/mL Final    Method: SENSITIVITY, JUNIOR    Trimeth/Sulfa Sensitive <=0.5/9.5 mcg/mL Final    Method: SENSITIVITY, JUNIOR    Vancomycin Sensitive 2 mcg/mL Final    Method: SENSITIVITY, JUNIOR              STAPHYLOCOCCUS HAEMOLYTICUS     Antibiotic Sensitivity Microscan Unit Status    Ampicillin/sulbactam Resistant 16/8 mcg/mL Final    Method: SENSITIVITY, JUNIOR    Clindamycin Resistant >4 mcg/mL Final    Method: SENSITIVITY, JUNIOR    Daptomycin Sensitive <=0.5 mcg/mL Final    Method: SENSITIVITY, JUNIOR    Erythromycin Resistant >4 mcg/mL Final    Method: SENSITIVITY, JUNIOR    Moxifloxacin Intermediate 4 mcg/mL Final    Method: SENSITIVITY, JUNIOR    Oxacillin Resistant >2 mcg/mL Final    Method: SENSITIVITY, JUNIOR    Penicillin Resistant >8 mcg/mL Final    Method: SENSITIVITY, JUNIOR    Tetracycline Sensitive <=4 mcg/mL Final    Method: SENSITIVITY, JUNIOR    Trimeth/Sulfa Resistant  >2/38 mcg/mL Final    Method: SENSITIVITY, JUNIOR    Vancomycin Sensitive 2 mcg/mL Final    Method: SENSITIVITY, JUNIOR                       ANAEROBIC CULTURE [[id number]]  (Abnormal) Collected:  05/12/18 1113    Order Status:  Completed Specimen:  Tissue Updated:  05/15/18 1554     Significant Indicator POS (POS)     Source TISS     Site Left Foot     Anaerobic Culture, Culture Res Growth noted after further incubation, see below for  organism identification.   (A)      Peptostreptococcus art  Moderate growth   (A)    Narrative:       CALL  Sandoval  MS5 tel. [phone],  CALLED  MS5 tel. [phone] 05/14/2018, 10:22, RB PERF. RESULTS CALLED  TO:Fabiola [id number] Rn          Assessment:  Active Hospital Problems    Diagnosis   • Diabetic ulcer of left foot associated with type 2 diabetes mellitus, with muscle involvement without evidence of necrosis (HCC) [E11.621, L97.525]   • Dehiscence of surgical wound [T81.31XA]   • Diabetic peripheral neuropathy (HCC) [E11.42]   • Psychiatric disorder [F99]   • Type 2 diabetes mellitus with neurologic complication, without long-term current use of insulin (HCC) [E11.49]       Plan:  Left foot osteomyelitis, additional work  Afebrile  No leukocytosis   S/p left great amp through proximal phalanx on 4/1-Wound cx - group G and C strep, morganella , diphtheroids  S/p left great toe amp down to MTP joint on 4/23. Clean margins obtained per OP note- OR cx (proximal bone) - mixed skin christine  Failed outpatient therapy-clinical osteomyelitis as probed to bone  s/p another debridement 5/12-MRSA, staph haemolyticus(MRSE), peptostreptococcus, and Pasteurella  s/p I and D 5/14  Continue Zosyn  Changed vanco to dapto as vanco JUNIOR is 2  MRI +OM  Endpoint clinical-anticipate 6 weeks IV if no TMA/ BKA  Order PICC as agreeable today    DM2  Last HgA1c 5.8  Maintain BS less than 150    Psychiatric disorder  Suspect contributing to prior noncompliance  Poor insight    Prognosis for limb salvage poor  Will  need placement    DW Psych

## 2018-05-16 NOTE — CARE PLAN
Problem: Knowledge Deficit  Goal: Knowledge of the prescribed therapeutic regimen will improve  Outcome: PROGRESSING AS EXPECTED  Patient educated on medications and possible side effects to look for. What medications do, and how they are helping with his disease process.

## 2018-05-17 ENCOUNTER — PATIENT OUTREACH (OUTPATIENT)
Dept: HEALTH INFORMATION MANAGEMENT | Facility: OTHER | Age: 53
End: 2018-05-17

## 2018-05-17 LAB
ALBUMIN SERPL BCP-MCNC: 3.4 G/DL (ref 3.2–4.9)
BUN SERPL-MCNC: 15 MG/DL (ref 8–22)
CALCIUM SERPL-MCNC: 9 MG/DL (ref 8.5–10.5)
CHLORIDE SERPL-SCNC: 105 MMOL/L (ref 96–112)
CK SERPL-CCNC: 19 U/L (ref 0–154)
CO2 SERPL-SCNC: 28 MMOL/L (ref 20–33)
CREAT SERPL-MCNC: 0.93 MG/DL (ref 0.5–1.4)
GLUCOSE BLD-MCNC: 101 MG/DL (ref 65–99)
GLUCOSE BLD-MCNC: 106 MG/DL (ref 65–99)
GLUCOSE BLD-MCNC: 119 MG/DL (ref 65–99)
GLUCOSE SERPL-MCNC: 106 MG/DL (ref 65–99)
PHOSPHATE SERPL-MCNC: 4.8 MG/DL (ref 2.5–4.5)
POTASSIUM SERPL-SCNC: 4 MMOL/L (ref 3.6–5.5)
SODIUM SERPL-SCNC: 138 MMOL/L (ref 135–145)

## 2018-05-17 PROCEDURE — 700105 HCHG RX REV CODE 258: Performed by: INTERNAL MEDICINE

## 2018-05-17 PROCEDURE — 700105 HCHG RX REV CODE 258: Performed by: HOSPITALIST

## 2018-05-17 PROCEDURE — A9270 NON-COVERED ITEM OR SERVICE: HCPCS | Performed by: HOSPITALIST

## 2018-05-17 PROCEDURE — 700111 HCHG RX REV CODE 636 W/ 250 OVERRIDE (IP): Performed by: INTERNAL MEDICINE

## 2018-05-17 PROCEDURE — 99232 SBSQ HOSP IP/OBS MODERATE 35: CPT | Performed by: HOSPITALIST

## 2018-05-17 PROCEDURE — 82550 ASSAY OF CK (CPK): CPT

## 2018-05-17 PROCEDURE — 80069 RENAL FUNCTION PANEL: CPT

## 2018-05-17 PROCEDURE — 700102 HCHG RX REV CODE 250 W/ 637 OVERRIDE(OP): Performed by: HOSPITALIST

## 2018-05-17 PROCEDURE — 82962 GLUCOSE BLOOD TEST: CPT | Mod: 91

## 2018-05-17 PROCEDURE — 770021 HCHG ROOM/CARE - ISO PRIVATE

## 2018-05-17 PROCEDURE — 36415 COLL VENOUS BLD VENIPUNCTURE: CPT

## 2018-05-17 RX ADMIN — INSULIN GLARGINE 5 UNITS: 100 INJECTION, SOLUTION SUBCUTANEOUS at 22:02

## 2018-05-17 RX ADMIN — VALPROIC ACID 250 MG: 250 CAPSULE, LIQUID FILLED ORAL at 21:57

## 2018-05-17 RX ADMIN — PIPERACILLIN SODIUM AND TAZOBACTAM SODIUM 3.38 G: 3; .375 INJECTION, POWDER, FOR SOLUTION INTRAVENOUS at 21:56

## 2018-05-17 RX ADMIN — GABAPENTIN 300 MG: 300 CAPSULE ORAL at 16:20

## 2018-05-17 RX ADMIN — VALPROIC ACID 250 MG: 250 CAPSULE, LIQUID FILLED ORAL at 08:13

## 2018-05-17 RX ADMIN — ENOXAPARIN SODIUM 40 MG: 100 INJECTION SUBCUTANEOUS at 08:12

## 2018-05-17 RX ADMIN — QUETIAPINE FUMARATE 100 MG: 100 TABLET ORAL at 12:14

## 2018-05-17 RX ADMIN — LORAZEPAM 1 MG: 1 TABLET ORAL at 12:14

## 2018-05-17 RX ADMIN — GABAPENTIN 300 MG: 300 CAPSULE ORAL at 08:13

## 2018-05-17 RX ADMIN — FLUOXETINE HYDROCHLORIDE 60 MG: 20 CAPSULE ORAL at 08:12

## 2018-05-17 RX ADMIN — INSULIN HUMAN 2 UNITS: 100 INJECTION, SOLUTION PARENTERAL at 22:02

## 2018-05-17 RX ADMIN — DAPTOMYCIN 620 MG: 500 INJECTION, POWDER, LYOPHILIZED, FOR SOLUTION INTRAVENOUS at 12:14

## 2018-05-17 RX ADMIN — OXYCODONE HYDROCHLORIDE 5 MG: 5 TABLET ORAL at 12:14

## 2018-05-17 RX ADMIN — BENZTROPINE MESYLATE 1 MG: 1 TABLET ORAL at 08:13

## 2018-05-17 RX ADMIN — SODIUM CHLORIDE: 900 INJECTION INTRAVENOUS at 22:07

## 2018-05-17 RX ADMIN — BENZTROPINE MESYLATE 1 MG: 1 TABLET ORAL at 21:56

## 2018-05-17 RX ADMIN — GABAPENTIN 300 MG: 300 CAPSULE ORAL at 21:56

## 2018-05-17 RX ADMIN — PIPERACILLIN SODIUM AND TAZOBACTAM SODIUM 3.38 G: 3; .375 INJECTION, POWDER, FOR SOLUTION INTRAVENOUS at 05:30

## 2018-05-17 RX ADMIN — PIPERACILLIN SODIUM AND TAZOBACTAM SODIUM 3.38 G: 3; .375 INJECTION, POWDER, FOR SOLUTION INTRAVENOUS at 14:15

## 2018-05-17 RX ADMIN — SENNOSIDES AND DOCUSATE SODIUM 2 TABLET: 8.6; 5 TABLET ORAL at 21:57

## 2018-05-17 RX ADMIN — QUETIAPINE FUMARATE 100 MG: 100 TABLET ORAL at 08:13

## 2018-05-17 RX ADMIN — QUETIAPINE FUMARATE 200 MG: 100 TABLET ORAL at 21:56

## 2018-05-17 RX ADMIN — OXYCODONE HYDROCHLORIDE 5 MG: 5 TABLET ORAL at 22:07

## 2018-05-17 ASSESSMENT — PAIN SCALES - GENERAL
PAINLEVEL_OUTOF10: 6
PAINLEVEL_OUTOF10: 5
PAINLEVEL_OUTOF10: 7
PAINLEVEL_OUTOF10: ASSUMED PAIN PRESENT
PAINLEVEL_OUTOF10: 6
PAINLEVEL_OUTOF10: 8
PAINLEVEL_OUTOF10: 7

## 2018-05-17 ASSESSMENT — LIFESTYLE VARIABLES: SUBSTANCE_ABUSE: 0

## 2018-05-17 ASSESSMENT — ENCOUNTER SYMPTOMS
FEVER: 0
MYALGIAS: 0
BLOOD IN STOOL: 0
ABDOMINAL PAIN: 0
HEADACHES: 0
WEAKNESS: 0
DIZZINESS: 0
VOMITING: 0
HEARTBURN: 0
WHEEZING: 0
HEMOPTYSIS: 0
DEPRESSION: 0
SENSORY CHANGE: 1
NAUSEA: 0
SHORTNESS OF BREATH: 0
SEIZURES: 0
TREMORS: 1
COUGH: 0
CHILLS: 0
NERVOUS/ANXIOUS: 1
PALPITATIONS: 0

## 2018-05-17 NOTE — DISCHARGE PLANNING
Anticipated Discharge Disposition: [facility]     Action: LSW called [facility] ([phone]) and was transferred to [facility] and LSW left message re: referral. LSW called [facility] back and asked for the direct number and was given [phone] and LSW spoke to Jaci. Jaci states that she will have Amita or Telma give this LSW a call back re: referral.     Barriers to discharge: Pt is homeless and will need 6 weeks of IV ABX.     Plan: LSW waiting for call back to discuss referral and possible dc to [facility] for 6 weeks of IV Abx treatment.

## 2018-05-17 NOTE — PROGRESS NOTES
LIMB PRESERVATION SERVICE     Patient is a 52 y.o. male with a past medical history that includes borderline diabetes, anxiety, HTN, Hep C and is admitted to [facility] via [facility] for infected L great toe amp site.   Pt had L great toe amp by Dr. Baez 4/1 followed by revision of amp site on 4/23 by . Pt did not follow up with the [facility] clinic for dressing care and he did not receive oral antibiotics. He also did not follow up with his PCP at [facility]. Pt has Hx of noncompliance and psychiatric disorder which is likely contributory. Pt is not wearing the previously provided offloading shoes.  A1c this admission is 5.8%.      S/P I and D Left Great Toe 5/12/18 Dr Baez  POD # 3 s/p I & D L great toe amp with delayed primary closure by Dr. Baez       Removed surgical dressing  L great toe amputation site:   Incision approximated with sutures, minimal serosang drainage lateral aspect  Mild edema, warmth, erythema remains    L medial cuneiform and L lateral 5th metatarsal unstageable pressure injury  Medial measures: 0.7 x 2.5cm, eschar, moist  Lateral measures: 3.4 x 2cm, red tissue except for 5% eschar to wound bed    ID involved  PICC ordered        PLAN  Wound care orders placed   Wound team to assess pressure injury  Offloading shoe for L foot to be worn when OOB   IV abx per ID, PICC ordered, 6wk treatment      D/C plan: highly recommend SNF, failed OP therapy x 2 occasions now.   Pt concerned about social security check and losing place of living    D/w: pt, RN, Dr. Feng, Dr. Back, wound team

## 2018-05-17 NOTE — CARE PLAN
Problem: Venous Thromboembolism (VTW)/Deep Vein Thrombosis (DVT) Prevention:  Goal: Patient will participate in Venous Thrombosis (VTE)/Deep Vein Thrombosis (DVT)Prevention Measures  Patient educated on need for VTE preventative therapy. Placed on lovenox therapy. Refusing SCDs. Monitoring for any venous changes ongoing.

## 2018-05-17 NOTE — PROGRESS NOTES
Received handoff report from Ministerio GARAY and assumed pt care at 0700.  Pt resting in bed comfortably. Assessment complete. Labs reviewed. Patient A&Ox4 but is very fatigued.  Patient and RN discussed plan of care and questions were answered. Bed in lowest and locked position and bed alarm is in place.  Call light and personal belongings within reach.

## 2018-05-17 NOTE — WOUND TEAM
"[facility]  Inpatient Services  Initial Wound and Skin Care Evaluation    Admission Date:   05/11/2018  HPI, PMH, SH: Reviewed  Unit where seen by Wound Team: [id number]    WOUND CONSULT RELATED TO: L foot medial and lateral mid foot pressure injuries    SUBJECTIVE:  \"Are you going to take my staples out too\"    Self Report / Pain Level: denies sensation to Left foot.    OBJECTIVE: Pt sitting up in bed. No heel float boots or waffle overlay in place. Pt left leg open to air. Sutures in place to left great toe amputation site.     WOUND TYPE, LOCATION, CHARACTERISTICS (Pressure ulcers: location, stage, POA or date identified)    Location and type of wound: Left lateral mid foot unstageable pressure ulcer, 05/17/2018        Periwound:        Pink, intact  Drainage:        Scant serosanguinous to the outer borders  Tissue Type and %:       Mixture of pink/red, black   Wound Edges:       Attached  Odor:         NONE  Exposed structure(s):      FLAQUITA, due to eschar  S&S of Infection:              NONE      Measurements: (Taken 05/17/18)  Length:        3.4 cm  Width:         2 cm  Depth:                              ~0.1 cm of what is visible, unable to visualize entire wound                                          bed due to black slough.     Location and type of wound: Left medial mid foot unstageable pressure ulcer, 05/17/2018        Periwound:      Pink, intact  Drainage:      Scant, serosanginous  Tissue Type and %:     Mixture of pink/red, yellow and black  Wound Edges:     Attached  Odor:       NONE  Exposed structure(s):    FLAQUITA, due to slough  S&S of Infection:            NONE      Measurements: (Taken 05/17/2018)  Length:     0.7 cm  Width:      2.5 cm  Depth:                            FLAQUITA, due to slough      INTERVENTIONS BY WOUND TEAM: YADIEL ALMONTE notified wound team during her assessment of patients foot pressure ulcers were identified to the medial and lateral aspect of the left foot. Wound team in to " assess wounds. Wounds currently TODD.  Wounds cleansed with NS and dry gauze. Measurements and pictures obtained. Per [facility] APRN wounds are to be dressed with honey alignate. Linda-wound was prepped with no sting skin barrier.  Large portion of lateral wound bed is red/pink and moist.  Small area found to have black/brown slough present. A small piece of honey alginate was cut to fit over slough area, avoiding intact skin.  Large adhesive foam was placed over entire wound.  Medial pressure ulcer wound bed has a large amount of yellow/brown slough.  Linda-wound was prepped with no sting skin barrier. A small piece of honey alginate was cut to fit over slough of wound bed, intact skin was avoided. A small adhesive foam was then placed over entire wound. Discussed with patient heel float boots, pt states he is mobile but would try them out. Pt was educated that he can't wear boots while ambulating but should keep them on while in bed to prevent further breakdown. Staff RN updated, RN will place waffle overlay and heel float boots during rounding.       Interdisciplinary consultation: Patient, Lai ([facility] APRN), Staff RN    EVALUATION: Pt is an uncontrolled diabetic with neuropathy.  Pt was admitted for infected diabetic great toe ulcer. Great toe was amputated this admission. Pt leg was wrapped with gauze and ace wrap since surgery and upon removal of dressing 2 unstageable pressure ulcers were identified. Both wounds are small.  Honey alignate was placed over slough to autolytically debride the wound bed as well as for its antimicrobial properties. Adhesive foam in place to protect wound bed as it heals.  Recommend heel float boots and waffle overlay to bed.     Factors affecting wound healing: Diabetes, Neuropathy, Infection   Goals: Wounds will decrease in size by 2% weekly    NURSING PLAN OF CARE ORDERS (X):    Dressing changes: See Dressing Maintenance orders: X  Skin care: See Skin Care orders: X  Rectal tube care: See  Rectal Tube Care orders:   Other orders:    RSKIN: CURRENT (X) ORDERED (O)  Q shift Robby:  X  Q shift pressure point assessments:  X  Pressure redistribution mattress  X      ADA      Bariatric ADA      Bariatric foam        Heel float boots  O     Heels floated on pillows      Barrier wipes      Barrier Cream      Barrier paste      Sacral silicone dressing      Silicone O2 tubing      Anchorfast      Trach with Optifoam split foam       Waffle cushion   Waffle Overlay O     Rectal tube or BMS      Antifungal tx    Turn q 2 hours     Up to chair   X  Ambulate X  PT/OT     Dietician      PO  X   TF   TPN     PVN    NPO   # days   Other       WOUND TEAM PLAN OF CARE (X):   NPWT change 3 x week:        Dressing changes by wound team:       Follow up as needed:       Other (explain): X Wound team will follow up in 1 week (5/24/18)    Anticipated discharge plans (X):  SNF:           Home Care:           Outpatient Wound Center:            Self Care:            Other:   X TBD

## 2018-05-17 NOTE — CARE PLAN
Problem: Safety  Goal: Will remain free from injury    Intervention: Educate patient and significant other/support system about adaptive mobility strategies and safe transfers  Instructed pt not to attempt to ambulate with float boots on without calling nursing staff. Patient verbalized understanding but needs reinforcement.       Problem: Knowledge Deficit  Goal: Knowledge of disease process/condition, treatment plan, diagnostic tests, and medications will improve  Outcome: PROGRESSING SLOWER THAN EXPECTED  Patient is in denial that he has a current infection in foot. Educated patient on the signs and symptoms of infection.

## 2018-05-17 NOTE — PROGRESS NOTES
"[facility] Hospitalist Progress Note    Date of Service: 5/17/2018    Chief Complaint  52 y.o. male admitted 5/11/2018 with left foot wound. History remarkable for s/p left foot great toe amputation, admitted for nonhealing, infected foot wound with osteomyelitis.       Interval Problem Update  5/12: patient was seen post operatively, he has no complaints. We discussed tight blood sugar control. He is s/p left foot wound irrigation and debridement. Surgery recommends wound vac and revision surgery on Monday. dc'd metformin, will do insulin only     5/13: patient is stable. Discussed pending plan. Id recommends switching to zosyn, vanc. Patient has no complaints    5/14: underwent repeat irrigation and debridement and secondary closure of surgical wound. Refer to surgery op note. Continuing abx's with vanc/zosyn. ID recommended contact isolation. He is less anxious appearing, started on librium yesterday.     5/15: no acute events overnight. Vitals stable. Blood glucose well controlled, not req any additional insulin. OR yest for repeat I/D. No acute complaints.    5/16: patient c/o anxiety, vitals stable, afebrile. White count stable. On abx based on cx-switched to daptomycin. per ID will need picc and 6weeks of iv antibiotics. Patient declining picc line and long term iv abx. Not able to state clearly the risks and benefits, remains quiet, visibly anxious when asked again.Will obtain Psych consult to determine capacity, treat underlying severe anxiety    5/17: no acute event overnight. Vitals stable. Psych consult appreciated- appears to have capacity but this is tenuous, will need to be reevaluated if he tries to leave ama, patient w/ intellectual disability, understands at \"5th grade level\". Still needs further wound care inpatient. Eventually picc, then 6weeks of iv abx total    Consultants/Specialty  Surgery, ID, wound care    Disposition  Continuing daptomycin/zosyn, wound         Review of Systems "   Constitutional: Negative for chills and fever.   HENT: Negative for hearing loss.    Respiratory: Negative for cough, hemoptysis and wheezing.    Cardiovascular: Negative for chest pain, palpitations and leg swelling.   Gastrointestinal: Negative for abdominal pain, blood in stool, heartburn, melena and nausea.   Genitourinary: Negative for dysuria.   Musculoskeletal: Negative for myalgias.   Skin: Negative for rash.   Neurological: Positive for tremors. Negative for dizziness, seizures, weakness and headaches.   Psychiatric/Behavioral: Negative for depression and substance abuse. The patient is nervous/anxious.       Physical Exam  Laboratory/Imaging   Hemodynamics  Temp (24hrs), Av.3 °C (97.4 °F), Min:36.2 °C (97.2 °F), Max:36.7 °C (98 °F)   Temperature: 36.7 °C (98 °F)  Pulse  Av.9  Min: 52  Max: 76   Blood Pressure: (!) 167/98      Respiratory      Respiration: 17, Pulse Oximetry: 100 %        RUL Breath Sounds: Diminished, RML Breath Sounds: Diminished, RLL Breath Sounds: Diminished, LORENA Breath Sounds: Diminished, LLL Breath Sounds: Diminished    Fluids    Intake/Output Summary (Last 24 hours) at 18 1131  Last data filed at 18 0715   Gross per 24 hour   Intake                0 ml   Output              200 ml   Net             -200 ml       Nutrition  Orders Placed This Encounter   Procedures   • DIET ORDER     Standing Status:   Standing     Number of Occurrences:   1     Order Specific Question:   Diet:     Answer:   Regular [1]     Physical Exam   Constitutional: He is oriented to person, place, and time. He appears well-developed and well-nourished. No distress.   HENT:   Head: Normocephalic and atraumatic.   Eyes: Right eye exhibits no discharge. Left eye exhibits no discharge. No scleral icterus.   Neck: Neck supple.   Cardiovascular: Normal rate, regular rhythm, normal heart sounds and intact distal pulses.    Pulmonary/Chest: Effort normal and breath sounds normal. No respiratory  distress. He has no wheezes.   Abdominal: Soft. Bowel sounds are normal. He exhibits no distension. There is no tenderness.   Musculoskeletal: Normal range of motion. He exhibits no edema.   Neurological: He is alert and oriented to person, place, and time.   Skin: Skin is warm and dry. He is not diaphoretic.   Left foot post debridement wrapped in dressing, right foot scabbed wounds   Psychiatric: His affect is blunt. He is slowed. He is not agitated and not aggressive.       Recent Labs      05/15/18   0817   WBC  4.6*   RBC  3.90*   HEMOGLOBIN  11.8*   HEMATOCRIT  35.2*   MCV  90.3   MCH  30.3   MCHC  33.5*   RDW  44.7   PLATELETCT  144*   MPV  9.1     Recent Labs      05/15/18   0817  05/16/18   0345  05/17/18   0305   SODIUM  140  139  138   POTASSIUM  3.9  3.8  4.0   CHLORIDE  109  107  105   CO2  25  25  28   GLUCOSE  95  96  106*   BUN  9  12  15   CREATININE  0.73  0.88  0.93   CALCIUM  8.7  8.6  9.0                      Assessment/Plan     Diabetic ulcer of left foot associated with type 2 diabetes mellitus, with muscle involvement without evidence of necrosis (HCC)- (present on admission)   Assessment & Plan    Hx of diabetic nonhealing ulcer, hx of previous Left first toe amputation, presented w. Draining, nonhealing infected foot wound. This admin: MRI left foot showed: Large open ulcer extending from the great toe amputation site into the first metatarsal head with osteomyelitis of the first metatarsal head.    s/p Irrigation and debridement of left foot wound 5/12  s/p repeat debridement 5/14 with secondary closure of surgical wound    Pertinent cultures to date:   5/12/18: Tissue, left foot = MRSA (vanco JUNIOR=2) and CoNS     Many previous cultures   4/1/18 left great toe - Diphtheroids, streptococcus   3/31/18 left great toe - streptococcus, Morganella morganii  3/31/18 right great toe - Streptococcus     - on daptomycin/zosyn. Will need picc and 6weeks of iv antibiotics  - pt is homeless, hx of  "psychiatric illness,  difficult discharge!    Patient declining picc line and long term iv abx.   Not able to state clearly the risks and benefits, remains quiet, visibly anxious when asked again  Will obtain Psych consult to determine capacity, treat underlying severe anxiety.  f/u psych, ID and ortho recs        Acute hematogenous osteomyelitis of left foot (HCC)- (present on admission)   Assessment & Plan    See above- diabetic ulcer        Diabetic peripheral neuropathy ([facility])- (present on admission)   Assessment & Plan    Pain controlled  Increase neurontin 300 tid        Dehiscence of surgical wound- (present on admission)   Assessment & Plan    Wound care  On Antibiotics as above        Psychiatric disorder- (present on admission)   Assessment & Plan    Cont congentin, seroquel, prozac  Added librium tid, valproate for mood  Patient declining picc line and long term iv abx.   Not able to state clearly the risks and benefits, remains quiet, visibly anxious when asked again  Psych consult appreciated- see note for full details but briefly ( appears to have capacity but this is tenuous, will need to be reevaluated if he tries to leave ama, patient w/ intellectual disability, understands at \"5th grade level\")        Type 2 diabetes mellitus with neurologic complication, without long-term current use of insulin ([facility])- (present on admission)   Assessment & Plan    Controlled  Dc metformin  Do ssi, added lantus  Diabetic diet  Maintain tight blood sugar control            Quality-Core Measures   Reviewed items::  Labs reviewed, Radiology images reviewed and Medications reviewed  Olsen catheter::  No Olsen  DVT prophylaxis pharmacological::  Enoxaparin (Lovenox)        "

## 2018-05-17 NOTE — PROGRESS NOTES
"Received bedside report from off going nurse. Assumed care of pt. @ 1900. Initial assessment completed. Patient in bed, A/O x 4.Spent shift in bed. No acute distress. Denies pain. Reviewed care plan w/patient. Questions answered. Monitoring ongoing. Call lights w/in reach. Bed locked in lowest position BP (!) 170/98   Pulse 60   Temp 36.3 °C (97.3 °F)   Resp 20   Ht 1.93 m (6' 4\")   Wt 103.1 kg (227 lb 4.7 oz)   SpO2 92%   BMI 27.67 kg/m²   "

## 2018-05-17 NOTE — PROGRESS NOTES
"Assumed care of patient at 0700 . Received report from RN. Patient is AOX4 . Assessment complete. Labs reviewed.Patient and RN discussed plan of care. Patient questions answered. Patient needs are met at this time. Bed in lowest and locked position. Upper bed rails up.  Call light is within reach. Hourly rounding in place.  BP (!) 167/98   Pulse 67   Temp 36.7 °C (98 °F)   Resp 17   Ht 1.93 m (6' 4\")   Wt 103.1 kg (227 lb 4.7 oz)   SpO2 100%   BMI 27.67 kg/m²     "

## 2018-05-17 NOTE — DISCHARGE PLANNING
LSW met w/ pt at bedside to complete assessment. LSW introduced self and role and explained the purpose of the assessment to gather information as we assist in creating a safe dc plan. Pt was agreeable to assessment. Pt A/Ox4. Pt states that he is homeless and stays at the [facility]. LSW asked the pt if he was open to discharging to the [facility] as pt will be needing 6 weeks of IV Abx. Pt agreeable to a referral being sent. Pt denies any current or past ETOH or drug abuse. Pt states that he sees a Psychiatrist at [facility] and pt also reports he has a PCP there as well. LSW called scheduling ([phone]) to confirm. Safia in scheduling reports that pt's PCP is Tahir Harrington and FS has been updated. Pt has been a no show for the last 4 appointments. LSW will call scheduling if PCP appointment needs to be made.     Care Transition Team Assessment    Information Source  Orientation : Oriented x 4  Information Given By: Patient  Informant's Name: Wilbert Yeomans  Who is responsible for making decisions for patient? : Patient         Elopement Risk  Legal Hold: No  Ambulatory or Self Mobile in Wheelchair: Yes  Disoriented: No  Psychiatric Symptoms: None  History of Wandering: No  Elopement this Admit: No  Vocalizing Wanting to Leave: No  Displays Behaviors, Body Language Wanting to Leave: No-Not at Risk for Elopement  Elopement Risk: Not at Risk for Elopement    Interdisciplinary Discharge Planning  Patient or legal guardian wants to designate a caregiver (see row info): No    Discharge Preparedness  What is your plan after discharge?: Uncertain - pending medical team collaboration  What are your discharge supports?:  (Pt says he has no family or friends)  Prior Functional Level: Ambulatory, Independent with Activities of Daily Living, Independent with Medication Management  Difficulity with ADLs: None  Difficulity with IADLs: None    Functional Assesment  Prior Functional Level: Ambulatory,  Independent with Activities of Daily Living, Independent with Medication Management    Finances  Financial Barriers to Discharge: Yes  Average Monthly Income: 708 $  Source of Income: Social Security  Prescription Coverage: Yes    Vision / Hearing Impairment  Vision Impairment : No  Hearing Impairment : No    Values / Beliefs / Concerns  Values / Beliefs Concerns : No    Advance Directive  Advance Directive?: None    Domestic Abuse  Have you ever been the victim of abuse or violence?: No  Physical Abuse or Sexual Abuse: No  Verbal Abuse or Emotional Abuse: No  Possible Abuse Reported to:: Not Applicable    Psychological Assessment  History of Substance Abuse: None  History of Psychiatric Problems: Yes (Pt has anxiety and is seen by a Psychiatrist at [facility] )  Non-compliant with Treatment: No  Newly Diagnosed Illness: No    Discharge Risks or Barriers  Discharge risks or barriers?: Post-acute placement / services, Homeless / couch surfing  Patient risk factors: Homeless, Lack of outside supports, Lives alone and no community support    Anticipated Discharge Information  Anticipated discharge disposition: Infusion Center (outpatient), Outpatient mental health follow-up, Shelter

## 2018-05-17 NOTE — PROGRESS NOTES
"Infectious Disease Progress Note    Author: Amanda Feng M.D. Date & Time of service: 2018  1:30 PM    Chief Complaint:  Left foot wound dehiscence with osteomyelitis f/u      Interval History:  52 y.o. WM admitted 2018 due to left foot wound dehiscence at amp site   AF WBC 5 denies any pain-ate all of breakfast   AF plan for repeat surgery today. No new complaints  5/15 AF WBC 4.6 cxs now polymicrobial somnolent   AF alert and anxious today, jittery states \"I don't want them to cut my leg off\"   AF less anxious today-apparently refused PICC. Seen with LPS  Labs Reviewed, Medications Reviewed, Radiology Reviewed and Wound Reviewed.    Review of Systems:  Review of Systems   Constitutional: Negative for chills and fever.   Respiratory: Negative for cough and shortness of breath.    Cardiovascular: Negative for chest pain.   Gastrointestinal: Negative for abdominal pain, nausea and vomiting.   Musculoskeletal: Negative for joint pain.   Neurological: Positive for sensory change.        Neuropathy   Psychiatric/Behavioral: The patient is nervous/anxious.    All other systems reviewed and are negative.      Hemodynamics:  Temp (24hrs), Av.3 °C (97.4 °F), Min:36.2 °C (97.2 °F), Max:36.7 °C (98 °F)  Temperature: 36.7 °C (98 °F)  Pulse  Av.9  Min: 52  Max: 76  Blood Pressure: (!) 167/98       Physical Exam:  Physical Exam   Constitutional: He appears well-developed. No distress.   disheveled   HENT:   Head: Atraumatic.   Poor dentition   Neck: Neck supple.   Cardiovascular: Normal rate.    Pulmonary/Chest: Effort normal. No respiratory distress.   Abdominal: Soft. He exhibits no distension.   Musculoskeletal:   Amp site left with sutures, minimal drainage  Forefoot enlarged, soft     Neurological: He is alert.   Skin: No rash noted.   DTIs to lat and dorsum left foot   Nursing note and vitals reviewed.      Meds:    Current Facility-Administered Medications:   •  gabapentin  •  " LORazepam  •  oxyCODONE immediate-release  •  ketorolac  •  acetaminophen  •  insulin glargine  •  valproic acid  •  DAPTOmycin  •  [COMPLETED] piperacillin-tazobactam **AND** piperacillin-tazobactam  •  enoxaparin (LOVENOX) injection  •  senna-docusate **AND** polyethylene glycol/lytes **AND** magnesium hydroxide **AND** bisacodyl  •  Respiratory Care per Protocol  •  NS  •  insulin regular **AND** Accu-Chek ACHS **AND** NOTIFY MD and PharmD **AND** glucose 4 g **AND** dextrose 50%  •  benztropine  •  FLUoxetine  •  QUEtiapine **AND** QUEtiapine    Labs:  Recent Labs      05/15/18   0817   WBC  4.6*   RBC  3.90*   HEMOGLOBIN  11.8*   HEMATOCRIT  35.2*   MCV  90.3   MCH  30.3   RDW  44.7   PLATELETCT  144*   MPV  9.1   NEUTSPOLYS  55.70   LYMPHOCYTES  33.40   MONOCYTES  8.30   EOSINOPHILS  1.70   BASOPHILS  0.90     Recent Labs      05/15/18   0817 05/16/18   0345  05/17/18   0305   SODIUM  140  139  138   POTASSIUM  3.9  3.8  4.0   CHLORIDE  109  107  105   CO2  25  25  28   GLUCOSE  95  96  106*   BUN  9  12  15   CPKTOTAL   --    --   19     Recent Labs      05/15/18   0817 05/16/18   0345  05/17/18   0305   ALBUMIN  3.3  3.1*  3.4   CREATININE  0.73  0.88  0.93       Imaging:  Dx-foot-2- Left    Result Date: 4/20/2018 4/20/2018 10:19 AM HISTORY/REASON FOR EXAM:  Pain/Deformity Following Trauma Weeping great toe TECHNIQUE/EXAM DESCRIPTION AND NUMBER OF VIEWS: 2 nonweightbearing views of the LEFT foot. COMPARISON:  3/31/2018 FINDINGS: Patient is status post amputation of the first digit at the level of the proximal phalanx. There is minimal osseous irregularity at the level of the amputation. There is faint adjacent calcification which may be dystrophic.  There are mild degenerative changes of the first tarsometatarsal joint. No acute fracture or dislocation is seen. There is soft tissue swelling about the medial forefoot and remainder of the first digit.     Post surgical changes status post amputation at the  level of the proximal phalanx of the first digit. Linear amorphous calcification adjacent to the amputation may be dystrophic. There is minimal osseous irregularity at the level of the amputation where it is difficult to exclude osteomyelitis. Soft tissue swelling of the forefoot and remainder of the first digit.     Mr-foot-with & W/o Left    Result Date: 5/12/2018 5/12/2018 10:44 AM HISTORY/REASON FOR EXAM:  Open wound. Open wound,?surgical site at great toe amp site, please eval for OM and/ or abscess. TECHNIQUE/EXAM DESCRIPTION: MRI of the LEFT foot with and without contrast. The study was performed on a IGIGI Signa 1.5 Steph MRI scanner. T1 axial, T1 sagittal, T1 coronal, STIR sagittal, T2 fast spin-echo fat-suppressed coronal, sagittal inversion recovery, and T1 postcontrast coronal images were obtained. 20 mL Omniscan contrast was administered intravenously. COMPARISON: Foot radiograph 4/20/2018. FINDINGS: Diffuse soft tissue swelling about the dorsum of foot. Increased signal within the intrinsic foot muscles, likely related to neuropathic change. There is an open ulcer extending from the great toe amputation site into the first metatarsal head with marrow replacement. No walled fluid collection seen.     Large open ulcer extending from the great toe amputation site into the first metatarsal head with osteomyelitis of the first metatarsal head. No walled fluid collection seen.      Micro:  Results     Procedure Component Value Units Date/Time    GRAM STAIN [[id number]]  (Abnormal) Collected:  05/12/18 1113    Order Status:  Completed Specimen:  Tissue Updated:  05/15/18 1559     Significant Indicator . (POS)     Source TISS     Site Left Foot     Gram Stain Result Few WBCs.  Moderate Gram positive cocci.  Rare Gram positive rods.   (A)    Narrative:       CALL  Sandoval  MS5 tel. [phone],  CALLED  MS5 tel. [phone] 05/14/2018, 10:22, RB PERF. RESULTS CALLED  TO:Fabiola [id number] Rn    CULTURE TISSUE W/ GRM STAIN  [[id number]]  (Abnormal)  (Susceptibility) Collected:  05/12/18 1113    Order Status:  Completed Specimen:  Tissue Updated:  05/15/18 9880     Significant Indicator POS (POS)     Source TISS     Site Left Foot     Tissue Culture -- (A)     Gram Stain Result Few WBCs.  Moderate Gram positive cocci.  Rare Gram positive rods.   (A)     Tissue Culture Methicillin Resistant Staphylococcus aureus  Moderate growth   (A)      Staphylococcus haemolyticus  Light growth   (A)      Pasteurella multocida  Light growth   (A)    Narrative:       CALL  Sandoval  MS5 tel. [phone],  [place]ED  [facility] [place] [phone] 05/14/2018, 10:22, RB PERF. RESULTS CALLED  TO:Fabiola [id number] Rn    Culture & Susceptibility     METHICILLIN RESISTANT STAPHYLOCOCCUS AUREUS     Antibiotic Sensitivity Microscan Unit Status    Ampicillin/sulbactam Resistant >16/8 mcg/mL Final    Method: SENSITIVITY, JUNIOR    Clindamycin Sensitive <=0.5 mcg/mL Final    Method: SENSITIVITY, JUNIOR    Daptomycin Sensitive 1 mcg/mL Final    Method: SENSITIVITY, JUNIOR    Erythromycin Resistant >4 mcg/mL Final    Method: SENSITIVITY, JUNIOR    Moxifloxacin Sensitive 2 mcg/mL Final    Method: SENSITIVITY, JUNIOR    Oxacillin Resistant >2 mcg/mL Final    Method: SENSITIVITY, JUNIOR    Penicillin Resistant >8 mcg/mL Final    Method: SENSITIVITY, JUNIOR    Tetracycline Sensitive <=4 mcg/mL Final    Method: SENSITIVITY, JUNIOR    Trimeth/Sulfa Sensitive <=0.5/9.5 mcg/mL Final    Method: SENSITIVITY, JUNIOR    Vancomycin Sensitive 2 mcg/mL Final    Method: SENSITIVITY, JUNIOR              STAPHYLOCOCCUS HAEMOLYTICUS     Antibiotic Sensitivity Microscan Unit Status    Ampicillin/sulbactam Resistant 16/8 mcg/mL Final    Method: SENSITIVITY, JUNIOR    Clindamycin Resistant >4 mcg/mL Final    Method: SENSITIVITY, JUNIOR    Daptomycin Sensitive <=0.5 mcg/mL Final    Method: SENSITIVITY, JUNIOR    Erythromycin Resistant >4 mcg/mL Final    Method: SENSITIVITY, JUNIOR    Moxifloxacin Intermediate 4 mcg/mL Final    Method: SENSITIVITY, JUNIOR     Oxacillin Resistant >2 mcg/mL Final    Method: SENSITIVITY, JUNIOR    Penicillin Resistant >8 mcg/mL Final    Method: SENSITIVITY, JUNIOR    Tetracycline Sensitive <=4 mcg/mL Final    Method: SENSITIVITY, JUNIOR    Trimeth/Sulfa Resistant >2/38 mcg/mL Final    Method: SENSITIVITY, JUNIOR    Vancomycin Sensitive 2 mcg/mL Final    Method: SENSITIVITY, JUNIOR                       ANAEROBIC CULTURE [[id number]]  (Abnormal) Collected:  05/12/18 1113    Order Status:  Completed Specimen:  Tissue Updated:  05/15/18 1550     Significant Indicator POS (POS)     Source TISS     Site Left Foot     Anaerobic Culture, Culture Res Growth noted after further incubation, see below for  organism identification.   (A)      Peptostreptococcus art  Moderate growth   (A)    Narrative:       CALL  Sandoval  MS5 tel. [phone],  CALLED  MS5 tel. [phone] 05/14/2018, 10:22, RB PERF. RESULTS CALLED  TO:Fabiola [id number] Rn          Assessment:  Active Hospital Problems    Diagnosis   • Diabetic ulcer of left foot associated with type 2 diabetes mellitus, with muscle involvement without evidence of necrosis (HCC) [E11.621, L97.525]   • Dehiscence of surgical wound [T81.31XA]   • Diabetic peripheral neuropathy (HCC) [E11.42]   • Psychiatric disorder [F99]   • Type 2 diabetes mellitus with neurologic complication, without long-term current use of insulin (HCC) [E11.49]       Plan:  Left foot osteomyelitis, additional work  Afebrile  No leukocytosis   S/p left great amp through proximal phalanx on 4/1-Wound cx - group G and C strep, morganella , diphtheroids  S/p left great toe amp down to MTP joint on 4/23. Clean margins obtained per OP note- OR cx (proximal bone) - mixed skin christine  Failed outpatient therapy-clinical osteomyelitis as probed to bone  s/p another debridement 5/12-MRSA, staph haemolyticus(MRSE), peptostreptococcus, and Pasteurella  MRI +ostemyelitis  s/p I and D 5/14  Continue Zosyn and dapto as vanco JUNIOR is 2  Endpoint clinical-anticipate 6 weeks  IV if no TMA/ BKA  Will need PICC and placement    DM2  Last HgA1c 5.8  Maintain BS less than 150    Psychiatric disorder  Suspect contributing to prior noncompliance  Poor insight    Prognosis for limb salvage poor  Will need placement    DW IM Dr Maria/LPS

## 2018-05-17 NOTE — CARE PLAN
Problem: Bowel/Gastric:  Goal: Normal bowel function is maintained or improved  Normal bowel function maintained. Monitoring ongoing.

## 2018-05-18 ENCOUNTER — APPOINTMENT (OUTPATIENT)
Dept: RADIOLOGY | Facility: MEDICAL CENTER | Age: 53
DRG: 516 | End: 2018-05-18
Attending: HOSPITALIST
Payer: MEDICAID

## 2018-05-18 ENCOUNTER — APPOINTMENT (OUTPATIENT)
Dept: RADIOLOGY | Facility: MEDICAL CENTER | Age: 53
DRG: 516 | End: 2018-05-18
Attending: INTERNAL MEDICINE
Payer: MEDICAID

## 2018-05-18 LAB
GLUCOSE BLD-MCNC: 107 MG/DL (ref 65–99)
GLUCOSE BLD-MCNC: 114 MG/DL (ref 65–99)
GLUCOSE BLD-MCNC: 145 MG/DL (ref 65–99)
GLUCOSE BLD-MCNC: 165 MG/DL (ref 65–99)
GLUCOSE BLD-MCNC: 96 MG/DL (ref 65–99)

## 2018-05-18 PROCEDURE — 02HV33Z INSERTION OF INFUSION DEVICE INTO SUPERIOR VENA CAVA, PERCUTANEOUS APPROACH: ICD-10-PCS | Performed by: INTERNAL MEDICINE

## 2018-05-18 PROCEDURE — A9270 NON-COVERED ITEM OR SERVICE: HCPCS | Performed by: HOSPITALIST

## 2018-05-18 PROCEDURE — 700105 HCHG RX REV CODE 258: Performed by: INTERNAL MEDICINE

## 2018-05-18 PROCEDURE — B548ZZA ULTRASONOGRAPHY OF SUPERIOR VENA CAVA, GUIDANCE: ICD-10-PCS | Performed by: INTERNAL MEDICINE

## 2018-05-18 PROCEDURE — 99232 SBSQ HOSP IP/OBS MODERATE 35: CPT | Performed by: HOSPITALIST

## 2018-05-18 PROCEDURE — 700111 HCHG RX REV CODE 636 W/ 250 OVERRIDE (IP): Performed by: INTERNAL MEDICINE

## 2018-05-18 PROCEDURE — 700102 HCHG RX REV CODE 250 W/ 637 OVERRIDE(OP): Performed by: HOSPITALIST

## 2018-05-18 PROCEDURE — 36569 INSJ PICC 5 YR+ W/O IMAGING: CPT

## 2018-05-18 PROCEDURE — 82962 GLUCOSE BLOOD TEST: CPT

## 2018-05-18 PROCEDURE — 770021 HCHG ROOM/CARE - ISO PRIVATE

## 2018-05-18 RX ADMIN — GABAPENTIN 300 MG: 300 CAPSULE ORAL at 16:59

## 2018-05-18 RX ADMIN — VALPROIC ACID 250 MG: 250 CAPSULE, LIQUID FILLED ORAL at 08:33

## 2018-05-18 RX ADMIN — BENZTROPINE MESYLATE 1 MG: 1 TABLET ORAL at 08:33

## 2018-05-18 RX ADMIN — VALPROIC ACID 250 MG: 250 CAPSULE, LIQUID FILLED ORAL at 21:42

## 2018-05-18 RX ADMIN — SENNOSIDES AND DOCUSATE SODIUM 2 TABLET: 8.6; 5 TABLET ORAL at 21:42

## 2018-05-18 RX ADMIN — OXYCODONE HYDROCHLORIDE 5 MG: 5 TABLET ORAL at 06:30

## 2018-05-18 RX ADMIN — GABAPENTIN 300 MG: 300 CAPSULE ORAL at 08:33

## 2018-05-18 RX ADMIN — OXYCODONE HYDROCHLORIDE 5 MG: 5 TABLET ORAL at 16:59

## 2018-05-18 RX ADMIN — FLUOXETINE HYDROCHLORIDE 60 MG: 20 CAPSULE ORAL at 08:33

## 2018-05-18 RX ADMIN — QUETIAPINE FUMARATE 100 MG: 100 TABLET ORAL at 14:38

## 2018-05-18 RX ADMIN — ENOXAPARIN SODIUM 40 MG: 100 INJECTION SUBCUTANEOUS at 08:32

## 2018-05-18 RX ADMIN — BENZTROPINE MESYLATE 1 MG: 1 TABLET ORAL at 21:41

## 2018-05-18 RX ADMIN — QUETIAPINE FUMARATE 100 MG: 100 TABLET ORAL at 08:33

## 2018-05-18 RX ADMIN — PIPERACILLIN SODIUM AND TAZOBACTAM SODIUM 3.38 G: 3; .375 INJECTION, POWDER, FOR SOLUTION INTRAVENOUS at 14:44

## 2018-05-18 RX ADMIN — QUETIAPINE FUMARATE 200 MG: 100 TABLET ORAL at 21:42

## 2018-05-18 RX ADMIN — PIPERACILLIN SODIUM AND TAZOBACTAM SODIUM 3.38 G: 3; .375 INJECTION, POWDER, FOR SOLUTION INTRAVENOUS at 21:41

## 2018-05-18 RX ADMIN — PIPERACILLIN SODIUM AND TAZOBACTAM SODIUM 3.38 G: 3; .375 INJECTION, POWDER, FOR SOLUTION INTRAVENOUS at 06:14

## 2018-05-18 RX ADMIN — DAPTOMYCIN 620 MG: 500 INJECTION, POWDER, LYOPHILIZED, FOR SOLUTION INTRAVENOUS at 11:24

## 2018-05-18 RX ADMIN — GABAPENTIN 300 MG: 300 CAPSULE ORAL at 21:41

## 2018-05-18 ASSESSMENT — ENCOUNTER SYMPTOMS
SEIZURES: 0
DEPRESSION: 0
HEARTBURN: 0
NERVOUS/ANXIOUS: 1
TREMORS: 1
PALPITATIONS: 0
WHEEZING: 0
COUGH: 0
BLOOD IN STOOL: 0
FEVER: 0
DIZZINESS: 0
ABDOMINAL PAIN: 0
MYALGIAS: 0
NAUSEA: 0
VOMITING: 0
CHILLS: 0
HEMOPTYSIS: 0
DEPRESSION: 1
SHORTNESS OF BREATH: 0
SENSORY CHANGE: 1
WEAKNESS: 0
HEADACHES: 0

## 2018-05-18 ASSESSMENT — PAIN SCALES - GENERAL
PAINLEVEL_OUTOF10: 7
PAINLEVEL_OUTOF10: 0
PAINLEVEL_OUTOF10: 1
PAINLEVEL_OUTOF10: 7

## 2018-05-18 ASSESSMENT — LIFESTYLE VARIABLES: SUBSTANCE_ABUSE: 0

## 2018-05-18 NOTE — PROGRESS NOTES
"Patient seen and examined    Blood pressure 115/74, pulse 60, temperature 36.3 °C (97.3 °F), resp. rate 18, height 1.93 m (6' 4\"), weight 103.1 kg (227 lb 4.7 oz), SpO2 93 %.    Recent Labs      05/15/18   0817   WBC  4.6*   RBC  3.90*   HEMOGLOBIN  11.8*   HEMATOCRIT  35.2*   MCV  90.3   MCH  30.3   MCHC  33.5*   RDW  44.7   PLATELETCT  144*   MPV  9.1       No acute distress  Dressing clean dry and intact  Neurovascularly intact    POD# 6,4    Plan:  DVT Prophylaxis- TEDS/SCDs  Weight Bearing Status-WBAT through heel  PT/OT  Antibiotics: per ID  Case Coordination          "

## 2018-05-18 NOTE — PROGRESS NOTES
Consents confirmed, vessel patency confirmed with ultrasound. Risks and benefits of procedure explained to patent/family and education regarding central line associated bloodstream infection provided. Questions and concerns addressed.     PICC placed in LUE per MD order with ultrasound guidance. 4Fr, single lumen PICC placed in the basillic vein after 1 attempt(s). 1% lidocaine injected intradermally, 21 beata micro introducer needle and modified Seldinger technique used. 52 cm catheter inserted with good blood return. Each lumen flushed without resistance with 10ml 0.9% normal saline. PICC line secured Biopatch and Tegaderm applied.     CXR ordered, PICC placement confirmation will be provided by the Radiologist via interpretation of the follow up chest x-ray to confirm tip location, 3CG confirmation was not obtained. Pt tolerated procedure well. Pt condition relayed to unit RN or ordering physician via this post procedure note in the EMR.    [employer] Power PICC Ref # [id number], Lot # [id number]

## 2018-05-18 NOTE — PROGRESS NOTES
Patient refusing to wear float boots.  Educated patient about pressure injuries and why the float boots would be beneficial.  Patient continued to refuse.

## 2018-05-18 NOTE — DISCHARGE PLANNING
Anticipated Discharge Disposition: [facility]      Action: LSW called [facility] ([phone]) and asked to be transferred to [place] or [facility]. LSW was transferred to [place] and Telma reports that referrals and contracts w/ [facility] go through Graeme (ext. [phone]). LSW left VM for Graeme to return call re: pt who is homeless and will need 6 weeks of IV Abx.     Update 1243: LSW emailed RN CM Supervisor, Emma Juan, for approval of request to send pt to [facility].       Barriers to discharge: Pt is homeless and will need 6 weeks of IV ABX.      Plan: LSW will complete application for [facility] and send to Emma Juan to begin referral process.

## 2018-05-18 NOTE — PROGRESS NOTES
Waffle cushion placed on patients bed and float boots placed on patients feet. Educated patient about the danger of attempting to ambulate in the float boots. Instructed patient not to get out of bed with the float boots on without nursing staff being present. Patient verbalized understanding but needs further reinforcement.

## 2018-05-18 NOTE — PROGRESS NOTES
"[facility] Hospitalist Progress Note    Date of Service: 5/18/2018    Chief Complaint  52 y.o. male admitted 5/11/2018 with left foot wound. History remarkable for s/p left foot great toe amputation, admitted for nonhealing, infected foot wound with osteomyelitis.       Interval Problem Update  5/12: patient was seen post operatively, he has no complaints. We discussed tight blood sugar control. He is s/p left foot wound irrigation and debridement. Surgery recommends wound vac and revision surgery on Monday. dc'd metformin, will do insulin only     5/13: patient is stable. Discussed pending plan. Id recommends switching to zosyn, vanc. Patient has no complaints    5/14: underwent repeat irrigation and debridement and secondary closure of surgical wound. Refer to surgery op note. Continuing abx's with vanc/zosyn. ID recommended contact isolation. He is less anxious appearing, started on librium yesterday.     5/15: no acute events overnight. Vitals stable. Blood glucose well controlled, not req any additional insulin. OR yest for repeat I/D. No acute complaints.    5/16: patient c/o anxiety, vitals stable, afebrile. White count stable. On abx based on cx-switched to daptomycin. per ID will need picc and 6weeks of iv antibiotics. Patient declining picc line and long term iv abx. Not able to state clearly the risks and benefits, remains quiet, visibly anxious when asked again.Will obtain Psych consult to determine capacity, treat underlying severe anxiety    5/17: no acute event overnight. Vitals stable. Psych consult appreciated- appears to have capacity but this is tenuous, will need to be reevaluated if he tries to leave ama, patient w/ intellectual disability, understands at \"5th grade level\". Still needs further wound care inpatient. Eventually picc, then 6weeks of iv abx total    5/18: no acute events. Pt agreeable to picc placement and iv antibiotics.will order picc line. Working with sw to arrange dc to snf for " outpatient iv antibiotics    Consultants/Specialty  Surgery, ID, wound care    Disposition  Continuing daptomycin/zosyn, wound         Review of Systems   Constitutional: Negative for chills and fever.   HENT: Negative for hearing loss.    Respiratory: Negative for cough, hemoptysis and wheezing.    Cardiovascular: Negative for chest pain, palpitations and leg swelling.   Gastrointestinal: Negative for abdominal pain, blood in stool, heartburn, melena and nausea.   Genitourinary: Negative for dysuria.   Musculoskeletal: Negative for myalgias.   Skin: Negative for rash.   Neurological: Positive for tremors. Negative for dizziness, seizures, weakness and headaches.   Psychiatric/Behavioral: Negative for depression and substance abuse. The patient is nervous/anxious.       Physical Exam  Laboratory/Imaging   Hemodynamics  Temp (24hrs), Av.7 °C (98.1 °F), Min:36.3 °C (97.3 °F), Max:37.2 °C (98.9 °F)   Temperature: 36.7 °C (98.1 °F)  Pulse  Av.8  Min: 52  Max: 76   Blood Pressure: 125/82      Respiratory      Respiration: 12, Pulse Oximetry: 94 %        RUL Breath Sounds: Diminished, RML Breath Sounds: Diminished, RLL Breath Sounds: Diminished, LORENA Breath Sounds: Diminished, LLL Breath Sounds: Diminished    Fluids    Intake/Output Summary (Last 24 hours) at 18 1049  Last data filed at 18 0600   Gross per 24 hour   Intake              400 ml   Output                0 ml   Net              400 ml       Nutrition  Orders Placed This Encounter   Procedures   • DIET ORDER     Standing Status:   Standing     Number of Occurrences:   1     Order Specific Question:   Diet:     Answer:   Regular [1]     Physical Exam   Constitutional: He is oriented to person, place, and time. He appears well-developed and well-nourished. No distress.   HENT:   Head: Normocephalic and atraumatic.   Eyes: Right eye exhibits no discharge. Left eye exhibits no discharge. No scleral icterus.   Neck: Neck supple.   Cardiovascular:  Normal rate, regular rhythm, normal heart sounds and intact distal pulses.    Pulmonary/Chest: Effort normal and breath sounds normal. No respiratory distress. He has no wheezes.   Abdominal: Soft. Bowel sounds are normal. He exhibits no distension. There is no tenderness.   Musculoskeletal: Normal range of motion. He exhibits no edema.   Neurological: He is alert and oriented to person, place, and time.   Skin: Skin is warm and dry. He is not diaphoretic.   Left foot post debridement wrapped in dressing, right foot scabbed wounds   Psychiatric: His affect is blunt. He is slowed. He is not agitated and not aggressive.           Recent Labs      05/16/18   0345  05/17/18   0305   SODIUM  139  138   POTASSIUM  3.8  4.0   CHLORIDE  107  105   CO2  25  28   GLUCOSE  96  106*   BUN  12  15   CREATININE  0.88  0.93   CALCIUM  8.6  9.0                      Assessment/Plan     Diabetic ulcer of left foot associated with type 2 diabetes mellitus, with muscle involvement without evidence of necrosis (HCC)- (present on admission)   Assessment & Plan    Hx of diabetic nonhealing ulcer, hx of previous Left first toe amputation, presented w. Draining, nonhealing infected foot wound. This admin: MRI left foot showed: Large open ulcer extending from the great toe amputation site into the first metatarsal head with osteomyelitis of the first metatarsal head.    s/p Irrigation and debridement of left foot wound 5/12  s/p repeat debridement 5/14 with secondary closure of surgical wound    Pertinent cultures to date:   5/12/18: Tissue, left foot = MRSA (vanco JUNIOR=2) and CoNS     Many previous cultures   4/1/18 left great toe - Diphtheroids, streptococcus   3/31/18 left great toe - streptococcus, Morganella morganii  3/31/18 right great toe - Streptococcus     - on daptomycin/zosyn. Will need picc and 6weeks of iv antibiotics  - pt is homeless, hx of psychiatric illness,  difficult discharge!    Patient declining picc line and long term  "iv abx.   Not able to state clearly the risks and benefits, remains quiet, visibly anxious when asked again  Will obtain Psych consult to determine capacity, treat underlying severe anxiety.  f/u psych, ID and ortho recs        Acute hematogenous osteomyelitis of left foot (HCC)- (present on admission)   Assessment & Plan    See above- diabetic ulcer        Diabetic peripheral neuropathy (HCC)- (present on admission)   Assessment & Plan    Pain controlled  Increase neurontin 300 tid        Dehiscence of surgical wound- (present on admission)   Assessment & Plan    Wound care  On Antibiotics as above        Psychiatric disorder- (present on admission)   Assessment & Plan    Cont congentin, seroquel, prozac  Added librium tid, valproate for mood  Patient declining picc line and long term iv abx.   Not able to state clearly the risks and benefits, remains quiet, visibly anxious when asked again  Psych consult appreciated- see note for full details but briefly ( appears to have capacity but this is tenuous, will need to be reevaluated if he tries to leave ama, patient w/ intellectual disability, understands at \"5th grade level\")        Type 2 diabetes mellitus with neurologic complication, without long-term current use of insulin ([facility])- (present on admission)   Assessment & Plan    Controlled  Dc metformin  Do ssi, added lantus  Diabetic diet  Maintain tight blood sugar control            Quality-Core Measures   Reviewed items::  Labs reviewed, Radiology images reviewed and Medications reviewed  Olsen catheter::  No Olsen  DVT prophylaxis pharmacological::  Enoxaparin (Lovenox)        "

## 2018-05-18 NOTE — CARE PLAN
Problem: Safety  Goal: Will remain free from falls    Intervention: Assess risk factors for falls  Environment is clutter free. Personal possession and bedside table near patient. Bed locked and in the lowest position. Patient educated to call when needing assistance. Call light within reach. Bed alarm on. Non-skids socks in place. Hourly rounding in place.       Problem: Skin Integrity  Goal: Risk for impaired skin integrity will decrease    Intervention: Assess risk factors for impaired skin integrity and/or pressure ulcers  Assessing and monitoring patient skin integrity. Dressing in place to left foot, dressing clean, dry, and intact. Patient noted to turn self from side to side, patient educated on performing Q2 hour turns, patient refused. Patient educated on the importance of wearing heel float boats, patient refused. Waffle cushion overlay in place. Pillows in use for support.

## 2018-05-18 NOTE — PROGRESS NOTES
"Report received from day RN. Assumed patient care at 1900. Patient is A&Ox3,disoriented to time. Patient appears calm and in no acute distress. Labs and orders reviewed. Assessment completed. Patient states pain 7 out of 10, patient medicated, refer to MAR. Patient provided with scheduled medication, refer to MAR. Waffle cushion in place. Patient refusing to wear Heel Float boots and performing Q2 hour turns, patient educated, patient continuous to refuse.  Plan of care discussed with patient; questions have been answered at this time. Patient needs have been met at this time. Patient educated to call when needing assistance. Call light within reach. Bed alarm on. Bed locked and in the lowest position. Hourly rounding in place. BP (!) 97/60   Pulse 68   Temp 36.8 °C (98.2 °F)   Resp 18   Ht 1.93 m (6' 4\")   Wt 103.1 kg (227 lb 4.7 oz)   SpO2 92%   BMI 27.67 kg/m² .   "

## 2018-05-18 NOTE — DISCHARGE PLANNING
Anticipated Discharge Disposition: SNF    Action: LSW spoke w/ Dr. Maria regarding pt's dc plan as Wound Team's note indicated they would like to see the pt next week. Dr. Maria spoke w/ the Wound Team and it was decided that the pt would benefit greatly going to SNF for IV Abx and wound care. Dr. Maria will put in PT/OT orders to see if there is any additional SNF needs. MDs are concerned that the pt will not follow up if discharged w/ outpt follow-up services as pt has a hx of non-compliance. [facility] plan cancelled and LSW will move forward w/ SNF placement. Wound team will continue to follow the pt.     Barriers to discharge: Pt has Medicaid FFS and will need to wait for a Medicaid Bed at SNF to open.    Plan: Pt to dc to accepting SNF for wound care, IV Abx, and possible PT/OT.

## 2018-05-19 LAB
GLUCOSE BLD-MCNC: 104 MG/DL (ref 65–99)
GLUCOSE BLD-MCNC: 107 MG/DL (ref 65–99)

## 2018-05-19 PROCEDURE — 700105 HCHG RX REV CODE 258: Performed by: INTERNAL MEDICINE

## 2018-05-19 PROCEDURE — 700111 HCHG RX REV CODE 636 W/ 250 OVERRIDE (IP): Performed by: INTERNAL MEDICINE

## 2018-05-19 PROCEDURE — 99232 SBSQ HOSP IP/OBS MODERATE 35: CPT | Performed by: HOSPITALIST

## 2018-05-19 PROCEDURE — A9270 NON-COVERED ITEM OR SERVICE: HCPCS | Performed by: HOSPITALIST

## 2018-05-19 PROCEDURE — 700102 HCHG RX REV CODE 250 W/ 637 OVERRIDE(OP): Performed by: HOSPITALIST

## 2018-05-19 PROCEDURE — 82962 GLUCOSE BLOOD TEST: CPT

## 2018-05-19 PROCEDURE — 770021 HCHG ROOM/CARE - ISO PRIVATE

## 2018-05-19 RX ADMIN — LORAZEPAM 1 MG: 1 TABLET ORAL at 16:12

## 2018-05-19 RX ADMIN — OXYCODONE HYDROCHLORIDE 5 MG: 5 TABLET ORAL at 07:52

## 2018-05-19 RX ADMIN — VALPROIC ACID 250 MG: 250 CAPSULE, LIQUID FILLED ORAL at 07:54

## 2018-05-19 RX ADMIN — OXYCODONE HYDROCHLORIDE 5 MG: 5 TABLET ORAL at 16:24

## 2018-05-19 RX ADMIN — INSULIN HUMAN 2 UNITS: 100 INJECTION, SOLUTION PARENTERAL at 21:40

## 2018-05-19 RX ADMIN — BENZTROPINE MESYLATE 1 MG: 1 TABLET ORAL at 21:36

## 2018-05-19 RX ADMIN — VALPROIC ACID 250 MG: 250 CAPSULE, LIQUID FILLED ORAL at 21:36

## 2018-05-19 RX ADMIN — FLUOXETINE HYDROCHLORIDE 60 MG: 20 CAPSULE ORAL at 07:52

## 2018-05-19 RX ADMIN — METFORMIN HYDROCHLORIDE 500 MG: 500 TABLET, FILM COATED ORAL at 15:44

## 2018-05-19 RX ADMIN — PIPERACILLIN SODIUM AND TAZOBACTAM SODIUM 3.38 G: 3; .375 INJECTION, POWDER, FOR SOLUTION INTRAVENOUS at 12:48

## 2018-05-19 RX ADMIN — LORAZEPAM 1 MG: 1 TABLET ORAL at 07:52

## 2018-05-19 RX ADMIN — PIPERACILLIN SODIUM AND TAZOBACTAM SODIUM 3.38 G: 3; .375 INJECTION, POWDER, FOR SOLUTION INTRAVENOUS at 04:28

## 2018-05-19 RX ADMIN — OXYCODONE HYDROCHLORIDE 5 MG: 5 TABLET ORAL at 22:53

## 2018-05-19 RX ADMIN — GABAPENTIN 300 MG: 300 CAPSULE ORAL at 21:36

## 2018-05-19 RX ADMIN — QUETIAPINE FUMARATE 200 MG: 100 TABLET ORAL at 21:36

## 2018-05-19 RX ADMIN — GABAPENTIN 300 MG: 300 CAPSULE ORAL at 15:44

## 2018-05-19 RX ADMIN — ACETAMINOPHEN 500 MG: 500 TABLET ORAL at 21:35

## 2018-05-19 RX ADMIN — BENZTROPINE MESYLATE 1 MG: 1 TABLET ORAL at 07:52

## 2018-05-19 RX ADMIN — PIPERACILLIN SODIUM AND TAZOBACTAM SODIUM 3.38 G: 3; .375 INJECTION, POWDER, FOR SOLUTION INTRAVENOUS at 21:34

## 2018-05-19 RX ADMIN — QUETIAPINE FUMARATE 100 MG: 100 TABLET ORAL at 11:56

## 2018-05-19 RX ADMIN — QUETIAPINE FUMARATE 100 MG: 100 TABLET ORAL at 07:52

## 2018-05-19 RX ADMIN — GABAPENTIN 300 MG: 300 CAPSULE ORAL at 07:53

## 2018-05-19 RX ADMIN — DAPTOMYCIN 620 MG: 500 INJECTION, POWDER, LYOPHILIZED, FOR SOLUTION INTRAVENOUS at 11:53

## 2018-05-19 ASSESSMENT — ENCOUNTER SYMPTOMS
DEPRESSION: 1
VOMITING: 0
ABDOMINAL PAIN: 0
MYALGIAS: 0
CHILLS: 0
COUGH: 0
SHORTNESS OF BREATH: 0
SENSORY CHANGE: 1
NAUSEA: 0
NERVOUS/ANXIOUS: 1
FEVER: 0

## 2018-05-19 ASSESSMENT — PATIENT HEALTH QUESTIONNAIRE - PHQ9
2. FEELING DOWN, DEPRESSED, IRRITABLE, OR HOPELESS: NOT AT ALL
SUM OF ALL RESPONSES TO PHQ9 QUESTIONS 1 AND 2: 0
1. LITTLE INTEREST OR PLEASURE IN DOING THINGS: NOT AT ALL

## 2018-05-19 ASSESSMENT — PAIN SCALES - GENERAL
PAINLEVEL_OUTOF10: 2
PAINLEVEL_OUTOF10: 4
PAINLEVEL_OUTOF10: 6
PAINLEVEL_OUTOF10: 2
PAINLEVEL_OUTOF10: 0
PAINLEVEL_OUTOF10: 3

## 2018-05-19 NOTE — CARE PLAN
Problem: Safety  Goal: Will remain free from falls    Intervention: Assess risk factors for falls  Environment is clutter free. Personal possession and bedside table near patient. Bed locked and in the lowest position. Patient educated to call when needing assistance. Call light within reach. Bed alarm on. Hourly rounding in place.       Problem: Infection  Goal: Will remain free from infection    Intervention: Assess signs and symptoms of infection  Assessing and monitoring patient vital signs and lab values. Patient receiving Zosyn ABX, refer to MAR. Patient on Contact precautions. Washing hands before and after patient contact.

## 2018-05-19 NOTE — PROGRESS NOTES
"Infectious Disease Progress Note    Author: Juliane Vaughn M.D. Date & Time of service: 2018  9:06 AM    Chief Complaint:  Left foot wound dehiscence with osteomyelitis f/u      Interval History:  52 y.o. WM admitted 2018 due to left foot wound dehiscence at amp site   AF WBC 5 denies any pain-ate all of breakfast   AF plan for repeat surgery today. No new complaints  5/15 AF WBC 4.6 cxs now polymicrobial somnolent   AF alert and anxious today, jittery states \"I don't want them to cut my leg off\"   AF less anxious today-apparently refused PICC. Seen with LPS   AF no CBC, does not believe he has recurrent infection bc he marcum not have pain, anxious to per RN  Labs Reviewed, Medications Reviewed, Radiology Reviewed and Wound Reviewed.    Review of Systems:  Review of Systems   Constitutional: Negative for chills and fever.   Respiratory: Negative for cough and shortness of breath.    Cardiovascular: Negative for chest pain.   Gastrointestinal: Negative for abdominal pain, nausea and vomiting.   Musculoskeletal: Negative for joint pain and myalgias.   Neurological: Positive for sensory change.        Neuropathy   Psychiatric/Behavioral: Positive for depression. The patient is nervous/anxious.    All other systems reviewed and are negative.      Hemodynamics:  Temp (24hrs), Av.4 °C (97.6 °F), Min:36.1 °C (97 °F), Max:36.7 °C (98 °F)  Temperature: 36.6 °C (97.9 °F)  Pulse  Av.3  Min: 52  Max: 112  Blood Pressure: 112/76       Physical Exam:  Physical Exam   Constitutional: He appears well-developed.   Disheveled    Restless   HENT:   Head: Normocephalic and atraumatic.   Mouth/Throat: No oropharyngeal exudate.   Poor dentition   Eyes: EOM are normal. Pupils are equal, round, and reactive to light.   Neck: Normal range of motion. Neck supple.   Cardiovascular: Normal rate and normal heart sounds.    Pulmonary/Chest: He has no wheezes. He has no rales.   Abdominal: Bowel sounds are " normal. There is no tenderness.   Musculoskeletal:   Amp site dressed with sutures, minimal drainage  Forefoot decreased edema  LUE PICC   Neurological: He is alert.     Flat affect   Skin: No rash noted. He is not diaphoretic.   DTIs to lat and dorsum left foot   Nursing note and vitals reviewed.      Meds:    Current Facility-Administered Medications:   •  PICC Line Insertion has been implemented **AND** May use Lidocaine 1% not to exceed 3 mls for local at insertion site **AND** NOTIFY MD **AND** Tip to dwell in the superior vena cava **AND** Do not use PICC Line until placement verified by Chest X Ray **AND** [CANCELED] DX-CHEST-FOR PICC LINE Perform procedure in: Patient's Room **AND** If radiologist reading of chest X-ray states any of the following the PICC should be used **AND** Further evaluation of the PICC placement can be retrieved from X-Ray and Imaging **AND** Blood draws through PICC line; draws by RN only **AND** FLUSHING GUIDELINES WHEN IN USE **AND** normal saline PF **AND** FLUSHING GUIDELINES WHEN NOT IN USE **AND** DRESSING MAINTENANCE **AND** Change needleless pressure ports and IV tubing every 72 hours per hospital policy **AND** TUBING **AND** If there is an MD order to remove the PICC line, any RN may remove the PICC line **AND** [] PATIENT EDUCATION MATERIALS **AND** NURSING COMMUNICATION  •  gabapentin  •  LORazepam  •  oxyCODONE immediate-release  •  ketorolac  •  acetaminophen  •  valproic acid  •  DAPTOmycin  •  [COMPLETED] piperacillin-tazobactam **AND** piperacillin-tazobactam  •  enoxaparin (LOVENOX) injection  •  senna-docusate **AND** polyethylene glycol/lytes **AND** magnesium hydroxide **AND** bisacodyl  •  Respiratory Care per Protocol  •  insulin regular **AND** Accu-Chek ACHS **AND** NOTIFY MD and PharmD **AND** glucose 4 g **AND** dextrose 50%  •  benztropine  •  FLUoxetine  •  QUEtiapine **AND** QUEtiapine    Labs:  No results for input(s): WBC, RBC, HEMOGLOBIN,  HEMATOCRIT, MCV, MCH, RDW, PLATELETCT, MPV, NEUTSPOLYS, LYMPHOCYTES, MONOCYTES, EOSINOPHILS, BASOPHILS, RBCMORPHOLO in the last 72 hours.  Recent Labs      05/17/18   0305   SODIUM  138   POTASSIUM  4.0   CHLORIDE  105   CO2  28   GLUCOSE  106*   BUN  15   CPKTOTAL  19     Recent Labs      05/17/18   0305   ALBUMIN  3.4   CREATININE  0.93       Imaging:  Dx-foot-2- Left    Result Date: 4/20/2018 4/20/2018 10:19 AM HISTORY/REASON FOR EXAM:  Pain/Deformity Following Trauma Weeping great toe TECHNIQUE/EXAM DESCRIPTION AND NUMBER OF VIEWS: 2 nonweightbearing views of the LEFT foot. COMPARISON:  3/31/2018 FINDINGS: Patient is status post amputation of the first digit at the level of the proximal phalanx. There is minimal osseous irregularity at the level of the amputation. There is faint adjacent calcification which may be dystrophic.  There are mild degenerative changes of the first tarsometatarsal joint. No acute fracture or dislocation is seen. There is soft tissue swelling about the medial forefoot and remainder of the first digit.     Post surgical changes status post amputation at the level of the proximal phalanx of the first digit. Linear amorphous calcification adjacent to the amputation may be dystrophic. There is minimal osseous irregularity at the level of the amputation where it is difficult to exclude osteomyelitis. Soft tissue swelling of the forefoot and remainder of the first digit.     Mr-foot-with & W/o Left    Result Date: 5/12/2018 5/12/2018 10:44 AM HISTORY/REASON FOR EXAM:  Open wound. Open wound,?surgical site at great toe amp site, please eval for OM and/ or abscess. TECHNIQUE/EXAM DESCRIPTION: MRI of the LEFT foot with and without contrast. The study was performed on a Actacell 1.5 Steph MRI scanner. T1 axial, T1 sagittal, T1 coronal, STIR sagittal, T2 fast spin-echo fat-suppressed coronal, sagittal inversion recovery, and T1 postcontrast coronal images were obtained. 20 mL Omniscan contrast  was administered intravenously. COMPARISON: Foot radiograph 4/20/2018. FINDINGS: Diffuse soft tissue swelling about the dorsum of foot. Increased signal within the intrinsic foot muscles, likely related to neuropathic change. There is an open ulcer extending from the great toe amputation site into the first metatarsal head with marrow replacement. No walled fluid collection seen.     Large open ulcer extending from the great toe amputation site into the first metatarsal head with osteomyelitis of the first metatarsal head. No walled fluid collection seen.      Micro:  Results     Procedure Component Value Units Date/Time    GRAM STAIN [[id number]]  (Abnormal) Collected:  05/12/18 1113    Order Status:  Completed Specimen:  Tissue Updated:  05/15/18 1559     Significant Indicator . (POS)     Source TISS     Site Left Foot     Gram Stain Result Few WBCs.  Moderate Gram positive cocci.  Rare Gram positive rods.   (A)    Narrative:       CALL  [place]  [facility] [place]. [phone],  CALLED  [facility] [place] tel. [phone] 05/14/2018, 10:22, RB PERF. RESULTS CALLED  TO:Fabiola Solares Rn    CULTURE TISSUE W/ GRM STAIN [[id number]]  (Abnormal)  (Susceptibility) Collected:  05/12/18 1113    Order Status:  Completed Specimen:  Tissue Updated:  05/15/18 1559     Significant Indicator POS (POS)     Source TISS     Site Left Foot     Tissue Culture -- (A)     Gram Stain Result Few WBCs.  Moderate Gram positive cocci.  Rare Gram positive rods.   (A)     Tissue Culture Methicillin Resistant Staphylococcus aureus  Moderate growth   (A)      Staphylococcus haemolyticus  Light growth   (A)      Pasteurella multocida  Light growth   (A)    Narrative:       CALL  Sandoval  [facility] [place] tel. [phone],  CALLED  [facility] [place] tel. [phone] 05/14/2018, 10:22, RB PERF. RESULTS CALLED  TO:Fabiola Solares Rn    Culture & Susceptibility     METHICILLIN RESISTANT STAPHYLOCOCCUS AUREUS     Antibiotic Sensitivity Microscan Unit Status    Ampicillin/sulbactam Resistant >16/8 mcg/mL Final     Method: SENSITIVITY, JUINOR    Clindamycin Sensitive <=0.5 mcg/mL Final    Method: SENSITIVITY, JUNIOR    Daptomycin Sensitive 1 mcg/mL Final    Method: SENSITIVITY, JUNIOR    Erythromycin Resistant >4 mcg/mL Final    Method: SENSITIVITY, JUNIOR    Moxifloxacin Sensitive 2 mcg/mL Final    Method: SENSITIVITY, JUNIOR    Oxacillin Resistant >2 mcg/mL Final    Method: SENSITIVITY, JUNIOR    Penicillin Resistant >8 mcg/mL Final    Method: SENSITIVITY, JUNIOR    Tetracycline Sensitive <=4 mcg/mL Final    Method: SENSITIVITY, JUNIOR    Trimeth/Sulfa Sensitive <=0.5/9.5 mcg/mL Final    Method: SENSITIVITY, JUNIOR    Vancomycin Sensitive 2 mcg/mL Final    Method: SENSITIVITY, JUNIOR              STAPHYLOCOCCUS HAEMOLYTICUS     Antibiotic Sensitivity Microscan Unit Status    Ampicillin/sulbactam Resistant 16/8 mcg/mL Final    Method: SENSITIVITY, JUNIOR    Clindamycin Resistant >4 mcg/mL Final    Method: SENSITIVITY, JUNIOR    Daptomycin Sensitive <=0.5 mcg/mL Final    Method: SENSITIVITY, JUNIOR    Erythromycin Resistant >4 mcg/mL Final    Method: SENSITIVITY, JUNIOR    Moxifloxacin Intermediate 4 mcg/mL Final    Method: SENSITIVITY, JUNIOR    Oxacillin Resistant >2 mcg/mL Final    Method: SENSITIVITY, JUNIOR    Penicillin Resistant >8 mcg/mL Final    Method: SENSITIVITY, JUNIOR    Tetracycline Sensitive <=4 mcg/mL Final    Method: SENSITIVITY, JUNIOR    Trimeth/Sulfa Resistant >2/38 mcg/mL Final    Method: SENSITIVITY, JUNIOR    Vancomycin Sensitive 2 mcg/mL Final    Method: SENSITIVITY, JUNIOR                       ANAEROBIC CULTURE [[id number]]  (Abnormal) Collected:  05/12/18 1113    Order Status:  Completed Specimen:  Tissue Updated:  05/15/18 1559     Significant Indicator POS (POS)     Source TISS     Site Left Foot     Anaerobic Culture, Culture Res Growth noted after further incubation, see below for  organism identification.   (A)      Peptostreptococcus art  Moderate growth   (A)    Narrative:       CALL  Sandoval  [facility] tel. [phone],  CALLED  [facility] tel. [phone]  05/14/2018, 10:22, RB PERF. RESULTS CALLED  TO:Fabiola [id number] Rn          Assessment:  Active Hospital Problems    Diagnosis   • Diabetic ulcer of left foot associated with type 2 diabetes mellitus, with muscle involvement without evidence of necrosis (HCC) [E11.621, L97.525]   • Dehiscence of surgical wound [T81.31XA]   • Diabetic peripheral neuropathy (HCC) [E11.42]   • Psychiatric disorder [F99]   • Type 2 diabetes mellitus with neurologic complication, without long-term current use of insulin (HCC) [E11.49]       Plan:  Left foot osteomyelitis  Afebrile  No leukocytosis   S/p left great amp prox phalanx on 4/1-Wound cx - group G and C strep, morganella , diphtheroids  S/p left great toe amp down to MTP joint on 4/23. Clean margins obtained per OP note- OR cx (proximal bone) - mixed skin christine  Failed outpatient therapy-clinical osteomyelitis as probed to bone in clinic. MRI also+ostemyelitis  s/p another debridement 5/12-MRSA, staph haemolyticus(MRSE), peptostreptococcus, and Pasteurella  s/p I and D 5/14  Continue Zosyn and dapto as vanco JUNIOR is 2  Monitor CPK - 17  Endpoint clinical-anticipate 6 weeks IV if no TMA/ BKA    DM2  Last HgA1c 5.8  Keep BS under 150 to help control current infection    Psychiatric disorder  Suspect contributing to prior noncompliance  Poor insight-denies infection at times    Prognosis for limb salvage poor  Will need placement    DW IM Dr Maria/LPS

## 2018-05-19 NOTE — PROGRESS NOTES
Orthopaedics  Patient seen and examined  No issues  No change in plan- chart, staff queried- mauricio Briones

## 2018-05-19 NOTE — PROGRESS NOTES
"LIMB PRESERVATION SERVICE NOTE:    Subjective:      Reason for Consultation: S/P I and D Left Great Toe 5/12/18 Dr Baez    History of Present Illness:    Patient is well known to [facility] and outpatient wound care services.     Patient is a 52 y.o. male with a past medical history that includes borderline diabetes, anxiety, HTN, Hep C and is admitted to [facility] via [facility] for his S/P I and D Left Great Toe 5/12/18 Dr Baez.  S/P Left Great Toe Amp by Dr Easton 4/23/18. The s/p left great toe amputation site was from 4/1/18 by Dr. Baez. Pt did not follow up with the [facility] clinic for dressing care and he did not receive oral antibiotics. He also did not follow up with his PCP at [facility]. Pt has Hx of noncompliance and psychiatric disorder which is likely contributory. Pt is not wearing the previously provided offloading shoes.  A1c this admission is 5.8.    Patient denies fevers chills, nausea, vomiting.        Pain:        Patient resting comfortably    Vitals  /76   Pulse 70   Temp 36.6 °C (97.9 °F)   Resp 18   Ht 1.93 m (6' 4\")   Wt 103.1 kg (227 lb 4.7 oz)   SpO2 95%   BMI 27.67 kg/m²       Objective:    Labs      Recent Labs      05/17/18   0305   SODIUM  138   POTASSIUM  4.0   CHLORIDE  105   CO2  28   GLUCOSE  106*   BUN  15   CPKTOTAL  19         Plan:       Treatment Plan and Recommendations:     1. Labs\Imaging:         Reviewed     2. Treatment:              Continue Wound Care by Nursing, LPS to Follow.     Nursing to change dressing Q 48 hours.                                                                            3.Collaboration:                                                 IV Antibiotics per ID, Zosyn and vanco     4. Orthotics/Prosthetics:                                       pt to get orthotics/prosthetics  as OP, pt to wear shoes that do not rub on Wound sites      Anticipated discharge plans (X):              SNF:                       Home Care:                       " Outpatient Wound Center: X                   Self Care:                        Other:                       TBD:     Patient requires skilled therapeutic intervention for debridement, product selection and application, education, wound bed preparation and assessment.

## 2018-05-19 NOTE — PROGRESS NOTES
[facility] Hospitalist Note      Attending: Dr Back    Resident: Dr Maria     Name Yeomans Louie Donith     1965   Age/Sex 52 y.o. male   MRN [id number]   Code Status full        Assessment/Plan  Diabetic ulcer of left foot associated with type 2 diabetes mellitus, with muscle involvement without evidence of necrosis (HCC)- (present on admission)   Assessment & Plan    Hx of diabetic nonhealing ulcer, hx of previous Left first toe amputation, presented w. Draining, nonhealing infected foot wound. This admin: MRI left foot showed: Large open ulcer extending from the great toe amputation site into the first metatarsal head with osteomyelitis of the first metatarsal head.    s/p Irrigation and debridement of left foot wound   s/p repeat debridement  with secondary closure of surgical wound    Pertinent cultures to date:   18: Tissue, left foot = MRSA (vanco JUNIOR=2) and CoNS     Many previous cultures   18 left great toe - Diphtheroids, streptococcus   3/31/18 left great toe - streptococcus, Morganella morganii  3/31/18 right great toe - Streptococcus     - on daptomycin/zosyn. Will need picc and 6weeks of iv antibiotics  - pt is homeless, hx of psychiatric illness,  difficult discharge!    Patient initially declining picc line and long term iv abx but after more thorough explanation appears to be willing with plan   Picc line placed   PT/OT eval placed, likely will need snf for iv antibiotics,wound care and likely PT pending eval  f/u psych, ID and ortho recs        Acute hematogenous osteomyelitis of left foot (HCC)- (present on admission)   Assessment & Plan    See above- diabetic ulcer        Diabetic peripheral neuropathy (HCC)- (present on admission)   Assessment & Plan    Pain controlled  Increase neurontin 300 tid        Dehiscence of surgical wound- (present on admission)   Assessment & Plan    Wound care  On Antibiotics as above        Psychiatric disorder-  (present on admission)   Assessment & Plan    Cont congentin, seroquel, prozac  Added librium tid, valproate for mood  Patient declining picc line and long term iv abx.   Not able to state clearly the risks and benefits, remains quiet, visibly anxious when asked again  Will obtain Psych consult to determine capacity, treat underlying severe anxiety        Type 2 diabetes mellitus with neurologic complication, without long-term current use of insulin (HCC)- (present on admission)   Assessment & Plan    Controlled, last A1c 5.8  Resume metformin  Do ssi, consider stopping lantus to simplify regimen  Diabetic diet  Maintain tight blood sugar control             Date of Service: 5/19/2018    Chief Complaint  52 y.o. year old male here with diabetic nonhealing ulcer, osteomyelitis.    Interval Problem Update  5/19: picc line placed yest. PT/OT eval placed, likely will need snf for iv antibiotics,wound care and likely PT pending erick. Vitals stable. No new complaints    Consultants/Specialty  ID  Orthopedics  Psychiatry  LPS    Disposition  To SNF   Physical Exam    Vitals:    05/18/18 1600 05/18/18 2000 05/19/18 0400 05/19/18 0845   BP: 154/93 102/72 125/86 112/76   Pulse: 66 65 (!) 112 70   Resp: 20 18 20 18   Temp: 36.7 °C (98 °F) 36.4 °C (97.5 °F) 36.1 °C (97 °F) 36.6 °C (97.9 °F)   SpO2: 97% 98% 96% 95%   Weight:       Height:         Physical Exam   Constitutional: He is oriented to person, place, and time. He appears well-developed and well-nourished. No distress.   HENT:   Head: Normocephalic and atraumatic.   Mouth/Throat: No oropharyngeal exudate.   Eyes: EOM are normal. Right eye exhibits no discharge. Left eye exhibits no discharge.   Neck: Normal range of motion. Neck supple.   Cardiovascular: Normal rate and regular rhythm.    No murmur heard.  Pulmonary/Chest: Breath sounds normal. No respiratory distress. He exhibits no tenderness.   Abdominal: Soft. Bowel sounds are normal. There is no tenderness.    Musculoskeletal: Normal range of motion. He exhibits deformity. He exhibits no edema or tenderness.   Left foot wrapped covered in dressing.    Neurological: He is alert and oriented to person, place, and time.   Skin: He is not diaphoretic.   Psychiatric: His mood appears anxious. His affect is not angry. His speech is not rapid and/or pressured and not delayed. He is slowed. He is not agitated and not aggressive.     Body mass index is 27.67 kg/m².   ROS   Laboratory/Imaging    See labs  Quality-Core Measures   Reviewed items::  Labs reviewed, Medications reviewed and Radiology images reviewed  Olsen catheter::  No Olsen  DVT prophylaxis pharmacological::  Enoxaparin (Lovenox)  Ulcer Prophylaxis::  Not indicated  Antibiotics:  Treating active infection/contamination beyond 24 hours perioperative coverage

## 2018-05-19 NOTE — PROGRESS NOTES
"Report received from day RN. Assumed patient care at 1900. Patient is A&Ox3,disoriented to time. Patient appears calm and in no acute distress. Labs and orders reviewed. Assessment completed. PICC line to Left  Basilic patent and + for Blood return. Patient provided with scheduled medication, refer to MAR. Waffle cushion in place. Patient refusing to wear Heel Float boots patient educated, patient continuous to refuse. Plan of care discussed with patient; questions have been answered at this time. Patient needs have been met at this time. Patient educated to call when needing assistance. Call light within reach. Bed alarm on. Bed locked and in the lowest position. Hourly rounding in place. /72   Pulse 65   Temp 36.4 °C (97.5 °F)   Resp 18   Ht 1.93 m (6' 4\")   Wt 103.1 kg (227 lb 4.7 oz)   SpO2 98%   BMI 27.67 kg/m² .   "

## 2018-05-20 LAB
GLUCOSE BLD-MCNC: 104 MG/DL (ref 65–99)
GLUCOSE BLD-MCNC: 114 MG/DL (ref 65–99)
GLUCOSE BLD-MCNC: 124 MG/DL (ref 65–99)
GLUCOSE BLD-MCNC: 171 MG/DL (ref 65–99)
GLUCOSE BLD-MCNC: 98 MG/DL (ref 65–99)

## 2018-05-20 PROCEDURE — 700102 HCHG RX REV CODE 250 W/ 637 OVERRIDE(OP): Performed by: HOSPITALIST

## 2018-05-20 PROCEDURE — 700111 HCHG RX REV CODE 636 W/ 250 OVERRIDE (IP): Performed by: INTERNAL MEDICINE

## 2018-05-20 PROCEDURE — 99232 SBSQ HOSP IP/OBS MODERATE 35: CPT | Performed by: HOSPITALIST

## 2018-05-20 PROCEDURE — 82962 GLUCOSE BLOOD TEST: CPT | Mod: 91

## 2018-05-20 PROCEDURE — A9270 NON-COVERED ITEM OR SERVICE: HCPCS | Performed by: HOSPITALIST

## 2018-05-20 PROCEDURE — 770021 HCHG ROOM/CARE - ISO PRIVATE

## 2018-05-20 PROCEDURE — 700105 HCHG RX REV CODE 258: Performed by: INTERNAL MEDICINE

## 2018-05-20 RX ADMIN — FLUOXETINE HYDROCHLORIDE 60 MG: 20 CAPSULE ORAL at 08:12

## 2018-05-20 RX ADMIN — QUETIAPINE FUMARATE 100 MG: 100 TABLET ORAL at 11:51

## 2018-05-20 RX ADMIN — DAPTOMYCIN 620 MG: 500 INJECTION, POWDER, LYOPHILIZED, FOR SOLUTION INTRAVENOUS at 11:50

## 2018-05-20 RX ADMIN — QUETIAPINE FUMARATE 100 MG: 100 TABLET ORAL at 08:13

## 2018-05-20 RX ADMIN — SENNOSIDES AND DOCUSATE SODIUM 2 TABLET: 8.6; 5 TABLET ORAL at 08:13

## 2018-05-20 RX ADMIN — BENZTROPINE MESYLATE 1 MG: 1 TABLET ORAL at 20:44

## 2018-05-20 RX ADMIN — ENOXAPARIN SODIUM 40 MG: 100 INJECTION SUBCUTANEOUS at 08:13

## 2018-05-20 RX ADMIN — GABAPENTIN 300 MG: 300 CAPSULE ORAL at 08:13

## 2018-05-20 RX ADMIN — GABAPENTIN 300 MG: 300 CAPSULE ORAL at 20:44

## 2018-05-20 RX ADMIN — LORAZEPAM 1 MG: 1 TABLET ORAL at 05:08

## 2018-05-20 RX ADMIN — VALPROIC ACID 250 MG: 250 CAPSULE, LIQUID FILLED ORAL at 20:44

## 2018-05-20 RX ADMIN — PIPERACILLIN SODIUM AND TAZOBACTAM SODIUM 3.38 G: 3; .375 INJECTION, POWDER, FOR SOLUTION INTRAVENOUS at 12:57

## 2018-05-20 RX ADMIN — QUETIAPINE FUMARATE 200 MG: 100 TABLET ORAL at 20:44

## 2018-05-20 RX ADMIN — PIPERACILLIN SODIUM AND TAZOBACTAM SODIUM 3.38 G: 3; .375 INJECTION, POWDER, FOR SOLUTION INTRAVENOUS at 04:59

## 2018-05-20 RX ADMIN — METFORMIN HYDROCHLORIDE 500 MG: 500 TABLET, FILM COATED ORAL at 17:00

## 2018-05-20 RX ADMIN — VALPROIC ACID 250 MG: 250 CAPSULE, LIQUID FILLED ORAL at 08:13

## 2018-05-20 RX ADMIN — OXYCODONE HYDROCHLORIDE 5 MG: 5 TABLET ORAL at 17:00

## 2018-05-20 RX ADMIN — LORAZEPAM 1 MG: 1 TABLET ORAL at 17:00

## 2018-05-20 RX ADMIN — GABAPENTIN 300 MG: 300 CAPSULE ORAL at 14:33

## 2018-05-20 RX ADMIN — BENZTROPINE MESYLATE 1 MG: 1 TABLET ORAL at 08:13

## 2018-05-20 RX ADMIN — OXYCODONE HYDROCHLORIDE 5 MG: 5 TABLET ORAL at 05:08

## 2018-05-20 RX ADMIN — METFORMIN HYDROCHLORIDE 500 MG: 500 TABLET, FILM COATED ORAL at 08:13

## 2018-05-20 RX ADMIN — ACETAMINOPHEN 500 MG: 500 TABLET ORAL at 20:44

## 2018-05-20 RX ADMIN — PIPERACILLIN SODIUM AND TAZOBACTAM SODIUM 3.38 G: 3; .375 INJECTION, POWDER, FOR SOLUTION INTRAVENOUS at 20:45

## 2018-05-20 RX ADMIN — OXYCODONE HYDROCHLORIDE 5 MG: 5 TABLET ORAL at 11:51

## 2018-05-20 ASSESSMENT — ENCOUNTER SYMPTOMS
SENSORY CHANGE: 1
PND: 0
BACK PAIN: 0
PHOTOPHOBIA: 0
WEAKNESS: 1
NAUSEA: 0
VOMITING: 0
CHILLS: 0
CLAUDICATION: 0
ORTHOPNEA: 0
SORE THROAT: 0
MYALGIAS: 1
SPUTUM PRODUCTION: 0
SENSORY CHANGE: 0
BLOOD IN STOOL: 0
ABDOMINAL PAIN: 0
TREMORS: 0
PALPITATIONS: 0
EYE PAIN: 0
MEMORY LOSS: 0
STRIDOR: 0
SHORTNESS OF BREATH: 0
BLURRED VISION: 0
HEARTBURN: 0
DIZZINESS: 0
FEVER: 0
SPEECH CHANGE: 0
DOUBLE VISION: 0
MYALGIAS: 0
NECK PAIN: 0
HEADACHES: 0
TINGLING: 0
HEMOPTYSIS: 0
NERVOUS/ANXIOUS: 0
COUGH: 0
DEPRESSION: 1
CONSTIPATION: 0
DEPRESSION: 0
NERVOUS/ANXIOUS: 1

## 2018-05-20 ASSESSMENT — PAIN SCALES - GENERAL
PAINLEVEL_OUTOF10: 5
PAINLEVEL_OUTOF10: 7
PAINLEVEL_OUTOF10: 6

## 2018-05-20 ASSESSMENT — PATIENT HEALTH QUESTIONNAIRE - PHQ9
SUM OF ALL RESPONSES TO PHQ9 QUESTIONS 1 AND 2: 0
2. FEELING DOWN, DEPRESSED, IRRITABLE, OR HOPELESS: NOT AT ALL
1. LITTLE INTEREST OR PLEASURE IN DOING THINGS: NOT AT ALL

## 2018-05-20 NOTE — PROGRESS NOTES
[facility] Hospitalist Progress Note    Date of Service: 2018    Chief Complaint  52 y.o. male admitted 2018 with diabetic left foot ulcer.    Interval Problem Update  complaints patient is comfortable  S/p left great amp prox phalanx on -Wound cx - group G and C strep, morganella , diphtheroids  S/p left great toe amp down to MTP joint on . Clean margins obtained per OP  Continue IV daptomycin and Zosyn per infectious disease  Will need about 6 weeks of antibiotics  No further surgical debridement noted on orthopedics assessment and plan.    Consultants/Specialty  ID  Ortho    Disposition  placement        Review of Systems   Constitutional: Positive for malaise/fatigue. Negative for chills and fever.   HENT: Negative for congestion, hearing loss, sore throat and tinnitus.    Eyes: Negative for blurred vision, double vision, photophobia and pain.   Respiratory: Negative for cough, hemoptysis, sputum production, shortness of breath and stridor.    Cardiovascular: Negative for chest pain, palpitations, orthopnea, claudication and PND.   Gastrointestinal: Negative for blood in stool, constipation, heartburn, melena, nausea and vomiting.   Genitourinary: Negative for dysuria, frequency and urgency.   Musculoskeletal: Positive for joint pain and myalgias. Negative for back pain and neck pain.   Neurological: Positive for weakness. Negative for dizziness, tingling, tremors, sensory change, speech change and headaches.   Psychiatric/Behavioral: Negative for depression, memory loss and suicidal ideas. The patient is not nervous/anxious.       Physical Exam  Laboratory/Imaging   Hemodynamics  Temp (24hrs), Av.5 °C (97.7 °F), Min:36.1 °C (97 °F), Max:36.8 °C (98.2 °F)   Temperature: 36.3 °C (97.4 °F)  Pulse  Av.2  Min: 52  Max: 112   Blood Pressure: 111/69      Respiratory      Respiration: 16, Pulse Oximetry: 90 %        RUL Breath Sounds: Clear, RML Breath Sounds: Clear, RLL Breath Sounds: Clear, LORENA  Breath Sounds: Clear, LLL Breath Sounds: Clear    Fluids  No intake or output data in the 24 hours ending 05/20/18 1332    Nutrition  Orders Placed This Encounter   Procedures   • DIET ORDER     Standing Status:   Standing     Number of Occurrences:   1     Order Specific Question:   Diet:     Answer:   Regular [1]     Physical Exam   Constitutional: He is oriented to person, place, and time. He appears well-developed and well-nourished.   HENT:   Head: Normocephalic and atraumatic.   Mouth/Throat: No oropharyngeal exudate.   Eyes: Conjunctivae are normal. Pupils are equal, round, and reactive to light. Right eye exhibits no discharge. No scleral icterus.   Neck: Neck supple. No JVD present. No thyromegaly present.   Cardiovascular: Intact distal pulses.    No murmur heard.  Pulses:       Dorsalis pedis pulses are 2+ on the right side, and 2+ on the left side.   Cap refill < 3 s   Pulmonary/Chest: Effort normal and breath sounds normal. No stridor. No respiratory distress. He has no wheezes. He has no rales.   Abdominal: Soft. Bowel sounds are normal. He exhibits no distension. There is no tenderness. There is no rebound.   Musculoskeletal: Normal range of motion. He exhibits no edema.   PICC line placed left upper extremity dressing intact clean  Amputated left foot site clean dressing intact dry   Neurological: He is alert and oriented to person, place, and time.   Skin: Skin is warm and dry. No erythema.   Psychiatric: He has a normal mood and affect. His behavior is normal. Thought content normal.                                Assessment/Plan     Diabetic ulcer of left foot associated with type 2 diabetes mellitus, with muscle involvement without evidence of necrosis (HCC)- (present on admission)   Assessment & Plan    Hx of diabetic nonhealing ulcer, hx of previous Left first toe amputation,  Failed outpatient antibiotics  MRI + OM  S/p left great amp prox phalanx on 4/1-Wound cx - group G and C strep,  morganella , diphtheroids  S/p left great toe amp down to MTP joint on 4/23. Clean margins obtained  Continue IV daptomycin and Zosyn per infectious disease  Will need about 6 weeks of antibiotics  No further surgical debridement noted on orthopedics assessment and plan.          Acute hematogenous osteomyelitis of left foot (HCC)- (present on admission)   Assessment & Plan    As above        Diabetic peripheral neuropathy (HCC)- (present on admission)   Assessment & Plan    Pain controlled  Continue neurontin 300 tid        Dehiscence of surgical wound- (present on admission)   Assessment & Plan    Wound care continue        Psychiatric disorder- (present on admission)   Assessment & Plan    Continue congentin, seroquel, prozac  Continue valproate for mood        Type 2 diabetes mellitus with neurologic complication, without long-term current use of insulin (HCC)- (present on admission)   Assessment & Plan    Controlled, last A1c 5.8  Continue metformin              Quality-Core Measures   Reviewed items::  Labs reviewed and Radiology images reviewed  Olsen catheter::  No Olsen  DVT prophylaxis pharmacological::  Enoxaparin (Lovenox)  Antibiotics:  Treating active infection/contamination beyond 24 hours perioperative coverage      Patient plan of care discussed at multidisplinary team rounds and with patient and R.N at beside.

## 2018-05-20 NOTE — PROGRESS NOTES
Pt became extremely agitated in afternoon. Refused assistance when going to bathroom despite being unsteady on feet.

## 2018-05-20 NOTE — CARE PLAN
Problem: Knowledge Deficit  Goal: Knowledge of disease process/condition, treatment plan, diagnostic tests, and medications will improve  Outcome: PROGRESSING AS EXPECTED  Continue to educate pt on importance of IV antibiotics and proper wound care, as well as blood sugar management    Problem: Skin Integrity  Goal: Risk for impaired skin integrity will decrease  Outcome: PROGRESSING AS EXPECTED  Continue to assess skin integrity, complete wound care as ordered, encourage turning and repositioning in bed

## 2018-05-20 NOTE — PROGRESS NOTES
Assumed care @ 0715. Bedside report from JARROD Beatty. Resting in bed and in no distress. Bed in low position, call light w/in reach. Strip bed alarm on. Drsg to LLE intact.

## 2018-05-20 NOTE — PROGRESS NOTES
Pt is A&OX3, forgetful and impulsive. Bed alarm in place. Pt can be anxious and irritable. VSS, afebrile. C/o pain in left foot, tylenol and oxycodone effective. Wounds to left foot, dressings changed this shift. IV antibiotics infusing. Call light in reach, bed locked and lowered, nonskid socks applied. Pt resting comfortably over night, will continue to monitor.

## 2018-05-20 NOTE — PROGRESS NOTES
"Infectious Disease Progress Note    Author: Juliane Vaughn M.D. Date & Time of service: 2018  10:35 AM    Chief Complaint:  Left foot wound dehiscence with osteomyelitis f/u      Interval History:  52 y.o. WM admitted 2018 due to left foot wound dehiscence at amp site   AF WBC 5 denies any pain-ate all of breakfast   AF plan for repeat surgery today. No new complaints  5/15 AF WBC 4.6 cxs now polymicrobial somnolent   AF alert and anxious today, jittery states \"I don't want them to cut my leg off\"   AF less anxious today-apparently refused PICC. Seen with LPS   AF no CBC, does not believe he has recurrent infection bc he marcum not have pain, anxious to per RN   AF pt states his BS was 114 this am, poor insight into illness  Labs Reviewed, Medications Reviewed, Radiology Reviewed and Wound Reviewed.    Review of Systems:  Review of Systems   Constitutional: Negative for chills and fever.   Respiratory: Negative for cough and shortness of breath.    Cardiovascular: Negative for chest pain.   Gastrointestinal: Negative for abdominal pain, nausea and vomiting.   Musculoskeletal: Negative for joint pain and myalgias.   Neurological: Positive for sensory change.        Neuropathy   Psychiatric/Behavioral: Positive for depression. The patient is nervous/anxious.    All other systems reviewed and are negative.      Hemodynamics:  Temp (24hrs), Av.5 °C (97.7 °F), Min:36.1 °C (97 °F), Max:36.8 °C (98.2 °F)  Temperature: 36.3 °C (97.4 °F)  Pulse  Av.2  Min: 52  Max: 112  Blood Pressure: 111/69       Physical Exam:  Physical Exam   Constitutional: He appears well-developed.   Disheveled    Restless   HENT:   Head: Normocephalic and atraumatic.   Mouth/Throat: No oropharyngeal exudate.   Poor dentition   Eyes: EOM are normal. Pupils are equal, round, and reactive to light.   Neck: Normal range of motion. Neck supple.   Cardiovascular: Normal rate and normal heart sounds.  "   Pulmonary/Chest: Effort normal and breath sounds normal. He has no wheezes. He has no rales.   Abdominal: Bowel sounds are normal. There is no tenderness.   Musculoskeletal:   Amp site dressed with sutures. Dried blood  Forefoot decreased edema  LUE PICC   Neurological: He is alert.     Flat affect   Skin: No rash noted. He is not diaphoretic.   DTIs to lat and dorsum left foot   Psychiatric:   Poor insight into illness   Nursing note and vitals reviewed.      Meds:    Current Facility-Administered Medications:   •  metFORMIN  •  PICC Line Insertion has been implemented **AND** May use Lidocaine 1% not to exceed 3 mls for local at insertion site **AND** NOTIFY MD **AND** Tip to dwell in the superior vena cava **AND** Do not use PICC Line until placement verified by Chest X Ray **AND** [CANCELED] DX-CHEST-FOR PICC LINE Perform procedure in: Patient's Room **AND** If radiologist reading of chest X-ray states any of the following the PICC should be used **AND** Further evaluation of the PICC placement can be retrieved from X-Ray and Imaging **AND** Blood draws through PICC line; draws by RN only **AND** FLUSHING GUIDELINES WHEN IN USE **AND** normal saline PF **AND** FLUSHING GUIDELINES WHEN NOT IN USE **AND** DRESSING MAINTENANCE **AND** Change needleless pressure ports and IV tubing every 72 hours per hospital policy **AND** TUBING **AND** If there is an MD order to remove the PICC line, any RN may remove the PICC line **AND** [] PATIENT EDUCATION MATERIALS **AND** NURSING COMMUNICATION  •  gabapentin  •  LORazepam  •  oxyCODONE immediate-release  •  ketorolac  •  acetaminophen  •  valproic acid  •  DAPTOmycin  •  [COMPLETED] piperacillin-tazobactam **AND** piperacillin-tazobactam  •  enoxaparin (LOVENOX) injection  •  senna-docusate **AND** polyethylene glycol/lytes **AND** magnesium hydroxide **AND** bisacodyl  •  Respiratory Care per Protocol  •  insulin regular **AND** Accu-Chek ACHS **AND** NOTIFY MD  and PharmD **AND** glucose 4 g **AND** dextrose 50%  •  benztropine  •  FLUoxetine  •  QUEtiapine **AND** QUEtiapine    Labs:  No results for input(s): WBC, RBC, HEMOGLOBIN, HEMATOCRIT, MCV, MCH, RDW, PLATELETCT, MPV, NEUTSPOLYS, LYMPHOCYTES, MONOCYTES, EOSINOPHILS, BASOPHILS, RBCMORPHOLO in the last 72 hours.  No results for input(s): SODIUM, POTASSIUM, CHLORIDE, CO2, GLUCOSE, BUN, CPKTOTAL in the last 72 hours.  No results for input(s): ALBUMIN, TBILIRUBIN, ALKPHOSPHAT, TOTPROTEIN, ALTSGPT, ASTSGOT, CREATININE in the last 72 hours.    Imaging:  Dx-foot-2- Left    Result Date: 4/20/2018 4/20/2018 10:19 AM HISTORY/REASON FOR EXAM:  Pain/Deformity Following Trauma Weeping great toe TECHNIQUE/EXAM DESCRIPTION AND NUMBER OF VIEWS: 2 nonweightbearing views of the LEFT foot. COMPARISON:  3/31/2018 FINDINGS: Patient is status post amputation of the first digit at the level of the proximal phalanx. There is minimal osseous irregularity at the level of the amputation. There is faint adjacent calcification which may be dystrophic.  There are mild degenerative changes of the first tarsometatarsal joint. No acute fracture or dislocation is seen. There is soft tissue swelling about the medial forefoot and remainder of the first digit.     Post surgical changes status post amputation at the level of the proximal phalanx of the first digit. Linear amorphous calcification adjacent to the amputation may be dystrophic. There is minimal osseous irregularity at the level of the amputation where it is difficult to exclude osteomyelitis. Soft tissue swelling of the forefoot and remainder of the first digit.     Mr-foot-with & W/o Left    Result Date: 5/12/2018 5/12/2018 10:44 AM HISTORY/REASON FOR EXAM:  Open wound. Open wound,?surgical site at great toe amp site, please eval for OM and/ or abscess. TECHNIQUE/EXAM DESCRIPTION: MRI of the LEFT foot with and without contrast. The study was performed on a [employer] 1.5 Steph MRI  scanner. T1 axial, T1 sagittal, T1 coronal, STIR sagittal, T2 fast spin-echo fat-suppressed coronal, sagittal inversion recovery, and T1 postcontrast coronal images were obtained. 20 mL Omniscan contrast was administered intravenously. COMPARISON: Foot radiograph 4/20/2018. FINDINGS: Diffuse soft tissue swelling about the dorsum of foot. Increased signal within the intrinsic foot muscles, likely related to neuropathic change. There is an open ulcer extending from the great toe amputation site into the first metatarsal head with marrow replacement. No walled fluid collection seen.     Large open ulcer extending from the great toe amputation site into the first metatarsal head with osteomyelitis of the first metatarsal head. No walled fluid collection seen.      Micro:  Results     Procedure Component Value Units Date/Time    GRAM STAIN [[id number]]  (Abnormal) Collected:  05/12/18 1113    Order Status:  Completed Specimen:  Tissue Updated:  05/15/18 1559     Significant Indicator . (POS)     Source TISS     Site Left Foot     Gram Stain Result Few WBCs.  Moderate Gram positive cocci.  Rare Gram positive rods.   (A)    Narrative:       CALL  Sandoval  MS5 tel. [phone],  CALLED  [facility] tel. [phone] 05/14/2018, 10:22, RB PERF. RESULTS CALLED  TO:Fabiola [id number] Rn    CULTURE TISSUE W/ GRM STAIN [[id number]]  (Abnormal)  (Susceptibility) Collected:  05/12/18 1113    Order Status:  Completed Specimen:  Tissue Updated:  05/15/18 1559     Significant Indicator POS (POS)     Source TISS     Site Left Foot     Tissue Culture -- (A)     Gram Stain Result Few WBCs.  Moderate Gram positive cocci.  Rare Gram positive rods.   (A)     Tissue Culture Methicillin Resistant Staphylococcus aureus  Moderate growth   (A)      Staphylococcus haemolyticus  Light growth   (A)      Pasteurella multocida  Light growth   (A)    Narrative:       CALL  Sandoval  MS5 tel. [phone],  CALLED  MS5 tel. [phone] 05/14/2018, 10:22, RB PERF. RESULTS  CALLED  TO:Fabiola [id number] Rn    Culture & Susceptibility     METHICILLIN RESISTANT STAPHYLOCOCCUS AUREUS     Antibiotic Sensitivity Microscan Unit Status    Ampicillin/sulbactam Resistant >16/8 mcg/mL Final    Method: SENSITIVITY, JUNIOR    Clindamycin Sensitive <=0.5 mcg/mL Final    Method: SENSITIVITY, JUNIOR    Daptomycin Sensitive 1 mcg/mL Final    Method: SENSITIVITY, JUNIOR    Erythromycin Resistant >4 mcg/mL Final    Method: SENSITIVITY, JUNIOR    Moxifloxacin Sensitive 2 mcg/mL Final    Method: SENSITIVITY, JUNIOR    Oxacillin Resistant >2 mcg/mL Final    Method: SENSITIVITY, JUNIOR    Penicillin Resistant >8 mcg/mL Final    Method: SENSITIVITY, JUNIOR    Tetracycline Sensitive <=4 mcg/mL Final    Method: SENSITIVITY, JUNIOR    Trimeth/Sulfa Sensitive <=0.5/9.5 mcg/mL Final    Method: SENSITIVITY, JUNIOR    Vancomycin Sensitive 2 mcg/mL Final    Method: SENSITIVITY, JUNIOR              STAPHYLOCOCCUS HAEMOLYTICUS     Antibiotic Sensitivity Microscan Unit Status    Ampicillin/sulbactam Resistant 16/8 mcg/mL Final    Method: SENSITIVITY, JUNIOR    Clindamycin Resistant >4 mcg/mL Final    Method: SENSITIVITY, JUNIOR    Daptomycin Sensitive <=0.5 mcg/mL Final    Method: SENSITIVITY, JUNIOR    Erythromycin Resistant >4 mcg/mL Final    Method: SENSITIVITY, JUNIOR    Moxifloxacin Intermediate 4 mcg/mL Final    Method: SENSITIVITY, JUNIOR    Oxacillin Resistant >2 mcg/mL Final    Method: SENSITIVITY, JUNIOR    Penicillin Resistant >8 mcg/mL Final    Method: SENSITIVITY, JUNIOR    Tetracycline Sensitive <=4 mcg/mL Final    Method: SENSITIVITY, JUNIOR    Trimeth/Sulfa Resistant >2/38 mcg/mL Final    Method: SENSITIVITY, JUNIOR    Vancomycin Sensitive 2 mcg/mL Final    Method: SENSITIVITY, JUNIOR                       ANAEROBIC CULTURE [[id number]]  (Abnormal) Collected:  05/12/18 1113    Order Status:  Completed Specimen:  Tissue Updated:  05/15/18 2699     Significant Indicator POS (POS)     Source TISS     Site Left Foot     Anaerobic Culture, Culture Res Growth noted after  further incubation, see below for  organism identification.   (A)      Peptostreptococcus art  Moderate growth   (A)    Narrative:       CALL  Sandoval  MS5 tel. [phone],  CALLED  MS5 tel. [phone] 05/14/2018, 10:22, RB PERF. RESULTS CALLED  TO:Fabiola [id number] Rn          Assessment:  Active Hospital Problems    Diagnosis   • Diabetic ulcer of left foot associated with type 2 diabetes mellitus, with muscle involvement without evidence of necrosis (HCC) [E11.621, L97.525]   • Dehiscence of surgical wound [T81.31XA]   • Diabetic peripheral neuropathy (HCC) [E11.42]   • Psychiatric disorder [F99]   • Type 2 diabetes mellitus with neurologic complication, without long-term current use of insulin (HCC) [E11.49]       Plan:  Left foot osteomyelitis  Afebrile  No leukocytosis   S/p left great amp prox phalanx on 4/1-Wound cx - group G and C strep, morganella , diphtheroids  S/p left great toe amp down to MTP joint on 4/23. Clean margins obtained per OP note- OR cx (proximal bone) - mixed skin christine  Failed outpatient therapy-clinical osteomyelitis as probed to bone in clinic. MRI also+ostemyelitis  s/p another debridement 5/12-MRSA, staph haemolyticus(MRSE), peptostreptococcus, and Pasteurella  s/p I and D 5/14  Continue Zosyn and dapto as vanco JUNIOR is 2  Monitor CPK - 17  Endpoint clinical-anticipate 6 weeks IV if no TMA/ BKA    DM2  Last HgA1c 5.8  Keep BS under 150 to help control current infection    Psychiatric disorder  Suspect contributing to prior noncompliance  Poor insight-denies infection at times    Prognosis for limb salvage poor  Will need placement. Difficult discharge    RAFFAELE valencia

## 2018-05-20 NOTE — PROGRESS NOTES
"Patient seen and examined  Polymicrobial infection    Blood pressure 111/69, pulse 63, temperature 36.3 °C (97.4 °F), resp. rate 16, height 1.93 m (6' 4\"), weight 101.6 kg (223 lb 15.8 oz), SpO2 90 %.          No acute distress  Dressing clean dry and intact  Neurovascularly intact    POD#6    Plan:  DVT Prophylaxis- TEDS/SCDs  Weight Bearing Status-WBAT through heel  PT/OT  Antibiotics: per ID  Case Coordination          "

## 2018-05-21 LAB
GLUCOSE BLD-MCNC: 101 MG/DL (ref 65–99)
GLUCOSE BLD-MCNC: 115 MG/DL (ref 65–99)
GLUCOSE BLD-MCNC: 183 MG/DL (ref 65–99)
GLUCOSE BLD-MCNC: 81 MG/DL (ref 65–99)

## 2018-05-21 PROCEDURE — 700102 HCHG RX REV CODE 250 W/ 637 OVERRIDE(OP): Performed by: HOSPITALIST

## 2018-05-21 PROCEDURE — 700111 HCHG RX REV CODE 636 W/ 250 OVERRIDE (IP): Performed by: INTERNAL MEDICINE

## 2018-05-21 PROCEDURE — 99232 SBSQ HOSP IP/OBS MODERATE 35: CPT | Performed by: HOSPITALIST

## 2018-05-21 PROCEDURE — 700105 HCHG RX REV CODE 258: Performed by: INTERNAL MEDICINE

## 2018-05-21 PROCEDURE — A9270 NON-COVERED ITEM OR SERVICE: HCPCS | Performed by: HOSPITALIST

## 2018-05-21 PROCEDURE — 770021 HCHG ROOM/CARE - ISO PRIVATE

## 2018-05-21 PROCEDURE — 82962 GLUCOSE BLOOD TEST: CPT | Mod: 91

## 2018-05-21 RX ADMIN — INSULIN HUMAN 2 UNITS: 100 INJECTION, SOLUTION PARENTERAL at 09:23

## 2018-05-21 RX ADMIN — PIPERACILLIN SODIUM AND TAZOBACTAM SODIUM 3.38 G: 3; .375 INJECTION, POWDER, FOR SOLUTION INTRAVENOUS at 20:00

## 2018-05-21 RX ADMIN — FLUOXETINE HYDROCHLORIDE 60 MG: 20 CAPSULE ORAL at 09:19

## 2018-05-21 RX ADMIN — METFORMIN HYDROCHLORIDE 500 MG: 500 TABLET, FILM COATED ORAL at 17:11

## 2018-05-21 RX ADMIN — PIPERACILLIN SODIUM AND TAZOBACTAM SODIUM 3.38 G: 3; .375 INJECTION, POWDER, FOR SOLUTION INTRAVENOUS at 12:36

## 2018-05-21 RX ADMIN — GABAPENTIN 300 MG: 300 CAPSULE ORAL at 17:10

## 2018-05-21 RX ADMIN — QUETIAPINE FUMARATE 200 MG: 100 TABLET ORAL at 20:00

## 2018-05-21 RX ADMIN — VALPROIC ACID 250 MG: 250 CAPSULE, LIQUID FILLED ORAL at 09:20

## 2018-05-21 RX ADMIN — PIPERACILLIN SODIUM AND TAZOBACTAM SODIUM 3.38 G: 3; .375 INJECTION, POWDER, FOR SOLUTION INTRAVENOUS at 05:06

## 2018-05-21 RX ADMIN — BENZTROPINE MESYLATE 1 MG: 1 TABLET ORAL at 20:00

## 2018-05-21 RX ADMIN — QUETIAPINE FUMARATE 100 MG: 100 TABLET ORAL at 12:36

## 2018-05-21 RX ADMIN — VALPROIC ACID 250 MG: 250 CAPSULE, LIQUID FILLED ORAL at 20:00

## 2018-05-21 RX ADMIN — GABAPENTIN 300 MG: 300 CAPSULE ORAL at 09:19

## 2018-05-21 RX ADMIN — BENZTROPINE MESYLATE 1 MG: 1 TABLET ORAL at 09:19

## 2018-05-21 RX ADMIN — OXYCODONE HYDROCHLORIDE 5 MG: 5 TABLET ORAL at 12:46

## 2018-05-21 RX ADMIN — LORAZEPAM 1 MG: 1 TABLET ORAL at 12:46

## 2018-05-21 RX ADMIN — DAPTOMYCIN 620 MG: 500 INJECTION, POWDER, LYOPHILIZED, FOR SOLUTION INTRAVENOUS at 17:11

## 2018-05-21 RX ADMIN — METFORMIN HYDROCHLORIDE 500 MG: 500 TABLET, FILM COATED ORAL at 09:19

## 2018-05-21 RX ADMIN — ACETAMINOPHEN 500 MG: 500 TABLET ORAL at 05:13

## 2018-05-21 RX ADMIN — QUETIAPINE FUMARATE 100 MG: 100 TABLET ORAL at 09:19

## 2018-05-21 RX ADMIN — LORAZEPAM 1 MG: 1 TABLET ORAL at 05:14

## 2018-05-21 RX ADMIN — GABAPENTIN 300 MG: 300 CAPSULE ORAL at 20:00

## 2018-05-21 RX ADMIN — OXYCODONE HYDROCHLORIDE 5 MG: 5 TABLET ORAL at 05:13

## 2018-05-21 ASSESSMENT — ENCOUNTER SYMPTOMS
NERVOUS/ANXIOUS: 0
BLURRED VISION: 0
TINGLING: 0
SORE THROAT: 0
STRIDOR: 0
HEADACHES: 0
SENSORY CHANGE: 0
ORTHOPNEA: 0
CLAUDICATION: 0
SPEECH CHANGE: 0
SHORTNESS OF BREATH: 0
NECK PAIN: 0
CHILLS: 0
DEPRESSION: 0
HEARTBURN: 0
MEMORY LOSS: 0
NAUSEA: 0
COUGH: 0
DOUBLE VISION: 0
BACK PAIN: 0
DIZZINESS: 0
TREMORS: 0
EYE PAIN: 0
PALPITATIONS: 0
SPUTUM PRODUCTION: 0
MYALGIAS: 0
DEPRESSION: 1
SENSORY CHANGE: 1
PHOTOPHOBIA: 0
CONSTIPATION: 0
VOMITING: 0
WEAKNESS: 1
PND: 0
FEVER: 0
ABDOMINAL PAIN: 0
HEMOPTYSIS: 0
BLOOD IN STOOL: 0
MYALGIAS: 1

## 2018-05-21 ASSESSMENT — PAIN SCALES - GENERAL
PAINLEVEL_OUTOF10: 5
PAINLEVEL_OUTOF10: 0
PAINLEVEL_OUTOF10: 0
PAINLEVEL_OUTOF10: 7
PAINLEVEL_OUTOF10: 2

## 2018-05-21 NOTE — PROGRESS NOTES
Pt is A&OX3, forgetful and impulsive. Bed alarm in place, sustained fall this shift, see post-fall note and documentation for details. Pt can be anxious and irritable. VSS, afebrile. C/o pain in left foot, tylenol and oxycodone effective. Wounds to left foot, dressings intact. IV antibiotics infusing. Call light in reach, bed locked and lowered, nonskid socks applied. Pt resting comfortably over night, hourly rounding in place, will continue to monitor.

## 2018-05-21 NOTE — PROGRESS NOTES
Received bedside report from NOC RN. Assumed care at 0700. Patient resting comfortably in bed, no s/s of distress at this time. Patient able to make needs known and denies any at this time. Bed alarm on, fall precautions implemented. Hourly rounding in place.

## 2018-05-21 NOTE — PROGRESS NOTES
[facility] Hospitalist Progress Note    Date of Service: 2018    Chief Complaint  52 y.o. male admitted 2018 with diabetic left foot ulcer.    Interval Problem Update  complaints patient is comfortable  S/p left great amp prox phalanx on -Wound cx - group G and C strep, morganella , diphtheroids  S/p left great toe amp down to MTP joint on . Clean margins obtained per OP  Continue IV daptomycin and Zosyn per infectious disease  Will need about 6 weeks of antibiotics  No further surgical debridement noted on orthopedics assessment and plan.      No acute issues overnight, patient is comfortable, no acute needs.  Continue IV daptomycin and Zosyn   Pending for SNF placement  Continue IV antibiotic therapy    Consultants/Specialty  ID  Ortho    Disposition  placement        Review of Systems   Constitutional: Positive for malaise/fatigue. Negative for chills and fever.   HENT: Negative for congestion, hearing loss, sore throat and tinnitus.    Eyes: Negative for blurred vision, double vision, photophobia and pain.   Respiratory: Negative for cough, hemoptysis, sputum production, shortness of breath and stridor.    Cardiovascular: Negative for chest pain, palpitations, orthopnea, claudication and PND.   Gastrointestinal: Negative for blood in stool, constipation, heartburn, melena, nausea and vomiting.   Genitourinary: Negative for dysuria, frequency and urgency.   Musculoskeletal: Positive for joint pain and myalgias. Negative for back pain and neck pain.   Neurological: Positive for weakness. Negative for dizziness, tingling, tremors, sensory change, speech change and headaches.   Psychiatric/Behavioral: Negative for depression, memory loss and suicidal ideas. The patient is not nervous/anxious.       Physical Exam  Laboratory/Imaging   Hemodynamics  Temp (24hrs), Av.6 °C (97.9 °F), Min:36.6 °C (97.8 °F), Max:36.7 °C (98 °F)   Temperature: 36.7 °C (98 °F)  Pulse  Av.2  Min: 52  Max: 112    Blood Pressure: 120/65      Respiratory      Respiration: 16, Pulse Oximetry: 95 %        RUL Breath Sounds: Clear, RML Breath Sounds: Clear, RLL Breath Sounds: Clear, LORENA Breath Sounds: Clear, LLL Breath Sounds: Clear    Fluids  No intake or output data in the 24 hours ending 05/21/18 1530    Nutrition  Orders Placed This Encounter   Procedures   • DIET ORDER     Standing Status:   Standing     Number of Occurrences:   1     Order Specific Question:   Diet:     Answer:   Regular [1]     Physical Exam   Constitutional: He appears well-developed and well-nourished.   HENT:   Head: Normocephalic and atraumatic.   Mouth/Throat: No oropharyngeal exudate.   Eyes: Conjunctivae are normal. Pupils are equal, round, and reactive to light. Right eye exhibits no discharge. No scleral icterus.   Neck: Neck supple. No JVD present. No thyromegaly present.   Cardiovascular: Intact distal pulses.    No murmur heard.  Pulses:       Dorsalis pedis pulses are 2+ on the right side, and 2+ on the left side.   Cap refill < 3 s   Pulmonary/Chest: Effort normal and breath sounds normal. No stridor. No respiratory distress. He has no wheezes. He has no rales.   Abdominal: Soft. Bowel sounds are normal. He exhibits no distension. There is no tenderness. There is no rebound.   Musculoskeletal: Normal range of motion. He exhibits no edema.   PICC line placed left upper extremity dressing intact   Amputated left foot   Neurological: He is alert. No cranial nerve deficit.   Skin: Skin is warm and dry. No erythema.   Psychiatric: He has a normal mood and affect. His behavior is normal. Thought content normal.                                Assessment/Plan     Dehiscence of surgical wound  Continue Wound care     Diabetic ulcer of left foot associated with type 2 diabetes mellitus, with muscle involvement without evidence of necrosis (HCC)  Hx of diabetic nonhealing ulcer, hx of previous Left first toe amputation,  Failed outpatient  antibiotics  MRI + OM  S/p left great amp prox phalanx on 4/1-Wound cx - group G and C strep, morganella , diphtheroids  S/p left great toe amp down to MTP joint on 4/23. Clean margins obtained  Continue IV daptomycin and Zosyn per infectious disease ~ 6 weeks recommended  No further surgical debridement recommended per orthopedics         Psychiatric disorder  Continue congentin, seroquel, prozac  Continue valproate for mood control    Type 2 diabetes mellitus with neurologic complication, without long-term current use of insulin (HCC)  Controlled, last A1c 5.8  Continue metformin      Diabetic peripheral neuropathy (HCC)  Pain controlled  Continue neurontin 300mg TID    Acute hematogenous osteomyelitis of left foot (HCC)  As above  Continue IV abx.      Quality-Core Measures   Reviewed items::  Labs reviewed and Radiology images reviewed  Olsen catheter::  No Olsen  DVT prophylaxis pharmacological::  Enoxaparin (Lovenox)  Antibiotics:  Treating active infection/contamination beyond 24 hours perioperative coverage      Patient plan of care discussed at multidisplinary team rounds and with patient and R.N at [facility].

## 2018-05-21 NOTE — PROGRESS NOTES
Patient sustained fall this shift. Pt is impulsive and unsteady on feet, does not use call light, got up to use the bathroom without calling, RN responded to bed alarm and assisted patient into bathroom. RN attempting to help steady pt in bathroom but pt becoming agitated and yelling at RN to stop touching him. RN stood back to give pt space and pt fell backward into shower. Pt did not hit his head or sustain any injuries. Pt assisted to feet and back to bed. VS: /70, HR 69, T 36.6, Sats 98% on RA. Hospitalist, Dr. Joseph notified, instructed to continue monitoring patient and continue with high fall risk precautions. Call light kept in reach, pt instructed to use call light for assistance, RN stressed importance of allowing standby assist when up to prevent further falls, IV pole on side close to restroom, 3x siderails up with siderail closest to bathroom down, path to bathroom free of clutter, pt refusing to wear nonskid socks. Charge nurse notified, room close to nurses station not available, will move patient when room becomes available. Other staff also notified of high fall risk patient.

## 2018-05-21 NOTE — CARE PLAN
Problem: Safety  Goal: Will remain free from falls  Outcome: PROGRESSING SLOWER THAN EXPECTED  Pt had fall this shift, see post fall documentation for interventions    Problem: Mobility  Goal: Risk for activity intolerance will decrease  Outcome: PROGRESSING AS EXPECTED  Involve pt/ot in plan of care, encourage ambulation with assistance

## 2018-05-21 NOTE — PROGRESS NOTES
"  Blood pressure 100/69, pulse 65, temperature 36.6 °C (97.8 °F), resp. rate 16, height 1.93 m (6' 4\"), weight 101.6 kg (223 lb 15.8 oz), SpO2 97 %.      POD# 7 left foot I&D    Plan:  DVT Prophylaxis- TEDS/SCDs, lmwh  Weight Bearing Status- wbat thru heel  PT/OT  Antibiotics:  Per ID  Case Coordination          "

## 2018-05-21 NOTE — CARE PLAN
Problem: Safety  Goal: Will remain free from injury  Outcome: PROGRESSING AS EXPECTED  HD Fall Risk complete; patient educated on level of risk and proper identifiers in place.    Problem: Bowel/Gastric:  Goal: Normal bowel function is maintained or improved  Outcome: PROGRESSING AS EXPECTED  Patient having BMs at regular intervals with no complaints of having any troubles.

## 2018-05-21 NOTE — PROGRESS NOTES
"Infectious Disease Progress Note    Author: Amanda Feng M.D. Date & Time of service: 2018  2:06 PM    Chief Complaint:  Left foot wound dehiscence with osteomyelitis f/u      Interval History:  52 y.o. WM admitted 2018 due to left foot wound dehiscence at amp site   AF WBC 5 denies any pain-ate all of breakfast   AF plan for repeat surgery today. No new complaints  5/15 AF WBC 4.6 cxs now polymicrobial somnolent   AF alert and anxious today, jittery states \"I don't want them to cut my leg off\"   AF less anxious today-apparently refused PICC. Seen with LPS   AF no CBC, does not believe he has recurrent infection bc he marcum not have pain, anxious to per RN   AF pt states his BS was 114 this am, poor insight into illness   AF resting quietly no acute events  Labs Reviewed, Medications Reviewed, Radiology Reviewed and Wound Reviewed.    Review of Systems:  Review of Systems   Constitutional: Negative for chills and fever.   Respiratory: Negative for cough and shortness of breath.    Cardiovascular: Negative for chest pain.   Gastrointestinal: Negative for abdominal pain, nausea and vomiting.   Musculoskeletal: Negative for joint pain and myalgias.   Neurological: Positive for sensory change.        Neuropathy   Psychiatric/Behavioral: Positive for depression.   All other systems reviewed and are negative.      Hemodynamics:  Temp (24hrs), Av.6 °C (97.9 °F), Min:36.6 °C (97.8 °F), Max:36.7 °C (98 °F)  Temperature: 36.7 °C (98 °F)  Pulse  Av.2  Min: 52  Max: 112  Blood Pressure: 120/65       Physical Exam:  Physical Exam   Constitutional: He appears well-developed.   Disheveled    Restless   HENT:   Head: Normocephalic and atraumatic.   Mouth/Throat: No oropharyngeal exudate.   Poor dentition   Eyes: EOM are normal. Pupils are equal, round, and reactive to light.   Neck: Normal range of motion. Neck supple.   Cardiovascular: Normal rate and normal heart sounds.  "   Pulmonary/Chest: Effort normal and breath sounds normal. He has no wheezes. He has no rales.   Abdominal: Bowel sounds are normal. There is no tenderness.   Musculoskeletal:   Amp site dressed with sutures. Dried blood  Forefoot decreased edema  LUE PICC   Neurological: He is alert.     Flat affect   Skin: No rash noted. He is not diaphoretic.   DTIs to lat and dorsum left foot   Psychiatric:   Poor insight into illness   Nursing note and vitals reviewed.      Meds:    Current Facility-Administered Medications:   •  metFORMIN  •  PICC Line Insertion has been implemented **AND** May use Lidocaine 1% not to exceed 3 mls for local at insertion site **AND** NOTIFY MD **AND** Tip to dwell in the superior vena cava **AND** Do not use PICC Line until placement verified by Chest X Ray **AND** [CANCELED] DX-CHEST-FOR PICC LINE Perform procedure in: Patient's Room **AND** If radiologist reading of chest X-ray states any of the following the PICC should be used **AND** Further evaluation of the PICC placement can be retrieved from X-Ray and Imaging **AND** Blood draws through PICC line; draws by RN only **AND** FLUSHING GUIDELINES WHEN IN USE **AND** normal saline PF **AND** FLUSHING GUIDELINES WHEN NOT IN USE **AND** DRESSING MAINTENANCE **AND** Change needleless pressure ports and IV tubing every 72 hours per hospital policy **AND** TUBING **AND** If there is an MD order to remove the PICC line, any RN may remove the PICC line **AND** [] PATIENT EDUCATION MATERIALS **AND** NURSING COMMUNICATION  •  gabapentin  •  LORazepam  •  oxyCODONE immediate-release  •  acetaminophen  •  valproic acid  •  DAPTOmycin  •  [COMPLETED] piperacillin-tazobactam **AND** piperacillin-tazobactam  •  enoxaparin (LOVENOX) injection  •  senna-docusate **AND** polyethylene glycol/lytes **AND** magnesium hydroxide **AND** bisacodyl  •  Respiratory Care per Protocol  •  insulin regular **AND** Accu-Chek ACHS **AND** NOTIFY MD and PharmD  **AND** glucose 4 g **AND** dextrose 50%  •  benztropine  •  FLUoxetine  •  QUEtiapine **AND** QUEtiapine    Labs:  No results for input(s): WBC, RBC, HEMOGLOBIN, HEMATOCRIT, MCV, MCH, RDW, PLATELETCT, MPV, NEUTSPOLYS, LYMPHOCYTES, MONOCYTES, EOSINOPHILS, BASOPHILS, RBCMORPHOLO in the last 72 hours.  No results for input(s): SODIUM, POTASSIUM, CHLORIDE, CO2, GLUCOSE, BUN, CPKTOTAL in the last 72 hours.  No results for input(s): ALBUMIN, TBILIRUBIN, ALKPHOSPHAT, TOTPROTEIN, ALTSGPT, ASTSGOT, CREATININE in the last 72 hours.    Imaging:  Dx-foot-2- Left    Result Date: 4/20/2018 4/20/2018 10:19 AM HISTORY/REASON FOR EXAM:  Pain/Deformity Following Trauma Weeping great toe TECHNIQUE/EXAM DESCRIPTION AND NUMBER OF VIEWS: 2 nonweightbearing views of the LEFT foot. COMPARISON:  3/31/2018 FINDINGS: Patient is status post amputation of the first digit at the level of the proximal phalanx. There is minimal osseous irregularity at the level of the amputation. There is faint adjacent calcification which may be dystrophic.  There are mild degenerative changes of the first tarsometatarsal joint. No acute fracture or dislocation is seen. There is soft tissue swelling about the medial forefoot and remainder of the first digit.     Post surgical changes status post amputation at the level of the proximal phalanx of the first digit. Linear amorphous calcification adjacent to the amputation may be dystrophic. There is minimal osseous irregularity at the level of the amputation where it is difficult to exclude osteomyelitis. Soft tissue swelling of the forefoot and remainder of the first digit.     Mr-foot-with & W/o Left    Result Date: 5/12/2018 5/12/2018 10:44 AM HISTORY/REASON FOR EXAM:  Open wound. Open wound,?surgical site at great toe amp site, please eval for OM and/ or abscess. TECHNIQUE/EXAM DESCRIPTION: MRI of the LEFT foot with and without contrast. The study was performed on a [employer] 1.5 Steph MRI scanner. T1 axial,  T1 sagittal, T1 coronal, STIR sagittal, T2 fast spin-echo fat-suppressed coronal, sagittal inversion recovery, and T1 postcontrast coronal images were obtained. 20 mL Omniscan contrast was administered intravenously. COMPARISON: Foot radiograph 4/20/2018. FINDINGS: Diffuse soft tissue swelling about the dorsum of foot. Increased signal within the intrinsic foot muscles, likely related to neuropathic change. There is an open ulcer extending from the great toe amputation site into the first metatarsal head with marrow replacement. No walled fluid collection seen.     Large open ulcer extending from the great toe amputation site into the first metatarsal head with osteomyelitis of the first metatarsal head. No walled fluid collection seen.      Micro:  Results     Procedure Component Value Units Date/Time    GRAM STAIN [[id number]]  (Abnormal) Collected:  05/12/18 1113    Order Status:  Completed Specimen:  Tissue Updated:  05/15/18 1559     Significant Indicator . (POS)     Source TISS     Site Left Foot     Gram Stain Result Few WBCs.  Moderate Gram positive cocci.  Rare Gram positive rods.   (A)    Narrative:       CALL  Sandoval  MS5 tel. [phone],  [place]  [facility] [place] [phone] 05/14/2018, 10:22, RB PERF. RESULTS CALLED  TO:Fabiola Solares Rn    CULTURE TISSUE W/ GRM STAIN [[id number]]  (Abnormal)  (Susceptibility) Collected:  05/12/18 1113    Order Status:  Completed Specimen:  Tissue Updated:  05/15/18 1559     Significant Indicator POS (POS)     Source TISS     Site Left Foot     Tissue Culture -- (A)     Gram Stain Result Few WBCs.  Moderate Gram positive cocci.  Rare Gram positive rods.   (A)     Tissue Culture Methicillin Resistant Staphylococcus aureus  Moderate growth   (A)      Staphylococcus haemolyticus  Light growth   (A)      Pasteurella multocida  Light growth   (A)    Narrative:       CALL  [facility]oval  MS5 tel. [phone],  CALLED  [facility] tel. [phone] 05/14/2018, 10:22, RB PERF. RESULTS CALLED  TO:Fabiola Solares Rn     Culture & Susceptibility     METHICILLIN RESISTANT STAPHYLOCOCCUS AUREUS     Antibiotic Sensitivity Microscan Unit Status    Ampicillin/sulbactam Resistant >16/8 mcg/mL Final    Method: SENSITIVITY, JUNIOR    Clindamycin Sensitive <=0.5 mcg/mL Final    Method: SENSITIVITY, JUNIOR    Daptomycin Sensitive 1 mcg/mL Final    Method: SENSITIVITY, JUNIOR    Erythromycin Resistant >4 mcg/mL Final    Method: SENSITIVITY, JUNIOR    Moxifloxacin Sensitive 2 mcg/mL Final    Method: SENSITIVITY, JUNIOR    Oxacillin Resistant >2 mcg/mL Final    Method: SENSITIVITY, JUNIOR    Penicillin Resistant >8 mcg/mL Final    Method: SENSITIVITY, JUNIOR    Tetracycline Sensitive <=4 mcg/mL Final    Method: SENSITIVITY, JUNIOR    Trimeth/Sulfa Sensitive <=0.5/9.5 mcg/mL Final    Method: SENSITIVITY, JUNIOR    Vancomycin Sensitive 2 mcg/mL Final    Method: SENSITIVITY, JUNIOR              STAPHYLOCOCCUS HAEMOLYTICUS     Antibiotic Sensitivity Microscan Unit Status    Ampicillin/sulbactam Resistant 16/8 mcg/mL Final    Method: SENSITIVITY, JUNIOR    Clindamycin Resistant >4 mcg/mL Final    Method: SENSITIVITY, JUNIOR    Daptomycin Sensitive <=0.5 mcg/mL Final    Method: SENSITIVITY, JUNIOR    Erythromycin Resistant >4 mcg/mL Final    Method: SENSITIVITY, JUNIOR    Moxifloxacin Intermediate 4 mcg/mL Final    Method: SENSITIVITY, JUNIOR    Oxacillin Resistant >2 mcg/mL Final    Method: SENSITIVITY, JUNIOR    Penicillin Resistant >8 mcg/mL Final    Method: SENSITIVITY, JUNIOR    Tetracycline Sensitive <=4 mcg/mL Final    Method: SENSITIVITY, JUNIOR    Trimeth/Sulfa Resistant >2/38 mcg/mL Final    Method: SENSITIVITY, JUNIOR    Vancomycin Sensitive 2 mcg/mL Final    Method: SENSITIVITY, JUNIOR                       ANAEROBIC CULTURE [[id number]]  (Abnormal) Collected:  05/12/18 1113    Order Status:  Completed Specimen:  Tissue Updated:  05/15/18 3496     Significant Indicator POS (POS)     Source TISS     Site Left Foot     Anaerobic Culture, Culture Res Growth noted after further incubation, see below  for  organism identification.   (A)      Peptostreptococcus art  Moderate growth   (A)    Narrative:       CALL  [facility]  MS5 tel. [phone],  CALLED  MS5 tel. [phone] 05/14/2018, 10:22, RB PERF. RESULTS CALLED  TO:Fabiola 72482 Rn          Assessment:  Active Hospital Problems    Diagnosis   • Diabetic ulcer of left foot associated with type 2 diabetes mellitus, with muscle involvement without evidence of necrosis (HCC) [E11.621, L97.525]   • Dehiscence of surgical wound [T81.31XA]   • Diabetic peripheral neuropathy (HCC) [E11.42]   • Psychiatric disorder [F99]   • Type 2 diabetes mellitus with neurologic complication, without long-term current use of insulin (HCC) [E11.49]       Plan:  Left foot osteomyelitis  Afebrile  No leukocytosis   S/p left great amp prox phalanx on 4/1-  Wound cx polymicrobial: group G and C strep, morganella , diphtheroids  S/p left great toe amp down to MTP joint on 4/23. Clean margins obtained per OP note- OR cx (proximal bone) - mixed skin christine  Failed outpatient therapy-clinical osteomyelitis as probed to bone in clinic. MRI also+ostemyelitis  s/p another debridement 5/12-MRSA, staph haemolyticus(MRSE), peptostreptococcus, and Pasteurella  s/p I and D 5/14  Continue Zosyn and dapto as vanco JUNIOR is 2  Monitor CPK weekly  Endpoint clinical-anticipate 6 weeks IV if no TMA/ BKA  Stop date 6/26/2018    DM2  Last HgA1c 5.8  Keep BS under 150 to help control current infection    Psychiatric disorder  Contributing to noncompliance and poor insight  Appreciate psych eval    Prognosis for limb salvage poor  Will need placement. Difficult discharge

## 2018-05-21 NOTE — DISCHARGE PLANNING
Anticipated Discharge Disposition: SNF     Action: Pt discussed in IDT rounds. Pt is pending PT/OT evaluation and will need SNF for wound care and IV ABX.      Barriers to discharge: Pt has Medicaid FFS and will need to wait for a Medicaid bed to come available.      Plan: Pt to dc to accepting SNF for wound care, IV Abx, and possible PT/OT.

## 2018-05-22 LAB
GLUCOSE BLD-MCNC: 116 MG/DL (ref 65–99)
GLUCOSE BLD-MCNC: 120 MG/DL (ref 65–99)
GLUCOSE BLD-MCNC: 142 MG/DL (ref 65–99)
GLUCOSE BLD-MCNC: 211 MG/DL (ref 65–99)

## 2018-05-22 PROCEDURE — A9270 NON-COVERED ITEM OR SERVICE: HCPCS | Performed by: HOSPITALIST

## 2018-05-22 PROCEDURE — 700111 HCHG RX REV CODE 636 W/ 250 OVERRIDE (IP): Performed by: INTERNAL MEDICINE

## 2018-05-22 PROCEDURE — G8987 SELF CARE CURRENT STATUS: HCPCS | Mod: CI

## 2018-05-22 PROCEDURE — G8989 SELF CARE D/C STATUS: HCPCS | Mod: CI

## 2018-05-22 PROCEDURE — 700102 HCHG RX REV CODE 250 W/ 637 OVERRIDE(OP): Performed by: HOSPITALIST

## 2018-05-22 PROCEDURE — 700105 HCHG RX REV CODE 258: Performed by: INTERNAL MEDICINE

## 2018-05-22 PROCEDURE — G8978 MOBILITY CURRENT STATUS: HCPCS | Mod: CH

## 2018-05-22 PROCEDURE — 97161 PT EVAL LOW COMPLEX 20 MIN: CPT

## 2018-05-22 PROCEDURE — G8979 MOBILITY GOAL STATUS: HCPCS | Mod: CH

## 2018-05-22 PROCEDURE — G8988 SELF CARE GOAL STATUS: HCPCS | Mod: CI

## 2018-05-22 PROCEDURE — 82962 GLUCOSE BLOOD TEST: CPT | Mod: 91

## 2018-05-22 PROCEDURE — 97165 OT EVAL LOW COMPLEX 30 MIN: CPT

## 2018-05-22 PROCEDURE — 99232 SBSQ HOSP IP/OBS MODERATE 35: CPT | Performed by: FAMILY MEDICINE

## 2018-05-22 PROCEDURE — G8980 MOBILITY D/C STATUS: HCPCS | Mod: CH

## 2018-05-22 PROCEDURE — 770021 HCHG ROOM/CARE - ISO PRIVATE

## 2018-05-22 RX ADMIN — QUETIAPINE FUMARATE 200 MG: 100 TABLET ORAL at 21:19

## 2018-05-22 RX ADMIN — METFORMIN HYDROCHLORIDE 500 MG: 500 TABLET, FILM COATED ORAL at 17:48

## 2018-05-22 RX ADMIN — SENNOSIDES AND DOCUSATE SODIUM 2 TABLET: 8.6; 5 TABLET ORAL at 21:18

## 2018-05-22 RX ADMIN — BENZTROPINE MESYLATE 1 MG: 1 TABLET ORAL at 21:18

## 2018-05-22 RX ADMIN — LORAZEPAM 1 MG: 1 TABLET ORAL at 09:52

## 2018-05-22 RX ADMIN — OXYCODONE HYDROCHLORIDE 5 MG: 5 TABLET ORAL at 23:57

## 2018-05-22 RX ADMIN — VALPROIC ACID 250 MG: 250 CAPSULE, LIQUID FILLED ORAL at 09:47

## 2018-05-22 RX ADMIN — QUETIAPINE FUMARATE 100 MG: 100 TABLET ORAL at 13:36

## 2018-05-22 RX ADMIN — LORAZEPAM 1 MG: 1 TABLET ORAL at 21:30

## 2018-05-22 RX ADMIN — VALPROIC ACID 250 MG: 250 CAPSULE, LIQUID FILLED ORAL at 21:18

## 2018-05-22 RX ADMIN — QUETIAPINE FUMARATE 100 MG: 100 TABLET ORAL at 09:47

## 2018-05-22 RX ADMIN — DAPTOMYCIN 620 MG: 500 INJECTION, POWDER, LYOPHILIZED, FOR SOLUTION INTRAVENOUS at 11:16

## 2018-05-22 RX ADMIN — METFORMIN HYDROCHLORIDE 500 MG: 500 TABLET, FILM COATED ORAL at 09:47

## 2018-05-22 RX ADMIN — GABAPENTIN 300 MG: 300 CAPSULE ORAL at 09:47

## 2018-05-22 RX ADMIN — BENZTROPINE MESYLATE 1 MG: 1 TABLET ORAL at 09:47

## 2018-05-22 RX ADMIN — GABAPENTIN 300 MG: 300 CAPSULE ORAL at 13:35

## 2018-05-22 RX ADMIN — OXYCODONE HYDROCHLORIDE 5 MG: 5 TABLET ORAL at 17:59

## 2018-05-22 RX ADMIN — GABAPENTIN 300 MG: 300 CAPSULE ORAL at 21:18

## 2018-05-22 RX ADMIN — INSULIN HUMAN 3 UNITS: 100 INJECTION, SOLUTION PARENTERAL at 18:01

## 2018-05-22 RX ADMIN — FLUOXETINE HYDROCHLORIDE 60 MG: 20 CAPSULE ORAL at 09:47

## 2018-05-22 RX ADMIN — PIPERACILLIN SODIUM AND TAZOBACTAM SODIUM 3.38 G: 3; .375 INJECTION, POWDER, FOR SOLUTION INTRAVENOUS at 21:19

## 2018-05-22 RX ADMIN — PIPERACILLIN SODIUM AND TAZOBACTAM SODIUM 3.38 G: 3; .375 INJECTION, POWDER, FOR SOLUTION INTRAVENOUS at 05:00

## 2018-05-22 RX ADMIN — ENOXAPARIN SODIUM 40 MG: 100 INJECTION SUBCUTANEOUS at 09:46

## 2018-05-22 RX ADMIN — PIPERACILLIN SODIUM AND TAZOBACTAM SODIUM 3.38 G: 3; .375 INJECTION, POWDER, FOR SOLUTION INTRAVENOUS at 13:35

## 2018-05-22 ASSESSMENT — ENCOUNTER SYMPTOMS
PALPITATIONS: 0
WEIGHT LOSS: 0
ORTHOPNEA: 0
NECK PAIN: 0
DIZZINESS: 0
VOMITING: 0
CHILLS: 0
COUGH: 0
DOUBLE VISION: 0
BLURRED VISION: 0
SPUTUM PRODUCTION: 0
SHORTNESS OF BREATH: 0
HEMOPTYSIS: 0
FEVER: 0
ABDOMINAL PAIN: 0
MYALGIAS: 0
NAUSEA: 0
HEARTBURN: 0
PHOTOPHOBIA: 0
BACK PAIN: 0
SENSORY CHANGE: 1
HEADACHES: 0
TINGLING: 0

## 2018-05-22 ASSESSMENT — COGNITIVE AND FUNCTIONAL STATUS - GENERAL
SUGGESTED CMS G CODE MODIFIER DAILY ACTIVITY: CH
DAILY ACTIVITIY SCORE: 24
SUGGESTED CMS G CODE MODIFIER MOBILITY: CH
MOBILITY SCORE: 24

## 2018-05-22 ASSESSMENT — GAIT ASSESSMENTS
GAIT LEVEL OF ASSIST: INDEPENDENT
DISTANCE (FEET): 15

## 2018-05-22 ASSESSMENT — PAIN SCALES - GENERAL
PAINLEVEL_OUTOF10: 7
PAINLEVEL_OUTOF10: 6
PAINLEVEL_OUTOF10: 4
PAINLEVEL_OUTOF10: 7
PAINLEVEL_OUTOF10: 5

## 2018-05-22 ASSESSMENT — ACTIVITIES OF DAILY LIVING (ADL): TOILETING: INDEPENDENT

## 2018-05-22 NOTE — PROGRESS NOTES
Pt a/ox4, restig comfortably in bed. No complaints or reports of pain. Medications given as ordered, senna held per pt request. Dressing changes done per MD orders. Left lateral wound with small purulent drainage, other two wounds had no drainage.

## 2018-05-22 NOTE — DISCHARGE PLANNING
Anticipated Discharge Disposition: SNF     PASRR: [id number]  LOC: Manual Review    Action: Pt discussed in IDT rounds. Pt is pending PT/OT evaluation and will need SNF for wound care and IV ABX. LSW spoke w/ PT and they state that pt is independent and at baseline. LSW completed LOC for IV ABX and wound care needs and LOC is in Manual Review.      Barriers to discharge: Pt has Medicaid FFS and will need to wait for a Medicaid bed to come available.      Plan: Pt to dc to accepting SNF for wound care, IV Abx, and possible PT/OT.

## 2018-05-22 NOTE — PROGRESS NOTES
Patient a+o x 4, flat affect, denies sob/lightheadedness/numbness/tingling/dizziness/chest pain/n/v/d. Tolerating diet. Blood sugar checks a/o, no s&s of hypo/hyperglycemia. 6/10 pain to left foot - declined pain meds this am - wctm. Requested ativan 2/2 anxiety. Iv abx a/o, afebrile, vss. Dressing to left foot, c/d/I. ble's= +pulses/+csm. Fall precautions/hourly rounding maintained, call light within reach and functioning, all items within reach.  Patient encouraged to call for assistance, poc reviewed with patient, ?'s/concerns answered.

## 2018-05-22 NOTE — PROGRESS NOTES
[facility]ist Progress Note    Date of Service: 2018    Chief Complaint  52 y.o. male admitted 2018 with diabetic left foot ulcer.    Interval Problem Update  SNF    Consultants/Specialty  I.D  ortho    Disposition  pending        Review of Systems   Constitutional: Negative for chills, fever and weight loss.   HENT: Negative for ear pain, hearing loss and tinnitus.    Eyes: Negative for blurred vision, double vision and photophobia.   Respiratory: Negative for cough, hemoptysis and sputum production.    Cardiovascular: Negative for chest pain, palpitations and orthopnea.   Gastrointestinal: Negative for heartburn, nausea and vomiting.   Genitourinary: Negative for dysuria, frequency and urgency.   Musculoskeletal: Negative for back pain, myalgias and neck pain.   Skin: Negative for itching.   Neurological: Negative for dizziness, tingling and headaches.      Physical Exam  Laboratory/Imaging   Hemodynamics  Temp (24hrs), Av.5 °C (97.7 °F), Min:35.9 °C (96.7 °F), Max:36.9 °C (98.4 °F)   Temperature: 36.7 °C (98 °F)  Pulse  Av.1  Min: 52  Max: 112   Blood Pressure: 110/75      Respiratory      Respiration: 16, Pulse Oximetry: 93 %        RUL Breath Sounds: Clear, RML Breath Sounds: Clear, RLL Breath Sounds: Clear, LORENA Breath Sounds: Clear, LLL Breath Sounds: Clear    Fluids    Intake/Output Summary (Last 24 hours) at 18 1505  Last data filed at 18 0837   Gross per 24 hour   Intake              720 ml   Output                0 ml   Net              720 ml       Nutrition  Orders Placed This Encounter   Procedures   • DIET ORDER     Standing Status:   Standing     Number of Occurrences:   1     Order Specific Question:   Diet:     Answer:   Regular [1]     Physical Exam   Constitutional: He is oriented to person, place, and time. He appears well-developed and well-nourished.   HENT:   Head: Normocephalic and atraumatic.   Eyes: Conjunctivae are normal. Pupils are equal, round, and  reactive to light.   Neck: Normal range of motion. Neck supple.   Cardiovascular: Normal rate and regular rhythm.    Pulmonary/Chest: Effort normal and breath sounds normal.   Abdominal: Soft. Bowel sounds are normal.   Musculoskeletal: He exhibits no edema.   Neurological: He is alert and oriented to person, place, and time.   Skin: Skin is warm.   Dressing look clean on the left foot                                Assessment/Plan     Diabetic ulcer of left foot associated with type 2 diabetes mellitus, with muscle involvement without evidence of necrosis (HCC)- (present on admission)   Assessment & Plan    Hx of diabetic nonhealing ulcer, hx of previous Left first toe amputation,  Failed outpatient antibiotics  MRI + OM  S/p left great amp prox phalanx on 4/1-Wound cx - group G and C strep, morganella , diphtheroids  S/p left great toe amp down to MTP joint on 4/23. Clean margins obtained  Ortho input is noted  Zosyn  daptomycin  I.D input is noted and 6weeks antibiotics  Ortho input is noted            Acute hematogenous osteomyelitis of left foot (HCC)- (present on admission)   Assessment & Plan    As above  Continue IV abx.        Diabetic peripheral neuropathy (HCC)- (present on admission)   Assessment & Plan    Pain controlled  Continue neurontin 300mg TID        Dehiscence of surgical wound- (present on admission)   Assessment & Plan    Continue Wound care         Psychiatric disorder- (present on admission)   Assessment & Plan    Continue congentin, seroquel, prozac  Continue valproate for mood control        Type 2 diabetes mellitus with neurologic complication, without long-term current use of insulin (HCC)- (present on admission)   Assessment & Plan    Controlled, last A1c 5.8  accu checks are noted   metformin            Quality-Core Measures   DVT prophylaxis pharmacological::  Enoxaparin (Lovenox)

## 2018-05-22 NOTE — DISCHARGE PLANNING
[facility] received SNF choice form from [facility] at 1542. Referrals have been sent to all [facility] facilities excluding [facility] at 1619.

## 2018-05-22 NOTE — PROGRESS NOTES
"Infectious Disease Progress Note    Author: Amanda Feng M.D. Date & Time of service: 2018  2:12 PM    Chief Complaint:  Left foot wound dehiscence with osteomyelitis f/u      Interval History:  52 y.o. WM admitted 2018 due to left foot wound dehiscence at amp site   AF WBC 5 denies any pain-ate all of breakfast   AF plan for repeat surgery today. No new complaints  5/15 AF WBC 4.6 cxs now polymicrobial somnolent   AF alert and anxious today, jittery states \"I don't want them to cut my leg off\"   AF less anxious today-apparently refused PICC. Seen with LPS   AF no CBC, does not believe he has recurrent infection bc he marcum not have pain, anxious to per RN   AF pt states his BS was 114 this am, poor insight into illness   AF no complaints  Labs Reviewed, Medications Reviewed, Radiology Reviewed and Wound Reviewed.    Review of Systems:  Review of Systems   Constitutional: Negative for chills and fever.   Respiratory: Negative for cough and shortness of breath.    Cardiovascular: Negative for chest pain.   Gastrointestinal: Negative for abdominal pain, nausea and vomiting.   Musculoskeletal: Negative for joint pain and myalgias.   Neurological: Positive for sensory change.        Neuropathy   All other systems reviewed and are negative.      Hemodynamics:  Temp (24hrs), Av.5 °C (97.7 °F), Min:35.9 °C (96.7 °F), Max:36.9 °C (98.4 °F)  Temperature: 36.7 °C (98 °F)  Pulse  Av.1  Min: 52  Max: 112  Blood Pressure: 110/75       Physical Exam:  Physical Exam   Constitutional: No distress.   Disheveled    Restless   HENT:   Poor dentition   Eyes: Conjunctivae are normal.   Cardiovascular: Regular rhythm.    No murmur heard.  Pulmonary/Chest: Breath sounds normal. No respiratory distress.   Abdominal: Soft. He exhibits no distension.   Musculoskeletal: He exhibits no edema.   Decreased edema  LUE PICC   Neurological:   Alert  Flat affect   Skin: No rash noted.   DTIs to lat " and dorsum left foot-lateral with increased drainage  Dry eschar over surgical site   Psychiatric:   Poor insight into illness  Calm today   Nursing note and vitals reviewed.      Meds:    Current Facility-Administered Medications:   •  metFORMIN  •  PICC Line Insertion has been implemented **AND** May use Lidocaine 1% not to exceed 3 mls for local at insertion site **AND** NOTIFY MD **AND** Tip to dwell in the superior vena cava **AND** Do not use PICC Line until placement verified by Chest X Ray **AND** [CANCELED] DX-CHEST-FOR PICC LINE Perform procedure in: Patient's Room **AND** If radiologist reading of chest X-ray states any of the following the PICC should be used **AND** Further evaluation of the PICC placement can be retrieved from X-Ray and Imaging **AND** Blood draws through PICC line; draws by RN only **AND** FLUSHING GUIDELINES WHEN IN USE **AND** normal saline PF **AND** FLUSHING GUIDELINES WHEN NOT IN USE **AND** DRESSING MAINTENANCE **AND** Change needleless pressure ports and IV tubing every 72 hours per hospital policy **AND** TUBING **AND** If there is an MD order to remove the PICC line, any RN may remove the PICC line **AND** [] PATIENT EDUCATION MATERIALS **AND** NURSING COMMUNICATION  •  gabapentin  •  LORazepam  •  oxyCODONE immediate-release  •  acetaminophen  •  valproic acid  •  DAPTOmycin  •  [COMPLETED] piperacillin-tazobactam **AND** piperacillin-tazobactam  •  enoxaparin (LOVENOX) injection  •  senna-docusate **AND** polyethylene glycol/lytes **AND** magnesium hydroxide **AND** bisacodyl  •  Respiratory Care per Protocol  •  insulin regular **AND** Accu-Chek ACHS **AND** NOTIFY MD and PharmD **AND** glucose 4 g **AND** dextrose 50%  •  benztropine  •  FLUoxetine  •  QUEtiapine **AND** QUEtiapine    Labs:  No results for input(s): WBC, RBC, HEMOGLOBIN, HEMATOCRIT, MCV, MCH, RDW, PLATELETCT, MPV, NEUTSPOLYS, LYMPHOCYTES, MONOCYTES, EOSINOPHILS, BASOPHILS, RBCMORPHOLO in the last  72 hours.  No results for input(s): SODIUM, POTASSIUM, CHLORIDE, CO2, GLUCOSE, BUN, CPKTOTAL in the last 72 hours.  No results for input(s): ALBUMIN, TBILIRUBIN, ALKPHOSPHAT, TOTPROTEIN, ALTSGPT, ASTSGOT, CREATININE in the last 72 hours.    Imaging:  Dx-foot-2- Left    Result Date: 4/20/2018 4/20/2018 10:19 AM HISTORY/REASON FOR EXAM:  Pain/Deformity Following Trauma Weeping great toe TECHNIQUE/EXAM DESCRIPTION AND NUMBER OF VIEWS: 2 nonweightbearing views of the LEFT foot. COMPARISON:  3/31/2018 FINDINGS: Patient is status post amputation of the first digit at the level of the proximal phalanx. There is minimal osseous irregularity at the level of the amputation. There is faint adjacent calcification which may be dystrophic.  There are mild degenerative changes of the first tarsometatarsal joint. No acute fracture or dislocation is seen. There is soft tissue swelling about the medial forefoot and remainder of the first digit.     Post surgical changes status post amputation at the level of the proximal phalanx of the first digit. Linear amorphous calcification adjacent to the amputation may be dystrophic. There is minimal osseous irregularity at the level of the amputation where it is difficult to exclude osteomyelitis. Soft tissue swelling of the forefoot and remainder of the first digit.     Mr-foot-with & W/o Left    Result Date: 5/12/2018 5/12/2018 10:44 AM HISTORY/REASON FOR EXAM:  Open wound. Open wound,?surgical site at great toe amp site, please eval for OM and/ or abscess. TECHNIQUE/EXAM DESCRIPTION: MRI of the LEFT foot with and without contrast. The study was performed on a [employer] ADEX 1.5 Steph MRI scanner. T1 axial, T1 sagittal, T1 coronal, STIR sagittal, T2 fast spin-echo fat-suppressed coronal, sagittal inversion recovery, and T1 postcontrast coronal images were obtained. 20 mL Omniscan contrast was administered intravenously. COMPARISON: Foot radiograph 4/20/2018. FINDINGS: Diffuse soft tissue  swelling about the dorsum of foot. Increased signal within the intrinsic foot muscles, likely related to neuropathic change. There is an open ulcer extending from the great toe amputation site into the first metatarsal head with marrow replacement. No walled fluid collection seen.     Large open ulcer extending from the great toe amputation site into the first metatarsal head with osteomyelitis of the first metatarsal head. No walled fluid collection seen.      Micro:  Results     Procedure Component Value Units Date/Time    GRAM STAIN [[id number]]  (Abnormal) Collected:  05/12/18 1113    Order Status:  Completed Specimen:  Tissue Updated:  05/15/18 1559     Significant Indicator . (POS)     Source TISS     Site Left Foot     Gram Stain Result Few WBCs.  Moderate Gram positive cocci.  Rare Gram positive rods.   (A)    Narrative:       CALL  Sandoval  [facility] [place]. [phone],  CALLED  [facility] [place] tel. [phone] 05/14/2018, 10:22, RB PERF. RESULTS CALLED  TO:Fabiola Solares Rn    CULTURE TISSUE W/ GRM STAIN [[id number]]  (Abnormal)  (Susceptibility) Collected:  05/12/18 1113    Order Status:  Completed Specimen:  Tissue Updated:  05/15/18 1559     Significant Indicator POS (POS)     Source TISS     Site Left Foot     Tissue Culture -- (A)     Gram Stain Result Few WBCs.  Moderate Gram positive cocci.  Rare Gram positive rods.   (A)     Tissue Culture Methicillin Resistant Staphylococcus aureus  Moderate growth   (A)      Staphylococcus haemolyticus  Light growth   (A)      Pasteurella multocida  Light growth   (A)    Narrative:       CALL  Sanodval  [facility] tel. [phone],  CALLED  [facility] [place]. [phone] 05/14/2018, 10:22, RB PERF. RESULTS CALLED  TO:Fabiola Solares Rn    Culture & Susceptibility     METHICILLIN RESISTANT STAPHYLOCOCCUS AUREUS     Antibiotic Sensitivity Microscan Unit Status    Ampicillin/sulbactam Resistant >16/8 mcg/mL Final    Method: SENSITIVITY, JUNIOR    Clindamycin Sensitive <=0.5 mcg/mL Final    Method: SENSITIVITY, JUNIOR     Daptomycin Sensitive 1 mcg/mL Final    Method: SENSITIVITY, JUNIOR    Erythromycin Resistant >4 mcg/mL Final    Method: SENSITIVITY, JUNIOR    Moxifloxacin Sensitive 2 mcg/mL Final    Method: SENSITIVITY, JUNIOR    Oxacillin Resistant >2 mcg/mL Final    Method: SENSITIVITY, JUNIOR    Penicillin Resistant >8 mcg/mL Final    Method: SENSITIVITY, JUNIOR    Tetracycline Sensitive <=4 mcg/mL Final    Method: SENSITIVITY, JUNIOR    Trimeth/Sulfa Sensitive <=0.5/9.5 mcg/mL Final    Method: SENSITIVITY, JUNIOR    Vancomycin Sensitive 2 mcg/mL Final    Method: SENSITIVITY, JUNIOR              STAPHYLOCOCCUS HAEMOLYTICUS     Antibiotic Sensitivity Microscan Unit Status    Ampicillin/sulbactam Resistant 16/8 mcg/mL Final    Method: SENSITIVITY, JUNIOR    Clindamycin Resistant >4 mcg/mL Final    Method: SENSITIVITY, JUNIOR    Daptomycin Sensitive <=0.5 mcg/mL Final    Method: SENSITIVITY, JUNIOR    Erythromycin Resistant >4 mcg/mL Final    Method: SENSITIVITY, JUNIOR    Moxifloxacin Intermediate 4 mcg/mL Final    Method: SENSITIVITY, JUNIOR    Oxacillin Resistant >2 mcg/mL Final    Method: SENSITIVITY, JUNIOR    Penicillin Resistant >8 mcg/mL Final    Method: SENSITIVITY, JUNIOR    Tetracycline Sensitive <=4 mcg/mL Final    Method: SENSITIVITY, JUNIOR    Trimeth/Sulfa Resistant >2/38 mcg/mL Final    Method: SENSITIVITY, JUNIOR    Vancomycin Sensitive 2 mcg/mL Final    Method: SENSITIVITY, JUNIOR                       ANAEROBIC CULTURE [[id number]]  (Abnormal) Collected:  05/12/18 1113    Order Status:  Completed Specimen:  Tissue Updated:  05/15/18 1559     Significant Indicator POS (POS)     Source TISS     Site Left Foot     Anaerobic Culture, Culture Res Growth noted after further incubation, see below for  organism identification.   (A)      Peptostreptococcus art  Moderate growth   (A)    Narrative:       CALL  Sandoval  [facility] tel. [phone],  CALLED  [facility] tel. [phone] 05/14/2018, 10:22, RB PERF. RESULTS CALLED  TO:Fabiola [id number] Rn          Assessment:  Active Hospital Problems     Diagnosis   • Diabetic ulcer of left foot associated with type 2 diabetes mellitus, with muscle involvement without evidence of necrosis (HCC) [E11.621, L97.525]   • Dehiscence of surgical wound [T81.31XA]   • Diabetic peripheral neuropathy (HCC) [E11.42]   • Psychiatric disorder [F99]   • Type 2 diabetes mellitus with neurologic complication, without long-term current use of insulin (HCC) [E11.49]       Plan:  Left foot osteomyelitis  Afebrile  No leukocytosis   S/p left great amp prox phalanx on 4/1-  Wound cx polymicrobial: group G and C strep, morganella , diphtheroids  S/p left great toe amp down to MTP joint on 4/23. Clean margins obtained per OP note- OR cx (proximal bone) - mixed skin christine  Failed outpatient therapy-clinical osteomyelitis, probe to bone and MRI +ostemyelitis  s/p I and D 5/12-MRSA, staph haemolyticus(MRSE), peptostreptococcus, and Pasteurella  s/p I and D 5/14  Change Zosyn to Unasyn and continue dapto (vanco JUNIOR is 2)  Monitor CPK weekly  Endpoint clinical-anticipate 6 weeks IV if no TMA/ BKA  Stop date 6/26/2018    DM2  Last HgA1c 5.8  Keep BS under 150 to help control current infection    Psychiatric disorder  Contributing to noncompliance and poor insight  Appreciate psych eval    Prognosis for limb salvage poor  Will need placement. Difficult discharge

## 2018-05-22 NOTE — THERAPY
"Physical Therapy Evaluation completed.   Bed Mobility:  Supine to Sit: Independent  Transfers: Sit to Stand: Independent  Gait: Level Of Assist: Independent with No Equipment Needed       Plan of Care: Patient with no further skilled PT needs in the acute care setting at this time  Discharge Recommendations: Equipment: No Equipment Needed. Post-acute therapy Discharge to a transitional care facility for continued skilled therapy services.    See \"Rehab Therapy-Acute\" Patient Summary Report for complete documentation.       52 year old male presents to [facility] for management of worsening L foot wound. He demonstrates no functional deficits at this time. He will benefit from SNF placement for wound management and for IV antibiotic therapies. He is encouraged to ambulate as tolerated and as allowed as far as leaving the room.   "

## 2018-05-23 LAB
ANION GAP SERPL CALC-SCNC: 7 MMOL/L (ref 0–11.9)
BASOPHILS # BLD AUTO: 0.9 % (ref 0–1.8)
BASOPHILS # BLD: 0.05 K/UL (ref 0–0.12)
BUN SERPL-MCNC: 23 MG/DL (ref 8–22)
CALCIUM SERPL-MCNC: 9 MG/DL (ref 8.5–10.5)
CHLORIDE SERPL-SCNC: 106 MMOL/L (ref 96–112)
CO2 SERPL-SCNC: 25 MMOL/L (ref 20–33)
CREAT SERPL-MCNC: 0.79 MG/DL (ref 0.5–1.4)
EOSINOPHIL # BLD AUTO: 0.2 K/UL (ref 0–0.51)
EOSINOPHIL NFR BLD: 3.5 % (ref 0–6.9)
ERYTHROCYTE [DISTWIDTH] IN BLOOD BY AUTOMATED COUNT: 45.5 FL (ref 35.9–50)
GLUCOSE BLD-MCNC: 109 MG/DL (ref 65–99)
GLUCOSE BLD-MCNC: 119 MG/DL (ref 65–99)
GLUCOSE BLD-MCNC: 209 MG/DL (ref 65–99)
GLUCOSE BLD-MCNC: 256 MG/DL (ref 65–99)
GLUCOSE SERPL-MCNC: 131 MG/DL (ref 65–99)
HCT VFR BLD AUTO: 42 % (ref 42–52)
HGB BLD-MCNC: 14.2 G/DL (ref 14–18)
IMM GRANULOCYTES # BLD AUTO: 0.02 K/UL (ref 0–0.11)
IMM GRANULOCYTES NFR BLD AUTO: 0.4 % (ref 0–0.9)
LYMPHOCYTES # BLD AUTO: 2.69 K/UL (ref 1–4.8)
LYMPHOCYTES NFR BLD: 47.1 % (ref 22–41)
MCH RBC QN AUTO: 30.5 PG (ref 27–33)
MCHC RBC AUTO-ENTMCNC: 33.8 G/DL (ref 33.7–35.3)
MCV RBC AUTO: 90.1 FL (ref 81.4–97.8)
MONOCYTES # BLD AUTO: 0.47 K/UL (ref 0–0.85)
MONOCYTES NFR BLD AUTO: 8.2 % (ref 0–13.4)
NEUTROPHILS # BLD AUTO: 2.28 K/UL (ref 1.82–7.42)
NEUTROPHILS NFR BLD: 39.9 % (ref 44–72)
NRBC # BLD AUTO: 0 K/UL
NRBC BLD-RTO: 0 /100 WBC
PLATELET # BLD AUTO: 156 K/UL (ref 164–446)
PMV BLD AUTO: 9.8 FL (ref 9–12.9)
POTASSIUM SERPL-SCNC: 4.2 MMOL/L (ref 3.6–5.5)
RBC # BLD AUTO: 4.66 M/UL (ref 4.7–6.1)
SODIUM SERPL-SCNC: 138 MMOL/L (ref 135–145)
WBC # BLD AUTO: 5.7 K/UL (ref 4.8–10.8)

## 2018-05-23 PROCEDURE — 700111 HCHG RX REV CODE 636 W/ 250 OVERRIDE (IP): Performed by: INTERNAL MEDICINE

## 2018-05-23 PROCEDURE — 700102 HCHG RX REV CODE 250 W/ 637 OVERRIDE(OP): Performed by: HOSPITALIST

## 2018-05-23 PROCEDURE — A9270 NON-COVERED ITEM OR SERVICE: HCPCS | Performed by: HOSPITALIST

## 2018-05-23 PROCEDURE — 770021 HCHG ROOM/CARE - ISO PRIVATE

## 2018-05-23 PROCEDURE — 99231 SBSQ HOSP IP/OBS SF/LOW 25: CPT | Performed by: FAMILY MEDICINE

## 2018-05-23 PROCEDURE — 80048 BASIC METABOLIC PNL TOTAL CA: CPT

## 2018-05-23 PROCEDURE — 700105 HCHG RX REV CODE 258: Performed by: INTERNAL MEDICINE

## 2018-05-23 PROCEDURE — 82962 GLUCOSE BLOOD TEST: CPT

## 2018-05-23 PROCEDURE — 85025 COMPLETE CBC W/AUTO DIFF WBC: CPT

## 2018-05-23 RX ADMIN — QUETIAPINE FUMARATE 200 MG: 100 TABLET ORAL at 20:35

## 2018-05-23 RX ADMIN — GABAPENTIN 300 MG: 300 CAPSULE ORAL at 17:47

## 2018-05-23 RX ADMIN — DAPTOMYCIN 620 MG: 500 INJECTION, POWDER, LYOPHILIZED, FOR SOLUTION INTRAVENOUS at 12:57

## 2018-05-23 RX ADMIN — FLUOXETINE HYDROCHLORIDE 60 MG: 20 CAPSULE ORAL at 08:47

## 2018-05-23 RX ADMIN — INSULIN HUMAN 5 UNITS: 100 INJECTION, SOLUTION PARENTERAL at 08:50

## 2018-05-23 RX ADMIN — INSULIN HUMAN 3 UNITS: 100 INJECTION, SOLUTION PARENTERAL at 12:59

## 2018-05-23 RX ADMIN — OXYCODONE HYDROCHLORIDE 5 MG: 5 TABLET ORAL at 08:48

## 2018-05-23 RX ADMIN — PIPERACILLIN SODIUM AND TAZOBACTAM SODIUM 3.38 G: 3; .375 INJECTION, POWDER, FOR SOLUTION INTRAVENOUS at 13:02

## 2018-05-23 RX ADMIN — VALPROIC ACID 250 MG: 250 CAPSULE, LIQUID FILLED ORAL at 20:34

## 2018-05-23 RX ADMIN — BENZTROPINE MESYLATE 1 MG: 1 TABLET ORAL at 20:35

## 2018-05-23 RX ADMIN — SENNOSIDES AND DOCUSATE SODIUM 2 TABLET: 8.6; 5 TABLET ORAL at 20:34

## 2018-05-23 RX ADMIN — VALPROIC ACID 250 MG: 250 CAPSULE, LIQUID FILLED ORAL at 08:47

## 2018-05-23 RX ADMIN — GABAPENTIN 300 MG: 300 CAPSULE ORAL at 20:35

## 2018-05-23 RX ADMIN — METFORMIN HYDROCHLORIDE 500 MG: 500 TABLET, FILM COATED ORAL at 17:48

## 2018-05-23 RX ADMIN — QUETIAPINE FUMARATE 100 MG: 100 TABLET ORAL at 12:57

## 2018-05-23 RX ADMIN — GABAPENTIN 300 MG: 300 CAPSULE ORAL at 08:47

## 2018-05-23 RX ADMIN — PIPERACILLIN SODIUM AND TAZOBACTAM SODIUM 3.38 G: 3; .375 INJECTION, POWDER, FOR SOLUTION INTRAVENOUS at 04:30

## 2018-05-23 RX ADMIN — LORAZEPAM 1 MG: 1 TABLET ORAL at 08:54

## 2018-05-23 RX ADMIN — METFORMIN HYDROCHLORIDE 500 MG: 500 TABLET, FILM COATED ORAL at 08:47

## 2018-05-23 RX ADMIN — BENZTROPINE MESYLATE 1 MG: 1 TABLET ORAL at 08:47

## 2018-05-23 RX ADMIN — QUETIAPINE FUMARATE 100 MG: 100 TABLET ORAL at 08:48

## 2018-05-23 RX ADMIN — ENOXAPARIN SODIUM 40 MG: 100 INJECTION SUBCUTANEOUS at 08:47

## 2018-05-23 RX ADMIN — PIPERACILLIN SODIUM AND TAZOBACTAM SODIUM 3.38 G: 3; .375 INJECTION, POWDER, FOR SOLUTION INTRAVENOUS at 20:34

## 2018-05-23 ASSESSMENT — PAIN SCALES - GENERAL
PAINLEVEL_OUTOF10: 6
PAINLEVEL_OUTOF10: 0
PAINLEVEL_OUTOF10: 7
PAINLEVEL_OUTOF10: 3

## 2018-05-23 ASSESSMENT — ENCOUNTER SYMPTOMS
WHEEZING: 0
SENSORY CHANGE: 1
FEVER: 0
SHORTNESS OF BREATH: 0
SPUTUM PRODUCTION: 0
ORTHOPNEA: 0
BACK PAIN: 0
TREMORS: 0
WEIGHT LOSS: 0
CHILLS: 0
EYE PAIN: 0
VOMITING: 0
CLAUDICATION: 0
PALPITATIONS: 0
DIAPHORESIS: 0
NAUSEA: 0
ABDOMINAL PAIN: 0
EYE DISCHARGE: 0
TINGLING: 0
DIARRHEA: 0
MYALGIAS: 0
FLANK PAIN: 0
PHOTOPHOBIA: 0
SENSORY CHANGE: 0
COUGH: 0
FALLS: 0

## 2018-05-23 NOTE — PROGRESS NOTES
"Patient seen and examined    Blood pressure 117/78, pulse 60, temperature 36.2 °C (97.1 °F), resp. rate 16, height 1.93 m (6' 4\"), weight 101.6 kg (223 lb 15.8 oz), SpO2 93 %.    Recent Labs      05/23/18   0442   WBC  5.7   RBC  4.66*   HEMOGLOBIN  14.2   HEMATOCRIT  42.0   MCV  90.1   MCH  30.5   MCHC  33.8   RDW  45.5   PLATELETCT  156*   MPV  9.8       No acute distress  Dressing clean dry and intact  Neurovascularly intact    POD#9    Plan:  DVT Prophylaxis- TEDS/SCDs  Weight Bearing Status-Thru heel  PT/OT  Antibiotics: per ID  Case Coordination          "

## 2018-05-23 NOTE — CARE PLAN
Problem: Discharge Barriers/Planning  Goal: Patient's continuum of care needs will be met    Intervention: Collaborate with Transitional Care Team and Interdisciplinary Team to meet discharge needs  Possible discharge to SNF for 6 weeks iv abx, wound care and PT/OT.      Problem: Pain Management  Goal: Pain level will decrease to patient's comfort goal    Intervention: Follow pain managment plan developed in collaboration with patient and Interdisciplinary Team  Medicated per MAR and reassessed every 2 hours per protocol, able to sleep and relaxed after medicated.

## 2018-05-23 NOTE — PROGRESS NOTES
Patient a+o x 4, flat affect, denies sob/lightheadedness/numbness/tingling/dizziness/chest pain/n/v/d. Tolerating diet. Blood sugar checks a/o, no s&s of hypo/hyperglycemia. 6/10 pain to left foot - oxycodone admin a/o with good effect, patient sleeping on reassessment. . Requested ativan 2/2 anxiety. Iv abx a/o, afebrile, vss. Dressing to left foot, c/d/I. ble's= +pulses/+csm. Fall precautions/hourly rounding maintained, call light within reach and functioning, all items within reach.  Patient encouraged to call for assistance, poc reviewed with patient, ?'s/concerns answered.

## 2018-05-23 NOTE — PROGRESS NOTES
"Infectious Disease Progress Note    Author: Amanda Feng M.D. Date & Time of service: 2018  2:51 PM    Chief Complaint:  Left foot wound dehiscence with osteomyelitis f/u      Interval History:  52 y.o. WM admitted 2018 due to left foot wound dehiscence at amp site   AF WBC 5 denies any pain-ate all of breakfast   AF plan for repeat surgery today. No new complaints  5/15 AF WBC 4.6 cxs now polymicrobial somnolent   AF alert and anxious today, jittery states \"I don't want them to cut my leg off\"   AF less anxious today-apparently refused PICC. Seen with LPS   AF no CBC, does not believe he has recurrent infection bc he marcum not have pain, anxious to per RN   AF pt states his BS was 114 this am, poor insight into illness   AF no complaints   AF denies SE from abx  Labs Reviewed, Medications Reviewed, Radiology Reviewed and Wound Reviewed.    Review of Systems:  Review of Systems   Constitutional: Negative for chills and fever.   Respiratory: Negative for cough and shortness of breath.    Cardiovascular: Negative for chest pain.   Gastrointestinal: Negative for abdominal pain, nausea and vomiting.   Musculoskeletal: Negative for joint pain and myalgias.   Neurological: Positive for sensory change.        Neuropathy   All other systems reviewed and are negative.      Hemodynamics:  Temp (24hrs), Av.8 °C (98.3 °F), Min:36.2 °C (97.1 °F), Max:37.1 °C (98.8 °F)  Temperature: 36.2 °C (97.1 °F)  Pulse  Av.9  Min: 52  Max: 112  Blood Pressure: 117/78       Physical Exam:  Physical Exam   Constitutional: He appears well-developed and well-nourished.   Disheveled    Restless   HENT:   Head: Normocephalic and atraumatic.   Poor dentition   Eyes: EOM are normal. Pupils are equal, round, and reactive to light.   Neck: Neck supple.   Cardiovascular: Normal rate.    Pulmonary/Chest: Effort normal. He has no wheezes. He has no rales.   Abdominal: Bowel sounds are normal. "   Musculoskeletal: He exhibits no edema.   Decreased edema  LUE PICC   Neurological: He is alert.     Flat affect   Skin: No rash noted.   DTIs to lat and dorsum left foot-lateral with increased drainage  Dry eschar over surgical site   Psychiatric:   Poor insight into illness  Calm today   Nursing note and vitals reviewed.      Meds:    Current Facility-Administered Medications:   •  metFORMIN  •  PICC Line Insertion has been implemented **AND** May use Lidocaine 1% not to exceed 3 mls for local at insertion site **AND** NOTIFY MD **AND** Tip to dwell in the superior vena cava **AND** Do not use PICC Line until placement verified by Chest X Ray **AND** [CANCELED] DX-CHEST-FOR PICC LINE Perform procedure in: Patient's Room **AND** If radiologist reading of chest X-ray states any of the following the PICC should be used **AND** Further evaluation of the PICC placement can be retrieved from X-Ray and Imaging **AND** Blood draws through PICC line; draws by RN only **AND** FLUSHING GUIDELINES WHEN IN USE **AND** normal saline PF **AND** FLUSHING GUIDELINES WHEN NOT IN USE **AND** DRESSING MAINTENANCE **AND** Change needleless pressure ports and IV tubing every 72 hours per hospital policy **AND** TUBING **AND** If there is an MD order to remove the PICC line, any RN may remove the PICC line **AND** [] PATIENT EDUCATION MATERIALS **AND** NURSING COMMUNICATION  •  gabapentin  •  LORazepam  •  oxyCODONE immediate-release  •  acetaminophen  •  valproic acid  •  DAPTOmycin  •  [COMPLETED] piperacillin-tazobactam **AND** piperacillin-tazobactam  •  enoxaparin (LOVENOX) injection  •  senna-docusate **AND** polyethylene glycol/lytes **AND** magnesium hydroxide **AND** bisacodyl  •  Respiratory Care per Protocol  •  insulin regular **AND** Accu-Chek ACHS **AND** NOTIFY MD and PharmD **AND** glucose 4 g **AND** dextrose 50%  •  benztropine  •  FLUoxetine  •  QUEtiapine **AND** QUEtiapine    Labs:  Recent Labs       05/23/18   0442   WBC  5.7   RBC  4.66*   HEMOGLOBIN  14.2   HEMATOCRIT  42.0   MCV  90.1   MCH  30.5   RDW  45.5   PLATELETCT  156*   MPV  9.8   NEUTSPOLYS  39.90*   LYMPHOCYTES  47.10*   MONOCYTES  8.20   EOSINOPHILS  3.50   BASOPHILS  0.90     Recent Labs      05/23/18   0442   SODIUM  138   POTASSIUM  4.2   CHLORIDE  106   CO2  25   GLUCOSE  131*   BUN  23*     Recent Labs      05/23/18   0442   CREATININE  0.79       Imaging:  Dx-foot-2- Left    Result Date: 4/20/2018 4/20/2018 10:19 AM HISTORY/REASON FOR EXAM:  Pain/Deformity Following Trauma Weeping great toe TECHNIQUE/EXAM DESCRIPTION AND NUMBER OF VIEWS: 2 nonweightbearing views of the LEFT foot. COMPARISON:  3/31/2018 FINDINGS: Patient is status post amputation of the first digit at the level of the proximal phalanx. There is minimal osseous irregularity at the level of the amputation. There is faint adjacent calcification which may be dystrophic.  There are mild degenerative changes of the first tarsometatarsal joint. No acute fracture or dislocation is seen. There is soft tissue swelling about the medial forefoot and remainder of the first digit.     Post surgical changes status post amputation at the level of the proximal phalanx of the first digit. Linear amorphous calcification adjacent to the amputation may be dystrophic. There is minimal osseous irregularity at the level of the amputation where it is difficult to exclude osteomyelitis. Soft tissue swelling of the forefoot and remainder of the first digit.     Mr-foot-with & W/o Left    Result Date: 5/12/2018 5/12/2018 10:44 AM HISTORY/REASON FOR EXAM:  Open wound. Open wound,?surgical site at great toe amp site, please eval for OM and/ or abscess. TECHNIQUE/EXAM DESCRIPTION: MRI of the LEFT foot with and without contrast. The study was performed on a [employer] 1.5 Steph MRI scanner. T1 axial, T1 sagittal, T1 coronal, STIR sagittal, T2 fast spin-echo fat-suppressed coronal, sagittal inversion  recovery, and T1 postcontrast coronal images were obtained. 20 mL Omniscan contrast was administered intravenously. COMPARISON: Foot radiograph 4/20/2018. FINDINGS: Diffuse soft tissue swelling about the dorsum of foot. Increased signal within the intrinsic foot muscles, likely related to neuropathic change. There is an open ulcer extending from the great toe amputation site into the first metatarsal head with marrow replacement. No walled fluid collection seen.     Large open ulcer extending from the great toe amputation site into the first metatarsal head with osteomyelitis of the first metatarsal head. No walled fluid collection seen.      Micro:  Results     ** No results found for the last 168 hours. **          Assessment:  Active Hospital Problems    Diagnosis   • Diabetic ulcer of left foot associated with type 2 diabetes mellitus, with muscle involvement without evidence of necrosis (HCC) [E11.621, L97.525]   • Dehiscence of surgical wound [T81.31XA]   • Diabetic peripheral neuropathy (HCC) [E11.42]   • Psychiatric disorder [F99]   • Type 2 diabetes mellitus with neurologic complication, without long-term current use of insulin ([facility]) [E11.49]       Plan:  Left foot osteomyelitis  Afebrile  No leukocytosis   S/p left great amp prox phalanx on 4/1- cx polymicrobial: group G and C strep, morganella , diphtheroids  S/p left great toe amp down to MTP joint on 4/23. Clean margins obtained per OP note- OR cx (proximal bone) - mixed skin christine  Failed outpatient therapy-clinical osteomyelitis, probe to bone and MRI +ostemyelitis  s/p I and D 5/12-MRSA, staph haemolyticus(MRSE), peptostreptococcus, and Pasteurella  s/p I and D 5/14  Continue Unasyn and dapto (vanco JUNIOR is 2)  Monitor CPK weekly  Endpoint clinical-anticipate 6 weeks IV if no TMA/ BKA  Stop date 6/26/2018    DM2  Last HgA1c 5.8  Keep BS under 150 to help control current infection    Psychiatric disorder  Contributing to noncompliance and poor  insight  Appreciate psych eval    Prognosis for limb salvage poor  Will need placement. Difficult discharge

## 2018-05-23 NOTE — DISCHARGE SUMMARY
[facility] has followed up with pending snf referrals.     [facility] mails for admissions for [facility] at [facility].     [facility] declined : No Medicaid beds available per [facility] (admissions).  [facility] [place] [facility] available.

## 2018-05-23 NOTE — PROGRESS NOTES
"LIMB PRESERVATION SERVICE     52 y.o. male with a past medical history that includes borderline diabetes, anxiety, HTN, and Hep C, .  admitted for dehiscence and infection of left great toe amputation site.  Patient was seen at LPS rounds in the wound clinic on 5/11 and was directly admitted to [facility] ([place]  He previously underwent left great toe amp on 4/1/18 by Dr. Baez, followed by a revision on 4/23 by Dr. Easton. Pt did not f/u at hospitals for wound care or for his antibiotics.    S/P I&D with secondary closure of left great toe amputation site on 5/12 by Dr. Baez    /78   Pulse 60   Temp 36.2 °C (97.1 °F)   Resp 16   Ht 1.93 m (6' 4\")   Wt 101.6 kg (223 lb 15.8 oz)   SpO2 93%   BMI 27.26 kg/m²    Blood glucose 209    Patient denies F/C/N/V, pain well controlled    Surgical site well approximated, dried blood along incision line  Shallow open wounds to left lateral 5th MTH, and medial foot    ID managing abx, Zosyn and Dapto x 6 weeks    Case management working on SNF placement, no accepting facilities as yet    PLAN  - Dressing changes to amp site and foot wounds q 48 hrs  -IV abx per ID recs  -offloading shoe ordered  -HW LLE  -Discharge planning   -Sutures out at 3 weeks post-op        "

## 2018-05-23 NOTE — DISCHARGE PLANNING
Agency/Facility Name: [facility]    Spoke To: [place]   Outcome: pt not accepted. No medicaid beds available at the moment

## 2018-05-23 NOTE — PROGRESS NOTES
Received alert and oriented x 4, still c/o let big toe pain, due med given as ordered, call light within reach, bed alarm in placed, upset about bed alarm, health teaching given, needs attended, will continue to monitor.

## 2018-05-23 NOTE — DISCHARGE PLANNING
Agency/Facility Name: [facility]    Spoke To: Phoebe  Outcome: Declined,no medicaid beds available.

## 2018-05-24 LAB
CK SERPL-CCNC: 21 U/L (ref 0–154)
GLUCOSE BLD-MCNC: 154 MG/DL (ref 65–99)
GLUCOSE BLD-MCNC: 82 MG/DL (ref 65–99)

## 2018-05-24 PROCEDURE — 82962 GLUCOSE BLOOD TEST: CPT | Mod: 91

## 2018-05-24 PROCEDURE — 700102 HCHG RX REV CODE 250 W/ 637 OVERRIDE(OP): Performed by: HOSPITALIST

## 2018-05-24 PROCEDURE — 700105 HCHG RX REV CODE 258: Performed by: INTERNAL MEDICINE

## 2018-05-24 PROCEDURE — 700111 HCHG RX REV CODE 636 W/ 250 OVERRIDE (IP): Performed by: INTERNAL MEDICINE

## 2018-05-24 PROCEDURE — 770021 HCHG ROOM/CARE - ISO PRIVATE

## 2018-05-24 PROCEDURE — 82550 ASSAY OF CK (CPK): CPT

## 2018-05-24 PROCEDURE — A9270 NON-COVERED ITEM OR SERVICE: HCPCS | Performed by: HOSPITALIST

## 2018-05-24 PROCEDURE — 99232 SBSQ HOSP IP/OBS MODERATE 35: CPT | Performed by: FAMILY MEDICINE

## 2018-05-24 RX ADMIN — QUETIAPINE FUMARATE 200 MG: 100 TABLET ORAL at 20:12

## 2018-05-24 RX ADMIN — GABAPENTIN 300 MG: 300 CAPSULE ORAL at 15:04

## 2018-05-24 RX ADMIN — SENNOSIDES AND DOCUSATE SODIUM 1 TABLET: 8.6; 5 TABLET ORAL at 20:11

## 2018-05-24 RX ADMIN — METFORMIN HYDROCHLORIDE 500 MG: 500 TABLET, FILM COATED ORAL at 08:43

## 2018-05-24 RX ADMIN — ENOXAPARIN SODIUM 40 MG: 100 INJECTION SUBCUTANEOUS at 08:43

## 2018-05-24 RX ADMIN — PIPERACILLIN SODIUM AND TAZOBACTAM SODIUM 3.38 G: 3; .375 INJECTION, POWDER, FOR SOLUTION INTRAVENOUS at 15:04

## 2018-05-24 RX ADMIN — METFORMIN HYDROCHLORIDE 500 MG: 500 TABLET, FILM COATED ORAL at 18:00

## 2018-05-24 RX ADMIN — BENZTROPINE MESYLATE 1 MG: 1 TABLET ORAL at 20:12

## 2018-05-24 RX ADMIN — PIPERACILLIN SODIUM AND TAZOBACTAM SODIUM 3.38 G: 3; .375 INJECTION, POWDER, FOR SOLUTION INTRAVENOUS at 23:59

## 2018-05-24 RX ADMIN — DAPTOMYCIN 620 MG: 500 INJECTION, POWDER, LYOPHILIZED, FOR SOLUTION INTRAVENOUS at 12:52

## 2018-05-24 RX ADMIN — INSULIN HUMAN 2 UNITS: 100 INJECTION, SOLUTION PARENTERAL at 18:00

## 2018-05-24 RX ADMIN — FLUOXETINE HYDROCHLORIDE 60 MG: 20 CAPSULE ORAL at 08:42

## 2018-05-24 RX ADMIN — PIPERACILLIN SODIUM AND TAZOBACTAM SODIUM 3.38 G: 3; .375 INJECTION, POWDER, FOR SOLUTION INTRAVENOUS at 05:19

## 2018-05-24 RX ADMIN — VALPROIC ACID 250 MG: 250 CAPSULE, LIQUID FILLED ORAL at 20:12

## 2018-05-24 RX ADMIN — QUETIAPINE FUMARATE 100 MG: 100 TABLET ORAL at 12:51

## 2018-05-24 RX ADMIN — LORAZEPAM 1 MG: 1 TABLET ORAL at 08:50

## 2018-05-24 RX ADMIN — QUETIAPINE FUMARATE 100 MG: 100 TABLET ORAL at 08:43

## 2018-05-24 RX ADMIN — BENZTROPINE MESYLATE 1 MG: 1 TABLET ORAL at 08:42

## 2018-05-24 RX ADMIN — VALPROIC ACID 250 MG: 250 CAPSULE, LIQUID FILLED ORAL at 08:43

## 2018-05-24 RX ADMIN — GABAPENTIN 300 MG: 300 CAPSULE ORAL at 20:11

## 2018-05-24 RX ADMIN — GABAPENTIN 300 MG: 300 CAPSULE ORAL at 08:43

## 2018-05-24 ASSESSMENT — ENCOUNTER SYMPTOMS
PALPITATIONS: 0
HEMOPTYSIS: 0
CHILLS: 0
MYALGIAS: 0
BACK PAIN: 0
VOMITING: 0
CLAUDICATION: 0
BLURRED VISION: 0
WEIGHT LOSS: 0
HEADACHES: 0
PHOTOPHOBIA: 0
DIZZINESS: 0
NAUSEA: 0
SINUS PAIN: 0
TINGLING: 0
ABDOMINAL PAIN: 0
FEVER: 0
NECK PAIN: 0
DOUBLE VISION: 0
HEARTBURN: 0
SPUTUM PRODUCTION: 0
SHORTNESS OF BREATH: 0
SENSORY CHANGE: 1
COUGH: 0

## 2018-05-24 ASSESSMENT — PAIN SCALES - GENERAL
PAINLEVEL_OUTOF10: 0

## 2018-05-24 NOTE — PROGRESS NOTES
[facility]    Date of Service: 2018    Chief Complaint  52 y.o. male admitted 2018 with diabetic left foot ulcer.    Interval Problem Update  SNF    Consultants/Specialty  I.D  ortho    Disposition  Awaiting SNF acceptance        Review of Systems   Constitutional: Negative for chills, fever and weight loss.   HENT: Negative for congestion, nosebleeds and sinus pain.    Eyes: Negative for blurred vision, double vision and photophobia.   Respiratory: Negative for cough, hemoptysis and sputum production.    Cardiovascular: Negative for chest pain, palpitations and claudication.   Gastrointestinal: Negative for heartburn, nausea and vomiting.   Genitourinary: Negative for dysuria, frequency and urgency.   Musculoskeletal: Negative for back pain, myalgias and neck pain.   Skin: Negative for itching and rash.   Neurological: Negative for dizziness, tingling and headaches.      Physical Exam  Laboratory/Imaging   Hemodynamics  Temp (24hrs), Av.7 °C (98.1 °F), Min:36.7 °C (98 °F), Max:36.9 °C (98.4 °F)   Temperature: 36.9 °C (98.4 °F)  Pulse  Av.7  Min: 52  Max: 112   Blood Pressure: 111/79      Respiratory      Respiration: 20, Pulse Oximetry: 93 %     Work Of Breathing / Effort:  (normal)  RUL Breath Sounds: Clear, RML Breath Sounds: Clear, RLL Breath Sounds: Clear, LORENA Breath Sounds: Clear, LLL Breath Sounds: Clear    Fluids    Intake/Output Summary (Last 24 hours) at 18 1010  Last data filed at 18 2030   Gross per 24 hour   Intake              580 ml   Output                0 ml   Net              580 ml       Nutrition  Orders Placed This Encounter   Procedures   • DIET ORDER     Standing Status:   Standing     Number of Occurrences:   1     Order Specific Question:   Diet:     Answer:   Regular [1]     Physical Exam   Constitutional: He is oriented to person, place, and time. He appears well-developed and well-nourished.   HENT:   Head: Normocephalic and  atraumatic.   Eyes: Conjunctivae are normal. Pupils are equal, round, and reactive to light.   Neck: No tracheal deviation present. No thyromegaly present.   Cardiovascular: Normal rate and regular rhythm.    Pulmonary/Chest: Effort normal and breath sounds normal.   Abdominal: Soft. Bowel sounds are normal.   Musculoskeletal: He exhibits no edema.   Neurological: He is alert and oriented to person, place, and time.   Skin: Skin is warm.   Dressing look clean on the left foot       Recent Labs      05/23/18   0442   WBC  5.7   RBC  4.66*   HEMOGLOBIN  14.2   HEMATOCRIT  42.0   MCV  90.1   MCH  30.5   MCHC  33.8   RDW  45.5   PLATELETCT  156*   MPV  9.8     Recent Labs      05/23/18   0442   SODIUM  138   POTASSIUM  4.2   CHLORIDE  106   CO2  25   GLUCOSE  131*   BUN  23*   CREATININE  0.79   CALCIUM  9.0                      Assessment/Plan     Diabetic ulcer of left foot associated with type 2 diabetes mellitus, with muscle involvement without evidence of necrosis (HCC)- (present on admission)   Assessment & Plan    Hx of diabetic nonhealing ulcer, hx of previous Left first toe amputation,  Failed outpatient antibiotics  MRI + OM  S/p left great amp prox phalanx on 4/1-Wound cx - group G and C strep, morganella , diphtheroids  S/p left great toe amp down to MTP joint on 4/23. Clean margins obtained  Ortho input is noted  Zosyn  daptomycin  I.D input is noted and 6weeks antibiotics  Ortho input is noted  Awaiting SNF acceptance  Pt is seen at the bed side with the nurse and answered all of his questions.            Acute hematogenous osteomyelitis of left foot (HCC)- (present on admission)   Assessment & Plan    As above  Continue IV abx.        Diabetic peripheral neuropathy (HCC)- (present on admission)   Assessment & Plan    Pain controlled  Continue neurontin 300mg TID        Dehiscence of surgical wound- (present on admission)   Assessment & Plan    Continue Wound care         Psychiatric disorder- (present on  admission)   Assessment & Plan    Continue congentin, seroquel, prozac  Continue valproate for mood control        Type 2 diabetes mellitus with neurologic complication, without long-term current use of insulin (HCC)- (present on admission)   Assessment & Plan    Controlled, last A1c 5.8  accu checks are noted   metformin            Quality-Core Measures   DVT prophylaxis pharmacological::  Enoxaparin (Lovenox)

## 2018-05-24 NOTE — DISCHARGE PLANNING
Anticipated Discharge Disposition: SNF     PASRR: [id number]  LOC: [id number]      Action: LOC completed. LSW requested [facility], [place], follow [facility].      Barriers to discharge: Pt has been declined at [facility], and [facility] due to no available Medicaid beds.       Plan: Pt to dc to accepting SNF for wound care and IV ABX.

## 2018-05-24 NOTE — CARE PLAN
Problem: Communication  Goal: The ability to communicate needs accurately and effectively will improve  Outcome: NOT MET  Patient encouraged to use call light for needs    Problem: Skin Integrity  Goal: Risk for impaired skin integrity will decrease  Outcome: NOT MET  Patient refusing to wear off loading heel boots. Continues to put leg on top of edge of bed after educating about skin break down. Will continue to educate patient.

## 2018-05-24 NOTE — CARE PLAN
Problem: Safety  Goal: Will remain free from falls  Pt educated on fall prevention, indicates understanding, however reinforcement needed. Bed locked, lowest position, side rails 2/4 up and bed alarm and non skid socks on. Call light in reach. Pt next to nurses station, hourly rounding performed, will continue to monitor and reassess.     Problem: Pain Management  Goal: Pain level will decrease to patient's comfort goal  Pt sleeping when RN entered room, pt drowsy, but awakens to name call. Pt does not voice pain, able to move all extremities and falls back asleep after stimulation ends. Scheduled medications administered, will continue to monitor and reassess pain.

## 2018-05-24 NOTE — PROGRESS NOTES
"Dr. Hernandez notified patient up ambulating with ortho boot and refusing assistance. Patient very unsteady on feet and leaning back while walking. Patient refusing fall risk education. Provider at bedside with patient and educated patient on fall risk and allowing staff to help him. Patient stated \"ok.\"  "

## 2018-05-24 NOTE — DISCHARGE PLANNING
[facility] has followed up with SNF referrals. [facility] have declined as well as [facility] due to : no available medicaid beds.     [facility] has left voicemail for [facility].

## 2018-05-24 NOTE — PROGRESS NOTES
"Infectious Disease Progress Note    Author: Juliane Vaughn M.D. Date & Time of service: 2018  10:16 AM    Chief Complaint:  Left foot wound dehiscence with osteomyelitis f/u    Interval History:  52 y.o. WM admitted 2018 due to left foot wound dehiscence at amp site   AF WBC 5 denies any pain-ate all of breakfast   AF plan for repeat surgery today. No new complaints  5/15 AF WBC 4.6 cxs now polymicrobial somnolent   AF alert and anxious today, jittery states \"I don't want them to cut my leg off\"   AF less anxious today-apparently refused PICC. Seen with LPS   AF no CBC, does not believe he has recurrent infection bc he marcum not have pain, anxious to per RN   AF pt states his BS was 114 this am, poor insight into illness   AF no complaints   AF denies SE from abx   AF no CBC states he has drainage from left 2nd toe, ongoing pain in left foot but controlled  Labs Reviewed, Medications Reviewed, Radiology Reviewed and Wound Reviewed.    Review of Systems:  Review of Systems   Constitutional: Negative for chills and fever.   Respiratory: Negative for cough and shortness of breath.    Cardiovascular: Negative for chest pain.   Gastrointestinal: Negative for abdominal pain, nausea and vomiting.   Musculoskeletal: Positive for joint pain. Negative for myalgias.   Neurological: Positive for sensory change.        Neuropathy   All other systems reviewed and are negative.      Hemodynamics:  Temp (24hrs), Av.7 °C (98.1 °F), Min:36.7 °C (98 °F), Max:36.9 °C (98.4 °F)  Temperature: 36.9 °C (98.4 °F)  Pulse  Av.7  Min: 52  Max: 112  Blood Pressure: 111/79       Physical Exam:  Physical Exam   Constitutional: He appears well-developed and well-nourished.   Disheveled    Restless   HENT:   Head: Normocephalic and atraumatic.   Poor dentition   Eyes: EOM are normal. Pupils are equal, round, and reactive to light.   Neck: Neck supple.   Cardiovascular: Normal rate.  "   Pulmonary/Chest: Effort normal. He has no wheezes. He has no rales.   Abdominal: Bowel sounds are normal.   Musculoskeletal: He exhibits no edema.   Decreased edema  LUE PICC   Neurological: He is alert.     Flat affect   Skin: No rash noted.   DTIs to lat and dorsum left foot-lateral with increased drainage  Dry eschar over surgical site. One suture remains in place   Psychiatric:   Poor insight into illness     Nursing note and vitals reviewed.      Meds:    Current Facility-Administered Medications:   •  metFORMIN  •  PICC Line Insertion has been implemented **AND** May use Lidocaine 1% not to exceed 3 mls for local at insertion site **AND** NOTIFY MD **AND** Tip to dwell in the superior vena cava **AND** Do not use PICC Line until placement verified by Chest X Ray **AND** [CANCELED] DX-CHEST-FOR PICC LINE Perform procedure in: Patient's Room **AND** If radiologist reading of chest X-ray states any of the following the PICC should be used **AND** Further evaluation of the PICC placement can be retrieved from X-Ray and Imaging **AND** Blood draws through PICC line; draws by RN only **AND** FLUSHING GUIDELINES WHEN IN USE **AND** normal saline PF **AND** FLUSHING GUIDELINES WHEN NOT IN USE **AND** DRESSING MAINTENANCE **AND** Change needleless pressure ports and IV tubing every 72 hours per hospital policy **AND** TUBING **AND** If there is an MD order to remove the PICC line, any RN may remove the PICC line **AND** [] PATIENT EDUCATION MATERIALS **AND** NURSING COMMUNICATION  •  gabapentin  •  LORazepam  •  oxyCODONE immediate-release  •  acetaminophen  •  valproic acid  •  DAPTOmycin  •  [COMPLETED] piperacillin-tazobactam **AND** piperacillin-tazobactam  •  enoxaparin (LOVENOX) injection  •  senna-docusate **AND** polyethylene glycol/lytes **AND** magnesium hydroxide **AND** bisacodyl  •  Respiratory Care per Protocol  •  insulin regular **AND** Accu-Chek ACHS **AND** NOTIFY MD and PharmD **AND**  glucose 4 g **AND** dextrose 50%  •  benztropine  •  FLUoxetine  •  QUEtiapine **AND** QUEtiapine    Labs:  Recent Labs      05/23/18   0442   WBC  5.7   RBC  4.66*   HEMOGLOBIN  14.2   HEMATOCRIT  42.0   MCV  90.1   MCH  30.5   RDW  45.5   PLATELETCT  156*   MPV  9.8   NEUTSPOLYS  39.90*   LYMPHOCYTES  47.10*   MONOCYTES  8.20   EOSINOPHILS  3.50   BASOPHILS  0.90     Recent Labs      05/23/18   0442  05/24/18   0722   SODIUM  138   --    POTASSIUM  4.2   --    CHLORIDE  106   --    CO2  25   --    GLUCOSE  131*   --    BUN  23*   --    CPKTOTAL   --   21     Recent Labs      05/23/18   0442   CREATININE  0.79       Imaging:  Dx-foot-2- Left    Result Date: 4/20/2018 4/20/2018 10:19 AM HISTORY/REASON FOR EXAM:  Pain/Deformity Following Trauma Weeping great toe TECHNIQUE/EXAM DESCRIPTION AND NUMBER OF VIEWS: 2 nonweightbearing views of the LEFT foot. COMPARISON:  3/31/2018 FINDINGS: Patient is status post amputation of the first digit at the level of the proximal phalanx. There is minimal osseous irregularity at the level of the amputation. There is faint adjacent calcification which may be dystrophic.  There are mild degenerative changes of the first tarsometatarsal joint. No acute fracture or dislocation is seen. There is soft tissue swelling about the medial forefoot and remainder of the first digit.     Post surgical changes status post amputation at the level of the proximal phalanx of the first digit. Linear amorphous calcification adjacent to the amputation may be dystrophic. There is minimal osseous irregularity at the level of the amputation where it is difficult to exclude osteomyelitis. Soft tissue swelling of the forefoot and remainder of the first digit.     Mr-foot-with & W/o Left    Result Date: 5/12/2018 5/12/2018 10:44 AM HISTORY/REASON FOR EXAM:  Open wound. Open wound,?surgical site at great toe amp site, please eval for OM and/ or abscess. TECHNIQUE/EXAM DESCRIPTION: MRI of the LEFT foot with  and without contrast. The study was performed on a [employer]now [employer] Signa 1.5 Steph MRI scanner. T1 axial, T1 sagittal, T1 coronal, STIR sagittal, T2 fast spin-echo fat-suppressed coronal, sagittal inversion recovery, and T1 postcontrast coronal images were obtained. 20 mL Omniscan contrast was administered intravenously. COMPARISON: Foot radiograph 4/20/2018. FINDINGS: Diffuse soft tissue swelling about the dorsum of foot. Increased signal within the intrinsic foot muscles, likely related to neuropathic change. There is an open ulcer extending from the great toe amputation site into the first metatarsal head with marrow replacement. No walled fluid collection seen.     Large open ulcer extending from the great toe amputation site into the first metatarsal head with osteomyelitis of the first metatarsal head. No walled fluid collection seen.      Micro:  Results     ** No results found for the last 168 hours. **          Assessment:  Active Hospital Problems    Diagnosis   • Diabetic ulcer of left foot associated with type 2 diabetes mellitus, with muscle involvement without evidence of necrosis (HCC) [E11.621, L97.525]   • Dehiscence of surgical wound [T81.31XA]   • Diabetic peripheral neuropathy (HCC) [E11.42]   • Psychiatric disorder [F99]   • Type 2 diabetes mellitus with neurologic complication, without long-term current use of insulin (HCC) [E11.49]       Plan:  Left foot osteomyelitis  Afebrile  No leukocytosis   S/p left great amp prox phalanx on 4/1- cx polymicrobial: group G and C strep, morganella , diphtheroids  S/p left great toe amp down to MTP joint on 4/23. Clean margins obtained per OP note- OR cx (proximal bone) - mixed skin christine  Failed outpatient therapy-clinical osteomyelitis, probe to bone and MRI +ostemyelitis  s/p I and D 5/12-MRSA, staph haemolyticus(MRSE), peptostreptococcus, and Pasteurella  s/p I and D 5/14  Continue Unasyn and dapto (vanco JUNIOR is 2)  Monitor CPK weekly  Endpoint clinical-anticipate  6 weeks IV if no TMA/ BKA  Stop date 6/26/2018    DM2  Last HgA1c 5.8  Keep BS under 150 to help control current infection    Psychiatric disorder  Contributing to noncompliance and poor insight  Appreciate psych eval    Prognosis for limb salvage poor  Will need placement. Difficult discharge

## 2018-05-25 LAB
ANION GAP SERPL CALC-SCNC: 6 MMOL/L (ref 0–11.9)
BASOPHILS # BLD AUTO: 1.1 % (ref 0–1.8)
BASOPHILS # BLD: 0.06 K/UL (ref 0–0.12)
BUN SERPL-MCNC: 24 MG/DL (ref 8–22)
CALCIUM SERPL-MCNC: 9 MG/DL (ref 8.5–10.5)
CHLORIDE SERPL-SCNC: 109 MMOL/L (ref 96–112)
CO2 SERPL-SCNC: 24 MMOL/L (ref 20–33)
CREAT SERPL-MCNC: 0.88 MG/DL (ref 0.5–1.4)
EOSINOPHIL # BLD AUTO: 0.2 K/UL (ref 0–0.51)
EOSINOPHIL NFR BLD: 3.8 % (ref 0–6.9)
ERYTHROCYTE [DISTWIDTH] IN BLOOD BY AUTOMATED COUNT: 47 FL (ref 35.9–50)
GLUCOSE BLD-MCNC: 106 MG/DL (ref 65–99)
GLUCOSE BLD-MCNC: 107 MG/DL (ref 65–99)
GLUCOSE BLD-MCNC: 124 MG/DL (ref 65–99)
GLUCOSE BLD-MCNC: 125 MG/DL (ref 65–99)
GLUCOSE BLD-MCNC: 131 MG/DL (ref 65–99)
GLUCOSE BLD-MCNC: 95 MG/DL (ref 65–99)
GLUCOSE SERPL-MCNC: 120 MG/DL (ref 65–99)
HCT VFR BLD AUTO: 42.2 % (ref 42–52)
HGB BLD-MCNC: 13.8 G/DL (ref 14–18)
IMM GRANULOCYTES # BLD AUTO: 0.01 K/UL (ref 0–0.11)
IMM GRANULOCYTES NFR BLD AUTO: 0.2 % (ref 0–0.9)
LYMPHOCYTES # BLD AUTO: 2.44 K/UL (ref 1–4.8)
LYMPHOCYTES NFR BLD: 46.3 % (ref 22–41)
MCH RBC QN AUTO: 29.9 PG (ref 27–33)
MCHC RBC AUTO-ENTMCNC: 32.7 G/DL (ref 33.7–35.3)
MCV RBC AUTO: 91.3 FL (ref 81.4–97.8)
MONOCYTES # BLD AUTO: 0.34 K/UL (ref 0–0.85)
MONOCYTES NFR BLD AUTO: 6.5 % (ref 0–13.4)
NEUTROPHILS # BLD AUTO: 2.22 K/UL (ref 1.82–7.42)
NEUTROPHILS NFR BLD: 42.1 % (ref 44–72)
NRBC # BLD AUTO: 0 K/UL
NRBC BLD-RTO: 0 /100 WBC
PLATELET # BLD AUTO: 151 K/UL (ref 164–446)
PMV BLD AUTO: 9.7 FL (ref 9–12.9)
POTASSIUM SERPL-SCNC: 3.9 MMOL/L (ref 3.6–5.5)
RBC # BLD AUTO: 4.62 M/UL (ref 4.7–6.1)
SODIUM SERPL-SCNC: 139 MMOL/L (ref 135–145)
WBC # BLD AUTO: 5.3 K/UL (ref 4.8–10.8)

## 2018-05-25 PROCEDURE — A9270 NON-COVERED ITEM OR SERVICE: HCPCS | Performed by: HOSPITALIST

## 2018-05-25 PROCEDURE — 700105 HCHG RX REV CODE 258: Performed by: INTERNAL MEDICINE

## 2018-05-25 PROCEDURE — 700111 HCHG RX REV CODE 636 W/ 250 OVERRIDE (IP): Performed by: INTERNAL MEDICINE

## 2018-05-25 PROCEDURE — 700102 HCHG RX REV CODE 250 W/ 637 OVERRIDE(OP): Performed by: HOSPITALIST

## 2018-05-25 PROCEDURE — 80048 BASIC METABOLIC PNL TOTAL CA: CPT

## 2018-05-25 PROCEDURE — 82962 GLUCOSE BLOOD TEST: CPT | Mod: 91

## 2018-05-25 PROCEDURE — 99231 SBSQ HOSP IP/OBS SF/LOW 25: CPT | Performed by: FAMILY MEDICINE

## 2018-05-25 PROCEDURE — 85025 COMPLETE CBC W/AUTO DIFF WBC: CPT

## 2018-05-25 PROCEDURE — 770021 HCHG ROOM/CARE - ISO PRIVATE

## 2018-05-25 RX ADMIN — DAPTOMYCIN 620 MG: 500 INJECTION, POWDER, LYOPHILIZED, FOR SOLUTION INTRAVENOUS at 15:05

## 2018-05-25 RX ADMIN — PIPERACILLIN SODIUM AND TAZOBACTAM SODIUM 3.38 G: 3; .375 INJECTION, POWDER, FOR SOLUTION INTRAVENOUS at 21:03

## 2018-05-25 RX ADMIN — FLUOXETINE HYDROCHLORIDE 60 MG: 20 CAPSULE ORAL at 08:29

## 2018-05-25 RX ADMIN — QUETIAPINE FUMARATE 100 MG: 100 TABLET ORAL at 14:20

## 2018-05-25 RX ADMIN — METFORMIN HYDROCHLORIDE 500 MG: 500 TABLET, FILM COATED ORAL at 08:29

## 2018-05-25 RX ADMIN — PIPERACILLIN SODIUM AND TAZOBACTAM SODIUM 3.38 G: 3; .375 INJECTION, POWDER, FOR SOLUTION INTRAVENOUS at 14:19

## 2018-05-25 RX ADMIN — QUETIAPINE FUMARATE 100 MG: 100 TABLET ORAL at 08:29

## 2018-05-25 RX ADMIN — BENZTROPINE MESYLATE 1 MG: 1 TABLET ORAL at 08:29

## 2018-05-25 RX ADMIN — QUETIAPINE FUMARATE 200 MG: 100 TABLET ORAL at 20:15

## 2018-05-25 RX ADMIN — BENZTROPINE MESYLATE 1 MG: 1 TABLET ORAL at 20:15

## 2018-05-25 RX ADMIN — GABAPENTIN 300 MG: 300 CAPSULE ORAL at 08:29

## 2018-05-25 RX ADMIN — PIPERACILLIN SODIUM AND TAZOBACTAM SODIUM 3.38 G: 3; .375 INJECTION, POWDER, FOR SOLUTION INTRAVENOUS at 04:52

## 2018-05-25 RX ADMIN — GABAPENTIN 300 MG: 300 CAPSULE ORAL at 14:19

## 2018-05-25 RX ADMIN — ENOXAPARIN SODIUM 40 MG: 100 INJECTION SUBCUTANEOUS at 08:28

## 2018-05-25 RX ADMIN — GABAPENTIN 300 MG: 300 CAPSULE ORAL at 20:15

## 2018-05-25 RX ADMIN — VALPROIC ACID 250 MG: 250 CAPSULE, LIQUID FILLED ORAL at 20:15

## 2018-05-25 RX ADMIN — LORAZEPAM 1 MG: 1 TABLET ORAL at 08:29

## 2018-05-25 RX ADMIN — VALPROIC ACID 250 MG: 250 CAPSULE, LIQUID FILLED ORAL at 08:29

## 2018-05-25 RX ADMIN — METFORMIN HYDROCHLORIDE 500 MG: 500 TABLET, FILM COATED ORAL at 18:01

## 2018-05-25 ASSESSMENT — ENCOUNTER SYMPTOMS
TREMORS: 0
SENSORY CHANGE: 1
BACK PAIN: 0
CHILLS: 0
CLAUDICATION: 0
EYE PAIN: 0
NAUSEA: 0
WHEEZING: 0
SHORTNESS OF BREATH: 0
FLANK PAIN: 0
ORTHOPNEA: 0
DIARRHEA: 0
SPUTUM PRODUCTION: 0
MYALGIAS: 0
EYE REDNESS: 0
SENSORY CHANGE: 0
DIAPHORESIS: 0
TINGLING: 0
COUGH: 0
VOMITING: 0
NECK PAIN: 0
ABDOMINAL PAIN: 0
WEAKNESS: 0
FEVER: 0
EYE DISCHARGE: 0

## 2018-05-25 ASSESSMENT — PAIN SCALES - GENERAL
PAINLEVEL_OUTOF10: 3
PAINLEVEL_OUTOF10: 0
PAINLEVEL_OUTOF10: 0

## 2018-05-25 NOTE — PROGRESS NOTES
[facility]    Date of Service: 2018    Chief Complaint  52 y.o. male admitted 2018 with diabetic left foot ulcer.    Interval Problem Update  SNF    Consultants/Specialty  I.D  ortho    Disposition  Awaiting SNF acceptance        Review of Systems   Constitutional: Negative for diaphoresis and malaise/fatigue.   HENT: Negative for ear pain, hearing loss and tinnitus.    Eyes: Negative for pain, discharge and redness.   Respiratory: Negative for sputum production, shortness of breath and wheezing.    Cardiovascular: Negative for orthopnea, claudication and leg swelling.   Gastrointestinal: Negative for abdominal pain, diarrhea and vomiting.   Genitourinary: Negative for flank pain, frequency and hematuria.   Musculoskeletal: Negative for back pain and neck pain.   Skin: Negative for itching and rash.   Neurological: Negative for tingling, tremors, sensory change and weakness.      Physical Exam  Laboratory/Imaging   Hemodynamics  Temp (24hrs), Av.7 °C (98.1 °F), Min:36.5 °C (97.7 °F), Max:36.9 °C (98.4 °F)   Temperature: 36.6 °C (97.8 °F)  Pulse  Av.6  Min: 52  Max: 112   Blood Pressure: 112/78      Respiratory      Respiration: 12, Pulse Oximetry: 96 %     Work Of Breathing / Effort:  (normal)  RUL Breath Sounds: Clear, RML Breath Sounds: Clear, RLL Breath Sounds: Clear, LORENA Breath Sounds: Clear, LLL Breath Sounds: Clear    Fluids    Intake/Output Summary (Last 24 hours) at 18 1026  Last data filed at 18 2000   Gross per 24 hour   Intake              250 ml   Output              450 ml   Net             -200 ml       Nutrition  Orders Placed This Encounter   Procedures   • DIET ORDER     Standing Status:   Standing     Number of Occurrences:   1     Order Specific Question:   Diet:     Answer:   Regular [1]     Physical Exam   Constitutional: He is oriented to person, place, and time. No distress.   HENT:   Right Ear: External ear normal.   Left Ear: External ear  normal.   Eyes: Right eye exhibits no discharge. Left eye exhibits no discharge.   Neck: Normal range of motion. Neck supple.   Cardiovascular: Normal heart sounds.    Pulmonary/Chest: No respiratory distress. He has no wheezes. He has no rales.   Abdominal: He exhibits no distension. There is no tenderness. There is no rebound.   Musculoskeletal: He exhibits no edema.   Neurological: He is alert and oriented to person, place, and time.   Skin: Skin is warm. He is not diaphoretic.   Dressing look clean on the left foot       Recent Labs      05/23/18   0442  05/25/18   0454   WBC  5.7  5.3   RBC  4.66*  4.62*   HEMOGLOBIN  14.2  13.8*   HEMATOCRIT  42.0  42.2   MCV  90.1  91.3   MCH  30.5  29.9   MCHC  33.8  32.7*   RDW  45.5  47.0   PLATELETCT  156*  151*   MPV  9.8  9.7     Recent Labs      05/23/18 0442  05/25/18   0454   SODIUM  138  139   POTASSIUM  4.2  3.9   CHLORIDE  106  109   CO2  25  24   GLUCOSE  131*  120*   BUN  23*  24*   CREATININE  0.79  0.88   CALCIUM  9.0  9.0                      Assessment/Plan     Diabetic ulcer of left foot associated with type 2 diabetes mellitus, with muscle involvement without evidence of necrosis (HCC)- (present on admission)   Assessment & Plan    Hx of diabetic nonhealing ulcer, hx of previous Left first toe amputation,  Failed outpatient antibiotics  MRI + OM  S/p left great amp prox phalanx on 4/1-Wound cx - group G and C strep, morganella , diphtheroids  S/p left great toe amp down to MTP joint on 4/23. Clean margins obtained  Ortho input is noted  Zosyn  daptomycin  I.D input is noted and 6weeks antibiotics  Ortho input is noted  Awaiting SNF acceptance   I have answered all of his questions            Acute hematogenous osteomyelitis of left foot (HCC)- (present on admission)   Assessment & Plan    As above  Continue IV abx.        Diabetic peripheral neuropathy (HCC)- (present on admission)   Assessment & Plan    Pain controlled  Continue neurontin 300mg TID         Dehiscence of surgical wound- (present on admission)   Assessment & Plan    Continue Wound care         Psychiatric disorder- (present on admission)   Assessment & Plan    Continue congentin, seroquel, prozac  Continue valproate for mood control        Type 2 diabetes mellitus with neurologic complication, without long-term current use of insulin (HCC)- (present on admission)   Assessment & Plan    Controlled, last A1c 5.8  accu checks are noted   metformin            Quality-Core Measures   DVT prophylaxis pharmacological::  Enoxaparin (Lovenox)

## 2018-05-25 NOTE — CARE PLAN
Problem: Safety  Goal: Will remain free from injury  Pt educated on fall prevention. Pt denies need for restroom or other ambulation at this time. Pt alert and oriented x4 at this time. Pt reinforced on use of call light for assistance. Bed locked, lowest position, side rails 2/4 and call light in reach. Non skid socks on, bed alarm on and audible. Room nearest to nurses station. Hourly rounding. Will continue to monitor and reassess.     Problem: Pain Management  Goal: Pain level will decrease to patient's comfort goal  Pt able to move around in bed, and does not voice pain at this time. Resting quietly. Will continue to monitor and reassess.

## 2018-05-25 NOTE — DISCHARGE PLANNING
Anticipated Discharge Disposition: SNF     PASRR: [id number]  LOC: [id number]      Action: LSW notified that all SNF's have declined due to no medicaid beds available. LSW requested [facility] to continue to check on medicaid bed availability so when a bed opens up then pt can transfer to SNF. Pt was discussed in IDT rounds and LSW inquired if pt could go to [facility] w/ HH following and outpt infusion appointments. MD stated that this pt needs daily wound care and reports the pt needs to transfer to 24/7 care until IV ABX are completed.        Barriers to discharge: Pt has been declined by all SNF's due to no available Medicaid beds at this time.       Plan: Pt to dc to accepting SNF for wound care and IV ABX when Medicaid bed comes available.

## 2018-05-25 NOTE — PROGRESS NOTES
"Infectious Disease Progress Note    Author: Aida Carvalho M.D. Date & Time of service: 2018  8:54 AM    Chief Complaint:  Left foot wound dehiscence with osteomyelitis f/u    Interval History:  52 y.o. WM admitted 2018 due to left foot wound dehiscence at amp site   AF WBC 5 denies any pain-ate all of breakfast   AF plan for repeat surgery today. No new complaints  5/15 AF WBC 4.6 cxs now polymicrobial somnolent   AF alert and anxious today, jittery states \"I don't want them to cut my leg off\"   AF less anxious today-apparently refused PICC. Seen with LPS   AF no CBC, does not believe he has recurrent infection bc he marcum not have pain, anxious to per RN   AF pt states his BS was 114 this am, poor insight into illness   AF no complaints   AF denies SE from abx   AF no CBC states he has drainage from left 2nd toe, ongoing pain in left foot but controlled  2018 MAXIMUM TEMPERATURE 98.4-5.3 platelets 151 creatinine 0.88  Labs Reviewed, Medications Reviewed, Radiology Reviewed and Wound Reviewed.    Review of Systems:  Review of Systems   Constitutional: Negative for chills and fever.   Respiratory: Negative for cough and shortness of breath.    Cardiovascular: Negative for chest pain.   Gastrointestinal: Negative for abdominal pain, nausea and vomiting.   Musculoskeletal: Positive for joint pain. Negative for myalgias.   Neurological: Positive for sensory change.        Neuropathy   All other systems reviewed and are negative.      Hemodynamics:  Temp (24hrs), Av.7 °C (98.1 °F), Min:36.5 °C (97.7 °F), Max:36.9 °C (98.4 °F)  Temperature: 36.6 °C (97.8 °F)  Pulse  Av.6  Min: 52  Max: 112  Blood Pressure: 112/78       Physical Exam:  Physical Exam   Constitutional: He appears well-developed and well-nourished.   Disheveled    Restless   HENT:   Head: Normocephalic and atraumatic.   Poor dentition   Eyes: EOM are normal. Pupils are equal, round, and reactive to " light.   Neck: Neck supple.   Cardiovascular: Normal rate.    Pulmonary/Chest: Effort normal. He has no wheezes. He has no rales.   Abdominal: Bowel sounds are normal.   Musculoskeletal: He exhibits no edema.   Decreased edema  LUE PICC   Neurological: He is alert.     Flat affect   Skin: No rash noted.   DTIs to lat and dorsum left foot-lateral with increased drainage  Dry eschar over surgical site. One suture remains in place   Psychiatric:   Poor insight into illness     Nursing note and vitals reviewed.      Meds:    Current Facility-Administered Medications:   •  metFORMIN  •  PICC Line Insertion has been implemented **AND** May use Lidocaine 1% not to exceed 3 mls for local at insertion site **AND** NOTIFY MD **AND** Tip to dwell in the superior vena cava **AND** Do not use PICC Line until placement verified by Chest X Ray **AND** [CANCELED] DX-CHEST-FOR PICC LINE Perform procedure in: Patient's Room **AND** If radiologist reading of chest X-ray states any of the following the PICC should be used **AND** Further evaluation of the PICC placement can be retrieved from X-Ray and Imaging **AND** Blood draws through PICC line; draws by RN only **AND** FLUSHING GUIDELINES WHEN IN USE **AND** normal saline PF **AND** FLUSHING GUIDELINES WHEN NOT IN USE **AND** DRESSING MAINTENANCE **AND** Change needleless pressure ports and IV tubing every 72 hours per hospital policy **AND** TUBING **AND** If there is an MD order to remove the PICC line, any RN may remove the PICC line **AND** [] PATIENT EDUCATION MATERIALS **AND** NURSING COMMUNICATION  •  gabapentin  •  LORazepam  •  oxyCODONE immediate-release  •  acetaminophen  •  valproic acid  •  DAPTOmycin  •  [COMPLETED] piperacillin-tazobactam **AND** piperacillin-tazobactam  •  enoxaparin (LOVENOX) injection  •  senna-docusate **AND** polyethylene glycol/lytes **AND** magnesium hydroxide **AND** bisacodyl  •  Respiratory Care per Protocol  •  insulin regular  **AND** Accu-Chek ACHS **AND** NOTIFY MD and PharmD **AND** glucose 4 g **AND** dextrose 50%  •  benztropine  •  FLUoxetine  •  QUEtiapine **AND** QUEtiapine    Labs:  Recent Labs      05/23/18 0442 05/25/18   0454   WBC  5.7  5.3   RBC  4.66*  4.62*   HEMOGLOBIN  14.2  13.8*   HEMATOCRIT  42.0  42.2   MCV  90.1  91.3   MCH  30.5  29.9   RDW  45.5  47.0   PLATELETCT  156*  151*   MPV  9.8  9.7   NEUTSPOLYS  39.90*  42.10*   LYMPHOCYTES  47.10*  46.30*   MONOCYTES  8.20  6.50   EOSINOPHILS  3.50  3.80   BASOPHILS  0.90  1.10     Recent Labs      05/23/18 0442 05/24/18   0722  05/25/18   0454   SODIUM  138   --   139   POTASSIUM  4.2   --   3.9   CHLORIDE  106   --   109   CO2  25   --   24   GLUCOSE  131*   --   120*   BUN  23*   --   24*   CPKTOTAL   --   21   --      Recent Labs      05/23/18 0442 05/25/18   0454   CREATININE  0.79  0.88       Imaging:  Dx-foot-2- Left    Result Date: 4/20/2018 4/20/2018 10:19 AM HISTORY/REASON FOR EXAM:  Pain/Deformity Following Trauma Weeping great toe TECHNIQUE/EXAM DESCRIPTION AND NUMBER OF VIEWS: 2 nonweightbearing views of the LEFT foot. COMPARISON:  3/31/2018 FINDINGS: Patient is status post amputation of the first digit at the level of the proximal phalanx. There is minimal osseous irregularity at the level of the amputation. There is faint adjacent calcification which may be dystrophic.  There are mild degenerative changes of the first tarsometatarsal joint. No acute fracture or dislocation is seen. There is soft tissue swelling about the medial forefoot and remainder of the first digit.     Post surgical changes status post amputation at the level of the proximal phalanx of the first digit. Linear amorphous calcification adjacent to the amputation may be dystrophic. There is minimal osseous irregularity at the level of the amputation where it is difficult to exclude osteomyelitis. Soft tissue swelling of the forefoot and remainder of the first digit.      Mr-foot-with & W/o Left    Result Date: 5/12/2018 5/12/2018 10:44 AM HISTORY/REASON FOR EXAM:  Open wound. Open wound,?surgical site at great toe amp site, please eval for OM and/ or abscess. TECHNIQUE/EXAM DESCRIPTION: MRI of the LEFT foot with and without contrast. The study was performed on a Pin-Digital Signa 1.5 Steph MRI scanner. T1 axial, T1 sagittal, T1 coronal, STIR sagittal, T2 fast spin-echo fat-suppressed coronal, sagittal inversion recovery, and T1 postcontrast coronal images were obtained. 20 mL Omniscan contrast was administered intravenously. COMPARISON: Foot radiograph 4/20/2018. FINDINGS: Diffuse soft tissue swelling about the dorsum of foot. Increased signal within the intrinsic foot muscles, likely related to neuropathic change. There is an open ulcer extending from the great toe amputation site into the first metatarsal head with marrow replacement. No walled fluid collection seen.     Large open ulcer extending from the great toe amputation site into the first metatarsal head with osteomyelitis of the first metatarsal head. No walled fluid collection seen.      Micro:  Results     ** No results found for the last 168 hours. **          Assessment:  Active Hospital Problems    Diagnosis   • Diabetic ulcer of left foot associated with type 2 diabetes mellitus, with muscle involvement without evidence of necrosis (HCC) [E11.621, L97.525]   • Dehiscence of surgical wound [T81.31XA]   • Diabetic peripheral neuropathy (HCC) [E11.42]   • Psychiatric disorder [F99]   • Type 2 diabetes mellitus with neurologic complication, without long-term current use of insulin (HCC) [E11.49]       Plan:  Left foot osteomyelitis  Afebrile  No leukocytosis   S/p left great amp prox phalanx on 4/1- cx polymicrobial: group G and C strep, morganella , diphtheroids  S/p left great toe amp down to MTP joint on 4/23. Clean margins obtained per OP note- OR cx (proximal bone) - mixed skin christine  Failed outpatient  therapy-clinical osteomyelitis, probe to bone and MRI +ostemyelitis  s/p I and D 5/12-MRSA, staph haemolyticus(MRSE), peptostreptococcus, and Pasteurella  s/p I and D 5/14  Continue Unasyn and dapto (vanco JUNIOR is 2)  Monitor CPK weekly  Endpoint clinical-anticipate 6 weeks IV if no TMA/ BKA  Stop date 6/26/2018    DM2  Last HgA1c 5.8  Keep BS under 150 to help control current infection    Psychiatric disorder  Contributing to noncompliance and poor insight  Appreciate psych eval    Prognosis for limb salvage poor  Will need placement. Difficult discharge  Infectious disease will sign off. Please call as needed

## 2018-05-25 NOTE — CARE PLAN
Problem: Communication  Goal: The ability to communicate needs accurately and effectively will improve  Outcome: PROGRESSING AS EXPECTED  Patient expresses needs to staff.    Problem: Safety  Goal: Will remain free from injury  Outcome: PROGRESSING AS EXPECTED  Patient has not been impulsive to get up by himself in last 24 hours

## 2018-05-26 LAB
GLUCOSE BLD-MCNC: 104 MG/DL (ref 65–99)
GLUCOSE BLD-MCNC: 122 MG/DL (ref 65–99)
GLUCOSE BLD-MCNC: 131 MG/DL (ref 65–99)
GLUCOSE BLD-MCNC: 98 MG/DL (ref 65–99)

## 2018-05-26 PROCEDURE — A9270 NON-COVERED ITEM OR SERVICE: HCPCS | Performed by: HOSPITALIST

## 2018-05-26 PROCEDURE — 82962 GLUCOSE BLOOD TEST: CPT | Mod: 91

## 2018-05-26 PROCEDURE — 99231 SBSQ HOSP IP/OBS SF/LOW 25: CPT | Performed by: FAMILY MEDICINE

## 2018-05-26 PROCEDURE — 700102 HCHG RX REV CODE 250 W/ 637 OVERRIDE(OP): Performed by: HOSPITALIST

## 2018-05-26 PROCEDURE — 700111 HCHG RX REV CODE 636 W/ 250 OVERRIDE (IP): Performed by: INTERNAL MEDICINE

## 2018-05-26 PROCEDURE — 700105 HCHG RX REV CODE 258: Performed by: INTERNAL MEDICINE

## 2018-05-26 PROCEDURE — 770021 HCHG ROOM/CARE - ISO PRIVATE

## 2018-05-26 RX ADMIN — BENZTROPINE MESYLATE 1 MG: 1 TABLET ORAL at 08:28

## 2018-05-26 RX ADMIN — QUETIAPINE FUMARATE 200 MG: 100 TABLET ORAL at 22:07

## 2018-05-26 RX ADMIN — GABAPENTIN 300 MG: 300 CAPSULE ORAL at 22:07

## 2018-05-26 RX ADMIN — FLUOXETINE HYDROCHLORIDE 60 MG: 20 CAPSULE ORAL at 08:28

## 2018-05-26 RX ADMIN — LORAZEPAM 1 MG: 1 TABLET ORAL at 22:10

## 2018-05-26 RX ADMIN — PIPERACILLIN SODIUM AND TAZOBACTAM SODIUM 3.38 G: 3; .375 INJECTION, POWDER, FOR SOLUTION INTRAVENOUS at 14:02

## 2018-05-26 RX ADMIN — METFORMIN HYDROCHLORIDE 500 MG: 500 TABLET, FILM COATED ORAL at 16:33

## 2018-05-26 RX ADMIN — VALPROIC ACID 250 MG: 250 CAPSULE, LIQUID FILLED ORAL at 22:08

## 2018-05-26 RX ADMIN — GABAPENTIN 300 MG: 300 CAPSULE ORAL at 08:28

## 2018-05-26 RX ADMIN — QUETIAPINE FUMARATE 100 MG: 100 TABLET ORAL at 11:29

## 2018-05-26 RX ADMIN — PIPERACILLIN SODIUM AND TAZOBACTAM SODIUM 3.38 G: 3; .375 INJECTION, POWDER, FOR SOLUTION INTRAVENOUS at 22:07

## 2018-05-26 RX ADMIN — GABAPENTIN 300 MG: 300 CAPSULE ORAL at 14:02

## 2018-05-26 RX ADMIN — DAPTOMYCIN 620 MG: 500 INJECTION, POWDER, LYOPHILIZED, FOR SOLUTION INTRAVENOUS at 11:29

## 2018-05-26 RX ADMIN — QUETIAPINE FUMARATE 100 MG: 100 TABLET ORAL at 08:28

## 2018-05-26 RX ADMIN — BENZTROPINE MESYLATE 1 MG: 1 TABLET ORAL at 22:07

## 2018-05-26 RX ADMIN — VALPROIC ACID 250 MG: 250 CAPSULE, LIQUID FILLED ORAL at 08:29

## 2018-05-26 RX ADMIN — PIPERACILLIN SODIUM AND TAZOBACTAM SODIUM 3.38 G: 3; .375 INJECTION, POWDER, FOR SOLUTION INTRAVENOUS at 05:05

## 2018-05-26 RX ADMIN — METFORMIN HYDROCHLORIDE 500 MG: 500 TABLET, FILM COATED ORAL at 08:29

## 2018-05-26 ASSESSMENT — ENCOUNTER SYMPTOMS
HEADACHES: 0
DOUBLE VISION: 0
BLURRED VISION: 0
BACK PAIN: 0
ORTHOPNEA: 0
COUGH: 0
TINGLING: 0
VOMITING: 0
DIZZINESS: 0
MYALGIAS: 0
NAUSEA: 0
HEMOPTYSIS: 0
WEIGHT LOSS: 0
NECK PAIN: 0
FEVER: 0
PALPITATIONS: 0
PHOTOPHOBIA: 0
CHILLS: 0
SPUTUM PRODUCTION: 0
HEARTBURN: 0

## 2018-05-26 ASSESSMENT — PAIN SCALES - GENERAL
PAINLEVEL_OUTOF10: 0
PAINLEVEL_OUTOF10: 3

## 2018-05-26 NOTE — PROGRESS NOTES
Received bedside report in SBAR format from night shift RN. Head to toe assessment complete. Patient is A&O by 4-agitated at this time. Pt has no complaints of nausea or pain.IV is patent and saline locked. Pt moderate fall risk, wearing treaded socks, bed locked and in lowest position, bed alarm on and indicated.  Pt instructed to call for assistance prior to getting out of bed, pt verbalized understanding although does not call appropriately. Call light and belongings within reach, bed in lowest position, treaded socks in place, and bed alarm on. To disuss the need for the pt to wear his left offloading boot. Hourly rounds in place. Will continue to monitor.

## 2018-05-26 NOTE — PROGRESS NOTES
"Subjective:       Reason for Consultation: S/P I and D Left Great Toe 5/12/18 Dr Baez     History of Present Illness:    Patient is well known to [facility] and outpatient wound care services.     Patient is a 52 y.o. male with a past medical history that includes borderline diabetes, anxiety, HTN, Hep C and is admitted to [facility] via [facility] for his S/P I and D Left Great Toe 5/12/18 Dr Baez.  S/P Left Great Toe Amp by Dr Easton 4/23/18. The s/p left great toe amputation site was from 4/1/18 by Dr. Baez. Pt did not follow up with the \[facility]\"" clinic for dressing care and he did not receive oral antibiotics. He also did not follow up with his PCP at \[facility]\"". Pt has Hx of noncompliance and psychiatric disorder which is likely contributory. Pt is not wearing the previously provided offloading shoes.  A1c this admission is 5.8.    Patient denies fevers chills, nausea, vomiting.      Pain:        Patient resting comfortably     /77   Pulse (!) 56   Temp 36.8 °C (98.2 °F)   Resp 18   Ht 1.93 m (6' 4\")   Wt 101.6 kg (223 lb 15.8 oz)   SpO2 94%   BMI 27.26 kg/m²        Surgical site well approximated, dried blood along incision line  Shallow open wounds to left lateral 5th MTH, and medial foot       Plan:       Treatment Plan and Recommendations:     1. Labs\Imaging:         Reviewed     2. Treatment:              Continue Wound Care by Nursing, LPS to Follow.                                      Nursing to change dressing Q 48 hours. -HWB LLE , -Sutures out Week of 6/3/18  3.Collaboration:                                                 ID managing abx, Zosyn and Dapto x 6 weeks     4. Orthotics/Prosthetics:                                       pt to get orthotics/prosthetics  as OP, pt to wear shoes that do not rub on Wound sites      Anticipated discharge plans (X):              SNF:         X  Case management working on SNF placement, no accepting facilities as yet              Home " Care:                       Outpatient Wound Center: X                   Self Care:                        Other:                       TBD:

## 2018-05-26 NOTE — CARE PLAN
Problem: Safety  Goal: Will remain free from falls  Pt educated on fall prevention. Pt indicates  Understanding, reinforcement needed. Bed locked, lowest position, side rails 2/4 up, bed alarm on and non skid footwear in place. Hourly rounding completed. Will continue to monitor and reassess.     Problem: Skin Integrity  Goal: Risk for impaired skin integrity will decrease    Intervention: Assess and monitor skin integrity, appearance and/or temperature  Pt with very dry calloused skin to bilateral feet and soles. Dressing change performed to left foot wounds x3. Pt educated on need to reposition self and turn to prevent further skin breakdown. Preventative mepilex to sacrum. Waffle mattress on bed. Will continue to monitor and reassess.

## 2018-05-26 NOTE — CARE PLAN
Problem: Safety  Goal: Will remain free from injury  Outcome: PROGRESSING SLOWER THAN EXPECTED  Pt educated on the need to use call light for assistance when trying to ambulate to bathroom. Pt denies the need and continues to get out of bed without using call light. Pt has a hx of falls during hospital stay    Problem: Bowel/Gastric:  Goal: Normal bowel function is maintained or improved  Outcome: PROGRESSING AS EXPECTED  Pt denies the need for stool softeners as he states he had a bowel movement last night 5/25/18

## 2018-05-26 NOTE — PROGRESS NOTES
[facility] Hospitalist Progress Note    Date of Service: 2018    Chief Complaint  52 y.o. male admitted 2018 with diabetic left foot ulcer.    Interval Problem Update  SNF    Consultants/Specialty  I.D  ortho    Disposition  Awaiting SNF acceptance        Review of Systems   Constitutional: Negative for chills, fever and weight loss.   HENT: Negative for congestion, ear discharge and nosebleeds.    Eyes: Negative for blurred vision, double vision and photophobia.   Respiratory: Negative for cough, hemoptysis and sputum production.    Cardiovascular: Negative for chest pain, palpitations and orthopnea.   Gastrointestinal: Negative for heartburn, nausea and vomiting.   Genitourinary: Negative for dysuria, frequency and urgency.   Musculoskeletal: Negative for back pain, myalgias and neck pain.   Skin: Negative for itching and rash.   Neurological: Negative for dizziness, tingling and headaches.      Physical Exam  Laboratory/Imaging   Hemodynamics  Temp (24hrs), Av.7 °C (98.1 °F), Min:36.7 °C (98 °F), Max:36.8 °C (98.2 °F)   Temperature: 36.7 °C (98 °F)  Pulse  Av.2  Min: 52  Max: 112   Blood Pressure: 121/81      Respiratory      Respiration: 18, Pulse Oximetry: 96 %        RUL Breath Sounds: Clear, RML Breath Sounds: Clear, RLL Breath Sounds: Clear, LORENA Breath Sounds: Clear, LLL Breath Sounds: Clear    Fluids    Intake/Output Summary (Last 24 hours) at 18 1044  Last data filed at 18 2018   Gross per 24 hour   Intake              220 ml   Output                0 ml   Net              220 ml       Nutrition  Orders Placed This Encounter   Procedures   • DIET ORDER     Standing Status:   Standing     Number of Occurrences:   1     Order Specific Question:   Diet:     Answer:   Regular [1]     Physical Exam   Constitutional: He is oriented to person, place, and time. He appears well-developed and well-nourished.   HENT:   Head: Normocephalic and atraumatic.   Eyes: Conjunctivae are normal.  Pupils are equal, round, and reactive to light.   Neck: No tracheal deviation present. No thyromegaly present.   Cardiovascular: Normal rate and regular rhythm.    Pulmonary/Chest: Effort normal and breath sounds normal.   Abdominal: Soft. Bowel sounds are normal.   Musculoskeletal: He exhibits no edema.   Neurological: He is alert and oriented to person, place, and time.   Skin: Skin is warm and dry.   Dressing look clean on the left foot       Recent Labs      05/25/18   0454   WBC  5.3   RBC  4.62*   HEMOGLOBIN  13.8*   HEMATOCRIT  42.2   MCV  91.3   MCH  29.9   MCHC  32.7*   RDW  47.0   PLATELETCT  151*   MPV  9.7     Recent Labs      05/25/18   0454   SODIUM  139   POTASSIUM  3.9   CHLORIDE  109   CO2  24   GLUCOSE  120*   BUN  24*   CREATININE  0.88   CALCIUM  9.0                      Assessment/Plan     Diabetic ulcer of left foot associated with type 2 diabetes mellitus, with muscle involvement without evidence of necrosis (HCC)- (present on admission)   Assessment & Plan    Hx of diabetic nonhealing ulcer, hx of previous Left first toe amputation,  Failed outpatient antibiotics  MRI + OM  S/p left great amp prox phalanx on 4/1-Wound cx - group G and C strep, morganella , diphtheroids  S/p left great toe amp down to MTP joint on 4/23. Clean margins obtained  Ortho input is noted  Zosyn  daptomycin  I.D input is noted and 6weeks antibiotics  Ortho input is noted  Awaiting SNF acceptance   D/w the nurse and the staff during the bed side rounding              Acute hematogenous osteomyelitis of left foot (HCC)- (present on admission)   Assessment & Plan    As above  Continue IV abx.        Diabetic peripheral neuropathy (HCC)- (present on admission)   Assessment & Plan    Pain controlled  Continue neurontin 300mg TID        Dehiscence of surgical wound- (present on admission)   Assessment & Plan    Continue Wound care         Psychiatric disorder- (present on admission)   Assessment & Plan    Continue  congentin, seroquel, prozac  Continue valproate for mood control        Type 2 diabetes mellitus with neurologic complication, without long-term current use of insulin (HCC)- (present on admission)   Assessment & Plan    Controlled, last A1c 5.8  accu checks are noted   metformin            Quality-Core Measures   DVT prophylaxis pharmacological::  Enoxaparin (Lovenox)

## 2018-05-27 LAB
GLUCOSE BLD-MCNC: 113 MG/DL (ref 65–99)
GLUCOSE BLD-MCNC: 120 MG/DL (ref 65–99)
GLUCOSE BLD-MCNC: 150 MG/DL (ref 65–99)
GLUCOSE BLD-MCNC: 91 MG/DL (ref 65–99)

## 2018-05-27 PROCEDURE — 700105 HCHG RX REV CODE 258: Performed by: INTERNAL MEDICINE

## 2018-05-27 PROCEDURE — 700102 HCHG RX REV CODE 250 W/ 637 OVERRIDE(OP): Performed by: HOSPITALIST

## 2018-05-27 PROCEDURE — 99231 SBSQ HOSP IP/OBS SF/LOW 25: CPT | Performed by: FAMILY MEDICINE

## 2018-05-27 PROCEDURE — A9270 NON-COVERED ITEM OR SERVICE: HCPCS | Performed by: HOSPITALIST

## 2018-05-27 PROCEDURE — 82962 GLUCOSE BLOOD TEST: CPT | Mod: 91

## 2018-05-27 PROCEDURE — 700111 HCHG RX REV CODE 636 W/ 250 OVERRIDE (IP): Performed by: INTERNAL MEDICINE

## 2018-05-27 PROCEDURE — 770021 HCHG ROOM/CARE - ISO PRIVATE

## 2018-05-27 RX ADMIN — LORAZEPAM 1 MG: 1 TABLET ORAL at 10:21

## 2018-05-27 RX ADMIN — FLUOXETINE HYDROCHLORIDE 60 MG: 20 CAPSULE ORAL at 08:26

## 2018-05-27 RX ADMIN — LORAZEPAM 1 MG: 1 TABLET ORAL at 20:13

## 2018-05-27 RX ADMIN — OXYCODONE HYDROCHLORIDE 5 MG: 5 TABLET ORAL at 20:13

## 2018-05-27 RX ADMIN — PIPERACILLIN SODIUM AND TAZOBACTAM SODIUM 3.38 G: 3; .375 INJECTION, POWDER, FOR SOLUTION INTRAVENOUS at 05:18

## 2018-05-27 RX ADMIN — BENZTROPINE MESYLATE 1 MG: 1 TABLET ORAL at 20:12

## 2018-05-27 RX ADMIN — OXYCODONE HYDROCHLORIDE 5 MG: 5 TABLET ORAL at 08:33

## 2018-05-27 RX ADMIN — QUETIAPINE FUMARATE 200 MG: 100 TABLET ORAL at 20:13

## 2018-05-27 RX ADMIN — METFORMIN HYDROCHLORIDE 500 MG: 500 TABLET, FILM COATED ORAL at 16:52

## 2018-05-27 RX ADMIN — GABAPENTIN 300 MG: 300 CAPSULE ORAL at 13:19

## 2018-05-27 RX ADMIN — PIPERACILLIN SODIUM AND TAZOBACTAM SODIUM 3.38 G: 3; .375 INJECTION, POWDER, FOR SOLUTION INTRAVENOUS at 20:14

## 2018-05-27 RX ADMIN — METFORMIN HYDROCHLORIDE 500 MG: 500 TABLET, FILM COATED ORAL at 08:26

## 2018-05-27 RX ADMIN — DAPTOMYCIN 620 MG: 500 INJECTION, POWDER, LYOPHILIZED, FOR SOLUTION INTRAVENOUS at 11:53

## 2018-05-27 RX ADMIN — QUETIAPINE FUMARATE 100 MG: 100 TABLET ORAL at 08:26

## 2018-05-27 RX ADMIN — QUETIAPINE FUMARATE 100 MG: 100 TABLET ORAL at 11:54

## 2018-05-27 RX ADMIN — GABAPENTIN 300 MG: 300 CAPSULE ORAL at 08:26

## 2018-05-27 RX ADMIN — VALPROIC ACID 250 MG: 250 CAPSULE, LIQUID FILLED ORAL at 20:13

## 2018-05-27 RX ADMIN — GABAPENTIN 300 MG: 300 CAPSULE ORAL at 20:12

## 2018-05-27 RX ADMIN — BENZTROPINE MESYLATE 1 MG: 1 TABLET ORAL at 08:26

## 2018-05-27 RX ADMIN — VALPROIC ACID 250 MG: 250 CAPSULE, LIQUID FILLED ORAL at 08:26

## 2018-05-27 RX ADMIN — PIPERACILLIN SODIUM AND TAZOBACTAM SODIUM 3.38 G: 3; .375 INJECTION, POWDER, FOR SOLUTION INTRAVENOUS at 13:20

## 2018-05-27 ASSESSMENT — ENCOUNTER SYMPTOMS
WHEEZING: 0
EYE PAIN: 0
EYE DISCHARGE: 0
ABDOMINAL PAIN: 0
NECK PAIN: 0
MYALGIAS: 0
DIARRHEA: 0
TREMORS: 0
BACK PAIN: 0
SPUTUM PRODUCTION: 0
DIAPHORESIS: 0
SENSORY CHANGE: 0
PND: 0
CONSTIPATION: 0
EYE REDNESS: 0
SPEECH CHANGE: 0
WEIGHT LOSS: 0
CLAUDICATION: 0
FLANK PAIN: 0
SHORTNESS OF BREATH: 0

## 2018-05-27 ASSESSMENT — PATIENT HEALTH QUESTIONNAIRE - PHQ9
2. FEELING DOWN, DEPRESSED, IRRITABLE, OR HOPELESS: NOT AT ALL
1. LITTLE INTEREST OR PLEASURE IN DOING THINGS: NOT AT ALL
SUM OF ALL RESPONSES TO PHQ9 QUESTIONS 1 AND 2: 0

## 2018-05-27 ASSESSMENT — PAIN SCALES - GENERAL
PAINLEVEL_OUTOF10: 7
PAINLEVEL_OUTOF10: 4
PAINLEVEL_OUTOF10: 5
PAINLEVEL_OUTOF10: 8
PAINLEVEL_OUTOF10: 6

## 2018-05-27 NOTE — PROGRESS NOTES
"LIMB PRESERVATION SERVICE NOTE:    Subjective:      Reason for Consultation: S/P I and D Left Great Toe 5/12/18 Dr Baez    History of Present Illness:    Patient is well known to [facility] and outpatient wound care services.     Patient is a 52 y.o. male with a past medical history that includes borderline diabetes, anxiety, HTN, Hep C and is admitted to [facility] via [facility] for his S/P I and D Left Great Toe 5/12/18 Dr Baez.  S/P Left Great Toe Amp by Dr Easton 4/23/18. The s/p left great toe amputation site was from 4/1/18 by Dr. Baez. Pt did not follow up with the [facility] clinic for dressing care and he did not receive oral antibiotics. He also did not follow up with his PCP at [facility]. Pt has Hx of noncompliance and psychiatric disorder which is likely contributory. Pt is not wearing the previously provided offloading shoes.    Lab Results   Component Value Date/Time    HBA1C 5.8 (H) 03/30/2018 03:04 PM      Patient denies fevers chills, nausea, vomiting.      Pain:        Patient resting comfortably    Vitals  /71   Pulse 62   Temp 36.3 °C (97.4 °F)   Resp 17   Ht 1.93 m (6' 4\")   Wt 105.1 kg (231 lb 11.3 oz)   SpO2 94%   BMI 28.20 kg/m²      Surgical site well approximated, dried blood along incision line  Shallow open wounds to left lateral 5th MTH, and medial foot     Plan:       Treatment Plan and Recommendations:     1. Labs\Imaging:         Reviewed     2. Treatment:              Continue Wound Care by Nursing, LPS to Follow.                                      Nursing to change dressing Q 48 hours. -HWB LLE , -Sutures out Week of 6/3/18  3.Collaboration:                                                 ID managing abx, Zosyn and Dapto x 6 weeks     4. Orthotics/Prosthetics:                                       pt to get orthotics/prosthetics  as OP, pt to wear shoes that do not rub on Wound sites      Anticipated discharge plans (X):              SNF:         X  Case management working " on SNF placement, no accepting facilities as yet              Home Care:                       Outpatient Wound Center: X                   Self Care:                         Other:                       TBD:

## 2018-05-27 NOTE — PROGRESS NOTES
[facility]    Date of Service: 2018    Chief Complaint  52 y.o. male admitted 2018 with diabetic left foot ulcer.    Interval Problem Update  SNF    Consultants/Specialty  I.D  ortho    Disposition  Awaiting SNF acceptance        Review of Systems   Constitutional: Negative for diaphoresis, malaise/fatigue and weight loss.   HENT: Negative for ear pain, hearing loss and tinnitus.    Eyes: Negative for pain, discharge and redness.   Respiratory: Negative for sputum production, shortness of breath and wheezing.    Cardiovascular: Negative for claudication, leg swelling and PND.   Gastrointestinal: Negative for abdominal pain, constipation and diarrhea.   Genitourinary: Negative for flank pain, frequency and hematuria.   Musculoskeletal: Negative for back pain, myalgias and neck pain.   Skin: Negative for itching and rash.   Neurological: Negative for tremors, sensory change and speech change.      Physical Exam  Laboratory/Imaging   Hemodynamics  Temp (24hrs), Av.6 °C (97.9 °F), Min:36.2 °C (97.2 °F), Max:36.9 °C (98.5 °F)   Temperature: 36.3 °C (97.4 °F)  Pulse  Av.9  Min: 52  Max: 112   Blood Pressure: 109/71      Respiratory      Respiration: 17, Pulse Oximetry: 94 %        RUL Breath Sounds: Clear, RML Breath Sounds: Clear, RLL Breath Sounds: Clear, LORENA Breath Sounds: Clear, LLL Breath Sounds: Clear    Fluids    Intake/Output Summary (Last 24 hours) at 18 0935  Last data filed at 18 0705   Gross per 24 hour   Intake              358 ml   Output             1325 ml   Net             -967 ml       Nutrition  Orders Placed This Encounter   Procedures   • DIET ORDER     Standing Status:   Standing     Number of Occurrences:   1     Order Specific Question:   Diet:     Answer:   Regular [1]     Physical Exam   Constitutional: He is oriented to person, place, and time.   HENT:   Right Ear: External ear normal.   Left Ear: External ear normal.   Eyes: Conjunctivae are  normal. Pupils are equal, round, and reactive to light.   Neck: No tracheal deviation present. No thyromegaly present.   Cardiovascular: Normal rate and regular rhythm.    Pulmonary/Chest: No respiratory distress. He has no wheezes. He has no rales.   Abdominal: He exhibits no distension. There is no tenderness. There is no rebound.   Musculoskeletal: He exhibits no edema.   Neurological: He is alert and oriented to person, place, and time.   Skin: Skin is warm and dry. He is not diaphoretic.   Dressing look clean on the left foot       Recent Labs      05/25/18   0454   WBC  5.3   RBC  4.62*   HEMOGLOBIN  13.8*   HEMATOCRIT  42.2   MCV  91.3   MCH  29.9   MCHC  32.7*   RDW  47.0   PLATELETCT  151*   MPV  9.7     Recent Labs      05/25/18   0454   SODIUM  139   POTASSIUM  3.9   CHLORIDE  109   CO2  24   GLUCOSE  120*   BUN  24*   CREATININE  0.88   CALCIUM  9.0                      Assessment/Plan     Diabetic ulcer of left foot associated with type 2 diabetes mellitus, with muscle involvement without evidence of necrosis (HCC)- (present on admission)   Assessment & Plan    Hx of diabetic nonhealing ulcer, hx of previous Left first toe amputation,  Failed outpatient antibiotics  MRI + OM  S/p left great amp prox phalanx on 4/1-Wound cx - group G and C strep, morganella , diphtheroids  S/p left great toe amp down to MTP joint on 4/23. Clean margins obtained  Ortho input is noted  Zosyn  daptomycin  I.D input is noted and 6weeks antibiotics  Ortho input is noted  Awaiting SNF acceptance  And no acceptance so far              Acute hematogenous osteomyelitis of left foot (HCC)- (present on admission)   Assessment & Plan    As above  Continue IV abx.        Diabetic peripheral neuropathy (HCC)- (present on admission)   Assessment & Plan    Pain controlled  Continue neurontin 300mg TID        Dehiscence of surgical wound- (present on admission)   Assessment & Plan    Continue Wound care         Psychiatric disorder-  (present on admission)   Assessment & Plan    Continue congentin, seroquel, prozac  Continue valproate for mood control  No acute change        Type 2 diabetes mellitus with neurologic complication, without long-term current use of insulin (HCC)- (present on admission)   Assessment & Plan    Controlled, last A1c 5.8  accu checks are noted   metformin            Quality-Core Measures   Olsen catheter::  No Olsen  DVT prophylaxis pharmacological::  Enoxaparin (Lovenox)

## 2018-05-27 NOTE — CARE PLAN
Problem: Communication  Goal: The ability to communicate needs accurately and effectively will improve  Outcome: PROGRESSING AS EXPECTED  Pt able to make needs known but does not utilize call light system.  Hourly rounding in place to insure all pt needs are met.    Problem: Safety  Goal: Will remain free from falls  Outcome: PROGRESSING AS EXPECTED  Pt high fall risk, pt wearing treaded socks, bed locked and in lowest position, bed alarm is on.  Pt call light and phone within reach.

## 2018-05-27 NOTE — CARE PLAN
Problem: Safety  Goal: Will remain free from injury  Outcome: PROGRESSING SLOWER THAN EXPECTED  Pt educated on the importance of using call light prior to getting out of bed so RN and other staff can assist pt to restroom. Pt denies the need for help and gets out of bed without using call light.    Problem: Venous Thromboembolism (VTW)/Deep Vein Thrombosis (DVT) Prevention:  Goal: Patient will participate in Venous Thrombosis (VTE)/Deep Vein Thrombosis (DVT)Prevention Measures  Outcome: PROGRESSING SLOWER THAN EXPECTED  Pt denies the need for Lovenox during morning medication pass. Pt states that he gets out of bed enough and does not need it at this time. RN educated pt on the importance and pt still refused.

## 2018-05-27 NOTE — PROGRESS NOTES
"Patient seen and examined    Blood pressure 120/72, pulse (!) 55, temperature 36.2 °C (97.2 °F), resp. rate 17, height 1.93 m (6' 4\"), weight 105.1 kg (231 lb 11.3 oz), SpO2 97 %.    Recent Labs      05/25/18   0454   WBC  5.3   RBC  4.62*   HEMOGLOBIN  13.8*   HEMATOCRIT  42.2   MCV  91.3   MCH  29.9   MCHC  32.7*   RDW  47.0   PLATELETCT  151*   MPV  9.7       No acute distress  Dressing clean dry and intact  Neurovascularly intact    POD#13    Plan:  DVT Prophylaxis- TEDS/SCDs  Weight Bearing Status-WBAT through heel  PT/OT  Antibiotics: per ID  Case Coordination          "

## 2018-05-27 NOTE — PROGRESS NOTES
Assumed care of pt, received report from day shift RN, pt assessed.  Pt complaining of 3/10 left foot pain, declines pain medication at this time.  Pt is A&O x4 but can get agitated at times. Pt on contact isolation for MRSA in left foot wounds, all isolation precautions and signs in place.  Pt high fall risk, wearing treaded socks, bed locked and in lowest position, bed alarm is on.  Pt instructed to call for assistance prior to getting OOB, pt verbalized understanding.  Call light, phone, and personal belongings within reach.

## 2018-05-27 NOTE — PROGRESS NOTES
Received bedside report in SBAR format from night shift RN. Head to toe assessment complete. Patient is A&O by 4-slightly agitated during morning rounds. Pt has no complaints of nausea or pain.  IV is patent andsaline locked. Pt moderate fall risk, wearing treaded socks, bed locked and in lowest position, bed alarm on and indicated.  Pt instructed to call for assistance prior to getting out of bed, pt verbalized understanding however, does not call appropriately even after education is provided by this RN. Call light and belongings within reach, bed in lowest position, treaded socks in place, and bed alarm on. Pt not wearing his left offloading boot, eduction to be provided to pt. Waiting of SNF placement. Hourly rounds in place. Will continue to monitor.

## 2018-05-28 LAB
GLUCOSE BLD-MCNC: 104 MG/DL (ref 65–99)
GLUCOSE BLD-MCNC: 106 MG/DL (ref 65–99)
GLUCOSE BLD-MCNC: 111 MG/DL (ref 65–99)
GLUCOSE BLD-MCNC: 95 MG/DL (ref 65–99)

## 2018-05-28 PROCEDURE — 700102 HCHG RX REV CODE 250 W/ 637 OVERRIDE(OP): Performed by: HOSPITALIST

## 2018-05-28 PROCEDURE — 82962 GLUCOSE BLOOD TEST: CPT

## 2018-05-28 PROCEDURE — 99231 SBSQ HOSP IP/OBS SF/LOW 25: CPT | Performed by: FAMILY MEDICINE

## 2018-05-28 PROCEDURE — A6212 FOAM DRG <=16 SQ IN W/BORDER: HCPCS | Performed by: FAMILY MEDICINE

## 2018-05-28 PROCEDURE — 700111 HCHG RX REV CODE 636 W/ 250 OVERRIDE (IP): Performed by: INTERNAL MEDICINE

## 2018-05-28 PROCEDURE — A9270 NON-COVERED ITEM OR SERVICE: HCPCS | Performed by: HOSPITALIST

## 2018-05-28 PROCEDURE — 700105 HCHG RX REV CODE 258: Performed by: INTERNAL MEDICINE

## 2018-05-28 PROCEDURE — 770021 HCHG ROOM/CARE - ISO PRIVATE

## 2018-05-28 RX ADMIN — PIPERACILLIN SODIUM AND TAZOBACTAM SODIUM 3.38 G: 3; .375 INJECTION, POWDER, FOR SOLUTION INTRAVENOUS at 05:43

## 2018-05-28 RX ADMIN — BENZTROPINE MESYLATE 1 MG: 1 TABLET ORAL at 07:33

## 2018-05-28 RX ADMIN — DAPTOMYCIN 620 MG: 500 INJECTION, POWDER, LYOPHILIZED, FOR SOLUTION INTRAVENOUS at 11:54

## 2018-05-28 RX ADMIN — GABAPENTIN 300 MG: 300 CAPSULE ORAL at 07:33

## 2018-05-28 RX ADMIN — VALPROIC ACID 250 MG: 250 CAPSULE, LIQUID FILLED ORAL at 07:31

## 2018-05-28 RX ADMIN — OXYCODONE HYDROCHLORIDE 5 MG: 5 TABLET ORAL at 20:39

## 2018-05-28 RX ADMIN — OXYCODONE HYDROCHLORIDE 5 MG: 5 TABLET ORAL at 07:52

## 2018-05-28 RX ADMIN — METFORMIN HYDROCHLORIDE 500 MG: 500 TABLET, FILM COATED ORAL at 17:11

## 2018-05-28 RX ADMIN — QUETIAPINE FUMARATE 100 MG: 100 TABLET ORAL at 11:54

## 2018-05-28 RX ADMIN — METFORMIN HYDROCHLORIDE 500 MG: 500 TABLET, FILM COATED ORAL at 07:35

## 2018-05-28 RX ADMIN — PIPERACILLIN SODIUM AND TAZOBACTAM SODIUM 3.38 G: 3; .375 INJECTION, POWDER, FOR SOLUTION INTRAVENOUS at 20:39

## 2018-05-28 RX ADMIN — QUETIAPINE FUMARATE 100 MG: 100 TABLET ORAL at 07:33

## 2018-05-28 RX ADMIN — FLUOXETINE HYDROCHLORIDE 60 MG: 20 CAPSULE ORAL at 07:33

## 2018-05-28 RX ADMIN — VALPROIC ACID 250 MG: 250 CAPSULE, LIQUID FILLED ORAL at 20:39

## 2018-05-28 RX ADMIN — ACETAMINOPHEN 500 MG: 500 TABLET ORAL at 20:39

## 2018-05-28 RX ADMIN — LORAZEPAM 1 MG: 1 TABLET ORAL at 11:53

## 2018-05-28 RX ADMIN — LORAZEPAM 1 MG: 1 TABLET ORAL at 20:40

## 2018-05-28 RX ADMIN — GABAPENTIN 300 MG: 300 CAPSULE ORAL at 20:38

## 2018-05-28 RX ADMIN — PIPERACILLIN SODIUM AND TAZOBACTAM SODIUM 3.38 G: 3; .375 INJECTION, POWDER, FOR SOLUTION INTRAVENOUS at 13:27

## 2018-05-28 RX ADMIN — QUETIAPINE FUMARATE 200 MG: 100 TABLET ORAL at 20:38

## 2018-05-28 RX ADMIN — BENZTROPINE MESYLATE 1 MG: 1 TABLET ORAL at 20:38

## 2018-05-28 RX ADMIN — GABAPENTIN 300 MG: 300 CAPSULE ORAL at 13:26

## 2018-05-28 ASSESSMENT — ENCOUNTER SYMPTOMS
ORTHOPNEA: 0
BACK PAIN: 0
FEVER: 0
WEIGHT LOSS: 0
SPUTUM PRODUCTION: 0
NAUSEA: 0
NECK PAIN: 0
DIZZINESS: 0
COUGH: 0
CHILLS: 0
TINGLING: 0
HEADACHES: 0
BLURRED VISION: 0
PALPITATIONS: 0
MYALGIAS: 0
HEMOPTYSIS: 0
DOUBLE VISION: 0
VOMITING: 0
HEARTBURN: 0
PHOTOPHOBIA: 0

## 2018-05-28 ASSESSMENT — PAIN SCALES - GENERAL
PAINLEVEL_OUTOF10: 2
PAINLEVEL_OUTOF10: 3
PAINLEVEL_OUTOF10: 4
PAINLEVEL_OUTOF10: 5
PAINLEVEL_OUTOF10: 7

## 2018-05-28 NOTE — CARE PLAN
"Problem: Communication  Goal: The ability to communicate needs accurately and effectively will improve  Outcome: PROGRESSING SLOWER THAN EXPECTED  Pt is irritable with staff every time we try to go into the room for any reason. Pt got up this morning, fell back into the chair and yelled at staff for coming in and staring at him. Pt educated continuously provided by this RN during hourly rounding about the need to use call light prior to getting out of bed or when assistance is needed.     Problem: Safety  Goal: Will remain free from injury  Outcome: PROGRESSING SLOWER THAN EXPECTED  Pt does not use call light appropriately despite continuous education. Pt has had a fall during this stay and continues to get out of bed without use of call light and continues to be unsteady. Pt has several lines and needs to use call light prior. This RN had a discussion with pt about call light and pt states \"I know what I am doing, I am a grown man, I do not need your help and everyone to be staring at me\".       "

## 2018-05-28 NOTE — CARE PLAN
Problem: Infection  Goal: Will remain free from infection  Outcome: PROGRESSING AS EXPECTED  Continue to assess for s/s of infection and administer antibiotics as ordered    Problem: Mobility  Goal: Risk for activity intolerance will decrease  Outcome: PROGRESSING AS EXPECTED  Continue to encourage and assist with ambulation, continue high fall risk precautions

## 2018-05-28 NOTE — PROGRESS NOTES
[facility] Hospitalist Progress Note    Date of Service: 2018    Chief Complaint  52 y.o. male admitted 2018 with diabetic left foot ulcer.    Interval Problem Update  SNF    Consultants/Specialty  I.D  ortho    Disposition  Awaiting SNF acceptance        Review of Systems   Constitutional: Negative for chills, fever and weight loss.   HENT: Negative for congestion, ear discharge and nosebleeds.    Eyes: Negative for blurred vision, double vision and photophobia.   Respiratory: Negative for cough, hemoptysis and sputum production.    Cardiovascular: Negative for chest pain, palpitations and orthopnea.   Gastrointestinal: Negative for heartburn, nausea and vomiting.   Genitourinary: Negative for dysuria, frequency and urgency.   Musculoskeletal: Negative for back pain, myalgias and neck pain.   Skin: Negative for itching and rash.   Neurological: Negative for dizziness, tingling and headaches.      Physical Exam  Laboratory/Imaging   Hemodynamics  Temp (24hrs), Av.5 °C (97.7 °F), Min:36.2 °C (97.2 °F), Max:36.6 °C (97.9 °F)   Temperature: 36.6 °C (97.9 °F)  Pulse  Av.5  Min: 52  Max: 112   Blood Pressure: 108/62      Respiratory      Respiration: 18, Pulse Oximetry: 96 %        RUL Breath Sounds: Clear, RML Breath Sounds: Clear, RLL Breath Sounds: Clear, LORENA Breath Sounds: Clear, LLL Breath Sounds: Clear    Fluids    Intake/Output Summary (Last 24 hours) at 18 1110  Last data filed at 18 0900   Gross per 24 hour   Intake              360 ml   Output              500 ml   Net             -140 ml       Nutrition  Orders Placed This Encounter   Procedures   • DIET ORDER     Standing Status:   Standing     Number of Occurrences:   1     Order Specific Question:   Diet:     Answer:   Regular [1]     Physical Exam   Constitutional: He is oriented to person, place, and time. He appears well-developed and well-nourished.   HENT:   Head: Normocephalic and atraumatic.   Left Ear: External ear normal.    Eyes: Right eye exhibits no discharge. Left eye exhibits no discharge.   Neck: Normal range of motion. Neck supple.   Cardiovascular: Normal heart sounds.    Pulmonary/Chest: Effort normal and breath sounds normal.   Abdominal: Soft. Bowel sounds are normal.   Musculoskeletal: He exhibits no edema.   Neurological: He is alert and oriented to person, place, and time.   Skin: Skin is warm and dry.   Dressing look clean on the left foot                                Assessment/Plan     Diabetic ulcer of left foot associated with type 2 diabetes mellitus, with muscle involvement without evidence of necrosis (HCC)- (present on admission)   Assessment & Plan    Hx of diabetic nonhealing ulcer, hx of previous Left first toe amputation,  Failed outpatient antibiotics  MRI + OM  S/p left great amp prox phalanx on 4/1-Wound cx - group G and C strep, morganella , diphtheroids  S/p left great toe amp down to MTP joint on 4/23. Clean margins obtained  Ortho input is noted  Zosyn  daptomycin  I.D input is noted and 6weeks antibiotics  Ortho input is noted  Awaiting SNF acceptance  And no acceptance so far              Acute hematogenous osteomyelitis of left foot (HCC)- (present on admission)   Assessment & Plan    As above  Continue IV abx.        Diabetic peripheral neuropathy (HCC)- (present on admission)   Assessment & Plan    Pain controlled  Continue neurontin 300mg TID        Dehiscence of surgical wound- (present on admission)   Assessment & Plan    Continue Wound care         Psychiatric disorder- (present on admission)   Assessment & Plan    Continue congentin, seroquel, prozac  Continue valproate for mood control  No acute change  Pt is cooperative        Type 2 diabetes mellitus with neurologic complication, without long-term current use of insulin (HCC)- (present on admission)   Assessment & Plan    Controlled, last A1c 5.8  accu checks are noted   metformin            Quality-Core Measures   Olsen catheter::  No  Olsen  DVT prophylaxis pharmacological::  Enoxaparin (Lovenox)

## 2018-05-28 NOTE — PROGRESS NOTES
Pt is A&OX3-4, forgetful and impulsive at times, VSS, afebrile, pain controlled with prn oxycodone. Pt tolerating regular diet, denies nausea. IV abx infusing via PICC line. Dressing to left foot wound CDI. High fall risk precautions in place, bed alarm active, room close to nurses station, call light in reach, bed locked and lowered, nonskid socks applied. Pt resting comfortably. Will continue to monitor.

## 2018-05-28 NOTE — PROGRESS NOTES
"Despite continuous education about using the call light prior to getting out of bed so staff can assist pt, pt continuously gets out of bed on own. Pt is very unsteady and has lines in place that make ambulating on own difficult. Pt got up out of bed and staff went running to check on him. Pt yelled at staff for \"staring and laughing at him\". Pt refusing to use call light. Education provided yet again by this RN. Bed alarm on and pt located near nurses station for continuous monitoring. Pt requesting door to be closed.   "

## 2018-05-28 NOTE — PROGRESS NOTES
Received bedside report in SBAR format from night shift RN. Head to toe assessment complete. Patient is A&O by 4-resting at this time. Pt has no complaints of nausea and states pain 2/10 in his left foot. IV is patent and saline locked. Pt high fall risk as he has had a fall during this stay, wearing treaded socks, bed locked and in lowest position, bed alarm on and indicated.  Pt instructed to call for assistance prior to getting out of bed, pt verbalized understanding although he does not call appropriately. Call light and belongings within reach, bed in lowest position, treaded socks in place, and bed alarm on. To monitor blood sugar levels. Hourly rounds in place. Will continue to monitor.

## 2018-05-29 PROBLEM — L89.90 PRESSURE INJURY OF SKIN: Status: ACTIVE | Noted: 2018-05-29

## 2018-05-29 PROBLEM — T81.31XA DEHISCENCE OF SURGICAL WOUND: Status: RESOLVED | Noted: 2018-05-11 | Resolved: 2018-05-29

## 2018-05-29 PROBLEM — M86.072 ACUTE HEMATOGENOUS OSTEOMYELITIS OF LEFT FOOT (HCC): Status: RESOLVED | Noted: 2018-05-15 | Resolved: 2018-05-29

## 2018-05-29 LAB
ALBUMIN SERPL BCP-MCNC: 3.8 G/DL (ref 3.2–4.9)
ALBUMIN/GLOB SERPL: 1.2 G/DL
ALP SERPL-CCNC: 72 U/L (ref 30–99)
ALT SERPL-CCNC: 18 U/L (ref 2–50)
ANION GAP SERPL CALC-SCNC: 8 MMOL/L (ref 0–11.9)
AST SERPL-CCNC: 14 U/L (ref 12–45)
BASOPHILS # BLD AUTO: 1 % (ref 0–1.8)
BASOPHILS # BLD: 0.06 K/UL (ref 0–0.12)
BILIRUB SERPL-MCNC: 0.5 MG/DL (ref 0.1–1.5)
BUN SERPL-MCNC: 22 MG/DL (ref 8–22)
CALCIUM SERPL-MCNC: 9.3 MG/DL (ref 8.5–10.5)
CHLORIDE SERPL-SCNC: 107 MMOL/L (ref 96–112)
CO2 SERPL-SCNC: 24 MMOL/L (ref 20–33)
CREAT SERPL-MCNC: 1.08 MG/DL (ref 0.5–1.4)
EOSINOPHIL # BLD AUTO: 0.2 K/UL (ref 0–0.51)
EOSINOPHIL NFR BLD: 3.3 % (ref 0–6.9)
ERYTHROCYTE [DISTWIDTH] IN BLOOD BY AUTOMATED COUNT: 46.1 FL (ref 35.9–50)
GLOBULIN SER CALC-MCNC: 3.2 G/DL (ref 1.9–3.5)
GLUCOSE BLD-MCNC: 107 MG/DL (ref 65–99)
GLUCOSE BLD-MCNC: 98 MG/DL (ref 65–99)
GLUCOSE SERPL-MCNC: 93 MG/DL (ref 65–99)
HCT VFR BLD AUTO: 42.1 % (ref 42–52)
HGB BLD-MCNC: 14 G/DL (ref 14–18)
IMM GRANULOCYTES # BLD AUTO: 0.02 K/UL (ref 0–0.11)
IMM GRANULOCYTES NFR BLD AUTO: 0.3 % (ref 0–0.9)
LYMPHOCYTES # BLD AUTO: 2.79 K/UL (ref 1–4.8)
LYMPHOCYTES NFR BLD: 45.7 % (ref 22–41)
MCH RBC QN AUTO: 30.1 PG (ref 27–33)
MCHC RBC AUTO-ENTMCNC: 33.3 G/DL (ref 33.7–35.3)
MCV RBC AUTO: 90.5 FL (ref 81.4–97.8)
MONOCYTES # BLD AUTO: 0.35 K/UL (ref 0–0.85)
MONOCYTES NFR BLD AUTO: 5.7 % (ref 0–13.4)
NEUTROPHILS # BLD AUTO: 2.69 K/UL (ref 1.82–7.42)
NEUTROPHILS NFR BLD: 44 % (ref 44–72)
NRBC # BLD AUTO: 0 K/UL
NRBC BLD-RTO: 0 /100 WBC
PLATELET # BLD AUTO: 171 K/UL (ref 164–446)
PMV BLD AUTO: 9.7 FL (ref 9–12.9)
POTASSIUM SERPL-SCNC: 4.4 MMOL/L (ref 3.6–5.5)
PROT SERPL-MCNC: 7 G/DL (ref 6–8.2)
RBC # BLD AUTO: 4.65 M/UL (ref 4.7–6.1)
SODIUM SERPL-SCNC: 139 MMOL/L (ref 135–145)
WBC # BLD AUTO: 6.1 K/UL (ref 4.8–10.8)

## 2018-05-29 PROCEDURE — 99232 SBSQ HOSP IP/OBS MODERATE 35: CPT | Performed by: INTERNAL MEDICINE

## 2018-05-29 PROCEDURE — 700111 HCHG RX REV CODE 636 W/ 250 OVERRIDE (IP): Performed by: INTERNAL MEDICINE

## 2018-05-29 PROCEDURE — 700105 HCHG RX REV CODE 258: Performed by: INTERNAL MEDICINE

## 2018-05-29 PROCEDURE — A9270 NON-COVERED ITEM OR SERVICE: HCPCS | Performed by: HOSPITALIST

## 2018-05-29 PROCEDURE — 80053 COMPREHEN METABOLIC PANEL: CPT

## 2018-05-29 PROCEDURE — 82962 GLUCOSE BLOOD TEST: CPT

## 2018-05-29 PROCEDURE — 700102 HCHG RX REV CODE 250 W/ 637 OVERRIDE(OP): Performed by: HOSPITALIST

## 2018-05-29 PROCEDURE — 700102 HCHG RX REV CODE 250 W/ 637 OVERRIDE(OP): Performed by: INTERNAL MEDICINE

## 2018-05-29 PROCEDURE — 770021 HCHG ROOM/CARE - ISO PRIVATE

## 2018-05-29 PROCEDURE — 97597 DBRDMT OPN WND 1ST 20 CM/<: CPT

## 2018-05-29 PROCEDURE — 85025 COMPLETE CBC W/AUTO DIFF WBC: CPT

## 2018-05-29 PROCEDURE — A9270 NON-COVERED ITEM OR SERVICE: HCPCS | Performed by: INTERNAL MEDICINE

## 2018-05-29 RX ADMIN — PIPERACILLIN SODIUM AND TAZOBACTAM SODIUM 3.38 G: 3; .375 INJECTION, POWDER, FOR SOLUTION INTRAVENOUS at 12:55

## 2018-05-29 RX ADMIN — PIPERACILLIN SODIUM AND TAZOBACTAM SODIUM 3.38 G: 3; .375 INJECTION, POWDER, FOR SOLUTION INTRAVENOUS at 05:36

## 2018-05-29 RX ADMIN — GABAPENTIN 300 MG: 300 CAPSULE ORAL at 20:59

## 2018-05-29 RX ADMIN — SENNOSIDES AND DOCUSATE SODIUM 2 TABLET: 8.6; 5 TABLET ORAL at 08:58

## 2018-05-29 RX ADMIN — QUETIAPINE FUMARATE 100 MG: 100 TABLET ORAL at 12:19

## 2018-05-29 RX ADMIN — QUETIAPINE FUMARATE 200 MG: 100 TABLET ORAL at 20:59

## 2018-05-29 RX ADMIN — GABAPENTIN 300 MG: 300 CAPSULE ORAL at 08:57

## 2018-05-29 RX ADMIN — METFORMIN HYDROCHLORIDE 500 MG: 500 TABLET, FILM COATED ORAL at 17:24

## 2018-05-29 RX ADMIN — VALPROIC ACID 250 MG: 250 CAPSULE, LIQUID FILLED ORAL at 20:59

## 2018-05-29 RX ADMIN — QUETIAPINE FUMARATE 100 MG: 100 TABLET ORAL at 08:57

## 2018-05-29 RX ADMIN — ENOXAPARIN SODIUM 40 MG: 100 INJECTION SUBCUTANEOUS at 08:57

## 2018-05-29 RX ADMIN — PIPERACILLIN SODIUM AND TAZOBACTAM SODIUM 3.38 G: 3; .375 INJECTION, POWDER, FOR SOLUTION INTRAVENOUS at 20:59

## 2018-05-29 RX ADMIN — FLUOXETINE HYDROCHLORIDE 60 MG: 20 CAPSULE ORAL at 08:57

## 2018-05-29 RX ADMIN — DAPTOMYCIN 620 MG: 500 INJECTION, POWDER, LYOPHILIZED, FOR SOLUTION INTRAVENOUS at 13:26

## 2018-05-29 RX ADMIN — ASPIRIN 81 MG: 81 TABLET, COATED ORAL at 17:24

## 2018-05-29 RX ADMIN — VALPROIC ACID 250 MG: 250 CAPSULE, LIQUID FILLED ORAL at 08:58

## 2018-05-29 RX ADMIN — BENZTROPINE MESYLATE 1 MG: 1 TABLET ORAL at 08:57

## 2018-05-29 RX ADMIN — METFORMIN HYDROCHLORIDE 500 MG: 500 TABLET, FILM COATED ORAL at 08:57

## 2018-05-29 RX ADMIN — BENZTROPINE MESYLATE 1 MG: 1 TABLET ORAL at 20:59

## 2018-05-29 RX ADMIN — GABAPENTIN 300 MG: 300 CAPSULE ORAL at 14:47

## 2018-05-29 ASSESSMENT — ENCOUNTER SYMPTOMS
ORTHOPNEA: 0
CHILLS: 0
WEAKNESS: 0
SPUTUM PRODUCTION: 0
DIZZINESS: 0
NAUSEA: 0
DOUBLE VISION: 0
BLURRED VISION: 0
PALPITATIONS: 0
MYALGIAS: 0
NECK PAIN: 0
DIAPHORESIS: 0
HEARTBURN: 0
WEIGHT LOSS: 0
VOMITING: 0
TINGLING: 0
PHOTOPHOBIA: 0
HEMOPTYSIS: 0
HEADACHES: 0
BACK PAIN: 0
COUGH: 0
FEVER: 0

## 2018-05-29 ASSESSMENT — PAIN SCALES - GENERAL
PAINLEVEL_OUTOF10: 0
PAINLEVEL_OUTOF10: 0

## 2018-05-29 NOTE — PROGRESS NOTES
[facility] Hospitalist Progress Note    Date of Service: 2018    Chief Complaint  52 y.o. male admitted 2018 with diabetic left foot ulcer.  Underwent I&D with  May 12, 2018 and May 14, 2018. Seen by infectious disease team during the hospital stay. Plan to continue IV Zosyn and daptomycin with the stop date of 2018. Difficult SNF disposition given insurance issues and use of daptomycin for antibiotics.    Interval Problem Update  Patient seen and evaluated on rounds  Doing well  No complaints  Awaiting disposition  Continuing antibiotics    Consultants/Specialty  Infectious disease  Orthopedics  LPS    Disposition  Awaiting SNF acceptance   Insurance barriers to SNF acceptance  Daptomycin cost as a barrier to SNF acceptance  , to evaluate for RICHARD for daptomycin        Review of Systems   Constitutional: Negative for chills, diaphoresis, fever, malaise/fatigue and weight loss.   HENT: Negative for congestion, ear discharge, ear pain, hearing loss, nosebleeds and tinnitus.    Eyes: Negative for blurred vision, double vision and photophobia.   Respiratory: Negative for cough, hemoptysis and sputum production.    Cardiovascular: Negative for chest pain, palpitations and orthopnea.   Gastrointestinal: Negative for heartburn, nausea and vomiting.   Genitourinary: Negative for dysuria, frequency and urgency.   Musculoskeletal: Negative for back pain, myalgias and neck pain.   Skin: Negative for itching and rash.   Neurological: Negative for dizziness, tingling, weakness and headaches.      Physical Exam  Laboratory/Imaging   Hemodynamics  Temp (24hrs), Av.5 °C (97.7 °F), Min:36.2 °C (97.2 °F), Max:36.7 °C (98.1 °F)   Temperature: 36.5 °C (97.7 °F)  Pulse  Av.2  Min: 52  Max: 112   Blood Pressure: 102/70      Respiratory      Respiration: 18, Pulse Oximetry: 94 %     Work Of Breathing / Effort: Mild       Fluids    Intake/Output Summary (Last 24 hours) at 18 0822  Last  data filed at 05/28/18 1300   Gross per 24 hour   Intake              360 ml   Output              200 ml   Net              160 ml       Nutrition  Orders Placed This Encounter   Procedures   • DIET ORDER     Standing Status:   Standing     Number of Occurrences:   1     Order Specific Question:   Diet:     Answer:   Regular [1]     Physical Exam   Constitutional: He is oriented to person, place, and time. He appears well-developed and well-nourished. No distress.   HENT:   Head: Normocephalic and atraumatic.   Left Ear: External ear normal.   Mouth/Throat: Oropharynx is clear and moist. No oropharyngeal exudate.   Eyes: Conjunctivae are normal. Pupils are equal, round, and reactive to light. Right eye exhibits no discharge. Left eye exhibits no discharge.   Neck: Normal range of motion. Neck supple. No JVD present.   Cardiovascular: Normal rate, regular rhythm and normal heart sounds.  Exam reveals no gallop and no friction rub.    No murmur heard.  Pulmonary/Chest: Effort normal and breath sounds normal. No stridor. No respiratory distress. He has no wheezes. He has no rales.   Abdominal: Soft. Bowel sounds are normal. He exhibits no distension. There is no tenderness. There is no rebound and no guarding.   Musculoskeletal: He exhibits deformity. He exhibits no edema.   Neurological: He is alert and oriented to person, place, and time. No cranial nerve deficit.   Skin: Skin is warm and dry. He is not diaphoretic.   Dressing look clean on the left foot, left great toe amputation       Recent Labs      05/29/18   0337   WBC  6.1   RBC  4.65*   HEMOGLOBIN  14.0   HEMATOCRIT  42.1   MCV  90.5   MCH  30.1   MCHC  33.3*   RDW  46.1   PLATELETCT  171   MPV  9.7     Recent Labs      05/29/18   0337   SODIUM  139   POTASSIUM  4.4   CHLORIDE  107   CO2  24   GLUCOSE  93   BUN  22   CREATININE  1.08   CALCIUM  9.3                      Assessment/Plan     Diabetic ulcer of left foot associated with type 2 diabetes mellitus,  "with muscle involvement without evidence of necrosis (HCC)- (present on admission)   Assessment & Plan    Hx of diabetic nonhealing ulcer, hx of previous Left first toe amputation  Failed outpatient antibiotics    MRI revealing \"Large open ulcer extending from the great toe amputation site into the first metatarsal head with osteomyelitis of the first metatarsal head\"    s/p I&D 05/12/2018 & 05/14/2018 with Dr Baez     ID team recommends abx until 06/26/2018    Continue IV Zosyn and daptomycin per ID team  Weekly CPK monitoring while on daptomycin   Continue wound care  Weekly CBC and CMP  Awaiting SNF acceptance        Pressure injury of skin   Assessment & Plan    Agree with wound team documentation  Continue aggressive wound care        Diabetic peripheral neuropathy (HCC)- (present on admission)   Assessment & Plan    Pain controlled  Continue neurontin 300mg TID        Psychiatric disorder- (present on admission)   Assessment & Plan    Continue congentin, seroquel, prozac  Continue valproate for mood control  No acute change  Pt is cooperative        Type 2 diabetes mellitus with neurologic complication, without long-term current use of insulin (HCC)- (present on admission)   Assessment & Plan    Controlled, last A1c 5.8  Continue mefformin  Continue Neurontin  ASA 81  Check lipid profile          Quality-Core Measures   Reviewed items::  Labs reviewed, Medications reviewed and Radiology images reviewed  Olsen catheter::  No Olsen  DVT prophylaxis pharmacological::  Enoxaparin (Lovenox)  DVT prophylaxis - mechanical:  SCDs  Ulcer Prophylaxis::  Not indicated  Antibiotics:  Treating active infection/contamination beyond 24 hours perioperative coverage  Assessed for rehabilitation services:  Patient was assess for and/or received rehabilitation services during this hospitalization        "

## 2018-05-29 NOTE — WOUND TEAM
"[facility]  Inpatient Services   Wound and Skin Care Progress Note    Admission Date:   05/11/2018  HPI, PMH, SH: Reviewed  Unit where seen by Wound Team: [id number]    WOUND CONSULT RELATED TO: follow up assessment of left foot lateral and dorsal foot pressure injuries    SUBJECTIVE:  \"Is another toe going to grow there?\"    Self Report / Pain Level: Denies     OBJECTIVE:  Dressings intact and patient lying in bed with feet resting on foot rail.  Medial foot wound resolved    WOUND TYPE, LOCATION, CHARACTERISTICS (Pressure ulcers: location, stage, POA or date identified)    Location and type of wound: Left lateral mid foot stage 2 pressure injury 05/17/2018      Periwound:  intact  Drainage:  none  Tissue Type and %:  Red/brown 95/5%  Wound Edges:  attached  Odor:   none  Exposed structure(s):   none  S&S of Infection:   none      Measurements:  (taken 5/29/18)  Length:   0.7cm  Width:   1.1cm  Depth:     <0.5cm  Tunnels and undermining:  none    Location and type of wound: Left foot dorsal pressure injury stage 2 05/17/2018      Periwound:  intact  Drainage:  none  Tissue Type and %:  Red 100%  Wound Edges:  attached  Odor:   none  Exposed structure(s):   none  S&S of Infection:   none      Measurements:  (taken 5/29/18)  Length:   0.5cm  Width:    0.5cm  Depth:     <0.5cm  Tunnels and undermining:  none    INTERVENTIONS BY WOUND TEAM:  Removed old dressings and cleansed wound with NS gauze and used forceps to remove dried exudate.  Using scalpel and forceps removed 98% loose brown slough lateral wound revealing red tissue.  Measurements and photos taken.  Covered wounds with honey colloid dressing and secured with adhesive foam.  Adhesive foam dressing changed on great toe incision (this wound is being addressed by LPS team).  Discussed with staff RN.    Interdisciplinary consultation: Patient, Staff RN    EVALUATION:  Wound resolving; changed from honey alginate to honey colloid to promote moisture for " wound healing.    Factors affecting wound healing: DM, Neuropathy, Infection   Goals: Clean wound beds in 1 week.    NURSING PLAN OF CARE ORDERS (X):    Dressing changes: See Dressing Care orders: X updated  Skin care: See Skin Care orders:   Rectal tube care: See Rectal Tube Care orders:   Other orders:    WOUND TEAM PLAN OF CARE (X):   NPWT change 3 x week:        Dressing changes by wound team:       Follow up as needed:       Other (explain): X  Wound team will follow up in 1 week.

## 2018-05-29 NOTE — CARE PLAN
Problem: Safety  Goal: Will remain free from injury  Outcome: PROGRESSING AS EXPECTED  Bed in low, bed alarm in place, call light in reach, and hourly rounding      Problem: Skin Integrity  Goal: Risk for impaired skin integrity will decrease  Outcome: PROGRESSING AS EXPECTED  Dressing changes per orders, dressing CDI, IV ABX

## 2018-05-29 NOTE — CARE PLAN
Problem: Safety  Goal: Will remain free from injury  Outcome: PROGRESSING AS EXPECTED  Continue high fall risk precautions, encourage pt to call for assistance, assist with ambulation    Problem: Pain Management  Goal: Pain level will decrease to patient's comfort goal  Outcome: PROGRESSING AS EXPECTED  Continue to assess pain and administer prn medications as needed

## 2018-05-30 LAB
CHOLEST SERPL-MCNC: 182 MG/DL (ref 100–199)
HDLC SERPL-MCNC: 25 MG/DL
LDLC SERPL CALC-MCNC: 131 MG/DL
TRIGL SERPL-MCNC: 129 MG/DL (ref 0–149)

## 2018-05-30 PROCEDURE — A9270 NON-COVERED ITEM OR SERVICE: HCPCS | Performed by: INTERNAL MEDICINE

## 2018-05-30 PROCEDURE — 700105 HCHG RX REV CODE 258: Performed by: INTERNAL MEDICINE

## 2018-05-30 PROCEDURE — 99232 SBSQ HOSP IP/OBS MODERATE 35: CPT | Performed by: INTERNAL MEDICINE

## 2018-05-30 PROCEDURE — 80061 LIPID PANEL: CPT

## 2018-05-30 PROCEDURE — A9270 NON-COVERED ITEM OR SERVICE: HCPCS | Performed by: HOSPITALIST

## 2018-05-30 PROCEDURE — 700102 HCHG RX REV CODE 250 W/ 637 OVERRIDE(OP): Performed by: HOSPITALIST

## 2018-05-30 PROCEDURE — 700111 HCHG RX REV CODE 636 W/ 250 OVERRIDE (IP): Performed by: INTERNAL MEDICINE

## 2018-05-30 PROCEDURE — 770021 HCHG ROOM/CARE - ISO PRIVATE

## 2018-05-30 PROCEDURE — 700102 HCHG RX REV CODE 250 W/ 637 OVERRIDE(OP): Performed by: INTERNAL MEDICINE

## 2018-05-30 RX ORDER — ATORVASTATIN CALCIUM 20 MG/1
20 TABLET, FILM COATED ORAL
Status: DISCONTINUED | OUTPATIENT
Start: 2018-05-30 | End: 2018-06-12

## 2018-05-30 RX ADMIN — GABAPENTIN 300 MG: 300 CAPSULE ORAL at 08:26

## 2018-05-30 RX ADMIN — VALPROIC ACID 250 MG: 250 CAPSULE, LIQUID FILLED ORAL at 20:07

## 2018-05-30 RX ADMIN — ATORVASTATIN CALCIUM 20 MG: 20 TABLET, FILM COATED ORAL at 20:07

## 2018-05-30 RX ADMIN — GABAPENTIN 300 MG: 300 CAPSULE ORAL at 14:53

## 2018-05-30 RX ADMIN — FLUOXETINE HYDROCHLORIDE 60 MG: 20 CAPSULE ORAL at 08:26

## 2018-05-30 RX ADMIN — ASPIRIN 81 MG: 81 TABLET, COATED ORAL at 08:26

## 2018-05-30 RX ADMIN — DAPTOMYCIN 620 MG: 500 INJECTION, POWDER, LYOPHILIZED, FOR SOLUTION INTRAVENOUS at 12:28

## 2018-05-30 RX ADMIN — METFORMIN HYDROCHLORIDE 500 MG: 500 TABLET, FILM COATED ORAL at 17:23

## 2018-05-30 RX ADMIN — QUETIAPINE FUMARATE 100 MG: 100 TABLET ORAL at 12:30

## 2018-05-30 RX ADMIN — QUETIAPINE FUMARATE 100 MG: 100 TABLET ORAL at 08:26

## 2018-05-30 RX ADMIN — QUETIAPINE FUMARATE 200 MG: 100 TABLET ORAL at 20:07

## 2018-05-30 RX ADMIN — PIPERACILLIN SODIUM AND TAZOBACTAM SODIUM 3.38 G: 3; .375 INJECTION, POWDER, FOR SOLUTION INTRAVENOUS at 20:07

## 2018-05-30 RX ADMIN — GABAPENTIN 300 MG: 300 CAPSULE ORAL at 20:07

## 2018-05-30 RX ADMIN — BENZTROPINE MESYLATE 1 MG: 1 TABLET ORAL at 08:26

## 2018-05-30 RX ADMIN — BENZTROPINE MESYLATE 1 MG: 1 TABLET ORAL at 20:07

## 2018-05-30 RX ADMIN — VALPROIC ACID 250 MG: 250 CAPSULE, LIQUID FILLED ORAL at 08:26

## 2018-05-30 RX ADMIN — METFORMIN HYDROCHLORIDE 500 MG: 500 TABLET, FILM COATED ORAL at 08:27

## 2018-05-30 RX ADMIN — LORAZEPAM 1 MG: 1 TABLET ORAL at 14:53

## 2018-05-30 RX ADMIN — ENOXAPARIN SODIUM 40 MG: 100 INJECTION SUBCUTANEOUS at 08:27

## 2018-05-30 RX ADMIN — PIPERACILLIN SODIUM AND TAZOBACTAM SODIUM 3.38 G: 3; .375 INJECTION, POWDER, FOR SOLUTION INTRAVENOUS at 05:57

## 2018-05-30 RX ADMIN — PIPERACILLIN SODIUM AND TAZOBACTAM SODIUM 3.38 G: 3; .375 INJECTION, POWDER, FOR SOLUTION INTRAVENOUS at 13:11

## 2018-05-30 ASSESSMENT — ENCOUNTER SYMPTOMS
VOMITING: 0
WEAKNESS: 0
ORTHOPNEA: 0
DOUBLE VISION: 0
BACK PAIN: 0
NAUSEA: 0
HEADACHES: 0
DIAPHORESIS: 0
HEARTBURN: 0
HEMOPTYSIS: 0
PALPITATIONS: 0
TINGLING: 0
DIZZINESS: 0
COUGH: 0
BLURRED VISION: 0
PHOTOPHOBIA: 0
WEIGHT LOSS: 0
NECK PAIN: 0
MYALGIAS: 0
CHILLS: 0
SPUTUM PRODUCTION: 0
FEVER: 0

## 2018-05-30 ASSESSMENT — PAIN SCALES - GENERAL
PAINLEVEL_OUTOF10: 0

## 2018-05-30 NOTE — CARE PLAN
Problem: Safety  Goal: Will remain free from falls    Intervention: Assess risk factors for falls  Environment is clutter free. Personal possession and bedside table near patient. Bed locked and in the lowest position. Patient educated to call when needing assistance. Call light within reach. Non-skids socks in place. Bed alarm on. Hourly rounding in place.       Problem: Infection  Goal: Will remain free from infection    Intervention: Assess signs and symptoms of infection  Assessing and monitoring patient vital signs and lab values. Patient receiving Daptomycin and Zosyn ABX, refer to MAR. Patient on Contact precautions. Washing hands before and after patient contact.

## 2018-05-30 NOTE — PROGRESS NOTES
Pt alert and oriented x3 with intermittent situational confusion. Reoriented and reinforced call light use. Bed alarm in place and on, bed in low, wheels locked. Up SBA with unsteady gait. Wound care changed dressing to LLE - drg CDI. Denies pain.

## 2018-05-30 NOTE — PROGRESS NOTES
Pt alert, disoriented to situation. IV ABX. Dressings CDI to LLE. Up SBA with unsteady gate to bathroom. Free of falls and trauma - call light use reinforced, bed alarm in use and bed in low. Denies pain. Pt increased anxiety and agitation prn ativan administered with relief.

## 2018-05-30 NOTE — CARE PLAN
Problem: Safety  Goal: Will remain free from injury  Outcome: PROGRESSING SLOWER THAN EXPECTED  Bed in low, bed alarm in place, call light in reach, and hourly rounding      Problem: Knowledge Deficit  Goal: Knowledge of disease process/condition, treatment plan, diagnostic tests, and medications will improve  Outcome: PROGRESSING AS EXPECTED

## 2018-05-30 NOTE — PROGRESS NOTES
[facility] Hospitalist Progress Note    Date of Service: 2018    Chief Complaint  52 y.o. male admitted 2018 with diabetic left foot ulcer.  Underwent I&D with  May 12, 2018 and May 14, 2018. Seen by infectious disease team during the hospital stay. Plan to continue IV Zosyn and daptomycin with the stop date of 2018. Difficult SNF disposition given insurance issues and use of daptomycin for antibiotics.    Interval Problem Update  Patient seen and evaluated on rounds  Doing well  No complaints  Awaiting disposition  Continuing antibiotics    Consultants/Specialty  Infectious disease  Orthopedics  LPS    Disposition  Awaiting SNF acceptance   Insurance barriers to SNF acceptance  Daptomycin cost as a barrier to SNF acceptance  , to evaluate for [facility] for daptomycin        Review of Systems   Constitutional: Negative for chills, diaphoresis, fever, malaise/fatigue and weight loss.   HENT: Negative for congestion, ear discharge, ear pain, hearing loss, nosebleeds and tinnitus.    Eyes: Negative for blurred vision, double vision and photophobia.   Respiratory: Negative for cough, hemoptysis and sputum production.    Cardiovascular: Negative for chest pain, palpitations and orthopnea.   Gastrointestinal: Negative for heartburn, nausea and vomiting.   Genitourinary: Negative for dysuria, frequency and urgency.   Musculoskeletal: Negative for back pain, myalgias and neck pain.   Skin: Negative for itching and rash.   Neurological: Negative for dizziness, tingling, weakness and headaches.      Physical Exam  Laboratory/Imaging   Hemodynamics  Temp (24hrs), Av.4 °C (97.5 °F), Min:36.2 °C (97.2 °F), Max:36.5 °C (97.7 °F)   Temperature: 36.5 °C (97.7 °F)  Pulse  Av.3  Min: 52  Max: 112   Blood Pressure: 116/75      Respiratory      Respiration: 16, Pulse Oximetry: 93 %     Work Of Breathing / Effort: Mild  RUL Breath Sounds: Clear, RML Breath Sounds: Clear, RLL Breath Sounds:  Clear, LORENA Breath Sounds: Clear, LLL Breath Sounds: Clear    Fluids    Intake/Output Summary (Last 24 hours) at 05/30/18 1540  Last data filed at 05/30/18 0600   Gross per 24 hour   Intake              340 ml   Output                0 ml   Net              340 ml       Nutrition  Orders Placed This Encounter   Procedures   • DIET ORDER     Standing Status:   Standing     Number of Occurrences:   1     Order Specific Question:   Diet:     Answer:   Regular [1]     Physical Exam   Constitutional: He is oriented to person, place, and time. He appears well-developed and well-nourished. No distress.   HENT:   Head: Normocephalic and atraumatic.   Left Ear: External ear normal.   Mouth/Throat: Oropharynx is clear and moist. No oropharyngeal exudate.   Eyes: Conjunctivae are normal. Pupils are equal, round, and reactive to light. Right eye exhibits no discharge. Left eye exhibits no discharge.   Neck: Normal range of motion. Neck supple. No JVD present.   Cardiovascular: Normal rate, regular rhythm and normal heart sounds.  Exam reveals no gallop and no friction rub.    No murmur heard.  Pulmonary/Chest: Effort normal and breath sounds normal. No stridor. No respiratory distress. He has no wheezes. He has no rales.   Abdominal: Soft. Bowel sounds are normal. He exhibits no distension. There is no tenderness. There is no rebound and no guarding.   Musculoskeletal: He exhibits deformity. He exhibits no edema.   Neurological: He is alert and oriented to person, place, and time. No cranial nerve deficit.   Skin: Skin is warm and dry. He is not diaphoretic.   Dressing look clean on the left foot, left great toe amputation       Recent Labs      05/29/18   0337   WBC  6.1   RBC  4.65*   HEMOGLOBIN  14.0   HEMATOCRIT  42.1   MCV  90.5   MCH  30.1   MCHC  33.3*   RDW  46.1   PLATELETCT  171   MPV  9.7     Recent Labs      05/29/18   0337   SODIUM  139   POTASSIUM  4.4   CHLORIDE  107   CO2  24   GLUCOSE  93   BUN  22   CREATININE   "1.08   CALCIUM  9.3             Recent Labs      05/30/18   0549   TRIGLYCERIDE  129   HDL  25*   LDL  131*          Assessment/Plan     Diabetic ulcer of left foot associated with type 2 diabetes mellitus, with muscle involvement without evidence of necrosis (HCC)- (present on admission)   Assessment & Plan    Hx of diabetic nonhealing ulcer, hx of previous Left first toe amputation  Failed outpatient antibiotics    MRI revealing \"Large open ulcer extending from the great toe amputation site into the first metatarsal head with osteomyelitis of the first metatarsal head\"    s/p I&D 05/12/2018 & 05/14/2018 with Dr Baez     ID team recommends abx until 06/26/2018    Continue IV Zosyn and daptomycin per ID team  Weekly CPK monitoring while on daptomycin   Continue wound care  Weekly CBC and CMP  Awaiting SNF acceptance        Pressure injury of skin   Assessment & Plan    Agree with wound team documentation  Continue aggressive wound care        Diabetic peripheral neuropathy (HCC)- (present on admission)   Assessment & Plan    Pain controlled  Continue neurontin 300mg TID        Psychiatric disorder- (present on admission)   Assessment & Plan    Continue congentin, seroquel, prozac  Continue valproate for mood control  No acute change  Pt is cooperative        Type 2 diabetes mellitus with neurologic complication, without long-term current use of insulin (HCC)- (present on admission)   Assessment & Plan    Controlled, last A1c 5.8  Continue mefformin  Continue Neurontin  ASA 81  Atorvastatin 20 mg          Quality-Core Measures   Reviewed items::  Labs reviewed, Medications reviewed and Radiology images reviewed  Olsen catheter::  No Olsen  DVT prophylaxis pharmacological::  Enoxaparin (Lovenox)  DVT prophylaxis - mechanical:  SCDs  Ulcer Prophylaxis::  Not indicated  Antibiotics:  Treating active infection/contamination beyond 24 hours perioperative coverage  Assessed for rehabilitation services:  Patient was " assess for and/or received rehabilitation services during this hospitalization

## 2018-05-30 NOTE — PROGRESS NOTES
"Report received from day RN. Assumed patient care at 1900. Patient is A&Ox3, reoriented to time, Patient noted to be forgetful at times.Patient appears calm and in no acute distress. Labs and orders reviewed. Assessment completed. Dressing to left wound, clean, dry, and intact. Patient denies any pain at this time. Patient provided with scheduled medication, refer to MAR. Plan of care discussed with patient; questions have been answered at this time. Patient needs have been met at this time. Patient educated to call when needing assistance. Call light within reach. Bed alarm on. Patient resting comfortably in bed. Non-Skid socks in place. Bed locked and in the lowest position. Hourly rounding in place. /71   Pulse 61   Temp 36.5 °C (97.7 °F)   Resp 17   Ht 1.93 m (6' 4\")   Wt 105.1 kg (231 lb 11.3 oz)   SpO2 95%   BMI 28.20 kg/m² .   "

## 2018-05-31 LAB
ALBUMIN SERPL BCP-MCNC: 3.7 G/DL (ref 3.2–4.9)
ALBUMIN/GLOB SERPL: 1.2 G/DL
ALP SERPL-CCNC: 72 U/L (ref 30–99)
ALT SERPL-CCNC: 20 U/L (ref 2–50)
ANION GAP SERPL CALC-SCNC: 9 MMOL/L (ref 0–11.9)
AST SERPL-CCNC: 15 U/L (ref 12–45)
BILIRUB SERPL-MCNC: 0.5 MG/DL (ref 0.1–1.5)
BUN SERPL-MCNC: 20 MG/DL (ref 8–22)
CALCIUM SERPL-MCNC: 9.1 MG/DL (ref 8.5–10.5)
CHLORIDE SERPL-SCNC: 108 MMOL/L (ref 96–112)
CK SERPL-CCNC: 29 U/L (ref 0–154)
CO2 SERPL-SCNC: 23 MMOL/L (ref 20–33)
CREAT SERPL-MCNC: 0.89 MG/DL (ref 0.5–1.4)
ERYTHROCYTE [DISTWIDTH] IN BLOOD BY AUTOMATED COUNT: 45.6 FL (ref 35.9–50)
GLOBULIN SER CALC-MCNC: 3 G/DL (ref 1.9–3.5)
GLUCOSE SERPL-MCNC: 88 MG/DL (ref 65–99)
HCT VFR BLD AUTO: 41.5 % (ref 42–52)
HGB BLD-MCNC: 14.1 G/DL (ref 14–18)
MCH RBC QN AUTO: 30.3 PG (ref 27–33)
MCHC RBC AUTO-ENTMCNC: 34 G/DL (ref 33.7–35.3)
MCV RBC AUTO: 89.2 FL (ref 81.4–97.8)
PLATELET # BLD AUTO: 156 K/UL (ref 164–446)
PMV BLD AUTO: 10 FL (ref 9–12.9)
POTASSIUM SERPL-SCNC: 4 MMOL/L (ref 3.6–5.5)
PROT SERPL-MCNC: 6.7 G/DL (ref 6–8.2)
RBC # BLD AUTO: 4.65 M/UL (ref 4.7–6.1)
SODIUM SERPL-SCNC: 140 MMOL/L (ref 135–145)
WBC # BLD AUTO: 5.5 K/UL (ref 4.8–10.8)

## 2018-05-31 PROCEDURE — 700102 HCHG RX REV CODE 250 W/ 637 OVERRIDE(OP): Performed by: HOSPITALIST

## 2018-05-31 PROCEDURE — 700105 HCHG RX REV CODE 258: Performed by: INTERNAL MEDICINE

## 2018-05-31 PROCEDURE — 700111 HCHG RX REV CODE 636 W/ 250 OVERRIDE (IP): Performed by: INTERNAL MEDICINE

## 2018-05-31 PROCEDURE — 85027 COMPLETE CBC AUTOMATED: CPT

## 2018-05-31 PROCEDURE — A9270 NON-COVERED ITEM OR SERVICE: HCPCS | Performed by: INTERNAL MEDICINE

## 2018-05-31 PROCEDURE — 99232 SBSQ HOSP IP/OBS MODERATE 35: CPT | Performed by: INTERNAL MEDICINE

## 2018-05-31 PROCEDURE — 770021 HCHG ROOM/CARE - ISO PRIVATE

## 2018-05-31 PROCEDURE — 700102 HCHG RX REV CODE 250 W/ 637 OVERRIDE(OP): Performed by: INTERNAL MEDICINE

## 2018-05-31 PROCEDURE — 82550 ASSAY OF CK (CPK): CPT

## 2018-05-31 PROCEDURE — A9270 NON-COVERED ITEM OR SERVICE: HCPCS | Performed by: HOSPITALIST

## 2018-05-31 PROCEDURE — 80053 COMPREHEN METABOLIC PANEL: CPT

## 2018-05-31 PROCEDURE — 700102 HCHG RX REV CODE 250 W/ 637 OVERRIDE(OP): Performed by: NURSE PRACTITIONER

## 2018-05-31 PROCEDURE — A9270 NON-COVERED ITEM OR SERVICE: HCPCS | Performed by: NURSE PRACTITIONER

## 2018-05-31 RX ORDER — AMMONIUM LACTATE 12 G/100G
LOTION TOPICAL 2 TIMES DAILY
Status: DISCONTINUED | OUTPATIENT
Start: 2018-05-31 | End: 2018-06-12

## 2018-05-31 RX ADMIN — VALPROIC ACID 250 MG: 250 CAPSULE, LIQUID FILLED ORAL at 20:18

## 2018-05-31 RX ADMIN — METFORMIN HYDROCHLORIDE 500 MG: 500 TABLET, FILM COATED ORAL at 09:23

## 2018-05-31 RX ADMIN — BENZTROPINE MESYLATE 1 MG: 1 TABLET ORAL at 20:17

## 2018-05-31 RX ADMIN — PIPERACILLIN SODIUM AND TAZOBACTAM SODIUM 3.38 G: 3; .375 INJECTION, POWDER, FOR SOLUTION INTRAVENOUS at 20:18

## 2018-05-31 RX ADMIN — DAPTOMYCIN 620 MG: 500 INJECTION, POWDER, LYOPHILIZED, FOR SOLUTION INTRAVENOUS at 12:14

## 2018-05-31 RX ADMIN — LORAZEPAM 1 MG: 1 TABLET ORAL at 05:28

## 2018-05-31 RX ADMIN — Medication: at 12:14

## 2018-05-31 RX ADMIN — GABAPENTIN 300 MG: 300 CAPSULE ORAL at 09:23

## 2018-05-31 RX ADMIN — PIPERACILLIN SODIUM AND TAZOBACTAM SODIUM 3.38 G: 3; .375 INJECTION, POWDER, FOR SOLUTION INTRAVENOUS at 14:23

## 2018-05-31 RX ADMIN — SENNOSIDES AND DOCUSATE SODIUM 2 TABLET: 8.6; 5 TABLET ORAL at 09:23

## 2018-05-31 RX ADMIN — VALPROIC ACID 250 MG: 250 CAPSULE, LIQUID FILLED ORAL at 09:23

## 2018-05-31 RX ADMIN — ENOXAPARIN SODIUM 40 MG: 100 INJECTION SUBCUTANEOUS at 09:23

## 2018-05-31 RX ADMIN — QUETIAPINE FUMARATE 100 MG: 100 TABLET ORAL at 09:23

## 2018-05-31 RX ADMIN — QUETIAPINE FUMARATE 100 MG: 100 TABLET ORAL at 12:15

## 2018-05-31 RX ADMIN — METFORMIN HYDROCHLORIDE 500 MG: 500 TABLET, FILM COATED ORAL at 17:47

## 2018-05-31 RX ADMIN — QUETIAPINE FUMARATE 200 MG: 100 TABLET ORAL at 20:17

## 2018-05-31 RX ADMIN — BENZTROPINE MESYLATE 1 MG: 1 TABLET ORAL at 12:14

## 2018-05-31 RX ADMIN — ASPIRIN 81 MG: 81 TABLET, COATED ORAL at 09:23

## 2018-05-31 RX ADMIN — FLUOXETINE HYDROCHLORIDE 60 MG: 20 CAPSULE ORAL at 09:23

## 2018-05-31 RX ADMIN — GABAPENTIN 300 MG: 300 CAPSULE ORAL at 14:23

## 2018-05-31 RX ADMIN — Medication: at 20:18

## 2018-05-31 RX ADMIN — ATORVASTATIN CALCIUM 20 MG: 20 TABLET, FILM COATED ORAL at 20:18

## 2018-05-31 RX ADMIN — GABAPENTIN 300 MG: 300 CAPSULE ORAL at 20:17

## 2018-05-31 RX ADMIN — PIPERACILLIN SODIUM AND TAZOBACTAM SODIUM 3.38 G: 3; .375 INJECTION, POWDER, FOR SOLUTION INTRAVENOUS at 05:38

## 2018-05-31 ASSESSMENT — ENCOUNTER SYMPTOMS
FEVER: 0
MYALGIAS: 0
TINGLING: 0
VOMITING: 0
NECK PAIN: 0
BLURRED VISION: 0
HEADACHES: 0
ORTHOPNEA: 0
WEIGHT LOSS: 0
DIZZINESS: 0
DIAPHORESIS: 0
BACK PAIN: 0
SPUTUM PRODUCTION: 0
PALPITATIONS: 0
WEAKNESS: 0
PHOTOPHOBIA: 0
HEMOPTYSIS: 0
COUGH: 0
DOUBLE VISION: 0
CHILLS: 0
HEARTBURN: 0
NAUSEA: 0

## 2018-05-31 ASSESSMENT — PAIN SCALES - GENERAL
PAINLEVEL_OUTOF10: 0

## 2018-05-31 NOTE — PROGRESS NOTES
"Report received from day RN. Assumed patient care at 1900. Patient is A&Ox3, reoriented to time, Patient noted to be forgetful at times.Patient appears calm and in no acute distress. Labs and orders reviewed. Assessment completed.Patient denies any pain at this time. Patient provided with scheduled medication, refer to MAR. Plan of care discussed with patient; questions have been answered at this time. Patient needs have been met at this time. Patient educated to call when needing assistance. Call light within reach. Bed alarm on. Patient resting comfortably in bed. Non-Skid socks in place. Bed locked and in the lowest position. Hourly rounding in place. /77   Pulse 62   Temp 36.8 °C (98.2 °F)   Resp 16   Ht 1.93 m (6' 4\")   Wt 105.1 kg (231 lb 11.3 oz)   SpO2 95%   BMI 28.20 kg/m² .     "

## 2018-05-31 NOTE — PROGRESS NOTES
[facility] Hospitalist Progress Note    Date of Service: 2018    Chief Complaint  52 y.o. male admitted 2018 with diabetic left foot ulcer.  Underwent I&D with  May 12, 2018 and May 14, 2018. Seen by infectious disease team during the hospital stay. Plan to continue IV Zosyn and daptomycin with the stop date of 2018. Difficult SNF disposition given insurance issues and use of daptomycin for antibiotics.    Interval Problem Update  Patient seen and evaluated on rounds  Doing well  No complaints  Awaiting disposition  Continuing antibiotics  Labs from today reviewed, no abnormalities     Consultants/Specialty  Infectious disease  Orthopedics  LPS    Disposition  Awaiting SNF acceptance   Insurance barriers to SNF acceptance  Daptomycin cost as a barrier to SNF acceptance  , to evaluate for [facility]ARD for daptomycin        Review of Systems   Constitutional: Negative for chills, diaphoresis, fever, malaise/fatigue and weight loss.   HENT: Negative for congestion, ear discharge, ear pain, hearing loss, nosebleeds and tinnitus.    Eyes: Negative for blurred vision, double vision and photophobia.   Respiratory: Negative for cough, hemoptysis and sputum production.    Cardiovascular: Negative for chest pain, palpitations and orthopnea.   Gastrointestinal: Negative for heartburn, nausea and vomiting.   Genitourinary: Negative for dysuria, frequency and urgency.   Musculoskeletal: Negative for back pain, myalgias and neck pain.   Skin: Negative for itching and rash.   Neurological: Negative for dizziness, tingling, weakness and headaches.      Physical Exam  Laboratory/Imaging   Hemodynamics  Temp (24hrs), Av.7 °C (98 °F), Min:36.4 °C (97.6 °F), Max:36.8 °C (98.3 °F)   Temperature: 36.4 °C (97.6 °F)  Pulse  Av  Min: 52  Max: 112   Blood Pressure: 115/77      Respiratory      Respiration: 12, Pulse Oximetry: 92 %             Fluids    Intake/Output Summary (Last 24 hours) at 18  1447  Last data filed at 05/31/18 0600   Gross per 24 hour   Intake              340 ml   Output                0 ml   Net              340 ml       Nutrition  Orders Placed This Encounter   Procedures   • DIET ORDER     Standing Status:   Standing     Number of Occurrences:   1     Order Specific Question:   Diet:     Answer:   Regular [1]     Physical Exam   Constitutional: He is oriented to person, place, and time. He appears well-developed and well-nourished. No distress.   HENT:   Head: Normocephalic and atraumatic.   Left Ear: External ear normal.   Mouth/Throat: Oropharynx is clear and moist. No oropharyngeal exudate.   Eyes: Conjunctivae are normal. Pupils are equal, round, and reactive to light. Right eye exhibits no discharge. Left eye exhibits no discharge.   Neck: Normal range of motion. Neck supple. No JVD present.   Cardiovascular: Normal rate, regular rhythm and normal heart sounds.  Exam reveals no gallop and no friction rub.    No murmur heard.  Pulmonary/Chest: Effort normal and breath sounds normal. No stridor. No respiratory distress. He has no wheezes. He has no rales.   Abdominal: Soft. Bowel sounds are normal. He exhibits no distension. There is no tenderness. There is no rebound and no guarding.   Musculoskeletal: He exhibits deformity. He exhibits no edema.   Neurological: He is alert and oriented to person, place, and time. No cranial nerve deficit.   Skin: Skin is warm and dry. He is not diaphoretic.   Dressing look clean on the left foot, left great toe amputation       Recent Labs      05/29/18   0337  05/31/18   0533   WBC  6.1  5.5   RBC  4.65*  4.65*   HEMOGLOBIN  14.0  14.1   HEMATOCRIT  42.1  41.5*   MCV  90.5  89.2   MCH  30.1  30.3   MCHC  33.3*  34.0   RDW  46.1  45.6   PLATELETCT  171  156*   MPV  9.7  10.0     Recent Labs      05/29/18   0337  05/31/18   0533   SODIUM  139  140   POTASSIUM  4.4  4.0   CHLORIDE  107  108   CO2  24  23   GLUCOSE  93  88   BUN  22  20   CREATININE  " 1.08  0.89   CALCIUM  9.3  9.1             Recent Labs      05/30/18   0549   TRIGLYCERIDE  129   HDL  25*   LDL  131*          Assessment/Plan     Diabetic ulcer of left foot associated with type 2 diabetes mellitus, with muscle involvement without evidence of necrosis (HCC)- (present on admission)   Assessment & Plan    Hx of diabetic nonhealing ulcer, hx of previous Left first toe amputation  Failed outpatient antibiotics    MRI revealing \"Large open ulcer extending from the great toe amputation site into the first metatarsal head with osteomyelitis of the first metatarsal head\"    s/p I&D 05/12/2018 & 05/14/2018 with Dr Baez     ID team recommends abx until 06/26/2018    Continue IV Zosyn and daptomycin per ID team  Weekly CPK monitoring while on daptomycin   Continue wound care  Weekly CBC and CMP  Awaiting SNF acceptance        Pressure injury of skin   Assessment & Plan    Agree with wound team documentation  Continue aggressive wound care        Diabetic peripheral neuropathy (HCC)- (present on admission)   Assessment & Plan    Pain controlled  Continue neurontin 300mg TID        Psychiatric disorder- (present on admission)   Assessment & Plan    Continue congentin, seroquel, prozac  Continue valproate for mood control  No acute change  Pt is cooperative        Type 2 diabetes mellitus with neurologic complication, without long-term current use of insulin (HCC)- (present on admission)   Assessment & Plan    Controlled, last A1c 5.8  Continue mefformin  Continue Neurontin  ASA 81  Atorvastatin 20 mg          Quality-Core Measures   Reviewed items::  Labs reviewed, Medications reviewed and Radiology images reviewed  Olsen catheter::  No Olsen  DVT prophylaxis pharmacological::  Enoxaparin (Lovenox)  DVT prophylaxis - mechanical:  SCDs  Ulcer Prophylaxis::  Not indicated  Antibiotics:  Treating active infection/contamination beyond 24 hours perioperative coverage  Assessed for rehabilitation services:  " Patient was assess for and/or received rehabilitation services during this hospitalization

## 2018-05-31 NOTE — PROGRESS NOTES
Pt alert, disoriented to situation. Dressing changed to LLE lateral wound per orders. IV ABX. Free of falls and trauma - call light use reinforeced, bed alarm in use, and bed in low. No anxiety noted. WCTM.

## 2018-05-31 NOTE — PROGRESS NOTES
"LIMB PRESERVATION SERVICE     52 y.o. male with a past medical history that includes borderline diabetes, anxiety, HTN, and Hep C. admitted for dehiscence and infection of left great toe amputation site.  Patient was seen at LPS rounds in the wound clinic on 5/11 and was directly admitted to [facility].  He previously underwent left great toe amp on 4/1/18 by Dr. Baez, followed by a revision on 4/23 by Dr. Easton. Pt did not f/u at [facility] for wound care or for his antibiotics.    s/p I & D of L great toe amp with VAC on 5/12  S/P I&D with secondary closure of left great toe amputation site on 5/14 by Dr. Baez    /77   Pulse (!) 58 Comment: RN notified  Temp 36.4 °C (97.6 °F)   Resp 12   Ht 1.93 m (6' 4\")   Wt 105.1 kg (231 lb 11.3 oz)   SpO2 92%   BMI 28.20 kg/m²    Blood glucose 107  Pedal pulses present bilaterally  Patient denies F/C/N/V, pain well controlled        Surgical site well approximated, dried blood along incision line  Pressure ulcers stage 2 to midfoot medial and lateral aspect. Improving. Followed by wound team.  Lateral wound periwound macerated. Nursing to change dressing    ID managing abx, Zosyn and Dapto x 6 weeks  ID s/o          PLAN  - Dressing changes to amp site and foot wounds q 72 hrs  -pressure ulcers followed by wound team  -IV abx until 6/26/18  -offloading shoe x2 at bedside. To be worn when OOB  -HWB LLE  -IP wound consult for foot and nail care  -amlactin lotion BID to feet, avoiding wound dressings      -Discharge planning   -Sutures to remain in place for 1 more week.   Denied by SNF-no medicaid beds        "

## 2018-05-31 NOTE — CARE PLAN
Problem: Safety  Goal: Will remain free from injury  Outcome: PROGRESSING AS EXPECTED  Bed in low, bed alarm in place, call light in reach, and hourly rounding      Problem: Mobility  Goal: Risk for activity intolerance will decrease  Outcome: PROGRESSING AS EXPECTED  SBA, off loading boot to LLE, call light and bed in use

## 2018-05-31 NOTE — CARE PLAN
Problem: Infection  Goal: Will remain free from infection    Intervention: Assess signs and symptoms of infection  Assessing and monitoring patient vital signs and lab values. Patient receiving IV Zosyn and Daptomycin ABX, refer to MAR. Patient on Contact precautions for MRSA to wound. Washing hands before and after patient contact.       Problem: Skin Integrity  Goal: Risk for impaired skin integrity will decrease    Intervention: Assess risk factors for impaired skin integrity and/or pressure ulcers  Assessing and monitoring patient skin integrity. Dressing in place to wound to Left leg. Patient is able to turn self from side to side. Pillows in use for support.

## 2018-06-01 PROCEDURE — 700105 HCHG RX REV CODE 258: Performed by: INTERNAL MEDICINE

## 2018-06-01 PROCEDURE — 700111 HCHG RX REV CODE 636 W/ 250 OVERRIDE (IP): Performed by: INTERNAL MEDICINE

## 2018-06-01 PROCEDURE — 99232 SBSQ HOSP IP/OBS MODERATE 35: CPT | Performed by: INTERNAL MEDICINE

## 2018-06-01 PROCEDURE — A9270 NON-COVERED ITEM OR SERVICE: HCPCS | Performed by: HOSPITALIST

## 2018-06-01 PROCEDURE — 770021 HCHG ROOM/CARE - ISO PRIVATE

## 2018-06-01 PROCEDURE — 700102 HCHG RX REV CODE 250 W/ 637 OVERRIDE(OP): Performed by: HOSPITALIST

## 2018-06-01 PROCEDURE — 700102 HCHG RX REV CODE 250 W/ 637 OVERRIDE(OP): Performed by: INTERNAL MEDICINE

## 2018-06-01 PROCEDURE — A6212 FOAM DRG <=16 SQ IN W/BORDER: HCPCS | Performed by: INTERNAL MEDICINE

## 2018-06-01 PROCEDURE — A9270 NON-COVERED ITEM OR SERVICE: HCPCS | Performed by: INTERNAL MEDICINE

## 2018-06-01 RX ADMIN — PIPERACILLIN SODIUM AND TAZOBACTAM SODIUM 3.38 G: 3; .375 INJECTION, POWDER, FOR SOLUTION INTRAVENOUS at 20:52

## 2018-06-01 RX ADMIN — BENZTROPINE MESYLATE 1 MG: 1 TABLET ORAL at 20:52

## 2018-06-01 RX ADMIN — QUETIAPINE FUMARATE 100 MG: 100 TABLET ORAL at 12:06

## 2018-06-01 RX ADMIN — LORAZEPAM 1 MG: 1 TABLET ORAL at 08:26

## 2018-06-01 RX ADMIN — ENOXAPARIN SODIUM 40 MG: 100 INJECTION SUBCUTANEOUS at 08:19

## 2018-06-01 RX ADMIN — VALPROIC ACID 250 MG: 250 CAPSULE, LIQUID FILLED ORAL at 20:52

## 2018-06-01 RX ADMIN — QUETIAPINE FUMARATE 200 MG: 100 TABLET ORAL at 20:52

## 2018-06-01 RX ADMIN — BENZTROPINE MESYLATE 1 MG: 1 TABLET ORAL at 08:19

## 2018-06-01 RX ADMIN — Medication: at 20:52

## 2018-06-01 RX ADMIN — VALPROIC ACID 250 MG: 250 CAPSULE, LIQUID FILLED ORAL at 08:20

## 2018-06-01 RX ADMIN — FLUOXETINE HYDROCHLORIDE 60 MG: 20 CAPSULE ORAL at 08:19

## 2018-06-01 RX ADMIN — GABAPENTIN 300 MG: 300 CAPSULE ORAL at 20:52

## 2018-06-01 RX ADMIN — PIPERACILLIN SODIUM AND TAZOBACTAM SODIUM 3.38 G: 3; .375 INJECTION, POWDER, FOR SOLUTION INTRAVENOUS at 06:05

## 2018-06-01 RX ADMIN — METFORMIN HYDROCHLORIDE 500 MG: 500 TABLET, FILM COATED ORAL at 17:41

## 2018-06-01 RX ADMIN — ASPIRIN 81 MG: 81 TABLET, COATED ORAL at 08:21

## 2018-06-01 RX ADMIN — ATORVASTATIN CALCIUM 20 MG: 20 TABLET, FILM COATED ORAL at 20:52

## 2018-06-01 RX ADMIN — METFORMIN HYDROCHLORIDE 500 MG: 500 TABLET, FILM COATED ORAL at 08:20

## 2018-06-01 RX ADMIN — SENNOSIDES AND DOCUSATE SODIUM 2 TABLET: 8.6; 5 TABLET ORAL at 08:20

## 2018-06-01 RX ADMIN — QUETIAPINE FUMARATE 100 MG: 100 TABLET ORAL at 08:19

## 2018-06-01 RX ADMIN — PIPERACILLIN SODIUM AND TAZOBACTAM SODIUM 3.38 G: 3; .375 INJECTION, POWDER, FOR SOLUTION INTRAVENOUS at 13:38

## 2018-06-01 RX ADMIN — Medication: at 08:20

## 2018-06-01 RX ADMIN — DAPTOMYCIN 620 MG: 500 INJECTION, POWDER, LYOPHILIZED, FOR SOLUTION INTRAVENOUS at 12:06

## 2018-06-01 RX ADMIN — GABAPENTIN 300 MG: 300 CAPSULE ORAL at 16:12

## 2018-06-01 RX ADMIN — GABAPENTIN 300 MG: 300 CAPSULE ORAL at 08:19

## 2018-06-01 ASSESSMENT — ENCOUNTER SYMPTOMS
WEAKNESS: 0
BACK PAIN: 0
DOUBLE VISION: 0
CHILLS: 0
NAUSEA: 0
FEVER: 0
TINGLING: 0
HEARTBURN: 0
DIAPHORESIS: 0
NECK PAIN: 0
DIZZINESS: 0
ORTHOPNEA: 0
MYALGIAS: 0
BLURRED VISION: 0
WEIGHT LOSS: 0
COUGH: 0
VOMITING: 0
PALPITATIONS: 0
SPUTUM PRODUCTION: 0
HEMOPTYSIS: 0
PHOTOPHOBIA: 0
HEADACHES: 0

## 2018-06-01 ASSESSMENT — PAIN SCALES - GENERAL
PAINLEVEL_OUTOF10: 0
PAINLEVEL_OUTOF10: 3
PAINLEVEL_OUTOF10: 0

## 2018-06-01 NOTE — PROGRESS NOTES
"Report received from day RN. Assumed patient care at 1900. Patient is A&Ox3, reoriented to time.Patient appears calm and in no acute distress. Labs and orders reviewed. Assessment completed. Dressing to left wound, clean, dry, and intact. Patient denies any pain at this time. Patient provided with scheduled medication, refer to MAR. Plan of care discussed with patient; questions have been answered at this time. Patient needs have been met at this time. Patient educated to call when needing assistance. Call light within reach. Bed alarm on. Patient resting comfortably in bed. Non-Skid socks in place. Bed locked and in the lowest position. Hourly rounding in place.  /80   Pulse 70   Temp 36.9 °C (98.5 °F)   Resp 19   Ht 1.93 m (6' 4\")   Wt 105.1 kg (231 lb 11.3 oz)   SpO2 97%   BMI 28.20 kg/m² .   "

## 2018-06-01 NOTE — CARE PLAN
Problem: Safety  Goal: Will remain free from falls    Intervention: Assess risk factors for falls  Environment is clutter free. Personal possession and bedside table near patient. Bed locked and in the lowest position. Patient educated to call when needing assistance. Call light within reach. Bed alarm on.Non-skids socks in place. Hourly rounding in place.       Problem: Infection  Goal: Will remain free from infection    Intervention: Assess signs and symptoms of infection  Assessing and monitoring patient vital signs and lab values. Patient receiving IV Daptomycin and Zosyn ABX, refer to MAR. Patient on Contact precautions for MRSA to left foot wound. Washing hands before and after patient contact.

## 2018-06-01 NOTE — PROGRESS NOTES
[facility] Hospitalist Progress Note    Date of Service: 2018    Chief Complaint  52 y.o. male admitted 2018 with diabetic left foot ulcer.  Underwent I&D with  May 12, 2018 and May 14, 2018. Seen by infectious disease team during the hospital stay. Plan to continue IV Zosyn and daptomycin with the stop date of 2018. Difficult SNF disposition given insurance issues and use of daptomycin for antibiotics.    Interval Problem Update  Patient seen and evaluated on rounds  Doing well  No complaints  Awaiting disposition  Continuing antibiotics  No concerns from nursing perspective     Consultants/Specialty  Infectious disease  Orthopedics  LPS    Disposition  Awaiting SNF acceptance   Insurance barriers to SNF acceptance  Daptomycin cost as a barrier to SNF acceptance  , to evaluate for [facility]ARD for daptomycin        Review of Systems   Constitutional: Negative for chills, diaphoresis, fever, malaise/fatigue and weight loss.   HENT: Negative for congestion, ear discharge, ear pain, hearing loss, nosebleeds and tinnitus.    Eyes: Negative for blurred vision, double vision and photophobia.   Respiratory: Negative for cough, hemoptysis and sputum production.    Cardiovascular: Negative for chest pain, palpitations and orthopnea.   Gastrointestinal: Negative for heartburn, nausea and vomiting.   Genitourinary: Negative for dysuria, frequency and urgency.   Musculoskeletal: Negative for back pain, myalgias and neck pain.   Skin: Negative for itching and rash.   Neurological: Negative for dizziness, tingling, weakness and headaches.      Physical Exam  Laboratory/Imaging   Hemodynamics  Temp (24hrs), Av.6 °C (97.9 °F), Min:36.3 °C (97.4 °F), Max:36.9 °C (98.5 °F)   Temperature: 36.6 °C (97.9 °F)  Pulse  Av.9  Min: 52  Max: 112   Blood Pressure: 125/65      Respiratory      Respiration: 18, Pulse Oximetry: 96 %             Fluids    Intake/Output Summary (Last 24 hours) at 18  1633  Last data filed at 06/01/18 0836   Gross per 24 hour   Intake              580 ml   Output                0 ml   Net              580 ml       Nutrition  Orders Placed This Encounter   Procedures   • DIET ORDER     Standing Status:   Standing     Number of Occurrences:   1     Order Specific Question:   Diet:     Answer:   Regular [1]     Physical Exam   Constitutional: He is oriented to person, place, and time. He appears well-developed and well-nourished. No distress.   HENT:   Head: Normocephalic and atraumatic.   Left Ear: External ear normal.   Mouth/Throat: Oropharynx is clear and moist. No oropharyngeal exudate.   Eyes: Conjunctivae are normal. Pupils are equal, round, and reactive to light. Right eye exhibits no discharge. Left eye exhibits no discharge.   Neck: Normal range of motion. Neck supple. No JVD present.   Cardiovascular: Normal rate, regular rhythm and normal heart sounds.  Exam reveals no gallop and no friction rub.    No murmur heard.  Pulmonary/Chest: Effort normal and breath sounds normal. No stridor. No respiratory distress. He has no wheezes. He has no rales.   Abdominal: Soft. Bowel sounds are normal. He exhibits no distension. There is no tenderness. There is no rebound and no guarding.   Musculoskeletal: He exhibits deformity. He exhibits no edema.   Neurological: He is alert and oriented to person, place, and time. No cranial nerve deficit.   Skin: Skin is warm and dry. He is not diaphoretic.   Dressing look clean on the left foot, left great toe amputation       Recent Labs      05/31/18   0533   WBC  5.5   RBC  4.65*   HEMOGLOBIN  14.1   HEMATOCRIT  41.5*   MCV  89.2   MCH  30.3   MCHC  34.0   RDW  45.6   PLATELETCT  156*   MPV  10.0     Recent Labs      05/31/18   0533   SODIUM  140   POTASSIUM  4.0   CHLORIDE  108   CO2  23   GLUCOSE  88   BUN  20   CREATININE  0.89   CALCIUM  9.1             Recent Labs      05/30/18   0549   TRIGLYCERIDE  129   HDL  25*   LDL  131*         "  Assessment/Plan     Diabetic ulcer of left foot associated with type 2 diabetes mellitus, with muscle involvement without evidence of necrosis (HCC)- (present on admission)   Assessment & Plan    Hx of diabetic nonhealing ulcer, hx of previous Left first toe amputation  Failed outpatient antibiotics    MRI revealing \"Large open ulcer extending from the great toe amputation site into the first metatarsal head with osteomyelitis of the first metatarsal head\"    s/p I&D 05/12/2018 & 05/14/2018 with Dr Baez     ID team recommends abx until 06/26/2018    Continue IV Zosyn and daptomycin per ID team  Weekly CPK monitoring while on daptomycin   Continue wound care  Weekly CBC and CMP  Awaiting SNF acceptance        Pressure injury of skin   Assessment & Plan    Agree with wound team documentation  Continue aggressive wound care        Diabetic peripheral neuropathy (HCC)- (present on admission)   Assessment & Plan    Pain controlled  Continue neurontin 300mg TID        Psychiatric disorder- (present on admission)   Assessment & Plan    Continue congentin, seroquel, prozac  Continue valproate for mood control  No acute change  Pt is cooperative        Type 2 diabetes mellitus with neurologic complication, without long-term current use of insulin (HCC)- (present on admission)   Assessment & Plan    Controlled, last A1c 5.8  Continue mefformin  Continue Neurontin  ASA 81  Atorvastatin 20 mg          Quality-Core Measures   Reviewed items::  Labs reviewed, Medications reviewed and Radiology images reviewed  Olsen catheter::  No Olsen  DVT prophylaxis pharmacological::  Enoxaparin (Lovenox)  DVT prophylaxis - mechanical:  SCDs  Ulcer Prophylaxis::  Not indicated  Antibiotics:  Treating active infection/contamination beyond 24 hours perioperative coverage  Assessed for rehabilitation services:  Patient was assess for and/or received rehabilitation services during this hospitalization        "

## 2018-06-01 NOTE — CARE PLAN
Problem: Safety  Goal: Will remain free from falls  Outcome: PROGRESSING AS EXPECTED  Bed low and locked with alarm in place. Call light within reach and calls as needed for assistance.     Problem: Pain Management  Goal: Pain level will decrease to patient's comfort goal  Outcome: PROGRESSING AS EXPECTED  Pt not experiencing any pain this shift

## 2018-06-02 PROCEDURE — 700111 HCHG RX REV CODE 636 W/ 250 OVERRIDE (IP): Performed by: INTERNAL MEDICINE

## 2018-06-02 PROCEDURE — 700105 HCHG RX REV CODE 258: Performed by: INTERNAL MEDICINE

## 2018-06-02 PROCEDURE — 99232 SBSQ HOSP IP/OBS MODERATE 35: CPT | Performed by: INTERNAL MEDICINE

## 2018-06-02 PROCEDURE — 700102 HCHG RX REV CODE 250 W/ 637 OVERRIDE(OP): Performed by: HOSPITALIST

## 2018-06-02 PROCEDURE — A9270 NON-COVERED ITEM OR SERVICE: HCPCS | Performed by: HOSPITALIST

## 2018-06-02 PROCEDURE — A9270 NON-COVERED ITEM OR SERVICE: HCPCS | Performed by: INTERNAL MEDICINE

## 2018-06-02 PROCEDURE — 700102 HCHG RX REV CODE 250 W/ 637 OVERRIDE(OP): Performed by: INTERNAL MEDICINE

## 2018-06-02 PROCEDURE — 770021 HCHG ROOM/CARE - ISO PRIVATE

## 2018-06-02 RX ADMIN — GABAPENTIN 300 MG: 300 CAPSULE ORAL at 09:20

## 2018-06-02 RX ADMIN — Medication: at 09:21

## 2018-06-02 RX ADMIN — QUETIAPINE FUMARATE 100 MG: 100 TABLET ORAL at 11:44

## 2018-06-02 RX ADMIN — ENOXAPARIN SODIUM 40 MG: 100 INJECTION SUBCUTANEOUS at 09:20

## 2018-06-02 RX ADMIN — QUETIAPINE FUMARATE 200 MG: 100 TABLET ORAL at 21:08

## 2018-06-02 RX ADMIN — ATORVASTATIN CALCIUM 20 MG: 20 TABLET, FILM COATED ORAL at 21:08

## 2018-06-02 RX ADMIN — QUETIAPINE FUMARATE 100 MG: 100 TABLET ORAL at 09:20

## 2018-06-02 RX ADMIN — OXYCODONE HYDROCHLORIDE 5 MG: 5 TABLET ORAL at 22:39

## 2018-06-02 RX ADMIN — ASPIRIN 81 MG: 81 TABLET, COATED ORAL at 09:21

## 2018-06-02 RX ADMIN — SENNOSIDES AND DOCUSATE SODIUM 2 TABLET: 8.6; 5 TABLET ORAL at 09:20

## 2018-06-02 RX ADMIN — GABAPENTIN 300 MG: 300 CAPSULE ORAL at 16:26

## 2018-06-02 RX ADMIN — VALPROIC ACID 250 MG: 250 CAPSULE, LIQUID FILLED ORAL at 21:08

## 2018-06-02 RX ADMIN — BENZTROPINE MESYLATE 1 MG: 1 TABLET ORAL at 09:20

## 2018-06-02 RX ADMIN — PIPERACILLIN SODIUM AND TAZOBACTAM SODIUM 3.38 G: 3; .375 INJECTION, POWDER, FOR SOLUTION INTRAVENOUS at 15:11

## 2018-06-02 RX ADMIN — GABAPENTIN 300 MG: 300 CAPSULE ORAL at 21:08

## 2018-06-02 RX ADMIN — METFORMIN HYDROCHLORIDE 500 MG: 500 TABLET, FILM COATED ORAL at 09:20

## 2018-06-02 RX ADMIN — PIPERACILLIN SODIUM AND TAZOBACTAM SODIUM 3.38 G: 3; .375 INJECTION, POWDER, FOR SOLUTION INTRAVENOUS at 21:08

## 2018-06-02 RX ADMIN — LORAZEPAM 1 MG: 1 TABLET ORAL at 22:39

## 2018-06-02 RX ADMIN — PIPERACILLIN SODIUM AND TAZOBACTAM SODIUM 3.38 G: 3; .375 INJECTION, POWDER, FOR SOLUTION INTRAVENOUS at 05:28

## 2018-06-02 RX ADMIN — DAPTOMYCIN 620 MG: 500 INJECTION, POWDER, LYOPHILIZED, FOR SOLUTION INTRAVENOUS at 11:44

## 2018-06-02 RX ADMIN — VALPROIC ACID 250 MG: 250 CAPSULE, LIQUID FILLED ORAL at 09:21

## 2018-06-02 RX ADMIN — FLUOXETINE HYDROCHLORIDE 60 MG: 20 CAPSULE ORAL at 09:20

## 2018-06-02 RX ADMIN — METFORMIN HYDROCHLORIDE 500 MG: 500 TABLET, FILM COATED ORAL at 18:20

## 2018-06-02 RX ADMIN — Medication: at 21:08

## 2018-06-02 RX ADMIN — BENZTROPINE MESYLATE 1 MG: 1 TABLET ORAL at 21:08

## 2018-06-02 ASSESSMENT — ENCOUNTER SYMPTOMS
DOUBLE VISION: 0
HEMOPTYSIS: 0
NECK PAIN: 0
VOMITING: 0
DIAPHORESIS: 0
BACK PAIN: 0
NAUSEA: 0
WEAKNESS: 0
PALPITATIONS: 0
SPUTUM PRODUCTION: 0
DIZZINESS: 0
PHOTOPHOBIA: 0
HEARTBURN: 0
MYALGIAS: 0
CHILLS: 0
ORTHOPNEA: 0
TINGLING: 0
FEVER: 0
COUGH: 0
HEADACHES: 0
WEIGHT LOSS: 0
BLURRED VISION: 0

## 2018-06-02 ASSESSMENT — PAIN SCALES - GENERAL
PAINLEVEL_OUTOF10: 0
PAINLEVEL_OUTOF10: 7
PAINLEVEL_OUTOF10: 3

## 2018-06-02 NOTE — PROGRESS NOTES
[facility] Hospitalist Progress Note    Date of Service: 2018    Chief Complaint  52 y.o. male admitted 2018 with diabetic left foot ulcer.  Underwent I&D with  May 12, 2018 and May 14, 2018. Seen by infectious disease team during the hospital stay. Plan to continue IV Zosyn and daptomycin with the stop date of 2018. Difficult SNF disposition given insurance issues and use of daptomycin for antibiotics.    Interval Problem Update  Patient seen and evaluated on rounds  Doing well  No complaints  Awaiting disposition  Continuing antibiotics  No concerns from nursing perspective   Will discuss disposition planning on Monday with     Consultants/Specialty  Infectious disease  Orthopedics  LPS    Disposition  Awaiting SNF acceptance   Insurance barriers to SNF acceptance  Daptomycin cost as a barrier to SNF acceptance  , to evaluate for [facility] for daptomycin        Review of Systems   Constitutional: Negative for chills, diaphoresis, fever, malaise/fatigue and weight loss.   HENT: Negative for congestion, ear discharge, ear pain, hearing loss, nosebleeds and tinnitus.    Eyes: Negative for blurred vision, double vision and photophobia.   Respiratory: Negative for cough, hemoptysis and sputum production.    Cardiovascular: Negative for chest pain, palpitations and orthopnea.   Gastrointestinal: Negative for heartburn, nausea and vomiting.   Genitourinary: Negative for dysuria, frequency and urgency.   Musculoskeletal: Negative for back pain, myalgias and neck pain.   Skin: Negative for itching and rash.   Neurological: Negative for dizziness, tingling, weakness and headaches.      Physical Exam  Laboratory/Imaging   Hemodynamics  Temp (24hrs), Av.8 °C (98.2 °F), Min:36.6 °C (97.9 °F), Max:36.9 °C (98.5 °F)   Temperature: 36.9 °C (98.4 °F)  Pulse  Av.7  Min: 52  Max: 112   Blood Pressure: 113/78      Respiratory      Respiration: 16, Pulse Oximetry: 91 %              Fluids    Intake/Output Summary (Last 24 hours) at 06/02/18 1334  Last data filed at 06/02/18 0920   Gross per 24 hour   Intake              360 ml   Output              500 ml   Net             -140 ml       Nutrition  Orders Placed This Encounter   Procedures   • DIET ORDER     Standing Status:   Standing     Number of Occurrences:   1     Order Specific Question:   Diet:     Answer:   Regular [1]     Physical Exam   Constitutional: He is oriented to person, place, and time. He appears well-developed and well-nourished. No distress.   HENT:   Head: Normocephalic and atraumatic.   Left Ear: External ear normal.   Mouth/Throat: Oropharynx is clear and moist. No oropharyngeal exudate.   Eyes: Conjunctivae are normal. Pupils are equal, round, and reactive to light. Right eye exhibits no discharge. Left eye exhibits no discharge.   Neck: Normal range of motion. Neck supple. No JVD present.   Cardiovascular: Normal rate, regular rhythm and normal heart sounds.  Exam reveals no gallop and no friction rub.    No murmur heard.  Pulmonary/Chest: Effort normal and breath sounds normal. No stridor. No respiratory distress. He has no wheezes. He has no rales.   Abdominal: Soft. Bowel sounds are normal. He exhibits no distension. There is no tenderness. There is no rebound and no guarding.   Musculoskeletal: He exhibits deformity. He exhibits no edema.   Neurological: He is alert and oriented to person, place, and time. No cranial nerve deficit.   Skin: Skin is warm and dry. He is not diaphoretic.   Dressing look clean on the left foot, left great toe amputation       Recent Labs      05/31/18   0533   WBC  5.5   RBC  4.65*   HEMOGLOBIN  14.1   HEMATOCRIT  41.5*   MCV  89.2   MCH  30.3   MCHC  34.0   RDW  45.6   PLATELETCT  156*   MPV  10.0     Recent Labs      05/31/18   0533   SODIUM  140   POTASSIUM  4.0   CHLORIDE  108   CO2  23   GLUCOSE  88   BUN  20   CREATININE  0.89   CALCIUM  9.1                     "  Assessment/Plan     Diabetic ulcer of left foot associated with type 2 diabetes mellitus, with muscle involvement without evidence of necrosis (HCC)- (present on admission)   Assessment & Plan    Hx of diabetic nonhealing ulcer, hx of previous Left first toe amputation  Failed outpatient antibiotics    MRI revealing \"Large open ulcer extending from the great toe amputation site into the first metatarsal head with osteomyelitis of the first metatarsal head\"    s/p I&D 05/12/2018 & 05/14/2018 with Dr Baez     ID team recommends abx until 06/26/2018    Continue IV Zosyn and daptomycin per ID team  Weekly CPK monitoring while on daptomycin   Continue wound care  Weekly CBC and CMP  Awaiting SNF acceptance        Pressure injury of skin   Assessment & Plan    Agree with wound team documentation  Continue aggressive wound care        Diabetic peripheral neuropathy (HCC)- (present on admission)   Assessment & Plan    Pain controlled  Continue neurontin 300mg TID        Psychiatric disorder- (present on admission)   Assessment & Plan    Continue congentin, seroquel, prozac  Continue valproate for mood control  No acute change  Pt is cooperative        Type 2 diabetes mellitus with neurologic complication, without long-term current use of insulin (HCC)- (present on admission)   Assessment & Plan    Controlled, last A1c 5.8  Continue mefformin  Continue Neurontin  ASA 81  Atorvastatin 20 mg          Quality-Core Measures   Reviewed items::  Labs reviewed, Medications reviewed and Radiology images reviewed  Olsen catheter::  No Olsen  DVT prophylaxis pharmacological::  Enoxaparin (Lovenox)  DVT prophylaxis - mechanical:  SCDs  Ulcer Prophylaxis::  Not indicated  Antibiotics:  Treating active infection/contamination beyond 24 hours perioperative coverage  Assessed for rehabilitation services:  Patient was assess for and/or received rehabilitation services during this hospitalization        "

## 2018-06-02 NOTE — CARE PLAN
Problem: Communication  Goal: The ability to communicate needs accurately and effectively will improve  Outcome: PROGRESSING AS EXPECTED  Pt capable of verbalizing all needs and utilizes call light system approprietly.    Problem: Infection  Goal: Will remain free from infection  Outcome: PROGRESSING AS EXPECTED  Pt on IV abx, showing no signs of new or worsening infection

## 2018-06-02 NOTE — PROGRESS NOTES
Assumed care of pt, received report from day shift RN, pt assessed.  Pt complaining of 3/10 left foot pain, declines pain medication at this time.  Pt is A&O x4 but can get agitated at times. Pt on contact isolation for MRSA in left foot wounds, all isolation precautions and signs in place.  Pt moderate fall risk, wearing treaded socks, bed locked and in lowest position, bed alarm is on.  Pt instructed to call for assistance prior to getting OOB, pt verbalized understanding, reinforcement needed.  Call light, phone, and personal belongings within reach.

## 2018-06-03 PROCEDURE — 700102 HCHG RX REV CODE 250 W/ 637 OVERRIDE(OP): Performed by: INTERNAL MEDICINE

## 2018-06-03 PROCEDURE — 700105 HCHG RX REV CODE 258: Performed by: INTERNAL MEDICINE

## 2018-06-03 PROCEDURE — 700102 HCHG RX REV CODE 250 W/ 637 OVERRIDE(OP): Performed by: HOSPITALIST

## 2018-06-03 PROCEDURE — A9270 NON-COVERED ITEM OR SERVICE: HCPCS | Performed by: HOSPITALIST

## 2018-06-03 PROCEDURE — A9270 NON-COVERED ITEM OR SERVICE: HCPCS | Performed by: INTERNAL MEDICINE

## 2018-06-03 PROCEDURE — 700111 HCHG RX REV CODE 636 W/ 250 OVERRIDE (IP): Performed by: INTERNAL MEDICINE

## 2018-06-03 PROCEDURE — 99232 SBSQ HOSP IP/OBS MODERATE 35: CPT | Performed by: INTERNAL MEDICINE

## 2018-06-03 PROCEDURE — 770021 HCHG ROOM/CARE - ISO PRIVATE

## 2018-06-03 RX ADMIN — METFORMIN HYDROCHLORIDE 500 MG: 500 TABLET, FILM COATED ORAL at 09:19

## 2018-06-03 RX ADMIN — QUETIAPINE FUMARATE 200 MG: 100 TABLET ORAL at 19:30

## 2018-06-03 RX ADMIN — VALPROIC ACID 250 MG: 250 CAPSULE, LIQUID FILLED ORAL at 19:57

## 2018-06-03 RX ADMIN — QUETIAPINE FUMARATE 100 MG: 100 TABLET ORAL at 12:19

## 2018-06-03 RX ADMIN — Medication: at 19:57

## 2018-06-03 RX ADMIN — METFORMIN HYDROCHLORIDE 500 MG: 500 TABLET, FILM COATED ORAL at 17:53

## 2018-06-03 RX ADMIN — ATORVASTATIN CALCIUM 20 MG: 20 TABLET, FILM COATED ORAL at 19:30

## 2018-06-03 RX ADMIN — PIPERACILLIN SODIUM AND TAZOBACTAM SODIUM 3.38 G: 3; .375 INJECTION, POWDER, FOR SOLUTION INTRAVENOUS at 13:27

## 2018-06-03 RX ADMIN — LORAZEPAM 1 MG: 1 TABLET ORAL at 09:32

## 2018-06-03 RX ADMIN — ASPIRIN 81 MG: 81 TABLET, COATED ORAL at 09:19

## 2018-06-03 RX ADMIN — BENZTROPINE MESYLATE 1 MG: 1 TABLET ORAL at 09:19

## 2018-06-03 RX ADMIN — GABAPENTIN 300 MG: 300 CAPSULE ORAL at 09:19

## 2018-06-03 RX ADMIN — GABAPENTIN 300 MG: 300 CAPSULE ORAL at 16:16

## 2018-06-03 RX ADMIN — BENZTROPINE MESYLATE 1 MG: 1 TABLET ORAL at 19:30

## 2018-06-03 RX ADMIN — PIPERACILLIN SODIUM AND TAZOBACTAM SODIUM 3.38 G: 3; .375 INJECTION, POWDER, FOR SOLUTION INTRAVENOUS at 05:07

## 2018-06-03 RX ADMIN — FLUOXETINE HYDROCHLORIDE 60 MG: 20 CAPSULE ORAL at 09:18

## 2018-06-03 RX ADMIN — Medication: at 09:17

## 2018-06-03 RX ADMIN — PIPERACILLIN SODIUM AND TAZOBACTAM SODIUM 3.38 G: 3; .375 INJECTION, POWDER, FOR SOLUTION INTRAVENOUS at 19:57

## 2018-06-03 RX ADMIN — QUETIAPINE FUMARATE 100 MG: 100 TABLET ORAL at 09:32

## 2018-06-03 RX ADMIN — LORAZEPAM 1 MG: 1 TABLET ORAL at 19:30

## 2018-06-03 RX ADMIN — VALPROIC ACID 250 MG: 250 CAPSULE, LIQUID FILLED ORAL at 09:20

## 2018-06-03 RX ADMIN — DAPTOMYCIN 620 MG: 500 INJECTION, POWDER, LYOPHILIZED, FOR SOLUTION INTRAVENOUS at 12:18

## 2018-06-03 RX ADMIN — GABAPENTIN 300 MG: 300 CAPSULE ORAL at 19:30

## 2018-06-03 ASSESSMENT — ENCOUNTER SYMPTOMS
HEADACHES: 0
FEVER: 0
COUGH: 0
WEIGHT LOSS: 0
DIZZINESS: 0
WEAKNESS: 0
PHOTOPHOBIA: 0
NECK PAIN: 0
SPUTUM PRODUCTION: 0
MYALGIAS: 0
PALPITATIONS: 0
ORTHOPNEA: 0
BACK PAIN: 0
HEMOPTYSIS: 0
HEARTBURN: 0
VOMITING: 0
CHILLS: 0
DIAPHORESIS: 0
TINGLING: 0
BLURRED VISION: 0
DOUBLE VISION: 0
NAUSEA: 0

## 2018-06-03 ASSESSMENT — PAIN SCALES - GENERAL
PAINLEVEL_OUTOF10: 4
PAINLEVEL_OUTOF10: 3

## 2018-06-03 NOTE — PROGRESS NOTES
[facility] Hospitalist Progress Note    Date of Service: 6/3/2018    Chief Complaint  52 y.o. male admitted 2018 with diabetic left foot ulcer.  Underwent I&D with  May 12, 2018 and May 14, 2018. Seen by infectious disease team during the hospital stay. Plan to continue IV Zosyn and daptomycin with the stop date of 2018. Difficult SNF disposition given insurance issues and use of daptomycin for antibiotics.    Interval Problem Update  Patient seen and evaluated on rounds  Doing well  No complaints  Awaiting disposition  Continuing antibiotics  No concerns from nursing perspective   Will discuss disposition planning on Monday with     Consultants/Specialty  Infectious disease  Orthopedics  LPS    Disposition  Awaiting SNF acceptance   Insurance barriers to SNF acceptance  Daptomycin cost as a barrier to SNF acceptance  , to evaluate for [facility]ARD for daptomycin        Review of Systems   Constitutional: Negative for chills, diaphoresis, fever, malaise/fatigue and weight loss.   HENT: Negative for congestion, ear discharge, ear pain, hearing loss, nosebleeds and tinnitus.    Eyes: Negative for blurred vision, double vision and photophobia.   Respiratory: Negative for cough, hemoptysis and sputum production.    Cardiovascular: Negative for chest pain, palpitations and orthopnea.   Gastrointestinal: Negative for heartburn, nausea and vomiting.   Genitourinary: Negative for dysuria, frequency and urgency.   Musculoskeletal: Negative for back pain, myalgias and neck pain.   Skin: Negative for itching and rash.   Neurological: Negative for dizziness, tingling, weakness and headaches.      Physical Exam  Laboratory/Imaging   Hemodynamics  Temp (24hrs), Av.6 °C (97.8 °F), Min:36.3 °C (97.4 °F), Max:36.9 °C (98.4 °F)   Temperature: 36.7 °C (98 °F)  Pulse  Av.6  Min: 52  Max: 112   Blood Pressure: 112/80      Respiratory      Respiration: 17, Pulse Oximetry: 99 %          "    Fluids    Intake/Output Summary (Last 24 hours) at 06/03/18 1210  Last data filed at 06/03/18 0915   Gross per 24 hour   Intake              240 ml   Output                0 ml   Net              240 ml       Nutrition  Orders Placed This Encounter   Procedures   • DIET ORDER     Standing Status:   Standing     Number of Occurrences:   1     Order Specific Question:   Diet:     Answer:   Regular [1]     Physical Exam   Constitutional: He is oriented to person, place, and time. He appears well-developed and well-nourished. No distress.   HENT:   Head: Normocephalic and atraumatic.   Left Ear: External ear normal.   Mouth/Throat: Oropharynx is clear and moist. No oropharyngeal exudate.   Eyes: Conjunctivae are normal. Pupils are equal, round, and reactive to light. Right eye exhibits no discharge. Left eye exhibits no discharge.   Neck: Normal range of motion. Neck supple. No JVD present.   Cardiovascular: Normal rate, regular rhythm and normal heart sounds.  Exam reveals no gallop and no friction rub.    No murmur heard.  Pulmonary/Chest: Effort normal and breath sounds normal. No stridor. No respiratory distress. He has no wheezes. He has no rales.   Abdominal: Soft. Bowel sounds are normal. He exhibits no distension. There is no tenderness. There is no rebound and no guarding.   Musculoskeletal: He exhibits deformity. He exhibits no edema.   Neurological: He is alert and oriented to person, place, and time. No cranial nerve deficit.   Skin: Skin is warm and dry. He is not diaphoretic.   Dressing look clean on the left foot, left great toe amputation                                Assessment/Plan     Diabetic ulcer of left foot associated with type 2 diabetes mellitus, with muscle involvement without evidence of necrosis (HCC)- (present on admission)   Assessment & Plan    Hx of diabetic nonhealing ulcer, hx of previous Left first toe amputation  Failed outpatient antibiotics    MRI revealing \"Large open ulcer " "extending from the great toe amputation site into the first metatarsal head with osteomyelitis of the first metatarsal head\"    s/p I&D 05/12/2018 & 05/14/2018 with Dr Baez     ID team recommends abx until 06/26/2018    Continue IV Zosyn and daptomycin per ID team  Weekly CPK monitoring while on daptomycin   Continue wound care  Weekly CBC and CMP  Awaiting SNF acceptance        Pressure injury of skin   Assessment & Plan    Agree with wound team documentation  Continue aggressive wound care        Diabetic peripheral neuropathy (HCC)- (present on admission)   Assessment & Plan    Pain controlled  Continue neurontin 300mg TID        Psychiatric disorder- (present on admission)   Assessment & Plan    Continue congentin, seroquel, prozac  Continue valproate for mood control  No acute change  Pt is cooperative        Type 2 diabetes mellitus with neurologic complication, without long-term current use of insulin (HCC)- (present on admission)   Assessment & Plan    Controlled, last A1c 5.8  Continue mefformin  Continue Neurontin  ASA 81  Atorvastatin 20 mg          Quality-Core Measures   Reviewed items::  Labs reviewed, Medications reviewed and Radiology images reviewed  Olsen catheter::  No Olsen  DVT prophylaxis pharmacological::  Enoxaparin (Lovenox)  DVT prophylaxis - mechanical:  SCDs  Ulcer Prophylaxis::  Not indicated  Antibiotics:  Treating active infection/contamination beyond 24 hours perioperative coverage  Assessed for rehabilitation services:  Patient was assess for and/or received rehabilitation services during this hospitalization        "

## 2018-06-03 NOTE — PROGRESS NOTES
"LIMB PRESERVATION SERVICE NOTE:    Subjective:      Reason for Consultation: S/P I and D Left Great Toe 5/12/18 Dr Baez    Patient is well known to [facility] and outpatient wound care services.     Patient is a 52 y.o. male with a past medical history that includes borderline diabetes, anxiety, HTN, Hep C and is admitted to [facility] via [facility] for his S/P I and D Left Great Toe 5/12/18 Dr Baez.  S/P Left Great Toe Amp by Dr Easton 4/23/18. The s/p left great toe amputation site was from 4/1/18 by Dr. Baez. Pt did not follow up with the [facility] clinic for dressing care and he did not receive oral antibiotics. He also did not follow up with his PCP at [facility]. Pt has Hx of noncompliance and psychiatric disorder which is likely contributory. Pt is still not wearing the previously provided offloading shoes, and was reeducated.          Lab Results   Component Value Date/Time    HBA1C 5.8 (H) 03/30/2018 03:04 PM      Patient denies fevers chills, nausea, vomiting.     Pain:        Patient resting comfortably    Vitals  /80   Pulse 63   Temp 36.7 °C (98 °F)   Resp 17   Ht 1.93 m (6' 4\")   Wt 105.1 kg (231 lb 11.3 oz)   SpO2 99%   BMI 28.20 kg/m²       Objective:           PHYSICAL EXAMINATION:      General Appearance:  Well developed,  nourished, in no acute distress     Sensory Assessment        Patient sensation insensate bilaterally with light touch 10 of 10 points        Vascular Assessment        +2 PT bilaterally  +1 DP on Left Foot  Unable to Palpate DP on Right Foot - Found on Doppler      Orthotic, protective, supportive devices:      Offloading     Offloading Shoes not on, reeducated    Wound Characteristics                                                    Location: Right Great amp site Initial Evaluation  Date:6/3/2018   Tissue Type and %: 100% Pink   Periwound: Pink   Drainage: None   Exposed structures None   Wound Edges:   Attached 100% approximated   Odor: None   S&S of Infection:   None "   Edema: Localized at Site   Sensation: Insensate                     Plan:       Treatment Plan and Recommendations:     1. Labs\Imaging:         Reviewed     2. Treatment:              Sutures Removed                        Continue Wound Care by Nursing, LPS to Follow.                                      Nursing to change dressing Q 48 hours. -BEBETO MELCHOR   3.Collaboration:                                                 ID managing abx, Zosyn and Dapto x 6 weeks     4. Orthotics/Prosthetics:                                       pt to get orthotics/prosthetics  as OP, pt to wear shoes that do not rub on Wound sites      Anticipated discharge plans (X):              SNF:         X  Case management working on SNF placement, no accepting facilities as yet              Home Care:                       Outpatient Wound Center: X                   Self Care:                         Other:                       TBD:  Patient requires skilled therapeutic intervention for debridement, product selection and application, education, wound bed preparation and assessment.

## 2018-06-03 NOTE — CARE PLAN
Problem: Safety  Goal: Will remain free from falls  Outcome: PROGRESSING AS EXPECTED  Pt moderate fall risk, pt wearing treaded socks, bed locked and in lowest position, bed alarm is on.  Pt call light and phone within reach.    Problem: Bowel/Gastric:  Goal: Will not experience complications related to bowel motility  Outcome: PROGRESSING AS EXPECTED  LBM 6/2/2018, Pt refusing stool softeners, no complaints of loose stools or constipation

## 2018-06-03 NOTE — CARE PLAN
Problem: Safety  Goal: Will remain free from injury  Outcome: PROGRESSING AS EXPECTED  Safe and free from falls. Bed alarm on. Hourly rounding completed.call light within reach    Problem: Infection  Goal: Will remain free from infection  Outcome: PROGRESSING AS EXPECTED  Hand and personal hygiene promoted. All precautions taken going in and out of room

## 2018-06-04 PROCEDURE — 700105 HCHG RX REV CODE 258: Performed by: INTERNAL MEDICINE

## 2018-06-04 PROCEDURE — 99232 SBSQ HOSP IP/OBS MODERATE 35: CPT | Performed by: INTERNAL MEDICINE

## 2018-06-04 PROCEDURE — 700102 HCHG RX REV CODE 250 W/ 637 OVERRIDE(OP): Performed by: HOSPITALIST

## 2018-06-04 PROCEDURE — 700102 HCHG RX REV CODE 250 W/ 637 OVERRIDE(OP): Performed by: INTERNAL MEDICINE

## 2018-06-04 PROCEDURE — 700111 HCHG RX REV CODE 636 W/ 250 OVERRIDE (IP): Performed by: INTERNAL MEDICINE

## 2018-06-04 PROCEDURE — 770021 HCHG ROOM/CARE - ISO PRIVATE

## 2018-06-04 PROCEDURE — A9270 NON-COVERED ITEM OR SERVICE: HCPCS | Performed by: HOSPITALIST

## 2018-06-04 PROCEDURE — A9270 NON-COVERED ITEM OR SERVICE: HCPCS | Performed by: INTERNAL MEDICINE

## 2018-06-04 RX ADMIN — PIPERACILLIN SODIUM AND TAZOBACTAM SODIUM 3.38 G: 3; .375 INJECTION, POWDER, FOR SOLUTION INTRAVENOUS at 20:14

## 2018-06-04 RX ADMIN — QUETIAPINE FUMARATE 100 MG: 100 TABLET ORAL at 12:30

## 2018-06-04 RX ADMIN — SENNOSIDES AND DOCUSATE SODIUM 2 TABLET: 8.6; 5 TABLET ORAL at 20:13

## 2018-06-04 RX ADMIN — Medication: at 09:01

## 2018-06-04 RX ADMIN — VALPROIC ACID 250 MG: 250 CAPSULE, LIQUID FILLED ORAL at 20:14

## 2018-06-04 RX ADMIN — GABAPENTIN 300 MG: 300 CAPSULE ORAL at 20:13

## 2018-06-04 RX ADMIN — METFORMIN HYDROCHLORIDE 500 MG: 500 TABLET, FILM COATED ORAL at 08:59

## 2018-06-04 RX ADMIN — LORAZEPAM 1 MG: 1 TABLET ORAL at 20:14

## 2018-06-04 RX ADMIN — OXYCODONE HYDROCHLORIDE 5 MG: 5 TABLET ORAL at 17:01

## 2018-06-04 RX ADMIN — QUETIAPINE FUMARATE 200 MG: 100 TABLET ORAL at 20:14

## 2018-06-04 RX ADMIN — BENZTROPINE MESYLATE 1 MG: 1 TABLET ORAL at 20:14

## 2018-06-04 RX ADMIN — ENOXAPARIN SODIUM 40 MG: 100 INJECTION SUBCUTANEOUS at 08:59

## 2018-06-04 RX ADMIN — PIPERACILLIN SODIUM AND TAZOBACTAM SODIUM 3.38 G: 3; .375 INJECTION, POWDER, FOR SOLUTION INTRAVENOUS at 05:59

## 2018-06-04 RX ADMIN — METFORMIN HYDROCHLORIDE 500 MG: 500 TABLET, FILM COATED ORAL at 16:58

## 2018-06-04 RX ADMIN — FLUOXETINE HYDROCHLORIDE 60 MG: 20 CAPSULE ORAL at 09:00

## 2018-06-04 RX ADMIN — QUETIAPINE FUMARATE 100 MG: 100 TABLET ORAL at 09:01

## 2018-06-04 RX ADMIN — BENZTROPINE MESYLATE 1 MG: 1 TABLET ORAL at 09:01

## 2018-06-04 RX ADMIN — DAPTOMYCIN 620 MG: 500 INJECTION, POWDER, LYOPHILIZED, FOR SOLUTION INTRAVENOUS at 11:29

## 2018-06-04 RX ADMIN — Medication: at 20:14

## 2018-06-04 RX ADMIN — ASPIRIN 81 MG: 81 TABLET, COATED ORAL at 09:01

## 2018-06-04 RX ADMIN — ATORVASTATIN CALCIUM 20 MG: 20 TABLET, FILM COATED ORAL at 20:14

## 2018-06-04 RX ADMIN — PIPERACILLIN SODIUM AND TAZOBACTAM SODIUM 3.38 G: 3; .375 INJECTION, POWDER, FOR SOLUTION INTRAVENOUS at 12:30

## 2018-06-04 RX ADMIN — VALPROIC ACID 250 MG: 250 CAPSULE, LIQUID FILLED ORAL at 09:06

## 2018-06-04 RX ADMIN — LORAZEPAM 1 MG: 1 TABLET ORAL at 09:00

## 2018-06-04 RX ADMIN — GABAPENTIN 300 MG: 300 CAPSULE ORAL at 09:00

## 2018-06-04 RX ADMIN — GABAPENTIN 300 MG: 300 CAPSULE ORAL at 16:58

## 2018-06-04 ASSESSMENT — ENCOUNTER SYMPTOMS
VOMITING: 0
COUGH: 0
NECK PAIN: 0
TINGLING: 0
PALPITATIONS: 0
DIZZINESS: 0
NAUSEA: 0
HEARTBURN: 0
WEIGHT LOSS: 0
HEADACHES: 0
BACK PAIN: 0
BLURRED VISION: 0
FEVER: 0
PHOTOPHOBIA: 0
DIAPHORESIS: 0
MYALGIAS: 0
ORTHOPNEA: 0
WEAKNESS: 0
DOUBLE VISION: 0
HEMOPTYSIS: 0
SPUTUM PRODUCTION: 0
CHILLS: 0

## 2018-06-04 ASSESSMENT — PAIN SCALES - GENERAL
PAINLEVEL_OUTOF10: 2
PAINLEVEL_OUTOF10: 0

## 2018-06-04 NOTE — CARE PLAN
Problem: Safety  Goal: Will remain free from falls  Outcome: NOT MET  Reinforced fall prevention education with patient. Educated on call light for needs, and bed alarm. Educated on risk for injury due to previous fall, surgery, and instability.

## 2018-06-04 NOTE — PROGRESS NOTES
"LIMB PRESERVATION SERVICE NOTE:    Subjective:      Reason for Consultation: S/P I and D Left Great Toe 5/12/18 Dr Baez    History of Present Illness:     Patient is well known to [facility] and outpatient wound care services.     Patient is a 52 y.o. male with a past medical history that includes borderline diabetes, anxiety, HTN, Hep C and is admitted to [facility] via [facility] for his S/P I and D Left Great Toe 5/12/18 Dr Baez.  S/P Left Great Toe Amp by Dr Easton 4/23/18. The s/p left great toe amputation site was from 4/1/18 by Dr. Baez. Pt did not follow up with the \[facility]\"" clinic for dressing care and he did not receive oral antibiotics. He also did not follow up with his PCP at \[facility]\"". Pt has Hx of noncompliance and psychiatric disorder which is likely contributory.     Lab Results   Component Value Date/Time    HBA1C 5.8 (H) 03/30/2018 03:04 PM      Patient denies fevers chills, nausea, vomiting.     Pain:        Patient resting comfortably  Vitals  /77   Pulse 71   Temp 36.4 °C (97.6 °F)   Resp 18   Ht 1.93 m (6' 4\")   Wt 105.7 kg (233 lb 0.4 oz)   SpO2 94%   BMI 28.36 kg/m²       Objective:      Wound Characteristics     Surgical site well approximated, sutures removed    Plan:       Treatment Plan and Recommendations:     1. Labs\Imaging:         Reviewed     2. Treatment:              Continue Wound Care by Nursing, LPS to Follow.                                      Nursing to change dressing Q 48 hours. -[facility] LLE   3.Collaboration:                                                 ID managing abx, Zosyn and Dapto x 6 weeks     4. Orthotics/Prosthetics:                                       pt to get orthotics/prosthetics  as OP, pt to wear shoes that do not rub on Wound sites      Anticipated discharge plans (X):              SNF:         X  Case management working on SNF placement, no accepting facilities as yet              Home Care:                       Outpatient Wound " Center: X                   Self Care:                         Other:                       TBD:  Patient requires skilled therapeutic intervention for debridement, product selection and application, education, wound bed preparation and assessment.

## 2018-06-04 NOTE — PROGRESS NOTES
Pt was alert and safe in bed today. Pt given Ativan x1 per patient request. Pt tolerating PO meds and solid foods. VSS. Pt afebrile. Intermittent abx continuing to run. Sleeping between care.

## 2018-06-04 NOTE — PROGRESS NOTES
Patient in bed standing and unsteady. Reinforced falls education with patient. Nurse ensured personal belongings at bedside call light treaded socks bed alarm. Patient agreed to call nurse for needs. Nurse provided therapeutic communication regarding, anxiety. Patient calmed. No further questions or concerns at this time.

## 2018-06-04 NOTE — PROGRESS NOTES
[facility]ist Progress Note    Date of Service: 2018    Chief Complaint  52 y.o. male admitted 2018 with diabetic left foot ulcer.  Underwent I&D with  May 12, 2018 and May 14, 2018. Seen by infectious disease team during the hospital stay. Plan to continue IV Zosyn and daptomycin with the stop date of 2018. Difficult SNF disposition given insurance issues and use of daptomycin for antibiotics.    Interval Problem Update  Patient seen and evaluated on rounds  Doing well  No complaints  Awaiting disposition  Continuing antibiotics  No concerns from nursing perspective   No current disposition plans in place per     Consultants/Specialty  Infectious disease  Orthopedics  LPS    Disposition  Awaiting SNF acceptance   Insurance barriers to SNF acceptance  Daptomycin cost as a barrier to SNF acceptance  , to evaluate for RICHARD for daptomycin        Review of Systems   Constitutional: Negative for chills, diaphoresis, fever, malaise/fatigue and weight loss.   HENT: Negative for congestion, ear discharge, ear pain, hearing loss, nosebleeds and tinnitus.    Eyes: Negative for blurred vision, double vision and photophobia.   Respiratory: Negative for cough, hemoptysis and sputum production.    Cardiovascular: Negative for chest pain, palpitations and orthopnea.   Gastrointestinal: Negative for heartburn, nausea and vomiting.   Genitourinary: Negative for dysuria, frequency and urgency.   Musculoskeletal: Negative for back pain, myalgias and neck pain.   Skin: Negative for itching and rash.   Neurological: Negative for dizziness, tingling, weakness and headaches.      Physical Exam  Laboratory/Imaging   Hemodynamics  Temp (24hrs), Av.4 °C (97.6 °F), Min:36 °C (96.8 °F), Max:36.7 °C (98 °F)   Temperature: 36.4 °C (97.6 °F)  Pulse  Av.9  Min: 52  Max: 112   Blood Pressure: 119/77      Respiratory      Respiration: 18, Pulse Oximetry: 94 %        RUL Breath  Sounds: Clear, RML Breath Sounds: Clear, RLL Breath Sounds: Clear, LORENA Breath Sounds: Clear, LLL Breath Sounds: Clear    Fluids    Intake/Output Summary (Last 24 hours) at 06/04/18 1421  Last data filed at 06/04/18 0910   Gross per 24 hour   Intake              480 ml   Output                0 ml   Net              480 ml       Nutrition  Orders Placed This Encounter   Procedures   • DIET ORDER     Standing Status:   Standing     Number of Occurrences:   1     Order Specific Question:   Diet:     Answer:   Regular [1]     Physical Exam   Constitutional: He is oriented to person, place, and time. He appears well-developed and well-nourished. No distress.   HENT:   Head: Normocephalic and atraumatic.   Left Ear: External ear normal.   Mouth/Throat: Oropharynx is clear and moist. No oropharyngeal exudate.   Eyes: Conjunctivae are normal. Pupils are equal, round, and reactive to light. Right eye exhibits no discharge. Left eye exhibits no discharge.   Neck: Normal range of motion. Neck supple. No JVD present.   Cardiovascular: Normal rate, regular rhythm and normal heart sounds.  Exam reveals no gallop and no friction rub.    No murmur heard.  Pulmonary/Chest: Effort normal and breath sounds normal. No stridor. No respiratory distress. He has no wheezes. He has no rales.   Abdominal: Soft. Bowel sounds are normal. He exhibits no distension. There is no tenderness. There is no rebound and no guarding.   Musculoskeletal: He exhibits deformity. He exhibits no edema.   Neurological: He is alert and oriented to person, place, and time. No cranial nerve deficit.   Skin: Skin is warm and dry. He is not diaphoretic.   Dressing look clean on the left foot, left great toe amputation                                Assessment/Plan     Diabetic ulcer of left foot associated with type 2 diabetes mellitus, with muscle involvement without evidence of necrosis (HCC)- (present on admission)   Assessment & Plan    Hx of diabetic  "nonhealing ulcer, hx of previous Left first toe amputation  Failed outpatient antibiotics    MRI revealing \"Large open ulcer extending from the great toe amputation site into the first metatarsal head with osteomyelitis of the first metatarsal head\"    s/p I&D 05/12/2018 & 05/14/2018 with Dr Baez     ID team recommends abx until 06/26/2018    Continue IV Zosyn and daptomycin per ID team  Weekly CPK monitoring while on daptomycin   Continue wound care  Weekly CBC and CMP  Awaiting SNF acceptance        Pressure injury of skin   Assessment & Plan    Agree with wound team documentation  Continue aggressive wound care        Diabetic peripheral neuropathy (HCC)- (present on admission)   Assessment & Plan    Pain controlled  Continue neurontin 300mg TID        Psychiatric disorder- (present on admission)   Assessment & Plan    Continue congentin, seroquel, prozac  Continue valproate for mood control  No acute change  Pt is cooperative        Type 2 diabetes mellitus with neurologic complication, without long-term current use of insulin (HCC)- (present on admission)   Assessment & Plan    Controlled, last A1c 5.8  Continue mefformin  Continue Neurontin  ASA 81  Atorvastatin 20 mg          Quality-Core Measures   Reviewed items::  Labs reviewed, Medications reviewed and Radiology images reviewed  Olsen catheter::  No Olsen  DVT prophylaxis pharmacological::  Enoxaparin (Lovenox)  DVT prophylaxis - mechanical:  SCDs  Ulcer Prophylaxis::  Not indicated  Antibiotics:  Treating active infection/contamination beyond 24 hours perioperative coverage  Assessed for rehabilitation services:  Patient was assess for and/or received rehabilitation services during this hospitalization        "

## 2018-06-05 PROCEDURE — A9270 NON-COVERED ITEM OR SERVICE: HCPCS | Performed by: INTERNAL MEDICINE

## 2018-06-05 PROCEDURE — 700102 HCHG RX REV CODE 250 W/ 637 OVERRIDE(OP): Performed by: HOSPITALIST

## 2018-06-05 PROCEDURE — 700102 HCHG RX REV CODE 250 W/ 637 OVERRIDE(OP): Performed by: NURSE PRACTITIONER

## 2018-06-05 PROCEDURE — 700111 HCHG RX REV CODE 636 W/ 250 OVERRIDE (IP): Performed by: INTERNAL MEDICINE

## 2018-06-05 PROCEDURE — 99232 SBSQ HOSP IP/OBS MODERATE 35: CPT | Performed by: HOSPITALIST

## 2018-06-05 PROCEDURE — 700102 HCHG RX REV CODE 250 W/ 637 OVERRIDE(OP): Performed by: INTERNAL MEDICINE

## 2018-06-05 PROCEDURE — 770021 HCHG ROOM/CARE - ISO PRIVATE

## 2018-06-05 PROCEDURE — 700105 HCHG RX REV CODE 258: Performed by: INTERNAL MEDICINE

## 2018-06-05 PROCEDURE — A9270 NON-COVERED ITEM OR SERVICE: HCPCS | Performed by: NURSE PRACTITIONER

## 2018-06-05 PROCEDURE — A9270 NON-COVERED ITEM OR SERVICE: HCPCS | Performed by: HOSPITALIST

## 2018-06-05 PROCEDURE — 700111 HCHG RX REV CODE 636 W/ 250 OVERRIDE (IP): Performed by: HOSPITALIST

## 2018-06-05 RX ORDER — LORAZEPAM 2 MG/ML
1 INJECTION INTRAMUSCULAR ONCE
Status: COMPLETED | OUTPATIENT
Start: 2018-06-05 | End: 2018-06-05

## 2018-06-05 RX ADMIN — DAPTOMYCIN 620 MG: 500 INJECTION, POWDER, LYOPHILIZED, FOR SOLUTION INTRAVENOUS at 11:40

## 2018-06-05 RX ADMIN — QUETIAPINE FUMARATE 100 MG: 100 TABLET ORAL at 08:51

## 2018-06-05 RX ADMIN — VALPROIC ACID 250 MG: 250 CAPSULE, LIQUID FILLED ORAL at 22:29

## 2018-06-05 RX ADMIN — METFORMIN HYDROCHLORIDE 500 MG: 500 TABLET, FILM COATED ORAL at 16:39

## 2018-06-05 RX ADMIN — QUETIAPINE FUMARATE 100 MG: 100 TABLET ORAL at 11:40

## 2018-06-05 RX ADMIN — PIPERACILLIN SODIUM AND TAZOBACTAM SODIUM 3.38 G: 3; .375 INJECTION, POWDER, FOR SOLUTION INTRAVENOUS at 20:03

## 2018-06-05 RX ADMIN — LORAZEPAM 1 MG: 2 INJECTION INTRAMUSCULAR; INTRAVENOUS at 16:36

## 2018-06-05 RX ADMIN — PIPERACILLIN SODIUM AND TAZOBACTAM SODIUM 3.38 G: 3; .375 INJECTION, POWDER, FOR SOLUTION INTRAVENOUS at 12:39

## 2018-06-05 RX ADMIN — BENZTROPINE MESYLATE 1 MG: 1 TABLET ORAL at 08:51

## 2018-06-05 RX ADMIN — ASPIRIN 81 MG: 81 TABLET, COATED ORAL at 08:51

## 2018-06-05 RX ADMIN — BENZTROPINE MESYLATE 1 MG: 1 TABLET ORAL at 20:04

## 2018-06-05 RX ADMIN — FLUOXETINE HYDROCHLORIDE 60 MG: 20 CAPSULE ORAL at 08:50

## 2018-06-05 RX ADMIN — QUETIAPINE FUMARATE 200 MG: 100 TABLET ORAL at 20:03

## 2018-06-05 RX ADMIN — ATORVASTATIN CALCIUM 20 MG: 20 TABLET, FILM COATED ORAL at 20:04

## 2018-06-05 RX ADMIN — VALPROIC ACID 250 MG: 250 CAPSULE, LIQUID FILLED ORAL at 08:50

## 2018-06-05 RX ADMIN — GABAPENTIN 300 MG: 300 CAPSULE ORAL at 08:51

## 2018-06-05 RX ADMIN — ENOXAPARIN SODIUM 40 MG: 100 INJECTION SUBCUTANEOUS at 08:49

## 2018-06-05 RX ADMIN — OXYCODONE HYDROCHLORIDE 5 MG: 5 TABLET ORAL at 20:03

## 2018-06-05 RX ADMIN — METFORMIN HYDROCHLORIDE 500 MG: 500 TABLET, FILM COATED ORAL at 08:51

## 2018-06-05 RX ADMIN — PIPERACILLIN SODIUM AND TAZOBACTAM SODIUM 3.38 G: 3; .375 INJECTION, POWDER, FOR SOLUTION INTRAVENOUS at 04:59

## 2018-06-05 RX ADMIN — GABAPENTIN 300 MG: 300 CAPSULE ORAL at 20:03

## 2018-06-05 RX ADMIN — GABAPENTIN 300 MG: 300 CAPSULE ORAL at 16:39

## 2018-06-05 RX ADMIN — LORAZEPAM 1 MG: 1 TABLET ORAL at 20:04

## 2018-06-05 RX ADMIN — Medication: at 22:29

## 2018-06-05 RX ADMIN — OXYCODONE HYDROCHLORIDE 5 MG: 5 TABLET ORAL at 01:09

## 2018-06-05 RX ADMIN — Medication: at 08:51

## 2018-06-05 RX ADMIN — LORAZEPAM 1 MG: 1 TABLET ORAL at 08:50

## 2018-06-05 ASSESSMENT — ENCOUNTER SYMPTOMS
BLURRED VISION: 0
HEADACHES: 0
ABDOMINAL PAIN: 0
SPUTUM PRODUCTION: 0
PALPITATIONS: 0
MYALGIAS: 0
FEVER: 0
CHILLS: 0
DIZZINESS: 0
HEARTBURN: 0
WEAKNESS: 0
NAUSEA: 0
COUGH: 0
VOMITING: 0
FALLS: 0

## 2018-06-05 ASSESSMENT — PAIN SCALES - GENERAL
PAINLEVEL_OUTOF10: 7
PAINLEVEL_OUTOF10: 4
PAINLEVEL_OUTOF10: 4
PAINLEVEL_OUTOF10: 3

## 2018-06-05 NOTE — CARE PLAN
Problem: Safety  Goal: Will remain free from falls  Outcome: NOT MET  Reinforced fall risk education. Bed alarm on call light in reach.    Problem: Psychosocial Needs:  Goal: Level of anxiety will decrease  Outcome: NOT MET  Patient educated on anxiety reducing techniques. Nurse provided therapeutic communication to patient. Provide prn medications for anxiety.

## 2018-06-05 NOTE — PROGRESS NOTES
Pt alert and oriented. Running intermittent abx thru PICC line. Sleeping between care. Tolerating PO meds and solid foods. Called in morning on call light when he wanted to get up and go to the bathroom. VSS. Pt afebrile. In afternoon pt got very agitated and threatened to pull PICC line out. He got dressed and wanted to leave AMA. RN called MD and security. MD spoke to patient and talked pt into staying admitted in hospital. IV abx hooked back up. Pt given Ativan 1mg IV once.  Wound care came and changed dressing on foot. New dressing orders put in.

## 2018-06-05 NOTE — PROGRESS NOTES
Received bedside report from day nurse. Patient in bed alert and oriented. Patient is agitated tonight. Reinforced fall risk. Provided emotional support. Educated on anxiety reducing techniques. Patient bed alarm on, bed locked and in lowest position, treaded socks in place. Provided fresh water. Denies any further questions or concerns. Call light in reach.

## 2018-06-05 NOTE — DISCHARGE PLANNING
Anticipated Discharge Disposition: SNF    Action: LSW spoke w/ [facility]ia[facility] to check on availability of Medicaid beds and none are available. LSW following up w/ other SNFs regarding Medicaid bed availability.     Barriers to Discharge: MD recommends that pt transfer to a 24/7 care facility (not [facility]) for wound care and until IV ABX are completed due to pt's non-compliance in the past. Pt's insurance is Medicaid FFS and no Medicaid beds are available. Pt is also on Dapto which is $800+ a dose.      Plan: Pt to remain in hospital for IV ABX tx or until a Medicaid bed comes available and SNF will accept pt. LSW will remain available for dc planning needs.

## 2018-06-05 NOTE — WOUND TEAM
"[facility]  Inpatient Services   Wound and Skin Care Progress Note    Admission Date:   05/11/2018  HPI, PMH, SH: Reviewed  Unit where seen by Wound Team: [address]    WOUND CONSULT RELATED TO: follow up assessment of left foot lateral and dorsal foot pressure injuries    SUBJECTIVE:  \" Where are my socks?\"    Self Report / Pain Level: Denies     OBJECTIVE:  Dressings intact and patient lying in bed with feet resting on foot rail.      WOUND TYPE, LOCATION, CHARACTERISTICS (Pressure ulcers: location, stage, POA or date identified)    Location and type of wound: Left lateral mid foot stage 2 pressure injury 05/17/2018      Periwound:  macerated  Drainage:  Scant sanguinous  Tissue Type and %:  100% moist red tissue  Wound Edges:  open  Odor:   none  Exposed structure(s):   none  S&S of Infection:   none      Measurements:  (taken 06/05/18)  Length:   0.5cm  Width:   0.5 cm  Depth:    0.1 cm  Tunnels and undermining:  none    Location and type of wound: Left foot dorsal pressure injury stage 2 05/17/2018      Periwound:  intact  Drainage:  Scant sanguinous  Tissue Type and %:  Red 100%  Wound Edges:  open  Odor:   none  Exposed structure(s):   none  S&S of Infection:   none      Measurements:  (taken 06/05/18)  Length:   0.2 cm  Width:    0.4 cm  Depth:    0.1 cm  Tunnels and undermining:  none    INTERVENTIONS BY WOUND TEAM:  Removed old dressings and cleansed foot with moist wash cloth, wound with NS and  gauze. Foam dressing removed from left great toe amputation site and left open to air.  Measurements and photos taken.  Covered wounds with small piece of Aquacel silver hydrofiber and secured with adhesive foam.     Interdisciplinary consultation: Patient, Staff RN    EVALUATION: Wounds are nearly resolved, no longer requiring honey product, switched to Aquacel silver. AqAg to manage bioburden, absorb exudate, and maintain moist wound environment without laterally wicking exudate therefore reducing " sen-wound maceration. Great toe amp site is dry and approximated, no drainage, left open to air.    Factors affecting wound healing: DM, Neuropathy, Infection   Goals: wounds resolved in 1-2 weeks    NURSING PLAN OF CARE ORDERS (X):    Dressing changes: See Dressing Care orders: X updated  Skin care: See Skin Care orders:   Rectal tube care: See Rectal Tube Care orders:   Other orders:    WOUND TEAM PLAN OF CARE (X):   NPWT change 3 x week:        Dressing changes by wound team:       Follow up as needed:       Other (explain): X  Wound team will follow up in 1 week.

## 2018-06-05 NOTE — CARE PLAN
Problem: Safety  Goal: Will remain free from injury  Outcome: PROGRESSING AS EXPECTED  Call light within reach. Pt safe and free from falls. Hourly rounding completed    Problem: Infection  Goal: Will remain free from infection  Outcome: PROGRESSING AS EXPECTED  Hand and personal hygiene promoted. All precautions maintained in and out of room.

## 2018-06-06 PROCEDURE — A9270 NON-COVERED ITEM OR SERVICE: HCPCS | Performed by: HOSPITALIST

## 2018-06-06 PROCEDURE — 700105 HCHG RX REV CODE 258: Performed by: INTERNAL MEDICINE

## 2018-06-06 PROCEDURE — 99232 SBSQ HOSP IP/OBS MODERATE 35: CPT | Performed by: HOSPITALIST

## 2018-06-06 PROCEDURE — 700111 HCHG RX REV CODE 636 W/ 250 OVERRIDE (IP): Performed by: INTERNAL MEDICINE

## 2018-06-06 PROCEDURE — 700102 HCHG RX REV CODE 250 W/ 637 OVERRIDE(OP): Performed by: HOSPITALIST

## 2018-06-06 PROCEDURE — A9270 NON-COVERED ITEM OR SERVICE: HCPCS | Performed by: INTERNAL MEDICINE

## 2018-06-06 PROCEDURE — 770021 HCHG ROOM/CARE - ISO PRIVATE

## 2018-06-06 PROCEDURE — 700102 HCHG RX REV CODE 250 W/ 637 OVERRIDE(OP): Performed by: INTERNAL MEDICINE

## 2018-06-06 RX ADMIN — METFORMIN HYDROCHLORIDE 500 MG: 500 TABLET, FILM COATED ORAL at 17:24

## 2018-06-06 RX ADMIN — QUETIAPINE FUMARATE 200 MG: 100 TABLET ORAL at 21:56

## 2018-06-06 RX ADMIN — Medication: at 21:58

## 2018-06-06 RX ADMIN — METFORMIN HYDROCHLORIDE 500 MG: 500 TABLET, FILM COATED ORAL at 12:18

## 2018-06-06 RX ADMIN — BENZTROPINE MESYLATE 1 MG: 1 TABLET ORAL at 12:16

## 2018-06-06 RX ADMIN — FLUOXETINE HYDROCHLORIDE 60 MG: 20 CAPSULE ORAL at 12:17

## 2018-06-06 RX ADMIN — PIPERACILLIN SODIUM AND TAZOBACTAM SODIUM 3.38 G: 3; .375 INJECTION, POWDER, FOR SOLUTION INTRAVENOUS at 13:20

## 2018-06-06 RX ADMIN — PIPERACILLIN SODIUM AND TAZOBACTAM SODIUM 3.38 G: 3; .375 INJECTION, POWDER, FOR SOLUTION INTRAVENOUS at 05:36

## 2018-06-06 RX ADMIN — ASPIRIN 81 MG: 81 TABLET, COATED ORAL at 12:16

## 2018-06-06 RX ADMIN — ENOXAPARIN SODIUM 40 MG: 100 INJECTION SUBCUTANEOUS at 12:17

## 2018-06-06 RX ADMIN — QUETIAPINE FUMARATE 100 MG: 100 TABLET ORAL at 12:16

## 2018-06-06 RX ADMIN — ATORVASTATIN CALCIUM 20 MG: 20 TABLET, FILM COATED ORAL at 21:57

## 2018-06-06 RX ADMIN — GABAPENTIN 300 MG: 300 CAPSULE ORAL at 21:57

## 2018-06-06 RX ADMIN — VALPROIC ACID 250 MG: 250 CAPSULE, LIQUID FILLED ORAL at 22:00

## 2018-06-06 RX ADMIN — Medication: at 12:17

## 2018-06-06 RX ADMIN — GABAPENTIN 300 MG: 300 CAPSULE ORAL at 17:24

## 2018-06-06 RX ADMIN — DAPTOMYCIN 620 MG: 500 INJECTION, POWDER, LYOPHILIZED, FOR SOLUTION INTRAVENOUS at 12:16

## 2018-06-06 RX ADMIN — VALPROIC ACID 250 MG: 250 CAPSULE, LIQUID FILLED ORAL at 12:16

## 2018-06-06 RX ADMIN — GABAPENTIN 300 MG: 300 CAPSULE ORAL at 12:17

## 2018-06-06 RX ADMIN — PIPERACILLIN SODIUM AND TAZOBACTAM SODIUM 3.38 G: 3; .375 INJECTION, POWDER, FOR SOLUTION INTRAVENOUS at 21:58

## 2018-06-06 RX ADMIN — BENZTROPINE MESYLATE 1 MG: 1 TABLET ORAL at 21:57

## 2018-06-06 ASSESSMENT — ENCOUNTER SYMPTOMS
VOMITING: 0
NAUSEA: 0
CHILLS: 0
ABDOMINAL PAIN: 0
WEAKNESS: 0
PALPITATIONS: 0
SPUTUM PRODUCTION: 0
HEARTBURN: 0
HEADACHES: 0
FALLS: 0
COUGH: 0
BLURRED VISION: 0
MYALGIAS: 0
DIZZINESS: 0
FEVER: 0

## 2018-06-06 ASSESSMENT — PAIN SCALES - GENERAL
PAINLEVEL_OUTOF10: 7
PAINLEVEL_OUTOF10: 5

## 2018-06-06 NOTE — PROGRESS NOTES
[facility]ist Progress Note    Date of Service: 2018    Chief Complaint  52 y.o. male admitted 2018 with diabetic left foot ulcer.  Underwent I&D with  May 12, 2018 and May 14, 2018. Seen by infectious disease team during the hospital stay. Plan to continue IV Zosyn and daptomycin with the stop date of 2018. Difficult SNF disposition given insurance issues and use of daptomycin for antibiotics.    Interval Problem Update  Calm this AM but became agitated and threatening to leave AMA this afternoon. Discussed his care while the RN called security. He is confused, agitated and unsteady on his feet.    Consultants/Specialty  Infectious disease  Orthopedics  LPS    Disposition  Insurance barriers to SNF acceptance  Daptomycin cost as a barrier to SNF acceptance  , to evaluate for RICHARD for daptomycin        Review of Systems   Constitutional: Negative for chills, fever and malaise/fatigue.   HENT: Negative for congestion, hearing loss and tinnitus.    Eyes: Negative for blurred vision.   Respiratory: Negative for cough and sputum production.    Cardiovascular: Negative for chest pain and palpitations.   Gastrointestinal: Negative for abdominal pain, heartburn, nausea and vomiting.   Genitourinary: Negative for dysuria.   Musculoskeletal: Negative for falls and myalgias.   Skin: Negative.    Neurological: Negative for dizziness, weakness and headaches.   Psychiatric/Behavioral:        Denies any issue      Physical Exam  Laboratory/Imaging   Hemodynamics  Temp (24hrs), Av.8 °C (98.2 °F), Min:36.7 °C (98 °F), Max:36.9 °C (98.4 °F)   Temperature: 36.8 °C (98.2 °F)  Pulse  Av.2  Min: 52  Max: 112   Blood Pressure: 112/75      Respiratory      Respiration: 18, Pulse Oximetry: 96 %     Work Of Breathing / Effort: Mild  RUL Breath Sounds: Clear, RML Breath Sounds: Clear, RLL Breath Sounds: Clear, LORENA Breath Sounds: Clear, LLL Breath Sounds: Clear    Fluids    Intake/Output  Summary (Last 24 hours) at 06/05/18 1928  Last data filed at 06/05/18 0500   Gross per 24 hour   Intake              680 ml   Output                0 ml   Net              680 ml       Nutrition  Orders Placed This Encounter   Procedures   • DIET ORDER     Standing Status:   Standing     Number of Occurrences:   1     Order Specific Question:   Diet:     Answer:   Regular [1]     Physical Exam   Constitutional: He appears well-developed and well-nourished. He appears distressed (restless).   HENT:   Head: Normocephalic and atraumatic.   Mouth/Throat: Oropharynx is clear and moist.   Eyes: EOM are normal. Pupils are equal, round, and reactive to light. Right eye exhibits no discharge. Left eye exhibits no discharge.   Neck: Normal range of motion. Neck supple. No JVD present.   Cardiovascular: Normal rate, regular rhythm and normal heart sounds.  Exam reveals no gallop and no friction rub.    No murmur heard.  Pulmonary/Chest: Effort normal and breath sounds normal. No stridor. No respiratory distress. He has no wheezes. He has no rales.   Abdominal: Soft. Bowel sounds are normal. He exhibits no distension. There is no tenderness.   Musculoskeletal: He exhibits deformity. He exhibits no edema.   s/p left great toe amputation, bandage is clean and dry   Neurological: He is alert. No cranial nerve deficit. Coordination abnormal.   Skin: Skin is warm and dry.   Psychiatric: His speech is delayed. He is agitated. He expresses impulsivity.   Vitals reviewed.                               Assessment/Plan     Diabetic ulcer of left foot associated with type 2 diabetes mellitus, with muscle involvement without evidence of necrosis (HCC)- (present on admission)   Assessment & Plan    Hx of diabetic nonhealing ulcer, hx of previous left first toe amputation. Failed outpatient antibiotics.  - ID and surgery following, greatly appreciate  - s/p I&D 05/12/2018 & 05/14/2018 with Dr Baez   - continue IV Zosyn and daptomycin  "through 6/26  - continue wound care  - weekly CBC, CMP, CPK (while on daptomycin): next due on 6/7/18      MRI revealing \"Large open ulcer extending from the great toe amputation site into the first metatarsal head with osteomyelitis of the first metatarsal head\"        Type 2 diabetes mellitus with neurologic complication, without long-term current use of insulin (HCC)- (present on admission)   Assessment & Plan    Controlled, last A1c 5.8.  - Continue metformin  - Continue Neurontin  - ASA 81 and Atorvastatin 20 mg        Diabetic peripheral neuropathy (HCC)- (present on admission)   Assessment & Plan    Pain controlled. Stable.  - Continue neurontin 300mg TID        Psychiatric disorder- (present on admission)   Assessment & Plan    Patient with intermittent agitation but typically cooperative. Redirectable today.  - Continue congentin, seroquel, prozac  - Continue valproate for mood control  - ativan PO BID PRN          Quality-Core Measures   Reviewed items::  Labs reviewed, Medications reviewed and Radiology images reviewed  Olsen catheter::  No Olsen  DVT prophylaxis pharmacological::  Enoxaparin (Lovenox)  DVT prophylaxis - mechanical:  SCDs  Ulcer Prophylaxis::  Not indicated  Antibiotics:  Treating active infection/contamination beyond 24 hours perioperative coverage        "

## 2018-06-06 NOTE — CARE PLAN
Problem: Communication  Goal: The ability to communicate needs accurately and effectively will improve  Outcome: PROGRESSING AS EXPECTED  Pt able to verbalize needs.     Problem: Knowledge Deficit  Goal: Knowledge of disease process/condition, treatment plan, diagnostic tests, and medications will improve  Outcome: PROGRESSING SLOWER THAN EXPECTED  Pt struggling to comprehend the importance of IV antibiotics.

## 2018-06-06 NOTE — PROGRESS NOTES
Received handoff report from Emery GARAY and assumed pt care at 1900.  Pt resting in bed comfortably. Assessment complete. Labs reviewed.  Patient and RN discussed plan of care and questions were answered. Bed in lowest and locked position and bed alarm is in place.  Call light and personal belongings within reach.

## 2018-06-07 LAB
ALBUMIN SERPL BCP-MCNC: 4.1 G/DL (ref 3.2–4.9)
ALBUMIN/GLOB SERPL: 1.4 G/DL
ALP SERPL-CCNC: 75 U/L (ref 30–99)
ALT SERPL-CCNC: 31 U/L (ref 2–50)
ANION GAP SERPL CALC-SCNC: 6 MMOL/L (ref 0–11.9)
AST SERPL-CCNC: 22 U/L (ref 12–45)
BILIRUB SERPL-MCNC: 0.6 MG/DL (ref 0.1–1.5)
BUN SERPL-MCNC: 21 MG/DL (ref 8–22)
CALCIUM SERPL-MCNC: 9.5 MG/DL (ref 8.5–10.5)
CHLORIDE SERPL-SCNC: 107 MMOL/L (ref 96–112)
CK SERPL-CCNC: 32 U/L (ref 0–154)
CO2 SERPL-SCNC: 27 MMOL/L (ref 20–33)
CREAT SERPL-MCNC: 0.87 MG/DL (ref 0.5–1.4)
ERYTHROCYTE [DISTWIDTH] IN BLOOD BY AUTOMATED COUNT: 45.3 FL (ref 35.9–50)
GLOBULIN SER CALC-MCNC: 3 G/DL (ref 1.9–3.5)
GLUCOSE SERPL-MCNC: 101 MG/DL (ref 65–99)
HCT VFR BLD AUTO: 42 % (ref 42–52)
HGB BLD-MCNC: 14.3 G/DL (ref 14–18)
MCH RBC QN AUTO: 30.3 PG (ref 27–33)
MCHC RBC AUTO-ENTMCNC: 34 G/DL (ref 33.7–35.3)
MCV RBC AUTO: 89 FL (ref 81.4–97.8)
PLATELET # BLD AUTO: 136 K/UL (ref 164–446)
PMV BLD AUTO: 10 FL (ref 9–12.9)
POTASSIUM SERPL-SCNC: 4.2 MMOL/L (ref 3.6–5.5)
PROT SERPL-MCNC: 7.1 G/DL (ref 6–8.2)
RBC # BLD AUTO: 4.72 M/UL (ref 4.7–6.1)
SODIUM SERPL-SCNC: 140 MMOL/L (ref 135–145)
WBC # BLD AUTO: 4.8 K/UL (ref 4.8–10.8)

## 2018-06-07 PROCEDURE — 99232 SBSQ HOSP IP/OBS MODERATE 35: CPT | Performed by: HOSPITALIST

## 2018-06-07 PROCEDURE — 700102 HCHG RX REV CODE 250 W/ 637 OVERRIDE(OP): Performed by: HOSPITALIST

## 2018-06-07 PROCEDURE — 80053 COMPREHEN METABOLIC PANEL: CPT

## 2018-06-07 PROCEDURE — 82550 ASSAY OF CK (CPK): CPT

## 2018-06-07 PROCEDURE — 700105 HCHG RX REV CODE 258: Performed by: INTERNAL MEDICINE

## 2018-06-07 PROCEDURE — 700102 HCHG RX REV CODE 250 W/ 637 OVERRIDE(OP): Performed by: INTERNAL MEDICINE

## 2018-06-07 PROCEDURE — 85027 COMPLETE CBC AUTOMATED: CPT

## 2018-06-07 PROCEDURE — A9270 NON-COVERED ITEM OR SERVICE: HCPCS | Performed by: INTERNAL MEDICINE

## 2018-06-07 PROCEDURE — A9270 NON-COVERED ITEM OR SERVICE: HCPCS | Performed by: HOSPITALIST

## 2018-06-07 PROCEDURE — 700111 HCHG RX REV CODE 636 W/ 250 OVERRIDE (IP): Performed by: INTERNAL MEDICINE

## 2018-06-07 PROCEDURE — 770021 HCHG ROOM/CARE - ISO PRIVATE

## 2018-06-07 RX ADMIN — PIPERACILLIN SODIUM AND TAZOBACTAM SODIUM 3.38 G: 3; .375 INJECTION, POWDER, FOR SOLUTION INTRAVENOUS at 14:11

## 2018-06-07 RX ADMIN — GABAPENTIN 300 MG: 300 CAPSULE ORAL at 14:16

## 2018-06-07 RX ADMIN — QUETIAPINE FUMARATE 200 MG: 100 TABLET ORAL at 19:53

## 2018-06-07 RX ADMIN — BENZTROPINE MESYLATE 1 MG: 1 TABLET ORAL at 19:53

## 2018-06-07 RX ADMIN — ATORVASTATIN CALCIUM 20 MG: 20 TABLET, FILM COATED ORAL at 19:53

## 2018-06-07 RX ADMIN — METFORMIN HYDROCHLORIDE 500 MG: 500 TABLET, FILM COATED ORAL at 07:54

## 2018-06-07 RX ADMIN — ASPIRIN 81 MG: 81 TABLET, COATED ORAL at 07:55

## 2018-06-07 RX ADMIN — LORAZEPAM 1 MG: 1 TABLET ORAL at 07:55

## 2018-06-07 RX ADMIN — VALPROIC ACID 250 MG: 250 CAPSULE, LIQUID FILLED ORAL at 07:54

## 2018-06-07 RX ADMIN — DAPTOMYCIN 620 MG: 500 INJECTION, POWDER, LYOPHILIZED, FOR SOLUTION INTRAVENOUS at 11:58

## 2018-06-07 RX ADMIN — Medication: at 07:56

## 2018-06-07 RX ADMIN — PIPERACILLIN SODIUM AND TAZOBACTAM SODIUM 3.38 G: 3; .375 INJECTION, POWDER, FOR SOLUTION INTRAVENOUS at 06:01

## 2018-06-07 RX ADMIN — QUETIAPINE FUMARATE 100 MG: 100 TABLET ORAL at 11:58

## 2018-06-07 RX ADMIN — METFORMIN HYDROCHLORIDE 500 MG: 500 TABLET, FILM COATED ORAL at 17:35

## 2018-06-07 RX ADMIN — GABAPENTIN 300 MG: 300 CAPSULE ORAL at 07:54

## 2018-06-07 RX ADMIN — ENOXAPARIN SODIUM 40 MG: 100 INJECTION SUBCUTANEOUS at 07:54

## 2018-06-07 RX ADMIN — BENZTROPINE MESYLATE 1 MG: 1 TABLET ORAL at 07:56

## 2018-06-07 RX ADMIN — FLUOXETINE HYDROCHLORIDE 60 MG: 20 CAPSULE ORAL at 07:54

## 2018-06-07 RX ADMIN — VALPROIC ACID 250 MG: 250 CAPSULE, LIQUID FILLED ORAL at 19:54

## 2018-06-07 RX ADMIN — QUETIAPINE FUMARATE 100 MG: 100 TABLET ORAL at 07:54

## 2018-06-07 RX ADMIN — GABAPENTIN 300 MG: 300 CAPSULE ORAL at 19:53

## 2018-06-07 RX ADMIN — PIPERACILLIN SODIUM AND TAZOBACTAM SODIUM 3.38 G: 3; .375 INJECTION, POWDER, FOR SOLUTION INTRAVENOUS at 19:53

## 2018-06-07 RX ADMIN — Medication: at 19:54

## 2018-06-07 RX ADMIN — SENNOSIDES AND DOCUSATE SODIUM 2 TABLET: 8.6; 5 TABLET ORAL at 07:55

## 2018-06-07 ASSESSMENT — ENCOUNTER SYMPTOMS
FALLS: 0
DIZZINESS: 0
BLURRED VISION: 0
TINGLING: 1
CHILLS: 0
HEADACHES: 0
ABDOMINAL PAIN: 0
VOMITING: 0
WEAKNESS: 0
FEVER: 0
COUGH: 0
HEARTBURN: 0
MYALGIAS: 0
NAUSEA: 0
PALPITATIONS: 0
SHORTNESS OF BREATH: 0

## 2018-06-07 ASSESSMENT — PAIN SCALES - GENERAL
PAINLEVEL_OUTOF10: 4
PAINLEVEL_OUTOF10: 3

## 2018-06-07 NOTE — CARE PLAN
Problem: Safety  Goal: Will remain free from falls  Outcome: PROGRESSING AS EXPECTED  Bed in low, bed alarm in place, call light in reach, and hourly rounding      Problem: Infection  Goal: Will remain free from infection  Outcome: PROGRESSING AS EXPECTED  IV ABX

## 2018-06-07 NOTE — PROGRESS NOTES
"[facility]ist Progress Note    Date of Service: 2018    Chief Complaint  52 y.o. male admitted 2018 with diabetic left foot ulcer.  Underwent I&D with  May 12, 2018 and May 14, 2018. Seen by infectious disease team during the hospital stay. Plan to continue IV Zosyn and daptomycin with the stop date of 2018. Difficult SNF disposition given insurance issues and use of daptomycin for antibiotics.    Interval Problem Update  Cooperative, laying in bed but fidgety. Did not want labs but explained that we need to get them at least weekly. Describes a tingling in his left foot but states that it otherwise \"feels normal\". No acute overnight events.    Consultants/Specialty  Infectious disease- signed off  Orthopedics  LPS    Disposition  Insurance barriers to SNF acceptance  , to evaluate for RICHARD for daptomycin        Review of Systems   Constitutional: Negative for chills, fever and malaise/fatigue.   HENT: Negative for congestion, hearing loss and tinnitus.    Eyes: Negative for blurred vision.   Respiratory: Negative for cough and shortness of breath.    Cardiovascular: Negative for chest pain and palpitations.   Gastrointestinal: Negative for abdominal pain, heartburn, nausea and vomiting.   Genitourinary: Negative for dysuria.   Musculoskeletal: Negative for falls and myalgias.   Skin: Negative.    Neurological: Positive for tingling. Negative for dizziness, weakness and headaches.   Psychiatric/Behavioral:        Denies any issue      Physical Exam  Laboratory/Imaging   Hemodynamics  Temp (24hrs), Av.4 °C (97.6 °F), Min:36.1 °C (97 °F), Max:36.7 °C (98 °F)   Temperature: 36.6 °C (97.8 °F)  Pulse  Av  Min: 52  Max: 112   Blood Pressure: 135/90      Respiratory      Respiration: 18, Pulse Oximetry: 96 %     Work Of Breathing / Effort: Mild  RUL Breath Sounds: Diminished, RML Breath Sounds: Diminished, RLL Breath Sounds: Diminished, LORENA Breath Sounds: Diminished, " LLL Breath Sounds: Diminished    Fluids    Intake/Output Summary (Last 24 hours) at 06/07/18 1153  Last data filed at 06/07/18 0930   Gross per 24 hour   Intake              360 ml   Output              500 ml   Net             -140 ml       Nutrition  Orders Placed This Encounter   Procedures   • DIET ORDER     Standing Status:   Standing     Number of Occurrences:   1     Order Specific Question:   Diet:     Answer:   Regular [1]     Physical Exam   Constitutional: He appears well-developed and well-nourished. No distress.   HENT:   Head: Normocephalic and atraumatic.   Mouth/Throat: Oropharynx is clear and moist.   Eyes: EOM are normal. Right eye exhibits no discharge. Left eye exhibits no discharge.   Neck: Normal range of motion. Neck supple. No JVD present.   Cardiovascular: Regular rhythm and intact distal pulses.  Bradycardia present.    No murmur heard.  Pulmonary/Chest: Effort normal and breath sounds normal. No stridor. No respiratory distress. He has no rales.   Abdominal: Soft. Bowel sounds are normal. He exhibits no distension.   Musculoskeletal: He exhibits deformity. He exhibits no edema.   s/p left great toe amputation (with h/o second toe amputation) bandage removed. Healing well and no tenderness to palpation. Neurovascularly intact   Neurological: He is alert. No cranial nerve deficit. Coordination abnormal.   Skin: Skin is warm and dry.   Psychiatric: His speech is delayed. He is withdrawn. He expresses impulsivity.   Vitals reviewed.      Recent Labs      06/07/18   1045   WBC  4.8   RBC  4.72   HEMOGLOBIN  14.3   HEMATOCRIT  42.0   MCV  89.0   MCH  30.3   MCHC  34.0   RDW  45.3   PLATELETCT  136*   MPV  10.0     Recent Labs      06/07/18   1045   SODIUM  140   POTASSIUM  4.2   CHLORIDE  107   CO2  27   GLUCOSE  101*   BUN  21   CREATININE  0.87   CALCIUM  9.5                      Assessment/Plan     Diabetic ulcer of left foot associated with type 2 diabetes mellitus, with muscle involvement  "without evidence of necrosis (HCC)- (present on admission)   Assessment & Plan    Hx of diabetic nonhealing ulcer, hx of previous left first toe amputation. Failed outpatient antibiotics. CBC, CMP, and CPK were checked today and are normal.   - ID and surgery following, greatly appreciate  - s/p I&D 05/12/2018 & 05/14/2018 with Dr Baez   - continue IV Zosyn and daptomycin through 6/26  - continue wound care  - weekly CBC, CMP, CPK (while on daptomycin)      MRI revealing \"Large open ulcer extending from the great toe amputation site into the first metatarsal head with osteomyelitis of the first metatarsal head\"        Type 2 diabetes mellitus with neurologic complication, without long-term current use of insulin (HCC)- (present on admission)   Assessment & Plan    Controlled and without hyperglycemia. Last A1c 5.8.  - Continue metformin  - Continue Neurontin for neuropathy  - ASA 81 and Atorvastatin 20 mg        Diabetic peripheral neuropathy (HCC)- (present on admission)   Assessment & Plan    Pain controlled. Stable.  - Continue neurontin 300mg TID        Psychiatric disorder- (present on admission)   Assessment & Plan    Patient with intermittent agitation but typically cooperative. Redirectable.  - Continue congentin, seroquel, prozac  - Continue valproate for mood control  - ativan PO BID PRN          Quality-Core Measures   Reviewed items::  Labs reviewed and Medications reviewed  Olsen catheter::  No Olsen  DVT prophylaxis pharmacological::  Enoxaparin (Lovenox)  DVT prophylaxis - mechanical:  SCDs  Ulcer Prophylaxis::  Not indicated  Antibiotics:  Treating active infection/contamination beyond 24 hours perioperative coverage        "

## 2018-06-07 NOTE — CARE PLAN
Problem: Safety  Goal: Will remain free from injury  Pt high risk. All precautions in place.     Problem: Knowledge Deficit  Goal: Knowledge of disease process/condition, treatment plan, diagnostic tests, and medications will improve  POC discussed with pt and questions answered at bedside. Pt encouraged to call when getting OOB. Pt impulsive at times. AAOX4 requiring reinforcement.

## 2018-06-07 NOTE — PROGRESS NOTES
Pt alert and oriented with impulsive behaviors noted. Bed alarm in use, call light in reach, nonslip socks on and hourly rounding done. Dressings to LLE CDI. VSS. Pain at tolerable level. Ambulating SBA with offloading boot. WCTM.

## 2018-06-07 NOTE — PROGRESS NOTES
Patient a+o x 4, flat affect, denies sob/lightheadedness/numbness/tingling/dizziness/chest pain/n/v/d. Tolerating diet. Patient c/o 7/10 left foot pain - no request for pain meds throughout shift.  Iv abx a/o, afebrile, vss. Dressing to left foot, c/d/I. ble's= +pulses/+csm. Fall precautions/hourly rounding maintained, call light within reach and functioning, all items within reach.  Patient encouraged to call for assistance, poc reviewed with patient, ?'s/concerns answered.

## 2018-06-07 NOTE — PROGRESS NOTES
[facility]ist Progress Note    Date of Service: 2018    Chief Complaint  52 y.o. male admitted 2018 with diabetic left foot ulcer.  Underwent I&D with  May 12, 2018 and May 14, 2018. Seen by infectious disease team during the hospital stay. Plan to continue IV Zosyn and daptomycin with the stop date of 2018. Difficult SNF disposition given insurance issues and use of daptomycin for antibiotics.    Interval Problem Update  Calm this AM and sleeping, wakes easily and answers questions but not interesting in a conversation. No acute issues overnight.     Consultants/Specialty  Infectious disease  Orthopedics  LPS    Disposition  Insurance barriers to SNF acceptance  Daptomycin cost as a barrier to SNF acceptance  , to evaluate for RICHARD for daptomycin        Review of Systems   Constitutional: Negative for chills, fever and malaise/fatigue.   HENT: Negative for congestion, hearing loss and tinnitus.    Eyes: Negative for blurred vision.   Respiratory: Negative for cough and sputum production.    Cardiovascular: Negative for chest pain and palpitations.   Gastrointestinal: Negative for abdominal pain, heartburn, nausea and vomiting.   Genitourinary: Negative for dysuria.   Musculoskeletal: Negative for falls and myalgias.   Skin: Negative.    Neurological: Negative for dizziness, weakness and headaches.   Psychiatric/Behavioral:        Denies any issue      Physical Exam  Laboratory/Imaging   Hemodynamics  Temp (24hrs), Av.5 °C (97.7 °F), Min:36.3 °C (97.4 °F), Max:36.7 °C (98 °F)   Temperature: 36.7 °C (98 °F)  Pulse  Av.2  Min: 52  Max: 112   Blood Pressure: 106/74      Respiratory      Respiration: 18, Pulse Oximetry: 92 %     Work Of Breathing / Effort: Mild  RUL Breath Sounds: Diminished, RML Breath Sounds: Diminished, RLL Breath Sounds: Diminished, LORENA Breath Sounds: Diminished, LLL Breath Sounds: Diminished    Fluids    Intake/Output Summary (Last 24 hours)  at 06/06/18 2147  Last data filed at 06/06/18 1650   Gross per 24 hour   Intake              360 ml   Output                0 ml   Net              360 ml       Nutrition  Orders Placed This Encounter   Procedures   • DIET ORDER     Standing Status:   Standing     Number of Occurrences:   1     Order Specific Question:   Diet:     Answer:   Regular [1]     Physical Exam   Constitutional: He appears well-developed and well-nourished. No distress.   HENT:   Head: Normocephalic and atraumatic.   Mouth/Throat: Oropharynx is clear and moist.   Eyes: EOM are normal. Right eye exhibits no discharge. Left eye exhibits no discharge.   Neck: Normal range of motion. Neck supple. No JVD present.   Cardiovascular: Normal rate, regular rhythm and intact distal pulses.    No murmur heard.  Pulmonary/Chest: Effort normal and breath sounds normal. No stridor. No respiratory distress. He has no rales.   Abdominal: Soft. Bowel sounds are normal. He exhibits no distension.   Musculoskeletal: He exhibits deformity. He exhibits no edema.   s/p left great toe amputation (with h/o second toe amputation) bandage removed. Healing well and no tenderness to palpation. Neurovascularly intact   Neurological: He is alert. No cranial nerve deficit. Coordination abnormal.   Skin: Skin is warm and dry.   Psychiatric: His speech is delayed. He is withdrawn. He expresses impulsivity.   Vitals reviewed.                               Assessment/Plan     Diabetic ulcer of left foot associated with type 2 diabetes mellitus, with muscle involvement without evidence of necrosis (HCC)- (present on admission)   Assessment & Plan    Hx of diabetic nonhealing ulcer, hx of previous left first toe amputation. Failed outpatient antibiotics.  - ID and surgery following, greatly appreciate  - s/p I&D 05/12/2018 & 05/14/2018 with Dr Baez   - continue IV Zosyn and daptomycin through 6/26  - continue wound care  - weekly CBC, CMP, CPK (while on daptomycin): next  "due tomorrow      MRI revealing \"Large open ulcer extending from the great toe amputation site into the first metatarsal head with osteomyelitis of the first metatarsal head\"        Type 2 diabetes mellitus with neurologic complication, without long-term current use of insulin (HCC)- (present on admission)   Assessment & Plan    Controlled, last A1c 5.8.  - Continue metformin  - Continue Neurontin for neuropathy  - ASA 81 and Atorvastatin 20 mg        Diabetic peripheral neuropathy (HCC)- (present on admission)   Assessment & Plan    Pain controlled. Stable.  - Continue neurontin 300mg TID        Psychiatric disorder- (present on admission)   Assessment & Plan    Patient with intermittent agitation but typically cooperative. Redirectable.  - Continue congentin, seroquel, prozac  - Continue valproate for mood control  - ativan PO BID PRN          Quality-Core Measures   Reviewed items::  Labs reviewed and Medications reviewed  Olsen catheter::  No Olsen  DVT prophylaxis pharmacological::  Enoxaparin (Lovenox)  DVT prophylaxis - mechanical:  SCDs  Ulcer Prophylaxis::  Not indicated  Antibiotics:  Treating active infection/contamination beyond 24 hours perioperative coverage        "

## 2018-06-07 NOTE — PROGRESS NOTES
Assumed care of pt at 1930. Report received and bedside rounding completed with day RN. Pt in bed resting comfortably denies any pain at this time and is calm. No SOB, or in any acute distress. Fall precautions in place,  bed alarm on. - Treaded non slip socks. Call light and pt belongings within reach - pt impulsive at times- hourly rounding in place. See flowsheets for further assessment.   Wound dressings changed and pictures taken and uploaded.

## 2018-06-08 PROCEDURE — 700102 HCHG RX REV CODE 250 W/ 637 OVERRIDE(OP): Performed by: INTERNAL MEDICINE

## 2018-06-08 PROCEDURE — 700111 HCHG RX REV CODE 636 W/ 250 OVERRIDE (IP): Performed by: INTERNAL MEDICINE

## 2018-06-08 PROCEDURE — 700105 HCHG RX REV CODE 258: Performed by: INTERNAL MEDICINE

## 2018-06-08 PROCEDURE — A9270 NON-COVERED ITEM OR SERVICE: HCPCS | Performed by: HOSPITALIST

## 2018-06-08 PROCEDURE — A9270 NON-COVERED ITEM OR SERVICE: HCPCS | Performed by: INTERNAL MEDICINE

## 2018-06-08 PROCEDURE — 99232 SBSQ HOSP IP/OBS MODERATE 35: CPT | Performed by: HOSPITALIST

## 2018-06-08 PROCEDURE — 700102 HCHG RX REV CODE 250 W/ 637 OVERRIDE(OP): Performed by: HOSPITALIST

## 2018-06-08 PROCEDURE — 770021 HCHG ROOM/CARE - ISO PRIVATE

## 2018-06-08 RX ADMIN — VALPROIC ACID 250 MG: 250 CAPSULE, LIQUID FILLED ORAL at 20:06

## 2018-06-08 RX ADMIN — PIPERACILLIN SODIUM AND TAZOBACTAM SODIUM 3.38 G: 3; .375 INJECTION, POWDER, FOR SOLUTION INTRAVENOUS at 20:21

## 2018-06-08 RX ADMIN — LORAZEPAM 1 MG: 1 TABLET ORAL at 05:33

## 2018-06-08 RX ADMIN — QUETIAPINE FUMARATE 100 MG: 100 TABLET ORAL at 11:57

## 2018-06-08 RX ADMIN — GABAPENTIN 300 MG: 300 CAPSULE ORAL at 15:01

## 2018-06-08 RX ADMIN — PIPERACILLIN SODIUM AND TAZOBACTAM SODIUM 3.38 G: 3; .375 INJECTION, POWDER, FOR SOLUTION INTRAVENOUS at 05:28

## 2018-06-08 RX ADMIN — BENZTROPINE MESYLATE 1 MG: 1 TABLET ORAL at 09:35

## 2018-06-08 RX ADMIN — Medication: at 09:35

## 2018-06-08 RX ADMIN — ASPIRIN 81 MG: 81 TABLET, COATED ORAL at 09:34

## 2018-06-08 RX ADMIN — DAPTOMYCIN 620 MG: 500 INJECTION, POWDER, LYOPHILIZED, FOR SOLUTION INTRAVENOUS at 11:57

## 2018-06-08 RX ADMIN — Medication: at 20:06

## 2018-06-08 RX ADMIN — QUETIAPINE FUMARATE 100 MG: 100 TABLET ORAL at 09:34

## 2018-06-08 RX ADMIN — METFORMIN HYDROCHLORIDE 500 MG: 500 TABLET, FILM COATED ORAL at 09:34

## 2018-06-08 RX ADMIN — ENOXAPARIN SODIUM 40 MG: 100 INJECTION SUBCUTANEOUS at 09:34

## 2018-06-08 RX ADMIN — GABAPENTIN 300 MG: 300 CAPSULE ORAL at 20:06

## 2018-06-08 RX ADMIN — FLUOXETINE HYDROCHLORIDE 60 MG: 20 CAPSULE ORAL at 09:35

## 2018-06-08 RX ADMIN — ATORVASTATIN CALCIUM 20 MG: 20 TABLET, FILM COATED ORAL at 20:06

## 2018-06-08 RX ADMIN — GABAPENTIN 300 MG: 300 CAPSULE ORAL at 09:35

## 2018-06-08 RX ADMIN — QUETIAPINE FUMARATE 200 MG: 100 TABLET ORAL at 20:06

## 2018-06-08 RX ADMIN — METFORMIN HYDROCHLORIDE 500 MG: 500 TABLET, FILM COATED ORAL at 17:30

## 2018-06-08 RX ADMIN — SENNOSIDES AND DOCUSATE SODIUM 2 TABLET: 8.6; 5 TABLET ORAL at 09:35

## 2018-06-08 RX ADMIN — VALPROIC ACID 250 MG: 250 CAPSULE, LIQUID FILLED ORAL at 09:35

## 2018-06-08 RX ADMIN — BENZTROPINE MESYLATE 1 MG: 1 TABLET ORAL at 20:06

## 2018-06-08 RX ADMIN — PIPERACILLIN SODIUM AND TAZOBACTAM SODIUM 3.38 G: 3; .375 INJECTION, POWDER, FOR SOLUTION INTRAVENOUS at 14:04

## 2018-06-08 ASSESSMENT — ENCOUNTER SYMPTOMS
NAUSEA: 0
PALPITATIONS: 0
HEADACHES: 0
DIZZINESS: 0
VOMITING: 0
WEAKNESS: 0
MYALGIAS: 0
ABDOMINAL PAIN: 0
TINGLING: 1
COUGH: 0
HEARTBURN: 0
FALLS: 0
BLURRED VISION: 0
FEVER: 0
FOCAL WEAKNESS: 0
CHILLS: 0
SHORTNESS OF BREATH: 0

## 2018-06-08 ASSESSMENT — PAIN SCALES - GENERAL
PAINLEVEL_OUTOF10: 4
PAINLEVEL_OUTOF10: 2
PAINLEVEL_OUTOF10: 4
PAINLEVEL_OUTOF10: 4

## 2018-06-08 NOTE — CARE PLAN
Problem: Safety  Goal: Will remain free from injury  Pt a high fall risk pt. Impulsive and forgetful at times. Call light with in reach, pt near nurses station. Non slip socks on.     Problem: Mobility  Goal: Risk for activity intolerance will decrease  Pt up sba. OOB to restroom.

## 2018-06-08 NOTE — PROGRESS NOTES
Assumed care of pt at 1930. Report received and bedside rounding completed with day RN. Pt in bed resting comfortably  No SOB, or in any acute distress. Fall precautions in place,  bed alarm. - Treaded non slip socks. Call light and pt belongings within reach - hourly rounding in place. See flowsheets for further assessment.

## 2018-06-08 NOTE — PROGRESS NOTES
"Patient seen and examined    Blood pressure 110/75, pulse (!) 54, temperature 36.1 °C (97 °F), resp. rate 17, height 1.93 m (6' 4\"), weight 105.7 kg (233 lb 0.4 oz), SpO2 94 %.    Recent Labs      06/07/18   1045   WBC  4.8   RBC  4.72   HEMOGLOBIN  14.3   HEMATOCRIT  42.0   MCV  89.0   MCH  30.3   MCHC  34.0   RDW  45.3   PLATELETCT  136*   MPV  10.0       No acute distress  Dressing clean dry and intact  Neurovascularly intact    POD#22    Plan:  DVT Prophylaxis- TEDS/SCDs  Weight Bearing Status-WBAT  PT/OT  Antibiotics: per ID  Case Coordination          "

## 2018-06-08 NOTE — CARE PLAN
Problem: Safety  Goal: Will remain free from injury  Outcome: PROGRESSING AS EXPECTED  HD Fall Risk complete; patient educated on level of risk and proper identifiers in place.    Problem: Venous Thromboembolism (VTW)/Deep Vein Thrombosis (DVT) Prevention:  Goal: Patient will participate in Venous Thrombosis (VTE)/Deep Vein Thrombosis (DVT)Prevention Measures  Outcome: PROGRESSING AS EXPECTED  Patient receiving Lovenox; administered as per MAR.

## 2018-06-08 NOTE — PROGRESS NOTES
[facility] Hospitalist Progress Note    Date of Service: 2018    Chief Complaint  52 y.o. male admitted 2018 with diabetic left foot ulcer.  Underwent I&D with  May 12, 2018 and May 14, 2018. Seen by infectious disease team during the hospital stay. Plan to continue IV Zosyn and daptomycin with the stop date of 2018. Difficult SNF disposition given insurance issues and use of daptomycin for antibiotics.    Interval Problem Update  Sleeping, no acute distress. Denies any pain at the moment. No acute overnight events.    Consultants/Specialty  Infectious disease- signed off  Orthopedics  LPS    Disposition  Insurance barriers to SNF acceptance  , to evaluate for RICHARD for daptomycin        Review of Systems   Constitutional: Negative for chills, fever and malaise/fatigue.   HENT: Negative for congestion and hearing loss.    Eyes: Negative for blurred vision.   Respiratory: Negative for cough and shortness of breath.    Cardiovascular: Negative for chest pain and palpitations.   Gastrointestinal: Negative for abdominal pain, heartburn, nausea and vomiting.   Genitourinary: Negative for dysuria.   Musculoskeletal: Negative for falls and myalgias.   Skin: Negative.    Neurological: Positive for tingling. Negative for dizziness, focal weakness, weakness and headaches.   Psychiatric/Behavioral:        Denies any issue      Physical Exam  Laboratory/Imaging   Hemodynamics  Temp (24hrs), Av.4 °C (97.5 °F), Min:36.1 °C (97 °F), Max:36.6 °C (97.8 °F)   Temperature: 36.3 °C (97.3 °F)  Pulse  Av  Min: 52  Max: 112   Blood Pressure: 114/78      Respiratory      Respiration: 16, Pulse Oximetry: 93 %     Work Of Breathing / Effort: Mild  RUL Breath Sounds: Diminished, RML Breath Sounds: Diminished, RLL Breath Sounds: Diminished, LORENA Breath Sounds: Diminished, LLL Breath Sounds: Diminished    Fluids    Intake/Output Summary (Last 24 hours) at 18 1501  Last data filed at 18  1400   Gross per 24 hour   Intake              240 ml   Output              400 ml   Net             -160 ml       Nutrition  Orders Placed This Encounter   Procedures   • DIET ORDER     Standing Status:   Standing     Number of Occurrences:   1     Order Specific Question:   Diet:     Answer:   Regular [1]     Physical Exam   Constitutional: He appears well-developed and well-nourished. No distress.   HENT:   Head: Normocephalic and atraumatic.   Mouth/Throat: Oropharynx is clear and moist.   Eyes: EOM are normal. Right eye exhibits no discharge. Left eye exhibits no discharge.   Neck: Normal range of motion. Neck supple. No JVD present.   Cardiovascular: Regular rhythm and intact distal pulses.  Bradycardia present.    No murmur heard.  Pulmonary/Chest: Effort normal and breath sounds normal. No stridor. No respiratory distress. He has no rales.   Abdominal: Soft. Bowel sounds are normal. He exhibits no distension.   Musculoskeletal: He exhibits deformity. He exhibits no edema.   s/p left great toe amputation (with h/o second toe amputation) bandage removed. Healing well and no tenderness to palpation. Neurovascularly intact   Neurological: He is alert.   Skin: Skin is warm and dry.   Psychiatric: His speech is delayed. He is slowed. He expresses impulsivity.   Vitals reviewed.      Recent Labs      06/07/18   1045   WBC  4.8   RBC  4.72   HEMOGLOBIN  14.3   HEMATOCRIT  42.0   MCV  89.0   MCH  30.3   MCHC  34.0   RDW  45.3   PLATELETCT  136*   MPV  10.0     Recent Labs      06/07/18   1045   SODIUM  140   POTASSIUM  4.2   CHLORIDE  107   CO2  27   GLUCOSE  101*   BUN  21   CREATININE  0.87   CALCIUM  9.5                      Assessment/Plan     Diabetic ulcer of left foot associated with type 2 diabetes mellitus, with muscle involvement without evidence of necrosis (HCC)- (present on admission)   Assessment & Plan    Hx of diabetic nonhealing ulcer, hx of previous left first toe amputation. Failed outpatient  "antibiotics. CBC, CMP, and CPK were checked today and are normal.   - ID and surgery following, greatly appreciate  - s/p I&D 05/12/2018 & 05/14/2018 with Dr Baez   - continue IV Zosyn and daptomycin through 6/26  - continue wound care  - weekly CBC, CMP, CPK (while on daptomycin)      MRI revealing \"Large open ulcer extending from the great toe amputation site into the first metatarsal head with osteomyelitis of the first metatarsal head\"        Type 2 diabetes mellitus with neurologic complication, without long-term current use of insulin (HCC)- (present on admission)   Assessment & Plan    Controlled and without hyperglycemia. Last A1c 5.8.  - Continue metformin  - Continue Neurontin for neuropathy  - ASA 81 and Atorvastatin 20 mg        Diabetic peripheral neuropathy (HCC)- (present on admission)   Assessment & Plan    Pain controlled, mild tingling. Stable.  - Continue neurontin 300mg TID        Psychiatric disorder- (present on admission)   Assessment & Plan    Patient with intermittent agitation but typically cooperative. Redirectable.  - Continue congentin, seroquel, prozac  - Continue valproate for mood control  - ativan PO BID PRN          Quality-Core Measures   Reviewed items::  Medications reviewed  Olsen catheter::  No Olsen  DVT prophylaxis pharmacological::  Enoxaparin (Lovenox)  DVT prophylaxis - mechanical:  SCDs  Ulcer Prophylaxis::  Not indicated  Antibiotics:  Treating active infection/contamination beyond 24 hours perioperative coverage        "

## 2018-06-09 PROCEDURE — 700105 HCHG RX REV CODE 258: Performed by: INTERNAL MEDICINE

## 2018-06-09 PROCEDURE — A9270 NON-COVERED ITEM OR SERVICE: HCPCS | Performed by: HOSPITALIST

## 2018-06-09 PROCEDURE — A9270 NON-COVERED ITEM OR SERVICE: HCPCS | Performed by: INTERNAL MEDICINE

## 2018-06-09 PROCEDURE — 700111 HCHG RX REV CODE 636 W/ 250 OVERRIDE (IP): Performed by: INTERNAL MEDICINE

## 2018-06-09 PROCEDURE — 99232 SBSQ HOSP IP/OBS MODERATE 35: CPT | Performed by: HOSPITALIST

## 2018-06-09 PROCEDURE — 700102 HCHG RX REV CODE 250 W/ 637 OVERRIDE(OP): Performed by: HOSPITALIST

## 2018-06-09 PROCEDURE — 770021 HCHG ROOM/CARE - ISO PRIVATE

## 2018-06-09 PROCEDURE — 700102 HCHG RX REV CODE 250 W/ 637 OVERRIDE(OP): Performed by: INTERNAL MEDICINE

## 2018-06-09 RX ADMIN — ATORVASTATIN CALCIUM 20 MG: 20 TABLET, FILM COATED ORAL at 20:55

## 2018-06-09 RX ADMIN — METFORMIN HYDROCHLORIDE 500 MG: 500 TABLET, FILM COATED ORAL at 18:21

## 2018-06-09 RX ADMIN — GABAPENTIN 300 MG: 300 CAPSULE ORAL at 20:55

## 2018-06-09 RX ADMIN — Medication: at 20:56

## 2018-06-09 RX ADMIN — VALPROIC ACID 250 MG: 250 CAPSULE, LIQUID FILLED ORAL at 09:19

## 2018-06-09 RX ADMIN — ASPIRIN 81 MG: 81 TABLET, COATED ORAL at 09:19

## 2018-06-09 RX ADMIN — SENNOSIDES AND DOCUSATE SODIUM 2 TABLET: 8.6; 5 TABLET ORAL at 20:56

## 2018-06-09 RX ADMIN — PIPERACILLIN SODIUM AND TAZOBACTAM SODIUM 3.38 G: 3; .375 INJECTION, POWDER, FOR SOLUTION INTRAVENOUS at 05:32

## 2018-06-09 RX ADMIN — DAPTOMYCIN 620 MG: 500 INJECTION, POWDER, LYOPHILIZED, FOR SOLUTION INTRAVENOUS at 12:20

## 2018-06-09 RX ADMIN — GABAPENTIN 300 MG: 300 CAPSULE ORAL at 15:20

## 2018-06-09 RX ADMIN — LORAZEPAM 1 MG: 1 TABLET ORAL at 20:55

## 2018-06-09 RX ADMIN — Medication: at 09:20

## 2018-06-09 RX ADMIN — BENZTROPINE MESYLATE 1 MG: 1 TABLET ORAL at 20:55

## 2018-06-09 RX ADMIN — VALPROIC ACID 250 MG: 250 CAPSULE, LIQUID FILLED ORAL at 20:56

## 2018-06-09 RX ADMIN — BENZTROPINE MESYLATE 1 MG: 1 TABLET ORAL at 09:19

## 2018-06-09 RX ADMIN — SENNOSIDES AND DOCUSATE SODIUM 2 TABLET: 8.6; 5 TABLET ORAL at 09:19

## 2018-06-09 RX ADMIN — PIPERACILLIN SODIUM AND TAZOBACTAM SODIUM 3.38 G: 3; .375 INJECTION, POWDER, FOR SOLUTION INTRAVENOUS at 20:56

## 2018-06-09 RX ADMIN — QUETIAPINE FUMARATE 200 MG: 100 TABLET ORAL at 20:55

## 2018-06-09 RX ADMIN — LORAZEPAM 1 MG: 1 TABLET ORAL at 09:23

## 2018-06-09 RX ADMIN — QUETIAPINE FUMARATE 100 MG: 100 TABLET ORAL at 12:20

## 2018-06-09 RX ADMIN — GABAPENTIN 300 MG: 300 CAPSULE ORAL at 09:19

## 2018-06-09 RX ADMIN — QUETIAPINE FUMARATE 100 MG: 100 TABLET ORAL at 09:19

## 2018-06-09 RX ADMIN — ENOXAPARIN SODIUM 40 MG: 100 INJECTION SUBCUTANEOUS at 09:19

## 2018-06-09 RX ADMIN — FLUOXETINE HYDROCHLORIDE 60 MG: 20 CAPSULE ORAL at 09:19

## 2018-06-09 RX ADMIN — METFORMIN HYDROCHLORIDE 500 MG: 500 TABLET, FILM COATED ORAL at 09:19

## 2018-06-09 RX ADMIN — PIPERACILLIN SODIUM AND TAZOBACTAM SODIUM 3.38 G: 3; .375 INJECTION, POWDER, FOR SOLUTION INTRAVENOUS at 15:20

## 2018-06-09 ASSESSMENT — ENCOUNTER SYMPTOMS
PALPITATIONS: 0
HEARTBURN: 0
FEVER: 0
FOCAL WEAKNESS: 0
BLURRED VISION: 0
FALLS: 0
SHORTNESS OF BREATH: 0
MYALGIAS: 0
TINGLING: 1
ABDOMINAL PAIN: 0
VOMITING: 0
CHILLS: 0
HEADACHES: 0
WEAKNESS: 0
DIZZINESS: 0
COUGH: 0
NAUSEA: 0

## 2018-06-09 ASSESSMENT — PAIN SCALES - GENERAL
PAINLEVEL_OUTOF10: 2
PAINLEVEL_OUTOF10: 0

## 2018-06-09 NOTE — PROGRESS NOTES
Dressings changed to RLE per orders. Pt alert and orientated able to answer all questions appropriatly. Pt requiring reinforcement regarding high fall risk and having to call appropriately. All interventions in place. Call light with in reach hourly rounding in place.

## 2018-06-09 NOTE — PROGRESS NOTES
"LIMB PRESERVATION SERVICE NOTE:    Subjective:      Reason for Consultation: S/P I and D Left Great Toe 5/12/18 Dr Baez       History of Present Illness:     Past Medical History:   Diagnosis Date   • Anxiety    • Bronchitis     has had 3-4 times   • Dental disorder     upper partial, but lost it   • Diabetes (HCC)     \"borderline\"   • Hepatitis C 1997   • Hypercholesteremia    • Hypertension    • Psychiatric disorder     anxiety       Patient is well known to [facility] and outpatient wound care services.     Patient is a 52 y.o. male with a past medical history that includes borderline diabetes, anxiety, HTN, Hep C and is admitted to [facility] via [facility] for his S/P I and D Left Great Toe 5/12/18 Dr Baez.  S/P Left Great Toe Amp by Dr Easton 4/23/18. The s/p left great toe amputation site was from 4/1/18 by Dr. Baez. Pt did not follow up with the [facility] clinic for dressing care and he did not receive oral antibiotics. He also did not follow up with his PCP at [facility]. Pt has Hx of noncompliance and psychiatric disorder which is likely contributory.           Lab Results   Component Value Date/Time    HBA1C 5.8 (H) 03/30/2018 03:04 PM      Patient denies fevers chills, nausea, vomiting.      Pain:        Patient resting comfortably    Vitals  /78   Pulse (!) 56   Temp 36.7 °C (98.1 °F)   Resp 16   Ht 1.93 m (6' 4\")   Wt 105.7 kg (233 lb 0.4 oz)   SpO2 95%   BMI 28.36 kg/m²       Objective:      Wound Characteristics      Surgical site well approximated,   Wound essentially healed    Pressure ulcers stage 2 to midfoot media,lateral, and dorsal aspect. Improving.       Plan:       Treatment Plan and Recommendations:     1. Labs\Imaging:         Reviewed     2. Treatment:              Continue Wound Care by Nursing, [facility] to Follow.                                      Dressing Orders Updated             Nursing to change dressing Q 72 hours. -[facility]B LLE "   3.Collaboration:                                                 ID managing abx, Zosyn and Dapto x 6 weeks     4. Orthotics/Prosthetics:                                       pt to get orthotics/prosthetics  as OP, pt to wear shoes that do not rub on Wound sites      Anticipated discharge plans (X):              SNF:         X  Case management working on SNF placement, no accepting facilities as yet              Home Care:                       Outpatient Wound Center: X                   Self Care:                         Other:                       TBD:  Patient requires skilled therapeutic intervention for debridement, product selection and application, education, wound bed preparation and assessment.

## 2018-06-09 NOTE — PROGRESS NOTES
[facility] Hospitalist Progress Note    Date of Service: 2018    Chief Complaint  52 y.o. male admitted 2018 with diabetic left foot ulcer.  Underwent I&D with  May 12, 2018 and May 14, 2018. Seen by infectious disease team during the hospital stay. Plan to continue IV Zosyn and daptomycin with the stop date of 2018. Difficult SNF disposition given insurance issues and use of daptomycin for antibiotics.    Interval Problem Update  Sleeping, no acute distress. Asks about the tingling in his foot, denies pain. No acute overnight events.    Consultants/Specialty  Infectious disease- signed off  Orthopedics  LPS    Disposition  Insurance barriers to SNF acceptance  , to evaluate for RICHARD for daptomycin        Review of Systems   Constitutional: Negative for chills, fever and malaise/fatigue.   HENT: Negative for congestion and hearing loss.    Eyes: Negative for blurred vision.   Respiratory: Negative for cough and shortness of breath.    Cardiovascular: Negative for chest pain and palpitations.   Gastrointestinal: Negative for abdominal pain, heartburn, nausea and vomiting.   Genitourinary: Negative for dysuria.   Musculoskeletal: Negative for falls and myalgias.   Skin: Negative.    Neurological: Positive for tingling. Negative for dizziness, focal weakness, weakness and headaches.   Psychiatric/Behavioral:        Denies any issue      Physical Exam  Laboratory/Imaging   Hemodynamics  Temp (24hrs), Av.5 °C (97.7 °F), Min:36.4 °C (97.5 °F), Max:36.7 °C (98.1 °F)   Temperature: 36.7 °C (98.1 °F)  Pulse  Av.9  Min: 52  Max: 112   Blood Pressure: 120/78      Respiratory      Respiration: 16, Pulse Oximetry: 95 %     Work Of Breathing / Effort: Mild  RUL Breath Sounds: Clear, RML Breath Sounds: Clear, RLL Breath Sounds: Diminished, LORENA Breath Sounds: Clear, LLL Breath Sounds: Diminished    Fluids    Intake/Output Summary (Last 24 hours) at 18 0941  Last data filed at  06/08/18 1400   Gross per 24 hour   Intake              240 ml   Output                0 ml   Net              240 ml       Nutrition  Orders Placed This Encounter   Procedures   • DIET ORDER     Standing Status:   Standing     Number of Occurrences:   1     Order Specific Question:   Diet:     Answer:   Regular [1]     Physical Exam   Constitutional: He appears well-developed and well-nourished. No distress.   HENT:   Head: Normocephalic and atraumatic.   Mouth/Throat: Oropharynx is clear and moist.   Eyes: EOM are normal. Right eye exhibits no discharge. Left eye exhibits no discharge.   Neck: Normal range of motion. Neck supple. No JVD present.   Cardiovascular: Regular rhythm and intact distal pulses.  Bradycardia present.    No murmur heard.  Pulmonary/Chest: Effort normal and breath sounds normal. No stridor. No respiratory distress. He has no rales.   Abdominal: Soft. Bowel sounds are normal. He exhibits no distension.   Musculoskeletal: He exhibits deformity. He exhibits no edema.   s/p left great toe amputation (with h/o second toe amputation) bandage removed. Healing well and no tenderness to palpation. Neurovascularly intact   Neurological: He is alert.   Skin: Skin is warm and dry.   Psychiatric: His speech is delayed. He is slowed.   Vitals reviewed.      Recent Labs      06/07/18   1045   WBC  4.8   RBC  4.72   HEMOGLOBIN  14.3   HEMATOCRIT  42.0   MCV  89.0   MCH  30.3   MCHC  34.0   RDW  45.3   PLATELETCT  136*   MPV  10.0     Recent Labs      06/07/18   1045   SODIUM  140   POTASSIUM  4.2   CHLORIDE  107   CO2  27   GLUCOSE  101*   BUN  21   CREATININE  0.87   CALCIUM  9.5                      Assessment/Plan     Diabetic ulcer of left foot associated with type 2 diabetes mellitus, with muscle involvement without evidence of necrosis (HCC)- (present on admission)   Assessment & Plan    Hx of diabetic nonhealing ulcer, hx of previous left first toe amputation. Failed outpatient antibiotics. CBC, CMP,  "and CPK were checked today and are normal.   - ID and surgery following, greatly appreciate  - s/p I&D 05/12/2018 & 05/14/2018 with Dr Baez   - continue IV Zosyn and daptomycin through 6/26  - continue wound care  - weekly CBC, CMP, CPK (while on daptomycin): last done 6/7      MRI revealing \"Large open ulcer extending from the great toe amputation site into the first metatarsal head with osteomyelitis of the first metatarsal head\"        Type 2 diabetes mellitus with neurologic complication, without long-term current use of insulin (HCC)- (present on admission)   Assessment & Plan    Controlled and without hyperglycemia. Last A1c 5.8.  - Continue metformin  - Continue Neurontin for neuropathy  - ASA 81 and Atorvastatin 20 mg        Diabetic peripheral neuropathy (HCC)- (present on admission)   Assessment & Plan    Pain controlled, mild tingling/neuropathy. Stable.  - Continue neurontin 300mg TID        Psychiatric disorder- (present on admission)   Assessment & Plan    Patient with intermittent agitation but typically cooperative. Redirectable.  - Continue congentin, seroquel, prozac  - Continue valproate for mood control  - ativan PO BID PRN, getting it regularly          Quality-Core Measures   Reviewed items::  Medications reviewed  Olsen catheter::  No Olsen  DVT prophylaxis pharmacological::  Enoxaparin (Lovenox)  DVT prophylaxis - mechanical:  SCDs  Ulcer Prophylaxis::  Not indicated  Antibiotics:  Treating active infection/contamination beyond 24 hours perioperative coverage        "

## 2018-06-09 NOTE — CARE PLAN
Problem: Bowel/Gastric:  Goal: Normal bowel function is maintained or improved  Outcome: PROGRESSING AS EXPECTED  Patient receiving stool softeners; administered as per MAR.    Problem: Psychosocial Needs:  Goal: Level of anxiety will decrease  Outcome: PROGRESSING AS EXPECTED  Patient receiving Ativan PRN 2x daily; administered as per MAR.

## 2018-06-09 NOTE — CARE PLAN
Problem: Infection  Goal: Will remain free from infection    Intervention: Assess signs and symptoms of infection  VSS. Pt in no acute distress. Wound dressings changed area cleansed per orders. Will continue to monitor.       Problem: Pain Management  Goal: Pain level will decrease to patient's comfort goal  Offering PRN medications as needed. Repositioning and non pharmaceutical interventions in place.

## 2018-06-10 PROCEDURE — 700102 HCHG RX REV CODE 250 W/ 637 OVERRIDE(OP): Performed by: HOSPITALIST

## 2018-06-10 PROCEDURE — 700111 HCHG RX REV CODE 636 W/ 250 OVERRIDE (IP): Performed by: INTERNAL MEDICINE

## 2018-06-10 PROCEDURE — 770021 HCHG ROOM/CARE - ISO PRIVATE

## 2018-06-10 PROCEDURE — A9270 NON-COVERED ITEM OR SERVICE: HCPCS | Performed by: HOSPITALIST

## 2018-06-10 PROCEDURE — 700102 HCHG RX REV CODE 250 W/ 637 OVERRIDE(OP): Performed by: INTERNAL MEDICINE

## 2018-06-10 PROCEDURE — 99232 SBSQ HOSP IP/OBS MODERATE 35: CPT | Performed by: HOSPITALIST

## 2018-06-10 PROCEDURE — 700105 HCHG RX REV CODE 258: Performed by: INTERNAL MEDICINE

## 2018-06-10 PROCEDURE — A9270 NON-COVERED ITEM OR SERVICE: HCPCS | Performed by: INTERNAL MEDICINE

## 2018-06-10 RX ADMIN — ENOXAPARIN SODIUM 40 MG: 100 INJECTION SUBCUTANEOUS at 09:46

## 2018-06-10 RX ADMIN — Medication: at 09:47

## 2018-06-10 RX ADMIN — METFORMIN HYDROCHLORIDE 500 MG: 500 TABLET, FILM COATED ORAL at 09:46

## 2018-06-10 RX ADMIN — QUETIAPINE FUMARATE 200 MG: 100 TABLET ORAL at 20:40

## 2018-06-10 RX ADMIN — PIPERACILLIN SODIUM AND TAZOBACTAM SODIUM 3.38 G: 3; .375 INJECTION, POWDER, FOR SOLUTION INTRAVENOUS at 04:57

## 2018-06-10 RX ADMIN — LORAZEPAM 1 MG: 1 TABLET ORAL at 09:46

## 2018-06-10 RX ADMIN — BENZTROPINE MESYLATE 1 MG: 1 TABLET ORAL at 09:45

## 2018-06-10 RX ADMIN — PIPERACILLIN SODIUM AND TAZOBACTAM SODIUM 3.38 G: 3; .375 INJECTION, POWDER, FOR SOLUTION INTRAVENOUS at 14:42

## 2018-06-10 RX ADMIN — ATORVASTATIN CALCIUM 20 MG: 20 TABLET, FILM COATED ORAL at 20:40

## 2018-06-10 RX ADMIN — VALPROIC ACID 250 MG: 250 CAPSULE, LIQUID FILLED ORAL at 20:40

## 2018-06-10 RX ADMIN — Medication: at 20:41

## 2018-06-10 RX ADMIN — VALPROIC ACID 250 MG: 250 CAPSULE, LIQUID FILLED ORAL at 09:46

## 2018-06-10 RX ADMIN — QUETIAPINE FUMARATE 100 MG: 100 TABLET ORAL at 12:33

## 2018-06-10 RX ADMIN — ASPIRIN 81 MG: 81 TABLET, COATED ORAL at 09:46

## 2018-06-10 RX ADMIN — METFORMIN HYDROCHLORIDE 500 MG: 500 TABLET, FILM COATED ORAL at 18:22

## 2018-06-10 RX ADMIN — SENNOSIDES AND DOCUSATE SODIUM 2 TABLET: 8.6; 5 TABLET ORAL at 20:40

## 2018-06-10 RX ADMIN — DAPTOMYCIN 620 MG: 500 INJECTION, POWDER, LYOPHILIZED, FOR SOLUTION INTRAVENOUS at 12:33

## 2018-06-10 RX ADMIN — FLUOXETINE HYDROCHLORIDE 60 MG: 20 CAPSULE ORAL at 09:45

## 2018-06-10 RX ADMIN — GABAPENTIN 300 MG: 300 CAPSULE ORAL at 09:45

## 2018-06-10 RX ADMIN — BENZTROPINE MESYLATE 1 MG: 1 TABLET ORAL at 20:40

## 2018-06-10 RX ADMIN — PIPERACILLIN SODIUM AND TAZOBACTAM SODIUM 3.38 G: 3; .375 INJECTION, POWDER, FOR SOLUTION INTRAVENOUS at 20:41

## 2018-06-10 RX ADMIN — GABAPENTIN 300 MG: 300 CAPSULE ORAL at 20:40

## 2018-06-10 RX ADMIN — GABAPENTIN 300 MG: 300 CAPSULE ORAL at 14:42

## 2018-06-10 RX ADMIN — QUETIAPINE FUMARATE 100 MG: 100 TABLET ORAL at 09:45

## 2018-06-10 ASSESSMENT — ENCOUNTER SYMPTOMS
BLURRED VISION: 0
WEAKNESS: 0
MYALGIAS: 0
ABDOMINAL PAIN: 0
HEARTBURN: 0
PALPITATIONS: 0
CHILLS: 0
FOCAL WEAKNESS: 0
VOMITING: 0
COUGH: 0
TINGLING: 0
SHORTNESS OF BREATH: 0
HEADACHES: 0
FALLS: 0
NAUSEA: 0
DIZZINESS: 0
FEVER: 0

## 2018-06-10 ASSESSMENT — PAIN SCALES - GENERAL: PAINLEVEL_OUTOF10: 0

## 2018-06-10 NOTE — PROGRESS NOTES
Pt is A&OX4, VSS, afebrile, denies pain. Dressing to left foot ulcer intact, no drainage. Pt reports mild numbness in left foot. IV abx infusing via PICC line. Pt resting comfortably. Does not call for assistance, impulsive and unsteady gait, bed alarm in place, room close to nurses station, nonskid socks applied. Call light in reach. Will continue to monitor.

## 2018-06-10 NOTE — PROGRESS NOTES
[facility] Hospitalist Progress Note    Date of Service: 6/10/2018    Chief Complaint  52 y.o. male admitted 2018 with diabetic left foot ulcer.  Underwent I&D with  May 12, 2018 and May 14, 2018. Seen by infectious disease team during the hospital stay. Plan to continue IV Zosyn and daptomycin with the stop date of 2018. Difficult SNF disposition given insurance issues and use of daptomycin for antibiotics.    Interval Problem Update  Sleeping, no acute distress. Denies any further tingling of his foot. No acute overnight events.    Consultants/Specialty  Infectious disease- signed off  Orthopedics  LPS    Disposition  Insurance barriers to SNF acceptance  , to evaluate for [facility] for daptomycin        Review of Systems   Constitutional: Negative for chills, fever and malaise/fatigue.   HENT: Negative for congestion and hearing loss.    Eyes: Negative for blurred vision.   Respiratory: Negative for cough and shortness of breath.    Cardiovascular: Negative for chest pain and palpitations.   Gastrointestinal: Negative for abdominal pain, heartburn, nausea and vomiting.   Genitourinary: Negative for dysuria.   Musculoskeletal: Negative for falls and myalgias.   Skin: Negative.    Neurological: Negative for dizziness, tingling, focal weakness, weakness and headaches.   Psychiatric/Behavioral:        Denies any issue      Physical Exam  Laboratory/Imaging   Hemodynamics  Temp (24hrs), Av.5 °C (97.7 °F), Min:36.4 °C (97.6 °F), Max:36.6 °C (97.9 °F)   Temperature: 36.6 °C (97.9 °F)  Pulse  Av.2  Min: 52  Max: 112   Blood Pressure: 120/76      Respiratory      Respiration: 18, Pulse Oximetry: 96 %        RUL Breath Sounds: Clear, RML Breath Sounds: Clear, RLL Breath Sounds: Diminished, LORENA Breath Sounds: Clear, LLL Breath Sounds: Diminished    Fluids  No intake or output data in the 24 hours ending 06/10/18 1716    Nutrition  Orders Placed This Encounter   Procedures   • DIET ORDER  "    Standing Status:   Standing     Number of Occurrences:   1     Order Specific Question:   Diet:     Answer:   Regular [1]     Physical Exam   Constitutional: He appears well-developed and well-nourished. No distress.   HENT:   Head: Normocephalic and atraumatic.   Mouth/Throat: Oropharynx is clear and moist.   Eyes: EOM are normal. Right eye exhibits no discharge. Left eye exhibits no discharge.   Neck: Normal range of motion. Neck supple. No JVD present.   Cardiovascular: Regular rhythm and intact distal pulses.  Bradycardia present.    No murmur heard.  Pulmonary/Chest: Effort normal. No stridor. No respiratory distress.   Abdominal: Soft. He exhibits no distension. There is no tenderness.   Musculoskeletal: He exhibits deformity. He exhibits no edema.   s/p left great toe amputation (with h/o second toe amputation). Healing well and no tenderness to palpation. Neurovascularly intact   Neurological: He is alert.   Skin: Skin is warm and dry.   Psychiatric: His speech is delayed. He is slowed.   Vitals reviewed.                               Assessment/Plan     Diabetic ulcer of left foot associated with type 2 diabetes mellitus, with muscle involvement without evidence of necrosis (HCC)- (present on admission)   Assessment & Plan    Hx of diabetic nonhealing ulcer, hx of previous left first toe amputation. Failed outpatient antibiotics. CBC, CMP, and CPK were checked today and are normal.   - ID and surgery following, greatly appreciate  - s/p I&D 05/12/2018 & 05/14/2018 with Dr Baez   - continue IV Zosyn and daptomycin through 6/26  - continue wound care  - weekly CBC, CMP, CPK (while on daptomycin): last done 6/7      MRI revealing \"Large open ulcer extending from the great toe amputation site into the first metatarsal head with osteomyelitis of the first metatarsal head\"        Type 2 diabetes mellitus with neurologic complication, without long-term current use of insulin (HCC)- (present on admission) "   Assessment & Plan    Controlled and without hyperglycemia. Last A1c 5.8.  - Continue metformin  - Continue Neurontin for neuropathy  - ASA 81 and Atorvastatin 20 mg        Diabetic peripheral neuropathy (HCC)- (present on admission)   Assessment & Plan    Pain controlled, mild tingling/neuropathy. Stable.  - Continue neurontin 300mg TID        Psychiatric disorder- (present on admission)   Assessment & Plan    Patient with intermittent agitation but typically cooperative. Redirectable.  - Continue congentin, seroquel, prozac  - Continue valproate for mood control  - ativan PO BID PRN, getting it regularly          Quality-Core Measures   Reviewed items::  Medications reviewed  Olsen catheter::  No Olsen  DVT prophylaxis pharmacological::  Enoxaparin (Lovenox)  DVT prophylaxis - mechanical:  SCDs  Ulcer Prophylaxis::  Not indicated  Antibiotics:  Treating active infection/contamination beyond 24 hours perioperative coverage

## 2018-06-10 NOTE — CARE PLAN
Problem: Safety  Goal: Will remain free from injury  Outcome: PROGRESSING AS EXPECTED  Continue high fall risk precautions, use bed alarm    Problem: Infection  Goal: Will remain free from infection  Outcome: PROGRESSING AS EXPECTED  Continue to assess for s/s of infection and administer antibiotics as ordered

## 2018-06-10 NOTE — CARE PLAN
Problem: Psychosocial Needs:  Goal: Level of anxiety will decrease  Outcome: PROGRESSING AS EXPECTED  Patient being redirected and receiving Ativan; administered as per MAR.    Problem: Skin Integrity  Goal: Risk for impaired skin integrity will decrease  Outcome: PROGRESSING AS EXPECTED  Patient moves in bed frequently and is up to the bathroom as needed.

## 2018-06-11 PROCEDURE — A9270 NON-COVERED ITEM OR SERVICE: HCPCS | Performed by: INTERNAL MEDICINE

## 2018-06-11 PROCEDURE — 700111 HCHG RX REV CODE 636 W/ 250 OVERRIDE (IP): Performed by: INTERNAL MEDICINE

## 2018-06-11 PROCEDURE — 700105 HCHG RX REV CODE 258: Performed by: INTERNAL MEDICINE

## 2018-06-11 PROCEDURE — 700102 HCHG RX REV CODE 250 W/ 637 OVERRIDE(OP): Performed by: HOSPITALIST

## 2018-06-11 PROCEDURE — A9270 NON-COVERED ITEM OR SERVICE: HCPCS | Performed by: HOSPITALIST

## 2018-06-11 PROCEDURE — 770021 HCHG ROOM/CARE - ISO PRIVATE

## 2018-06-11 PROCEDURE — 99231 SBSQ HOSP IP/OBS SF/LOW 25: CPT | Performed by: HOSPITALIST

## 2018-06-11 PROCEDURE — 700102 HCHG RX REV CODE 250 W/ 637 OVERRIDE(OP): Performed by: INTERNAL MEDICINE

## 2018-06-11 RX ADMIN — QUETIAPINE FUMARATE 100 MG: 100 TABLET ORAL at 12:16

## 2018-06-11 RX ADMIN — BENZTROPINE MESYLATE 1 MG: 1 TABLET ORAL at 08:48

## 2018-06-11 RX ADMIN — SENNOSIDES AND DOCUSATE SODIUM 2 TABLET: 8.6; 5 TABLET ORAL at 08:48

## 2018-06-11 RX ADMIN — Medication: at 19:58

## 2018-06-11 RX ADMIN — ATORVASTATIN CALCIUM 20 MG: 20 TABLET, FILM COATED ORAL at 19:58

## 2018-06-11 RX ADMIN — ASPIRIN 81 MG: 81 TABLET, COATED ORAL at 08:48

## 2018-06-11 RX ADMIN — GABAPENTIN 300 MG: 300 CAPSULE ORAL at 19:57

## 2018-06-11 RX ADMIN — PIPERACILLIN SODIUM AND TAZOBACTAM SODIUM 3.38 G: 3; .375 INJECTION, POWDER, FOR SOLUTION INTRAVENOUS at 19:58

## 2018-06-11 RX ADMIN — QUETIAPINE FUMARATE 200 MG: 100 TABLET ORAL at 19:57

## 2018-06-11 RX ADMIN — BENZTROPINE MESYLATE 1 MG: 1 TABLET ORAL at 19:57

## 2018-06-11 RX ADMIN — SENNOSIDES AND DOCUSATE SODIUM 2 TABLET: 8.6; 5 TABLET ORAL at 19:57

## 2018-06-11 RX ADMIN — DAPTOMYCIN 620 MG: 500 INJECTION, POWDER, LYOPHILIZED, FOR SOLUTION INTRAVENOUS at 12:16

## 2018-06-11 RX ADMIN — VALPROIC ACID 250 MG: 250 CAPSULE, LIQUID FILLED ORAL at 08:48

## 2018-06-11 RX ADMIN — LORAZEPAM 1 MG: 1 TABLET ORAL at 08:48

## 2018-06-11 RX ADMIN — GABAPENTIN 300 MG: 300 CAPSULE ORAL at 14:21

## 2018-06-11 RX ADMIN — FLUOXETINE HYDROCHLORIDE 60 MG: 20 CAPSULE ORAL at 08:48

## 2018-06-11 RX ADMIN — VALPROIC ACID 250 MG: 250 CAPSULE, LIQUID FILLED ORAL at 19:57

## 2018-06-11 RX ADMIN — PIPERACILLIN SODIUM AND TAZOBACTAM SODIUM 3.38 G: 3; .375 INJECTION, POWDER, FOR SOLUTION INTRAVENOUS at 14:21

## 2018-06-11 RX ADMIN — Medication: at 08:49

## 2018-06-11 RX ADMIN — GABAPENTIN 300 MG: 300 CAPSULE ORAL at 08:49

## 2018-06-11 RX ADMIN — ENOXAPARIN SODIUM 40 MG: 100 INJECTION SUBCUTANEOUS at 08:49

## 2018-06-11 RX ADMIN — QUETIAPINE FUMARATE 100 MG: 100 TABLET ORAL at 08:48

## 2018-06-11 RX ADMIN — PIPERACILLIN SODIUM AND TAZOBACTAM SODIUM 3.38 G: 3; .375 INJECTION, POWDER, FOR SOLUTION INTRAVENOUS at 04:59

## 2018-06-11 RX ADMIN — METFORMIN HYDROCHLORIDE 500 MG: 500 TABLET, FILM COATED ORAL at 08:48

## 2018-06-11 RX ADMIN — METFORMIN HYDROCHLORIDE 500 MG: 500 TABLET, FILM COATED ORAL at 18:03

## 2018-06-11 ASSESSMENT — PAIN SCALES - GENERAL
PAINLEVEL_OUTOF10: 0
PAINLEVEL_OUTOF10: 0

## 2018-06-11 ASSESSMENT — ENCOUNTER SYMPTOMS
BLURRED VISION: 0
HEADACHES: 0
PALPITATIONS: 0
VOMITING: 0
CHILLS: 0
COUGH: 0
FOCAL WEAKNESS: 0
ABDOMINAL PAIN: 0
FALLS: 0
SHORTNESS OF BREATH: 0
TINGLING: 0
FEVER: 0
WEAKNESS: 0
DIZZINESS: 0
MYALGIAS: 0
NAUSEA: 0

## 2018-06-11 NOTE — DISCHARGE PLANNING
Anticipated Discharge Disposition: Homeless Shelter when done w/ IV ABX tx    Action: Pt discussed in IDT rounds. No new updates. Pt to remain in hospital for IV ABX tx until 6/26/18 due to no Medicaid bed availability and pt's cost of IV ABX is too great for an [facility] or SNF acceptance.     Barriers to Discharge: No medicaid beds availability and pt's cost of IV ABX is too great for an [facility] or SNF acceptance.     Plan: Pt to remain in hospital for IV ABX tx until 6/26/18

## 2018-06-11 NOTE — PROGRESS NOTES
Assumes care of Pt. Pt is resting comfortably in bed, no s/s of distress. Bed alarm on, call bell within reach. Care continues.

## 2018-06-11 NOTE — PROGRESS NOTES
[facility] Hospitalist Progress Note    Date of Service: 2018    Chief Complaint  52 y.o. male admitted 2018 with diabetic left foot ulcer.  Underwent I&D with  May 12, 2018 and May 14, 2018. Seen by infectious disease team during the hospital stay. Plan to continue IV Zosyn and daptomycin with the stop date of 2018. Difficult SNF disposition given insurance issues and use of daptomycin for antibiotics.    Interval Problem Update  Intermittently gets agitated but is redirectable. Has some minor tingling in his foot but no pain. No acute overnight events.    Consultants/Specialty  Infectious disease- signed off  Orthopedics  LPS    Disposition  Insurance barriers to SNF acceptance  , to evaluate for [facility] for daptomycin        Review of Systems   Constitutional: Negative for chills, fever and malaise/fatigue.   HENT: Negative for congestion and hearing loss.    Eyes: Negative for blurred vision.   Respiratory: Negative for cough and shortness of breath.    Cardiovascular: Negative for chest pain and palpitations.   Gastrointestinal: Negative for abdominal pain, nausea and vomiting.   Genitourinary: Negative for dysuria.   Musculoskeletal: Negative for falls and myalgias.   Skin: Negative.    Neurological: Negative for dizziness, tingling, focal weakness, weakness and headaches.   Psychiatric/Behavioral:        Denies any issue      Physical Exam  Laboratory/Imaging   Hemodynamics  Temp (24hrs), Av.6 °C (97.9 °F), Min:36.5 °C (97.7 °F), Max:36.7 °C (98 °F)   Temperature: 36.6 °C (97.9 °F)  Pulse  Av  Min: 52  Max: 112   Blood Pressure: 125/70      Respiratory      Respiration: 17, Pulse Oximetry: 95 %        RUL Breath Sounds: Clear, RML Breath Sounds: Clear, RLL Breath Sounds: Diminished, LORENA Breath Sounds: Clear, LLL Breath Sounds: Diminished    Fluids    Intake/Output Summary (Last 24 hours) at 18 1512  Last data filed at 18 0600   Gross per 24 hour  "  Intake              200 ml   Output                0 ml   Net              200 ml       Nutrition  Orders Placed This Encounter   Procedures   • DIET ORDER     Standing Status:   Standing     Number of Occurrences:   1     Order Specific Question:   Diet:     Answer:   Regular [1]     Physical Exam   Constitutional: He appears well-developed. No distress.   HENT:   Head: Normocephalic.   Mouth/Throat: Oropharynx is clear and moist.   Eyes: EOM are normal. No scleral icterus.   Neck: Normal range of motion. No tracheal deviation present.   Cardiovascular: Normal rate, regular rhythm and intact distal pulses.    No murmur heard.  Pulmonary/Chest: Effort normal. No respiratory distress.   Abdominal: Soft. He exhibits no distension. There is no tenderness.   Musculoskeletal: He exhibits deformity. He exhibits no edema.   s/p left great toe amputation (with h/o second toe amputation). Healing well and no tenderness to palpation. Neurovascularly intact   Neurological: He is alert.   Skin: Skin is warm and dry.   Psychiatric: His speech is delayed. He is slowed.   Vitals reviewed.                               Assessment/Plan     Diabetic ulcer of left foot associated with type 2 diabetes mellitus, with muscle involvement without evidence of necrosis (HCC)- (present on admission)   Assessment & Plan    Hx of diabetic nonhealing ulcer, hx of previous left first toe amputation. Failed outpatient antibiotics. CBC, CMP, and CPK were checked today and are normal.   - ID and surgery evaluated, greatly appreciate  - s/p I&D 05/12 & 05/14 with Dr Baez   - continue IV Zosyn and daptomycin through 6/26  - continue wound care  - weekly CBC, CMP, CPK (while on daptomycin): last done 6/7      MRI revealing \"Large open ulcer extending from the great toe amputation site into the first metatarsal head with osteomyelitis of the first metatarsal head\"        Type 2 diabetes mellitus with neurologic complication, without long-term " current use of insulin (HCC)- (present on admission)   Assessment & Plan    Controlled and without hyperglycemia. Last A1c 5.8.  - Continue home metformin  - Continue Neurontin for neuropathy  - ASA 81 and Atorvastatin 20 mg        Diabetic peripheral neuropathy (HCC)- (present on admission)   Assessment & Plan    Pain controlled, mild tingling/neuropathy. Stable.  - Continue neurontin 300mg TID        Psychiatric disorder- (present on admission)   Assessment & Plan    Patient with intermittent agitation but typically cooperative. Redirectable.  - continue congentin, seroquel, prozac  - continue valproate for mood control  - ativan PO BID PRN, although getting it regularly          Quality-Core Measures   Reviewed items::  Medications reviewed  Olsen catheter::  No Olsen  DVT prophylaxis pharmacological::  Enoxaparin (Lovenox)  DVT prophylaxis - mechanical:  SCDs  Ulcer Prophylaxis::  Not indicated  Antibiotics:  Treating active infection/contamination beyond 24 hours perioperative coverage

## 2018-06-11 NOTE — DOCUMENTATION QUERY
DOCUMENTATION QUERY    PROVIDERS: Please select “Cosign w/ note”to reply to query.    To better represent the severity of illness of your patient, please review the following information and exercise your independent professional judgment in responding to this query.     Debridement is documented in the Operative Report date 05/14/18.Per coding guidelines the type of instrument must be documented by the [facility]. Based upon the clinical findings, risk factors, and treatment, can this procedure be further specified?    • Excisional Debridement (if so please include)  1. instrument  2. appearance and size of wound        technique  • Non-excisional Debridement  • Other explanation of clinical findings (please document)  • Unable to determine          The medical record reflects the following:   Clinical Findings  Debridement and secondary closure of wound   Treatment  Debridement and secondary closure of wound   Risk Factors  As clinically indicated   Location within medical record  Operative report date 05/14/18     Thank you,   DIANA Hector, FARZANEH, HIM Depart.

## 2018-06-11 NOTE — PROGRESS NOTES
Pharmacy Pharmacotherapy Consult for LOS >30 days    Admit Date: 5/11/2018      Medications were reviewed for appropriateness and ongoing need.     Current Facility-Administered Medications   Medication Dose Route Frequency Provider Last Rate Last Dose   • ammonium lactate (LAC-HYDRIN) 12 % lotion   Topical BID MARIAMA Whatley       • atorvastatin (LIPITOR) tablet 20 mg  20 mg Oral QHS Marla Tay M.D.   20 mg at 06/10/18 2040   • aspirin EC (ECOTRIN) tablet 81 mg  81 mg Oral DAILY Marla Tay M.D.   81 mg at 06/11/18 0848   • metFORMIN (GLUCOPHAGE) tablet 500 mg  500 mg Oral BID WITH MEALS Luke Maria M.D.   500 mg at 06/11/18 0848   • normal saline PF 10-20 mL  10-20 mL Intravenous PRN Amanda Feng M.D.       • gabapentin (NEURONTIN) capsule 300 mg  300 mg Oral TID Luke Maria M.D.   300 mg at 06/11/18 1421   • LORazepam (ATIVAN) tablet 1 mg  1 mg Oral BID PRN Luke Maria M.D.   1 mg at 06/11/18 0848   • oxyCODONE immediate-release (ROXICODONE) tablet 5 mg  5 mg Oral Q6HRS PRN Luke Maria M.D.   5 mg at 06/05/18 2003   • acetaminophen (TYLENOL) tablet 500 mg  500 mg Oral Q6HRS PRN Luke Maria M.D.   500 mg at 05/28/18 2039   • valproic acid (DEPAKENE) capsule 250 mg  250 mg Oral Q12HRS Luke Maria M.D.   250 mg at 06/11/18 0848   • DAPTOmycin (CUBICIN) 620 mg in NS 50 mL IVPB  6 mg/kg Intravenous Q24HR Juliane Vaughn M.D. 100 mL/hr at 06/11/18 1216 620 mg at 06/11/18 1216   • piperacillin-tazobactam (ZOSYN) 3.375 g in  mL IVPB  3.375 g Intravenous Q8HRS Amanda Feng M.D. 25 mL/hr at 06/11/18 1421 3.375 g at 06/11/18 1421   • enoxaparin (LOVENOX) inj 40 mg  40 mg Subcutaneous DAILY Sandoval Kauffman M.D.   40 mg at 06/11/18 0849   • senna-docusate (PERICOLACE or SENOKOT S) 8.6-50 MG per tablet 2 Tab  2 Tab Oral BID Kevyn Thornton M.D.   2 Tab at 06/11/18 0848    And   • polyethylene glycol/lytes (MIRALAX) PACKET 1 Packet  1 Packet Oral QDAY PRN  Kevyn Thornton M.D.        And   • magnesium hydroxide (MILK OF MAGNESIA) suspension 30 mL  30 mL Oral QDAY PRN Kevyn Thornton M.D.        And   • bisacodyl (DULCOLAX) suppository 10 mg  10 mg Rectal QDAY PRN Kevyn Thornton M.D.       • Respiratory Care per Protocol   Nebulization Continuous RT Kevyn Thornton M.D.       • benztropine (COGENTIN) tablet 1 mg  1 mg Oral BID Kevyn Thornton M.D.   1 mg at 06/11/18 0848   • FLUoxetine (PROZAC) capsule 60 mg  60 mg Oral DAILY Kevyn Thornton M.D.   60 mg at 06/11/18 0848   • QUEtiapine (SEROQUEL) tablet 100 mg  100 mg Oral BID Kevyn Thornton M.D.   100 mg at 06/11/18 1216    And   • QUEtiapine (SEROQUEL) tablet 200 mg  200 mg Oral Q EVENING Kevyn Thornton M.D.   200 mg at 06/10/18 2040       Recommendations:  DC the following and has not received. BM are regular with senokot.   > Miralax  > Milk of Magnesia  > Dulcolax    ALESHIA Ramires PharmD Student    Monalisa Harrison, Pharm.D

## 2018-06-12 LAB — GLUCOSE BLD-MCNC: 106 MG/DL (ref 65–99)

## 2018-06-12 PROCEDURE — A9270 NON-COVERED ITEM OR SERVICE: HCPCS | Performed by: HOSPITALIST

## 2018-06-12 PROCEDURE — 700102 HCHG RX REV CODE 250 W/ 637 OVERRIDE(OP): Performed by: INTERNAL MEDICINE

## 2018-06-12 PROCEDURE — 700102 HCHG RX REV CODE 250 W/ 637 OVERRIDE(OP): Performed by: HOSPITALIST

## 2018-06-12 PROCEDURE — 700105 HCHG RX REV CODE 258: Performed by: HOSPITALIST

## 2018-06-12 PROCEDURE — 99232 SBSQ HOSP IP/OBS MODERATE 35: CPT | Performed by: HOSPITALIST

## 2018-06-12 PROCEDURE — 770021 HCHG ROOM/CARE - ISO PRIVATE

## 2018-06-12 PROCEDURE — A9270 NON-COVERED ITEM OR SERVICE: HCPCS | Performed by: INTERNAL MEDICINE

## 2018-06-12 PROCEDURE — 700111 HCHG RX REV CODE 636 W/ 250 OVERRIDE (IP): Performed by: INTERNAL MEDICINE

## 2018-06-12 PROCEDURE — 700105 HCHG RX REV CODE 258: Performed by: INTERNAL MEDICINE

## 2018-06-12 PROCEDURE — 82962 GLUCOSE BLOOD TEST: CPT

## 2018-06-12 PROCEDURE — 700111 HCHG RX REV CODE 636 W/ 250 OVERRIDE (IP): Performed by: HOSPITALIST

## 2018-06-12 RX ORDER — LORAZEPAM 1 MG/1
1 TABLET ORAL 2 TIMES DAILY PRN
Status: DISCONTINUED | OUTPATIENT
Start: 2018-06-12 | End: 2018-06-17

## 2018-06-12 RX ORDER — BENZTROPINE MESYLATE 1 MG/1
1 TABLET ORAL 2 TIMES DAILY
Status: DISCONTINUED | OUTPATIENT
Start: 2018-06-12 | End: 2018-06-26 | Stop reason: HOSPADM

## 2018-06-12 RX ORDER — ACETAMINOPHEN 500 MG
500 TABLET ORAL EVERY 6 HOURS PRN
Status: DISCONTINUED | OUTPATIENT
Start: 2018-06-12 | End: 2018-06-26 | Stop reason: HOSPADM

## 2018-06-12 RX ORDER — GABAPENTIN 300 MG/1
300 CAPSULE ORAL 3 TIMES DAILY
Status: DISCONTINUED | OUTPATIENT
Start: 2018-06-12 | End: 2018-06-20

## 2018-06-12 RX ORDER — QUETIAPINE FUMARATE 100 MG/1
200 TABLET, FILM COATED ORAL EVERY EVENING
Status: DISCONTINUED | OUTPATIENT
Start: 2018-06-12 | End: 2018-06-26 | Stop reason: HOSPADM

## 2018-06-12 RX ORDER — OXYCODONE HYDROCHLORIDE 5 MG/1
5 TABLET ORAL EVERY 6 HOURS PRN
Status: DISCONTINUED | OUTPATIENT
Start: 2018-06-12 | End: 2018-06-26 | Stop reason: HOSPADM

## 2018-06-12 RX ORDER — QUETIAPINE FUMARATE 100 MG/1
100 TABLET, FILM COATED ORAL 2 TIMES DAILY
Status: DISCONTINUED | OUTPATIENT
Start: 2018-06-12 | End: 2018-06-26 | Stop reason: HOSPADM

## 2018-06-12 RX ORDER — VALPROIC ACID 250 MG/1
250 CAPSULE, LIQUID FILLED ORAL EVERY 12 HOURS
Status: DISCONTINUED | OUTPATIENT
Start: 2018-06-12 | End: 2018-06-26 | Stop reason: HOSPADM

## 2018-06-12 RX ORDER — FLUOXETINE HYDROCHLORIDE 20 MG/1
60 CAPSULE ORAL DAILY
Status: DISCONTINUED | OUTPATIENT
Start: 2018-06-13 | End: 2018-06-26 | Stop reason: HOSPADM

## 2018-06-12 RX ORDER — CLOTRIMAZOLE AND BETAMETHASONE DIPROPIONATE 10; .64 MG/G; MG/G
CREAM TOPICAL 2 TIMES DAILY
Status: DISCONTINUED | OUTPATIENT
Start: 2018-06-12 | End: 2018-06-26 | Stop reason: HOSPADM

## 2018-06-12 RX ORDER — CLOTRIMAZOLE AND BETAMETHASONE DIPROPIONATE 10; .64 MG/G; MG/G
CREAM TOPICAL 2 TIMES DAILY
Status: DISCONTINUED | OUTPATIENT
Start: 2018-06-12 | End: 2018-06-12

## 2018-06-12 RX ORDER — AMOXICILLIN 250 MG
2 CAPSULE ORAL 2 TIMES DAILY
Status: DISCONTINUED | OUTPATIENT
Start: 2018-06-12 | End: 2018-06-26 | Stop reason: HOSPADM

## 2018-06-12 RX ORDER — ATORVASTATIN CALCIUM 20 MG/1
20 TABLET, FILM COATED ORAL
Status: DISCONTINUED | OUTPATIENT
Start: 2018-06-12 | End: 2018-06-26 | Stop reason: HOSPADM

## 2018-06-12 RX ORDER — ZINC SULFATE 50(220)MG
220 CAPSULE ORAL DAILY
Status: DISCONTINUED | OUTPATIENT
Start: 2018-06-12 | End: 2018-06-26 | Stop reason: HOSPADM

## 2018-06-12 RX ADMIN — SENNOSIDES AND DOCUSATE SODIUM 2 TABLET: 8.6; 5 TABLET ORAL at 08:35

## 2018-06-12 RX ADMIN — Medication: at 08:34

## 2018-06-12 RX ADMIN — CLOTRIMAZOLE AND BETAMETHASONE DIPROPIONATE: 10; .5 CREAM TOPICAL at 21:00

## 2018-06-12 RX ADMIN — LORAZEPAM 1 MG: 1 TABLET ORAL at 08:42

## 2018-06-12 RX ADMIN — GABAPENTIN 300 MG: 300 CAPSULE ORAL at 13:02

## 2018-06-12 RX ADMIN — ASPIRIN 81 MG: 81 TABLET, COATED ORAL at 08:35

## 2018-06-12 RX ADMIN — GABAPENTIN 300 MG: 300 CAPSULE ORAL at 08:35

## 2018-06-12 RX ADMIN — METFORMIN HYDROCHLORIDE 500 MG: 500 TABLET, FILM COATED ORAL at 08:36

## 2018-06-12 RX ADMIN — QUETIAPINE FUMARATE 200 MG: 100 TABLET ORAL at 20:30

## 2018-06-12 RX ADMIN — PIPERACILLIN SODIUM AND TAZOBACTAM SODIUM 3.38 G: 3; .375 INJECTION, POWDER, FOR SOLUTION INTRAVENOUS at 20:30

## 2018-06-12 RX ADMIN — GABAPENTIN 300 MG: 300 CAPSULE ORAL at 20:31

## 2018-06-12 RX ADMIN — PIPERACILLIN SODIUM AND TAZOBACTAM SODIUM 3.38 G: 3; .375 INJECTION, POWDER, FOR SOLUTION INTRAVENOUS at 04:41

## 2018-06-12 RX ADMIN — BENZTROPINE MESYLATE 1 MG: 1 TABLET ORAL at 20:30

## 2018-06-12 RX ADMIN — QUETIAPINE FUMARATE 100 MG: 100 TABLET ORAL at 12:01

## 2018-06-12 RX ADMIN — OXYCODONE HYDROCHLORIDE 5 MG: 5 TABLET ORAL at 20:31

## 2018-06-12 RX ADMIN — ENOXAPARIN SODIUM 40 MG: 100 INJECTION SUBCUTANEOUS at 08:36

## 2018-06-12 RX ADMIN — SENNOSIDES AND DOCUSATE SODIUM 2 TABLET: 8.6; 5 TABLET ORAL at 20:31

## 2018-06-12 RX ADMIN — PIPERACILLIN SODIUM AND TAZOBACTAM SODIUM 3.38 G: 3; .375 INJECTION, POWDER, FOR SOLUTION INTRAVENOUS at 13:02

## 2018-06-12 RX ADMIN — FLUOXETINE HYDROCHLORIDE 60 MG: 20 CAPSULE ORAL at 08:35

## 2018-06-12 RX ADMIN — ATORVASTATIN CALCIUM 20 MG: 20 TABLET, FILM COATED ORAL at 20:30

## 2018-06-12 RX ADMIN — QUETIAPINE FUMARATE 100 MG: 100 TABLET ORAL at 08:36

## 2018-06-12 RX ADMIN — METFORMIN HYDROCHLORIDE 500 MG: 500 TABLET, FILM COATED ORAL at 16:23

## 2018-06-12 RX ADMIN — Medication 220 MG: at 18:16

## 2018-06-12 RX ADMIN — DAPTOMYCIN 620 MG: 500 INJECTION, POWDER, LYOPHILIZED, FOR SOLUTION INTRAVENOUS at 12:01

## 2018-06-12 RX ADMIN — BENZTROPINE MESYLATE 1 MG: 1 TABLET ORAL at 08:35

## 2018-06-12 RX ADMIN — CLOTRIMAZOLE AND BETAMETHASONE DIPROPIONATE: 10; .5 CREAM TOPICAL at 12:01

## 2018-06-12 RX ADMIN — VALPROIC ACID 250 MG: 250 CAPSULE, LIQUID FILLED ORAL at 21:00

## 2018-06-12 RX ADMIN — LORAZEPAM 1 MG: 1 TABLET ORAL at 20:31

## 2018-06-12 RX ADMIN — VALPROIC ACID 250 MG: 250 CAPSULE, LIQUID FILLED ORAL at 08:36

## 2018-06-12 ASSESSMENT — PAIN SCALES - GENERAL
PAINLEVEL_OUTOF10: 0
PAINLEVEL_OUTOF10: 4
PAINLEVEL_OUTOF10: 0
PAINLEVEL_OUTOF10: 0

## 2018-06-12 ASSESSMENT — ENCOUNTER SYMPTOMS
VOMITING: 0
FOCAL WEAKNESS: 0
SHORTNESS OF BREATH: 0
FALLS: 0
NECK PAIN: 0
TINGLING: 0
DIZZINESS: 0
HEADACHES: 0
NERVOUS/ANXIOUS: 1
SPUTUM PRODUCTION: 0
DOUBLE VISION: 0
MEMORY LOSS: 1
CHILLS: 0
FEVER: 0
MYALGIAS: 0
PALPITATIONS: 0
ABDOMINAL PAIN: 0
WEAKNESS: 0

## 2018-06-12 ASSESSMENT — LIFESTYLE VARIABLES: EVER_SMOKED: YES

## 2018-06-12 ASSESSMENT — COPD QUESTIONNAIRES
DO YOU EVER COUGH UP ANY MUCUS OR PHLEGM?: NO/ONLY WITH OCCASIONAL COLDS OR INFECTIONS
HAVE YOU SMOKED AT LEAST 100 CIGARETTES IN YOUR ENTIRE LIFE: YES
COPD SCREENING SCORE: 3
DURING THE PAST 4 WEEKS HOW MUCH DID YOU FEEL SHORT OF BREATH: NONE/LITTLE OF THE TIME

## 2018-06-12 NOTE — WOUND TEAM
"[facility]  Inpatient Services   Wound and Skin Care Progress Note    Admission Date:   05/11/2018  HPI, PMH, SH: Reviewed  Unit where seen by Wound Team: [address]    WOUND CONSULT RELATED TO: follow up assessment of left foot lateral and dorsal foot pressure injuries    SUBJECTIVE:  \" Does it look better?\"    Self Report / Pain Level: Denies     OBJECTIVE:  Dressings intact and patient lying in bed.      WOUND TYPE, LOCATION, CHARACTERISTICS (Pressure ulcers: location, stage, POA or date identified)    Location and type of wound: Left lateral mid foot stage 2 pressure injury 05/17/2018      Periwound: intact  Drainage:  Scant sanguinous  Tissue Type and %:  100% moist red tissue  Wound Edges:  open  Odor:   none  Exposed structure(s):   none  S&S of Infection:   none      Measurements:  (taken 06/12/18)  Left great toe amputation site slight dehiscence 0.2 cm X 0.4 cm X +0.1 cm- no drainage  Length:   0.4cm  Width:   0.5 cm  Depth:    0.1 cm  Tunnels and undermining:  none    Location and type of wound: Left foot dorsal pressure injury stage 2 05/17/2018 RESOLVED-- 06/12/2018            INTERVENTIONS BY WOUND TEAM:  Removed old dressings and cleansed foot with moist wash cloth, wounds with NS and  gauze. Foam dressing removed from dorsum of  foot and left open to air, wound resolved. Great toe amputation site with slight dehiscence, cleansed with normal saline and gauze.  Measurements and photos taken.  Covered lateral wound and toe amp site with small piece of Aquacel silver hydrofiber and secured with adhesive foam.     Interdisciplinary consultation: Patient, Staff RN    EVALUATION: Wound to dorsum of foot is resolved. AqAg to manage bioburden, absorb exudate, and maintain moist wound environment without laterally wicking exudate therefore reducing sen-wound maceration.     Factors affecting wound healing: DM, Neuropathy, Infection   Goals: wounds resolved in 1-2 weeks    NURSING PLAN OF CARE " ORDERS (X):    Dressing changes: See Dressing Care orders: X updated  Skin care: See Skin Care orders:   Rectal tube care: See Rectal Tube Care orders:   Other orders:    WOUND TEAM PLAN OF CARE (X):   NPWT change 3 x week:        Dressing changes by wound team:       Follow up as needed:       Other (explain): X  Wound team will follow up in 1 week.

## 2018-06-12 NOTE — CARE PLAN
Problem: Venous Thromboembolism (VTW)/Deep Vein Thrombosis (DVT) Prevention:  Goal: Patient will participate in Venous Thrombosis (VTE)/Deep Vein Thrombosis (DVT)Prevention Measures    Intervention: Encourage patient to perform ankle flex, foot rotation, and knee flex exercises in addition to other prophylatic measures every hour while awake  Patient encouraged to perform knee and ankle flexion exercises while in bed, requires reminding.       Problem: Pain Management  Goal: Pain level will decrease to patient's comfort goal    Intervention: Follow pain managment plan developed in collaboration with patient and Interdisciplinary Team  Patient declines pain at this time, at comfort goal. Will continue to assess.

## 2018-06-12 NOTE — PROGRESS NOTES
Assumed patient care at 0700. Received report from night shift. Assessment completed. POC discussed with pt, verbalizes understanding. A&Ox4. Denies pain at this time, no s/s of distress. IV abx infusing through LUE PICC. Patient is high fall risk, impulsive and unsteady,  bed alarm on, pt wearing treaded socks, room close to nurses station. Personal possessions and call light placed within reach. Pt denies any additional needs at this time.

## 2018-06-12 NOTE — PROGRESS NOTES
[facility] Hospitalist Progress Note    Date of Service: 2018    Chief Complaint  52 y.o. male admitted 2018 with diabetic left foot ulcer.  Underwent I&D with  May 12, 2018 and May 14, 2018. Seen by infectious disease team during the hospital stay. Plan to continue IV Zosyn and daptomycin with the stop date of 2018. Difficult SNF disposition given insurance issues and use of daptomycin for antibiotics.    Interval Problem Update  Patient seen and examined today.    Patient tolerating treatment and therapies.  All Data, Medication data reviewed.  Case discussed with nursing as available.  Plan of Care reviewed with patient and notified of changes.   the patient seems to have poor understanding of his current condition, he understands his need for continued IV antibiotics, he is homeless, and has a poor plan for after being discharged, his wound and foot care is not great     Consultants/Specialty  Infectious disease- signed off  Orthopedics  LPS    Disposition  Insurance barriers to SNF acceptance  , to evaluate for RICHARD for daptomycin  Remains inpatient until clarified        Review of Systems   Constitutional: Negative for chills, fever and malaise/fatigue.   HENT: Negative for congestion and hearing loss.    Eyes: Negative for double vision.   Respiratory: Negative for sputum production and shortness of breath.    Cardiovascular: Negative for palpitations and leg swelling.   Gastrointestinal: Negative for abdominal pain and vomiting.   Genitourinary: Negative for dysuria and urgency.   Musculoskeletal: Negative for falls, myalgias and neck pain.   Skin: Negative.    Neurological: Negative for dizziness, tingling, focal weakness, weakness and headaches.   Psychiatric/Behavioral: Positive for memory loss. The patient is nervous/anxious.         Denies any issue      Physical Exam  Laboratory/Imaging   Hemodynamics  Temp (24hrs), Av.4 °C (97.5 °F), Min:36.1 °C (96.9  °F), Max:36.7 °C (98 °F)   Temperature: 36.4 °C (97.6 °F)  Pulse  Av.1  Min: 52  Max: 112   Blood Pressure: 108/87      Respiratory      Respiration: 18, Pulse Oximetry: 97 %     Work Of Breathing / Effort: Mild  RUL Breath Sounds: Clear, RML Breath Sounds: Clear, RLL Breath Sounds: Diminished, LORENA Breath Sounds: Clear, LLL Breath Sounds: Diminished    Fluids    Intake/Output Summary (Last 24 hours) at 18 1624  Last data filed at 18 0430   Gross per 24 hour   Intake                0 ml   Output              500 ml   Net             -500 ml       Nutrition  Orders Placed This Encounter   Procedures   • DIET ORDER     Standing Status:   Standing     Number of Occurrences:   1     Order Specific Question:   Diet:     Answer:   Regular [1]     Physical Exam   Constitutional: He appears well-developed. No distress.   HENT:   Head: Normocephalic.   Mouth/Throat: Oropharynx is clear and moist.   Eyes: EOM are normal. No scleral icterus.   Neck: Normal range of motion. No tracheal deviation present.   Cardiovascular: Normal rate, regular rhythm and intact distal pulses.    No murmur heard.  Pulmonary/Chest: Effort normal. No respiratory distress.   Abdominal: Soft. He exhibits no distension. There is no tenderness.   Musculoskeletal: He exhibits deformity. He exhibits no edema.   s/p left great toe amputation (with h/o second toe amputation). Healing well and no tenderness to palpation. Neurovascularly intact     Neurological: He is alert.   Skin: Skin is warm and dry.   Psychiatric: His speech is delayed. He is slowed. Cognition and memory are impaired. He expresses impulsivity.   Vitals reviewed.                               Assessment/Plan     Diabetic ulcer of left foot associated with type 2 diabetes mellitus, with muscle involvement without evidence of necrosis (HCC)- (present on admission)   Assessment & Plan    Hx of diabetic nonhealing ulcer, hx of previous left first toe amputation. Failed  "outpatient antibiotics. CBC, CMP, and CPK were checked today and are normal.   - ID and surgery evaluated, greatly appreciate  - s/p I&D 05/12 & 05/14 with Dr Baez   - continue IV Zosyn and daptomycin through 6/26  - continue wound care  - weekly CBC, CMP, CPK (while on daptomycin): last done 6/7    MRI revealing \"Large open ulcer extending from the great toe amputation site into the first metatarsal head with osteomyelitis of the first metatarsal head\"        Type 2 diabetes mellitus with neurologic complication, without long-term current use of insulin (HCC)- (present on admission)   Assessment & Plan    Controlled and without hyperglycemia. Last A1c 5.8.  - Continue home metformin  - Continue Neurontin for neuropathy  - ASA 81 and Atorvastatin 20 mg        Diabetic peripheral neuropathy (HCC)- (present on admission)   Assessment & Plan    Pain controlled, mild tingling/neuropathy. Stable.  - Continue neurontin 300mg TID        Psychiatric disorder- (present on admission)   Assessment & Plan    Patient with intermittent agitation but typically cooperative. Redirectable.  - continue congentin, seroquel, prozac  - continue valproate for mood control  - ativan PO BID PRN, although getting it regularly  Will need outpatient follow-up          Quality-Core Measures   Reviewed items::  Medications reviewed  Olsen catheter::  No Olsen  DVT prophylaxis pharmacological::  Enoxaparin (Lovenox)  DVT prophylaxis - mechanical:  SCDs  Ulcer Prophylaxis::  Not indicated  Antibiotics:  Treating active infection/contamination beyond 24 hours perioperative coverage      Plan  Lotrisone to bilateral feet and lower legs to improve skin health  Continue with IV antibiotics as written  Disposition remains difficult  Medically complex case  Follow-up labs periodically  "

## 2018-06-13 PROBLEM — F79 MENTAL DISABILITY: Status: ACTIVE | Noted: 2018-06-13

## 2018-06-13 PROCEDURE — 700105 HCHG RX REV CODE 258: Performed by: HOSPITALIST

## 2018-06-13 PROCEDURE — 700102 HCHG RX REV CODE 250 W/ 637 OVERRIDE(OP): Performed by: HOSPITALIST

## 2018-06-13 PROCEDURE — 700111 HCHG RX REV CODE 636 W/ 250 OVERRIDE (IP): Performed by: HOSPITALIST

## 2018-06-13 PROCEDURE — 770021 HCHG ROOM/CARE - ISO PRIVATE

## 2018-06-13 PROCEDURE — A9270 NON-COVERED ITEM OR SERVICE: HCPCS | Performed by: HOSPITALIST

## 2018-06-13 PROCEDURE — 99232 SBSQ HOSP IP/OBS MODERATE 35: CPT | Performed by: HOSPITALIST

## 2018-06-13 RX ADMIN — BENZTROPINE MESYLATE 1 MG: 1 TABLET ORAL at 21:24

## 2018-06-13 RX ADMIN — DAPTOMYCIN 620 MG: 500 INJECTION, POWDER, LYOPHILIZED, FOR SOLUTION INTRAVENOUS at 13:20

## 2018-06-13 RX ADMIN — LORAZEPAM 1 MG: 1 TABLET ORAL at 21:24

## 2018-06-13 RX ADMIN — LORAZEPAM 1 MG: 1 TABLET ORAL at 08:06

## 2018-06-13 RX ADMIN — QUETIAPINE FUMARATE 100 MG: 100 TABLET ORAL at 08:05

## 2018-06-13 RX ADMIN — OXYCODONE HYDROCHLORIDE 5 MG: 5 TABLET ORAL at 08:06

## 2018-06-13 RX ADMIN — FLUOXETINE HYDROCHLORIDE 60 MG: 20 CAPSULE ORAL at 08:05

## 2018-06-13 RX ADMIN — GABAPENTIN 300 MG: 300 CAPSULE ORAL at 21:24

## 2018-06-13 RX ADMIN — PIPERACILLIN SODIUM AND TAZOBACTAM SODIUM 3.38 G: 3; .375 INJECTION, POWDER, FOR SOLUTION INTRAVENOUS at 21:25

## 2018-06-13 RX ADMIN — GABAPENTIN 300 MG: 300 CAPSULE ORAL at 08:05

## 2018-06-13 RX ADMIN — METFORMIN HYDROCHLORIDE 500 MG: 500 TABLET, FILM COATED ORAL at 08:05

## 2018-06-13 RX ADMIN — CLOTRIMAZOLE AND BETAMETHASONE DIPROPIONATE: 10; .5 CREAM TOPICAL at 08:06

## 2018-06-13 RX ADMIN — ASPIRIN 81 MG: 81 TABLET, COATED ORAL at 08:05

## 2018-06-13 RX ADMIN — OXYCODONE HYDROCHLORIDE 5 MG: 5 TABLET ORAL at 21:24

## 2018-06-13 RX ADMIN — VALPROIC ACID 250 MG: 250 CAPSULE, LIQUID FILLED ORAL at 08:05

## 2018-06-13 RX ADMIN — METFORMIN HYDROCHLORIDE 500 MG: 500 TABLET, FILM COATED ORAL at 17:43

## 2018-06-13 RX ADMIN — ATORVASTATIN CALCIUM 20 MG: 20 TABLET, FILM COATED ORAL at 21:24

## 2018-06-13 RX ADMIN — SENNOSIDES AND DOCUSATE SODIUM 2 TABLET: 8.6; 5 TABLET ORAL at 08:06

## 2018-06-13 RX ADMIN — CLOTRIMAZOLE AND BETAMETHASONE DIPROPIONATE: 10; .5 CREAM TOPICAL at 21:25

## 2018-06-13 RX ADMIN — PIPERACILLIN SODIUM AND TAZOBACTAM SODIUM 3.38 G: 3; .375 INJECTION, POWDER, FOR SOLUTION INTRAVENOUS at 05:28

## 2018-06-13 RX ADMIN — ENOXAPARIN SODIUM 40 MG: 100 INJECTION SUBCUTANEOUS at 08:06

## 2018-06-13 RX ADMIN — VALPROIC ACID 250 MG: 250 CAPSULE, LIQUID FILLED ORAL at 21:25

## 2018-06-13 RX ADMIN — Medication 220 MG: at 08:05

## 2018-06-13 RX ADMIN — QUETIAPINE FUMARATE 100 MG: 100 TABLET ORAL at 13:20

## 2018-06-13 RX ADMIN — GABAPENTIN 300 MG: 300 CAPSULE ORAL at 15:14

## 2018-06-13 RX ADMIN — QUETIAPINE FUMARATE 200 MG: 100 TABLET ORAL at 21:24

## 2018-06-13 RX ADMIN — BENZTROPINE MESYLATE 1 MG: 1 TABLET ORAL at 08:05

## 2018-06-13 RX ADMIN — PIPERACILLIN SODIUM AND TAZOBACTAM SODIUM 3.38 G: 3; .375 INJECTION, POWDER, FOR SOLUTION INTRAVENOUS at 15:14

## 2018-06-13 ASSESSMENT — PAIN SCALES - GENERAL
PAINLEVEL_OUTOF10: 5
PAINLEVEL_OUTOF10: 8
PAINLEVEL_OUTOF10: 2

## 2018-06-13 ASSESSMENT — ENCOUNTER SYMPTOMS
COUGH: 0
ORTHOPNEA: 0
CLAUDICATION: 0
DIZZINESS: 0
BLURRED VISION: 0
MYALGIAS: 0
NERVOUS/ANXIOUS: 1
FOCAL WEAKNESS: 0
FALLS: 0
DIARRHEA: 0
SPUTUM PRODUCTION: 0
DIAPHORESIS: 0
MEMORY LOSS: 1
TINGLING: 0
WEAKNESS: 0
HEADACHES: 0

## 2018-06-13 NOTE — PROGRESS NOTES
Received alert and oriented x 3, started getting up set, agitated, hostile and want to leave the facility, called Security for safety management, tried to calm down with medication needed for his anxiety and jittery, offered snack,  fs 109, advised to talk to SW tomorrow for any inquiry about his money in the bank, needs attended, will continue to monitor behavior.

## 2018-06-13 NOTE — WOUND TEAM
"In to see pt for toenail care. Introduced self and reason for visit. Pt stated, \"I don't need it, I do it myself.\" pt removed traded sock to display nails L foot. Pt refused to have nails cut. Consult cancelled  "

## 2018-06-13 NOTE — PROGRESS NOTES
"Patient seen and examined  Complains of wanting to go home  States very happy with current care of his foot/toe    Blood pressure 107/81, pulse 63, temperature 36.1 °C (96.9 °F), resp. rate 18, height 1.93 m (6' 4\"), weight 105.7 kg (233 lb 0.4 oz), SpO2 93 %.          No acute distress  Dressing clean dry and intact      One month s/p foot I&D, toe amp    Plan:  DVT Prophylaxis- TEDS/SCDs  Weight Bearing Status- wbat  PT/OT  Antibiotics: per ID  Case Coordination          "

## 2018-06-13 NOTE — PROGRESS NOTES
[facility] Hospitalist Progress Note    Date of Service: 6/13/2018    Chief Complaint  52 y.o. male admitted 5/11/2018 with diabetic left foot ulcer.  Underwent I&D with  May 12, 2018 and May 14, 2018. Seen by infectious disease team during the hospital stay. Plan to continue IV Zosyn and daptomycin with the stop date of June 26, 2018. Difficult SNF disposition given insurance issues and use of daptomycin for antibiotics.    Interval Problem Update  Patient seen and examined today.    Patient tolerating treatment and therapies.  All Data, Medication data reviewed.  Case discussed with nursing as available.  Plan of Care reviewed with patient and notified of changes.  6/12 the patient seems to have poor understanding of his current condition, he understands his need for continued IV antibiotics, he is homeless, and has a poor plan for after being discharged, his wound and foot care is not great   6/13 the patient is still exhibiting poor understanding of his situation, he thinks he could do IV antibiotics at home easily, though he is homeless.  His skin of bilateral legs and feet look better  Consultants/Specialty  Infectious disease- signed off  Orthopedics  LPS    Disposition  Insurance barriers to SNF acceptance  , to evaluate for RICHARD for daptomycin  Remains inpatient until clarified        Review of Systems   Constitutional: Negative for diaphoresis.   HENT: Negative for congestion.    Eyes: Negative for blurred vision.   Respiratory: Negative for cough and sputum production.    Cardiovascular: Negative for orthopnea and claudication.   Gastrointestinal: Negative for diarrhea.   Genitourinary: Negative for dysuria and urgency.   Musculoskeletal: Negative for falls and myalgias.   Skin: Positive for rash.   Neurological: Negative for dizziness, tingling, focal weakness, weakness and headaches.   Psychiatric/Behavioral: Positive for memory loss. The patient is nervous/anxious.         Denies  any issue      Physical Exam  Laboratory/Imaging   Hemodynamics  Temp (24hrs), Av.4 °C (97.6 °F), Min:36.1 °C (96.9 °F), Max:37.1 °C (98.7 °F)   Temperature: 36.1 °C (96.9 °F)  Pulse  Av.1  Min: 52  Max: 112   Blood Pressure: 107/81      Respiratory      Respiration: 18, Pulse Oximetry: 93 %        RUL Breath Sounds: Clear, RML Breath Sounds: Clear, RLL Breath Sounds: Diminished, LORENA Breath Sounds: Clear, LLL Breath Sounds: Diminished    Fluids    Intake/Output Summary (Last 24 hours) at 18 1359  Last data filed at 18 0600   Gross per 24 hour   Intake                0 ml   Output              400 ml   Net             -400 ml       Nutrition  Orders Placed This Encounter   Procedures   • DIET ORDER     Standing Status:   Standing     Number of Occurrences:   1     Order Specific Question:   Diet:     Answer:   Regular [1]     Physical Exam   Constitutional: He appears well-developed. No distress.   HENT:   Head: Normocephalic.   Mouth/Throat: Oropharynx is clear and moist.   Eyes: EOM are normal. No scleral icterus.   Neck: Normal range of motion. No tracheal deviation present.   Cardiovascular: Normal rate, regular rhythm and intact distal pulses.    No murmur heard.  Pulmonary/Chest: Effort normal. He has no wheezes. He has no rales.   Abdominal: Soft. There is no tenderness. There is no rebound.   Musculoskeletal: He exhibits deformity. He exhibits no edema.   s/p left great toe amputation (with h/o second toe amputation). Healing well and no tenderness to palpation. Neurovascularly intact     Neurological: He is alert.   Skin: Skin is warm and dry.   Bilateral feet with cracking and dry skin, currently improved   Psychiatric: His speech is delayed. He is slowed. Cognition and memory are impaired. He expresses impulsivity.   Vitals reviewed.                               Assessment/Plan     Diabetic ulcer of left foot associated with type 2 diabetes mellitus, with muscle involvement without  "evidence of necrosis (HCC)- (present on admission)   Assessment & Plan    Hx of diabetic nonhealing ulcer, hx of previous left first toe amputation. Failed outpatient antibiotics. CBC, CMP, and CPK were checked today and are normal.   - ID and surgery evaluated, greatly appreciate  - s/p I&D 05/12 & 05/14 with Dr Baez   - continue IV Zosyn and daptomycin through 6/26  - continue wound care  - weekly CBC, CMP, CPK (while on daptomycin): last done 6/7    MRI revealing \"Large open ulcer extending from the great toe amputation site into the first metatarsal head with osteomyelitis of the first metatarsal head\"        Type 2 diabetes mellitus with neurologic complication, without long-term current use of insulin (HCC)- (present on admission)   Assessment & Plan    Controlled and without hyperglycemia. Last A1c 5.8.  - Continue home metformin  - Continue Neurontin for neuropathy  - ASA 81 and Atorvastatin 20 mg        Mental disability   Assessment & Plan    Likely long-standing        Diabetic peripheral neuropathy (HCC)- (present on admission)   Assessment & Plan    Pain controlled, mild tingling/neuropathy. Stable.  - Continue neurontin 300mg TID        Psychiatric disorder- (present on admission)   Assessment & Plan    Patient with intermittent agitation but typically cooperative. Redirectable.  - continue congentin, seroquel, prozac  - continue valproate for mood control  - ativan PO BID PRN, although getting it regularly  Will need outpatient follow-up          Quality-Core Measures   Reviewed items::  Medications reviewed  Olsen catheter::  No Olsen  DVT prophylaxis pharmacological::  Enoxaparin (Lovenox)  DVT prophylaxis - mechanical:  SCDs  Ulcer Prophylaxis::  Not indicated  Antibiotics:  Treating active infection/contamination beyond 24 hours perioperative coverage      Plan  Lotrisone to bilateral feet and lower legs to improve skin health, continue  Continue with IV antibiotics as written  Disposition " remains difficult, patient is homeless  His mental debility certainly will be prohibitive longer-term success  Medically complex case  Follow-up labs periodically

## 2018-06-14 PROBLEM — D69.6 THROMBOCYTOPENIA (HCC): Status: ACTIVE | Noted: 2018-06-14

## 2018-06-14 LAB
ALBUMIN SERPL BCP-MCNC: 3.9 G/DL (ref 3.2–4.9)
ALBUMIN/GLOB SERPL: 1.3 G/DL
ALP SERPL-CCNC: 59 U/L (ref 30–99)
ALT SERPL-CCNC: 28 U/L (ref 2–50)
ANION GAP SERPL CALC-SCNC: 7 MMOL/L (ref 0–11.9)
AST SERPL-CCNC: 19 U/L (ref 12–45)
BILIRUB SERPL-MCNC: 0.4 MG/DL (ref 0.1–1.5)
BUN SERPL-MCNC: 23 MG/DL (ref 8–22)
CALCIUM SERPL-MCNC: 8.8 MG/DL (ref 8.5–10.5)
CHLORIDE SERPL-SCNC: 109 MMOL/L (ref 96–112)
CK SERPL-CCNC: 26 U/L (ref 0–154)
CO2 SERPL-SCNC: 25 MMOL/L (ref 20–33)
CREAT SERPL-MCNC: 0.92 MG/DL (ref 0.5–1.4)
ERYTHROCYTE [DISTWIDTH] IN BLOOD BY AUTOMATED COUNT: 48.1 FL (ref 35.9–50)
GLOBULIN SER CALC-MCNC: 2.9 G/DL (ref 1.9–3.5)
GLUCOSE SERPL-MCNC: 101 MG/DL (ref 65–99)
HCT VFR BLD AUTO: 41.3 % (ref 42–52)
HGB BLD-MCNC: 13.6 G/DL (ref 14–18)
MCH RBC QN AUTO: 30.2 PG (ref 27–33)
MCHC RBC AUTO-ENTMCNC: 32.9 G/DL (ref 33.7–35.3)
MCV RBC AUTO: 91.6 FL (ref 81.4–97.8)
PLATELET # BLD AUTO: 115 K/UL (ref 164–446)
PMV BLD AUTO: 10.3 FL (ref 9–12.9)
POTASSIUM SERPL-SCNC: 4.1 MMOL/L (ref 3.6–5.5)
PROT SERPL-MCNC: 6.8 G/DL (ref 6–8.2)
RBC # BLD AUTO: 4.51 M/UL (ref 4.7–6.1)
SODIUM SERPL-SCNC: 141 MMOL/L (ref 135–145)
WBC # BLD AUTO: 5.2 K/UL (ref 4.8–10.8)

## 2018-06-14 PROCEDURE — 80053 COMPREHEN METABOLIC PANEL: CPT

## 2018-06-14 PROCEDURE — 700105 HCHG RX REV CODE 258: Performed by: HOSPITALIST

## 2018-06-14 PROCEDURE — 700102 HCHG RX REV CODE 250 W/ 637 OVERRIDE(OP): Performed by: HOSPITALIST

## 2018-06-14 PROCEDURE — 82550 ASSAY OF CK (CPK): CPT

## 2018-06-14 PROCEDURE — 700111 HCHG RX REV CODE 636 W/ 250 OVERRIDE (IP): Performed by: HOSPITALIST

## 2018-06-14 PROCEDURE — 85027 COMPLETE CBC AUTOMATED: CPT

## 2018-06-14 PROCEDURE — 770021 HCHG ROOM/CARE - ISO PRIVATE

## 2018-06-14 PROCEDURE — 99232 SBSQ HOSP IP/OBS MODERATE 35: CPT | Performed by: HOSPITALIST

## 2018-06-14 PROCEDURE — A9270 NON-COVERED ITEM OR SERVICE: HCPCS | Performed by: HOSPITALIST

## 2018-06-14 RX ORDER — FOLIC ACID 1 MG/1
1 TABLET ORAL DAILY
Status: DISCONTINUED | OUTPATIENT
Start: 2018-06-14 | End: 2018-06-26 | Stop reason: HOSPADM

## 2018-06-14 RX ADMIN — ATORVASTATIN CALCIUM 20 MG: 20 TABLET, FILM COATED ORAL at 20:16

## 2018-06-14 RX ADMIN — LORAZEPAM 1 MG: 1 TABLET ORAL at 08:17

## 2018-06-14 RX ADMIN — BENZTROPINE MESYLATE 1 MG: 1 TABLET ORAL at 20:16

## 2018-06-14 RX ADMIN — METFORMIN HYDROCHLORIDE 500 MG: 500 TABLET, FILM COATED ORAL at 08:17

## 2018-06-14 RX ADMIN — BENZTROPINE MESYLATE 1 MG: 1 TABLET ORAL at 08:17

## 2018-06-14 RX ADMIN — Medication 220 MG: at 08:17

## 2018-06-14 RX ADMIN — FOLIC ACID 1 MG: 1 TABLET ORAL at 17:41

## 2018-06-14 RX ADMIN — METFORMIN HYDROCHLORIDE 500 MG: 500 TABLET, FILM COATED ORAL at 17:41

## 2018-06-14 RX ADMIN — ENOXAPARIN SODIUM 40 MG: 100 INJECTION SUBCUTANEOUS at 08:16

## 2018-06-14 RX ADMIN — LORAZEPAM 1 MG: 1 TABLET ORAL at 20:37

## 2018-06-14 RX ADMIN — CLOTRIMAZOLE AND BETAMETHASONE DIPROPIONATE: 10; .5 CREAM TOPICAL at 20:18

## 2018-06-14 RX ADMIN — DAPTOMYCIN 620 MG: 500 INJECTION, POWDER, LYOPHILIZED, FOR SOLUTION INTRAVENOUS at 12:10

## 2018-06-14 RX ADMIN — GABAPENTIN 300 MG: 300 CAPSULE ORAL at 08:17

## 2018-06-14 RX ADMIN — QUETIAPINE FUMARATE 100 MG: 100 TABLET ORAL at 10:26

## 2018-06-14 RX ADMIN — PIPERACILLIN SODIUM AND TAZOBACTAM SODIUM 3.38 G: 3; .375 INJECTION, POWDER, FOR SOLUTION INTRAVENOUS at 05:01

## 2018-06-14 RX ADMIN — VALPROIC ACID 250 MG: 250 CAPSULE, LIQUID FILLED ORAL at 20:17

## 2018-06-14 RX ADMIN — SENNOSIDES AND DOCUSATE SODIUM 2 TABLET: 8.6; 5 TABLET ORAL at 20:16

## 2018-06-14 RX ADMIN — VALPROIC ACID 250 MG: 250 CAPSULE, LIQUID FILLED ORAL at 08:17

## 2018-06-14 RX ADMIN — THERA TABS 1 TABLET: TAB at 17:41

## 2018-06-14 RX ADMIN — GABAPENTIN 300 MG: 300 CAPSULE ORAL at 15:02

## 2018-06-14 RX ADMIN — CLOTRIMAZOLE AND BETAMETHASONE DIPROPIONATE: 10; .5 CREAM TOPICAL at 08:16

## 2018-06-14 RX ADMIN — QUETIAPINE FUMARATE 100 MG: 100 TABLET ORAL at 15:24

## 2018-06-14 RX ADMIN — FLUOXETINE HYDROCHLORIDE 60 MG: 20 CAPSULE ORAL at 08:16

## 2018-06-14 RX ADMIN — SENNOSIDES AND DOCUSATE SODIUM 2 TABLET: 8.6; 5 TABLET ORAL at 08:17

## 2018-06-14 RX ADMIN — PIPERACILLIN SODIUM AND TAZOBACTAM SODIUM 3.38 G: 3; .375 INJECTION, POWDER, FOR SOLUTION INTRAVENOUS at 15:02

## 2018-06-14 RX ADMIN — PIPERACILLIN SODIUM AND TAZOBACTAM SODIUM 3.38 G: 3; .375 INJECTION, POWDER, FOR SOLUTION INTRAVENOUS at 20:15

## 2018-06-14 RX ADMIN — QUETIAPINE FUMARATE 200 MG: 100 TABLET ORAL at 20:16

## 2018-06-14 RX ADMIN — GABAPENTIN 300 MG: 300 CAPSULE ORAL at 20:16

## 2018-06-14 RX ADMIN — ASPIRIN 81 MG: 81 TABLET, COATED ORAL at 08:17

## 2018-06-14 RX ADMIN — OXYCODONE HYDROCHLORIDE 5 MG: 5 TABLET ORAL at 08:17

## 2018-06-14 RX ADMIN — OXYCODONE HYDROCHLORIDE 5 MG: 5 TABLET ORAL at 20:37

## 2018-06-14 ASSESSMENT — PAIN SCALES - GENERAL
PAINLEVEL_OUTOF10: 7
PAINLEVEL_OUTOF10: 2
PAINLEVEL_OUTOF10: 8
PAINLEVEL_OUTOF10: 2

## 2018-06-14 ASSESSMENT — ENCOUNTER SYMPTOMS
NERVOUS/ANXIOUS: 1
FOCAL WEAKNESS: 0
HEADACHES: 0
WEAKNESS: 0
SPUTUM PRODUCTION: 0
MEMORY LOSS: 1
DIAPHORESIS: 0
DIZZINESS: 0
MYALGIAS: 0
TINGLING: 0
CLAUDICATION: 0
FALLS: 0
DIARRHEA: 0
ORTHOPNEA: 0
COUGH: 0
BLURRED VISION: 0

## 2018-06-14 NOTE — PROGRESS NOTES
Pt is A&OX4, VSS, afebrile, tramadol effective for pain control. Dressing to left foot ulcer intact, no drainage. Pt reports mild numbness in left foot. IV abx infusing via PICC line. Pt resting comfortably. Does not call for assistance, impulsive and unsteady gait, bed alarm in place, room close to nurses station, nonskid socks applied. Call light in reach. Will continue to monitor.

## 2018-06-14 NOTE — CARE PLAN
Problem: Safety  Goal: Will remain free from injury  Outcome: PROGRESSING AS EXPECTED  Continue high fall risk precautions, encourage pt to call for assistance, keep pt in room close to nurses station    Problem: Infection  Goal: Will remain free from infection  Outcome: PROGRESSING AS EXPECTED  Continue to assess for s/s of infection, administer abx as ordered

## 2018-06-14 NOTE — PROGRESS NOTES
[facility] Hospitalist Progress Note    Date of Service: 6/14/2018    Chief Complaint  52 y.o. male admitted 5/11/2018 with diabetic left foot ulcer.  Underwent I&D with  May 12, 2018 and May 14, 2018. Seen by infectious disease team during the hospital stay. Plan to continue IV Zosyn and daptomycin with the stop date of June 26, 2018. Difficult SNF disposition given insurance issues and use of daptomycin for antibiotics.    Interval Problem Update  Patient seen and examined today.    Patient tolerating treatment and therapies.  All Data, Medication data reviewed.  Case discussed with nursing as available.  Plan of Care reviewed with patient and notified of changes.  6/12 the patient seems to have poor understanding of his current condition, he understands his need for continued IV antibiotics, he is homeless, and has a poor plan for after being discharged, his wound and foot care is not great   6/13 the patient is still exhibiting poor understanding of his situation, he thinks he could do IV antibiotics at home easily, though he is homeless.  His skin of bilateral legs and feet look better  6/14 the patient tolerates therapy, denies fevers chills, denies much pain, remains with affect, platelets are slowly decreasing, discussed with pharmacy  Consultants/Specialty  Infectious disease- signed off  Orthopedics  LPS    Disposition  Insurance barriers to SNF acceptance  , to evaluate for RICHARD for daptomycin  Remains inpatient until clarified        Review of Systems   Constitutional: Negative for diaphoresis.   HENT: Negative for congestion.    Eyes: Negative for blurred vision.   Respiratory: Negative for cough and sputum production.    Cardiovascular: Negative for orthopnea and claudication.   Gastrointestinal: Negative for diarrhea.   Genitourinary: Negative for dysuria and urgency.   Musculoskeletal: Negative for falls and myalgias.   Skin: Positive for rash.   Neurological: Negative for  dizziness, tingling, focal weakness, weakness and headaches.   Psychiatric/Behavioral: Positive for memory loss. The patient is nervous/anxious.         Denies any issue      Physical Exam  Laboratory/Imaging   Hemodynamics  Temp (24hrs), Av.7 °C (98 °F), Min:36.4 °C (97.5 °F), Max:36.9 °C (98.5 °F)   Temperature: 36.7 °C (98 °F)  Pulse  Av  Min: 52  Max: 112   Blood Pressure: 110/64      Respiratory      Respiration: 18, Pulse Oximetry: 94 %        RUL Breath Sounds: Clear, RML Breath Sounds: Clear, RLL Breath Sounds: Diminished, LORENA Breath Sounds: Clear, LLL Breath Sounds: Diminished    Fluids    Intake/Output Summary (Last 24 hours) at 18 1512  Last data filed at 18 1300   Gross per 24 hour   Intake              240 ml   Output                0 ml   Net              240 ml       Nutrition  Orders Placed This Encounter   Procedures   • DIET ORDER     Standing Status:   Standing     Number of Occurrences:   1     Order Specific Question:   Diet:     Answer:   Regular [1]     Physical Exam   Constitutional: He appears well-developed. No distress.   HENT:   Head: Normocephalic.   Mouth/Throat: Oropharynx is clear and moist.   Eyes: EOM are normal. No scleral icterus.   Neck: Normal range of motion. No tracheal deviation present.   Cardiovascular: Normal rate, regular rhythm and intact distal pulses.    No murmur heard.  Pulmonary/Chest: Effort normal. He has no wheezes. He has no rales.   Abdominal: Soft. There is no tenderness. There is no rebound.   Musculoskeletal: He exhibits deformity. He exhibits no edema.   s/p left great toe amputation (with h/o second toe amputation). Healing well and no tenderness to palpation. Neurovascularly intact     Neurological: He is alert.   Skin: Skin is warm and dry.   Bilateral feet with cracking and dry skin, currently improved   Psychiatric: His speech is delayed. He is slowed. Cognition and memory are impaired. He expresses impulsivity.   Vitals  "reviewed.      Recent Labs      06/14/18   0505   WBC  5.2   RBC  4.51*   HEMOGLOBIN  13.6*   HEMATOCRIT  41.3*   MCV  91.6   MCH  30.2   MCHC  32.9*   RDW  48.1   PLATELETCT  115*   MPV  10.3     Recent Labs      06/14/18   0505   SODIUM  141   POTASSIUM  4.1   CHLORIDE  109   CO2  25   GLUCOSE  101*   BUN  23*   CREATININE  0.92   CALCIUM  8.8                      Assessment/Plan     Diabetic ulcer of left foot associated with type 2 diabetes mellitus, with muscle involvement without evidence of necrosis (HCC)- (present on admission)   Assessment & Plan    Hx of diabetic nonhealing ulcer, hx of previous left first toe amputation. Failed outpatient antibiotics. CBC, CMP, and CPK were checked today and are normal.   - ID and surgery evaluated, greatly appreciate  - s/p I&D 05/12 & 05/14 with Dr Baez   - continue IV Zosyn and daptomycin through 6/26  - continue wound care  - weekly CBC, CMP, CPK (while on daptomycin): last done 6/7    MRI revealing \"Large open ulcer extending from the great toe amputation site into the first metatarsal head with osteomyelitis of the first metatarsal head\"        Type 2 diabetes mellitus with neurologic complication, without long-term current use of insulin (HCC)- (present on admission)   Assessment & Plan    Controlled and without hyperglycemia. Last A1c 5.8.  - Continue home metformin  - Continue Neurontin for neuropathy  - ASA 81 and Atorvastatin 20 mg        Thrombocytopenia (HCC)   Assessment & Plan    We will recheck in the morning, unclear etiology, possible medication effects versus heparin related, although less likely        Mental disability   Assessment & Plan    Likely long-standing        Diabetic peripheral neuropathy (HCC)- (present on admission)   Assessment & Plan    Pain controlled, mild tingling/neuropathy. Stable.  - Continue neurontin 300mg TID        Psychiatric disorder- (present on admission)   Assessment & Plan    Patient with intermittent agitation but " typically cooperative. Redirectable.  - continue congentin, seroquel, prozac  - continue valproate for mood control  - ativan PO BID PRN, although getting it regularly  Will need outpatient follow-up          Quality-Core Measures   Reviewed items::  Medications reviewed  Olsen catheter::  No Olsen  DVT prophylaxis pharmacological::  Enoxaparin (Lovenox)  DVT prophylaxis - mechanical:  SCDs  Ulcer Prophylaxis::  Not indicated  Antibiotics:  Treating active infection/contamination beyond 24 hours perioperative coverage      Plan  Lotrisone to bilateral feet and lower legs to improve skin health, continue  Continue with IV antibiotics as written  Disposition remains difficult, patient is homeless  His mental debility certainly will be prohibitive longer-term success  Medically complex case  A.m. labs including CBC to follow-up on platelets

## 2018-06-15 PROCEDURE — 700111 HCHG RX REV CODE 636 W/ 250 OVERRIDE (IP): Performed by: HOSPITALIST

## 2018-06-15 PROCEDURE — 99232 SBSQ HOSP IP/OBS MODERATE 35: CPT | Performed by: HOSPITALIST

## 2018-06-15 PROCEDURE — 700111 HCHG RX REV CODE 636 W/ 250 OVERRIDE (IP): Performed by: INTERNAL MEDICINE

## 2018-06-15 PROCEDURE — 700102 HCHG RX REV CODE 250 W/ 637 OVERRIDE(OP): Performed by: HOSPITALIST

## 2018-06-15 PROCEDURE — 770006 HCHG ROOM/CARE - MED/SURG/GYN SEMI*

## 2018-06-15 PROCEDURE — A9270 NON-COVERED ITEM OR SERVICE: HCPCS | Performed by: HOSPITALIST

## 2018-06-15 PROCEDURE — 700105 HCHG RX REV CODE 258: Performed by: INTERNAL MEDICINE

## 2018-06-15 PROCEDURE — 700105 HCHG RX REV CODE 258: Performed by: HOSPITALIST

## 2018-06-15 PROCEDURE — 99232 SBSQ HOSP IP/OBS MODERATE 35: CPT | Performed by: INTERNAL MEDICINE

## 2018-06-15 PROCEDURE — 770021 HCHG ROOM/CARE - ISO PRIVATE

## 2018-06-15 RX ADMIN — PIPERACILLIN SODIUM AND TAZOBACTAM SODIUM 3.38 G: 3; .375 INJECTION, POWDER, FOR SOLUTION INTRAVENOUS at 05:39

## 2018-06-15 RX ADMIN — BENZTROPINE MESYLATE 1 MG: 1 TABLET ORAL at 09:45

## 2018-06-15 RX ADMIN — ATORVASTATIN CALCIUM 20 MG: 20 TABLET, FILM COATED ORAL at 20:01

## 2018-06-15 RX ADMIN — SENNOSIDES AND DOCUSATE SODIUM 2 TABLET: 8.6; 5 TABLET ORAL at 09:45

## 2018-06-15 RX ADMIN — CLOTRIMAZOLE AND BETAMETHASONE DIPROPIONATE: 10; .5 CREAM TOPICAL at 09:46

## 2018-06-15 RX ADMIN — QUETIAPINE FUMARATE 100 MG: 100 TABLET ORAL at 12:42

## 2018-06-15 RX ADMIN — GABAPENTIN 300 MG: 300 CAPSULE ORAL at 20:01

## 2018-06-15 RX ADMIN — THERA TABS 1 TABLET: TAB at 09:46

## 2018-06-15 RX ADMIN — QUETIAPINE FUMARATE 200 MG: 100 TABLET ORAL at 20:01

## 2018-06-15 RX ADMIN — QUETIAPINE FUMARATE 100 MG: 100 TABLET ORAL at 09:45

## 2018-06-15 RX ADMIN — METFORMIN HYDROCHLORIDE 500 MG: 500 TABLET, FILM COATED ORAL at 17:25

## 2018-06-15 RX ADMIN — FOLIC ACID 1 MG: 1 TABLET ORAL at 09:46

## 2018-06-15 RX ADMIN — GABAPENTIN 300 MG: 300 CAPSULE ORAL at 14:39

## 2018-06-15 RX ADMIN — CLOTRIMAZOLE AND BETAMETHASONE DIPROPIONATE: 10; .5 CREAM TOPICAL at 20:02

## 2018-06-15 RX ADMIN — OXYCODONE HYDROCHLORIDE 5 MG: 5 TABLET ORAL at 09:46

## 2018-06-15 RX ADMIN — ASPIRIN 81 MG: 81 TABLET, COATED ORAL at 09:45

## 2018-06-15 RX ADMIN — CEFTRIAXONE 2 G: 2 INJECTION, POWDER, FOR SOLUTION INTRAMUSCULAR; INTRAVENOUS at 11:25

## 2018-06-15 RX ADMIN — BENZTROPINE MESYLATE 1 MG: 1 TABLET ORAL at 20:01

## 2018-06-15 RX ADMIN — FLUOXETINE HYDROCHLORIDE 60 MG: 20 CAPSULE ORAL at 09:45

## 2018-06-15 RX ADMIN — GABAPENTIN 300 MG: 300 CAPSULE ORAL at 09:45

## 2018-06-15 RX ADMIN — VALPROIC ACID 250 MG: 250 CAPSULE, LIQUID FILLED ORAL at 20:01

## 2018-06-15 RX ADMIN — OXYCODONE HYDROCHLORIDE 5 MG: 5 TABLET ORAL at 20:01

## 2018-06-15 RX ADMIN — METFORMIN HYDROCHLORIDE 500 MG: 500 TABLET, FILM COATED ORAL at 09:45

## 2018-06-15 RX ADMIN — Medication 220 MG: at 09:45

## 2018-06-15 RX ADMIN — DAPTOMYCIN 620 MG: 500 INJECTION, POWDER, LYOPHILIZED, FOR SOLUTION INTRAVENOUS at 12:42

## 2018-06-15 RX ADMIN — LORAZEPAM 1 MG: 1 TABLET ORAL at 09:46

## 2018-06-15 RX ADMIN — ENOXAPARIN SODIUM 40 MG: 100 INJECTION SUBCUTANEOUS at 09:46

## 2018-06-15 RX ADMIN — VALPROIC ACID 250 MG: 250 CAPSULE, LIQUID FILLED ORAL at 09:00

## 2018-06-15 ASSESSMENT — ENCOUNTER SYMPTOMS
VOMITING: 0
TINGLING: 0
MEMORY LOSS: 1
DIZZINESS: 0
WEAKNESS: 0
COUGH: 0
SENSORY CHANGE: 1
FALLS: 0
CLAUDICATION: 0
HEADACHES: 0
ORTHOPNEA: 0
FEVER: 0
CHILLS: 0
NAUSEA: 0
BLURRED VISION: 0
MYALGIAS: 0
NERVOUS/ANXIOUS: 1
DIARRHEA: 0
SHORTNESS OF BREATH: 0
SPUTUM PRODUCTION: 0
ABDOMINAL PAIN: 0
DIAPHORESIS: 0
FOCAL WEAKNESS: 0

## 2018-06-15 ASSESSMENT — PAIN SCALES - GENERAL
PAINLEVEL_OUTOF10: 8
PAINLEVEL_OUTOF10: 8
PAINLEVEL_OUTOF10: 2
PAINLEVEL_OUTOF10: 4

## 2018-06-15 NOTE — PROGRESS NOTES
[facility] Hospitalist Progress Note    Date of Service: 6/15/2018    Chief Complaint  52 y.o. male admitted 5/11/2018 with diabetic left foot ulcer.  Underwent I&D with  May 12, 2018 and May 14, 2018. Seen by infectious disease team during the hospital stay. Plan to continue IV Zosyn and daptomycin with the stop date of June 26, 2018. Difficult SNF disposition given insurance issues and use of daptomycin for antibiotics.    Interval Problem Update  Patient seen and examined today.    Patient tolerating treatment and therapies.  All Data, Medication data reviewed.  Case discussed with nursing as available.  Plan of Care reviewed with patient and notified of changes.  6/12 the patient seems to have poor understanding of his current condition, he understands his need for continued IV antibiotics, he is homeless, and has a poor plan for after being discharged, his wound and foot care is not great   6/13 the patient is still exhibiting poor understanding of his situation, he thinks he could do IV antibiotics at home easily, though he is homeless.  His skin of bilateral legs and feet look better  6/14 the patient tolerates therapy, denies fevers chills, denies much pain, remains with affect, platelets are slowly decreasing, discussed with pharmacy  6/15 the patient feels okay, no fever, discussed the case with infectious disease re possible antibiotic change because of developing thrombocytopenia  Consultants/Specialty  Infectious disease-  Orthopedics  LPS    Disposition  Insurance barriers to SNF acceptance  , to evaluate for RICHARD for daptomycin  Remains inpatient until clarified        Review of Systems   Constitutional: Negative for diaphoresis.   HENT: Negative for congestion.    Eyes: Negative for blurred vision.   Respiratory: Negative for cough and sputum production.    Cardiovascular: Negative for orthopnea and claudication.   Gastrointestinal: Negative for diarrhea.   Genitourinary: Negative  for dysuria and urgency.   Musculoskeletal: Negative for falls and myalgias.   Skin: Positive for rash.   Neurological: Negative for dizziness, tingling, focal weakness, weakness and headaches.   Psychiatric/Behavioral: Positive for memory loss. The patient is nervous/anxious.         Denies any issue      Physical Exam  Laboratory/Imaging   Hemodynamics  Temp (24hrs), Av.7 °C (98 °F), Min:36.4 °C (97.6 °F), Max:36.9 °C (98.4 °F)   Temperature: 36.4 °C (97.6 °F)  Pulse  Av.9  Min: 52  Max: 112   Blood Pressure: (!) 162/95      Respiratory      Respiration: 16, Pulse Oximetry: 96 %        RUL Breath Sounds: Clear, RML Breath Sounds: Clear, RLL Breath Sounds: Diminished, LORENA Breath Sounds: Clear, LLL Breath Sounds: Diminished    Fluids  No intake or output data in the 24 hours ending 06/15/18 1651    Nutrition  Orders Placed This Encounter   Procedures   • DIET ORDER     Standing Status:   Standing     Number of Occurrences:   1     Order Specific Question:   Diet:     Answer:   Regular [1]     Physical Exam   Constitutional: He appears well-developed. No distress.   HENT:   Head: Normocephalic.   Mouth/Throat: Oropharynx is clear and moist.   Eyes: EOM are normal. No scleral icterus.   Neck: Normal range of motion. No tracheal deviation present.   Cardiovascular: Normal rate, regular rhythm and intact distal pulses.    No murmur heard.  Pulmonary/Chest: Effort normal. He has no wheezes. He has no rales.   Abdominal: Soft. There is no tenderness. There is no rebound.   Musculoskeletal: He exhibits deformity. He exhibits no edema.   s/p left great toe amputation (with h/o second toe amputation). Healing well and no tenderness to palpation. Neurovascularly intact     Neurological: He is alert.   Skin: Skin is warm and dry.   Bilateral feet with cracking and dry skin, currently improved   Psychiatric: His speech is delayed. He is slowed. Cognition and memory are impaired. He expresses impulsivity.   Vitals  "reviewed.      Recent Labs      06/14/18   0505   WBC  5.2   RBC  4.51*   HEMOGLOBIN  13.6*   HEMATOCRIT  41.3*   MCV  91.6   MCH  30.2   MCHC  32.9*   RDW  48.1   PLATELETCT  115*   MPV  10.3     Recent Labs      06/14/18   0505   SODIUM  141   POTASSIUM  4.1   CHLORIDE  109   CO2  25   GLUCOSE  101*   BUN  23*   CREATININE  0.92   CALCIUM  8.8                      Assessment/Plan     Diabetic ulcer of left foot associated with type 2 diabetes mellitus, with muscle involvement without evidence of necrosis (HCC)- (present on admission)   Assessment & Plan    Hx of diabetic nonhealing ulcer, hx of previous left first toe amputation. Failed outpatient antibiotics. CBC, CMP, and CPK were checked today and are normal.   - ID and surgery evaluated, greatly appreciate  - s/p I&D 05/12 & 05/14 with Dr Baez   - continue IV Zosyn and daptomycin through 6/26  - continue wound care  - weekly CBC, CMP, CPK (while on daptomycin): last done 6/7    MRI revealing \"Large open ulcer extending from the great toe amputation site into the first metatarsal head with osteomyelitis of the first metatarsal head\"        Type 2 diabetes mellitus with neurologic complication, without long-term current use of insulin (HCC)- (present on admission)   Assessment & Plan    Controlled and without hyperglycemia. Last A1c 5.8.  - Continue home metformin  - Continue Neurontin for neuropathy  - ASA 81 and Atorvastatin 20 mg        Thrombocytopenia (HCC)   Assessment & Plan    We will recheck in the morning, unclear etiology, possible medication effects versus heparin related, although less likely        Mental disability   Assessment & Plan    Likely long-standing        Diabetic peripheral neuropathy (HCC)- (present on admission)   Assessment & Plan    Pain controlled, mild tingling/neuropathy. Stable.  - Continue neurontin 300mg TID        Psychiatric disorder- (present on admission)   Assessment & Plan    Patient with intermittent agitation but " typically cooperative. Redirectable.  - continue congentin, seroquel, prozac  - continue valproate for mood control  - ativan PO BID PRN, although getting it regularly  Will need outpatient follow-up          Quality-Core Measures   Reviewed items::  Medications reviewed  Olsen catheter::  No Olsen  DVT prophylaxis pharmacological::  Enoxaparin (Lovenox)  DVT prophylaxis - mechanical:  SCDs  Ulcer Prophylaxis::  Not indicated  Antibiotics:  Treating active infection/contamination beyond 24 hours perioperative coverage      Plan  Infectious disease follow-up appreciated, revised antibiotic regimen noted  Lotrisone to bilateral feet and lower legs to improve skin health, continue  Disposition remains difficult, patient is homeless  His mental debility certainly will be prohibitive longer-term success  Medically complex case

## 2018-06-15 NOTE — PROGRESS NOTES
"Infectious Disease Progress Note    Author: Amanda Feng M.D. Date & Time of service: 6/15/2018  1:57 PM    Chief Complaint:  Left foot wound dehiscence with osteomyelitis f/u    Interval History:  52 y.o. WM admitted 2018 due to left foot wound dehiscence at amp site   AF WBC 5 denies any pain-ate all of breakfast   AF plan for repeat surgery today. No new complaints  5/15 AF WBC 4.6 cxs now polymicrobial somnolent   AF alert and anxious today, jittery states \"I don't want them to cut my leg off\"   AF less anxious today-apparently refused PICC. Seen with LPS   AF no CBC, does not believe he has recurrent infection bc he marcum not have pain, anxious to per RN   AF pt states his BS was 114 this am, poor insight into illness   AF no complaints   AF denies SE from abx   AF no CBC states he has drainage from left 2nd toe, ongoing pain in left foot but controlled  2018 MAXIMUM TEMPERATURE 98.4-5.3 platelets 151 creatinine 0.88  6/15 AF WBC 5.2 asked to re-eval abx due to worsening thrombocytopenia  Labs Reviewed, Medications Reviewed, Radiology Reviewed and Wound Reviewed.    Review of Systems:  Review of Systems   Constitutional: Negative for chills and fever.   Respiratory: Negative for cough and shortness of breath.    Cardiovascular: Negative for chest pain.   Gastrointestinal: Negative for abdominal pain, nausea and vomiting.   Musculoskeletal: Positive for joint pain. Negative for myalgias.   Neurological: Positive for sensory change.        Neuropathy   All other systems reviewed and are negative.      Hemodynamics:  Temp (24hrs), Av.7 °C (98 °F), Min:36.4 °C (97.6 °F), Max:36.9 °C (98.4 °F)  Temperature: 36.4 °C (97.6 °F)  Pulse  Av.9  Min: 52  Max: 112  Blood Pressure: (!) 162/95       Physical Exam:  Physical Exam   Constitutional: No distress.   Disheveled    Restless   HENT:   Poor dentition   Cardiovascular: Regular rhythm and normal heart sounds.  "   Pulmonary/Chest: Breath sounds normal. No respiratory distress.   Abdominal: Soft. He exhibits no distension.   Musculoskeletal: He exhibits no edema.   LUE PICC     Neurological:   Flat affect     Skin: He is not diaphoretic.   Foot dressed   Psychiatric:   Poor insight into illness     Nursing note and vitals reviewed.      Meds:    Current Facility-Administered Medications:   •  cefTRIAXone (ROCEPHIN) IV  •  multivitamin  •  folic acid  •  acetaminophen  •  aspirin EC  •  atorvastatin  •  benztropine  •  clotrimazole-betamethasone  •  DAPTOmycin  •  enoxaparin (LOVENOX) injection  •  FLUoxetine  •  gabapentin  •  LORazepam  •  metFORMIN  •  oxyCODONE immediate-release  •  QUEtiapine **AND** QUEtiapine  •  Respiratory Care per Protocol  •  valproic acid  •  senna-docusate **AND** [DISCONTINUED] polyethylene glycol/lytes **AND** [DISCONTINUED] magnesium hydroxide **AND** [DISCONTINUED] bisacodyl  •  zinc sulfate  •  PICC Line Insertion has been implemented **AND** May use Lidocaine 1% not to exceed 3 mls for local at insertion site **AND** NOTIFY MD **AND** Tip to dwell in the superior vena cava **AND** Do not use PICC Line until placement verified by Chest X Ray **AND** [CANCELED] DX-CHEST-FOR PICC LINE Perform procedure in: Patient's Room **AND** If radiologist reading of chest X-ray states any of the following the PICC should be used **AND** Further evaluation of the PICC placement can be retrieved from X-Ray and Imaging **AND** Blood draws through PICC line; draws by RN only **AND** FLUSHING GUIDELINES WHEN IN USE **AND** normal saline PF **AND** FLUSHING GUIDELINES WHEN NOT IN USE **AND** DRESSING MAINTENANCE **AND** Change needleless pressure ports and IV tubing every 72 hours per hospital policy **AND** TUBING **AND** If there is an MD order to remove the PICC line, any RN may remove the PICC line **AND** [] PATIENT EDUCATION MATERIALS **AND** NURSING COMMUNICATION    Labs:  Recent Labs       06/14/18   0505   WBC  5.2   RBC  4.51*   HEMOGLOBIN  13.6*   HEMATOCRIT  41.3*   MCV  91.6   MCH  30.2   RDW  48.1   PLATELETCT  115*   MPV  10.3     Recent Labs      06/14/18   0505   SODIUM  141   POTASSIUM  4.1   CHLORIDE  109   CO2  25   GLUCOSE  101*   BUN  23*   CPKTOTAL  26     Recent Labs      06/14/18   0505   ALBUMIN  3.9   TBILIRUBIN  0.4   ALKPHOSPHAT  59   TOTPROTEIN  6.8   ALTSGPT  28   ASTSGOT  19   CREATININE  0.92       Imaging:  Dx-foot-2- Left    Result Date: 4/20/2018 4/20/2018 10:19 AM HISTORY/REASON FOR EXAM:  Pain/Deformity Following Trauma Weeping great toe TECHNIQUE/EXAM DESCRIPTION AND NUMBER OF VIEWS: 2 nonweightbearing views of the LEFT foot. COMPARISON:  3/31/2018 FINDINGS: Patient is status post amputation of the first digit at the level of the proximal phalanx. There is minimal osseous irregularity at the level of the amputation. There is faint adjacent calcification which may be dystrophic.  There are mild degenerative changes of the first tarsometatarsal joint. No acute fracture or dislocation is seen. There is soft tissue swelling about the medial forefoot and remainder of the first digit.     Post surgical changes status post amputation at the level of the proximal phalanx of the first digit. Linear amorphous calcification adjacent to the amputation may be dystrophic. There is minimal osseous irregularity at the level of the amputation where it is difficult to exclude osteomyelitis. Soft tissue swelling of the forefoot and remainder of the first digit.     Mr-foot-with & W/o Left    Result Date: 5/12/2018 5/12/2018 10:44 AM HISTORY/REASON FOR EXAM:  Open wound. Open wound,?surgical site at great toe amp site, please eval for OM and/ or abscess. TECHNIQUE/EXAM DESCRIPTION: MRI of the LEFT foot with and without contrast. The study was performed on a [email or web address] Signa 1.5 Steph MRI scanner. T1 axial, T1 sagittal, T1 coronal, STIR sagittal, T2 fast spin-echo fat-suppressed coronal,  sagittal inversion recovery, and T1 postcontrast coronal images were obtained. 20 mL Omniscan contrast was administered intravenously. COMPARISON: Foot radiograph 4/20/2018. FINDINGS: Diffuse soft tissue swelling about the dorsum of foot. Increased signal within the intrinsic foot muscles, likely related to neuropathic change. There is an open ulcer extending from the great toe amputation site into the first metatarsal head with marrow replacement. No walled fluid collection seen.     Large open ulcer extending from the great toe amputation site into the first metatarsal head with osteomyelitis of the first metatarsal head. No walled fluid collection seen.      Micro:  Results     ** No results found for the last 168 hours. **          Assessment:  Active Hospital Problems    Diagnosis   • Diabetic ulcer of left foot associated with type 2 diabetes mellitus, with muscle involvement without evidence of necrosis (HCC) [E11.621, L97.525]   • Dehiscence of surgical wound [T81.31XA]   • Diabetic peripheral neuropathy ([facility]) [E11.42]   • Psychiatric disorder [F99]   • Type 2 diabetes mellitus with neurologic complication, without long-term current use of insulin ([facility]) [E11.49]       Plan:  Left foot osteomyelitis  Afebrile  No leukocytosis   S/p left great amp prox phalanx on 4/1- cx polymicrobial: group G and C strep, morganella , diphtheroids  S/p left great toe amp down to MTP joint on 4/23. Clean margins obtained per OP note- OR cx (proximal bone) - mixed skin christine  Failed outpatient therapy-clinical osteomyelitis, probe to bone and MRI +ostemyelitis  s/p I and D 5/12-MRSA, staph haemolyticus(MRSE), peptostreptococcus, and Pasteurella  s/p I and D 5/14  DC Zosyn and continue dapto (vanco JUNIOR is 2). Add ceftriaxone  Monitor CPK weekly  Endpoint clinical-anticipate 6 weeks IV if no TMA/ BKA  Stop date 6/26/2018    Thrombocytopenia, worse  DC Zosyn as above  Continue dapto  Start ceftriaxone  Monitor  platelets    DM2  Last HgA1c 5.8  Keep BS under 150 to help control current infection    Psychiatric disorder  Contributing to noncompliance and poor insight    Prognosis for limb salvage poor  Recommend placement.   Difficult discharge    DW Dr Goodson

## 2018-06-15 NOTE — CARE PLAN
Problem: Pain Management  Goal: Pain level will decrease to patient's comfort goal  PRN pain medication given per MAR,.     Problem: Psychosocial Needs:  Goal: Level of anxiety will decrease  PRN given. RN at bedside. Pt verbalizing feeling anxious 8/10. Pt remaining calm.

## 2018-06-15 NOTE — PROGRESS NOTES
Assumed care of pt at 1930. Report received and bedside rounding completed with day RN. Pt in bed. Dressings observed. c/d/i. No SOB, or in any acute distress. Fall precautions in place,  bed alarm. - Treaded non slip socks. Call light and pt belongings within reach - pt makes needs known - hourly rounding in place. See flowsheets for further assessment.

## 2018-06-16 LAB
ERYTHROCYTE [DISTWIDTH] IN BLOOD BY AUTOMATED COUNT: 46.3 FL (ref 35.9–50)
HCT VFR BLD AUTO: 40.8 % (ref 42–52)
HGB BLD-MCNC: 13.9 G/DL (ref 14–18)
MCH RBC QN AUTO: 30.6 PG (ref 27–33)
MCHC RBC AUTO-ENTMCNC: 34.1 G/DL (ref 33.7–35.3)
MCV RBC AUTO: 89.9 FL (ref 81.4–97.8)
PLATELET # BLD AUTO: 111 K/UL (ref 164–446)
PMV BLD AUTO: 9.8 FL (ref 9–12.9)
RBC # BLD AUTO: 4.54 M/UL (ref 4.7–6.1)
WBC # BLD AUTO: 5.3 K/UL (ref 4.8–10.8)

## 2018-06-16 PROCEDURE — 700102 HCHG RX REV CODE 250 W/ 637 OVERRIDE(OP): Performed by: HOSPITALIST

## 2018-06-16 PROCEDURE — 770021 HCHG ROOM/CARE - ISO PRIVATE

## 2018-06-16 PROCEDURE — 85027 COMPLETE CBC AUTOMATED: CPT

## 2018-06-16 PROCEDURE — 700111 HCHG RX REV CODE 636 W/ 250 OVERRIDE (IP): Performed by: HOSPITALIST

## 2018-06-16 PROCEDURE — 700111 HCHG RX REV CODE 636 W/ 250 OVERRIDE (IP): Performed by: INTERNAL MEDICINE

## 2018-06-16 PROCEDURE — 700105 HCHG RX REV CODE 258: Performed by: HOSPITALIST

## 2018-06-16 PROCEDURE — A9270 NON-COVERED ITEM OR SERVICE: HCPCS | Performed by: HOSPITALIST

## 2018-06-16 PROCEDURE — 99232 SBSQ HOSP IP/OBS MODERATE 35: CPT | Performed by: HOSPITALIST

## 2018-06-16 PROCEDURE — 700105 HCHG RX REV CODE 258: Performed by: INTERNAL MEDICINE

## 2018-06-16 RX ORDER — PYRIDOXINE HCL (VITAMIN B6) 50 MG
50 TABLET ORAL DAILY
Status: DISCONTINUED | OUTPATIENT
Start: 2018-06-16 | End: 2018-06-26 | Stop reason: HOSPADM

## 2018-06-16 RX ADMIN — Medication 220 MG: at 07:59

## 2018-06-16 RX ADMIN — GABAPENTIN 300 MG: 300 CAPSULE ORAL at 16:16

## 2018-06-16 RX ADMIN — ASPIRIN 81 MG: 81 TABLET, COATED ORAL at 08:01

## 2018-06-16 RX ADMIN — CLOTRIMAZOLE AND BETAMETHASONE DIPROPIONATE: 10; .5 CREAM TOPICAL at 09:00

## 2018-06-16 RX ADMIN — CLOTRIMAZOLE AND BETAMETHASONE DIPROPIONATE: 10; .5 CREAM TOPICAL at 20:16

## 2018-06-16 RX ADMIN — QUETIAPINE FUMARATE 100 MG: 100 TABLET ORAL at 11:32

## 2018-06-16 RX ADMIN — GABAPENTIN 300 MG: 300 CAPSULE ORAL at 20:17

## 2018-06-16 RX ADMIN — ATORVASTATIN CALCIUM 20 MG: 20 TABLET, FILM COATED ORAL at 20:17

## 2018-06-16 RX ADMIN — FOLIC ACID 1 MG: 1 TABLET ORAL at 08:01

## 2018-06-16 RX ADMIN — BENZTROPINE MESYLATE 1 MG: 1 TABLET ORAL at 08:01

## 2018-06-16 RX ADMIN — QUETIAPINE FUMARATE 200 MG: 100 TABLET ORAL at 20:17

## 2018-06-16 RX ADMIN — Medication 50 MG: at 11:32

## 2018-06-16 RX ADMIN — LORAZEPAM 1 MG: 1 TABLET ORAL at 16:16

## 2018-06-16 RX ADMIN — BENZTROPINE MESYLATE 1 MG: 1 TABLET ORAL at 20:17

## 2018-06-16 RX ADMIN — OXYCODONE HYDROCHLORIDE 5 MG: 5 TABLET ORAL at 20:17

## 2018-06-16 RX ADMIN — FLUOXETINE HYDROCHLORIDE 60 MG: 20 CAPSULE ORAL at 08:00

## 2018-06-16 RX ADMIN — THERA TABS 1 TABLET: TAB at 08:00

## 2018-06-16 RX ADMIN — GABAPENTIN 300 MG: 300 CAPSULE ORAL at 08:00

## 2018-06-16 RX ADMIN — VALPROIC ACID 250 MG: 250 CAPSULE, LIQUID FILLED ORAL at 08:00

## 2018-06-16 RX ADMIN — DAPTOMYCIN 620 MG: 500 INJECTION, POWDER, LYOPHILIZED, FOR SOLUTION INTRAVENOUS at 11:32

## 2018-06-16 RX ADMIN — METFORMIN HYDROCHLORIDE 500 MG: 500 TABLET, FILM COATED ORAL at 08:00

## 2018-06-16 RX ADMIN — VALPROIC ACID 250 MG: 250 CAPSULE, LIQUID FILLED ORAL at 20:16

## 2018-06-16 RX ADMIN — OXYCODONE HYDROCHLORIDE 5 MG: 5 TABLET ORAL at 08:01

## 2018-06-16 RX ADMIN — METFORMIN HYDROCHLORIDE 500 MG: 500 TABLET, FILM COATED ORAL at 16:16

## 2018-06-16 RX ADMIN — CEFTRIAXONE 2 G: 2 INJECTION, POWDER, FOR SOLUTION INTRAMUSCULAR; INTRAVENOUS at 07:59

## 2018-06-16 RX ADMIN — ENOXAPARIN SODIUM 40 MG: 100 INJECTION SUBCUTANEOUS at 07:59

## 2018-06-16 RX ADMIN — QUETIAPINE FUMARATE 100 MG: 100 TABLET ORAL at 08:00

## 2018-06-16 ASSESSMENT — ENCOUNTER SYMPTOMS
MEMORY LOSS: 1
BLURRED VISION: 0
WEAKNESS: 0
CLAUDICATION: 0
DIAPHORESIS: 0
TINGLING: 0
NERVOUS/ANXIOUS: 1
HEADACHES: 0
COUGH: 0
FOCAL WEAKNESS: 0
DIZZINESS: 0
MYALGIAS: 0
SPUTUM PRODUCTION: 0
DIARRHEA: 0
FALLS: 0
ORTHOPNEA: 0

## 2018-06-16 ASSESSMENT — PAIN SCALES - GENERAL
PAINLEVEL_OUTOF10: 8
PAINLEVEL_OUTOF10: 4

## 2018-06-16 NOTE — CARE PLAN
Problem: Communication  Goal: The ability to communicate needs accurately and effectively will improve  Outcome: PROGRESSING SLOWER THAN EXPECTED  Pt can verbalize needs. Pt does not call appropriately.     Problem: Psychosocial Needs:  Goal: Level of anxiety will decrease  Outcome: PROGRESSING SLOWER THAN EXPECTED      Problem: Skin Integrity  Goal: Risk for impaired skin integrity will decrease    Intervention: Assess risk factors for impaired skin integrity and/or pressure ulcers  Educated pt about the importance of frequent turning.

## 2018-06-16 NOTE — PROGRESS NOTES
[facility] Hospitalist Progress Note    Date of Service: 6/16/2018    Chief Complaint  52 y.o. male admitted 5/11/2018 with diabetic left foot ulcer.  Underwent I&D with  May 12, 2018 and May 14, 2018. Seen by infectious disease team during the hospital stay. Plan to continue IV Zosyn and daptomycin with the stop date of June 26, 2018. Difficult SNF disposition given insurance issues and use of daptomycin for antibiotics.    Interval Problem Update  Patient seen and examined today.    Patient tolerating treatment and therapies.  All Data, Medication data reviewed.  Case discussed with nursing as available.  Plan of Care reviewed with patient and notified of changes.  6/12 the patient seems to have poor understanding of his current condition, he understands his need for continued IV antibiotics, he is homeless, and has a poor plan for after being discharged, his wound and foot care is not great   6/13 the patient is still exhibiting poor understanding of his situation, he thinks he could do IV antibiotics at home easily, though he is homeless.  His skin of bilateral legs and feet look better  6/14 the patient tolerates therapy, denies fevers chills, denies much pain, remains with affect, platelets are slowly decreasing, discussed with pharmacy  6/15 the patient feels okay, no fever, discussed the case with infectious disease re possible antibiotic change because of developing thrombocytopenia  6/16 the patient feels okay, his platelets are still slightly lower  Consultants/Specialty  Infectious disease-  Orthopedics  LPS    Disposition  Insurance barriers to SNF acceptance  , to evaluate for RICHARD for daptomycin, ?  [facility] possibility  Remains inpatient until clarified        Review of Systems   Constitutional: Negative for diaphoresis.   HENT: Negative for congestion.    Eyes: Negative for blurred vision.   Respiratory: Negative for cough and sputum production.    Cardiovascular: Negative for  orthopnea and claudication.   Gastrointestinal: Negative for diarrhea.   Genitourinary: Negative for dysuria and urgency.   Musculoskeletal: Negative for falls and myalgias.   Skin: Positive for rash.   Neurological: Negative for dizziness, tingling, focal weakness, weakness and headaches.   Psychiatric/Behavioral: Positive for memory loss. The patient is nervous/anxious.         Denies any issue      Physical Exam  Laboratory/Imaging   Hemodynamics  Temp (24hrs), Av.5 °C (97.7 °F), Min:36.3 °C (97.4 °F), Max:36.7 °C (98 °F)   Temperature: 36.4 °C (97.6 °F)  Pulse  Av.8  Min: 52  Max: 112   Blood Pressure: 123/75      Respiratory      Respiration: 18, Pulse Oximetry: 95 %     Work Of Breathing / Effort: Mild  RUL Breath Sounds: Clear, RML Breath Sounds: Diminished, RLL Breath Sounds: Diminished, LORENA Breath Sounds: Clear, LLL Breath Sounds: Diminished    Fluids    Intake/Output Summary (Last 24 hours) at 18 1352  Last data filed at 18 1342   Gross per 24 hour   Intake              595 ml   Output                0 ml   Net              595 ml       Nutrition  Orders Placed This Encounter   Procedures   • DIET ORDER     Standing Status:   Standing     Number of Occurrences:   1     Order Specific Question:   Diet:     Answer:   Regular [1]     Physical Exam   Constitutional: He appears well-developed. No distress.   HENT:   Head: Normocephalic.   Mouth/Throat: Oropharynx is clear and moist.   Eyes: EOM are normal. No scleral icterus.   Neck: Normal range of motion. No tracheal deviation present.   Cardiovascular: Normal rate, regular rhythm and intact distal pulses.    No murmur heard.  Pulmonary/Chest: Effort normal. He has no wheezes. He has no rales.   Abdominal: Soft. There is no tenderness. There is no rebound.   Musculoskeletal: He exhibits deformity. He exhibits no edema.   s/p left great toe amputation (with h/o second toe amputation). Healing well and no tenderness to palpation.  "Neurovascularly intact     Neurological: He is alert.   Skin: Skin is warm and dry.   Bilateral feet with cracking and dry skin, currently improved   Psychiatric: His speech is delayed. He is slowed. Cognition and memory are impaired. He expresses impulsivity.   Vitals reviewed.      Recent Labs      06/14/18   0505  06/16/18   0422   WBC  5.2  5.3   RBC  4.51*  4.54*   HEMOGLOBIN  13.6*  13.9*   HEMATOCRIT  41.3*  40.8*   MCV  91.6  89.9   MCH  30.2  30.6   MCHC  32.9*  34.1   RDW  48.1  46.3   PLATELETCT  115*  111*   MPV  10.3  9.8     Recent Labs      06/14/18   0505   SODIUM  141   POTASSIUM  4.1   CHLORIDE  109   CO2  25   GLUCOSE  101*   BUN  23*   CREATININE  0.92   CALCIUM  8.8                      Assessment/Plan     Diabetic ulcer of left foot associated with type 2 diabetes mellitus, with muscle involvement without evidence of necrosis (HCC)- (present on admission)   Assessment & Plan    Hx of diabetic nonhealing ulcer, hx of previous left first toe amputation. Failed outpatient antibiotics. CBC, CMP, and CPK were checked today and are normal.   - ID and surgery evaluated, greatly appreciate  - s/p I&D 05/12 & 05/14 with Dr Baez   - continue IV Zosyn and daptomycin through 6/26  - continue wound care  - weekly CBC, CMP, CPK (while on daptomycin): last done 6/7    MRI revealing \"Large open ulcer extending from the great toe amputation site into the first metatarsal head with osteomyelitis of the first metatarsal head\"        Type 2 diabetes mellitus with neurologic complication, without long-term current use of insulin (HCC)- (present on admission)   Assessment & Plan    Controlled and without hyperglycemia. Last A1c 5.8.  - Continue home metformin  - Continue Neurontin for neuropathy  - ASA 81 and Atorvastatin 20 mg        Thrombocytopenia (HCC)   Assessment & Plan    We will recheck in the morning, unclear etiology, possible medication effects versus heparin related, although less likely      "   Mental disability   Assessment & Plan    Likely long-standing        Diabetic peripheral neuropathy (HCC)- (present on admission)   Assessment & Plan    Pain controlled, mild tingling/neuropathy. Stable.  - Continue neurontin 300mg TID        Psychiatric disorder- (present on admission)   Assessment & Plan    Patient with intermittent agitation but typically cooperative. Redirectable.  - continue congentin, seroquel, prozac  - continue valproate for mood control  - ativan PO BID PRN, although getting it regularly  Will need outpatient follow-up          Quality-Core Measures   Reviewed items::  Medications reviewed  Olsen catheter::  No Olsen  DVT prophylaxis pharmacological::  Enoxaparin (Lovenox)  DVT prophylaxis - mechanical:  SCDs  Ulcer Prophylaxis::  Not indicated  Antibiotics:  Treating active infection/contamination beyond 24 hours perioperative coverage      Plan  Infectious disease follow-up appreciated, revised antibiotic regimen noted  Lotrisone to bilateral feet and lower legs to improve skin health, continue  Disposition remains difficult, patient is homeless  His mental debility certainly will be prohibitive longer-term success  Medically complex case  Vitamin support

## 2018-06-16 NOTE — PROGRESS NOTES
"Patient seen and examined    Blood pressure 129/74, pulse 62, temperature 36.6 °C (97.8 °F), resp. rate 18, height 1.93 m (6' 4\"), weight 108.4 kg (238 lb 15.7 oz), SpO2 94 %.    Recent Labs      06/14/18   0505  06/16/18   0422   WBC  5.2  5.3   RBC  4.51*  4.54*   HEMOGLOBIN  13.6*  13.9*   HEMATOCRIT  41.3*  40.8*   MCV  91.6  89.9   MCH  30.2  30.6   MCHC  32.9*  34.1   RDW  48.1  46.3   PLATELETCT  115*  111*   MPV  10.3  9.8       No acute distress  Dressing clean dry and intact  Neurovascularly intact    POD#32    Plan:  DVT Prophylaxis- TEDS/SCDs  Weight Bearing Status-WBAT  Antibiotics: per ID  Case Coordination          "

## 2018-06-16 NOTE — CARE PLAN
Problem: Venous Thromboembolism (VTW)/Deep Vein Thrombosis (DVT) Prevention:  Goal: Patient will participate in Venous Thrombosis (VTE)/Deep Vein Thrombosis (DVT)Prevention Measures  Outcome: PROGRESSING AS EXPECTED  AM Lovenox administered per MAR as ordered    Problem: Bowel/Gastric:  Goal: Normal bowel function is maintained or improved  Outcome: PROGRESSING AS EXPECTED  Per pt, last bm yesterday. Did not want stool softeners this am.

## 2018-06-16 NOTE — PROGRESS NOTES
This RN assumed care of the pt at 0700. Pt resting quietly in bed at this time. No needs. Bed locked in lowest position. Call light within reach. Hourly rounding in place. Will monitor.

## 2018-06-16 NOTE — PROGRESS NOTES
Pt not wearing his treaded socks for fall prevention at this time. Pt states he does not want them on now, but might put them back on later. Pt educated on fall prevention. Will monitor.

## 2018-06-16 NOTE — PROGRESS NOTES
Received handoff report from JARROD Greer and assumed pt care at 1900.  Pt resting in bed comfortably. Assessment complete. Labs reviewed.  Patient and RN discussed plan of care and questions were answered. Bed in lowest and locked position and bed alarm is in place.  Call light and personal belongings within reach.

## 2018-06-17 PROCEDURE — 700111 HCHG RX REV CODE 636 W/ 250 OVERRIDE (IP): Performed by: INTERNAL MEDICINE

## 2018-06-17 PROCEDURE — 770021 HCHG ROOM/CARE - ISO PRIVATE

## 2018-06-17 PROCEDURE — A9270 NON-COVERED ITEM OR SERVICE: HCPCS | Performed by: HOSPITALIST

## 2018-06-17 PROCEDURE — 700111 HCHG RX REV CODE 636 W/ 250 OVERRIDE (IP): Performed by: HOSPITALIST

## 2018-06-17 PROCEDURE — 700105 HCHG RX REV CODE 258: Performed by: HOSPITALIST

## 2018-06-17 PROCEDURE — 700102 HCHG RX REV CODE 250 W/ 637 OVERRIDE(OP): Performed by: HOSPITALIST

## 2018-06-17 PROCEDURE — 99232 SBSQ HOSP IP/OBS MODERATE 35: CPT | Performed by: HOSPITALIST

## 2018-06-17 PROCEDURE — 700105 HCHG RX REV CODE 258: Performed by: INTERNAL MEDICINE

## 2018-06-17 RX ORDER — LORAZEPAM 2 MG/ML
.5-1 INJECTION INTRAMUSCULAR EVERY 4 HOURS PRN
Status: DISCONTINUED | OUTPATIENT
Start: 2018-06-17 | End: 2018-06-18

## 2018-06-17 RX ADMIN — QUETIAPINE FUMARATE 200 MG: 100 TABLET ORAL at 20:24

## 2018-06-17 RX ADMIN — FLUOXETINE HYDROCHLORIDE 60 MG: 20 CAPSULE ORAL at 09:32

## 2018-06-17 RX ADMIN — DAPTOMYCIN 620 MG: 500 INJECTION, POWDER, LYOPHILIZED, FOR SOLUTION INTRAVENOUS at 12:58

## 2018-06-17 RX ADMIN — THERA TABS 1 TABLET: TAB at 09:32

## 2018-06-17 RX ADMIN — VALPROIC ACID 250 MG: 250 CAPSULE, LIQUID FILLED ORAL at 20:25

## 2018-06-17 RX ADMIN — QUETIAPINE FUMARATE 100 MG: 100 TABLET ORAL at 12:58

## 2018-06-17 RX ADMIN — ATORVASTATIN CALCIUM 20 MG: 20 TABLET, FILM COATED ORAL at 20:24

## 2018-06-17 RX ADMIN — GABAPENTIN 300 MG: 300 CAPSULE ORAL at 20:24

## 2018-06-17 RX ADMIN — GABAPENTIN 300 MG: 300 CAPSULE ORAL at 17:12

## 2018-06-17 RX ADMIN — Medication 50 MG: at 09:33

## 2018-06-17 RX ADMIN — ENOXAPARIN SODIUM 40 MG: 100 INJECTION SUBCUTANEOUS at 09:31

## 2018-06-17 RX ADMIN — NYSTATIN 5 ML: 100000 SUSPENSION ORAL at 20:25

## 2018-06-17 RX ADMIN — OXYCODONE HYDROCHLORIDE 5 MG: 5 TABLET ORAL at 20:23

## 2018-06-17 RX ADMIN — LORAZEPAM 1 MG: 2 INJECTION INTRAMUSCULAR; INTRAVENOUS at 10:34

## 2018-06-17 RX ADMIN — VALPROIC ACID 250 MG: 250 CAPSULE, LIQUID FILLED ORAL at 09:35

## 2018-06-17 RX ADMIN — Medication 220 MG: at 09:32

## 2018-06-17 RX ADMIN — CLOTRIMAZOLE AND BETAMETHASONE DIPROPIONATE: 10; .5 CREAM TOPICAL at 09:34

## 2018-06-17 RX ADMIN — FOLIC ACID 1 MG: 1 TABLET ORAL at 09:32

## 2018-06-17 RX ADMIN — CEFTRIAXONE 2 G: 2 INJECTION, POWDER, FOR SOLUTION INTRAMUSCULAR; INTRAVENOUS at 09:34

## 2018-06-17 RX ADMIN — LORAZEPAM 1 MG: 1 TABLET ORAL at 06:44

## 2018-06-17 RX ADMIN — LORAZEPAM 1 MG: 2 INJECTION INTRAMUSCULAR; INTRAVENOUS at 20:23

## 2018-06-17 RX ADMIN — BENZTROPINE MESYLATE 1 MG: 1 TABLET ORAL at 20:24

## 2018-06-17 RX ADMIN — ASPIRIN 81 MG: 81 TABLET, COATED ORAL at 09:33

## 2018-06-17 RX ADMIN — GABAPENTIN 300 MG: 300 CAPSULE ORAL at 09:32

## 2018-06-17 RX ADMIN — CLOTRIMAZOLE AND BETAMETHASONE DIPROPIONATE: 10; .5 CREAM TOPICAL at 20:24

## 2018-06-17 RX ADMIN — BENZTROPINE MESYLATE 1 MG: 1 TABLET ORAL at 09:32

## 2018-06-17 RX ADMIN — METFORMIN HYDROCHLORIDE 500 MG: 500 TABLET, FILM COATED ORAL at 09:34

## 2018-06-17 RX ADMIN — SENNOSIDES AND DOCUSATE SODIUM 2 TABLET: 8.6; 5 TABLET ORAL at 09:32

## 2018-06-17 RX ADMIN — OXYCODONE HYDROCHLORIDE 5 MG: 5 TABLET ORAL at 06:43

## 2018-06-17 RX ADMIN — QUETIAPINE FUMARATE 100 MG: 100 TABLET ORAL at 09:32

## 2018-06-17 RX ADMIN — OXYCODONE HYDROCHLORIDE 5 MG: 5 TABLET ORAL at 13:07

## 2018-06-17 RX ADMIN — NYSTATIN 5 ML: 100000 SUSPENSION ORAL at 17:16

## 2018-06-17 RX ADMIN — NYSTATIN 5 ML: 100000 SUSPENSION ORAL at 12:58

## 2018-06-17 RX ADMIN — METFORMIN HYDROCHLORIDE 500 MG: 500 TABLET, FILM COATED ORAL at 17:12

## 2018-06-17 ASSESSMENT — PAIN SCALES - GENERAL
PAINLEVEL_OUTOF10: 4
PAINLEVEL_OUTOF10: 0
PAINLEVEL_OUTOF10: 8
PAINLEVEL_OUTOF10: 1
PAINLEVEL_OUTOF10: 1

## 2018-06-17 ASSESSMENT — ENCOUNTER SYMPTOMS
DIARRHEA: 0
DIAPHORESIS: 0
CLAUDICATION: 0
ORTHOPNEA: 0
FALLS: 0
SPUTUM PRODUCTION: 0
MYALGIAS: 0
COUGH: 0
HEADACHES: 0
NERVOUS/ANXIOUS: 1
BLURRED VISION: 0
TINGLING: 0
DIZZINESS: 0
FOCAL WEAKNESS: 0
MEMORY LOSS: 1
WEAKNESS: 0

## 2018-06-17 NOTE — PROGRESS NOTES
"LIMB PRESERVATION SERVICE NOTE:    Subjective:      Reason for Consultation: S/P I and D Left Great Toe 5/12/18 Dr Baez    History of Present Illness:     Patient is well known to LPS and outpatient wound care services.     Patient is a 52 y.o. male with a past medical history that includes borderline diabetes, anxiety, HTN, Hep C and is admitted to [facility] via LPS Rnds for his S/P I and D Left Great Toe 5/12/18 Dr Baez.  S/P Left Great Toe Amp by Dr Easton 4/23/18. The s/p left great toe amputation site was from 4/1/18 by Dr. Baez. Pt did not follow up with the [facility] clinic for dressing care and he did not receive oral antibiotics. He also did not follow up with his PCP at [facility]. Pt has Hx of noncompliance and psychiatric disorder which is likely contributory.           Lab Results   Component Value Date/Time    HBA1C 5.8 (H) 03/30/2018 03:04 PM      Patient denies fevers chills, nausea, vomiting.     Pain:        Patient resting comfortably    Vitals  /97   Pulse 62   Temp 36.1 °C (96.9 °F)   Resp 18   Ht 1.93 m (6' 4\")   Wt 108.4 kg (238 lb 15.7 oz)   SpO2 94%   BMI 29.09 kg/m²       Objective:         Surgical site well approximated,   Wound essentially healed     Pressure ulcers stage 2 to midfoot lateral, aspect. Improving.   Left foot dorsal pressure injury stage 2  RESOLVED-- 06/12/2018    Tests and Measures:    Labs  Recent Labs      06/16/18   0422   WBC  5.3   RBC  4.54*   HEMOGLOBIN  13.9*   HEMATOCRIT  40.8*   MCV  89.9   MCH  30.6   MCHC  34.1   RDW  46.3   PLATELETCT  111*   MPV  9.8     No results for input(s): SODIUM, POTASSIUM, CHLORIDE, CO2, GLUCOSE, BUN, CPKTOTAL in the last 72 hours.      Plan:        Treatment Plan and Recommendations:     1. Labs\Imaging:         Reviewed     2. Treatment:              Continue Wound Care by Nursing, LPS to Follow.                                      Dressing Orders Updated                                      Nursing to change " dressing Q 72 hours. -HWB LLE   3.Collaboration:                                                 ID managing abx, Zosyn and Dapto x 6 weeks     4. Orthotics/Prosthetics:                                       pt to get orthotics/prosthetics  as OP, pt to wear shoes that do not rub on Wound sites      Anticipated discharge plans (X):              SNF:         X  Case management working on SNF placement, no accepting facilities as yet              Home Care:                       Outpatient Wound Center: X                   Self Care:                         Other:                       TBD:  Patient requires skilled therapeutic intervention for debridement, product selection and application, education, wound bed preparation and assessment.

## 2018-06-17 NOTE — CARE PLAN
Problem: Communication  Goal: The ability to communicate needs accurately and effectively will improve    Intervention: [place] patient and significant other/support system to call light to alert staff of needs  Pt does not call nursing staff appropriately.       Problem: Skin Integrity  Goal: Risk for impaired skin integrity will decrease  Outcome: PROGRESSING AS EXPECTED  Educated pt on the importance of frequent repositioning. Pt states that he frequently repositions himself.

## 2018-06-17 NOTE — PROGRESS NOTES
[facility] Hospitalist Progress Note    Date of Service: 6/17/2018    Chief Complaint  52 y.o. male admitted 5/11/2018 with diabetic left foot ulcer.  Underwent I&D with  May 12, 2018 and May 14, 2018. Seen by infectious disease team during the hospital stay. Plan to continue IV Zosyn and daptomycin with the stop date of June 26, 2018. Difficult SNF disposition given insurance issues and use of daptomycin for antibiotics.    Interval Problem Update  Patient seen and examined today.    Patient tolerating treatment and therapies.  All Data, Medication data reviewed.  Case discussed with nursing as available.  Plan of Care reviewed with patient and notified of changes.  6/12 the patient seems to have poor understanding of his current condition, he understands his need for continued IV antibiotics, he is homeless, and has a poor plan for after being discharged, his wound and foot care is not great   6/13 the patient is still exhibiting poor understanding of his situation, he thinks he could do IV antibiotics at home easily, though he is homeless.  His skin of bilateral legs and feet look better  6/14 the patient tolerates therapy, denies fevers chills, denies much pain, remains with affect, platelets are slowly decreasing, discussed with pharmacy  6/15 the patient feels okay, no fever, discussed the case with infectious disease re possible antibiotic change because of developing thrombocytopenia  6/16 the patient feels okay, his platelets are still slightly lower  6/17 the patient is agitated today and complains of sore, he remains mentally challenged and has poor overall insight  Consultants/Specialty  Infectious disease-  Orthopedics  LPS    Disposition  Insurance barriers to SNF acceptance  , to evaluate for RICHARD for daptomycin, ?  [facility] possibility  Remains inpatient until clarified        Review of Systems   Constitutional: Negative for diaphoresis.   HENT: Negative for congestion.    Eyes:  Negative for blurred vision.   Respiratory: Negative for cough and sputum production.    Cardiovascular: Negative for orthopnea and claudication.   Gastrointestinal: Negative for diarrhea.   Genitourinary: Negative for dysuria and urgency.   Musculoskeletal: Negative for falls and myalgias.   Skin: Positive for rash.   Neurological: Negative for dizziness, tingling, focal weakness, weakness and headaches.   Psychiatric/Behavioral: Positive for memory loss. The patient is nervous/anxious.         Denies any issue      Physical Exam  Laboratory/Imaging   Hemodynamics  Temp (24hrs), Av.4 °C (97.6 °F), Min:36.1 °C (96.9 °F), Max:36.9 °C (98.4 °F)   Temperature: 36.1 °C (96.9 °F)  Pulse  Av.7  Min: 52  Max: 112   Blood Pressure: 118/97      Respiratory      Respiration: 18, Pulse Oximetry: 94 %        RUL Breath Sounds: Clear, RML Breath Sounds: Diminished, RLL Breath Sounds: Diminished, LORENA Breath Sounds: Clear, LLL Breath Sounds: Diminished    Fluids    Intake/Output Summary (Last 24 hours) at 18 1349  Last data filed at 18 0900   Gross per 24 hour   Intake              240 ml   Output                0 ml   Net              240 ml       Nutrition  Orders Placed This Encounter   Procedures   • DIET ORDER     Standing Status:   Standing     Number of Occurrences:   1     Order Specific Question:   Diet:     Answer:   Regular [1]     Physical Exam   Constitutional: He appears well-developed. No distress.   HENT:   Head: Normocephalic.   Mouth/Throat: Oropharynx is clear and moist.   Eyes: EOM are normal. No scleral icterus.   Neck: Normal range of motion. No tracheal deviation present.   Cardiovascular: Normal rate, regular rhythm and intact distal pulses.    No murmur heard.  Pulmonary/Chest: Effort normal. He has no wheezes. He has no rales.   Abdominal: Soft. There is no tenderness. There is no rebound.   Musculoskeletal: He exhibits deformity. He exhibits no edema.   s/p left great toe amputation  "(with h/o second toe amputation). Healing well and no tenderness to palpation. Neurovascularly intact     Neurological: He is alert.   Skin: Skin is warm and dry.   Bilateral feet with cracking and dry skin, currently improved   Psychiatric: His speech is delayed. He is slowed. Cognition and memory are impaired. He expresses impulsivity.   Vitals reviewed.      Recent Labs      06/16/18   0422   WBC  5.3   RBC  4.54*   HEMOGLOBIN  13.9*   HEMATOCRIT  40.8*   MCV  89.9   MCH  30.6   MCHC  34.1   RDW  46.3   PLATELETCT  111*   MPV  9.8                          Assessment/Plan     Diabetic ulcer of left foot associated with type 2 diabetes mellitus, with muscle involvement without evidence of necrosis (HCC)- (present on admission)   Assessment & Plan    Hx of diabetic nonhealing ulcer, hx of previous left first toe amputation. Failed outpatient antibiotics. CBC, CMP, and CPK were checked today and are normal.   - ID and surgery evaluated, greatly appreciate  - s/p I&D 05/12 & 05/14 with Dr Baez   - continue IV Zosyn and daptomycin through 6/26  - continue wound care  - weekly CBC, CMP, CPK (while on daptomycin): last done 6/7    MRI revealing \"Large open ulcer extending from the great toe amputation site into the first metatarsal head with osteomyelitis of the first metatarsal head\"        Type 2 diabetes mellitus with neurologic complication, without long-term current use of insulin (HCC)- (present on admission)   Assessment & Plan    Controlled and without hyperglycemia. Last A1c 5.8.  - Continue home metformin  - Continue Neurontin for neuropathy  - ASA 81 and Atorvastatin 20 mg        Thrombocytopenia (HCC)   Assessment & Plan    We will recheck in the morning, unclear etiology, possible medication effects versus heparin related, although less likely        Mental disability   Assessment & Plan    Likely long-standing        Diabetic peripheral neuropathy (HCC)- (present on admission)   Assessment & Plan    " Pain controlled, mild tingling/neuropathy. Stable.  - Continue neurontin 300mg TID        Psychiatric disorder- (present on admission)   Assessment & Plan    Patient with intermittent agitation but typically cooperative. Redirectable.  - continue congentin, seroquel, prozac  - continue valproate for mood control  - ativan PO BID PRN, although getting it regularly  Will need outpatient follow-up          Quality-Core Measures   Reviewed items::  Medications reviewed  Olsen catheter::  No Olsen  DVT prophylaxis pharmacological::  Enoxaparin (Lovenox)  DVT prophylaxis - mechanical:  SCDs  Ulcer Prophylaxis::  Not indicated  Antibiotics:  Treating active infection/contamination beyond 24 hours perioperative coverage      Plan  Infectious disease follow-up appreciated, revised antibiotic regimen noted  Lotrisone to bilateral feet and lower legs to improve skin health, continue  Disposition remains difficult, patient is homeless  His mental debility certainly will be prohibitive longer-term success  Prescribed lozenges, Magic mouthwash  Low-dose Ativan for agitation  Medically complex case  Vitamin support

## 2018-06-17 NOTE — PROGRESS NOTES
Received handoff report from JARROD Bunn and assumed pt care at 1900.  Pt resting in bed comfortably. Assessment complete. Labs reviewed.  Patient and RN discussed plan of care and questions were answered. Bed in lowest and locked position and bed alarm is in place.  Call light and personal belongings within reach.

## 2018-06-17 NOTE — CARE PLAN
Problem: Safety  Goal: Will remain free from injury  Outcome: PROGRESSING AS EXPECTED  Bed alarm in place. Call light within reach. Hourly rounding completed. Pt safe and free from falls.     Problem: Infection  Goal: Will remain free from infection  Outcome: PROGRESSING AS EXPECTED  Hand and personal hygiene promoted. All precautions maintained in and out of room

## 2018-06-18 PROCEDURE — 770021 HCHG ROOM/CARE - ISO PRIVATE

## 2018-06-18 PROCEDURE — 700111 HCHG RX REV CODE 636 W/ 250 OVERRIDE (IP): Performed by: HOSPITALIST

## 2018-06-18 PROCEDURE — 700102 HCHG RX REV CODE 250 W/ 637 OVERRIDE(OP): Performed by: HOSPITALIST

## 2018-06-18 PROCEDURE — 700105 HCHG RX REV CODE 258: Performed by: INTERNAL MEDICINE

## 2018-06-18 PROCEDURE — 99232 SBSQ HOSP IP/OBS MODERATE 35: CPT | Performed by: HOSPITALIST

## 2018-06-18 PROCEDURE — 700105 HCHG RX REV CODE 258: Performed by: HOSPITALIST

## 2018-06-18 PROCEDURE — 99232 SBSQ HOSP IP/OBS MODERATE 35: CPT | Performed by: INTERNAL MEDICINE

## 2018-06-18 PROCEDURE — A9270 NON-COVERED ITEM OR SERVICE: HCPCS | Performed by: HOSPITALIST

## 2018-06-18 PROCEDURE — 700111 HCHG RX REV CODE 636 W/ 250 OVERRIDE (IP): Performed by: INTERNAL MEDICINE

## 2018-06-18 RX ORDER — HALOPERIDOL 5 MG/ML
5 INJECTION INTRAMUSCULAR EVERY 4 HOURS PRN
Status: DISCONTINUED | OUTPATIENT
Start: 2018-06-18 | End: 2018-06-26 | Stop reason: HOSPADM

## 2018-06-18 RX ORDER — LORAZEPAM 1 MG/1
1 TABLET ORAL 2 TIMES DAILY PRN
Status: DISCONTINUED | OUTPATIENT
Start: 2018-06-18 | End: 2018-06-26 | Stop reason: HOSPADM

## 2018-06-18 RX ADMIN — CLOTRIMAZOLE AND BETAMETHASONE DIPROPIONATE: 10; .5 CREAM TOPICAL at 09:37

## 2018-06-18 RX ADMIN — NYSTATIN 5 ML: 100000 SUSPENSION ORAL at 12:15

## 2018-06-18 RX ADMIN — FOLIC ACID 1 MG: 1 TABLET ORAL at 09:39

## 2018-06-18 RX ADMIN — GABAPENTIN 300 MG: 300 CAPSULE ORAL at 14:06

## 2018-06-18 RX ADMIN — DAPTOMYCIN 620 MG: 500 INJECTION, POWDER, LYOPHILIZED, FOR SOLUTION INTRAVENOUS at 12:14

## 2018-06-18 RX ADMIN — Medication 50 MG: at 09:38

## 2018-06-18 RX ADMIN — ATORVASTATIN CALCIUM 20 MG: 20 TABLET, FILM COATED ORAL at 21:25

## 2018-06-18 RX ADMIN — BENZTROPINE MESYLATE 1 MG: 1 TABLET ORAL at 21:25

## 2018-06-18 RX ADMIN — METFORMIN HYDROCHLORIDE 500 MG: 500 TABLET, FILM COATED ORAL at 18:09

## 2018-06-18 RX ADMIN — QUETIAPINE FUMARATE 100 MG: 100 TABLET ORAL at 09:35

## 2018-06-18 RX ADMIN — CLOTRIMAZOLE AND BETAMETHASONE DIPROPIONATE: 10; .5 CREAM TOPICAL at 21:25

## 2018-06-18 RX ADMIN — GABAPENTIN 300 MG: 300 CAPSULE ORAL at 09:36

## 2018-06-18 RX ADMIN — CEFTRIAXONE 2 G: 2 INJECTION, POWDER, FOR SOLUTION INTRAMUSCULAR; INTRAVENOUS at 09:36

## 2018-06-18 RX ADMIN — GABAPENTIN 300 MG: 300 CAPSULE ORAL at 21:25

## 2018-06-18 RX ADMIN — QUETIAPINE FUMARATE 100 MG: 100 TABLET ORAL at 12:14

## 2018-06-18 RX ADMIN — VALPROIC ACID 250 MG: 250 CAPSULE, LIQUID FILLED ORAL at 09:38

## 2018-06-18 RX ADMIN — VALPROIC ACID 250 MG: 250 CAPSULE, LIQUID FILLED ORAL at 21:25

## 2018-06-18 RX ADMIN — NYSTATIN 5 ML: 100000 SUSPENSION ORAL at 21:25

## 2018-06-18 RX ADMIN — QUETIAPINE FUMARATE 200 MG: 100 TABLET ORAL at 21:25

## 2018-06-18 RX ADMIN — METFORMIN HYDROCHLORIDE 500 MG: 500 TABLET, FILM COATED ORAL at 09:36

## 2018-06-18 RX ADMIN — HALOPERIDOL LACTATE 5 MG: 5 INJECTION, SOLUTION INTRAMUSCULAR at 14:01

## 2018-06-18 RX ADMIN — LORAZEPAM 1 MG: 1 TABLET ORAL at 18:09

## 2018-06-18 RX ADMIN — Medication 220 MG: at 09:36

## 2018-06-18 RX ADMIN — ASPIRIN 81 MG: 81 TABLET, COATED ORAL at 09:36

## 2018-06-18 RX ADMIN — ENOXAPARIN SODIUM 40 MG: 100 INJECTION SUBCUTANEOUS at 09:35

## 2018-06-18 RX ADMIN — THERA TABS 1 TABLET: TAB at 09:36

## 2018-06-18 RX ADMIN — SENNOSIDES AND DOCUSATE SODIUM 2 TABLET: 8.6; 5 TABLET ORAL at 09:35

## 2018-06-18 RX ADMIN — BENZTROPINE MESYLATE 1 MG: 1 TABLET ORAL at 09:36

## 2018-06-18 RX ADMIN — NYSTATIN 5 ML: 100000 SUSPENSION ORAL at 09:38

## 2018-06-18 RX ADMIN — FLUOXETINE HYDROCHLORIDE 60 MG: 20 CAPSULE ORAL at 09:35

## 2018-06-18 ASSESSMENT — ENCOUNTER SYMPTOMS
SPUTUM PRODUCTION: 0
SENSORY CHANGE: 1
FOCAL WEAKNESS: 0
FEVER: 0
NAUSEA: 0
DIARRHEA: 0
MYALGIAS: 0
NERVOUS/ANXIOUS: 1
ABDOMINAL PAIN: 0
WEAKNESS: 0
HEADACHES: 0
DIZZINESS: 0
COUGH: 0
SHORTNESS OF BREATH: 0
CHILLS: 0
ORTHOPNEA: 0
VOMITING: 0
BLURRED VISION: 0
FALLS: 0
CLAUDICATION: 0
TINGLING: 0
MEMORY LOSS: 1
DIAPHORESIS: 0

## 2018-06-18 ASSESSMENT — PAIN SCALES - GENERAL
PAINLEVEL_OUTOF10: 1
PAINLEVEL_OUTOF10: 3

## 2018-06-18 NOTE — CARE PLAN
Problem: Safety  Goal: Will remain free from falls  Outcome: PROGRESSING SLOWER THAN EXPECTED  Patient educated on high fall risk precautions, socks on, sign outside door, bed alarm on, call light in reach. Educated on using call light for needs.    Problem: Knowledge Deficit  Goal: Knowledge of disease process/condition, treatment plan, diagnostic tests, and medications will improve  Outcome: NOT MET  Educated patient on nightly medications

## 2018-06-18 NOTE — PROGRESS NOTES
"Infectious Disease Progress Note    Author: Aida Carvalho M.D. Date & Time of service: 2018  9:27 AM    Chief Complaint:  Left foot wound dehiscence with osteomyelitis f/u    Interval History:  52 y.o. WM admitted 2018 due to left foot wound dehiscence at amp site   AF WBC 5 denies any pain-ate all of breakfast   AF plan for repeat surgery today. No new complaints  5/15 AF WBC 4.6 cxs now polymicrobial somnolent   AF alert and anxious today, jittery states \"I don't want them to cut my leg off\"   AF less anxious today-apparently refused PICC. Seen with LPS   AF no CBC, does not believe he has recurrent infection bc he marcum not have pain, anxious to per RN   AF pt states his BS was 114 this am, poor insight into illness   AF no complaints   AF denies SE from abx   AF no CBC states he has drainage from left 2nd toe, ongoing pain in left foot but controlled  2018 MAXIMUM TEMPERATURE 98.4-5.3 platelets 151 creatinine 0.88  6/15 AF WBC 5.2 asked to re-eval abx due to worsening thrombocytopenia  2018 MAXIMUM TEMPERATURE 97.8 denies any new issues WBC 23 and platelets 111  Labs Reviewed, Medications Reviewed, Radiology Reviewed and Wound Reviewed.    Review of Systems:  Review of Systems   Constitutional: Negative for chills and fever.   Respiratory: Negative for cough and shortness of breath.    Cardiovascular: Negative for chest pain.   Gastrointestinal: Negative for abdominal pain, nausea and vomiting.   Musculoskeletal: Positive for joint pain. Negative for myalgias.   Neurological: Positive for sensory change.        Neuropathy   All other systems reviewed and are negative.      Hemodynamics:  Temp (24hrs), Av.6 °C (97.8 °F), Min:36.6 °C (97.8 °F), Max:36.6 °C (97.8 °F)  Temperature: 36.6 °C (97.8 °F)  Pulse  Av.8  Min: 52  Max: 112  Blood Pressure: 118/69       Physical Exam:  Physical Exam   Constitutional: No distress.   Disheveled    Restless "   HENT:   Poor dentition   Cardiovascular: Regular rhythm and normal heart sounds.    Pulmonary/Chest: Breath sounds normal. No respiratory distress.   Abdominal: Soft. He exhibits no distension.   Musculoskeletal: He exhibits no edema.   LUE PICC     Neurological:   Flat affect     Skin: He is not diaphoretic.   Foot dressed   Psychiatric:   Poor insight into illness     Nursing note and vitals reviewed.      Meds:    Current Facility-Administered Medications:   •  PICC Line Insertion has been implemented **AND** May use Lidocaine 1% not to exceed 3 mls for local at insertion site **AND** NOTIFY MD **AND** Tip to dwell in the superior vena cava **AND** Do not use PICC Line until placement verified by Chest X Ray **AND** [CANCELED] DX-CHEST-FOR PICC LINE Perform procedure in: Patient's Room **AND** If radiologist reading of chest X-ray states any of the following the PICC should be used **AND** Further evaluation of the PICC placement can be retrieved from X-Ray and Imaging **AND** Blood draws through PICC line; draws by RN only **AND** FLUSHING GUIDELINES WHEN IN USE **AND** normal saline PF **AND** FLUSHING GUIDELINES WHEN NOT IN USE **AND** DRESSING MAINTENANCE **AND** Change needleless pressure ports and IV tubing every 72 hours per hospital policy **AND** TUBING **AND** If there is an MD order to remove the PICC line, any RN may remove the PICC line **AND** [] PATIENT EDUCATION MATERIALS **AND** NURSING COMMUNICATION  •  benzocaine-menthol  •  nystatin-tetracycline-prednisone-diphenhydramine  •  LORazepam  •  pyridoxine  •  cefTRIAXone (ROCEPHIN) IV  •  multivitamin  •  folic acid  •  acetaminophen  •  aspirin EC  •  atorvastatin  •  benztropine  •  clotrimazole-betamethasone  •  DAPTOmycin  •  enoxaparin (LOVENOX) injection  •  FLUoxetine  •  gabapentin  •  metFORMIN  •  oxyCODONE immediate-release  •  QUEtiapine **AND** QUEtiapine  •  Respiratory Care per Protocol  •  valproic acid  •  senna-docusate  **AND** [DISCONTINUED] polyethylene glycol/lytes **AND** [DISCONTINUED] magnesium hydroxide **AND** [DISCONTINUED] bisacodyl  •  zinc sulfate    Labs:  Recent Labs      06/16/18   0422   WBC  5.3   RBC  4.54*   HEMOGLOBIN  13.9*   HEMATOCRIT  40.8*   MCV  89.9   MCH  30.6   RDW  46.3   PLATELETCT  111*   MPV  9.8     No results for input(s): SODIUM, POTASSIUM, CHLORIDE, CO2, GLUCOSE, BUN, CPKTOTAL in the last 72 hours.  No results for input(s): ALBUMIN, TBILIRUBIN, ALKPHOSPHAT, TOTPROTEIN, ALTSGPT, ASTSGOT, CREATININE in the last 72 hours.    Imaging:  Dx-foot-2- Left    Result Date: 4/20/2018 4/20/2018 10:19 AM HISTORY/REASON FOR EXAM:  Pain/Deformity Following Trauma Weeping great toe TECHNIQUE/EXAM DESCRIPTION AND NUMBER OF VIEWS: 2 nonweightbearing views of the LEFT foot. COMPARISON:  3/31/2018 FINDINGS: Patient is status post amputation of the first digit at the level of the proximal phalanx. There is minimal osseous irregularity at the level of the amputation. There is faint adjacent calcification which may be dystrophic.  There are mild degenerative changes of the first tarsometatarsal joint. No acute fracture or dislocation is seen. There is soft tissue swelling about the medial forefoot and remainder of the first digit.     Post surgical changes status post amputation at the level of the proximal phalanx of the first digit. Linear amorphous calcification adjacent to the amputation may be dystrophic. There is minimal osseous irregularity at the level of the amputation where it is difficult to exclude osteomyelitis. Soft tissue swelling of the forefoot and remainder of the first digit.     Mr-foot-with & W/o Left    Result Date: 5/12/2018 5/12/2018 10:44 AM HISTORY/REASON FOR EXAM:  Open wound. Open wound,?surgical site at great toe amp site, please eval for OM and/ or abscess. TECHNIQUE/EXAM DESCRIPTION: MRI of the LEFT foot with and without contrast. The study was performed on a [employer] 1.5 Steph MRI  scanner. T1 axial, T1 sagittal, T1 coronal, STIR sagittal, T2 fast spin-echo fat-suppressed coronal, sagittal inversion recovery, and T1 postcontrast coronal images were obtained. 20 mL Omniscan contrast was administered intravenously. COMPARISON: Foot radiograph 4/20/2018. FINDINGS: Diffuse soft tissue swelling about the dorsum of foot. Increased signal within the intrinsic foot muscles, likely related to neuropathic change. There is an open ulcer extending from the great toe amputation site into the first metatarsal head with marrow replacement. No walled fluid collection seen.     Large open ulcer extending from the great toe amputation site into the first metatarsal head with osteomyelitis of the first metatarsal head. No walled fluid collection seen.      Micro:  Results     ** No results found for the last 168 hours. **          Assessment:  Active Hospital Problems    Diagnosis   • Diabetic ulcer of left foot associated with type 2 diabetes mellitus, with muscle involvement without evidence of necrosis ([facility]) [E11.621, L97.525]   • Dehiscence of surgical wound [T81.31XA]   • Diabetic peripheral neuropathy ([facility]) [E11.42]   • Psychiatric disorder [F99]   • Type 2 diabetes mellitus with neurologic complication, without long-term current use of insulin ([facility]) [E11.49]       Plan:  Left foot osteomyelitis  Afebrile  No leukocytosis   S/p left great amp prox phalanx on 4/1- cx polymicrobial: group G and C strep, morganella , diphtheroids  S/p left great toe amp down to MTP joint on 4/23. Clean margins obtained per OP note- OR cx (proximal bone) - mixed skin christine  Failed outpatient therapy-clinical osteomyelitis, probe to bone and MRI +ostemyelitis  s/p I and D 5/12-MRSA, staph haemolyticus(MRSE), peptostreptococcus, and Pasteurella  s/p I and D 5/14  DC Zosyn and continue dapto (vanco JUNIOR is 2). Add ceftriaxone  Monitor CPK weekly  Endpoint clinical-anticipate 6 weeks IV if no TMA/ BKA  Stop date  6/26/2018    Thrombocytopenia, improved    DM2  Last HgA1c 5.8  Keep BS under 150 to help control current infection    Psychiatric disorder  Contributing to noncompliance and poor insight    Prognosis for limb salvage poor  Recommend placement.   Difficult discharge    DW Dr Pruett  ID will sign off. Please call as needed

## 2018-06-18 NOTE — CARE PLAN
Problem: Safety  Goal: Will remain free from falls  Outcome: PROGRESSING AS EXPECTED  Pt educated on fall precautions, but not complying. Bed low and locked with bed alarm x2 in place, call light within reach. Hourly rounding in place

## 2018-06-18 NOTE — PROGRESS NOTES
[facility] Hospitalist Progress Note    Date of Service: 6/18/2018    Chief Complaint  52 y.o. male admitted 5/11/2018 with diabetic left foot ulcer.  Underwent I&D with  May 12, 2018 and May 14, 2018. Seen by infectious disease team during the hospital stay. Plan to continue IV Zosyn and daptomycin with the stop date of June 26, 2018. Difficult SNF disposition given insurance issues and use of daptomycin for antibiotics.    Interval Problem Update  Patient seen and examined today.    Patient tolerating treatment and therapies.  All Data, Medication data reviewed.  Case discussed with nursing as available.  Plan of Care reviewed with patient and notified of changes.  6/12 the patient seems to have poor understanding of his current condition, he understands his need for continued IV antibiotics, he is homeless, and has a poor plan for after being discharged, his wound and foot care is not great   6/13 the patient is still exhibiting poor understanding of his situation, he thinks he could do IV antibiotics at home easily, though he is homeless.  His skin of bilateral legs and feet look better  6/14 the patient tolerates therapy, denies fevers chills, denies much pain, remains with affect, platelets are slowly decreasing, discussed with pharmacy  6/15 the patient feels okay, no fever, discussed the case with infectious disease re possible antibiotic change because of developing thrombocytopenia  6/16 the patient feels okay, his platelets are still slightly lower  6/17 the patient is agitated today and complains of sore, he remains mentally challenged and has poor overall insight  6/18 the patient is still anxious apparently did much better on oral Ativan we'll attempt to reinstitute that regimen to regimen     Consultants/Specialty  Infectious disease-  Orthopedics  LPS    Disposition  Insurance barriers to SNF acceptance  , to evaluate for [facility] for daptomycin, ?  [facility] possibility  Remains  inpatient until clarified        Review of Systems   Constitutional: Negative for diaphoresis.   HENT: Negative for congestion.    Eyes: Negative for blurred vision.   Respiratory: Negative for cough and sputum production.    Cardiovascular: Negative for orthopnea and claudication.   Gastrointestinal: Negative for diarrhea.   Genitourinary: Negative for dysuria and urgency.   Musculoskeletal: Negative for falls and myalgias.   Skin: Positive for rash.   Neurological: Negative for dizziness, tingling, focal weakness, weakness and headaches.   Psychiatric/Behavioral: Positive for memory loss. The patient is nervous/anxious.         Denies any issue      Physical Exam  Laboratory/Imaging   Hemodynamics  Temp (24hrs), Av.6 °C (97.8 °F), Min:36.6 °C (97.8 °F), Max:36.6 °C (97.8 °F)   Temperature: 36.6 °C (97.8 °F)  Pulse  Av.8  Min: 52  Max: 112   Blood Pressure: 118/69      Respiratory      Respiration: 18, Pulse Oximetry: 96 %        RUL Breath Sounds: Clear, RML Breath Sounds: Clear, RLL Breath Sounds: Clear, LORENA Breath Sounds: Clear, LLL Breath Sounds: Clear    Fluids    Intake/Output Summary (Last 24 hours) at 18 1144  Last data filed at 18 1000   Gross per 24 hour   Intake              540 ml   Output              400 ml   Net              140 ml       Nutrition  Orders Placed This Encounter   Procedures   • DIET ORDER     Standing Status:   Standing     Number of Occurrences:   1     Order Specific Question:   Diet:     Answer:   Regular [1]     Physical Exam   Constitutional: He appears well-developed. No distress.   HENT:   Head: Normocephalic.   Mouth/Throat: Oropharynx is clear and moist.   Eyes: EOM are normal. No scleral icterus.   Neck: Normal range of motion. No tracheal deviation present.   Cardiovascular: Normal rate, regular rhythm and intact distal pulses.    No murmur heard.  Pulmonary/Chest: Effort normal. He has no wheezes. He has no rales.   Abdominal: Soft. There is no  "tenderness. There is no rebound.   Musculoskeletal: He exhibits deformity. He exhibits no edema.   s/p left great toe amputation (with h/o second toe amputation). Healing well and no tenderness to palpation. Neurovascularly intact     Neurological: He is alert.   Skin: Skin is warm and dry.   Bilateral feet with cracking and dry skin, currently improved   Psychiatric: His speech is delayed. He is slowed. Cognition and memory are impaired. He expresses impulsivity.   Vitals reviewed.      Recent Labs      06/16/18   0422   WBC  5.3   RBC  4.54*   HEMOGLOBIN  13.9*   HEMATOCRIT  40.8*   MCV  89.9   MCH  30.6   MCHC  34.1   RDW  46.3   PLATELETCT  111*   MPV  9.8                          Assessment/Plan     Diabetic ulcer of left foot associated with type 2 diabetes mellitus, with muscle involvement without evidence of necrosis (HCC)- (present on admission)   Assessment & Plan    Hx of diabetic nonhealing ulcer, hx of previous left first toe amputation. Failed outpatient antibiotics. CBC, CMP, and CPK were checked today and are normal.   - ID and surgery evaluated, greatly appreciate  - s/p I&D 05/12 & 05/14 with Dr Baez   - continue IV Zosyn and daptomycin through 6/26  - continue wound care  - weekly CBC, CMP, CPK (while on daptomycin): last done 6/7    MRI revealing \"Large open ulcer extending from the great toe amputation site into the first metatarsal head with osteomyelitis of the first metatarsal head\"        Type 2 diabetes mellitus with neurologic complication, without long-term current use of insulin (HCC)- (present on admission)   Assessment & Plan    Controlled and without hyperglycemia. Last A1c 5.8.  - Continue home metformin  - Continue Neurontin for neuropathy  - ASA 81 and Atorvastatin 20 mg        Thrombocytopenia (HCC)   Assessment & Plan    We will recheck in the morning, unclear etiology, possible medication effects versus heparin related, although less likely        Mental disability "   Assessment & Plan    Likely long-standing        Diabetic peripheral neuropathy (HCC)- (present on admission)   Assessment & Plan    Pain controlled, mild tingling/neuropathy. Stable.  - Continue neurontin 300mg TID        Psychiatric disorder- (present on admission)   Assessment & Plan    Patient with intermittent agitation but typically cooperative. Redirectable.  - continue congentin, seroquel, prozac  - continue valproate for mood control  - ativan PO BID PRN, although getting it regularly  Will need outpatient follow-up          Quality-Core Measures   Reviewed items::  Medications reviewed  Olsen catheter::  No Olsen  DVT prophylaxis pharmacological::  Enoxaparin (Lovenox)  DVT prophylaxis - mechanical:  SCDs  Ulcer Prophylaxis::  Not indicated  Antibiotics:  Treating active infection/contamination beyond 24 hours perioperative coverage      Plan  Infectious disease follow-up appreciated, revised antibiotic regimen noted  Lotrisone to bilateral feet and lower legs to improve skin health, continue  Disposition remains difficult, patient is homeless  His mental debility certainly will be prohibitive longer-term success  Follow-up and platelets tomorrow  Low dose Ativan for agitation  Medically complex case  Vitamin support

## 2018-06-18 NOTE — PROGRESS NOTES
Pt was very impulsive today. He got up many many times during day shift. He yelled at staff when they accompanied him around halls. IV ativan given for agitation. See MAR. Pt running intermittent abx. BM today. Tolerating PO meds and solid foods. Was awake for most of day today. Pt took small nap off and on. VSS. Pt afebrile. New tubing today for TKO fluids and abx. Wound care saw patient today. See note.

## 2018-06-18 NOTE — PROGRESS NOTES
Received bedside report. Alert and oriented x3. Discussed high fall risk with patient. All high fall risk precautions in place. Reinforced call light use. Call light in reach, personal belongings at bedside. No further questions or concerns at this time.

## 2018-06-19 LAB
BASOPHILS # BLD AUTO: 0.9 % (ref 0–1.8)
BASOPHILS # BLD: 0.06 K/UL (ref 0–0.12)
EOSINOPHIL # BLD AUTO: 0.41 K/UL (ref 0–0.51)
EOSINOPHIL NFR BLD: 6.2 % (ref 0–6.9)
ERYTHROCYTE [DISTWIDTH] IN BLOOD BY AUTOMATED COUNT: 45.9 FL (ref 35.9–50)
HCT VFR BLD AUTO: 40.8 % (ref 42–52)
HGB BLD-MCNC: 13.7 G/DL (ref 14–18)
IMM GRANULOCYTES # BLD AUTO: 0.01 K/UL (ref 0–0.11)
IMM GRANULOCYTES NFR BLD AUTO: 0.2 % (ref 0–0.9)
LYMPHOCYTES # BLD AUTO: 2.39 K/UL (ref 1–4.8)
LYMPHOCYTES NFR BLD: 36 % (ref 22–41)
MCH RBC QN AUTO: 30.1 PG (ref 27–33)
MCHC RBC AUTO-ENTMCNC: 33.6 G/DL (ref 33.7–35.3)
MCV RBC AUTO: 89.7 FL (ref 81.4–97.8)
MONOCYTES # BLD AUTO: 0.43 K/UL (ref 0–0.85)
MONOCYTES NFR BLD AUTO: 6.5 % (ref 0–13.4)
NEUTROPHILS # BLD AUTO: 3.33 K/UL (ref 1.82–7.42)
NEUTROPHILS NFR BLD: 50.2 % (ref 44–72)
NRBC # BLD AUTO: 0 K/UL
NRBC BLD-RTO: 0 /100 WBC
PLATELET # BLD AUTO: 118 K/UL (ref 164–446)
PMV BLD AUTO: 9.9 FL (ref 9–12.9)
RBC # BLD AUTO: 4.55 M/UL (ref 4.7–6.1)
WBC # BLD AUTO: 6.6 K/UL (ref 4.8–10.8)

## 2018-06-19 PROCEDURE — 85025 COMPLETE CBC W/AUTO DIFF WBC: CPT

## 2018-06-19 PROCEDURE — 700111 HCHG RX REV CODE 636 W/ 250 OVERRIDE (IP): Performed by: HOSPITALIST

## 2018-06-19 PROCEDURE — 700105 HCHG RX REV CODE 258: Performed by: HOSPITALIST

## 2018-06-19 PROCEDURE — 700111 HCHG RX REV CODE 636 W/ 250 OVERRIDE (IP): Performed by: INTERNAL MEDICINE

## 2018-06-19 PROCEDURE — 700102 HCHG RX REV CODE 250 W/ 637 OVERRIDE(OP): Performed by: HOSPITALIST

## 2018-06-19 PROCEDURE — A9270 NON-COVERED ITEM OR SERVICE: HCPCS | Performed by: HOSPITALIST

## 2018-06-19 PROCEDURE — 700105 HCHG RX REV CODE 258: Performed by: INTERNAL MEDICINE

## 2018-06-19 PROCEDURE — 770021 HCHG ROOM/CARE - ISO PRIVATE

## 2018-06-19 PROCEDURE — 99232 SBSQ HOSP IP/OBS MODERATE 35: CPT | Performed by: INTERNAL MEDICINE

## 2018-06-19 RX ADMIN — Medication 220 MG: at 09:26

## 2018-06-19 RX ADMIN — NYSTATIN 5 ML: 100000 SUSPENSION ORAL at 09:27

## 2018-06-19 RX ADMIN — FLUOXETINE HYDROCHLORIDE 60 MG: 20 CAPSULE ORAL at 09:26

## 2018-06-19 RX ADMIN — METFORMIN HYDROCHLORIDE 500 MG: 500 TABLET, FILM COATED ORAL at 17:30

## 2018-06-19 RX ADMIN — ATORVASTATIN CALCIUM 20 MG: 20 TABLET, FILM COATED ORAL at 19:56

## 2018-06-19 RX ADMIN — GABAPENTIN 300 MG: 300 CAPSULE ORAL at 09:26

## 2018-06-19 RX ADMIN — BENZTROPINE MESYLATE 1 MG: 1 TABLET ORAL at 09:26

## 2018-06-19 RX ADMIN — CEFTRIAXONE 2 G: 2 INJECTION, POWDER, FOR SOLUTION INTRAMUSCULAR; INTRAVENOUS at 09:26

## 2018-06-19 RX ADMIN — VALPROIC ACID 250 MG: 250 CAPSULE, LIQUID FILLED ORAL at 09:28

## 2018-06-19 RX ADMIN — DAPTOMYCIN 620 MG: 500 INJECTION, POWDER, LYOPHILIZED, FOR SOLUTION INTRAVENOUS at 12:32

## 2018-06-19 RX ADMIN — QUETIAPINE FUMARATE 100 MG: 100 TABLET ORAL at 09:26

## 2018-06-19 RX ADMIN — CLOTRIMAZOLE AND BETAMETHASONE DIPROPIONATE: 10; .5 CREAM TOPICAL at 09:27

## 2018-06-19 RX ADMIN — ASPIRIN 81 MG: 81 TABLET, COATED ORAL at 09:27

## 2018-06-19 RX ADMIN — VALPROIC ACID 250 MG: 250 CAPSULE, LIQUID FILLED ORAL at 19:55

## 2018-06-19 RX ADMIN — LORAZEPAM 1 MG: 1 TABLET ORAL at 13:59

## 2018-06-19 RX ADMIN — ENOXAPARIN SODIUM 40 MG: 100 INJECTION SUBCUTANEOUS at 09:26

## 2018-06-19 RX ADMIN — Medication 50 MG: at 09:26

## 2018-06-19 RX ADMIN — QUETIAPINE FUMARATE 100 MG: 100 TABLET ORAL at 12:32

## 2018-06-19 RX ADMIN — FOLIC ACID 1 MG: 1 TABLET ORAL at 09:27

## 2018-06-19 RX ADMIN — CLOTRIMAZOLE AND BETAMETHASONE DIPROPIONATE: 10; .5 CREAM TOPICAL at 19:56

## 2018-06-19 RX ADMIN — METFORMIN HYDROCHLORIDE 500 MG: 500 TABLET, FILM COATED ORAL at 09:27

## 2018-06-19 RX ADMIN — BENZTROPINE MESYLATE 1 MG: 1 TABLET ORAL at 19:55

## 2018-06-19 RX ADMIN — GABAPENTIN 300 MG: 300 CAPSULE ORAL at 19:55

## 2018-06-19 RX ADMIN — GABAPENTIN 300 MG: 300 CAPSULE ORAL at 13:59

## 2018-06-19 RX ADMIN — THERA TABS 1 TABLET: TAB at 09:26

## 2018-06-19 RX ADMIN — QUETIAPINE FUMARATE 200 MG: 100 TABLET ORAL at 19:55

## 2018-06-19 ASSESSMENT — ENCOUNTER SYMPTOMS
MYALGIAS: 0
BLURRED VISION: 0
ORTHOPNEA: 0
TINGLING: 0
MEMORY LOSS: 1
SPUTUM PRODUCTION: 0
FOCAL WEAKNESS: 0
HEADACHES: 0
FALLS: 0
DIARRHEA: 0
CLAUDICATION: 0
DIZZINESS: 0
DIAPHORESIS: 0
WEAKNESS: 0
COUGH: 0
NERVOUS/ANXIOUS: 1

## 2018-06-19 ASSESSMENT — PAIN SCALES - GENERAL
PAINLEVEL_OUTOF10: 0
PAINLEVEL_OUTOF10: 2

## 2018-06-19 NOTE — PROGRESS NOTES
Assumed care of pt, received report from day shift RN, pt assessed.  Pt complaining of 3/10 BL foot pain, pt medicated with scheduled pain medication.  Pt is A&O x4.  Pt high fall risk, wearing treaded socks, bed locked and in lowest position, bed alarm is on.  Pt instructed to call for assistance prior to getting OOB, pt verbalized understanding, frequent reinforcement required.  Call light, phone, and personal belongings within reach.
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
## 2018-06-20 PROCEDURE — 700111 HCHG RX REV CODE 636 W/ 250 OVERRIDE (IP): Performed by: HOSPITALIST

## 2018-06-20 PROCEDURE — 700111 HCHG RX REV CODE 636 W/ 250 OVERRIDE (IP): Performed by: INTERNAL MEDICINE

## 2018-06-20 PROCEDURE — 99232 SBSQ HOSP IP/OBS MODERATE 35: CPT | Performed by: INTERNAL MEDICINE

## 2018-06-20 PROCEDURE — A9270 NON-COVERED ITEM OR SERVICE: HCPCS | Performed by: HOSPITALIST

## 2018-06-20 PROCEDURE — 700105 HCHG RX REV CODE 258: Performed by: INTERNAL MEDICINE

## 2018-06-20 PROCEDURE — 700102 HCHG RX REV CODE 250 W/ 637 OVERRIDE(OP): Performed by: INTERNAL MEDICINE

## 2018-06-20 PROCEDURE — A9270 NON-COVERED ITEM OR SERVICE: HCPCS | Performed by: INTERNAL MEDICINE

## 2018-06-20 PROCEDURE — 700102 HCHG RX REV CODE 250 W/ 637 OVERRIDE(OP): Performed by: HOSPITALIST

## 2018-06-20 PROCEDURE — 770021 HCHG ROOM/CARE - ISO PRIVATE

## 2018-06-20 PROCEDURE — 82607 VITAMIN B-12: CPT

## 2018-06-20 PROCEDURE — 82306 VITAMIN D 25 HYDROXY: CPT

## 2018-06-20 PROCEDURE — 700105 HCHG RX REV CODE 258: Performed by: HOSPITALIST

## 2018-06-20 RX ORDER — GABAPENTIN 100 MG/1
100 CAPSULE ORAL 3 TIMES DAILY
Status: DISCONTINUED | OUTPATIENT
Start: 2018-06-20 | End: 2018-06-26 | Stop reason: HOSPADM

## 2018-06-20 RX ADMIN — NYSTATIN 5 ML: 100000 SUSPENSION ORAL at 18:30

## 2018-06-20 RX ADMIN — QUETIAPINE FUMARATE 100 MG: 100 TABLET ORAL at 09:34

## 2018-06-20 RX ADMIN — FOLIC ACID 1 MG: 1 TABLET ORAL at 09:35

## 2018-06-20 RX ADMIN — Medication 50 MG: at 09:34

## 2018-06-20 RX ADMIN — GABAPENTIN 100 MG: 100 CAPSULE ORAL at 20:29

## 2018-06-20 RX ADMIN — METFORMIN HYDROCHLORIDE 500 MG: 500 TABLET, FILM COATED ORAL at 18:30

## 2018-06-20 RX ADMIN — CLOTRIMAZOLE AND BETAMETHASONE DIPROPIONATE: 10; .5 CREAM TOPICAL at 09:35

## 2018-06-20 RX ADMIN — CEFTRIAXONE 2 G: 2 INJECTION, POWDER, FOR SOLUTION INTRAMUSCULAR; INTRAVENOUS at 09:35

## 2018-06-20 RX ADMIN — GABAPENTIN 300 MG: 300 CAPSULE ORAL at 09:35

## 2018-06-20 RX ADMIN — DAPTOMYCIN 620 MG: 500 INJECTION, POWDER, LYOPHILIZED, FOR SOLUTION INTRAVENOUS at 12:57

## 2018-06-20 RX ADMIN — VALPROIC ACID 250 MG: 250 CAPSULE, LIQUID FILLED ORAL at 20:29

## 2018-06-20 RX ADMIN — HALOPERIDOL LACTATE 5 MG: 5 INJECTION, SOLUTION INTRAMUSCULAR at 15:15

## 2018-06-20 RX ADMIN — BENZTROPINE MESYLATE 1 MG: 1 TABLET ORAL at 20:29

## 2018-06-20 RX ADMIN — LORAZEPAM 1 MG: 1 TABLET ORAL at 20:36

## 2018-06-20 RX ADMIN — THERA TABS 1 TABLET: TAB at 09:34

## 2018-06-20 RX ADMIN — NYSTATIN 5 ML: 100000 SUSPENSION ORAL at 20:30

## 2018-06-20 RX ADMIN — ATORVASTATIN CALCIUM 20 MG: 20 TABLET, FILM COATED ORAL at 20:29

## 2018-06-20 RX ADMIN — QUETIAPINE FUMARATE 100 MG: 100 TABLET ORAL at 12:57

## 2018-06-20 RX ADMIN — SENNOSIDES AND DOCUSATE SODIUM 2 TABLET: 8.6; 5 TABLET ORAL at 09:35

## 2018-06-20 RX ADMIN — Medication 220 MG: at 09:36

## 2018-06-20 RX ADMIN — NYSTATIN 5 ML: 100000 SUSPENSION ORAL at 12:57

## 2018-06-20 RX ADMIN — SENNOSIDES AND DOCUSATE SODIUM 2 TABLET: 8.6; 5 TABLET ORAL at 20:28

## 2018-06-20 RX ADMIN — VALPROIC ACID 250 MG: 250 CAPSULE, LIQUID FILLED ORAL at 09:34

## 2018-06-20 RX ADMIN — QUETIAPINE FUMARATE 200 MG: 100 TABLET ORAL at 20:29

## 2018-06-20 RX ADMIN — CLOTRIMAZOLE AND BETAMETHASONE DIPROPIONATE: 10; .5 CREAM TOPICAL at 20:30

## 2018-06-20 RX ADMIN — FLUOXETINE HYDROCHLORIDE 60 MG: 20 CAPSULE ORAL at 09:34

## 2018-06-20 RX ADMIN — LORAZEPAM 1 MG: 1 TABLET ORAL at 09:44

## 2018-06-20 RX ADMIN — BENZTROPINE MESYLATE 1 MG: 1 TABLET ORAL at 09:34

## 2018-06-20 RX ADMIN — ENOXAPARIN SODIUM 40 MG: 100 INJECTION SUBCUTANEOUS at 09:35

## 2018-06-20 RX ADMIN — ASPIRIN 81 MG: 81 TABLET, COATED ORAL at 09:35

## 2018-06-20 RX ADMIN — METFORMIN HYDROCHLORIDE 500 MG: 500 TABLET, FILM COATED ORAL at 09:34

## 2018-06-20 ASSESSMENT — ENCOUNTER SYMPTOMS
FOCAL WEAKNESS: 0
ORTHOPNEA: 0
DIARRHEA: 0
HEADACHES: 0
FALLS: 0
MYALGIAS: 0
BLURRED VISION: 0
TINGLING: 0
MEMORY LOSS: 1
NERVOUS/ANXIOUS: 1
SPUTUM PRODUCTION: 0
DIZZINESS: 0
DIAPHORESIS: 0
COUGH: 0
CLAUDICATION: 0
WEAKNESS: 0

## 2018-06-20 ASSESSMENT — PAIN SCALES - GENERAL
PAINLEVEL_OUTOF10: 0

## 2018-06-20 NOTE — PROGRESS NOTES
"1445:  Pt had requested a shower multiple times.  Staff tried to get pt to take bed bath but pt was refusing.   Pt supplies obtained and shower chair offered and encouraged.  Pt refused twice, stating \"Nah. Nah. I dont need that its for old people.\"  Bedside RN educated a final time, prior to letting pt proceed.  RN was standing right outside pt door as pt shut off water and took one step over the lip of the shower pan and slipped and fell.  Charge RN immediately made aware.   Pt sat up on floor outside of shower, between shower and toilet. Pt  Denied injury.  Pt was assisted to feet by 2 RN's, pt was ambulated to bed.  Vitals taken (see flowsheet).  Pt was hypertensive and tachycardic, but otherwise stable and still denying injury. Mentation is unchanged from prior to shower.   Pt was provided with gown and scrub pants and new yellow non-skid socks.  Pt was reconnected to fluids.  Pt was also educated to not get up without assistance from staff.  Pt agreed.  Pt was then administered 5mg of halidol.   MD was notified.  MD placed orders for PT/OT.     and pharmacy called.     Post fall huddle complete.  All high risk interventions in place, although due to pt being in shower, some of those interventions were not applicable.  After huddle, we decided that this patient will now not shower without the use of a shower chair and bed baths will be encouraged going forward.  Pt will be offered a walker for ambulatory purposes.   "

## 2018-06-20 NOTE — PROGRESS NOTES
Report received. Assumed care, assessment complete. Pt is A & O x 2.  Pt anxious and reporting neuropathy pain. Pt denies having any other pain at this time. Fall precautions and appropriate signs in place. Pt oriented to unit routine, call light/phone system and RN extension number provided. Pt educated regarding fall precautions. Bed alarm in use. Pt denies any additional needs at this time. Call light within reach.

## 2018-06-20 NOTE — WOUND TEAM
Patient seen by wound team for reassessment of left lateral foot pressure injury and left great toe amputation site. Left foot washed with moist wash cloth, dried, great toe amp site with normal saline and gauze. Left lateral foot wound is now resolved with scar tissue, toe amp site is approximated, dry with scar tissue formation. Photographs taken, sween cream applied to foot, no between toes. No further follow up by wound team at this time. [facility] continues to round on patient for reassessment of amputation site.

## 2018-06-20 NOTE — CARE PLAN
Problem: Safety  Goal: Will remain free from injury    Intervention: Educate patient and significant other/support system about adaptive mobility strategies and safe transfers  Pt educated on fall risk.  Moved back to desk bed.   Pt agreeable to interventions.         Problem: Skin Integrity  Goal: Risk for impaired skin integrity will decrease    Intervention: Assess and monitor skin integrity, appearance and/or temperature  Wound to see pt today.  De-escalated dressing changes to skin care for LLE.   Pt is healing well. Incisions well approximated with no redness, drainage, ecchymosis or erythema.  Neuropathy present, no pain.

## 2018-06-20 NOTE — PROGRESS NOTES
[facility] Hospitalist Progress Note    Date of Service: 6/19/2018    Chief Complaint  52 y.o. male admitted 5/11/2018 with diabetic left foot ulcer.  Underwent I&D with  May 12, 2018 and May 14, 2018. Seen by infectious disease team during the hospital stay. Plan to continue IV Zosyn and daptomycin with the stop date of June 26, 2018. Difficult SNF disposition given insurance issues and use of daptomycin for antibiotics.    Interval Problem Update  Patient seen and examined today.    Patient tolerating treatment and therapies.  All Data, Medication data reviewed.  Case discussed with nursing as available.  Plan of Care reviewed with patient and notified of changes.  6/12 the patient seems to have poor understanding of his current condition, he understands his need for continued IV antibiotics, he is homeless, and has a poor plan for after being discharged, his wound and foot care is not great   6/13 the patient is still exhibiting poor understanding of his situation, he thinks he could do IV antibiotics at home easily, though he is homeless.  His skin of bilateral legs and feet look better  6/14 the patient tolerates therapy, denies fevers chills, denies much pain, remains with affect, platelets are slowly decreasing, discussed with pharmacy  6/15 the patient feels okay, no fever, discussed the case with infectious disease re possible antibiotic change because of developing thrombocytopenia  6/16 the patient feels okay, his platelets are still slightly lower  6/17 the patient is agitated today and complains of sore, he remains mentally challenged and has poor overall insight  6/18 the patient is still anxious apparently did much better on oral Ativan we'll attempt to reinstitute that regimen to regimen   6/19 patient is somewhat shaky. In no acute distress. Vitals stable.POD#35  Healed Toe amputation          Consultants/Specialty  Infectious disease-signed off  Orthopedics  LPS    Disposition  Insurance  barriers to SNF acceptance  , to evaluate for [facility] for daptomycin, ?  [facility] possibility  Remains inpatient until clarified        Review of Systems   Constitutional: Negative for diaphoresis.   HENT: Negative for congestion.    Eyes: Negative for blurred vision.   Respiratory: Negative for cough and sputum production.    Cardiovascular: Negative for orthopnea and claudication.   Gastrointestinal: Negative for diarrhea.   Genitourinary: Negative for dysuria and urgency.   Musculoskeletal: Negative for falls and myalgias.   Skin: Positive for rash.   Neurological: Negative for dizziness, tingling, focal weakness, weakness and headaches.   Psychiatric/Behavioral: Positive for memory loss. The patient is nervous/anxious.         Denies any issue      Physical Exam  Laboratory/Imaging   Hemodynamics  Temp (24hrs), Av.5 °C (97.7 °F), Min:36.3 °C (97.3 °F), Max:36.7 °C (98.1 °F)   Temperature: 36.4 °C (97.6 °F)  Pulse  Av.9  Min: 52  Max: 112   Blood Pressure: 114/76      Respiratory      Respiration: 18, Pulse Oximetry: 92 %             Fluids    Intake/Output Summary (Last 24 hours) at 18 1722  Last data filed at 18 1456   Gross per 24 hour   Intake              955 ml   Output                0 ml   Net              955 ml       Nutrition  Orders Placed This Encounter   Procedures   • DIET ORDER     Standing Status:   Standing     Number of Occurrences:   1     Order Specific Question:   Diet:     Answer:   Regular [1]     Physical Exam   Constitutional: He appears well-developed. No distress.   HENT:   Head: Normocephalic.   Mouth/Throat: Oropharynx is clear and moist.   Eyes: EOM are normal. No scleral icterus.   Neck: Normal range of motion. No tracheal deviation present.   Cardiovascular: Normal rate, regular rhythm and intact distal pulses.    No murmur heard.  Pulmonary/Chest: Effort normal. He has no wheezes. He has no rales.   Abdominal: Soft. There is no tenderness. There is  "no rebound.   Musculoskeletal: He exhibits deformity. He exhibits no edema.   s/p left great toe amputation (with h/o second toe amputation). Healing well and no tenderness to palpation. Neurovascularly intact     Neurological: He is alert.   Skin: Skin is warm and dry.   Bilateral feet with cracking and dry skin, currently improved   Psychiatric: His speech is delayed. He is slowed. Cognition and memory are impaired. He expresses impulsivity.   Vitals reviewed.      Recent Labs      06/19/18   0200   WBC  6.6   RBC  4.55*   HEMOGLOBIN  13.7*   HEMATOCRIT  40.8*   MCV  89.7   MCH  30.1   MCHC  33.6*   RDW  45.9   PLATELETCT  118*   MPV  9.9                          Assessment/Plan     Diabetic ulcer of left foot associated with type 2 diabetes mellitus, with muscle involvement without evidence of necrosis (HCC)- (present on admission)   Assessment & Plan    Hx of diabetic nonhealing ulcer, hx of previous left first toe amputation. Failed outpatient antibiotics. CBC, CMP, and CPK were checked today and are normal.   - ID and surgery evaluated, greatly appreciate  - s/p I&D 05/12 & 05/14 with Dr Baez   - continue IV ceftriaxone and daptomycin through 6/26  - continue wound care  - weekly CBC, CMP, CPK (while on daptomycin):    MRI revealing \"Large open ulcer extending from the great toe amputation site into the first metatarsal head with osteomyelitis of the first metatarsal head\"        Type 2 diabetes mellitus with neurologic complication, without long-term current use of insulin (HCC)- (present on admission)   Assessment & Plan    Controlled and without hyperglycemia. Last A1c 5.8.  - Continue home metformin  - Continue Neurontin for neuropathy  - ASA 81 and Atorvastatin 20 mg        Thrombocytopenia (HCC)   Assessment & Plan     unclear etiology  Stable. Continue to monitor.        Mental disability   Assessment & Plan    Likely long-standing        Diabetic peripheral neuropathy (HCC)- (present on admission) "   Assessment & Plan    Pain controlled, mild tingling/neuropathy. Stable.  - Continue neurontin 300mg TID        Psychiatric disorder- (present on admission)   Assessment & Plan    intermittent agitation but typically cooperative   \- continue congentin, seroquel, prozac  - continue valproate for mood control  - ativan PO BID PRN   outpatient follow-up          Quality-Core Measures   Reviewed items::  Medications reviewed  Olsen catheter::  No Olsen  DVT prophylaxis pharmacological::  Enoxaparin (Lovenox)  DVT prophylaxis - mechanical:  SCDs  Ulcer Prophylaxis::  Not indicated  Antibiotics:  Treating active infection/contamination beyond 24 hours perioperative coverage

## 2018-06-20 NOTE — CARE PLAN
Problem: Discharge Barriers/Planning  Goal: Patient's continuum of care needs will be met    Intervention: Assess potential discharge barriers on admission and throughout hospital stay  Pt homeless and unable to care for self after long term abx complete       Problem: Mobility  Goal: Risk for activity intolerance will decrease    Intervention: Assess and monitor signs of activity intolerance  Pt ambulating frequently in room with 1 person assist

## 2018-06-21 LAB
25(OH)D3 SERPL-MCNC: 22 NG/ML (ref 30–100)
ALBUMIN SERPL BCP-MCNC: 3.7 G/DL (ref 3.2–4.9)
ALBUMIN/GLOB SERPL: 1.4 G/DL
ALP SERPL-CCNC: 75 U/L (ref 30–99)
ALT SERPL-CCNC: 40 U/L (ref 2–50)
ANION GAP SERPL CALC-SCNC: 7 MMOL/L (ref 0–11.9)
AST SERPL-CCNC: 24 U/L (ref 12–45)
BILIRUB SERPL-MCNC: 0.4 MG/DL (ref 0.1–1.5)
BUN SERPL-MCNC: 22 MG/DL (ref 8–22)
CALCIUM SERPL-MCNC: 9 MG/DL (ref 8.5–10.5)
CHLORIDE SERPL-SCNC: 109 MMOL/L (ref 96–112)
CK SERPL-CCNC: 28 U/L (ref 0–154)
CO2 SERPL-SCNC: 25 MMOL/L (ref 20–33)
CREAT SERPL-MCNC: 0.88 MG/DL (ref 0.5–1.4)
ERYTHROCYTE [DISTWIDTH] IN BLOOD BY AUTOMATED COUNT: 46.6 FL (ref 35.9–50)
GLOBULIN SER CALC-MCNC: 2.6 G/DL (ref 1.9–3.5)
GLUCOSE SERPL-MCNC: 107 MG/DL (ref 65–99)
HCT VFR BLD AUTO: 39.6 % (ref 42–52)
HGB BLD-MCNC: 13.3 G/DL (ref 14–18)
MCH RBC QN AUTO: 30.2 PG (ref 27–33)
MCHC RBC AUTO-ENTMCNC: 33.6 G/DL (ref 33.7–35.3)
MCV RBC AUTO: 90 FL (ref 81.4–97.8)
PLATELET # BLD AUTO: 115 K/UL (ref 164–446)
PMV BLD AUTO: 10.4 FL (ref 9–12.9)
POTASSIUM SERPL-SCNC: 3.9 MMOL/L (ref 3.6–5.5)
PROT SERPL-MCNC: 6.3 G/DL (ref 6–8.2)
RBC # BLD AUTO: 4.4 M/UL (ref 4.7–6.1)
SODIUM SERPL-SCNC: 141 MMOL/L (ref 135–145)
VIT B12 SERPL-MCNC: 304 PG/ML (ref 211–911)
WBC # BLD AUTO: 6.8 K/UL (ref 4.8–10.8)

## 2018-06-21 PROCEDURE — 700102 HCHG RX REV CODE 250 W/ 637 OVERRIDE(OP): Performed by: HOSPITALIST

## 2018-06-21 PROCEDURE — G8978 MOBILITY CURRENT STATUS: HCPCS | Mod: CJ

## 2018-06-21 PROCEDURE — 700111 HCHG RX REV CODE 636 W/ 250 OVERRIDE (IP): Performed by: INTERNAL MEDICINE

## 2018-06-21 PROCEDURE — 85027 COMPLETE CBC AUTOMATED: CPT

## 2018-06-21 PROCEDURE — 97164 PT RE-EVAL EST PLAN CARE: CPT

## 2018-06-21 PROCEDURE — 770021 HCHG ROOM/CARE - ISO PRIVATE

## 2018-06-21 PROCEDURE — 700102 HCHG RX REV CODE 250 W/ 637 OVERRIDE(OP): Performed by: INTERNAL MEDICINE

## 2018-06-21 PROCEDURE — A9270 NON-COVERED ITEM OR SERVICE: HCPCS | Performed by: HOSPITALIST

## 2018-06-21 PROCEDURE — 80053 COMPREHEN METABOLIC PANEL: CPT

## 2018-06-21 PROCEDURE — 99232 SBSQ HOSP IP/OBS MODERATE 35: CPT | Performed by: INTERNAL MEDICINE

## 2018-06-21 PROCEDURE — A9270 NON-COVERED ITEM OR SERVICE: HCPCS | Performed by: INTERNAL MEDICINE

## 2018-06-21 PROCEDURE — 82550 ASSAY OF CK (CPK): CPT

## 2018-06-21 PROCEDURE — 700105 HCHG RX REV CODE 258: Performed by: HOSPITALIST

## 2018-06-21 PROCEDURE — 700111 HCHG RX REV CODE 636 W/ 250 OVERRIDE (IP): Performed by: HOSPITALIST

## 2018-06-21 PROCEDURE — 700105 HCHG RX REV CODE 258: Performed by: INTERNAL MEDICINE

## 2018-06-21 PROCEDURE — G8979 MOBILITY GOAL STATUS: HCPCS | Mod: CH

## 2018-06-21 RX ORDER — CHOLECALCIFEROL (VITAMIN D3) 125 MCG
1000 CAPSULE ORAL DAILY
Status: DISCONTINUED | OUTPATIENT
Start: 2018-06-21 | End: 2018-06-26 | Stop reason: HOSPADM

## 2018-06-21 RX ORDER — LINEZOLID 600 MG/1
600 TABLET, FILM COATED ORAL EVERY 12 HOURS
Status: DISCONTINUED | OUTPATIENT
Start: 2018-06-22 | End: 2018-06-26 | Stop reason: HOSPADM

## 2018-06-21 RX ADMIN — Medication 50 MG: at 09:13

## 2018-06-21 RX ADMIN — BENZTROPINE MESYLATE 1 MG: 1 TABLET ORAL at 20:21

## 2018-06-21 RX ADMIN — VITAMIN D, TAB 1000IU (100/BT) 1000 UNITS: 25 TAB at 17:24

## 2018-06-21 RX ADMIN — METFORMIN HYDROCHLORIDE 500 MG: 500 TABLET, FILM COATED ORAL at 09:13

## 2018-06-21 RX ADMIN — VALPROIC ACID 250 MG: 250 CAPSULE, LIQUID FILLED ORAL at 20:23

## 2018-06-21 RX ADMIN — GABAPENTIN 100 MG: 100 CAPSULE ORAL at 09:12

## 2018-06-21 RX ADMIN — GABAPENTIN 100 MG: 100 CAPSULE ORAL at 20:21

## 2018-06-21 RX ADMIN — THERA TABS 1 TABLET: TAB at 09:13

## 2018-06-21 RX ADMIN — QUETIAPINE FUMARATE 100 MG: 100 TABLET ORAL at 12:57

## 2018-06-21 RX ADMIN — LORAZEPAM 1 MG: 1 TABLET ORAL at 21:32

## 2018-06-21 RX ADMIN — ATORVASTATIN CALCIUM 20 MG: 20 TABLET, FILM COATED ORAL at 20:21

## 2018-06-21 RX ADMIN — GABAPENTIN 100 MG: 100 CAPSULE ORAL at 15:34

## 2018-06-21 RX ADMIN — DAPTOMYCIN 620 MG: 500 INJECTION, POWDER, LYOPHILIZED, FOR SOLUTION INTRAVENOUS at 12:56

## 2018-06-21 RX ADMIN — SENNOSIDES AND DOCUSATE SODIUM 2 TABLET: 8.6; 5 TABLET ORAL at 09:12

## 2018-06-21 RX ADMIN — CEFTRIAXONE 2 G: 2 INJECTION, POWDER, FOR SOLUTION INTRAMUSCULAR; INTRAVENOUS at 09:11

## 2018-06-21 RX ADMIN — VALPROIC ACID 250 MG: 250 CAPSULE, LIQUID FILLED ORAL at 09:13

## 2018-06-21 RX ADMIN — NYSTATIN 5 ML: 100000 SUSPENSION ORAL at 15:39

## 2018-06-21 RX ADMIN — Medication 220 MG: at 09:13

## 2018-06-21 RX ADMIN — ENOXAPARIN SODIUM 40 MG: 100 INJECTION SUBCUTANEOUS at 09:12

## 2018-06-21 RX ADMIN — METFORMIN HYDROCHLORIDE 500 MG: 500 TABLET, FILM COATED ORAL at 17:24

## 2018-06-21 RX ADMIN — BENZTROPINE MESYLATE 1 MG: 1 TABLET ORAL at 09:13

## 2018-06-21 RX ADMIN — OXYCODONE HYDROCHLORIDE 5 MG: 5 TABLET ORAL at 20:21

## 2018-06-21 RX ADMIN — FLUOXETINE HYDROCHLORIDE 60 MG: 20 CAPSULE ORAL at 09:12

## 2018-06-21 RX ADMIN — QUETIAPINE FUMARATE 100 MG: 100 TABLET ORAL at 09:12

## 2018-06-21 RX ADMIN — CLOTRIMAZOLE AND BETAMETHASONE DIPROPIONATE: 10; .5 CREAM TOPICAL at 20:21

## 2018-06-21 RX ADMIN — LORAZEPAM 1 MG: 1 TABLET ORAL at 09:19

## 2018-06-21 RX ADMIN — Medication 1000 MCG: at 17:24

## 2018-06-21 RX ADMIN — CLOTRIMAZOLE AND BETAMETHASONE DIPROPIONATE: 10; .5 CREAM TOPICAL at 09:11

## 2018-06-21 RX ADMIN — QUETIAPINE FUMARATE 200 MG: 100 TABLET ORAL at 20:21

## 2018-06-21 RX ADMIN — ASPIRIN 81 MG: 81 TABLET, COATED ORAL at 09:12

## 2018-06-21 RX ADMIN — FOLIC ACID 1 MG: 1 TABLET ORAL at 09:13

## 2018-06-21 RX ADMIN — OXYCODONE HYDROCHLORIDE 5 MG: 5 TABLET ORAL at 12:57

## 2018-06-21 RX ADMIN — NYSTATIN 5 ML: 100000 SUSPENSION ORAL at 13:00

## 2018-06-21 ASSESSMENT — COGNITIVE AND FUNCTIONAL STATUS - GENERAL
WALKING IN HOSPITAL ROOM: A LITTLE
MOBILITY SCORE: 21
CLIMB 3 TO 5 STEPS WITH RAILING: A LITTLE
STANDING UP FROM CHAIR USING ARMS: A LITTLE
SUGGESTED CMS G CODE MODIFIER MOBILITY: CJ

## 2018-06-21 ASSESSMENT — ENCOUNTER SYMPTOMS
COUGH: 0
DIARRHEA: 0
MYALGIAS: 0
WEAKNESS: 0
MEMORY LOSS: 1
DIZZINESS: 0
FOCAL WEAKNESS: 0
BLURRED VISION: 0
ORTHOPNEA: 0
TINGLING: 0
HEADACHES: 0
FALLS: 0
CLAUDICATION: 0
NERVOUS/ANXIOUS: 1
DIAPHORESIS: 0
SPUTUM PRODUCTION: 0

## 2018-06-21 ASSESSMENT — PAIN SCALES - GENERAL
PAINLEVEL_OUTOF10: 6
PAINLEVEL_OUTOF10: 3
PAINLEVEL_OUTOF10: 0
PAINLEVEL_OUTOF10: 5

## 2018-06-21 ASSESSMENT — GAIT ASSESSMENTS
GAIT LEVEL OF ASSIST: MINIMAL ASSIST
ASSISTIVE DEVICE: FRONT WHEEL WALKER;NONE
DISTANCE (FEET): 100

## 2018-06-21 NOTE — CARE PLAN
Problem: Safety  Goal: Will remain free from injury  Outcome: PROGRESSING AS EXPECTED  Bed on lowest position, call light within reach, patient educated about use of call light and safety precautions. Bed alarm on. Patient room close to nurses' station. Fall precautions in place.    Problem: Skin Integrity  Goal: Risk for impaired skin integrity will decrease  Outcome: PROGRESSING AS EXPECTED  Incision wounds on left foot approximated. No signs of infection. Cream applied to bilateral feet.

## 2018-06-21 NOTE — PROGRESS NOTES
[facility] Hospitalist Progress Note    Date of Service: 6/20/2018    Chief Complaint  52 y.o. male admitted 5/11/2018 with diabetic left foot ulcer.  Underwent I&D with  May 12, 2018 and May 14, 2018. Seen by infectious disease team during the hospital stay. Plan to continue IV Zosyn and daptomycin with the stop date of June 26, 2018. Difficult SNF disposition given insurance issues and use of daptomycin for antibiotics.    Interval Problem Update  Patient seen and examined today.    Patient tolerating treatment and therapies.  All Data, Medication data reviewed.  Case discussed with nursing as available.  Plan of Care reviewed with patient and notified of changes.  6/12 the patient seems to have poor understanding of his current condition, he understands his need for continued IV antibiotics, he is homeless, and has a poor plan for after being discharged, his wound and foot care is not great   6/13 the patient is still exhibiting poor understanding of his situation, he thinks he could do IV antibiotics at home easily, though he is homeless.  His skin of bilateral legs and feet look better  6/14 the patient tolerates therapy, denies fevers chills, denies much pain, remains with affect, platelets are slowly decreasing, discussed with pharmacy  6/15 the patient feels okay, no fever, discussed the case with infectious disease re possible antibiotic change because of developing thrombocytopenia  6/16 the patient feels okay, his platelets are still slightly lower  6/17 the patient is agitated today and complains of sore, he remains mentally challenged and has poor overall insight  6/18 the patient is still anxious apparently did much better on oral Ativan we'll attempt to reinstitute that regimen to regimen   6/19 patient is somewhat shaky. In no acute distress. Vitals stable.POD#35  Healed Toe amputation  6/20   Patient fell down while taking shower. She stated that he slipped over.  Denies loss of  consciousness or head trauma.  Reviewed medications. Gabapentin dose decreased  Ordered CPK, vitamin B12 level, vitamin D level        Consultants/Specialty  Infectious disease-signed off  Orthopedics  LPS    Disposition  Insurance barriers to SNF acceptance  , to evaluate for [facility] for daptomycin, ?  [facility] possibility  Remains inpatient until clarified        Review of Systems   Constitutional: Negative for diaphoresis.   HENT: Negative for congestion.    Eyes: Negative for blurred vision.   Respiratory: Negative for cough and sputum production.    Cardiovascular: Negative for orthopnea and claudication.   Gastrointestinal: Negative for diarrhea.   Genitourinary: Negative for dysuria and urgency.   Musculoskeletal: Negative for falls and myalgias.   Skin: Positive for rash.   Neurological: Negative for dizziness, tingling, focal weakness, weakness and headaches.   Psychiatric/Behavioral: Positive for memory loss. The patient is nervous/anxious.         Denies any issue      Physical Exam  Laboratory/Imaging   Hemodynamics  Temp (24hrs), Av.2 °C (97.2 °F), Min:36 °C (96.8 °F), Max:36.4 °C (97.6 °F)   Temperature: 36 °C (96.8 °F)  Pulse  Av.6  Min: 52  Max: 122   Blood Pressure: 151/104      Respiratory      Respiration: (!) 24, Pulse Oximetry: 98 %     Work Of Breathing / Effort: Mild  RUL Breath Sounds: Clear, RML Breath Sounds: Clear, RLL Breath Sounds: Clear, LORENA Breath Sounds: Clear, LLL Breath Sounds: Clear    Fluids    Intake/Output Summary (Last 24 hours) at 18 1701  Last data filed at 18 0931   Gross per 24 hour   Intake              520 ml   Output                0 ml   Net              520 ml       Nutrition  Orders Placed This Encounter   Procedures   • DIET ORDER     Standing Status:   Standing     Number of Occurrences:   1     Order Specific Question:   Diet:     Answer:   Regular [1]     Physical Exam   Constitutional: He appears well-developed. No distress.   HENT:  "  Head: Normocephalic.   Mouth/Throat: Oropharynx is clear and moist.   Eyes: EOM are normal. No scleral icterus.   Neck: Normal range of motion. No tracheal deviation present.   Cardiovascular: Normal rate, regular rhythm and intact distal pulses.    No murmur heard.  Pulmonary/Chest: Effort normal. He has no wheezes. He has no rales.   Abdominal: Soft. There is no tenderness. There is no rebound.   Musculoskeletal: He exhibits deformity. He exhibits no edema.   s/p left great toe amputation (with h/o second toe amputation). Healing well and no tenderness to palpation. Neurovascularly intact     Neurological: He is alert.   Skin: Skin is warm and dry.   Bilateral feet with cracking and dry skin, currently improved   Psychiatric: His speech is delayed. He is slowed. Cognition and memory are impaired. He expresses impulsivity.   Nursing note and vitals reviewed.      Recent Labs      06/19/18   0200   WBC  6.6   RBC  4.55*   HEMOGLOBIN  13.7*   HEMATOCRIT  40.8*   MCV  89.7   MCH  30.1   MCHC  33.6*   RDW  45.9   PLATELETCT  118*   MPV  9.9                          Assessment/Plan     Diabetic ulcer of left foot associated with type 2 diabetes mellitus, with muscle involvement without evidence of necrosis (HCC)- (present on admission)   Assessment & Plan    Hx of diabetic nonhealing ulcer, hx of previous left first toe amputation. Failed outpatient antibiotics. CBC, CMP, and CPK were checked today and are normal.   - ID and surgery evaluated, greatly appreciate  - s/p I&D 05/12 & 05/14 with Dr Baez   - continue IV ceftriaxone and daptomycin through 6/26  - continue wound care  - weekly CBC, CMP, CPK (while on daptomycin):    MRI revealing \"Large open ulcer extending from the great toe amputation site into the first metatarsal head with osteomyelitis of the first metatarsal head\"        Type 2 diabetes mellitus with neurologic complication, without long-term current use of insulin (HCC)- (present on admission) "   Assessment & Plan    Controlled and without hyperglycemia. Last A1c 5.8.  - Continue home metformin  - Continue Neurontin for neuropathy  - ASA 81 and Atorvastatin 20 mg        Fall   Assessment & Plan    Gabapentin dose decreased.  Will check valproic acid level, vitamin B12, vitamin D levels, CPK  Continue fall precautions  PTOT evaluation        Thrombocytopenia (HCC)   Assessment & Plan     unclear etiology  Stable. Continue to monitor.        Mental disability   Assessment & Plan    Likely long-standing        Diabetic peripheral neuropathy (HCC)- (present on admission)   Assessment & Plan    Pain controlled, mild tingling/neuropathy. Stable.  - Continue neurontin 300mg TID        Psychiatric disorder- (present on admission)   Assessment & Plan    intermittent agitation but typically cooperative   \- continue congentin, seroquel, prozac  - continue valproate for mood control  - ativan PO BID PRN   outpatient follow-up          Quality-Core Measures   Reviewed items::  Medications reviewed  Olsen catheter::  No Olsen  DVT prophylaxis pharmacological::  Enoxaparin (Lovenox)  DVT prophylaxis - mechanical:  SCDs  Ulcer Prophylaxis::  Not indicated  Antibiotics:  Treating active infection/contamination beyond 24 hours perioperative coverage

## 2018-06-21 NOTE — PROGRESS NOTES
"Pt spilled water in bed, RN noticed anxiety increasing, pt frequently gets agitated over very incidental events.  Pt sat at edge of bed and closed eyes and did purse-lipped breathing.  RN praised pt and gave recognition for enlisting a non-pharmacologic anxiety de-escalation technique.  Pt responded with, \"That's [employer]ING SKILLS!\".  RN provided validation of thought and offering self.   "

## 2018-06-21 NOTE — CARE PLAN
"Problem: Discharge Barriers/Planning  Goal: Patient's continuum of care needs will be met    Intervention: Collaborate with Transitional Care Team and Interdisciplinary Team to meet discharge needs  SW involved in DC planning.  Pt being denied from multiple referrals for a multitude of reasons.  DC planning ongoing, ABX stop date 6/26      Problem: Psychosocial Needs:  Goal: Level of anxiety will decrease    Intervention: Identify and develop with patient strategies to cope with anxiety triggers  Pt spilled water in bed, RN noticed anxiety increasing, pt frequently gets agitated over very incidental events.  Pt sat at edge of bed and closed eyes and did purse-lipped breathing.  RN praised pt and gave recognition for enlisting a non-pharmacologic anxiety de-escalation technique.  Pt responded with, \"That's [employer]ING SKILLS!\".  RN provided validation of thought and offering self.         "

## 2018-06-21 NOTE — FACE TO FACE
Face to Face Note  -  Durable Medical Equipment    Noé Haque M.D. - NPI: [id number]  I certify that this patient is under my care and that they have had a durable medical equipment(DME)face to face encounter by myself that meets the physician DME face-to-face encounter requirements with this patient on:    Date of encounter:   Patient:                    MRN:                       YOB: 2019  Wilbert Kennith Yeomans Jr.  [id number]  1965     The encounter with the patient was in whole, or in part, for the following medical condition, which is the primary reason for durable medical equipment:  Other - diabetic wound    I certify that, based on my findings, the following durable medical equipment is medically necessary:  Walkers.    HOME O2 Saturation Measurements:(Values must be present for Home Oxygen orders)         ,     ,         My Clinical findings support the need for the above equipment due to:  Abnormal Gait    Supporting Symptoms: Poor balance    ------------------------------------------------------------------------------------------------------------------    Face to Face Supporting Documentation - Home Health    The encounter with this patient was in whole or in part the primary reason for home health admission.    Date of encounter:   Patient:                    MRN:                       YOB: 2019  Wilbert Kennith Yeomans Jr.  [id number]  1965     Home health to see patient for:  Skilled Nursing care for assessment, interventions & education, Wound Care, Physical Therapy evaluation and treatment and Occupational therapy evaluation and treatment    Skilled need for:  Recent Deterioration of Health Status diabetic foot wound    Skilled nursing interventions to include:  Wound Care    Homebound evidenced status by:  Needs the assistance of another person in order to leave the home. Leaving home must require a considerable and taxing effort. There  must exist a normal inability to leave the home.    Community Physician to provide follow up care: Tahir Harrington     Optional Interventions    Wound information & treatment:    Home Infusion Therapy orders:    Line/Drain/Airway:    I certify the face to face encounter for this home care referral meets the CMS requirements and the encounter/clinical assessment with the patient was, in whole, or in part, for the medical condition(s) listed above, which is the primary reason for home health care. Based on my clinical findings: the service(s) are medically necessary, support the need for home health care, and the homebound criteria are met.  I certify that this patient has had a face to face encounter by myself.  Noé Haque M.D. - NPI: [id number]    *Debility, frailty and advanced age in the absence of an acute deterioration or exacerbation of a condition do not qualify a patient for home health.

## 2018-06-21 NOTE — PROGRESS NOTES
"Assumed care at 1900. Received report from RN. Patient is AOx4. Patient is sitting up in bed and appears calm and in no distress. Shakiness noted on extremities. Patient is impulsive and does not call for help. Fall precautions in place. PICC line in place for long term antibiotics until 6/26. Assessment complete. Labs reviewed. Patient and RN discussed plan of care. Patient questions answered. Patient needs are met at this time. Bed in lowest and locked position. Call light is within reach. Hourly rounding in place. /95   Pulse 81   Temp 36.3 °C (97.3 °F)   Resp 17   Ht 1.93 m (6' 4\")   Wt 108.4 kg (238 lb 15.7 oz)   SpO2 95%   BMI 29.09 kg/m²       "

## 2018-06-21 NOTE — DISCHARGE PLANNING
Anticipated Discharge Disposition: Homeless Shelter    Action: Pt discussed in IDT rounds on 6/20 and pt will remain in hospital until 6/26/18 when IV ABX are complete. LSW called [facility] and spoke w/ Koffi who confirmed that the pt is able to return to the shelter upon dc.      Barriers to Discharge: No medicaid beds availability and pt's cost of IV ABX is too great for an [facility] or SNF acceptance.     Plan: Pt to remain in hospital until IV ABX complete and will dc to homeless shelter.

## 2018-06-21 NOTE — THERAPY
"PT reorders received after pt had a slip and fall getting out of shower. Pt presents to physical therapy today with worsening gait and balance compared to inital evalution, suspect due to immobility while hosptialized. Pt experienced 4 LOB, requiring PT assist to correct, while ambulating without AD. Gait improved slightly with FWW; however, gait speed and step length remained variable likely due to balance and safety awareness impairments. Pt appears to lack insight into current function, overall safety, and management of L foot. Pt will benefit from acute PT interventions to address new impairments. Encouraged continued mobility with nursing staff (at least 2x/day) and up in chair for ALL meals.     Physical Therapy Re-Evaluation completed.   Bed Mobility:  Supine to Sit: Modified Independent  Transfers: Sit to Stand: Stand by Assist  Gait: Level Of Assist: Minimal Assist (-> CGA ) with no AD -> with FWW    Plan of Care: Will benefit from Physical Therapy 3 times per week  Discharge Recommendations: Equipment: Will Continue to Assess for Equipment Needs. Post-acute therapy: pt would benefit from post acute placement should pt qualify      See \"Rehab Therapy-Acute\" Patient Summary Report for complete documentation.     "

## 2018-06-21 NOTE — PROGRESS NOTES
[facility] Hospitalist Progress Note    Date of Service: 6/21/2018    Chief Complaint  52 y.o. male admitted 5/11/2018 with diabetic left foot ulcer.  Underwent I&D with  May 12, 2018 and May 14, 2018. Seen by infectious disease team during the hospital stay. Plan to continue IV Zosyn and daptomycin with the stop date of June 26, 2018. Difficult SNF disposition given insurance issues and use of daptomycin for antibiotics.    Interval Problem Update  Patient seen and examined today.    Patient tolerating treatment and therapies.  All Data, Medication data reviewed.  Case discussed with nursing as available.  Plan of Care reviewed with patient and notified of changes.  6/12 the patient seems to have poor understanding of his current condition, he understands his need for continued IV antibiotics, he is homeless, and has a poor plan for after being discharged, his wound and foot care is not great   6/13 the patient is still exhibiting poor understanding of his situation, he thinks he could do IV antibiotics at home easily, though he is homeless.  His skin of bilateral legs and feet look better  6/14 the patient tolerates therapy, denies fevers chills, denies much pain, remains with affect, platelets are slowly decreasing, discussed with pharmacy  6/15 the patient feels okay, no fever, discussed the case with infectious disease re possible antibiotic change because of developing thrombocytopenia  6/16 the patient feels okay, his platelets are still slightly lower  6/17 the patient is agitated today and complains of sore, he remains mentally challenged and has poor overall insight  6/18 the patient is still anxious apparently did much better on oral Ativan we'll attempt to reinstitute that regimen to regimen   6/19 patient is somewhat shaky. In no acute distress. Vitals stable.POD#35  Healed Toe amputation  6/20   Patient fell down while taking shower. She stated that he slipped over.  Denies loss of  consciousness or head trauma.  Reviewed medications. Gabapentin dose decreased  Ordered CPK, vitamin B12 level, vitamin D level    Patient is pleasant, cooperative.  Denies any significant pain in the area of toe amputation. Surgical incision wound looks well-healed without signs of infection.  Daptomycin switched to linezolid per ID  Low vitamin B12 and vitamin D level noted. Started supplementation        Consultants/Specialty  Infectious disease-signed off  Orthopedics  LPS    Disposition  Remains inpatient until IV antibiotic course finished  Anticipated disposition. Shelter for homeless. Home health order placed        Review of Systems   Constitutional: Negative for diaphoresis.   HENT: Negative for congestion.    Eyes: Negative for blurred vision.   Respiratory: Negative for cough and sputum production.    Cardiovascular: Negative for orthopnea and claudication.   Gastrointestinal: Negative for diarrhea.   Genitourinary: Negative for dysuria and urgency.   Musculoskeletal: Negative for falls and myalgias.   Skin: Positive for rash.   Neurological: Negative for dizziness, tingling, focal weakness, weakness and headaches.   Psychiatric/Behavioral: Positive for memory loss. The patient is nervous/anxious.         Denies any issue      Physical Exam  Laboratory/Imaging   Hemodynamics  Temp (24hrs), Av.5 °C (97.7 °F), Min:36.3 °C (97.3 °F), Max:36.9 °C (98.4 °F)   Temperature: 36.6 °C (97.9 °F)  Pulse  Av.8  Min: 52  Max: 122   Blood Pressure: 114/78      Respiratory      Respiration: 18, Pulse Oximetry: 95 %     Work Of Breathing / Effort: Mild  RUL Breath Sounds: Clear, RML Breath Sounds: Clear, RLL Breath Sounds: Clear, LORENA Breath Sounds: Clear, LLL Breath Sounds: Clear    Fluids    Intake/Output Summary (Last 24 hours) at 18 1618  Last data filed at 18 1300   Gross per 24 hour   Intake              600 ml   Output             1000 ml   Net             -400 ml       Nutrition  Orders  Placed This Encounter   Procedures   • DIET ORDER     Standing Status:   Standing     Number of Occurrences:   1     Order Specific Question:   Diet:     Answer:   Regular [1]     Physical Exam   Constitutional: He appears well-developed. No distress.   HENT:   Head: Normocephalic.   Mouth/Throat: Oropharynx is clear and moist.   Eyes: EOM are normal. No scleral icterus.   Neck: Normal range of motion. No tracheal deviation present.   Cardiovascular: Normal rate, regular rhythm and intact distal pulses.    No murmur heard.  Pulmonary/Chest: Effort normal. He has no wheezes. He has no rales.   Abdominal: Soft. There is no tenderness. There is no rebound.   Musculoskeletal: He exhibits deformity. He exhibits no edema.   s/p left great toe amputation (with h/o second toe amputation). Healing well and no tenderness to palpation. Neurovascularly intact     Neurological: He is alert.   Skin: Skin is warm and dry.   Bilateral feet with cracking and dry skin, currently improved   Psychiatric: His speech is delayed. He is slowed. Cognition and memory are impaired. He expresses impulsivity.   Nursing note and vitals reviewed.      Recent Labs      06/19/18   0200  06/21/18   0415   WBC  6.6  6.8   RBC  4.55*  4.40*   HEMOGLOBIN  13.7*  13.3*   HEMATOCRIT  40.8*  39.6*   MCV  89.7  90.0   MCH  30.1  30.2   MCHC  33.6*  33.6*   RDW  45.9  46.6   PLATELETCT  118*  115*   MPV  9.9  10.4     Recent Labs      06/21/18   0415   SODIUM  141   POTASSIUM  3.9   CHLORIDE  109   CO2  25   GLUCOSE  107*   BUN  22   CREATININE  0.88   CALCIUM  9.0                      Assessment/Plan     Diabetic ulcer of left foot associated with type 2 diabetes mellitus, with muscle involvement without evidence of necrosis (HCC)- (present on admission)   Assessment & Plan    Hx of diabetic nonhealing ulcer, hx of previous left first toe amputation. Failed outpatient antibiotics. CBC, CMP, and CPK were checked today and are normal.   - ID and surgery  "evaluated, greatly appreciate  - s/p I&D 05/12 & 05/14 with Dr Baez   - continue IV ceftriaxone and daptomycin through 6/26  - continue wound care  - weekly CBC, CMP, CPK (while on daptomycin):    MRI revealing \"Large open ulcer extending from the great toe amputation site into the first metatarsal head with osteomyelitis of the first metatarsal head\"        Type 2 diabetes mellitus with neurologic complication, without long-term current use of insulin (HCC)- (present on admission)   Assessment & Plan    Controlled and without hyperglycemia. Last A1c 5.8.  - Continue home metformin  - Continue Neurontin for neuropathy  - ASA 81 and Atorvastatin 20 mg        Fall   Assessment & Plan    Multifactorial: Dementia, alcohol induced encephalopathy, peripheral neuropathy, status post great toe amputation  Gabapentin dose decreased.  Vitamin B12 and vitamin D supplementation started  Continue fall precautions  PTOT evaluation  Walker ordered upon discharge        Thrombocytopenia (HCC)   Assessment & Plan     unclear etiology  Stable. Continue to monitor.        Mental disability   Assessment & Plan    Likely long-standing        Diabetic peripheral neuropathy (HCC)- (present on admission)   Assessment & Plan    Pain controlled, mild tingling/neuropathy. Stable.  - Continue neurontin        Psychiatric disorder- (present on admission)   Assessment & Plan    intermittent agitation but typically cooperative   \- continue congentin, seroquel, prozac  - continue valproate for mood control  - ativan PO BID PRN   outpatient follow-up          Quality-Core Measures   Reviewed items::  Medications reviewed  Olsen catheter::  No Olsen  DVT prophylaxis pharmacological::  Enoxaparin (Lovenox)  DVT prophylaxis - mechanical:  SCDs  Ulcer Prophylaxis::  Not indicated  Antibiotics:  Treating active infection/contamination beyond 24 hours perioperative coverage        "

## 2018-06-21 NOTE — DISCHARGE PLANNING
Received notice from [facility], patient has been denied for No Medicaid beds.  Received notice from [facility], patient has been denied for No Medicaid beds.  Call from Wilfrdeo with [facility], patient has been denied for no D/C plan, behaviors, and no medicaid beds.     Call from Zion with [facility], patient has been denied for: No Medicaid beds.  Call from Perla with [facility], patient has been denied for: No Medicaid beds.

## 2018-06-21 NOTE — PROGRESS NOTES
"LIMB PRESERVATION SERVICE     52 y.o. male with a past medical history that includes borderline diabetes, anxiety, HTN, and Hep C, .  admitted for dehiscence and infection of left great toe amputation site.     S/P I&D with secondary closure of left great toe amputation site on 5/12 by Dr. Baez    /76   Pulse 69   Temp 36.9 °C (98.4 °F)   Resp 18   Ht 1.93 m (6' 4\")   Wt 108.4 kg (238 lb 15.7 oz)   SpO2 94%   BMI 29.09 kg/m²    Blood glucose 106      Amp site is well healed    Pt remains inpatient d/t difficult placement.  No accepting SNF.  Anticipate that he will remain inpatient until IV abx completed  "

## 2018-06-21 NOTE — ASSESSMENT & PLAN NOTE
Multifactorial: Dementia, alcohol induced encephalopathy, peripheral neuropathy, status post great toe amputation  Gabapentin dose decreased.  Vitamin B12 and vitamin D supplementation started  Continue fall precautions  PTOT evaluation  Walker ordered upon discharge

## 2018-06-21 NOTE — THERAPY
OT consult received, per RN pt is baseline with ADLs, no acute OT needs, concerns regarding balance. Will defer to PT for that, will d/c order for now. Please re-consult OT should there be any change in function or POC.     Ирина Jimenez MS OTR/L, pager # [phone]

## 2018-06-21 NOTE — DISCHARGE PLANNING
Anticipated Discharge Disposition: [facility]    Action: LSW notified by Carmelina TAYLOR, that Pharmacist Amita spoke w/ ID who report that pt's IV ABX can be changed to PO ABX so pt can transfer to lower level of care. LSW completed [facility] Referral and emailed to Emma Juan for review.     Barriers to Discharge: Pt is a fall risk and will benefit from HH for continued PT/OT as pt learns to ambulate after recent toe amputation.     Plan: LSW awaiting response from  [facility] re: [facility] referral. LSW will request HH order/F2F from MD.

## 2018-06-22 ENCOUNTER — PATIENT OUTREACH (OUTPATIENT)
Dept: HEALTH INFORMATION MANAGEMENT | Facility: OTHER | Age: 53
End: 2018-06-22

## 2018-06-22 ENCOUNTER — HOME HEALTH ADMISSION (OUTPATIENT)
Dept: HOME HEALTH SERVICES | Facility: HOME HEALTHCARE | Age: 53
End: 2018-06-22
Payer: MEDICAID

## 2018-06-22 PROCEDURE — 700102 HCHG RX REV CODE 250 W/ 637 OVERRIDE(OP): Performed by: HOSPITALIST

## 2018-06-22 PROCEDURE — 700111 HCHG RX REV CODE 636 W/ 250 OVERRIDE (IP): Performed by: INTERNAL MEDICINE

## 2018-06-22 PROCEDURE — A9270 NON-COVERED ITEM OR SERVICE: HCPCS | Performed by: INTERNAL MEDICINE

## 2018-06-22 PROCEDURE — 770021 HCHG ROOM/CARE - ISO PRIVATE

## 2018-06-22 PROCEDURE — 700102 HCHG RX REV CODE 250 W/ 637 OVERRIDE(OP): Performed by: INTERNAL MEDICINE

## 2018-06-22 PROCEDURE — 700105 HCHG RX REV CODE 258: Performed by: INTERNAL MEDICINE

## 2018-06-22 PROCEDURE — A9270 NON-COVERED ITEM OR SERVICE: HCPCS | Performed by: HOSPITALIST

## 2018-06-22 PROCEDURE — 99231 SBSQ HOSP IP/OBS SF/LOW 25: CPT | Performed by: INTERNAL MEDICINE

## 2018-06-22 PROCEDURE — 700111 HCHG RX REV CODE 636 W/ 250 OVERRIDE (IP): Performed by: HOSPITALIST

## 2018-06-22 RX ADMIN — LINEZOLID 600 MG: 600 TABLET, FILM COATED ORAL at 20:33

## 2018-06-22 RX ADMIN — FLUOXETINE HYDROCHLORIDE 60 MG: 20 CAPSULE ORAL at 08:59

## 2018-06-22 RX ADMIN — FOLIC ACID 1 MG: 1 TABLET ORAL at 09:06

## 2018-06-22 RX ADMIN — Medication 220 MG: at 09:07

## 2018-06-22 RX ADMIN — VALPROIC ACID 250 MG: 250 CAPSULE, LIQUID FILLED ORAL at 20:33

## 2018-06-22 RX ADMIN — QUETIAPINE FUMARATE 100 MG: 100 TABLET ORAL at 12:08

## 2018-06-22 RX ADMIN — GABAPENTIN 100 MG: 100 CAPSULE ORAL at 17:30

## 2018-06-22 RX ADMIN — QUETIAPINE FUMARATE 100 MG: 100 TABLET ORAL at 09:00

## 2018-06-22 RX ADMIN — LORAZEPAM 1 MG: 1 TABLET ORAL at 21:07

## 2018-06-22 RX ADMIN — ENOXAPARIN SODIUM 40 MG: 100 INJECTION SUBCUTANEOUS at 08:57

## 2018-06-22 RX ADMIN — Medication 1000 MCG: at 09:00

## 2018-06-22 RX ADMIN — ASPIRIN 81 MG: 81 TABLET, COATED ORAL at 09:00

## 2018-06-22 RX ADMIN — CLOTRIMAZOLE AND BETAMETHASONE DIPROPIONATE: 10; .5 CREAM TOPICAL at 09:01

## 2018-06-22 RX ADMIN — METFORMIN HYDROCHLORIDE 500 MG: 500 TABLET, FILM COATED ORAL at 17:30

## 2018-06-22 RX ADMIN — ATORVASTATIN CALCIUM 20 MG: 20 TABLET, FILM COATED ORAL at 20:33

## 2018-06-22 RX ADMIN — LINEZOLID 600 MG: 600 TABLET, FILM COATED ORAL at 09:07

## 2018-06-22 RX ADMIN — METFORMIN HYDROCHLORIDE 500 MG: 500 TABLET, FILM COATED ORAL at 09:01

## 2018-06-22 RX ADMIN — VALPROIC ACID 250 MG: 250 CAPSULE, LIQUID FILLED ORAL at 09:01

## 2018-06-22 RX ADMIN — LORAZEPAM 1 MG: 1 TABLET ORAL at 09:07

## 2018-06-22 RX ADMIN — SENNOSIDES AND DOCUSATE SODIUM 2 TABLET: 8.6; 5 TABLET ORAL at 09:07

## 2018-06-22 RX ADMIN — VITAMIN D, TAB 1000IU (100/BT) 1000 UNITS: 25 TAB at 09:06

## 2018-06-22 RX ADMIN — BENZTROPINE MESYLATE 1 MG: 1 TABLET ORAL at 20:33

## 2018-06-22 RX ADMIN — CEFTRIAXONE 2 G: 2 INJECTION, POWDER, FOR SOLUTION INTRAMUSCULAR; INTRAVENOUS at 09:00

## 2018-06-22 RX ADMIN — GABAPENTIN 100 MG: 100 CAPSULE ORAL at 09:07

## 2018-06-22 RX ADMIN — GABAPENTIN 100 MG: 100 CAPSULE ORAL at 20:33

## 2018-06-22 RX ADMIN — Medication 50 MG: at 09:00

## 2018-06-22 RX ADMIN — CLOTRIMAZOLE AND BETAMETHASONE DIPROPIONATE: 10; .5 CREAM TOPICAL at 20:33

## 2018-06-22 RX ADMIN — QUETIAPINE FUMARATE 200 MG: 100 TABLET ORAL at 20:33

## 2018-06-22 RX ADMIN — BENZTROPINE MESYLATE 1 MG: 1 TABLET ORAL at 09:00

## 2018-06-22 ASSESSMENT — ENCOUNTER SYMPTOMS
BLURRED VISION: 0
FOCAL WEAKNESS: 0
MYALGIAS: 0
MEMORY LOSS: 1
COUGH: 0
CLAUDICATION: 0
DIARRHEA: 0
DIAPHORESIS: 0
ORTHOPNEA: 0
TINGLING: 0
FALLS: 0
HEADACHES: 0
DIZZINESS: 0
NERVOUS/ANXIOUS: 1
WEAKNESS: 0
SPUTUM PRODUCTION: 0

## 2018-06-22 ASSESSMENT — PAIN SCALES - GENERAL
PAINLEVEL_OUTOF10: 0
PAINLEVEL_OUTOF10: 0

## 2018-06-22 NOTE — DISCHARGE PLANNING
Received Choice form at [id number]  Agency/Facility Name: [facility]  Referral sent per Choice form @ [id number]  Choice obtained by RIANNA Bloom.

## 2018-06-22 NOTE — DISCHARGE PLANNING
There is no appropriate physical address and contact phone number on the patient.  In order to process referral accordingly, we need these information as soon as possible.  A message has been left for FARZANEH Cosme.    Thanks

## 2018-06-22 NOTE — DISCHARGE PLANNING
Anticipated Discharge Disposition: [facility] w/ HH    Action: LSW received PC from Jaci w/ [facility] re: referral sent for pt. Jaci will come assess this pt before noon today for possible admission to their housing unit. LSW met w/ pt at bedside and discussed dc plan and pt agreeable to [facility]. Pt also aware that Jaci will be coming to assess him today. LSW explained HH and DME Choice at bedside and pt agreeable to HH and to having a walker. Pt signed HH choice for [facility] and DME choice for [facility] and LSW faxed completed Choice to Carmelina TAYLOR ([phone]).      UPDATE: Jaci from [facility] met w/ LSW in Care Coordination office and stated that she just spoke w/ the pt at bedside and Jaci will give her assessment and pt's referral to her Director for review before accepting. LSW explained to Jaci that pt needs [facility] primarily to receive HH services for PT/OT/Woundcare as pt is homeless and cannot be seen by HH at the shelter.     UPDATE [id number]: LSW confirmed address of [facility] for HH purposes. If pt is accepted then HH can follow pt at [facility] [address]. LSW updated Carmelina TAYLOR.     Barriers to Discharge: Acceptance to [facility], HH, and pt needs walker delivered to bedside.    Plan: Pt's referral to be reviewed by the  for [facility].

## 2018-06-22 NOTE — DISCHARGE PLANNING
Update: LSW received notification that pt needs an appointment scheduled w/ PCP before [facility] will accept pt. LSW called Scheduling ([phone]) and talked w/ Beth who was able to schedule the pt w/ PCP at [facility] for 7/5/18 @ 3:20. In addition, pt does not have a telephone number but pt will be staying at [facility] and can be reached at: [phone]. CLAUDIA Cosme also notified.

## 2018-06-22 NOTE — CARE PLAN
Problem: Infection  Goal: Will remain free from infection  Outcome: PROGRESSING AS EXPECTED  No s&s of infection at [facility] line. VSS.     Problem: Skin Integrity  Goal: Risk for impaired skin integrity will decrease  Outcome: PROGRESSING AS EXPECTED  Moisturizer used for bilat feet. Surgical incisions healing well on left foot.

## 2018-06-22 NOTE — PROGRESS NOTES
"Assumed care at 1900. Received report from RN. Patient is AOx4 forgetful at times. Patient is sitting up in bed and appears calm and in no distress. Patient has PICC line to left upper arm at TKO. Patient will receive IV ABX until 6/26. Fall precautions in place. Assessment complete. Labs reviewed. Patient and RN discussed plan of care. Patient questions answered. Patient needs are met at this time. Bed in lowest and locked position. Call light is within reach. Hourly rounding in place. /72   Pulse 67   Temp 36.6 °C (97.9 °F)   Resp 18   Ht 1.93 m (6' 4\")   Wt 108.4 kg (238 lb 15.7 oz)   SpO2 95%   BMI 29.09 kg/m²       "

## 2018-06-22 NOTE — PROGRESS NOTES
[facility] Hospitalist Progress Note    Date of Service: 6/22/2018    Chief Complaint  52 y.o. male admitted 5/11/2018 with diabetic left foot ulcer.  Underwent I&D with  May 12, 2018 and May 14, 2018. Seen by infectious disease team during the hospital stay. Plan to continue IV Zosyn and daptomycin with the stop date of June 26, 2018. Difficult SNF disposition given insurance issues and use of daptomycin for antibiotics.    Interval Problem Update  Patient seen and examined today.    Patient tolerating treatment and therapies.  All Data, Medication data reviewed.  Case discussed with nursing as available.  Plan of Care reviewed with patient and notified of changes.  6/12 the patient seems to have poor understanding of his current condition, he understands his need for continued IV antibiotics, he is homeless, and has a poor plan for after being discharged, his wound and foot care is not great   6/13 the patient is still exhibiting poor understanding of his situation, he thinks he could do IV antibiotics at home easily, though he is homeless.  His skin of bilateral legs and feet look better  6/14 the patient tolerates therapy, denies fevers chills, denies much pain, remains with affect, platelets are slowly decreasing, discussed with pharmacy  6/15 the patient feels okay, no fever, discussed the case with infectious disease re possible antibiotic change because of developing thrombocytopenia  6/16 the patient feels okay, his platelets are still slightly lower  6/17 the patient is agitated today and complains of sore, he remains mentally challenged and has poor overall insight  6/18 the patient is still anxious apparently did much better on oral Ativan we'll attempt to reinstitute that regimen to regimen   6/19 patient is somewhat shaky. In no acute distress. Vitals stable.POD#35  Healed Toe amputation  6/20   Patient fell down while taking shower. She stated that he slipped over.  Denies loss of  consciousness or head trauma.  Reviewed medications. Gabapentin dose decreased  Ordered CPK, vitamin B12 level, vitamin D level    Patient is pleasant, cooperative.  Denies any significant pain in the area of toe amputation. Surgical incision wound looks well-healed without signs of infection.  Daptomycin switched to linezolid per ID  Low vitamin B12 and vitamin D level noted. Started supplementation     Patient denies new issues. No overnight event. Vitals stable.  Continued IV antibiotic while in the hospital.  he is being evaluated for homeless accommodation living facility and home health is being setup.        Consultants/Specialty  Infectious disease-signed off  Orthopedics  LPS    Disposition  Remains inpatient until IV antibiotic course finished  Anticipated disposition. Shelter for homeless. Home health order placed        Review of Systems   Constitutional: Negative for diaphoresis.   HENT: Negative for congestion.    Eyes: Negative for blurred vision.   Respiratory: Negative for cough and sputum production.    Cardiovascular: Negative for orthopnea and claudication.   Gastrointestinal: Negative for diarrhea.   Genitourinary: Negative for dysuria and urgency.   Musculoskeletal: Negative for falls and myalgias.   Skin: Positive for rash.   Neurological: Negative for dizziness, tingling, focal weakness, weakness and headaches.   Psychiatric/Behavioral: Positive for memory loss. The patient is nervous/anxious.         Denies any issue      Physical Exam  Laboratory/Imaging   Hemodynamics  Temp (24hrs), Av.6 °C (97.8 °F), Min:36.4 °C (97.5 °F), Max:36.6 °C (97.9 °F)   Temperature: 36.5 °C (97.7 °F)  Pulse  Av.7  Min: 52  Max: 122   Blood Pressure: 122/78      Respiratory      Respiration: 16, Pulse Oximetry: 92 %     Work Of Breathing / Effort: Mild  RUL Breath Sounds: Clear, RML Breath Sounds: Clear, RLL Breath Sounds: Clear, LORENA Breath Sounds: Clear, LLL Breath Sounds:  Clear    Fluids    Intake/Output Summary (Last 24 hours) at 06/22/18 1517  Last data filed at 06/22/18 1332   Gross per 24 hour   Intake              480 ml   Output              800 ml   Net             -320 ml       Nutrition  Orders Placed This Encounter   Procedures   • DIET ORDER     Standing Status:   Standing     Number of Occurrences:   1     Order Specific Question:   Diet:     Answer:   Regular [1]     Physical Exam   Constitutional: He appears well-developed. No distress.   HENT:   Head: Normocephalic.   Mouth/Throat: Oropharynx is clear and moist.   Eyes: EOM are normal. No scleral icterus.   Neck: Normal range of motion. No tracheal deviation present.   Cardiovascular: Normal rate, regular rhythm and intact distal pulses.    No murmur heard.  Pulmonary/Chest: Effort normal. He has no wheezes. He has no rales.   Abdominal: Soft. There is no tenderness. There is no rebound.   Musculoskeletal: He exhibits deformity. He exhibits no edema.   s/p left great toe amputation (with h/o second toe amputation). Healing well and no tenderness to palpation. Neurovascularly intact     Neurological: He is alert.   Skin: Skin is warm and dry.   Bilateral feet with cracking and dry skin, currently improved   Psychiatric: His speech is delayed. He is slowed. Cognition and memory are impaired. He expresses impulsivity.   Nursing note and vitals reviewed.      Recent Labs      06/21/18   0415   WBC  6.8   RBC  4.40*   HEMOGLOBIN  13.3*   HEMATOCRIT  39.6*   MCV  90.0   MCH  30.2   MCHC  33.6*   RDW  46.6   PLATELETCT  115*   MPV  10.4     Recent Labs      06/21/18   0415   SODIUM  141   POTASSIUM  3.9   CHLORIDE  109   CO2  25   GLUCOSE  107*   BUN  22   CREATININE  0.88   CALCIUM  9.0                      Assessment/Plan     Diabetic ulcer of left foot associated with type 2 diabetes mellitus, with muscle involvement without evidence of necrosis (HCC)- (present on admission)   Assessment & Plan    Hx of diabetic nonhealing  "ulcer, hx of previous left first toe amputation. Failed outpatient antibiotics. CBC, CMP, and CPK were checked today and are normal.   - ID and surgery evaluated, greatly appreciate  - s/p I&D 05/12 & 05/14 with Dr Baez   - continue IV ceftriaxone and daptomycin through 6/26  - continue wound care  - weekly CBC, CMP, CPK (while on daptomycin):    MRI revealing \"Large open ulcer extending from the great toe amputation site into the first metatarsal head with osteomyelitis of the first metatarsal head\"        Type 2 diabetes mellitus with neurologic complication, without long-term current use of insulin (HCC)- (present on admission)   Assessment & Plan    Controlled and without hyperglycemia. Last A1c 5.8.  - Continue home metformin  - Continue Neurontin for neuropathy  - ASA 81 and Atorvastatin 20 mg        Fall   Assessment & Plan    Multifactorial: Dementia, alcohol induced encephalopathy, peripheral neuropathy, status post great toe amputation  Gabapentin dose decreased.  Vitamin B12 and vitamin D supplementation started  Continue fall precautions  PTOT evaluation  Walker ordered upon discharge        Thrombocytopenia (HCC)   Assessment & Plan     unclear etiology  Stable. Continue to monitor.        Mental disability   Assessment & Plan    Likely long-standing        Diabetic peripheral neuropathy (HCC)- (present on admission)   Assessment & Plan    Pain controlled, mild tingling/neuropathy. Stable.  - Continue neurontin        Psychiatric disorder- (present on admission)   Assessment & Plan    intermittent agitation but typically cooperative   \- continue congentin, seroquel, prozac  - continue valproate for mood control  - ativan PO BID PRN   outpatient follow-up          Quality-Core Measures   Reviewed items::  Medications reviewed  Olsen catheter::  No Olsen  DVT prophylaxis pharmacological::  Enoxaparin (Lovenox)  DVT prophylaxis - mechanical:  SCDs  Ulcer Prophylaxis::  Not indicated  Antibiotics:  " Treating active infection/contamination beyond 24 hours perioperative coverage

## 2018-06-22 NOTE — DISCHARGE PLANNING
Received Choice form at 1668  Agency/Facility Name: [facility]  Referral sent per Choice form @ 9971  Choice obtained by RIANNA Bloom

## 2018-06-23 PROCEDURE — 700102 HCHG RX REV CODE 250 W/ 637 OVERRIDE(OP): Performed by: INTERNAL MEDICINE

## 2018-06-23 PROCEDURE — 700102 HCHG RX REV CODE 250 W/ 637 OVERRIDE(OP): Performed by: HOSPITALIST

## 2018-06-23 PROCEDURE — 770021 HCHG ROOM/CARE - ISO PRIVATE

## 2018-06-23 PROCEDURE — 700111 HCHG RX REV CODE 636 W/ 250 OVERRIDE (IP): Performed by: INTERNAL MEDICINE

## 2018-06-23 PROCEDURE — 700111 HCHG RX REV CODE 636 W/ 250 OVERRIDE (IP): Performed by: HOSPITALIST

## 2018-06-23 PROCEDURE — 700105 HCHG RX REV CODE 258: Performed by: INTERNAL MEDICINE

## 2018-06-23 PROCEDURE — A9270 NON-COVERED ITEM OR SERVICE: HCPCS | Performed by: INTERNAL MEDICINE

## 2018-06-23 PROCEDURE — A9270 NON-COVERED ITEM OR SERVICE: HCPCS | Performed by: HOSPITALIST

## 2018-06-23 PROCEDURE — 99231 SBSQ HOSP IP/OBS SF/LOW 25: CPT | Performed by: INTERNAL MEDICINE

## 2018-06-23 RX ADMIN — METFORMIN HYDROCHLORIDE 500 MG: 500 TABLET, FILM COATED ORAL at 08:01

## 2018-06-23 RX ADMIN — LINEZOLID 600 MG: 600 TABLET, FILM COATED ORAL at 20:49

## 2018-06-23 RX ADMIN — GABAPENTIN 100 MG: 100 CAPSULE ORAL at 13:26

## 2018-06-23 RX ADMIN — NYSTATIN 5 ML: 100000 SUSPENSION ORAL at 17:20

## 2018-06-23 RX ADMIN — LORAZEPAM 1 MG: 1 TABLET ORAL at 20:54

## 2018-06-23 RX ADMIN — GABAPENTIN 100 MG: 100 CAPSULE ORAL at 08:01

## 2018-06-23 RX ADMIN — ASPIRIN 81 MG: 81 TABLET, COATED ORAL at 08:01

## 2018-06-23 RX ADMIN — CEFTRIAXONE 2 G: 2 INJECTION, POWDER, FOR SOLUTION INTRAMUSCULAR; INTRAVENOUS at 07:59

## 2018-06-23 RX ADMIN — GABAPENTIN 100 MG: 100 CAPSULE ORAL at 20:49

## 2018-06-23 RX ADMIN — VALPROIC ACID 250 MG: 250 CAPSULE, LIQUID FILLED ORAL at 08:01

## 2018-06-23 RX ADMIN — THERA TABS 1 TABLET: TAB at 08:01

## 2018-06-23 RX ADMIN — NYSTATIN 5 ML: 100000 SUSPENSION ORAL at 08:02

## 2018-06-23 RX ADMIN — VITAMIN D, TAB 1000IU (100/BT) 1000 UNITS: 25 TAB at 08:01

## 2018-06-23 RX ADMIN — QUETIAPINE FUMARATE 100 MG: 100 TABLET ORAL at 13:26

## 2018-06-23 RX ADMIN — CLOTRIMAZOLE AND BETAMETHASONE DIPROPIONATE: 10; .5 CREAM TOPICAL at 08:02

## 2018-06-23 RX ADMIN — NYSTATIN 5 ML: 100000 SUSPENSION ORAL at 13:26

## 2018-06-23 RX ADMIN — SENNOSIDES AND DOCUSATE SODIUM 2 TABLET: 8.6; 5 TABLET ORAL at 08:01

## 2018-06-23 RX ADMIN — QUETIAPINE FUMARATE 100 MG: 100 TABLET ORAL at 09:00

## 2018-06-23 RX ADMIN — CLOTRIMAZOLE AND BETAMETHASONE DIPROPIONATE: 10; .5 CREAM TOPICAL at 20:50

## 2018-06-23 RX ADMIN — ENOXAPARIN SODIUM 40 MG: 100 INJECTION SUBCUTANEOUS at 08:02

## 2018-06-23 RX ADMIN — SENNOSIDES AND DOCUSATE SODIUM 2 TABLET: 8.6; 5 TABLET ORAL at 20:48

## 2018-06-23 RX ADMIN — FOLIC ACID 1 MG: 1 TABLET ORAL at 08:02

## 2018-06-23 RX ADMIN — METFORMIN HYDROCHLORIDE 500 MG: 500 TABLET, FILM COATED ORAL at 17:20

## 2018-06-23 RX ADMIN — FLUOXETINE HYDROCHLORIDE 60 MG: 20 CAPSULE ORAL at 08:01

## 2018-06-23 RX ADMIN — VALPROIC ACID 250 MG: 250 CAPSULE, LIQUID FILLED ORAL at 20:50

## 2018-06-23 RX ADMIN — BENZTROPINE MESYLATE 1 MG: 1 TABLET ORAL at 08:01

## 2018-06-23 RX ADMIN — LINEZOLID 600 MG: 600 TABLET, FILM COATED ORAL at 08:01

## 2018-06-23 RX ADMIN — Medication 1000 MCG: at 08:01

## 2018-06-23 RX ADMIN — Medication 220 MG: at 08:01

## 2018-06-23 RX ADMIN — OXYCODONE HYDROCHLORIDE 5 MG: 5 TABLET ORAL at 20:49

## 2018-06-23 RX ADMIN — BENZTROPINE MESYLATE 1 MG: 1 TABLET ORAL at 20:49

## 2018-06-23 RX ADMIN — Medication 50 MG: at 08:01

## 2018-06-23 RX ADMIN — QUETIAPINE FUMARATE 200 MG: 100 TABLET ORAL at 20:49

## 2018-06-23 RX ADMIN — NYSTATIN 5 ML: 100000 SUSPENSION ORAL at 20:50

## 2018-06-23 RX ADMIN — ATORVASTATIN CALCIUM 20 MG: 20 TABLET, FILM COATED ORAL at 20:49

## 2018-06-23 RX ADMIN — LORAZEPAM 1 MG: 1 TABLET ORAL at 09:19

## 2018-06-23 ASSESSMENT — ENCOUNTER SYMPTOMS
WEAKNESS: 0
SPUTUM PRODUCTION: 0
COUGH: 0
MYALGIAS: 0
MEMORY LOSS: 1
FALLS: 0
NERVOUS/ANXIOUS: 1
HEADACHES: 0
FOCAL WEAKNESS: 0
DIARRHEA: 0
ORTHOPNEA: 0
TINGLING: 0
DIAPHORESIS: 0
CLAUDICATION: 0
DIZZINESS: 0
BLURRED VISION: 0

## 2018-06-23 ASSESSMENT — PAIN SCALES - GENERAL
PAINLEVEL_OUTOF10: 0
PAINLEVEL_OUTOF10: 0
PAINLEVEL_OUTOF10: 4

## 2018-06-23 NOTE — PROGRESS NOTES
[facility] Hospitalist Progress Note    Date of Service: 6/23/2018    Chief Complaint  52 y.o. male admitted 5/11/2018 with diabetic left foot ulcer.  Underwent I&D with  May 12, 2018 and May 14, 2018. Seen by infectious disease team during the hospital stay. Plan to continue IV Zosyn and daptomycin with the stop date of June 26, 2018. Difficult SNF disposition given insurance issues and use of daptomycin for antibiotics.    Interval Problem Update  Patient seen and examined today.    Patient tolerating treatment and therapies.  All Data, Medication data reviewed.  Case discussed with nursing as available.  Plan of Care reviewed with patient and notified of changes.  6/12 the patient seems to have poor understanding of his current condition, he understands his need for continued IV antibiotics, he is homeless, and has a poor plan for after being discharged, his wound and foot care is not great   6/13 the patient is still exhibiting poor understanding of his situation, he thinks he could do IV antibiotics at home easily, though he is homeless.  His skin of bilateral legs and feet look better  6/14 the patient tolerates therapy, denies fevers chills, denies much pain, remains with affect, platelets are slowly decreasing, discussed with pharmacy  6/15 the patient feels okay, no fever, discussed the case with infectious disease re possible antibiotic change because of developing thrombocytopenia  6/16 the patient feels okay, his platelets are still slightly lower  6/17 the patient is agitated today and complains of sore, he remains mentally challenged and has poor overall insight  6/18 the patient is still anxious apparently did much better on oral Ativan we'll attempt to reinstitute that regimen to regimen   6/19 patient is somewhat shaky. In no acute distress. Vitals stable.POD#35  Healed Toe amputation  6/20   Patient fell down while taking shower. She stated that he slipped over.  Denies loss of  consciousness or head trauma.  Reviewed medications. Gabapentin dose decreased  Ordered CPK, vitamin B12 level, vitamin D level    Patient is pleasant, cooperative.  Denies any significant pain in the area of toe amputation. Surgical incision wound looks well-healed without signs of infection.  Daptomycin switched to linezolid per ID  Low vitamin B12 and vitamin D level noted. Started supplementation     Patient denies new issues. No overnight event. Vitals stable.  Continued IV antibiotic while in the hospital.  he is being evaluated for homeless accommodation living facility and home health is being set up.    Patient is in no distress. Vitals stable. IV antibiotics running.  Denies fever, chills, abdominal pain, foot pain, dizziness, nausea, vomiting, daily constipation. Left foot post surgical wound healing well.      Consultants/Specialty  Infectious disease-signed off  Orthopedics  LPS    Disposition  Remains inpatient until IV antibiotic course finished  Anticipated disposition. Shelter for homeless. Home health order placed        Review of Systems   Constitutional: Negative for diaphoresis.   HENT: Negative for congestion.    Eyes: Negative for blurred vision.   Respiratory: Negative for cough and sputum production.    Cardiovascular: Negative for orthopnea and claudication.   Gastrointestinal: Negative for diarrhea.   Genitourinary: Negative for dysuria and urgency.   Musculoskeletal: Negative for falls and myalgias.   Skin: Positive for rash.   Neurological: Negative for dizziness, tingling, focal weakness, weakness and headaches.   Psychiatric/Behavioral: Positive for memory loss. The patient is nervous/anxious.         Denies any issue      Physical Exam  Laboratory/Imaging   Hemodynamics  Temp (24hrs), Av.7 °C (98.1 °F), Min:36.4 °C (97.6 °F), Max:37 °C (98.6 °F)   Temperature: 36.9 °C (98.4 °F)  Pulse  Av.9  Min: 52  Max: 122   Blood Pressure: 103/64      Respiratory       Respiration: 16, Pulse Oximetry: 95 %        RUL Breath Sounds: Clear, RML Breath Sounds: Clear, RLL Breath Sounds: Clear, LORENA Breath Sounds: Clear, LLL Breath Sounds: Clear    Fluids    Intake/Output Summary (Last 24 hours) at 06/23/18 1420  Last data filed at 06/23/18 0900   Gross per 24 hour   Intake              240 ml   Output                0 ml   Net              240 ml       Nutrition  Orders Placed This Encounter   Procedures   • DIET ORDER     Standing Status:   Standing     Number of Occurrences:   1     Order Specific Question:   Diet:     Answer:   Regular [1]     Physical Exam   Constitutional: He appears well-developed. No distress.   HENT:   Head: Normocephalic.   Mouth/Throat: Oropharynx is clear and moist.   Eyes: EOM are normal. No scleral icterus.   Neck: Normal range of motion. No tracheal deviation present.   Cardiovascular: Normal rate, regular rhythm and intact distal pulses.    No murmur heard.  Pulmonary/Chest: Effort normal. He has no wheezes. He has no rales.   Abdominal: Soft. There is no tenderness. There is no rebound.   Musculoskeletal: He exhibits deformity. He exhibits no edema.   s/p left great toe amputation (with h/o second toe amputation). Healing well and no tenderness to palpation. Neurovascularly intact     Neurological: He is alert.   Skin: Skin is warm and dry.   Bilateral feet with cracking and dry skin, currently improved   Psychiatric: His speech is delayed. He is slowed. Cognition and memory are impaired. He expresses impulsivity.   Nursing note and vitals reviewed.      Recent Labs      06/21/18   0415   WBC  6.8   RBC  4.40*   HEMOGLOBIN  13.3*   HEMATOCRIT  39.6*   MCV  90.0   MCH  30.2   MCHC  33.6*   RDW  46.6   PLATELETCT  115*   MPV  10.4     Recent Labs      06/21/18   0415   SODIUM  141   POTASSIUM  3.9   CHLORIDE  109   CO2  25   GLUCOSE  107*   BUN  22   CREATININE  0.88   CALCIUM  9.0                      Assessment/Plan     Diabetic ulcer of left foot  "associated with type 2 diabetes mellitus, with muscle involvement without evidence of necrosis (HCC)- (present on admission)   Assessment & Plan    Hx of diabetic nonhealing ulcer, hx of previous left first toe amputation. Failed outpatient antibiotics. CBC, CMP, and CPK were checked today and are normal.   - ID and surgery evaluated, greatly appreciate  - s/p I&D 05/12 & 05/14 with Dr Baez   - continue IV ceftriaxone and daptomycin through 6/26  - continue wound care  - weekly CBC, CMP, CPK (while on daptomycin):    MRI revealing \"Large open ulcer extending from the great toe amputation site into the first metatarsal head with osteomyelitis of the first metatarsal head\"        Type 2 diabetes mellitus with neurologic complication, without long-term current use of insulin (HCC)- (present on admission)   Assessment & Plan    Controlled and without hyperglycemia. Last A1c 5.8.  - Continue home metformin  - Continue Neurontin for neuropathy  - ASA 81 and Atorvastatin 20 mg        Fall   Assessment & Plan    Multifactorial: Dementia, alcohol induced encephalopathy, peripheral neuropathy, status post great toe amputation  Gabapentin dose decreased.  Vitamin B12 and vitamin D supplementation started  Continue fall precautions  PTOT evaluation  Walker ordered upon discharge        Thrombocytopenia (HCC)   Assessment & Plan     unclear etiology  Stable. Continue to monitor.        Mental disability   Assessment & Plan    Likely long-standing        Diabetic peripheral neuropathy (HCC)- (present on admission)   Assessment & Plan    Pain controlled, mild tingling/neuropathy. Stable.  - Continue neurontin        Psychiatric disorder- (present on admission)   Assessment & Plan    intermittent agitation but typically cooperative   \- continue congentin, seroquel, prozac  - continue valproate for mood control  - ativan PO BID PRN   outpatient follow-up          Quality-Core Measures   Reviewed items::  Medications " reviewed  Olsen catheter::  No Olsen  DVT prophylaxis pharmacological::  Enoxaparin (Lovenox)  DVT prophylaxis - mechanical:  SCDs  Ulcer Prophylaxis::  Not indicated  Antibiotics:  Treating active infection/contamination beyond 24 hours perioperative coverage

## 2018-06-23 NOTE — CARE PLAN
Problem: Communication  Goal: The ability to communicate needs accurately and effectively will improve  Outcome: PROGRESSING AS EXPECTED  Reinforcement given about safety precautions. Redirected patient as needed.    Problem: Psychosocial Needs:  Goal: Level of anxiety will decrease  Outcome: PROGRESSING SLOWER THAN EXPECTED  PRN ativan given for anxiety.

## 2018-06-23 NOTE — CARE PLAN
Problem: Safety  Goal: Will remain free from injury  Outcome: PROGRESSING AS EXPECTED  Pt verbalizes understanding to call for assistance. Pt states he will use the call light if he needs anything .    Problem: Venous Thromboembolism (VTW)/Deep Vein Thrombosis (DVT) Prevention:  Goal: Patient will participate in Venous Thrombosis (VTE)/Deep Vein Thrombosis (DVT)Prevention Measures  Outcome: PROGRESSING AS EXPECTED  AM Lovenox administered as ordered.

## 2018-06-23 NOTE — DISCHARGE PLANNING
ATTN: Case Management  RE: Referral for Home Health                We would like to take this opportunity to thank you for submitting a referral for your patient to continue care with [facility]. Our skilled team is dedicated to helping all patients recover and gain independence in the home setting.            As of 6/22/18, we have accepted the above patient into our service. A [facility] clinician will be out to see the patient within 48 hours to conduct our initial visit. If you have any questions or concerns regarding the patient’s transition to Home Health, please do not hesitate to contact us. We are open for referrals 7 days a week from 8AM to 5PM at [phone].      We look forward to collaborating with you,  [facility] Team

## 2018-06-23 NOTE — PROGRESS NOTES
"Assumed care at 1900. Received report from RN. Patient is AOx4, forgetful at times. Patient is sitting up in bed and appears calm but somewhat anxious. PRN ativan given. Patient is on isolation precautions for MRSA. Patient needs constant reinforcement to not get out of bed. Fall precautions in place. Assessment complete. Labs reviewed. Patient and RN discussed plan of care. Patient questions answered. Patient needs are met at this time. Bed in lowest and locked position. Call light is within reach. Hourly rounding in place. BP (!) 96/64   Pulse 67   Temp 36.4 °C (97.6 °F)   Resp 16   Ht 1.93 m (6' 4\")   Wt 108.4 kg (238 lb 15.7 oz)   SpO2 94%   BMI 29.09 kg/m²       "

## 2018-06-23 NOTE — PROGRESS NOTES
This RN assumed care of the pt at 0700. Pt is resting quietly in bed with no needs at this time. Call light within reach, bed locked in lowest position. Hourly rounding in place. Will monitor.

## 2018-06-24 PROCEDURE — 700111 HCHG RX REV CODE 636 W/ 250 OVERRIDE (IP): Performed by: HOSPITALIST

## 2018-06-24 PROCEDURE — A9270 NON-COVERED ITEM OR SERVICE: HCPCS | Performed by: INTERNAL MEDICINE

## 2018-06-24 PROCEDURE — A9270 NON-COVERED ITEM OR SERVICE: HCPCS | Performed by: HOSPITALIST

## 2018-06-24 PROCEDURE — 700102 HCHG RX REV CODE 250 W/ 637 OVERRIDE(OP): Performed by: INTERNAL MEDICINE

## 2018-06-24 PROCEDURE — 700102 HCHG RX REV CODE 250 W/ 637 OVERRIDE(OP): Performed by: HOSPITALIST

## 2018-06-24 PROCEDURE — 700111 HCHG RX REV CODE 636 W/ 250 OVERRIDE (IP): Performed by: INTERNAL MEDICINE

## 2018-06-24 PROCEDURE — 700105 HCHG RX REV CODE 258: Performed by: INTERNAL MEDICINE

## 2018-06-24 PROCEDURE — 99231 SBSQ HOSP IP/OBS SF/LOW 25: CPT | Performed by: INTERNAL MEDICINE

## 2018-06-24 PROCEDURE — 770021 HCHG ROOM/CARE - ISO PRIVATE

## 2018-06-24 RX ADMIN — ENOXAPARIN SODIUM 40 MG: 100 INJECTION SUBCUTANEOUS at 08:30

## 2018-06-24 RX ADMIN — BENZTROPINE MESYLATE 1 MG: 1 TABLET ORAL at 08:29

## 2018-06-24 RX ADMIN — CLOTRIMAZOLE AND BETAMETHASONE DIPROPIONATE: 10; .5 CREAM TOPICAL at 20:43

## 2018-06-24 RX ADMIN — NYSTATIN 5 ML: 100000 SUSPENSION ORAL at 08:30

## 2018-06-24 RX ADMIN — QUETIAPINE FUMARATE 200 MG: 100 TABLET ORAL at 20:43

## 2018-06-24 RX ADMIN — Medication 50 MG: at 08:30

## 2018-06-24 RX ADMIN — NYSTATIN 5 ML: 100000 SUSPENSION ORAL at 11:39

## 2018-06-24 RX ADMIN — GABAPENTIN 100 MG: 100 CAPSULE ORAL at 13:59

## 2018-06-24 RX ADMIN — Medication 220 MG: at 08:29

## 2018-06-24 RX ADMIN — ALTEPLASE 2 MG: 2.2 INJECTION, POWDER, LYOPHILIZED, FOR SOLUTION INTRAVENOUS at 08:29

## 2018-06-24 RX ADMIN — NYSTATIN 5 ML: 100000 SUSPENSION ORAL at 20:43

## 2018-06-24 RX ADMIN — GABAPENTIN 100 MG: 100 CAPSULE ORAL at 20:43

## 2018-06-24 RX ADMIN — CEFTRIAXONE 2 G: 2 INJECTION, POWDER, FOR SOLUTION INTRAMUSCULAR; INTRAVENOUS at 08:30

## 2018-06-24 RX ADMIN — METFORMIN HYDROCHLORIDE 500 MG: 500 TABLET, FILM COATED ORAL at 08:29

## 2018-06-24 RX ADMIN — VITAMIN D, TAB 1000IU (100/BT) 1000 UNITS: 25 TAB at 08:29

## 2018-06-24 RX ADMIN — QUETIAPINE FUMARATE 100 MG: 100 TABLET ORAL at 08:30

## 2018-06-24 RX ADMIN — ATORVASTATIN CALCIUM 20 MG: 20 TABLET, FILM COATED ORAL at 20:43

## 2018-06-24 RX ADMIN — METFORMIN HYDROCHLORIDE 500 MG: 500 TABLET, FILM COATED ORAL at 16:56

## 2018-06-24 RX ADMIN — LINEZOLID 600 MG: 600 TABLET, FILM COATED ORAL at 08:30

## 2018-06-24 RX ADMIN — Medication 1000 MCG: at 08:29

## 2018-06-24 RX ADMIN — GABAPENTIN 100 MG: 100 CAPSULE ORAL at 08:30

## 2018-06-24 RX ADMIN — NYSTATIN 5 ML: 100000 SUSPENSION ORAL at 16:56

## 2018-06-24 RX ADMIN — FLUOXETINE HYDROCHLORIDE 60 MG: 20 CAPSULE ORAL at 08:29

## 2018-06-24 RX ADMIN — LINEZOLID 600 MG: 600 TABLET, FILM COATED ORAL at 20:43

## 2018-06-24 RX ADMIN — FOLIC ACID 1 MG: 1 TABLET ORAL at 08:30

## 2018-06-24 RX ADMIN — VALPROIC ACID 250 MG: 250 CAPSULE, LIQUID FILLED ORAL at 20:43

## 2018-06-24 RX ADMIN — LORAZEPAM 1 MG: 1 TABLET ORAL at 08:30

## 2018-06-24 RX ADMIN — ASPIRIN 81 MG: 81 TABLET, COATED ORAL at 08:30

## 2018-06-24 RX ADMIN — LORAZEPAM 1 MG: 1 TABLET ORAL at 20:43

## 2018-06-24 RX ADMIN — THERA TABS 1 TABLET: TAB at 08:29

## 2018-06-24 RX ADMIN — CLOTRIMAZOLE AND BETAMETHASONE DIPROPIONATE: 10; .5 CREAM TOPICAL at 08:30

## 2018-06-24 RX ADMIN — QUETIAPINE FUMARATE 100 MG: 100 TABLET ORAL at 11:39

## 2018-06-24 RX ADMIN — VALPROIC ACID 250 MG: 250 CAPSULE, LIQUID FILLED ORAL at 08:30

## 2018-06-24 RX ADMIN — BENZTROPINE MESYLATE 1 MG: 1 TABLET ORAL at 20:43

## 2018-06-24 ASSESSMENT — ENCOUNTER SYMPTOMS
FOCAL WEAKNESS: 0
WEAKNESS: 0
BLURRED VISION: 0
DIAPHORESIS: 0
ORTHOPNEA: 0
CLAUDICATION: 0
MEMORY LOSS: 1
TINGLING: 0
DIZZINESS: 0
COUGH: 0
HEADACHES: 0
DIARRHEA: 0
NERVOUS/ANXIOUS: 1
MYALGIAS: 0
FALLS: 0
SPUTUM PRODUCTION: 0

## 2018-06-24 ASSESSMENT — PAIN SCALES - GENERAL
PAINLEVEL_OUTOF10: 5
PAINLEVEL_OUTOF10: 0

## 2018-06-24 NOTE — CARE PLAN
Problem: Infection  Goal: Will remain free from infection  Outcome: PROGRESSING AS EXPECTED  Labs and vitals stable.     Problem: Pain Management  Goal: Pain level will decrease to patient's comfort goal  Outcome: PROGRESSING AS EXPECTED  Pt states his pain is well controlled, but he consistently requests PRN ativan.

## 2018-06-24 NOTE — PROGRESS NOTES
Bedside report received. Patient in bed alert and oriented x4. Patient with call light at side. Fall precautions in place. Reinforced fall safety. Reviewed shift POC. Patient denies any current needs. No further questions or concerns at this time.

## 2018-06-24 NOTE — PROGRESS NOTES
[facility] Hospitalist Progress Note    Date of Service: 6/24/2018    Chief Complaint  52 y.o. male admitted 5/11/2018 with diabetic left foot ulcer.  Underwent I&D with  May 12, 2018 and May 14, 2018. Seen by infectious disease team during the hospital stay. Plan to continue IV Zosyn and daptomycin with the stop date of June 26, 2018. Difficult SNF disposition given insurance issues and use of daptomycin for antibiotics.    Interval Problem Update  Patient seen and examined today.    Patient tolerating treatment and therapies.  All Data, Medication data reviewed.  Case discussed with nursing as available.  Plan of Care reviewed with patient and notified of changes.  6/12 the patient seems to have poor understanding of his current condition, he understands his need for continued IV antibiotics, he is homeless, and has a poor plan for after being discharged, his wound and foot care is not great   6/13 the patient is still exhibiting poor understanding of his situation, he thinks he could do IV antibiotics at home easily, though he is homeless.  His skin of bilateral legs and feet look better  6/14 the patient tolerates therapy, denies fevers chills, denies much pain, remains with affect, platelets are slowly decreasing, discussed with pharmacy  6/15 the patient feels okay, no fever, discussed the case with infectious disease re possible antibiotic change because of developing thrombocytopenia  6/16 the patient feels okay, his platelets are still slightly lower  6/17 the patient is agitated today and complains of sore, he remains mentally challenged and has poor overall insight  6/18 the patient is still anxious apparently did much better on oral Ativan we'll attempt to reinstitute that regimen to regimen   6/19 patient is somewhat shaky. In no acute distress. Vitals stable.POD#35  Healed Toe amputation  6/20   Patient fell down while taking shower. She stated that he slipped over.  Denies loss of  consciousness or head trauma.  Reviewed medications. Gabapentin dose decreased  Ordered CPK, vitamin B12 level, vitamin D level    Patient is pleasant, cooperative.  Denies any significant pain in the area of toe amputation. Surgical incision wound looks well-healed without signs of infection.  Daptomycin switched to linezolid per ID  Low vitamin B12 and vitamin D level noted. Started supplementation     Patient denies new issues. No overnight event. Vitals stable.  Continued IV antibiotic while in the hospital.  he is being evaluated for homeless accommodation living facility and home health is being set up.    Patient is in no distress. Vitals stable. IV antibiotics running.  Denies fever, chills, abdominal pain, foot pain, dizziness, nausea, vomiting, daily constipation. Left foot post surgical wound healing well.    Patient is resting comfortably in his bed. Pleasant, corporative. Vitals stable. Denies any pain. Counting down his days to discharge.      Consultants/Specialty  Infectious disease-signed off  Orthopedics  LPS    Disposition  Remains inpatient until IV antibiotic course finished  Anticipated disposition. Shelter for homeless. Home health order placed        Review of Systems   Constitutional: Negative for diaphoresis.   HENT: Negative for congestion.    Eyes: Negative for blurred vision.   Respiratory: Negative for cough and sputum production.    Cardiovascular: Negative for orthopnea and claudication.   Gastrointestinal: Negative for diarrhea.   Genitourinary: Negative for dysuria and urgency.   Musculoskeletal: Negative for falls and myalgias.   Skin: Positive for rash.   Neurological: Negative for dizziness, tingling, focal weakness, weakness and headaches.   Psychiatric/Behavioral: Positive for memory loss. The patient is nervous/anxious.         Denies any issue      Physical Exam  Laboratory/Imaging   Hemodynamics  Temp (24hrs), Av.5 °C (97.7 °F), Min:36.3 °C (97.3 °F),  Max:36.9 °C (98.4 °F)   Temperature: 36.5 °C (97.7 °F)  Pulse  Av  Min: 52  Max: 122   Blood Pressure: 124/93      Respiratory      Respiration: 17, Pulse Oximetry: 93 %             Fluids    Intake/Output Summary (Last 24 hours) at 18 1308  Last data filed at 18 1000   Gross per 24 hour   Intake              835 ml   Output                0 ml   Net              835 ml       Nutrition  Orders Placed This Encounter   Procedures   • DIET ORDER     Standing Status:   Standing     Number of Occurrences:   1     Order Specific Question:   Diet:     Answer:   Regular [1]     Physical Exam   Constitutional: He appears well-developed. No distress.   HENT:   Head: Normocephalic.   Mouth/Throat: Oropharynx is clear and moist.   Eyes: EOM are normal. No scleral icterus.   Neck: Normal range of motion. No tracheal deviation present.   Cardiovascular: Normal rate, regular rhythm and intact distal pulses.    No murmur heard.  Pulmonary/Chest: Effort normal. He has no wheezes. He has no rales.   Abdominal: Soft. There is no tenderness. There is no rebound.   Musculoskeletal: He exhibits deformity. He exhibits no edema.   s/p left great toe amputation (with h/o second toe amputation). Healing well and no tenderness to palpation. Neurovascularly intact     Neurological: He is alert.   Skin: Skin is warm and dry.   Bilateral feet with cracking and dry skin, currently improved   Psychiatric: His speech is delayed. He is slowed. Cognition and memory are impaired. He expresses impulsivity.   Nursing note and vitals reviewed.                               Assessment/Plan     Diabetic ulcer of left foot associated with type 2 diabetes mellitus, with muscle involvement without evidence of necrosis (HCC)- (present on admission)   Assessment & Plan    Hx of diabetic nonhealing ulcer, hx of previous left first toe amputation. Failed outpatient antibiotics. CBC, CMP, and CPK were checked today and are normal.   - ID and  "surgery evaluated, greatly appreciate  - s/p I&D 05/12 & 05/14 with Dr Baez   - continue IV ceftriaxone and daptomycin through 6/26  - continue wound care  - weekly CBC, CMP, CPK (while on daptomycin):    MRI revealing \"Large open ulcer extending from the great toe amputation site into the first metatarsal head with osteomyelitis of the first metatarsal head\"        Type 2 diabetes mellitus with neurologic complication, without long-term current use of insulin (HCC)- (present on admission)   Assessment & Plan    Controlled and without hyperglycemia. Last A1c 5.8.  - Continue home metformin  - Continue Neurontin for neuropathy  - ASA 81 and Atorvastatin 20 mg        Fall   Assessment & Plan    Multifactorial: Dementia, alcohol induced encephalopathy, peripheral neuropathy, status post great toe amputation  Gabapentin dose decreased.  Vitamin B12 and vitamin D supplementation started  Continue fall precautions  PTOT evaluation  Walker ordered upon discharge        Thrombocytopenia (HCC)   Assessment & Plan     unclear etiology  Stable. Continue to monitor.        Mental disability   Assessment & Plan    Likely long-standing        Diabetic peripheral neuropathy (HCC)- (present on admission)   Assessment & Plan    Pain controlled, mild tingling/neuropathy. Stable.  - Continue neurontin        Psychiatric disorder- (present on admission)   Assessment & Plan    intermittent agitation but typically cooperative   \- continue congentin, seroquel, prozac  - continue valproate for mood control  - ativan PO BID PRN   outpatient follow-up          Quality-Core Measures   Reviewed items::  Medications reviewed  Olsen catheter::  No Olsen  DVT prophylaxis pharmacological::  Enoxaparin (Lovenox)  DVT prophylaxis - mechanical:  SCDs  Ulcer Prophylaxis::  Not indicated  Antibiotics:  Treating active infection/contamination beyond 24 hours perioperative coverage        "

## 2018-06-24 NOTE — PROGRESS NOTES
This RN assumed care of the pt at 0700. Pt is resting quietly in bed with no needs at this time. Per NOC RN, pt needs altiplase due to hard to flush PICC. Will order. Call light within reach. Hourly rounding in place. Bed locked in lowest position. Will monitor.

## 2018-06-25 PROCEDURE — A9270 NON-COVERED ITEM OR SERVICE: HCPCS | Performed by: HOSPITALIST

## 2018-06-25 PROCEDURE — 700105 HCHG RX REV CODE 258: Performed by: INTERNAL MEDICINE

## 2018-06-25 PROCEDURE — A9270 NON-COVERED ITEM OR SERVICE: HCPCS | Performed by: INTERNAL MEDICINE

## 2018-06-25 PROCEDURE — 700102 HCHG RX REV CODE 250 W/ 637 OVERRIDE(OP): Performed by: INTERNAL MEDICINE

## 2018-06-25 PROCEDURE — 770021 HCHG ROOM/CARE - ISO PRIVATE

## 2018-06-25 PROCEDURE — 700111 HCHG RX REV CODE 636 W/ 250 OVERRIDE (IP): Performed by: INTERNAL MEDICINE

## 2018-06-25 PROCEDURE — 700102 HCHG RX REV CODE 250 W/ 637 OVERRIDE(OP): Performed by: HOSPITALIST

## 2018-06-25 PROCEDURE — 700111 HCHG RX REV CODE 636 W/ 250 OVERRIDE (IP): Performed by: HOSPITALIST

## 2018-06-25 PROCEDURE — 99231 SBSQ HOSP IP/OBS SF/LOW 25: CPT | Performed by: INTERNAL MEDICINE

## 2018-06-25 RX ADMIN — NYSTATIN 5 ML: 100000 SUSPENSION ORAL at 12:06

## 2018-06-25 RX ADMIN — GABAPENTIN 100 MG: 100 CAPSULE ORAL at 08:28

## 2018-06-25 RX ADMIN — GABAPENTIN 100 MG: 100 CAPSULE ORAL at 21:31

## 2018-06-25 RX ADMIN — METFORMIN HYDROCHLORIDE 500 MG: 500 TABLET, FILM COATED ORAL at 08:27

## 2018-06-25 RX ADMIN — QUETIAPINE FUMARATE 100 MG: 100 TABLET ORAL at 12:06

## 2018-06-25 RX ADMIN — VITAMIN D, TAB 1000IU (100/BT) 1000 UNITS: 25 TAB at 08:27

## 2018-06-25 RX ADMIN — FOLIC ACID 1 MG: 1 TABLET ORAL at 08:27

## 2018-06-25 RX ADMIN — LORAZEPAM 1 MG: 1 TABLET ORAL at 21:31

## 2018-06-25 RX ADMIN — LINEZOLID 600 MG: 600 TABLET, FILM COATED ORAL at 08:27

## 2018-06-25 RX ADMIN — CLOTRIMAZOLE AND BETAMETHASONE DIPROPIONATE: 10; .5 CREAM TOPICAL at 08:26

## 2018-06-25 RX ADMIN — CLOTRIMAZOLE AND BETAMETHASONE DIPROPIONATE: 10; .5 CREAM TOPICAL at 21:31

## 2018-06-25 RX ADMIN — Medication 1000 MCG: at 08:27

## 2018-06-25 RX ADMIN — QUETIAPINE FUMARATE 200 MG: 100 TABLET ORAL at 21:31

## 2018-06-25 RX ADMIN — LORAZEPAM 1 MG: 1 TABLET ORAL at 08:27

## 2018-06-25 RX ADMIN — NYSTATIN 5 ML: 100000 SUSPENSION ORAL at 16:15

## 2018-06-25 RX ADMIN — LINEZOLID 600 MG: 600 TABLET, FILM COATED ORAL at 21:31

## 2018-06-25 RX ADMIN — FLUOXETINE HYDROCHLORIDE 60 MG: 20 CAPSULE ORAL at 08:26

## 2018-06-25 RX ADMIN — VALPROIC ACID 250 MG: 250 CAPSULE, LIQUID FILLED ORAL at 21:33

## 2018-06-25 RX ADMIN — ASPIRIN 81 MG: 81 TABLET, COATED ORAL at 08:27

## 2018-06-25 RX ADMIN — THERA TABS 1 TABLET: TAB at 08:28

## 2018-06-25 RX ADMIN — CEFTRIAXONE 2 G: 2 INJECTION, POWDER, FOR SOLUTION INTRAMUSCULAR; INTRAVENOUS at 08:26

## 2018-06-25 RX ADMIN — NYSTATIN 5 ML: 100000 SUSPENSION ORAL at 21:31

## 2018-06-25 RX ADMIN — QUETIAPINE FUMARATE 100 MG: 100 TABLET ORAL at 08:27

## 2018-06-25 RX ADMIN — ATORVASTATIN CALCIUM 20 MG: 20 TABLET, FILM COATED ORAL at 21:31

## 2018-06-25 RX ADMIN — NYSTATIN 5 ML: 100000 SUSPENSION ORAL at 08:26

## 2018-06-25 RX ADMIN — GABAPENTIN 100 MG: 100 CAPSULE ORAL at 15:00

## 2018-06-25 RX ADMIN — Medication 220 MG: at 08:27

## 2018-06-25 RX ADMIN — VALPROIC ACID 250 MG: 250 CAPSULE, LIQUID FILLED ORAL at 08:27

## 2018-06-25 RX ADMIN — METFORMIN HYDROCHLORIDE 500 MG: 500 TABLET, FILM COATED ORAL at 16:15

## 2018-06-25 RX ADMIN — ENOXAPARIN SODIUM 40 MG: 100 INJECTION SUBCUTANEOUS at 08:26

## 2018-06-25 RX ADMIN — BENZTROPINE MESYLATE 1 MG: 1 TABLET ORAL at 08:27

## 2018-06-25 RX ADMIN — Medication 50 MG: at 08:27

## 2018-06-25 RX ADMIN — BENZTROPINE MESYLATE 1 MG: 1 TABLET ORAL at 21:31

## 2018-06-25 ASSESSMENT — ENCOUNTER SYMPTOMS
MEMORY LOSS: 1
HEADACHES: 0
COUGH: 0
TINGLING: 0
ORTHOPNEA: 0
DIARRHEA: 0
SPUTUM PRODUCTION: 0
DIAPHORESIS: 0
FALLS: 0
MYALGIAS: 0
BLURRED VISION: 0
WEAKNESS: 0
DIZZINESS: 0
FOCAL WEAKNESS: 0
CLAUDICATION: 0
NERVOUS/ANXIOUS: 1

## 2018-06-25 ASSESSMENT — PAIN SCALES - GENERAL
PAINLEVEL_OUTOF10: 7
PAINLEVEL_OUTOF10: 2

## 2018-06-25 NOTE — PROGRESS NOTES
Assumed care of pt, received report from day shift RN, pt assessed.  Pt complaining of 5/10 BL foot pain, pt medicated with scheduled pain medication.  Pt is A&O x4.  Pt high fall risk, wearing treaded socks, bed locked and in lowest position, bed alarm is on.  Pt instructed to call for assistance prior to getting OOB, pt verbalized understanding, frequent reinforcement required.  Call light, phone, and personal belongings within reach.

## 2018-06-25 NOTE — PROGRESS NOTES
[facility] Hospitalist Progress Note    Date of Service: 6/25/2018    Chief Complaint  52 y.o. male admitted 5/11/2018 with diabetic left foot ulcer.  Underwent I&D with  May 12, 2018 and May 14, 2018. Seen by infectious disease team during the hospital stay. Plan to continue IV Zosyn and daptomycin with the stop date of June 26, 2018. Difficult SNF disposition given insurance issues and use of daptomycin for antibiotics.    Interval Problem Update  Patient seen and examined today.    Patient tolerating treatment and therapies.  All Data, Medication data reviewed.  Case discussed with nursing as available.  Plan of Care reviewed with patient and notified of changes.  6/12 the patient seems to have poor understanding of his current condition, he understands his need for continued IV antibiotics, he is homeless, and has a poor plan for after being discharged, his wound and foot care is not great   6/13 the patient is still exhibiting poor understanding of his situation, he thinks he could do IV antibiotics at home easily, though he is homeless.  His skin of bilateral legs and feet look better  6/14 the patient tolerates therapy, denies fevers chills, denies much pain, remains with affect, platelets are slowly decreasing, discussed with pharmacy  6/15 the patient feels okay, no fever, discussed the case with infectious disease re possible antibiotic change because of developing thrombocytopenia  6/16 the patient feels okay, his platelets are still slightly lower  6/17 the patient is agitated today and complains of sore, he remains mentally challenged and has poor overall insight  6/18 the patient is still anxious apparently did much better on oral Ativan we'll attempt to reinstitute that regimen to regimen   6/19 patient is somewhat shaky. In no acute distress. Vitals stable.POD#35  Healed Toe amputation  6/20   Patient fell down while taking shower. She stated that he slipped over.  Denies loss of  consciousness or head trauma.  Reviewed medications. Gabapentin dose decreased  Ordered CPK, vitamin B12 level, vitamin D level  6/21  Patient is pleasant, cooperative.  Denies any significant pain in the area of toe amputation. Surgical incision wound looks well-healed without signs of infection.  Daptomycin switched to linezolid per ID  Low vitamin B12 and vitamin D level noted. Started supplementation  6/22   Patient denies new issues. No overnight event. Vitals stable.  Continued IV antibiotic while in the hospital.  he is being evaluated for homeless accommodation living facility and home health is being set up.  6/23  Patient is in no distress. Vitals stable. IV antibiotics running.  Denies fever, chills, abdominal pain, foot pain, dizziness, nausea, vomiting, daily constipation. Left foot post surgical wound healing well.  6/24  Patient is resting comfortably in his bed. Pleasant, corporative. Vitals stable. Denies any pain. Counting down his days to discharge.  6/25  Patient is pleasant, cooperative.   Looking forward to projected discharge.  VS stable, no overnight events.    Consultants/Specialty  Infectious disease-signed off  Orthopedics  LPS    Disposition  Remains inpatient until IV antibiotic course finished on 6/26  DC tomorrow to transitional  housing with  after last dose of antibiotic          Review of Systems   Constitutional: Negative for diaphoresis.   HENT: Negative for congestion.    Eyes: Negative for blurred vision.   Respiratory: Negative for cough and sputum production.    Cardiovascular: Negative for orthopnea and claudication.   Gastrointestinal: Negative for diarrhea.   Genitourinary: Negative for dysuria and urgency.   Musculoskeletal: Negative for falls and myalgias.   Skin: Positive for rash.   Neurological: Negative for dizziness, tingling, focal weakness, weakness and headaches.   Psychiatric/Behavioral: Positive for memory loss. The patient is nervous/anxious.         Denies  any issue      Physical Exam  Laboratory/Imaging   Hemodynamics  Temp (24hrs), Av.6 °C (97.9 °F), Min:36.4 °C (97.5 °F), Max:36.9 °C (98.4 °F)   Temperature: 36.6 °C (97.9 °F)  Pulse  Av.9  Min: 52  Max: 122   Blood Pressure: 113/78      Respiratory      Respiration: 18, Pulse Oximetry: 92 %             Fluids    Intake/Output Summary (Last 24 hours) at 18 1401  Last data filed at 18 1327   Gross per 24 hour   Intake              880 ml   Output                0 ml   Net              880 ml       Nutrition  Orders Placed This Encounter   Procedures   • DIET ORDER     Standing Status:   Standing     Number of Occurrences:   1     Order Specific Question:   Diet:     Answer:   Regular [1]     Physical Exam   Constitutional: He appears well-developed. No distress.   HENT:   Head: Normocephalic.   Mouth/Throat: Oropharynx is clear and moist.   Eyes: EOM are normal. No scleral icterus.   Neck: Normal range of motion. No tracheal deviation present.   Cardiovascular: Normal rate, regular rhythm and intact distal pulses.    No murmur heard.  Pulmonary/Chest: Effort normal. He has no wheezes. He has no rales.   Abdominal: Soft. There is no tenderness. There is no rebound.   Musculoskeletal: He exhibits deformity. He exhibits no edema.   s/p left great toe amputation (with h/o second toe amputation). Healing well and no tenderness to palpation. Neurovascularly intact     Neurological: He is alert.   Skin: Skin is warm and dry.   Bilateral feet with cracking and dry skin, currently improved   Psychiatric: His speech is delayed. He is slowed. Cognition and memory are impaired. He expresses impulsivity.   Nursing note and vitals reviewed.                               Assessment/Plan     Diabetic ulcer of left foot associated with type 2 diabetes mellitus, with muscle involvement without evidence of necrosis (HCC)- (present on admission)   Assessment & Plan    Hx of diabetic nonhealing ulcer, hx of previous  "left first toe amputation. Failed outpatient antibiotics. CBC, CMP, and CPK were checked today and are normal.   - ID and surgery evaluated, greatly appreciate  - s/p I&D 05/12 & 05/14 with Dr Baez   - continue IV ceftriaxone and daptomycin through 6/26  - continue wound care  - weekly CBC, CMP, CPK (while on daptomycin):    MRI revealing \"Large open ulcer extending from the great toe amputation site into the first metatarsal head with osteomyelitis of the first metatarsal head\"        Type 2 diabetes mellitus with neurologic complication, without long-term current use of insulin (HCC)- (present on admission)   Assessment & Plan    Controlled and without hyperglycemia. Last A1c 5.8.  - Continue home metformin  - Continue Neurontin for neuropathy  - ASA 81 and Atorvastatin 20 mg        Fall   Assessment & Plan    Multifactorial: Dementia, alcohol induced encephalopathy, peripheral neuropathy, status post great toe amputation  Gabapentin dose decreased.  Vitamin B12 and vitamin D supplementation started  Continue fall precautions  PTOT evaluation  Walker ordered upon discharge        Thrombocytopenia (HCC)   Assessment & Plan     unclear etiology  Stable. Continue to monitor.        Mental disability   Assessment & Plan    Likely long-standing        Diabetic peripheral neuropathy (HCC)- (present on admission)   Assessment & Plan    Pain controlled, mild tingling/neuropathy. Stable.  - Continue neurontin        Psychiatric disorder- (present on admission)   Assessment & Plan    intermittent agitation but typically cooperative   \- continue congentin, seroquel, prozac  - continue valproate for mood control  - ativan PO BID PRN   outpatient follow-up          Quality-Core Measures   Reviewed items::  Medications reviewed  Olsen catheter::  No Olsen  DVT prophylaxis pharmacological::  Enoxaparin (Lovenox)  DVT prophylaxis - mechanical:  SCDs  Ulcer Prophylaxis::  Not indicated  Antibiotics:  Treating active " infection/contamination beyond 24 hours perioperative coverage

## 2018-06-25 NOTE — PROGRESS NOTES
"LIMB PRESERVATION SERVICE NOTE:    Subjective:      Reason for Consultation: S/P I and D Left Great Toe 5/12/18 Dr Baez       History of Present Illness:     Patient is well known to LPS and outpatient wound care services.     Patient is a 52 y.o. male with a past medical history that includes borderline diabetes, anxiety, HTN, Hep C and is admitted to [facility] via [facility] for his S/P I and D Left Great Toe 5/12/18 Dr Baez.  S/P Left Great Toe Amp by Dr Easton 4/23/18. The s/p left great toe amputation site was from 4/1/18 by Dr. Baez. Pt did not follow up with the [facility] clinic for dressing care and he did not receive oral antibiotics. He also did not follow up with his PCP at [facility]. Pt has Hx of noncompliance and psychiatric disorder which is likely contributory.           Lab Results   Component Value Date/Time    HBA1C 5.8 (H) 03/30/2018 03:04 PM      Patient denies fevers chills, nausea, vomiting.     Pain:    Patient resting comfortably    Vitals  /78   Pulse 60   Temp 36.6 °C (97.9 °F)   Resp 18   Ht 1.93 m (6' 4\")   Wt 105.8 kg (233 lb 4 oz)   SpO2 92%   BMI 28.39 kg/m²       Objective:         Surgical site - Left Great Toe Amp Site RESOLVED 6/25/2018   Pressure ulcers stage 2 to midfoot RESOLVED 6/20/2018   Left foot dorsal pressure injury stage 2  RESOLVED-- 06/12/2018     Plan:          Treatment Plan and Recommendations:     1. Labs\Imaging:         Reviewed     2. Treatment:              Wound Care PRN by Nursing, LPS to Follow.                                        3.Collaboration:                                                 ID managing abx, Zosyn and Dapto COMP 6/26/18     4. Orthotics/Prosthetics:                                       pt to get orthotics/prosthetics  as OP, pt to wear shoes that do not rub on Wound sites      Anticipated discharge plans (X):              SNF:                      Home Care:                       Outpatient Wound " Center: X                   Self Care:                         Other:  HOMELESS SHELTER                     TBD:  Patient requires skilled therapeutic intervention for debridement, product selection and application, education, wound bed preparation and assessment.

## 2018-06-25 NOTE — FACE TO FACE
Face to Face Note  -  Durable Medical Equipment    Noé Haque M.D. - NPI: [id number]  I certify that this patient is under my care and that they had a durable medical equipment(DME)face to face encounter by myself that meets the physician DME face-to-face encounter requirements with this patient on:    Date of encounter:   Patient:                    MRN:                       YOB: 2019  Wilbert Kennith Yeomans Jr.  [id number]  1965     The encounter with the patient was in whole, or in part, for the following medical condition, which is the primary reason for durable medical equipment:  Post-Op Surgery    I certify that, based on my findings, the following durable medical equipment is medically necessary:  Other DME Equipment - Shower Chair.    HOME O2 Saturation Measurements:(Values must be present for Home Oxygen orders)         ,     ,         My Clinical findings support the need for the above equipment due to:  Abnormal Gait    Supporting Symptoms: poor balance

## 2018-06-25 NOTE — CARE PLAN
Problem: Communication  Goal: The ability to communicate needs accurately and effectively will improve  Outcome: PROGRESSING AS EXPECTED  Pt capable of verbalizing all needs and utilizes call light system approprietly.    Problem: Safety  Goal: Will remain free from falls  Outcome: PROGRESSING AS EXPECTED  Pt high fall risk, pt wearing treaded socks, bed locked and in lowest position, bed alarm is on.  Pt call light and phone within reach.

## 2018-06-25 NOTE — CARE PLAN
Problem: Communication  Goal: The ability to communicate needs accurately and effectively will improve  Outcome: PROGRESSING AS EXPECTED  Verbalizes understanding to POC. Discharge planning.     Problem: Safety  Goal: Will remain free from injury  Outcome: PROGRESSING SLOWER THAN EXPECTED  Pt impulsive at times, does not call appropriately and needs reinforcement with call light use. All high fall risk precautions in place.

## 2018-06-25 NOTE — PROGRESS NOTES
This RN assumed care of the pt at 0700. Pt is resting quietly in bed with no needs at this time. Call light within reach. Bed locked in lowest position. Hourly rounding in place. Will monitor.

## 2018-06-26 VITALS
WEIGHT: 233.25 LBS | HEIGHT: 76 IN | OXYGEN SATURATION: 93 % | SYSTOLIC BLOOD PRESSURE: 136 MMHG | DIASTOLIC BLOOD PRESSURE: 92 MMHG | TEMPERATURE: 98.3 F | RESPIRATION RATE: 18 BRPM | HEART RATE: 92 BPM | BODY MASS INDEX: 28.4 KG/M2

## 2018-06-26 PROCEDURE — 99239 HOSP IP/OBS DSCHRG MGMT >30: CPT | Performed by: INTERNAL MEDICINE

## 2018-06-26 PROCEDURE — A9270 NON-COVERED ITEM OR SERVICE: HCPCS | Performed by: HOSPITALIST

## 2018-06-26 PROCEDURE — 700102 HCHG RX REV CODE 250 W/ 637 OVERRIDE(OP): Performed by: HOSPITALIST

## 2018-06-26 PROCEDURE — 700102 HCHG RX REV CODE 250 W/ 637 OVERRIDE(OP): Performed by: INTERNAL MEDICINE

## 2018-06-26 PROCEDURE — 700111 HCHG RX REV CODE 636 W/ 250 OVERRIDE (IP): Performed by: HOSPITALIST

## 2018-06-26 PROCEDURE — A9270 NON-COVERED ITEM OR SERVICE: HCPCS | Performed by: INTERNAL MEDICINE

## 2018-06-26 PROCEDURE — 700105 HCHG RX REV CODE 258: Performed by: INTERNAL MEDICINE

## 2018-06-26 PROCEDURE — 700111 HCHG RX REV CODE 636 W/ 250 OVERRIDE (IP): Performed by: INTERNAL MEDICINE

## 2018-06-26 RX ORDER — ATORVASTATIN CALCIUM 20 MG/1
20 TABLET, FILM COATED ORAL
Qty: 30 TAB | Refills: 0 | Status: SHIPPED | OUTPATIENT
Start: 2018-06-26 | End: 2018-08-10

## 2018-06-26 RX ORDER — ZINC SULFATE 50(220)MG
220 CAPSULE ORAL DAILY
Qty: 30 CAP | Refills: 0 | Status: SHIPPED | OUTPATIENT
Start: 2018-06-26 | End: 2018-08-10

## 2018-06-26 RX ORDER — FOLIC ACID 1 MG/1
1 TABLET ORAL DAILY
Qty: 30 TAB | Refills: 0 | Status: SHIPPED | OUTPATIENT
Start: 2018-06-26 | End: 2018-08-10

## 2018-06-26 RX ORDER — ASPIRIN 81 MG/1
81 TABLET ORAL DAILY
Qty: 30 TAB | Refills: 0 | Status: SHIPPED | OUTPATIENT
Start: 2018-06-26 | End: 2018-08-10

## 2018-06-26 RX ORDER — GABAPENTIN 100 MG/1
100 CAPSULE ORAL 3 TIMES DAILY
Qty: 90 CAP | Refills: 0 | Status: SHIPPED | OUTPATIENT
Start: 2018-06-26 | End: 2018-08-10

## 2018-06-26 RX ORDER — VALPROIC ACID 250 MG/1
250 CAPSULE, LIQUID FILLED ORAL EVERY 12 HOURS
Qty: 30 CAP | Refills: 0 | Status: SHIPPED | OUTPATIENT
Start: 2018-06-26 | End: 2018-08-10

## 2018-06-26 RX ADMIN — THERA TABS 1 TABLET: TAB at 08:44

## 2018-06-26 RX ADMIN — Medication 1000 MCG: at 08:43

## 2018-06-26 RX ADMIN — GABAPENTIN 100 MG: 100 CAPSULE ORAL at 08:43

## 2018-06-26 RX ADMIN — NYSTATIN 5 ML: 100000 SUSPENSION ORAL at 08:44

## 2018-06-26 RX ADMIN — FOLIC ACID 1 MG: 1 TABLET ORAL at 08:43

## 2018-06-26 RX ADMIN — FLUOXETINE HYDROCHLORIDE 60 MG: 20 CAPSULE ORAL at 08:43

## 2018-06-26 RX ADMIN — QUETIAPINE FUMARATE 100 MG: 100 TABLET ORAL at 08:44

## 2018-06-26 RX ADMIN — LORAZEPAM 1 MG: 1 TABLET ORAL at 08:47

## 2018-06-26 RX ADMIN — BENZTROPINE MESYLATE 1 MG: 1 TABLET ORAL at 08:44

## 2018-06-26 RX ADMIN — Medication 50 MG: at 08:46

## 2018-06-26 RX ADMIN — METFORMIN HYDROCHLORIDE 500 MG: 500 TABLET, FILM COATED ORAL at 08:44

## 2018-06-26 RX ADMIN — Medication 220 MG: at 08:59

## 2018-06-26 RX ADMIN — CLOTRIMAZOLE AND BETAMETHASONE DIPROPIONATE: 10; .5 CREAM TOPICAL at 08:51

## 2018-06-26 RX ADMIN — ASPIRIN 81 MG: 81 TABLET, COATED ORAL at 08:43

## 2018-06-26 RX ADMIN — LINEZOLID 600 MG: 600 TABLET, FILM COATED ORAL at 08:43

## 2018-06-26 RX ADMIN — VITAMIN D, TAB 1000IU (100/BT) 1000 UNITS: 25 TAB at 08:59

## 2018-06-26 RX ADMIN — VALPROIC ACID 250 MG: 250 CAPSULE, LIQUID FILLED ORAL at 08:46

## 2018-06-26 RX ADMIN — CEFTRIAXONE 2 G: 2 INJECTION, POWDER, FOR SOLUTION INTRAMUSCULAR; INTRAVENOUS at 08:44

## 2018-06-26 ASSESSMENT — PAIN SCALES - GENERAL: PAINLEVEL_OUTOF10: 0

## 2018-06-26 NOTE — THERAPY
"Patient is laying down in bed, actively ignoring education re: participation with therapy. He reports that he \"uses the bar\" to perform squats, states he drinks water, and that he moves his legs around in bed. Attempted to educate that this was not sufficient activity to prevent functional loss. Patient states he'll \"get up and do it later, I'm lazy right now\". Will follow up.   "

## 2018-06-26 NOTE — DISCHARGE PLANNING
Anticipated Discharge Disposition: [facility] w/ HH    Action: LSW received PC from Jaci at [facility] reporting that the pt was picked up by the [facility]  and started to demand that he needed to cash his checks. The  told the pt that he would need to take him to [facility] first and pt became agitated and started yelling. Pt then left on foot down [address] and was never taken to [facility]. LSW notified Lucille Naylor.     Barriers to Discharge: Pt was set to discharge to [facility] to have HH follow pt for continued PT/OT. Pt refused to be taken to [facility] and left on foot down [address].    Plan: LSW spoke w/ Jaci at [facility] and explained that no action needed to be taken as the pt chose to leave on foot and not go to the [facility]. LSW also called [facility] and provided them with an update.

## 2018-06-26 NOTE — CARE PLAN
Problem: Venous Thromboembolism (VTW)/Deep Vein Thrombosis (DVT) Prevention:  Goal: Patient will participate in Venous Thrombosis (VTE)/Deep Vein Thrombosis (DVT)Prevention Measures  Outcome: PROGRESSING AS EXPECTED  Pt on Lovenox for pharmacologic prophylaxis.    Problem: Bowel/Gastric:  Goal: Will not experience complications related to bowel motility  Outcome: PROGRESSING AS EXPECTED  LBM 6/26/2017.  Pt refusing stool softeners, has no complaints of loose stools or constipation.

## 2018-06-26 NOTE — DISCHARGE INSTRUCTIONS
Discharge Instructions    Discharged to [facility] by car with escort. Discharged via wheelchair, hospital escort: Refused.  Special equipment needed: Not Applicable    Be sure to schedule a follow-up appointment with your primary care doctor or any specialists as instructed.     Discharge Plan:   Diet Plan: Discussed  Activity Level: Discussed  Smoking Cessation Offered: Patient Refused  Confirmed Follow up Appointment: Appointment Scheduled  Confirmed Symptoms Management: Discussed  Medication Reconciliation Updated: Yes  Pneumococcal Vaccine Administered/Refused: Given (See MAR)  Influenza Vaccine Indication: Patient Refuses    I understand that a diet low in cholesterol, fat, and sodium is recommended for good health. Unless I have been given specific instructions below for another diet, I accept this instruction as my diet prescription.   Other diet: Regular    Special Instructions: Sepsis, Adult  Sepsis is a serious infection of your blood or tissues that affects your whole body. The infection that causes sepsis may be bacterial, viral, fungal, or parasitic. Sepsis may be life threatening. Sepsis can cause your blood pressure to drop. This may result in shock. Shock causes your central nervous system and your organs to stop working correctly.  What increases the risk?  Sepsis can happen in anyone, but it is more likely to happen in people who have weakened immune systems.  What are the signs or symptoms?  Symptoms of sepsis can include:  · Fever or low body temperature (hypothermia).  · Rapid breathing (hyperventilation).  · Chills.  · Rapid heartbeat (tachycardia).  · Confusion or light-headedness.  · Trouble breathing.  · Urinating much less than usual.  · Cool, clammy skin or red, flushed skin.  · Other problems with the heart, kidneys, or brain.  How is this diagnosed?  Your health care provider will likely do tests to look for an infection, to see if the infection has spread to your blood, and to see  how serious your condition is. Tests can include:  · Blood tests, including cultures of your blood.  · Cultures of other fluids from your body, such as:  ¨ Urine.  ¨ Pus from wounds.  ¨ Mucus coughed up from your lungs.  · Urine tests other than cultures.  · X-ray exams or other imaging tests.  How is this treated?  Treatment will begin with elimination of the source of infection. If your sepsis is likely caused by a bacterial or fungal infection, you will be given antibiotic or antifungal medicines.  You may also receive:  · Oxygen.  · Fluids through an IV tube.  · Medicines to increase your blood pressure.  · A machine to clean your blood (dialysis) if your kidneys fail.  · A machine to help you breathe if your lungs fail.  Get help right away if:  You get an infection or develop any of the signs and symptoms of sepsis after surgery or a hospitalization.  This information is not intended to replace advice given to you by your health care provider. Make sure you discuss any questions you have with your health care provider.  Document Released: 09/15/2004 Document Revised: 05/25/2017 Document Reviewed: 08/25/2014  [employer] Patient Education © 2017 [employer]      · Is patient discharged on Warfarin / Coumadin?   No   Cellulitis, Adult  Introduction  Cellulitis is a skin infection. The infected area is usually red and sore. This condition occurs most often in the arms and lower legs. It is very important to get treated for this condition.  Follow these instructions at home:  Take over-the-counter and prescription medicines only as told by your doctor.  If you were prescribed an antibiotic medicine, take it as told by your doctor. Do not stop taking the antibiotic even if you start to feel better.  Drink enough fluid to keep your pee (urine) clear or pale yellow.  Do not touch or rub the infected area.  Raise (elevate) the infected area above the level of your heart while you are sitting or lying  down.  Place warm or cold wet cloths (warm or cold compresses) on the infected area. Do this as told by your doctor.  Keep all follow-up visits as told by your doctor. This is important. These visits let your doctor make sure your infection is not getting worse.  Contact a doctor if:  You have a fever.  Your symptoms do not get better after 1-2 days of treatment.  Your bone or joint under the infected area starts to hurt after the skin has healed.  Your infection comes back. This can happen in the same area or another area.  You have a swollen bump in the infected area.  You have new symptoms.  You feel ill and also have muscle aches and pains.  Get help right away if:  Your symptoms get worse.  You feel very sleepy.  You throw up (vomit) or have watery poop (diarrhea) for a long time.  There are red streaks coming from the infected area.  Your red area gets larger.  Your red area turns darker.  This information is not intended to replace advice given to you by your health care provider. Make sure you discuss any questions you have with your health care provider.  Document Released: 06/05/2009 Document Revised: 05/25/2017 Document Reviewed: 10/26/2016  © 2017 Elsevier      Depression / Suicide Risk    As you are discharged from this [facility] facility, it is important to learn how to keep safe from harming yourself.    Recognize the warning signs:  · Abrupt changes in personality, positive or negative- including increase in energy   · Giving away possessions  · Change in eating patterns- significant weight changes-  positive or negative  · Change in sleeping patterns- unable to sleep or sleeping all the time   · Unwillingness or inability to communicate  · Depression  · Unusual sadness, discouragement and loneliness  · Talk of wanting to die  · Neglect of personal appearance   · Rebelliousness- reckless behavior  · Withdrawal from people/activities they love  · Confusion- inability to concentrate     If you or a  "loved one observes any of these behaviors or has concerns about self-harm, here's what you can do:  · Talk about it- your feelings and reasons for harming yourself  · Remove any means that you might use to hurt yourself (examples: pills, rope, extension cords, firearm)  · Get professional help from the community (Mental Health, Substance Abuse, psychological counseling)  · Do not be alone:Call your Safe Contact- someone whom you trust who will be there for you.  · Call your local CRISIS HOTLINE 903-0026 or 926-691-3266  · Call your local Children's Mobile Crisis Response Team [place] (588) 050-3562 or www.[employer] Cell  · Call the toll free National Suicide Prevention Hotlines   · National Suicide Prevention Lifeline 880-983-SWTE (0247)  · National Hope Line Network 800-SUICIDE (278-9192)      PICC Line Instructions    How to Care for your PICC  • Do not take a bath, swim, or use hot tubs when you have a PICC. Cover PICC line with clear plastic wrap and tape to keep it dry while showering  • Check the PICC insertion site daily for leakage, redness, swelling, or pain.  • Flush the PICC as directed by your health care provider. Let your health care provider know right away if the PICC is difficult to flush or does not flush. Do not use force to flush the PICC.  • Do not use a syringe that is less than 10 mL to flush the PICC  • Avoid blood pressure checks on the arm with the PICC.  • Do not take the PICC out yourself. Only a trained clinical professional should remove the PICC  • Make sure you or anyone who access your PICC Line washes their hands before using the line  • Make sure the hub of the line is \"scrubbed\" prior to using the line  Dressing Changes  • Change the PICC dressing as instructed by your health care provider.  • Change your PICC dressing if it becomes loose or wet.  When to seek medical attention  • PICC is accidentally pulled all the way out  • There is any type of drainage, redness, or " "swelling where the PICC enters the skin.  • You cannot flush the PICC, it is difficult to flush, or the PICC leaks around the insertion site when it is flushed.  • You hear a \"flushing\" sound when the PICC is flushed.  • You notice a hole or tear in the PICC.  • You develop chills or a fever      "

## 2018-06-26 NOTE — PROGRESS NOTES
Assumed care of pt, received report from day shift RN, pt assessed.  Pt complaining of 7/10 BL foot pain, pt medicated with scheduled pain medication.  Pt is A&O x4.  Pt high fall risk, wearing treaded socks, bed locked and in lowest position, bed alarm is on.  Pt instructed to call for assistance prior to getting OOB, pt verbalized understanding, frequent reinforcement required.  Call light, phone, and personal belongings within reach.

## 2018-06-26 NOTE — DISCHARGE PLANNING
Anticipated Discharge Disposition: [facility]    Action: LSW notified by Jaci w/ [facility] that pt has been accepted and can transfer today. Jaci further reports that the pt can be picked up by their transport. LSW to follow up w/ DME request and then will communicate transport time w/ Jaci. LSW notified MD, Charge RN, and bedside RN. LSW called and requested [facility] follow up w/ shower chair request for [employer] re: insurance decision to pay for DME.      UPDATE 1000: LSW notified that [employer] will pay for a shower chair for the pt. Will need order that includes the pt's height and weight and they will deliver to [facility]. LSW notified MD of DME order needed. LSW left VM for Jaci to inform her that pt can dc w/ walker and the shower chair to be delivered and to call LSW w/ transport time.     Barriers to Discharge: none known    Plan: Awaiting transport time from Jaci w/ [facility].

## 2018-06-26 NOTE — DISCHARGE SUMMARY
Discharge Summary    CHIEF COMPLAINT ON ADMISSION  Foot pain  Reason for Admission  Infected Diabetic Toe Ulcer     Admission Date  5/11/2018    CODE STATUS  Full Code    HPI & HOSPITAL COURSE  This is a 52 y.o. male here with diabetic left foot ulcer on May 11, 2018.  He had history of previous left first toe amputation.  Dr. valenzuela and was consulted and did incision and drainage on May 12, 2018 and May 14, 2018.  Infectious disease was consulted as well and started on IV Zosyn and daptomycin during the hospital stay.  He had a PICC line placed for prolonged IV antibiotic.  Due to his difficult disposition he has prolonged hospital stay for IV antibiotic with the last dose of antibiotic today on June 26, 2018.  He continued to have wound care while on the floor.  PT OT saw the patient and recommended walker upon discharge which has been arranged for the patient.  Per surgery and infectious disease he can be discharged home after antibiotic and follow-up with them as an outpatient.  He was instructed to come back to ER if his symptoms get worse.       Therefore, he is discharged in guarded and stable condition to home with close outpatient follow-up.    The patient met 2-midnight criteria for an inpatient stay at the time of discharge.    Discharge Date  6/26/18    FOLLOW UP ITEMS POST DISCHARGE      DISCHARGE DIAGNOSES  Active Problems:    Diabetic ulcer of left foot associated with type 2 diabetes mellitus, with muscle involvement without evidence of necrosis (HCC) POA: Yes    Type 2 diabetes mellitus with neurologic complication, without long-term current use of insulin (HCC) POA: Yes    Psychiatric disorder POA: Yes    Diabetic peripheral neuropathy (HCC) POA: Yes    Mental disability POA: Unknown    Thrombocytopenia (HCC) POA: Unknown    Fall POA: Unknown  Resolved Problems:    * No resolved hospital problems. *      FOLLOW UP  No future appointments.  [facility]ah[facility] Har[facility]  [address]  [place]  [address]  [phone]    Go on 7/5/2018  Please arrive at 3:20 pm for your appointment with primary care.    [facility]  [address] [facility].  [place] [address]  [phone]        [facility]ah[facility] Harrington  [address]  [place] [address]  [phone]    In 1 week      Amanda Feng M.D.  [address]  [place] [address]  [phone]    In 1 week      Salomón Baez M.D.  [address]  [place] [address]  [phone]    In 2 weeks        MEDICATIONS ON DISCHARGE     Medication List      START taking these medications      Instructions   aspirin 81 MG EC tablet   Take 1 Tab by mouth every day.  Dose:  81 mg     atorvastatin 20 MG Tabs  Commonly known as:  LIPITOR   Take 1 Tab by mouth every bedtime.  Dose:  20 mg     cyanocobalamin 1000 MCG Tabs  Commonly known as:  VITAMIN B12   Take 1 Tab by mouth every day.  Dose:  1000 mcg     folic acid 1 MG Tabs  Commonly known as:  FOLVITE   Take 1 Tab by mouth every day.  Dose:  1 mg     gabapentin 100 MG Caps  Commonly known as:  NEURONTIN   Take 1 Cap by mouth 3 times a day.  Dose:  100 mg     valproic acid 250 MG Caps  Commonly known as:  DEPAKENE   Take 1 Cap by mouth every 12 hours.  Dose:  250 mg     zinc sulfate 220 (50 Zn) MG Caps  Commonly known as:  ZINCATE   Take 1 Cap by mouth every day.  Dose:  220 mg        CONTINUE taking these medications      Instructions   benztropine 1 MG Tabs  Commonly known as:  COGENTIN   Take 1 mg by mouth 2 times a day.  Dose:  1 mg     FLUoxetine 20 MG Caps  Commonly known as:  PROZAC   Take 60 mg by mouth every day.  Dose:  60 mg     HYDROcodone-acetaminophen 5-325 MG Tabs per tablet  Commonly known as:  NORCO   Take 1-2 Tabs by mouth every four hours as needed.  Dose:  1-2 Tab     LORazepam 0.5 MG Tabs  Commonly known as:  ATIVAN   Take 0.5 mg by mouth 2 times a day as needed for Anxiety.  Dose:  0.5 mg     metFORMIN 500 MG Tabs  Commonly known as:  GLUCOPHAGE   Take 1 Tab by mouth 2 times a day, with  meals.  Dose:  500 mg     paliperidone 3 MG ER tablet  Commonly known as:  INVEGA   Take 3 mg by mouth every morning.  Dose:  3 mg     QUEtiapine 100 MG Tabs  Commonly known as:  SEROQUEL   Take 100-200 mg by mouth 3 times a day. 100 mg in morning 100 mg at noon 200 mg at bedtime  Dose:  100-200 mg            Allergies  No Known Allergies    DIET  Orders Placed This Encounter   Procedures   • DIET ORDER     Standing Status:   Standing     Number of Occurrences:   1     Order Specific Question:   Diet:     Answer:   Regular [1]       ACTIVITY  As tolerated.  Weight bearing as tolerated    CONSULTATIONS  ID  ortho    PROCEDURES  I&D    LABORATORY  Lab Results   Component Value Date    SODIUM 141 06/21/2018    POTASSIUM 3.9 06/21/2018    CHLORIDE 109 06/21/2018    CO2 25 06/21/2018    GLUCOSE 107 (H) 06/21/2018    BUN 22 06/21/2018    CREATININE 0.88 06/21/2018        Lab Results   Component Value Date    WBC 6.8 06/21/2018    HEMOGLOBIN 13.3 (L) 06/21/2018    HEMATOCRIT 39.6 (L) 06/21/2018    PLATELETCT 115 (L) 06/21/2018        Total time of the discharge process exceeds 50 minutes.

## 2018-06-26 NOTE — DISCHARGE PLANNING
Anticipated Discharge Disposition: [facility].    Action: RN CM confirmed pick-up time with [facility] for noon. Pt. to be at ER entrance at noon. Order for shower chair is on the chart.    Barriers to Discharge: None identified at this time.    Plan: Bedside nurse to dc pt per MD hanson/policy at noon.

## 2018-06-26 NOTE — PROGRESS NOTES
PICC line D/C'd.  Discharge instructions provided to pt.  Pt states understanding.  Pt states all questions have been answered.  Copy of discharge provided to pt.  Signed copy in chart.  Prescriptions provided to pt.  Pt states that all personal belongings are in possession.  Walker and shower chair will be delivered to [facility].  Pt escorted off unit via wheelchair with assistance from this RN without incident.

## 2018-08-10 ENCOUNTER — APPOINTMENT (OUTPATIENT)
Dept: RADIOLOGY | Facility: MEDICAL CENTER | Age: 53
DRG: 629 | End: 2018-08-10
Attending: EMERGENCY MEDICINE
Payer: MEDICAID

## 2018-08-10 ENCOUNTER — HOSPITAL ENCOUNTER (INPATIENT)
Facility: MEDICAL CENTER | Age: 53
LOS: 5 days | DRG: 629 | End: 2018-08-15
Attending: EMERGENCY MEDICINE | Admitting: HOSPITALIST
Payer: MEDICAID

## 2018-08-10 DIAGNOSIS — E11.40 TYPE 2 DIABETES MELLITUS WITH DIABETIC NEUROPATHY, WITHOUT LONG-TERM CURRENT USE OF INSULIN (HCC): ICD-10-CM

## 2018-08-10 DIAGNOSIS — R65.10 SIRS (SYSTEMIC INFLAMMATORY RESPONSE SYNDROME) (HCC): ICD-10-CM

## 2018-08-10 DIAGNOSIS — M86.272 SUBACUTE OSTEOMYELITIS OF LEFT FOOT (HCC): ICD-10-CM
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
## 2018-08-10 PROBLEM — L08.9 WOUND INFECTION: Status: RESOLVED | Noted: 2018-05-11 | Resolved: 2018-08-10

## 2018-08-10 PROBLEM — T14.8XXA WOUND INFECTION: Status: RESOLVED | Noted: 2018-05-11 | Resolved: 2018-08-10

## 2018-08-10 PROBLEM — E87.6 HYPOKALEMIA: Status: RESOLVED | Noted: 2018-04-01 | Resolved: 2018-08-10

## 2018-08-10 PROBLEM — L89.90 PRESSURE INJURY OF SKIN: Status: RESOLVED | Noted: 2018-05-29 | Resolved: 2018-08-10

## 2018-08-10 PROBLEM — D69.6 THROMBOCYTOPENIA (HCC): Status: RESOLVED | Noted: 2018-06-14 | Resolved: 2018-08-10

## 2018-08-10 LAB
ALBUMIN SERPL BCP-MCNC: 4.8 G/DL (ref 3.2–4.9)
ALBUMIN/GLOB SERPL: 1.7 G/DL
ALP SERPL-CCNC: 83 U/L (ref 30–99)
ALT SERPL-CCNC: 29 U/L (ref 2–50)
AMPHET UR QL SCN: NEGATIVE
ANION GAP SERPL CALC-SCNC: 13 MMOL/L (ref 0–11.9)
APPEARANCE UR: CLEAR
AST SERPL-CCNC: 25 U/L (ref 12–45)
BARBITURATES UR QL SCN: NEGATIVE
BASOPHILS # BLD AUTO: 0.6 % (ref 0–1.8)
BASOPHILS # BLD: 0.06 K/UL (ref 0–0.12)
BENZODIAZ UR QL SCN: NEGATIVE
BILIRUB SERPL-MCNC: 0.5 MG/DL (ref 0.1–1.5)
BILIRUB UR QL STRIP.AUTO: NEGATIVE
BUN SERPL-MCNC: 18 MG/DL (ref 8–22)
BZE UR QL SCN: NEGATIVE
CALCIUM SERPL-MCNC: 9.5 MG/DL (ref 8.5–10.5)
CANNABINOIDS UR QL SCN: NEGATIVE
CHLORIDE SERPL-SCNC: 106 MMOL/L (ref 96–112)
CO2 SERPL-SCNC: 19 MMOL/L (ref 20–33)
COLOR UR: YELLOW
CREAT SERPL-MCNC: 0.95 MG/DL (ref 0.5–1.4)
DEPRECATED D DIMER PPP IA-ACNC: 223 NG/ML(D-DU)
EKG IMPRESSION: NORMAL
EOSINOPHIL # BLD AUTO: 0.18 K/UL (ref 0–0.51)
EOSINOPHIL NFR BLD: 1.9 % (ref 0–6.9)
ERYTHROCYTE [DISTWIDTH] IN BLOOD BY AUTOMATED COUNT: 40.4 FL (ref 35.9–50)
GLOBULIN SER CALC-MCNC: 2.8 G/DL (ref 1.9–3.5)
GLUCOSE SERPL-MCNC: 158 MG/DL (ref 65–99)
GLUCOSE UR STRIP.AUTO-MCNC: NEGATIVE MG/DL
GRAM STN SPEC: NORMAL
HCT VFR BLD AUTO: 43.4 % (ref 42–52)
HGB BLD-MCNC: 15.4 G/DL (ref 14–18)
IMM GRANULOCYTES # BLD AUTO: 0.02 K/UL (ref 0–0.11)
IMM GRANULOCYTES NFR BLD AUTO: 0.2 % (ref 0–0.9)
KETONES UR STRIP.AUTO-MCNC: NEGATIVE MG/DL
LACTATE BLD-SCNC: 3.3 MMOL/L (ref 0.5–2)
LEUKOCYTE ESTERASE UR QL STRIP.AUTO: NEGATIVE
LYMPHOCYTES # BLD AUTO: 2.55 K/UL (ref 1–4.8)
LYMPHOCYTES NFR BLD: 26.7 % (ref 22–41)
MCH RBC QN AUTO: 30.7 PG (ref 27–33)
MCHC RBC AUTO-ENTMCNC: 35.5 G/DL (ref 33.7–35.3)
MCV RBC AUTO: 86.5 FL (ref 81.4–97.8)
METHADONE UR QL SCN: NEGATIVE
MICRO URNS: NORMAL
MONOCYTES # BLD AUTO: 0.78 K/UL (ref 0–0.85)
MONOCYTES NFR BLD AUTO: 8.2 % (ref 0–13.4)
NEUTROPHILS # BLD AUTO: 5.96 K/UL (ref 1.82–7.42)
NEUTROPHILS NFR BLD: 62.4 % (ref 44–72)
NITRITE UR QL STRIP.AUTO: NEGATIVE
NRBC # BLD AUTO: 0 K/UL
NRBC BLD-RTO: 0 /100 WBC
OPIATES UR QL SCN: POSITIVE
OXYCODONE UR QL SCN: NEGATIVE
PCP UR QL SCN: NEGATIVE
PH UR STRIP.AUTO: 6.5 [PH]
PLATELET # BLD AUTO: 202 K/UL (ref 164–446)
PMV BLD AUTO: 9.6 FL (ref 9–12.9)
POTASSIUM SERPL-SCNC: 3.6 MMOL/L (ref 3.6–5.5)
PROPOXYPH UR QL SCN: NEGATIVE
PROT SERPL-MCNC: 7.6 G/DL (ref 6–8.2)
PROT UR QL STRIP: NEGATIVE MG/DL
RBC # BLD AUTO: 5.02 M/UL (ref 4.7–6.1)
RBC UR QL AUTO: NEGATIVE
SIGNIFICANT IND 70042: NORMAL
SITE SITE: NORMAL
SODIUM SERPL-SCNC: 138 MMOL/L (ref 135–145)
SOURCE SOURCE: NORMAL
SP GR UR STRIP.AUTO: 1.01
UROBILINOGEN UR STRIP.AUTO-MCNC: 0.2 MG/DL
WBC # BLD AUTO: 9.6 K/UL (ref 4.8–10.8)

## 2018-08-10 PROCEDURE — 87075 CULTR BACTERIA EXCEPT BLOOD: CPT

## 2018-08-10 PROCEDURE — 501838 HCHG SUTURE GENERAL: Performed by: ORTHOPAEDIC SURGERY

## 2018-08-10 PROCEDURE — 99291 CRITICAL CARE FIRST HOUR: CPT

## 2018-08-10 PROCEDURE — 700105 HCHG RX REV CODE 258: Performed by: HOSPITALIST

## 2018-08-10 PROCEDURE — 700105 HCHG RX REV CODE 258

## 2018-08-10 PROCEDURE — 501445 HCHG STAPLER, SKIN DISP: Performed by: ORTHOPAEDIC SURGERY

## 2018-08-10 PROCEDURE — 85025 COMPLETE CBC W/AUTO DIFF WBC: CPT

## 2018-08-10 PROCEDURE — 80053 COMPREHEN METABOLIC PANEL: CPT

## 2018-08-10 PROCEDURE — 87040 BLOOD CULTURE FOR BACTERIA: CPT

## 2018-08-10 PROCEDURE — A9270 NON-COVERED ITEM OR SERVICE: HCPCS

## 2018-08-10 PROCEDURE — 81003 URINALYSIS AUTO W/O SCOPE: CPT

## 2018-08-10 PROCEDURE — 36415 COLL VENOUS BLD VENIPUNCTURE: CPT

## 2018-08-10 PROCEDURE — 700102 HCHG RX REV CODE 250 W/ 637 OVERRIDE(OP): Performed by: HOSPITALIST

## 2018-08-10 PROCEDURE — 96375 TX/PRO/DX INJ NEW DRUG ADDON: CPT

## 2018-08-10 PROCEDURE — 160048 HCHG OR STATISTICAL LEVEL 1-5: Performed by: ORTHOPAEDIC SURGERY

## 2018-08-10 PROCEDURE — 96376 TX/PRO/DX INJ SAME DRUG ADON: CPT

## 2018-08-10 PROCEDURE — A9270 NON-COVERED ITEM OR SERVICE: HCPCS | Performed by: HOSPITALIST

## 2018-08-10 PROCEDURE — 96366 THER/PROPH/DIAG IV INF ADDON: CPT

## 2018-08-10 PROCEDURE — 770006 HCHG ROOM/CARE - MED/SURG/GYN SEMI*

## 2018-08-10 PROCEDURE — 87205 SMEAR GRAM STAIN: CPT

## 2018-08-10 PROCEDURE — 160035 HCHG PACU - 1ST 60 MINS PHASE I: Performed by: ORTHOPAEDIC SURGERY

## 2018-08-10 PROCEDURE — 83605 ASSAY OF LACTIC ACID: CPT

## 2018-08-10 PROCEDURE — 73630 X-RAY EXAM OF FOOT: CPT | Mod: LT

## 2018-08-10 PROCEDURE — 160028 HCHG SURGERY MINUTES - 1ST 30 MINS LEVEL 3: Performed by: ORTHOPAEDIC SURGERY

## 2018-08-10 PROCEDURE — 0QBR0ZZ EXCISION OF LEFT TOE PHALANX, OPEN APPROACH: ICD-10-PCS | Performed by: ORTHOPAEDIC SURGERY

## 2018-08-10 PROCEDURE — 700111 HCHG RX REV CODE 636 W/ 250 OVERRIDE (IP): Performed by: EMERGENCY MEDICINE

## 2018-08-10 PROCEDURE — 85379 FIBRIN DEGRADATION QUANT: CPT

## 2018-08-10 PROCEDURE — 99223 1ST HOSP IP/OBS HIGH 75: CPT | Performed by: HOSPITALIST

## 2018-08-10 PROCEDURE — 700111 HCHG RX REV CODE 636 W/ 250 OVERRIDE (IP): Performed by: HOSPITALIST

## 2018-08-10 PROCEDURE — 160009 HCHG ANES TIME/MIN: Performed by: ORTHOPAEDIC SURGERY

## 2018-08-10 PROCEDURE — 160036 HCHG PACU - EA ADDL 30 MINS PHASE I: Performed by: ORTHOPAEDIC SURGERY

## 2018-08-10 PROCEDURE — 500881 HCHG PACK, EXTREMITY: Performed by: ORTHOPAEDIC SURGERY

## 2018-08-10 PROCEDURE — 96365 THER/PROPH/DIAG IV INF INIT: CPT

## 2018-08-10 PROCEDURE — 93005 ELECTROCARDIOGRAM TRACING: CPT

## 2018-08-10 PROCEDURE — 87070 CULTURE OTHR SPECIMN AEROBIC: CPT

## 2018-08-10 PROCEDURE — 700105 HCHG RX REV CODE 258: Performed by: EMERGENCY MEDICINE

## 2018-08-10 PROCEDURE — 96367 TX/PROPH/DG ADDL SEQ IV INF: CPT

## 2018-08-10 PROCEDURE — 87086 URINE CULTURE/COLONY COUNT: CPT

## 2018-08-10 PROCEDURE — 80307 DRUG TEST PRSMV CHEM ANLYZR: CPT

## 2018-08-10 PROCEDURE — 87015 SPECIMEN INFECT AGNT CONCNTJ: CPT

## 2018-08-10 PROCEDURE — 700101 HCHG RX REV CODE 250

## 2018-08-10 PROCEDURE — 93005 ELECTROCARDIOGRAM TRACING: CPT | Performed by: EMERGENCY MEDICINE

## 2018-08-10 PROCEDURE — 71045 X-RAY EXAM CHEST 1 VIEW: CPT

## 2018-08-10 PROCEDURE — 700102 HCHG RX REV CODE 250 W/ 637 OVERRIDE(OP)

## 2018-08-10 PROCEDURE — 160002 HCHG RECOVERY MINUTES (STAT): Performed by: ORTHOPAEDIC SURGERY

## 2018-08-10 PROCEDURE — 700111 HCHG RX REV CODE 636 W/ 250 OVERRIDE (IP)

## 2018-08-10 RX ORDER — OXYCODONE HYDROCHLORIDE 5 MG/1
5 TABLET ORAL EVERY 4 HOURS PRN
Status: DISCONTINUED | OUTPATIENT
Start: 2018-08-10 | End: 2018-08-13

## 2018-08-10 RX ORDER — PROMETHAZINE HYDROCHLORIDE 25 MG/1
12.5-25 SUPPOSITORY RECTAL EVERY 4 HOURS PRN
Status: DISCONTINUED | OUTPATIENT
Start: 2018-08-10 | End: 2018-08-15 | Stop reason: HOSPADM

## 2018-08-10 RX ORDER — SODIUM CHLORIDE 9 MG/ML
INJECTION, SOLUTION INTRAVENOUS
Status: COMPLETED
Start: 2018-08-10 | End: 2018-08-10

## 2018-08-10 RX ORDER — HEPARIN SODIUM 5000 [USP'U]/ML
5000 INJECTION, SOLUTION INTRAVENOUS; SUBCUTANEOUS EVERY 8 HOURS
Status: DISCONTINUED | OUTPATIENT
Start: 2018-08-11 | End: 2018-08-15 | Stop reason: HOSPADM

## 2018-08-10 RX ORDER — ONDANSETRON 4 MG/1
4 TABLET, ORALLY DISINTEGRATING ORAL EVERY 4 HOURS PRN
Status: DISCONTINUED | OUTPATIENT
Start: 2018-08-10 | End: 2018-08-15 | Stop reason: HOSPADM

## 2018-08-10 RX ORDER — MORPHINE SULFATE 4 MG/ML
4 INJECTION, SOLUTION INTRAMUSCULAR; INTRAVENOUS ONCE
Status: COMPLETED | OUTPATIENT
Start: 2018-08-10 | End: 2018-08-10

## 2018-08-10 RX ORDER — BENZTROPINE MESYLATE 1 MG/1
1 TABLET ORAL 2 TIMES DAILY
COMMUNITY

## 2018-08-10 RX ORDER — SODIUM CHLORIDE 9 MG/ML
30 INJECTION, SOLUTION INTRAVENOUS
Status: COMPLETED | OUTPATIENT
Start: 2018-08-10 | End: 2018-08-10

## 2018-08-10 RX ORDER — LORAZEPAM 1 MG/1
0.5 TABLET ORAL 2 TIMES DAILY PRN
Status: DISCONTINUED | OUTPATIENT
Start: 2018-08-10 | End: 2018-08-15 | Stop reason: HOSPADM

## 2018-08-10 RX ORDER — OXYCODONE HCL 5 MG/5 ML
SOLUTION, ORAL ORAL
Status: COMPLETED
Start: 2018-08-10 | End: 2018-08-10

## 2018-08-10 RX ORDER — LABETALOL HYDROCHLORIDE 5 MG/ML
10 INJECTION, SOLUTION INTRAVENOUS EVERY 4 HOURS PRN
Status: DISCONTINUED | OUTPATIENT
Start: 2018-08-10 | End: 2018-08-15 | Stop reason: HOSPADM

## 2018-08-10 RX ORDER — PROPRANOLOL HYDROCHLORIDE 20 MG/1
20 TABLET ORAL 2 TIMES DAILY
COMMUNITY
End: 2020-05-26 | Stop reason: SDUPTHER

## 2018-08-10 RX ORDER — QUETIAPINE FUMARATE 100 MG/1
100 TABLET, FILM COATED ORAL 3 TIMES DAILY
Status: DISCONTINUED | OUTPATIENT
Start: 2018-08-10 | End: 2018-08-10

## 2018-08-10 RX ORDER — SODIUM CHLORIDE 9 MG/ML
INJECTION, SOLUTION INTRAVENOUS CONTINUOUS
Status: DISCONTINUED | OUTPATIENT
Start: 2018-08-10 | End: 2018-08-15 | Stop reason: HOSPADM

## 2018-08-10 RX ORDER — QUETIAPINE FUMARATE 100 MG/1
100 TABLET, FILM COATED ORAL 2 TIMES DAILY
Status: DISCONTINUED | OUTPATIENT
Start: 2018-08-10 | End: 2018-08-15 | Stop reason: HOSPADM

## 2018-08-10 RX ORDER — ACETAMINOPHEN 325 MG/1
650 TABLET ORAL EVERY 6 HOURS PRN
Status: DISCONTINUED | OUTPATIENT
Start: 2018-08-10 | End: 2018-08-15 | Stop reason: HOSPADM

## 2018-08-10 RX ORDER — PROMETHAZINE HYDROCHLORIDE 25 MG/1
12.5-25 TABLET ORAL EVERY 4 HOURS PRN
Status: DISCONTINUED | OUTPATIENT
Start: 2018-08-10 | End: 2018-08-15 | Stop reason: HOSPADM

## 2018-08-10 RX ORDER — FLUVASTATIN 40 MG/1
40 CAPSULE ORAL NIGHTLY
COMMUNITY

## 2018-08-10 RX ORDER — FOLIC ACID 1 MG/1
1 TABLET ORAL DAILY
COMMUNITY

## 2018-08-10 RX ORDER — BISACODYL 10 MG
10 SUPPOSITORY, RECTAL RECTAL
Status: DISCONTINUED | OUTPATIENT
Start: 2018-08-10 | End: 2018-08-15 | Stop reason: HOSPADM

## 2018-08-10 RX ORDER — POLYETHYLENE GLYCOL 3350 17 G/17G
1 POWDER, FOR SOLUTION ORAL
Status: DISCONTINUED | OUTPATIENT
Start: 2018-08-10 | End: 2018-08-15 | Stop reason: HOSPADM

## 2018-08-10 RX ORDER — OXYCODONE HYDROCHLORIDE 10 MG/1
10 TABLET ORAL EVERY 4 HOURS PRN
Status: DISCONTINUED | OUTPATIENT
Start: 2018-08-10 | End: 2018-08-13

## 2018-08-10 RX ORDER — QUETIAPINE FUMARATE 100 MG/1
200 TABLET, FILM COATED ORAL EVERY EVENING
Status: DISCONTINUED | OUTPATIENT
Start: 2018-08-10 | End: 2018-08-15 | Stop reason: HOSPADM

## 2018-08-10 RX ORDER — AMOXICILLIN 250 MG
2 CAPSULE ORAL 2 TIMES DAILY
Status: DISCONTINUED | OUTPATIENT
Start: 2018-08-10 | End: 2018-08-15 | Stop reason: HOSPADM

## 2018-08-10 RX ORDER — ONDANSETRON 2 MG/ML
4 INJECTION INTRAMUSCULAR; INTRAVENOUS ONCE
Status: COMPLETED | OUTPATIENT
Start: 2018-08-10 | End: 2018-08-10

## 2018-08-10 RX ORDER — ONDANSETRON 2 MG/ML
4 INJECTION INTRAMUSCULAR; INTRAVENOUS EVERY 4 HOURS PRN
Status: DISCONTINUED | OUTPATIENT
Start: 2018-08-10 | End: 2018-08-15 | Stop reason: HOSPADM

## 2018-08-10 RX ADMIN — OXYCODONE HYDROCHLORIDE 10 MG: 5 SOLUTION ORAL at 17:55

## 2018-08-10 RX ADMIN — FENTANYL CITRATE 50 MCG: 50 INJECTION, SOLUTION INTRAMUSCULAR; INTRAVENOUS at 18:30

## 2018-08-10 RX ADMIN — CEFTRIAXONE SODIUM 2 G: 2 INJECTION, POWDER, FOR SOLUTION INTRAMUSCULAR; INTRAVENOUS at 14:35

## 2018-08-10 RX ADMIN — MORPHINE SULFATE 4 MG: 4 INJECTION INTRAVENOUS at 08:07

## 2018-08-10 RX ADMIN — OXYCODONE HYDROCHLORIDE 10 MG: 10 TABLET ORAL at 21:24

## 2018-08-10 RX ADMIN — ONDANSETRON 4 MG: 2 INJECTION, SOLUTION INTRAMUSCULAR; INTRAVENOUS at 06:28

## 2018-08-10 RX ADMIN — FENTANYL CITRATE 50 MCG: 50 INJECTION, SOLUTION INTRAMUSCULAR; INTRAVENOUS at 18:25

## 2018-08-10 RX ADMIN — VANCOMYCIN HYDROCHLORIDE 2600 MG: 100 INJECTION, POWDER, LYOPHILIZED, FOR SOLUTION INTRAVENOUS at 07:21

## 2018-08-10 RX ADMIN — HYDROMORPHONE HYDROCHLORIDE 0.5 MG: 10 INJECTION, SOLUTION INTRAMUSCULAR; INTRAVENOUS; SUBCUTANEOUS at 18:17

## 2018-08-10 RX ADMIN — LORAZEPAM 0.5 MG: 1 TABLET ORAL at 14:47

## 2018-08-10 RX ADMIN — HYDROMORPHONE HYDROCHLORIDE 0.5 MG: 10 INJECTION, SOLUTION INTRAMUSCULAR; INTRAVENOUS; SUBCUTANEOUS at 17:46

## 2018-08-10 RX ADMIN — SODIUM CHLORIDE 3 G: 900 INJECTION INTRAVENOUS at 06:30

## 2018-08-10 RX ADMIN — FENTANYL CITRATE 50 MCG: 50 INJECTION, SOLUTION INTRAMUSCULAR; INTRAVENOUS at 17:41

## 2018-08-10 RX ADMIN — SODIUM CHLORIDE: 9 INJECTION, SOLUTION INTRAVENOUS at 21:39

## 2018-08-10 RX ADMIN — FENTANYL CITRATE 50 MCG: 50 INJECTION, SOLUTION INTRAMUSCULAR; INTRAVENOUS at 17:36

## 2018-08-10 RX ADMIN — QUETIAPINE FUMARATE 200 MG: 100 TABLET ORAL at 18:20

## 2018-08-10 RX ADMIN — HYDROMORPHONE HYDROCHLORIDE 0.5 MG: 10 INJECTION, SOLUTION INTRAMUSCULAR; INTRAVENOUS; SUBCUTANEOUS at 18:33

## 2018-08-10 RX ADMIN — HYDROMORPHONE HYDROCHLORIDE 0.5 MG: 10 INJECTION, SOLUTION INTRAMUSCULAR; INTRAVENOUS; SUBCUTANEOUS at 18:50

## 2018-08-10 RX ADMIN — SODIUM CHLORIDE 1000 ML: 9 INJECTION, SOLUTION INTRAVENOUS at 18:35

## 2018-08-10 RX ADMIN — DAPTOMYCIN 620 MG: 500 INJECTION, POWDER, LYOPHILIZED, FOR SOLUTION INTRAVENOUS at 12:01

## 2018-08-10 RX ADMIN — SODIUM CHLORIDE 3120 ML: 9 INJECTION, SOLUTION INTRAVENOUS at 06:20

## 2018-08-10 RX ADMIN — OXYCODONE HYDROCHLORIDE 5 MG: 5 TABLET ORAL at 11:45

## 2018-08-10 RX ADMIN — MORPHINE SULFATE 4 MG: 4 INJECTION INTRAVENOUS at 06:28

## 2018-08-10 RX ADMIN — HYDROMORPHONE HYDROCHLORIDE 0.5 MG: 10 INJECTION, SOLUTION INTRAMUSCULAR; INTRAVENOUS; SUBCUTANEOUS at 18:03

## 2018-08-10 RX ADMIN — LORAZEPAM 0.5 MG: 1 TABLET ORAL at 21:24

## 2018-08-10 RX ADMIN — HYDROMORPHONE HYDROCHLORIDE 0.5 MG: 10 INJECTION, SOLUTION INTRAMUSCULAR; INTRAVENOUS; SUBCUTANEOUS at 17:51

## 2018-08-10 ASSESSMENT — ENCOUNTER SYMPTOMS
DIARRHEA: 0
SHORTNESS OF BREATH: 0
FOCAL WEAKNESS: 0
DIZZINESS: 0
MYALGIAS: 0
CHILLS: 1
WEAKNESS: 1
COUGH: 0
PALPITATIONS: 0
VOMITING: 0
HEADACHES: 0
ABDOMINAL PAIN: 0
NAUSEA: 0
CONSTIPATION: 0
FEVER: 0

## 2018-08-10 ASSESSMENT — PAIN SCALES - GENERAL
PAINLEVEL_OUTOF10: 6
PAINLEVEL_OUTOF10: 8
PAINLEVEL_OUTOF10: 8
PAINLEVEL_OUTOF10: 10
PAINLEVEL_OUTOF10: 6
PAINLEVEL_OUTOF10: 7
PAINLEVEL_OUTOF10: 8
PAINLEVEL_OUTOF10: 6
PAINLEVEL_OUTOF10: 8
PAINLEVEL_OUTOF10: 6
PAINLEVEL_OUTOF10: 7
PAINLEVEL_OUTOF10: 7
PAINLEVEL_OUTOF10: 6
PAINLEVEL_OUTOF10: 7
PAINLEVEL_OUTOF10: 7

## 2018-08-10 NOTE — ED TRIAGE NOTES
Pt has multiple complains, states he has chronic pain in back, neck, bilateral feet, toes, SOB dizziness, diaphoretic.pt has a wound on bilateral big toes, pt denies feeing feverish. Pt has PMH of diabetes.

## 2018-08-10 NOTE — ED NOTES
"Pt continues to ask for \"IV morphine\". Pt already informed that MD was already notified and will not give orders of morphine.  "

## 2018-08-10 NOTE — ED NOTES
Break RN: Updated Pre op with patient status. Patient's last oral intake food last night at 1900, last drink at 0700 this morning.

## 2018-08-10 NOTE — ED NOTES
"Pt refusing PO pain medication, \"I'm not leaving until I get something in the IV\".  Paging admitting MD  "

## 2018-08-10 NOTE — CONSULTS
"8/10/2018    Wilbert Kennith Yeomans Jr. is a 53 y.o. male who presents with a left foot abscess and is here for operative management. Patient denies numbness, parasthesias, loss of concousness or other trauma    Past Medical History:   Diagnosis Date   • Anxiety    • Bronchitis     has had 3-4 times   • Dental disorder     upper partial, but lost it   • Diabetes (HCC)     \"borderline\"   • Hepatitis C 1997   • Hypercholesteremia    • Hypertension    • Psychiatric disorder     anxiety       Past Surgical History:   Procedure Laterality Date   • IRRIGATION & DEBRIDEMENT ORTHO Left 5/14/2018    Procedure: IRRIGATION & DEBRIDEMENT ORTHO-FOOT;  Surgeon: Salomón Baez M.D.;  Location: SURGERY [facility];  Service: Orthopedics   • IRRIGATION & DEBRIDEMENT ORTHO Left 5/12/2018    Procedure: IRRIGATION & DEBRIDEMENT ORTHO LT TOE, WOUND VAC CHANGE;  Surgeon: Salomón Baez M.D.;  Location: [facility];  Service: Orthopedics   • TOE AMPUTATION Left 4/23/2018    Procedure: TOE AMPUTATION GREAT TOE;  Surgeon: Aramis Easton M.D.;  Location: SURGERY [facility];  Service: Orthopedics   • TOE AMPUTATION Left 4/1/2018    Procedure: TOE AMPUTATION-GREAT TOE;  Surgeon: Salomón Baez M.D.;  Location: SURGERY [facility];  Service: Orthopedics   • VENTRAL HERNIA REPAIR LAPAROSCOPIC  3/30/2016    Procedure: VENTRAL HERNIA REPAIR LAPAROSCOPIC WITH MESH;  Surgeon: Caden Cat M.D.;  Location: SURGERY [facility];  Service:        Medications  No current facility-administered medications on file prior to encounter.      Current Outpatient Prescriptions on File Prior to Encounter   Medication Sig Dispense Refill   • LORazepam (ATIVAN) 0.5 MG Tab Take 0.5 mg by mouth 2 times a day as needed for Anxiety.     • QUEtiapine (SEROQUEL) 100 MG Tab Take 100 mg by mouth 3 times a day. 100 mg in morning  100 mg at noon  200 mg at bedtime          Allergies  Patient has no known allergies.    ROS  Left " "foot pain. All other systems were reviewed and found to be negative    No family history on file.    Social History     Social History   • Marital status: Single     Spouse name: N/A   • Number of children: N/A   • Years of education: N/A     Social History Main Topics   • Smoking status: Current Some Day Smoker     Packs/day: 0.25     Years: 30.00     Types: Cigarettes   • Smokeless tobacco: Never Used   • Alcohol use No   • Drug use: No      Comment: last time used 10+ years   • Sexual activity: Not on file     Other Topics Concern   • Not on file     Social History Narrative   • No narrative on file       Physical Exam  Vitals  Blood pressure (!) 161/94, pulse 73, temperature 37.1 °C (98.7 °F), resp. rate 14, height 1.93 m (6' 4\"), weight 104 kg (229 lb 4.5 oz), SpO2 96 %.  General: Well Developed, Well Nourished, no acute distress  HEENT: Normocephalic, atraumatic  Eyes: Anicteric, PERRLA, EOMI  Neck: Supple, nontender, no masses  Lungs: CTA, no wheezes or crackles  Heart: RRR, no murmurs, rubs or gallops  Abdomen: Soft, NT, ND  Pelvis: Stable to AP and Lateral Compression  Skin: Intact, no open wounds  Extremities: Left foot pain and open wound  Neuro: decreased sensation  Vascular: 1+DP/PT, Capillary refill <2 seconds    Radiographs:  DX-FOOT-COMPLETE 3+ LEFT   Final Result      1.  Surgical change consistent with prior amputation of the great toe and a portion of the second toe.      2.  Soft tissue swelling with a small amount of gas within the soft tissues of the forefoot.      DX-CHEST-PORTABLE (1 VIEW)   Final Result      Subsegmental right basilar atelectasis.      Stable prominence of the cardiomediastinal silhouette.      Atherosclerotic plaque.             Laboratory Values  Recent Labs      08/10/18   0613   WBC  9.6   RBC  5.02   HEMOGLOBIN  15.4   HEMATOCRIT  43.4   MCV  86.5   MCH  30.7   MCHC  35.5*   RDW  40.4   PLATELETCT  202   MPV  9.6     Recent Labs      08/10/18   0613   SODIUM  138 "   POTASSIUM  3.6   CHLORIDE  106   CO2  19*   GLUCOSE  158*   BUN  18             Impression: Left foot abscess    Plan:Operative intervention. Risks and benefits of surgery were discussed which include but are not limited to bleeding, infection, neurovascular damage, malunion, nonunion, instability, limb length discrepancy, DVT, PE, MI, Stroke and death. They understand these risks and wish to proceed.

## 2018-08-10 NOTE — ASSESSMENT & PLAN NOTE
Active Orders     Ordered     Ordering Provider       Fri Aug 10, 2018  8:19 AM    08/10/18 0819  cefTRIAXone (ROCEPHIN) 2 g in  mL IVPB  EVERY 24 HOURS      Ap Chowdary M.D.    08/10/18 0819  DAPTOmycin (CUBICIN) 620 mg in NS 50 mL IVPB  EVERY 24 HOURS      Ap Chowdary M.D.      His prior vancomycin JUNIOR is 2 and prior micro showed MRSA, staph haemolyticus (MRSE), peptostreptococcus, and pasteurella  Althjose marian plans to take him to the OR so he will remain NPO for now  Will likely need ID after OR & cultures sent.

## 2018-08-10 NOTE — H&P
"Hospital Medicine History & Physical Note    Date of Service  8/10/2018    Primary Care Physician  Tahir Harrington    Consultants  Sasha, Amaya    Code Status  Full    Chief Complaint  \"My foot hurts\"    History of Presenting Illness  53 y.o. male who presented 8/10/2018 with left foot pain that started back in April after his left big toe amputation. He was readmitted in May with osteomyelitis of that same foot with multiple positive bacterial cultures. He was kept in the hospital to complete his antibiotic course because of difficulty with placement and reliablity. He was on Daptomycin and Ceftriaxone. His course was complicated by thrombocytopenia associated with Zyvox.     Today he comes back having not taken any of his medications after discharge with worsening left foot pain. He has not had any new injuries. He was not supposed to be on any antibiotics. History is somewhat limited due to his underlying psychiatric disorder, which was a complicating factor in his last admission.    Dr. Baez was consulted by the ER.     Review of Systems  Review of Systems   Constitutional: Positive for chills. Negative for fever.   Respiratory: Negative for cough and shortness of breath.    Cardiovascular: Negative for chest pain and palpitations.   Gastrointestinal: Negative for abdominal pain, constipation, diarrhea, nausea and vomiting.   Genitourinary: Negative for dysuria.   Musculoskeletal: Positive for joint pain (left forefoot). Negative for myalgias.   Skin: Negative for itching.   Neurological: Positive for weakness. Negative for dizziness, focal weakness and headaches.   All other systems reviewed and are negative.      Past Medical History   has a past medical history of Anxiety; Bronchitis; Dental disorder; Diabetes (HCC); Hepatitis C (1997); Hypercholesteremia; Hypertension; and Psychiatric disorder.    Surgical History   has a past surgical history that includes ventral hernia repair laparoscopic " (3/30/2016); toe amputation (Left, 4/1/2018); toe amputation (Left, 4/23/2018); irrigation & debridement ortho (Left, 5/12/2018); and irrigation & debridement ortho (Left, 5/14/2018).     Family History  Unknown    Social History   reports that he has been smoking Cigarettes.  He has a 7.50 pack-year smoking history. He has never used smokeless tobacco. He reports that he does not drink alcohol or use drugs.    Allergies  No Known Allergies    Medications  Prior to Admission Medications   Prescriptions Last Dose Informant Patient Reported? Taking?   LORazepam (ATIVAN) 0.5 MG Tab  Patient's Home Pharmacy Yes No   Sig: Take 0.5 mg by mouth 2 times a day as needed for Anxiety.   PROPRANOLOL HCL PO   Yes Yes   Sig: Take  by mouth. Unknown dose   QUEtiapine (SEROQUEL) 100 MG Tab  Patient's Home Pharmacy Yes No   Sig: Take 100 mg by mouth 3 times a day. 100 mg in morning  100 mg at noon  200 mg at bedtime       Facility-Administered Medications: None       Physical Exam  Blood Pressure: 147/99   Temperature: (!) 35.8 °C (96.4 °F)   Pulse: 94   Respiration: 13   Pulse Oximetry: 99 %     Physical Exam   Constitutional: He is oriented to person, place, and time. He appears well-developed.   overnourished   HENT:   Head: Normocephalic and atraumatic.   Mouth/Throat: Oropharynx is clear and moist.   Eyes: Pupils are equal, round, and reactive to light. Conjunctivae and EOM are normal. No scleral icterus.   Neck: Normal range of motion. Neck supple. No tracheal deviation present. No thyromegaly present.   Cardiovascular: Normal rate, regular rhythm, normal heart sounds and intact distal pulses.    No murmur heard.  Pulmonary/Chest: Effort normal and breath sounds normal. No respiratory distress. He has no wheezes.   Abdominal: Soft. Bowel sounds are normal. He exhibits no distension. There is no tenderness.   Musculoskeletal: Normal range of motion. He exhibits edema (left foot) and tenderness (tender left forefoot with  erythema).   Lymphadenopathy:     He has no cervical adenopathy.        Right: No supraclavicular adenopathy present.        Left: No supraclavicular adenopathy present.   Neurological: He is alert and oriented to person, place, and time.   Skin: Skin is warm and dry.   Vitals reviewed.      Laboratory:  Recent Labs      08/10/18   0613   WBC  9.6   RBC  5.02   HEMOGLOBIN  15.4   HEMATOCRIT  43.4   MCV  86.5   MCH  30.7   MCHC  35.5*   RDW  40.4   PLATELETCT  202   MPV  9.6     Recent Labs      08/10/18   0613   SODIUM  138   POTASSIUM  3.6   CHLORIDE  106   CO2  19*   GLUCOSE  158*   BUN  18   CREATININE  0.95   CALCIUM  9.5     Recent Labs      08/10/18   0613   ALTSGPT  29   ASTSGOT  25   ALKPHOSPHAT  83   TBILIRUBIN  0.5   GLUCOSE  158*                 No results found for: TROPONINI    Urinalysis:    Recent Labs      08/10/18   0810   SPECGRAVITY  1.009   GLUCOSEUR  Negative   KETONES  Negative   NITRITE  Negative   LEUKESTERAS  Negative        Imaging:  DX-FOOT-COMPLETE 3+ LEFT   Final Result      1.  Surgical change consistent with prior amputation of the great toe and a portion of the second toe.      2.  Soft tissue swelling with a small amount of gas within the soft tissues of the forefoot.      DX-CHEST-PORTABLE (1 VIEW)   Final Result      Subsegmental right basilar atelectasis.      Stable prominence of the cardiomediastinal silhouette.      Atherosclerotic plaque.               Assessment/Plan:  I anticipate this patient will require at least two midnights for appropriate medical management, necessitating inpatient admission.    Diabetic ulcer of left foot associated with type 2 diabetes mellitus, with muscle involvement without evidence of necrosis (HCC)- (present on admission)   Assessment & Plan    Active Orders     Ordered     Ordering Provider       Fri Aug 10, 2018  8:19 AM    08/10/18 0819  cefTRIAXone (ROCEPHIN) 2 g in  mL IVPB  EVERY 24 HOURS      Ap Chowdary M.D.    08/10/18 0819   DAPTOmycin (CUBICIN) 620 mg in NS 50 mL IVPB  EVERY 24 HOURS      Ap Chowdary M.D.      His prior vancomycin JUNIOR is 2 and prior micro showed MRSA, staph haemolyticus (MRSE), peptostreptococcus, and pasteurella  [facility] plans to take him to the OR so he will remain NPO for now  Will likely need ID after OR & cultures sent.        Type 2 diabetes mellitus with neurologic complication, without long-term current use of insulin (HCC)- (present on admission)   Assessment & Plan    In-hospital FSBG goal less than 180 mg/dL  SSI qAC & qHS when eating, q6 when NPO  His diabetes is fairly well maintained on diet alone  Lab Results   Component Value Date    HBA1C 5.8 (H) 03/30/2018           Mental disability- (present on admission)   Assessment & Plan    At baseline  Has a difficult disposition        Status post amputation of great toe, left (HCC)- (present on admission)   Assessment & Plan    Due to infection; this is his second return for this and he is at high likelihood for losing his forefoot        Diabetic peripheral neuropathy (HCC)- (present on admission)   Assessment & Plan    At baseline, not currently managed with medications        Psychiatric disorder- (present on admission)   Assessment & Plan    At baseline; on seroquel (and will continue)            VTE prophylaxis: heparin

## 2018-08-10 NOTE — ED PROVIDER NOTES
ED Provider Note    Scribed for Evette Argueta M.D. by Ximena Fam. 8/10/2018  5:56 AM    Primary care provider: Tahir Harrington  Means of arrival: walk in   History obtained from: patient   History limited by: none     CHIEF COMPLAINT  Chief Complaint   Patient presents with   • Shortness of Breath   • Wound Check   • Back Pain       HPI  Wilbert Kennith Yeomans Jr. is a 53 y.o. Male with a history of Diabetes who presents to the Emergency Department for evaluation of severe pain with associated warmth and redness located to the amputation sites of his left great toe and left second toe. He states his toes were amputated secondary to osteomyelitis. He also complains of shortness of breath onset last night. He denies fever, vomiting and diarrhea.       REVIEW OF SYSTEMS  PULMONARY: Shortness of breath.   GI: no vomiting, diarrhea  Musculoskeletal: Pain to amputation sites of left foot with warmth and redness.  Endocrine: no fevers  See history of present illness. All other systems are negative. C.      PAST MEDICAL HISTORY   has a past medical history of Anxiety; Bronchitis; Dental disorder; Diabetes (HCC); Hepatitis C (1997); Hypercholesteremia; Hypertension; and Psychiatric disorder.  No history of stroke or MI.     SURGICAL HISTORY   has a past surgical history that includes ventral hernia repair laparoscopic (3/30/2016); toe amputation (Left, 4/1/2018); toe amputation (Left, 4/23/2018); irrigation & debridement ortho (Left, 5/12/2018); and irrigation & debridement ortho (Left, 5/14/2018).      SOCIAL HISTORY  Social History   Substance Use Topics   • Smoking status: Current Some Day Smoker     Packs/day: 0.25     Years: 30.00     Types: Cigarettes   • Smokeless tobacco: Never Used   • Alcohol use No      History   Drug Use No     Comment: last time used 10+ years       FAMILY HISTORY  None noted       CURRENT MEDICATIONS  Home Medications     Reviewed by Liliane Snell (Pharmacy Tech) on  "08/10/18 at 0901  Med List Status: Partial   Medication Last Dose Status   benztropine (COGENTIN) 1 MG Tab <2 weeks Active   cyanocobalamin (VITAMIN B12) 1000 MCG Tab <2 weeks Active   fluvastatin (LESCOL) 40 MG Cap <2weeks Active   folic acid (FOLVITE) 1 MG Tab <2weeks Active   LORazepam (ATIVAN) 0.5 MG Tab <2weeks Active   propranolol (INDERAL) 20 MG Tab <2weeks Active   QUEtiapine (SEROQUEL) 100 MG Tab <2weeks Active                ALLERGIES  No Known Allergies        PHYSICAL EXAM  VITAL SIGNS: /99   Pulse (!) 121   Temp (!) 35.8 °C (96.4 °F) (Temporal)   Resp 18   Ht 1.93 m (6' 4\")   Wt 104 kg (229 lb 4.5 oz)   SpO2 97%   BMI 27.91 kg/m²   Constitutional: Well developed, Well nourished, No acute distress.  HEENT: Normocephalic, Atraumatic,  external ears normal, pharynx pink,  Mucous  Membranes moist, No rhinorrhea or mucosal edema  Eyes: PERRL, EOMI, Conjunctiva normal, No discharge.   Neck: Normal range of motion, No tenderness, Supple, No stridor.   Lymphatic: No lymphadenopathy    Cardiovascular: Tachycardic, regular Rhythm, No murmurs,  rubs, or gallops.   Thorax & Lungs: Lungs clear to auscultation bilaterally, No respiratory distress, No wheezes, rhales or rhonchi, No chest wall tenderness.   Abdomen: Bowel sounds normal, Soft, non tender, non distended,  No pulsatile masses., no rebound guarding or peritoneal signs.   Skin: Warm, Dry  Back:  No CVA tenderness,  No spinal tenderness, bony crepitance, step offs, or instability.   Neurologic: Alert & oriented x 3, Normal motor function, Normal sensory function, No focal deficits noted. Normal reflexes. Normal Cranial Nerves.  Extremities: Equal, intact distal pulses, No cyanosis, clubbing or edema,  No tenderness. Left foot is warmer than the right with erythema. Left great toe and left second toe amputated. No purulent discharge or foul odor. Mild sensation.   Musculoskeletal: Full range of motion in all major joints. No " tenderness.        DIAGNOSTIC STUDIES / PROCEDURES  LABS  Results for orders placed or performed during the hospital encounter of 08/10/18   LACTIC ACID   Result Value Ref Range    Lactic Acid 3.3 (H) 0.5 - 2.0 mmol/L   CBC WITH DIFFERENTIAL   Result Value Ref Range    WBC 9.6 4.8 - 10.8 K/uL    RBC 5.02 4.70 - 6.10 M/uL    Hemoglobin 15.4 14.0 - 18.0 g/dL    Hematocrit 43.4 42.0 - 52.0 %    MCV 86.5 81.4 - 97.8 fL    MCH 30.7 27.0 - 33.0 pg    MCHC 35.5 (H) 33.7 - 35.3 g/dL    RDW 40.4 35.9 - 50.0 fL    Platelet Count 202 164 - 446 K/uL    MPV 9.6 9.0 - 12.9 fL    Neutrophils-Polys 62.40 44.00 - 72.00 %    Lymphocytes 26.70 22.00 - 41.00 %    Monocytes 8.20 0.00 - 13.40 %    Eosinophils 1.90 0.00 - 6.90 %    Basophils 0.60 0.00 - 1.80 %    Immature Granulocytes 0.20 0.00 - 0.90 %    Nucleated RBC 0.00 /100 WBC    Neutrophils (Absolute) 5.96 1.82 - 7.42 K/uL    Lymphs (Absolute) 2.55 1.00 - 4.80 K/uL    Monos (Absolute) 0.78 0.00 - 0.85 K/uL    Eos (Absolute) 0.18 0.00 - 0.51 K/uL    Baso (Absolute) 0.06 0.00 - 0.12 K/uL    Immature Granulocytes (abs) 0.02 0.00 - 0.11 K/uL    NRBC (Absolute) 0.00 K/uL   COMP METABOLIC PANEL   Result Value Ref Range    Sodium 138 135 - 145 mmol/L    Potassium 3.6 3.6 - 5.5 mmol/L    Chloride 106 96 - 112 mmol/L    Co2 19 (L) 20 - 33 mmol/L    Anion Gap 13.0 (H) 0.0 - 11.9    Glucose 158 (H) 65 - 99 mg/dL    Bun 18 8 - 22 mg/dL    Creatinine 0.95 0.50 - 1.40 mg/dL    Calcium 9.5 8.5 - 10.5 mg/dL    AST(SGOT) 25 12 - 45 U/L    ALT(SGPT) 29 2 - 50 U/L    Alkaline Phosphatase 83 30 - 99 U/L    Total Bilirubin 0.5 0.1 - 1.5 mg/dL    Albumin 4.8 3.2 - 4.9 g/dL    Total Protein 7.6 6.0 - 8.2 g/dL    Globulin 2.8 1.9 - 3.5 g/dL    A-G Ratio 1.7 g/dL   URINALYSIS   Result Value Ref Range    Color Yellow     Character Clear     Specific Gravity 1.009 <1.035    Ph 6.5 5.0 - 8.0    Glucose Negative Negative mg/dL    Ketones Negative Negative mg/dL    Protein Negative Negative mg/dL    Bilirubin  Negative Negative    Urobilinogen, Urine 0.2 Negative    Nitrite Negative Negative    Leukocyte Esterase Negative Negative    Occult Blood Negative Negative    Micro Urine Req see below    URINE DRUG SCREEN   Result Value Ref Range    Amphetamines Urine Negative Negative    Barbiturates Negative Negative    Benzodiazepines Negative Negative    Cocaine Metabolite Negative Negative    Methadone Negative Negative    Opiates Positive (A) Negative    Oxycodone Negative Negative    Phencyclidine -Pcp Negative Negative    Propoxyphene Negative Negative    Cannabinoid Metab Negative Negative   D-DIMER   Result Value Ref Range    D-Dimer Screen 223 <250 ng/mL(D-DU)   ESTIMATED GFR   Result Value Ref Range    GFR If African American >60 >60 mL/min/1.73 m 2    GFR If Non African American >60 >60 mL/min/1.73 m 2   EKG (IP)   Result Value Ref Range    Report       [facility] Emergency Dept.    Test Date:  2018-08-10  Pt Name:    WILBERT YEOMANS              Department: ER  MRN:        [id number]                      Room:  Gender:     Male                         Technician: [id number]  :        1965                   Requested By:ER TRIAGE PROTOCOL  Order #:    [id number]                    Reading MD:    Measurements  Intervals                                Axis  Rate:       111                          P:          57  ME:         160                          QRS:        -82  QRSD:       116                          T:          82  QT:         360  QTc:        489    Interpretive Statements  SINUS TACHYCARDIA  LEFT ANTERIOR FASCICULAR BLOCK  LEFT VENTRICULAR HYPERTROPHY  Compared to ECG 2018 11:45:52  Left ventricular hypertrophy now present  Sinus rhythm no longer present  Poor R-wave progression no longer present     All labs reviewed by me.      EKG  12 lead EKG; Interpreted by emergency department physician  Rhythm: Sinus tachycardia   Rate: 111  Axis: Normal  Conduction: left anterior fascicular  block   R Wave: Normal R wave progression  Normal ST-T segments  ST Segments: No ST segment elevation   T waves: Inversions in aVL and aVR.   Q waves: None  Clinical Impression: Sinus tachycardia with left anterior fascicular block.   Significantly changed from previous EKG.      RADIOLOGY  DX-FOOT-COMPLETE 3+ LEFT   Final Result      1.  Surgical change consistent with prior amputation of the great toe and a portion of the second toe.      2.  Soft tissue swelling with a small amount of gas within the soft tissues of the forefoot.      DX-CHEST-PORTABLE (1 VIEW)   Final Result      Subsegmental right basilar atelectasis.      Stable prominence of the cardiomediastinal silhouette.      Atherosclerotic plaque.         The radiologist's interpretation of all radiological studies have been reviewed by me.      COURSE & MEDICAL DECISION MAKING  Nursing notes, VS, PMSFHx reviewed in chart.    Differential diagnoses include but not limited to: sepsis, renal insufficiency, dehydration, drug abuse, osteomyelitis, PE.     5:51 AM Obtained and reviewed past medical records which indicated the patient was admitted to the hospital on 5/11/18 for a diabetic ulcer of the left midfoot with type 2 diabetes mellitus. He has a history of great toe amputation.     5:56 AM Patient seen and examined at bedside. Patient will be treated with Unasyn 3 g and vancomycin. Intravenous fluids administered secondary to sepsis protocol. Ordered DX chest, urine drug screen, lactic acid, CBC, CMP, UA, urine culture, blood culture and EKG to evaluate his symptoms.     6:19 AM Patient reevaluated at bedside. Performed US evaluation at bedside.     7:01 AM Patient reevaluated at bedside. Patient will continue to receive IV fluids for sepsis protocol. His heart rate has improved with IV fluids.     7:25 AM Consult with Dr. Chowdary, hospitalist, who agreed to admit the patient.     7:32 AM Patient reevaluated at bedside. Discussed plan of care with  patient. He agreed to admission.     7:36 AM [facility].     7:42 AM Consult with Dr. Baez [facility], who agreed to see the patient. He requested the patient be made NPO.       DISPOSITION:  Patient will be admitted to Dr. Chowdary in guarded condition.      FINAL IMPRESSION  1. Subacute osteomyelitis of left foot (HCC)    2. Type 2 diabetes mellitus with diabetic neuropathy, without long-term current use of insulin (HCC)    3. SIRS (systemic inflammatory response syndrome) (HCC)           Ximena GAUTAM (Heather), am scribing for, and in the presence of, Evette Argueta M.D..  Electronically signed by: Ximena Fam (Cricketibsawyer), 8/10/2018  Evette GAUTAM M.D. personally performed the services described in this documentation, as scribed by Ximena Fam in my presence, and it is both accurate and complete.    The note accurately reflects work and decisions made by me.  Evette Argueta  8/10/2018  2:12 PM

## 2018-08-10 NOTE — ASSESSMENT & PLAN NOTE
Due to infection; this is his second return for this and he is at high likelihood for losing his forefoot

## 2018-08-10 NOTE — ED NOTES
The Medication Reconciliation process has been completed by interviewing the patient who is unclear as to what meds he takes, I also contacted [facility] who gave me a list of his meds that he has filled since May, unknown when the patient took these meds last.    Allergies have been reviewed  Antibiotic use in 30 days - none per pharmacy    Home Pharmacy:  [facility]

## 2018-08-11 PROBLEM — L08.9 DIABETIC FOOT INFECTION (HCC): Status: ACTIVE | Noted: 2018-08-11

## 2018-08-11 PROBLEM — E11.628 DIABETIC FOOT INFECTION (HCC): Status: ACTIVE | Noted: 2018-08-11

## 2018-08-11 LAB
ANION GAP SERPL CALC-SCNC: 9 MMOL/L (ref 0–11.9)
BASOPHILS # BLD AUTO: 0.2 % (ref 0–1.8)
BASOPHILS # BLD: 0.01 K/UL (ref 0–0.12)
BUN SERPL-MCNC: 10 MG/DL (ref 8–22)
CALCIUM SERPL-MCNC: 8.7 MG/DL (ref 8.5–10.5)
CHLORIDE SERPL-SCNC: 111 MMOL/L (ref 96–112)
CO2 SERPL-SCNC: 20 MMOL/L (ref 20–33)
CREAT SERPL-MCNC: 0.68 MG/DL (ref 0.5–1.4)
EOSINOPHIL # BLD AUTO: 0 K/UL (ref 0–0.51)
EOSINOPHIL NFR BLD: 0 % (ref 0–6.9)
ERYTHROCYTE [DISTWIDTH] IN BLOOD BY AUTOMATED COUNT: 43.2 FL (ref 35.9–50)
GLUCOSE SERPL-MCNC: 178 MG/DL (ref 65–99)
HCT VFR BLD AUTO: 38.7 % (ref 42–52)
HGB BLD-MCNC: 13.1 G/DL (ref 14–18)
IMM GRANULOCYTES # BLD AUTO: 0.03 K/UL (ref 0–0.11)
IMM GRANULOCYTES NFR BLD AUTO: 0.6 % (ref 0–0.9)
LYMPHOCYTES # BLD AUTO: 0.91 K/UL (ref 1–4.8)
LYMPHOCYTES NFR BLD: 18.1 % (ref 22–41)
MCH RBC QN AUTO: 30.6 PG (ref 27–33)
MCHC RBC AUTO-ENTMCNC: 33.9 G/DL (ref 33.7–35.3)
MCV RBC AUTO: 90.4 FL (ref 81.4–97.8)
MONOCYTES # BLD AUTO: 0.24 K/UL (ref 0–0.85)
MONOCYTES NFR BLD AUTO: 4.8 % (ref 0–13.4)
NEUTROPHILS # BLD AUTO: 3.84 K/UL (ref 1.82–7.42)
NEUTROPHILS NFR BLD: 76.3 % (ref 44–72)
NRBC # BLD AUTO: 0 K/UL
NRBC BLD-RTO: 0 /100 WBC
PLATELET # BLD AUTO: 136 K/UL (ref 164–446)
PMV BLD AUTO: 10.2 FL (ref 9–12.9)
POTASSIUM SERPL-SCNC: 4.3 MMOL/L (ref 3.6–5.5)
RBC # BLD AUTO: 4.28 M/UL (ref 4.7–6.1)
SODIUM SERPL-SCNC: 140 MMOL/L (ref 135–145)
WBC # BLD AUTO: 5 K/UL (ref 4.8–10.8)

## 2018-08-11 PROCEDURE — 85025 COMPLETE CBC W/AUTO DIFF WBC: CPT

## 2018-08-11 PROCEDURE — A9270 NON-COVERED ITEM OR SERVICE: HCPCS | Performed by: INTERNAL MEDICINE

## 2018-08-11 PROCEDURE — 700111 HCHG RX REV CODE 636 W/ 250 OVERRIDE (IP): Performed by: FAMILY MEDICINE

## 2018-08-11 PROCEDURE — 700102 HCHG RX REV CODE 250 W/ 637 OVERRIDE(OP): Performed by: HOSPITALIST

## 2018-08-11 PROCEDURE — 700105 HCHG RX REV CODE 258: Performed by: HOSPITALIST

## 2018-08-11 PROCEDURE — 700111 HCHG RX REV CODE 636 W/ 250 OVERRIDE (IP): Performed by: HOSPITALIST

## 2018-08-11 PROCEDURE — 99232 SBSQ HOSP IP/OBS MODERATE 35: CPT | Performed by: FAMILY MEDICINE

## 2018-08-11 PROCEDURE — A9270 NON-COVERED ITEM OR SERVICE: HCPCS | Performed by: HOSPITALIST

## 2018-08-11 PROCEDURE — 36415 COLL VENOUS BLD VENIPUNCTURE: CPT

## 2018-08-11 PROCEDURE — 80048 BASIC METABOLIC PNL TOTAL CA: CPT

## 2018-08-11 PROCEDURE — 700105 HCHG RX REV CODE 258: Performed by: INTERNAL MEDICINE

## 2018-08-11 PROCEDURE — 99255 IP/OBS CONSLTJ NEW/EST HI 80: CPT | Performed by: INTERNAL MEDICINE

## 2018-08-11 PROCEDURE — 770006 HCHG ROOM/CARE - MED/SURG/GYN SEMI*

## 2018-08-11 PROCEDURE — 700102 HCHG RX REV CODE 250 W/ 637 OVERRIDE(OP): Performed by: INTERNAL MEDICINE

## 2018-08-11 PROCEDURE — 700111 HCHG RX REV CODE 636 W/ 250 OVERRIDE (IP): Performed by: INTERNAL MEDICINE

## 2018-08-11 RX ORDER — MORPHINE SULFATE 4 MG/ML
2-4 INJECTION, SOLUTION INTRAMUSCULAR; INTRAVENOUS EVERY 4 HOURS PRN
Status: DISCONTINUED | OUTPATIENT
Start: 2018-08-11 | End: 2018-08-15 | Stop reason: HOSPADM

## 2018-08-11 RX ORDER — MORPHINE SULFATE 4 MG/ML
4 INJECTION, SOLUTION INTRAMUSCULAR; INTRAVENOUS EVERY 4 HOURS PRN
Status: DISCONTINUED | OUTPATIENT
Start: 2018-08-11 | End: 2018-08-11

## 2018-08-11 RX ORDER — LINEZOLID 600 MG/1
600 TABLET, FILM COATED ORAL EVERY 12 HOURS
Status: DISCONTINUED | OUTPATIENT
Start: 2018-08-11 | End: 2018-08-15 | Stop reason: HOSPADM

## 2018-08-11 RX ADMIN — OXYCODONE HYDROCHLORIDE 10 MG: 10 TABLET ORAL at 22:27

## 2018-08-11 RX ADMIN — SODIUM CHLORIDE: 9 INJECTION, SOLUTION INTRAVENOUS at 05:45

## 2018-08-11 RX ADMIN — HEPARIN SODIUM 5000 UNITS: 5000 INJECTION, SOLUTION INTRAVENOUS; SUBCUTANEOUS at 05:45

## 2018-08-11 RX ADMIN — OXYCODONE HYDROCHLORIDE 10 MG: 10 TABLET ORAL at 15:32

## 2018-08-11 RX ADMIN — QUETIAPINE FUMARATE 200 MG: 100 TABLET ORAL at 17:28

## 2018-08-11 RX ADMIN — AMPICILLIN SODIUM AND SULBACTAM SODIUM 3 G: 2; 1 INJECTION, POWDER, FOR SOLUTION INTRAMUSCULAR; INTRAVENOUS at 14:44

## 2018-08-11 RX ADMIN — LINEZOLID 600 MG: 600 TABLET, FILM COATED ORAL at 17:28

## 2018-08-11 RX ADMIN — QUETIAPINE FUMARATE 100 MG: 100 TABLET ORAL at 05:46

## 2018-08-11 RX ADMIN — AMPICILLIN SODIUM AND SULBACTAM SODIUM 3 G: 2; 1 INJECTION, POWDER, FOR SOLUTION INTRAMUSCULAR; INTRAVENOUS at 17:26

## 2018-08-11 RX ADMIN — LORAZEPAM 0.5 MG: 1 TABLET ORAL at 22:27

## 2018-08-11 RX ADMIN — OXYCODONE HYDROCHLORIDE 10 MG: 10 TABLET ORAL at 05:46

## 2018-08-11 RX ADMIN — OXYCODONE HYDROCHLORIDE 5 MG: 5 TABLET ORAL at 11:10

## 2018-08-11 RX ADMIN — SODIUM CHLORIDE: 9 INJECTION, SOLUTION INTRAVENOUS at 17:28

## 2018-08-11 RX ADMIN — HEPARIN SODIUM 5000 UNITS: 5000 INJECTION, SOLUTION INTRAVENOUS; SUBCUTANEOUS at 14:40

## 2018-08-11 RX ADMIN — LORAZEPAM 0.5 MG: 1 TABLET ORAL at 11:10

## 2018-08-11 RX ADMIN — HEPARIN SODIUM 5000 UNITS: 5000 INJECTION, SOLUTION INTRAVENOUS; SUBCUTANEOUS at 22:27

## 2018-08-11 RX ADMIN — DAPTOMYCIN 620 MG: 500 INJECTION, POWDER, LYOPHILIZED, FOR SOLUTION INTRAVENOUS at 07:53

## 2018-08-11 RX ADMIN — CEFTRIAXONE SODIUM 2 G: 2 INJECTION, POWDER, FOR SOLUTION INTRAMUSCULAR; INTRAVENOUS at 05:45

## 2018-08-11 RX ADMIN — MORPHINE SULFATE 4 MG: 4 INJECTION INTRAVENOUS at 19:55

## 2018-08-11 RX ADMIN — QUETIAPINE FUMARATE 100 MG: 100 TABLET ORAL at 11:41

## 2018-08-11 RX ADMIN — SENNOSIDES AND DOCUSATE SODIUM 2 TABLET: 8.6; 5 TABLET ORAL at 05:46

## 2018-08-11 ASSESSMENT — ENCOUNTER SYMPTOMS
FEVER: 0
NAUSEA: 0
NERVOUS/ANXIOUS: 0
SHORTNESS OF BREATH: 0
WHEEZING: 0
HEARTBURN: 0
CHILLS: 0
VOMITING: 0
DIZZINESS: 0
WEAKNESS: 0
DIARRHEA: 0
BACK PAIN: 0
SORE THROAT: 0
HEADACHES: 0
COUGH: 0
ABDOMINAL PAIN: 0
BLURRED VISION: 0

## 2018-08-11 ASSESSMENT — PAIN SCALES - GENERAL
PAINLEVEL_OUTOF10: 8

## 2018-08-11 NOTE — PROGRESS NOTES
"Visibly SOB--moderate WOB with constant panting and huffing observed by this RN. Per continuous pulse ox, O2 sats are in mid 90s on RA. Refusing to wear NC or oxy mask; insists \"I'm NOT short of breath!\"  "

## 2018-08-11 NOTE — PROGRESS NOTES
"Patient very irritable and noncompliant. Initially refused skin check a few times. Almost fell getting off of stretcher and into bed while refusing this RN's assistance. Wanted all staff to leave so that he could put boxers on (refused assistance), go to the bathroom alone, \"and put a sign on my door that says to leave me alone. Or I can sign the papers to just go home. I don't want to talk and I don't want to be messed with.\" Taking off O2 that this RN applied even though he was visibly SOB after transfer from stretcher to bed. Continuous pulse ox applied and NS infusing via right arm PIV. Patient was educated on the need for two RNs to perform a skin check and for post op vitals to be obtained while he is a patient. Bed alarm set, bed low, call bell within reach; patient demonstrated use of call bell to request staff presence. Patient was educated on need for assistance when getting up d/t being NWB to left foot (had an I&D today) and d/t poor balance and recent near fall during transfer from bed to stretcher. Patient's belongings (two bags) placed within reach--patient does not want staff to go through his belongings and he does not want them locked up with security. Rates pain to both feet 10/10 but will not answer any other pain assessment questions. CNA at bedside, obtaining post op vitals now.  "

## 2018-08-11 NOTE — PROGRESS NOTES
"LIMB PRESERVATION SERVICE NOTE:    Subjective:      Reason for Consultation: S/P Left Foot I and D with Dr Baez 8/10/18    History of Present Illness:     Patient is well known to LPS and outpatient wound care services.    Patient is a 52 y.o. male with a past medical history that includes borderline diabetes, anxiety, HTN, Hep C and is admitted to [facility] for a diabetic foot ulcer for which an lps consult was requested. Pt did not follow up with the [facility] clinic for dressing care and he did not receive oral antibiotics. He also did not follow up with his PCP at [facility]. Pt has Hx of noncompliance and psychiatric disorder which is likely contributory to his wounds. Pt is still not wearing the previously provided offloading shoes, and was reeducated.     Pt does not know how long they have been diabetic and has not been managing their diabetes with any medications. The pt is  S/P Left Foot I and D with Dr Baez 8/10/18. He has had prior Sx: I and D Left Great Toe 5/12/18 Dr Baez. S/P Left Great Toe Amp by Dr Easton 4/23/18. The s/p left great toe amputation site was from 4/1/18 by Dr. Baez.          Lab Results   Component Value Date/Time    HBA1C 5.8 (H) 03/30/2018 03:04 PM     Patient denies fevers chills, nausea, vomiting.     Pain:        Patient resting comfortably    Vitals  /75   Pulse 90   Temp 36.8 °C (98.3 °F)   Resp 18   Ht 1.93 m (6' 4\")   Wt 104 kg (229 lb 4.5 oz)   SpO2 96%   BMI 27.91 kg/m²       Objective:      General Appearance:  Well developed,  nourished, in no acute distress     Sensory Assessment        Patient sensation insensate bilaterally with light touch 10 of 10 points        Vascular Assessment        +2 PT bilaterally  +1 DP on Left Foot  Unable to Palpate DP on Right Foot - Found on Doppler      Orthotic, protective, supportive devices:      Offloading     Offloading Shoe ordered but does not like to wear them    Wound Characteristics                          "                           Location: [facility]   Initial Evaluation  Date:8/11/2018   Tissue Type and %:    Periwound:    Drainage:    Exposed structures    Wound Edges:      Odor: None   S&S of Infection:   Edema/Erythema   Edema: Localized at Site   Sensation:                Measurements: Initial Evaluation  Date:8/11/2018   Length (cm)    Width (cm)    Depth (cm)    Tract/undermine      Tests and Measures:    Labs  Recent Labs      08/10/18   0613  08/11/18   0737   WBC  9.6  5.0   RBC  5.02  4.28*   HEMOGLOBIN  15.4  13.1*   HEMATOCRIT  43.4  38.7*   MCV  86.5  90.4   MCH  30.7  30.6   MCHC  35.5*  33.9   RDW  40.4  43.2   PLATELETCT  202  136*   MPV  9.6  10.2     Recent Labs      08/10/18   0613  08/11/18   0737   SODIUM  138  140   POTASSIUM  3.6  4.3   CHLORIDE  106  111   CO2  19*  20   GLUCOSE  158*  178*   BUN  18  10       A1C 5.8%    CELIO 4/1/18     R: 1.32             L: 1.28     RLE: Triphasic/triphasic   LLE: Triphasic/triphasic        Procedures:        Debridement :       Cleansed with:                                                                            Periwound protected with:     Primary dressing:     Secondary Dressing:     Other:     Patient Education: Implications of loss of protective sensation (LOPS) discussed with patient- including increased risk for amputation.  Advised to check foot at least daily, moisturize foot, and to always wear protective foot wear.      Plan:       Treatment Plan and Recommendations:    1. Labs\Imaging: Reviewed    2. Treatment:   Will Return Tomorrow for 1st Dressing Change, Wound Care by Nursing, LPS to Follow.                           Dressing Orders Updated. Skin Care Updated.      Nursing to change every 48 and PRN for Saturation or Dislodgement     Antibiotics IM     HWB when OOB wearing Shoe       Anticipated discharge plans (X):   SNF:            Home Care:            Outpatient Wound Center:        Self Care:             Other:            TBD:  X    Professional Collaboration: D/W:

## 2018-08-11 NOTE — PROGRESS NOTES
[facility] Hospitalist Progress Note    Date of Service: 2018    Chief Complaint  53 y.o. male admitted 8/10/2018 with diabetic foot infection.    Interval Problem Update  Diabetic foot infection-I&D done, culture pending, pain controlled  Diabetes-no current medications    Consultants/Specialty  Orthopedic surgery - [place]    Disposition  TBD        Review of Systems   Constitutional: Negative for chills, fever and malaise/fatigue.   HENT: Negative for hearing loss and sore throat.    Eyes: Negative for blurred vision.   Respiratory: Negative for cough, shortness of breath and wheezing.    Cardiovascular: Positive for leg swelling. Negative for chest pain.   Gastrointestinal: Negative for abdominal pain, diarrhea, heartburn, nausea and vomiting.   Genitourinary: Negative for dysuria.   Musculoskeletal: Negative for back pain.   Skin: Negative for rash.   Neurological: Negative for dizziness, weakness and headaches.   Psychiatric/Behavioral: The patient is not nervous/anxious.       Physical Exam  Laboratory/Imaging   Hemodynamics  Temp (24hrs), Av.7 °C (98.1 °F), Min:36.1 °C (96.9 °F), Max:37.1 °C (98.7 °F)   Temperature: 36.4 °C (97.6 °F)  Pulse  Av.5  Min: 62  Max: 122 Heart Rate (Monitored): 78  Blood Pressure: 133/85, NIBP: 152/95      Respiratory      Respiration: 16, Pulse Oximetry: 95 %        RUL Breath Sounds: (P) Diminished, RLL Breath Sounds: (P) Diminished, LORENA Breath Sounds: (P) Diminished, LLL Breath Sounds: (P) Diminished    Fluids    Intake/Output Summary (Last 24 hours) at 18 1119  Last data filed at 18 0900   Gross per 24 hour   Intake             2873 ml   Output             3775 ml   Net             -902 ml       Nutrition  Orders Placed This Encounter   Procedures   • Diet Order Regular     Standing Status:   Standing     Number of Occurrences:   1     Order Specific Question:   Diet:     Answer:   Regular [1]     Physical Exam   Constitutional: He is oriented to  person, place, and time. He appears well-developed and well-nourished.   HENT:   Head: Normocephalic and atraumatic.   Eyes: Pupils are equal, round, and reactive to light. Conjunctivae are normal.   Neck: No tracheal deviation present. No thyromegaly present.   Cardiovascular: Normal rate and regular rhythm.    Pulmonary/Chest: Effort normal and breath sounds normal.   Abdominal: Soft. Bowel sounds are normal. He exhibits no distension. There is no tenderness.   Musculoskeletal: He exhibits edema.   Dressing at left foot   Lymphadenopathy:     He has no cervical adenopathy.   Neurological: He is alert and oriented to person, place, and time.   Nursing note and vitals reviewed.      Recent Labs      08/10/18   0613  08/11/18   0737   WBC  9.6  5.0   RBC  5.02  4.28*   HEMOGLOBIN  15.4  13.1*   HEMATOCRIT  43.4  38.7*   MCV  86.5  90.4   MCH  30.7  30.6   MCHC  35.5*  33.9   RDW  40.4  43.2   PLATELETCT  202  136*   MPV  9.6  10.2     Recent Labs      08/10/18   0613  08/11/18   0737   SODIUM  138  140   POTASSIUM  3.6  4.3   CHLORIDE  106  111   CO2  19*  20   GLUCOSE  158*  178*   BUN  18  10   CREATININE  0.95  0.68   CALCIUM  9.5  8.7                      Assessment/Plan     * Diabetic foot infection (HCC)- (present on admission)   Assessment & Plan    Irrigation and debridement of left foot.  8/10/2018  IV daptomycin and Rocephin  Follow culture  Consult ID          Mental disability- (present on admission)   Assessment & Plan    baseline          Psychiatric disorder- (present on admission)   Assessment & Plan    Seroquel        Type 2 diabetes mellitus with neurologic complication, without long-term current use of insulin (HCC)- (present on admission)   Assessment & Plan    Diet controlled?  Check hgba1c        Diabetic peripheral neuropathy (HCC)- (present on admission)   Assessment & Plan    no current medications          Quality-Core Measures   Reviewed items::  Radiology images reviewed, Labs reviewed,  Medications reviewed and EKG reviewed  Olsen catheter::  No Olsen  DVT prophylaxis pharmacological::  Heparin  Ulcer Prophylaxis::  No  Antibiotics:  Treating active infection/contamination beyond 24 hours perioperative coverage

## 2018-08-11 NOTE — PROGRESS NOTES
"RN attempted again to go through admission questions with patient: \"No! I don't wanna do it!\" RN told patient that these questions do need completed for thorough care during hospitalization. Patient ignored RN.    Patient again refusing to discuss his pain. Yelled at RN that his pain level is \"an 8! OK?!\" RN asked where his pain was and he refused to answer. RN asked if his pain was still in his foot/feet and he yelled \"Yes!\"    Patient states he wants to eat ice cream and be left alone.    Though patient can correctly answer orientation questions, his mentation does not need appropriate. He does not seem to grasp the importance of hospital staff caring for him post-op. He is continually telling staff to leave his room \"and just leave me alone!\"  "

## 2018-08-11 NOTE — ASSESSMENT & PLAN NOTE
Irrigation and debridement of left foot.  8/10/2018  Zyvox and Augmentin until 8/24 per ID recommendation  Follow culture

## 2018-08-11 NOTE — OP REPORT
DATE OF SERVICE:  08/10/2018    PREOPERATIVE DIAGNOSIS:  Left foot abscess.    POSTOPERATIVE DIAGNOSIS:  Left foot abscess.    PROCEDURE:  Irrigation and debridement of left foot.    SURGEON:  Salomón Rodríguez MD    ASSISTANT:  Get Renteria DO    ESTIMATED BLOOD LOSS:  Minimal.    INDICATIONS:  This is a gentleman who had multiple foot I and D's, who   presented with x-rays showing gas and increased swelling and pain in his foot.    Risks and benefits of irrigation and debridement were discussed, which   include but not limited to bleeding, infection, neurovascular damage, pain,   stiffness, need for further surgery.  He understands all these risks and   wished to proceed.    DESCRIPTION OF PROCEDURE:  Patient was sedated with LMA anesthesia and his   left lower extremity was prepped in usual sterile fashion.  His previous   incisions were reopened and debrided of skin, subcutaneous tissue, underlying   muscle, and bone in an excisional fashion with a knife and rongeur and then   irrigated with copious amounts of normal saline solution and closed.  This was   sent to lab for culture and sensitivity.  It was then closed in layers.    Sterile dressings were applied.  Patient tolerated the procedure well.    POSTOPERATIVE PLAN:  This patient will be admitted while awaiting   perioperative antibiotics per medicine service while awaiting definitive   cultures.       ____________________________________     SALOMÓN RODRÍGUEZ MD [facility] / [facility]    DD:  08/10/2018 17:26:43  DT:  08/10/2018 17:55:02    D#:  [id number]  Job#:  [id number]

## 2018-08-11 NOTE — PROGRESS NOTES
"Patient seen and examined  Awaiting culture results from OR    Blood pressure 103/77, pulse 62, temperature 36.1 °C (96.9 °F), resp. rate 16, height 1.93 m (6' 4\"), weight 104 kg (229 lb 4.5 oz), SpO2 94 %.    Recent Labs      08/10/18   0613  08/11/18   0737   WBC  9.6  5.0   RBC  5.02  4.28*   HEMOGLOBIN  15.4  13.1*   HEMATOCRIT  43.4  38.7*   MCV  86.5  90.4   MCH  30.7  30.6   MCHC  35.5*  33.9   RDW  40.4  43.2   PLATELETCT  202  136*   MPV  9.6  10.2       No acute distress  Dressing clean dry and intact  Neurovascularly intact    POD#1    Plan:  DVT Prophylaxis- TEDS/SCDs  Weight Bearing Status-TTWB  PT/OT  Antibiotics: None  Case Coordination          "

## 2018-08-11 NOTE — CONSULTS
"INFECTIOUS DISEASES INPATIENT CONSULT NOTE     Date of Service: 8/11/2018    Consult Requested By: Jone Williamson M.D.    Reason for Consultation: Left foot abscess    History of Present Illness:   Wilbert Kennith Yeomans Jr. is a 53 y.o. well-known to the infectious disease service with a history of type 2 diabetes mellitus, psychiatric disorder, left foot osteomyelitis status post left great toe amputation down to the MTP joint on 04/23/18, recent hospitalization in June for osteomyelitis status post antibiotics completed on 6/26/18 admitted 8/10/2018 increasing left foot pain.  Prior cultures in May grew MRSA, MRSA, Peptostreptococcus and Pasteurella.  Patient states he was doing well after discharge until this past week when he experienced increasing left foot pain worse with ambulation.  He also noted increasing swelling and erythema on the dorsal aspect of his left foot.  Patient denies any subjective fevers but did experience some chills.  Denies any nausea, vomiting, abdominal pain or diarrhea.  Patient denies any recent antibiotics since his recent discharge.  Given persistent left foot pain, he presented to the ED for further evaluation.  On presentation, he was afebrile without any leukocytosis.  X-ray revealed soft tissue swelling of the left foot.  Patient was seen and evaluated by orthopedic surgery and underwent incision and and debridement on 8/10/18.  Or cultures are currently pending.  Blood cultures are negative to date.  Patient is currently on daptomycin and ceftriaxone.  Infectious disease service consulted for further antibiotic recommendations and management.    Review Of Systems:  All other ROS were reviewed and are negative except as noted above in the HPI.    PMH:   Past Medical History:   Diagnosis Date   • Anxiety    • Bronchitis     has had 3-4 times   • Dental disorder     upper partial, but lost it   • Diabetes (HCC)     \"borderline\"   • Hepatitis C 1997   • Hypercholesteremia    • " Hypertension    • Psychiatric disorder     anxiety   Type 2 diabetes mellitus  Left foot osteomyelitis  History of MRSA infection    PSH:  Past Surgical History:   Procedure Laterality Date   • IRRIGATION & DEBRIDEMENT ORTHO Left 5/14/2018    Procedure: IRRIGATION & DEBRIDEMENT ORTHO-FOOT;  Surgeon: Salomón Baez M.D.;  Location: [facility];  Service: Orthopedics   • IRRIGATION & DEBRIDEMENT ORTHO Left 5/12/2018    Procedure: IRRIGATION & DEBRIDEMENT ORTHO LT TOE, WOUND VAC CHANGE;  Surgeon: Salomón Baez M.D.;  Location: [facility];  Service: Orthopedics   • TOE AMPUTATION Left 4/23/2018    Procedure: TOE AMPUTATION GREAT TOE;  Surgeon: Aramis Easton M.D.;  Location: SURGERY [facility];  Service: Orthopedics   • TOE AMPUTATION Left 4/1/2018    Procedure: TOE AMPUTATION-GREAT TOE;  Surgeon: Salomón Baez M.D.;  Location: [facility];  Service: Orthopedics   • VENTRAL HERNIA REPAIR LAPAROSCOPIC  3/30/2016    Procedure: VENTRAL HERNIA REPAIR LAPAROSCOPIC WITH MESH;  Surgeon: Caden Cat M.D.;  Location: SURGERY [facility];  Service:        FAMILY HX:  Reviewed and not pertinent to the patient's clinical presentation    SOCIAL HX:  Social History     Social History   • Marital status: Single     Spouse name: N/A   • Number of children: N/A   • Years of education: N/A     Occupational History   • Not on file.     Social History Main Topics   • Smoking status: Current Some Day Smoker     Packs/day: 0.25     Years: 30.00     Types: Cigarettes   • Smokeless tobacco: Never Used   • Alcohol use No   • Drug use: No      Comment: last time used 10+ years   • Sexual activity: Not on file     Other Topics Concern   • Not on file     Social History Narrative   • No narrative on file     History   Smoking Status   • Current Some Day Smoker   • Packs/day: 0.25   • Years: 30.00   • Types: Cigarettes   Smokeless Tobacco   • Never Used     History   Alcohol Use No        Allergies/Intolerances:  No Known Allergies    History reviewed with the patient    Other Current Medications:    Current Facility-Administered Medications:   •  DAPTOmycin (CUBICIN) 620 mg in NS 50 mL IVPB, 6 mg/kg, Intravenous, Q24HR, Ap Chowdary M.D., Last Rate: 100 mL/hr at 08/11/18 0753, 620 mg at 08/11/18 0753  •  cefTRIAXone (ROCEPHIN) 2 g in  mL IVPB, 2 g, Intravenous, Q24HRS, Ap Chowdary M.D., Stopped at 08/11/18 0615  •  LORazepam (ATIVAN) tablet 0.5 mg, 0.5 mg, Oral, BID PRN, Ap Chowdary M.D., 0.5 mg at 08/11/18 1110  •  labetalol (NORMODYNE,TRANDATE) injection 10 mg, 10 mg, Intravenous, Q4HRS PRN, Ap Chowdary M.D.  •  ondansetron (ZOFRAN) syringe/vial injection 4 mg, 4 mg, Intravenous, Q4HRS PRN, Ap Chowdary M.D.  •  ondansetron (ZOFRAN ODT) dispertab 4 mg, 4 mg, Oral, Q4HRS PRN, Ap Chowdary M.D.  •  promethazine (PHENERGAN) tablet 12.5-25 mg, 12.5-25 mg, Oral, Q4HRS PRN, Ap Chowdary M.D.  •  promethazine (PHENERGAN) suppository 12.5-25 mg, 12.5-25 mg, Rectal, Q4HRS PRN, Ap Chowdary M.D.  •  prochlorperazine (COMPAZINE) injection 5-10 mg, 5-10 mg, Intravenous, Q4HRS PRN, Ap Chowdary M.D.  •  senna-docusate (PERICOLACE or SENOKOT S) 8.6-50 MG per tablet 2 Tab, 2 Tab, Oral, BID, 2 Tab at 08/11/18 0546 **AND** polyethylene glycol/lytes (MIRALAX) PACKET 1 Packet, 1 Packet, Oral, QDAY PRN **AND** magnesium hydroxide (MILK OF MAGNESIA) suspension 30 mL, 30 mL, Oral, QDAY PRN **AND** bisacodyl (DULCOLAX) suppository 10 mg, 10 mg, Rectal, QDAY PRN, Ap Chowdary M.D.  •  acetaminophen (TYLENOL) tablet 650 mg, 650 mg, Oral, Q6HRS PRN, Ap Chowdary M.D.  •  NS infusion, , Intravenous, Continuous, pA Chowdary M.D., Last Rate: 100 mL/hr at 08/11/18 0545  •  heparin injection 5,000 Units, 5,000 Units, Subcutaneous, Q8HRS, Ap Chowdary M.D., 5,000 Units at 08/11/18 0545  •  QUEtiapine (SEROQUEL) tablet 100 mg, 100 mg,  "Oral, BID, 100 mg at 18 1141 **AND** QUEtiapine (SEROQUEL) tablet 200 mg, 200 mg, Oral, Q EVENING, Ap Chowdary M.D., 200 mg at 08/10/18 1820  •  oxyCODONE immediate-release (ROXICODONE) tablet 5 mg, 5 mg, Oral, Q4HRS PRN, 5 mg at 18 1110 **OR** oxyCODONE immediate release (ROXICODONE) tablet 10 mg, 10 mg, Oral, Q4HRS PRN, Ap Chowdary M.D., 10 mg at 18 0546  [unfilled]    Most Recent Vital Signs:  /75   Pulse 90   Temp 36.8 °C (98.3 °F)   Resp 18   Ht 1.93 m (6' 4\")   Wt 104 kg (229 lb 4.5 oz)   SpO2 96%   BMI 27.91 kg/m²   Temp  Av.6 °C (97.9 °F)  Min: 35.8 °C (96.4 °F)  Max: 37.1 °C (98.7 °F)    Physical Exam:  General: well-appearing, no acute distress  HEENT: sclera anicteric, PERRL, EOMI, MMM, no oral lesions  Neck: supple, no lymphadenopathy  Chest: CTAB, no r/r/w, normal work of breathing.  Cardiac: RRR, normal S1 S2, no m/r/g   Abdomen: + bowel sounds, soft, non-tender, non-distended, no HSM  Extremities: Left foot in surgical dressing  Skin: no rashes or worrisome lesions  Neuro: Alert and oriented times 3, non-focal exam, speech fluent, moves all extremities  Psych: Flat and odd affect    Pertinent Lab Results:  Recent Labs      08/10/18   0618   0737   WBC  9.6  5.0      Recent Labs      08/10/18   0618   0737   HEMOGLOBIN  15.4  13.1*   HEMATOCRIT  43.4  38.7*   MCV  86.5  90.4   MCH  30.7  30.6   PLATELETCT  202  136*         Recent Labs      08/10/18   0613  08/11/18   0737   SODIUM  138  140   POTASSIUM  3.6  4.3   CHLORIDE  106  111   CO2  19*  20   CREATININE  0.95  0.68        Recent Labs      08/10/18   0613   ALBUMIN  4.8        Pertinent Micro:  Results     Procedure Component Value Units Date/Time    URINE CULTURE(NEW) [[id number]] Collected:  08/10/18 0810    Order Status:  Completed Specimen:  Urine Updated:  18 1245     Significant Indicator NEG     Source UR     Site --     Urine Culture No growth at 24 " "hours.    Narrative:       Indication for culture:->Emergency Room Patient    BLOOD CULTURE [[id number]] Collected:  08/10/18 0613    Order Status:  Completed Specimen:  Blood from Peripheral Updated:  08/11/18 0829     Significant Indicator NEG     Source BLD     Site PERIPHERAL     Blood Culture No Growth    Note: Blood cultures are incubated for 5 days and  are monitored continuously.Positive blood cultures  are called to the RN and reported as soon as  they are identified.      Narrative:       Per Hospital Policy: Only change Specimen Src: to \"Line\" if  specified by physician order.    BLOOD CULTURE [[id number]] Collected:  08/10/18 0630    Order Status:  Completed Specimen:  Blood from Peripheral Updated:  08/11/18 0829     Significant Indicator NEG     Source BLD     Site PERIPHERAL     Blood Culture No Growth    Note: Blood cultures are incubated for 5 days and  are monitored continuously.Positive blood cultures  are called to the RN and reported as soon as  they are identified.      Narrative:       Per Hospital Policy: Only change Specimen Src: to \"Line\" if  specified by physician order.    GRAM STAIN [[id number]] Collected:  08/10/18 1715    Order Status:  Completed Specimen:  Tissue Updated:  08/10/18 2244     Significant Indicator .     Source TISS     Site Left Foot     Gram Stain Result Moderate WBCs.  No organisms seen.      CULTURE TISSUE W/ GRM STAIN [[id number]] Collected:  08/10/18 1715    Order Status:  Completed Specimen:  Other Updated:  08/10/18 1828    ANAEROBIC CULTURE [[id number]] Collected:  08/10/18 1715    Order Status:  Completed Specimen:  Other Updated:  08/10/18 1828    URINALYSIS [[id number]] Collected:  08/10/18 0810    Order Status:  Completed Specimen:  Urine Updated:  08/10/18 0825     Color Yellow     Character Clear     Specific Gravity 1.009     Ph 6.5     Glucose Negative mg/dL      Ketones Negative mg/dL      Protein Negative mg/dL      Bilirubin Negative     Urobilinogen, Urine " 0.2     Nitrite Negative     Leukocyte Esterase Negative     Occult Blood Negative     Micro Urine Req see below     Comment: Microscopic examination not performed when specimen is clear  and chemically negative for protein, blood, leukocyte esterase  and nitrite.         Narrative:       Indication for culture:->Emergency Room Patient        Blood Culture   Date Value Ref Range Status   08/10/2018   Preliminary    No Growth    Note: Blood cultures are incubated for 5 days and  are monitored continuously.Positive blood cultures  are called to the RN and reported as soon as  they are identified.          Studies:  Dx-chest-portable (1 View)    Result Date: 8/10/2018  8/10/2018 6:17 AM HISTORY/REASON FOR EXAM:  Sepsis. TECHNIQUE/EXAM DESCRIPTION AND NUMBER OF VIEWS: Single portable view of the chest. COMPARISON: 5/18/2018 FINDINGS: The cardiomediastinal silhouette is stable. Aorta is tortuous. Atherosclerotic calcification is seen.  No focal consolidation, pleural effusion or pneumothorax is identified.  Costophrenic angles are clear. Retrocardiac right basilar opacity likely represents subsegmental atelectasis.     Subsegmental right basilar atelectasis. Stable prominence of the cardiomediastinal silhouette. Atherosclerotic plaque.     Dx-foot-complete 3+ Left    Result Date: 8/10/2018  8/10/2018 7:54 AM HISTORY/REASON FOR EXAM: Bilateral foot and toe pain. Infection. TECHNIQUE/EXAM DESCRIPTION AND NUMBER OF VIEWS: 3 nonweightbearing views of the LEFT foot. COMPARISON: FINDINGS: Bone mineralization is normal. There are surgical changes consistent with prior resection of the great toe. There has been partial resection of the second toe. There is soft tissue swelling and gas within the soft tissues.     1.  Surgical change consistent with prior amputation of the great toe and a portion of the second toe. 2.  Soft tissue swelling with a small amount of gas within the soft tissues of the forefoot.      IMPRESSION:   1.   Left foot abscess    2.  Recent left foot osteomyelitis  3.  Type 2 diabetes mellitus  4.  Psychiatric disorder      PLAN:   Wilbert Kennith Yeomans Jr. is a 53 y.o. man well-known to the infectious disease service with a history of type 2 diabetes mellitus and left foot osteomyelitis status post first and second toe amputations and IV antibiotics completed in June admitted for a left foot pain and swelling.  Patient found to have evidence of a left foot abscess now status post incision and debridement on 8/10/18.  OR cultures are currently pending.  However, prior cultures in May grew MRSA (vancomycin JUNIOR of 2), MRSE, Peptostreptococcus and Pasteurella.  Will discontinue daptomycin and ceftriaxone and transitioned to p.o. linezolid and Unasyn.  Further recommendations per clinical course and culture results.  Anticipate patient will need 14 day course of antibiotics.      Plan of care discussed with ALEX Williamson M.D.. Will continue to follow    Juliane Vaughn M.D.

## 2018-08-11 NOTE — PROGRESS NOTES
Admitted to unit [facility] 3 from PACU several minutes ago.   No breakdown noted to right heel: callouses to many areas on right foot and heel. No breakdown note to coccyx. Blanchable erythema (2 second cap refill) to buttocks and lower back--was on the stretcher in PACU waiting for a bed on this unit. C/D/I dsg. and ACE wrap to left foot and ankle. Skin otherwise WNL.

## 2018-08-11 NOTE — OR NURSING
Pt A&O. VSS on 2 L oxymask. Pt prefers mask over NC. Pt removes mask when awake and SPO2 remains > 90%, however when he falls asleep, SPO2 drops below 90% and pt requires the oxymask.   Pt reports pain level 6-7/10. Pt calm, unlabored breathing with legs crossed and is sitting up in [facility] eating.   Left leg elevated. Surgical drsg CDI.   Report called to JARROD Quinn and [facility]

## 2018-08-11 NOTE — PROGRESS NOTES
"Continuous pulse ox alarming d/t O2 sats of 88% on RA. Patient put on call light and began raising voice: \"I don't want to wear this! I'm breathing fine! I don't NEED oxygen! I've never needed to wear this thing () before!\" RN offered O2--patient again refused. Patient instructed to take deep breaths to prevent alarming. Patient educated that machine may alarm while he is sleeping if he chooses to remain on room air. Patient educated on need to wear  post-op d/t narcotic and anesthesia administration today.  "

## 2018-08-12 LAB
BACTERIA UR CULT: NORMAL
SIGNIFICANT IND 70042: NORMAL
SITE SITE: NORMAL
SOURCE SOURCE: NORMAL

## 2018-08-12 PROCEDURE — 700111 HCHG RX REV CODE 636 W/ 250 OVERRIDE (IP): Performed by: INTERNAL MEDICINE

## 2018-08-12 PROCEDURE — A9270 NON-COVERED ITEM OR SERVICE: HCPCS | Performed by: INTERNAL MEDICINE

## 2018-08-12 PROCEDURE — 99232 SBSQ HOSP IP/OBS MODERATE 35: CPT | Performed by: FAMILY MEDICINE

## 2018-08-12 PROCEDURE — 700111 HCHG RX REV CODE 636 W/ 250 OVERRIDE (IP): Performed by: HOSPITALIST

## 2018-08-12 PROCEDURE — 700105 HCHG RX REV CODE 258: Performed by: HOSPITALIST

## 2018-08-12 PROCEDURE — 99232 SBSQ HOSP IP/OBS MODERATE 35: CPT | Performed by: INTERNAL MEDICINE

## 2018-08-12 PROCEDURE — 700102 HCHG RX REV CODE 250 W/ 637 OVERRIDE(OP): Performed by: INTERNAL MEDICINE

## 2018-08-12 PROCEDURE — 700102 HCHG RX REV CODE 250 W/ 637 OVERRIDE(OP): Performed by: HOSPITALIST

## 2018-08-12 PROCEDURE — A9270 NON-COVERED ITEM OR SERVICE: HCPCS | Performed by: HOSPITALIST

## 2018-08-12 PROCEDURE — 700105 HCHG RX REV CODE 258: Performed by: INTERNAL MEDICINE

## 2018-08-12 PROCEDURE — 700111 HCHG RX REV CODE 636 W/ 250 OVERRIDE (IP): Performed by: FAMILY MEDICINE

## 2018-08-12 PROCEDURE — 770006 HCHG ROOM/CARE - MED/SURG/GYN SEMI*

## 2018-08-12 RX ADMIN — MORPHINE SULFATE 4 MG: 4 INJECTION INTRAVENOUS at 13:10

## 2018-08-12 RX ADMIN — LORAZEPAM 0.5 MG: 1 TABLET ORAL at 10:11

## 2018-08-12 RX ADMIN — MORPHINE SULFATE 4 MG: 4 INJECTION INTRAVENOUS at 00:18

## 2018-08-12 RX ADMIN — OXYCODONE HYDROCHLORIDE 10 MG: 10 TABLET ORAL at 08:18

## 2018-08-12 RX ADMIN — QUETIAPINE FUMARATE 200 MG: 100 TABLET ORAL at 17:06

## 2018-08-12 RX ADMIN — LORAZEPAM 0.5 MG: 1 TABLET ORAL at 22:18

## 2018-08-12 RX ADMIN — OXYCODONE HYDROCHLORIDE 10 MG: 10 TABLET ORAL at 20:32

## 2018-08-12 RX ADMIN — OXYCODONE HYDROCHLORIDE 10 MG: 10 TABLET ORAL at 16:25

## 2018-08-12 RX ADMIN — QUETIAPINE FUMARATE 100 MG: 100 TABLET ORAL at 05:09

## 2018-08-12 RX ADMIN — HEPARIN SODIUM 5000 UNITS: 5000 INJECTION, SOLUTION INTRAVENOUS; SUBCUTANEOUS at 13:10

## 2018-08-12 RX ADMIN — MORPHINE SULFATE 4 MG: 4 INJECTION INTRAVENOUS at 09:15

## 2018-08-12 RX ADMIN — MORPHINE SULFATE 4 MG: 4 INJECTION INTRAVENOUS at 05:06

## 2018-08-12 RX ADMIN — AMPICILLIN SODIUM AND SULBACTAM SODIUM 3 G: 2; 1 INJECTION, POWDER, FOR SOLUTION INTRAMUSCULAR; INTRAVENOUS at 12:00

## 2018-08-12 RX ADMIN — MORPHINE SULFATE 4 MG: 4 INJECTION INTRAVENOUS at 21:06

## 2018-08-12 RX ADMIN — OXYCODONE HYDROCHLORIDE 10 MG: 10 TABLET ORAL at 04:10

## 2018-08-12 RX ADMIN — HEPARIN SODIUM 5000 UNITS: 5000 INJECTION, SOLUTION INTRAVENOUS; SUBCUTANEOUS at 05:07

## 2018-08-12 RX ADMIN — QUETIAPINE FUMARATE 100 MG: 100 TABLET ORAL at 12:05

## 2018-08-12 RX ADMIN — AMPICILLIN SODIUM AND SULBACTAM SODIUM 3 G: 2; 1 INJECTION, POWDER, FOR SOLUTION INTRAMUSCULAR; INTRAVENOUS at 00:18

## 2018-08-12 RX ADMIN — SODIUM CHLORIDE: 9 INJECTION, SOLUTION INTRAVENOUS at 12:06

## 2018-08-12 RX ADMIN — AMPICILLIN SODIUM AND SULBACTAM SODIUM 3 G: 2; 1 INJECTION, POWDER, FOR SOLUTION INTRAMUSCULAR; INTRAVENOUS at 05:08

## 2018-08-12 RX ADMIN — MORPHINE SULFATE 4 MG: 4 INJECTION INTRAVENOUS at 17:04

## 2018-08-12 RX ADMIN — OXYCODONE HYDROCHLORIDE 10 MG: 10 TABLET ORAL at 12:05

## 2018-08-12 RX ADMIN — AMPICILLIN SODIUM AND SULBACTAM SODIUM 3 G: 2; 1 INJECTION, POWDER, FOR SOLUTION INTRAMUSCULAR; INTRAVENOUS at 17:13

## 2018-08-12 RX ADMIN — LINEZOLID 600 MG: 600 TABLET, FILM COATED ORAL at 17:06

## 2018-08-12 RX ADMIN — LINEZOLID 600 MG: 600 TABLET, FILM COATED ORAL at 05:08

## 2018-08-12 ASSESSMENT — ENCOUNTER SYMPTOMS
SORE THROAT: 0
WHEEZING: 0
SHORTNESS OF BREATH: 0
MYALGIAS: 1
ABDOMINAL PAIN: 0
DIARRHEA: 0
BACK PAIN: 0
CHILLS: 0
COUGH: 0
WEAKNESS: 0
NERVOUS/ANXIOUS: 0
NAUSEA: 0
HEARTBURN: 0
BLURRED VISION: 0
VOMITING: 0
HEADACHES: 0
DIZZINESS: 0
FEVER: 0

## 2018-08-12 ASSESSMENT — PAIN SCALES - GENERAL
PAINLEVEL_OUTOF10: 10
PAINLEVEL_OUTOF10: 8
PAINLEVEL_OUTOF10: 10
PAINLEVEL_OUTOF10: 8
PAINLEVEL_OUTOF10: 10
PAINLEVEL_OUTOF10: 8
PAINLEVEL_OUTOF10: 10

## 2018-08-12 NOTE — PROGRESS NOTES
Infectious Disease Progress Note    Author: Juliane Vaughn M.D. Date & Time of service: 2018  11:59 AM    Chief Complaint:  Follow-up for left diabetic foot infection    Interval History:   AF no CBC asking for pain medication, cultures negative today, tolerating IV antibiotics without any issues  Labs Reviewed, Medications Reviewed, Radiology Reviewed and Wound Reviewed.    Review of Systems:  Review of Systems   Constitutional: Negative for chills and fever.   Gastrointestinal: Negative for abdominal pain, diarrhea, nausea and vomiting.   Musculoskeletal: Positive for myalgias.       Hemodynamics:  Temp (24hrs), Av.4 °C (97.6 °F), Min:36.2 °C (97.2 °F), Max:36.8 °C (98.2 °F)  Temperature: 36.3 °C (97.4 °F)  Pulse  Av.1  Min: 62  Max: 122   Blood Pressure: 129/78       Physical Exam:  Physical Exam   Constitutional: He is oriented to person, place, and time. He appears well-developed.   HENT:   Head: Normocephalic and atraumatic.   Eyes: Pupils are equal, round, and reactive to light. EOM are normal.   Neck: Neck supple.   Cardiovascular: Normal rate, regular rhythm and normal heart sounds.    Pulmonary/Chest: Effort normal. He has no wheezes. He has no rales.   Abdominal: Soft. There is no tenderness.   Musculoskeletal:   Left foot in surgical dressing   Neurological: He is alert and oriented to person, place, and time.   Psychiatric:   Flat and odd affect       Meds:    Current Facility-Administered Medications:   •  linezolid  •  ampicillin-sulbactam (UNASYN) IV  •  morphine injection  •  LORazepam  •  labetalol  •  ondansetron  •  ondansetron  •  promethazine  •  promethazine  •  prochlorperazine  •  senna-docusate **AND** polyethylene glycol/lytes **AND** magnesium hydroxide **AND** bisacodyl  •  acetaminophen  •  NS  •  heparin  •  QUEtiapine **AND** QUEtiapine  •  oxyCODONE immediate-release **OR** oxyCODONE immediate-release    Labs:  Recent Labs      08/10/18   0613  18   0737    WBC  9.6  5.0   RBC  5.02  4.28*   HEMOGLOBIN  15.4  13.1*   HEMATOCRIT  43.4  38.7*   MCV  86.5  90.4   MCH  30.7  30.6   RDW  40.4  43.2   PLATELETCT  202  136*   MPV  9.6  10.2   NEUTSPOLYS  62.40  76.30*   LYMPHOCYTES  26.70  18.10*   MONOCYTES  8.20  4.80   EOSINOPHILS  1.90  0.00   BASOPHILS  0.60  0.20     Recent Labs      08/10/18   0613  08/11/18   0737   SODIUM  138  140   POTASSIUM  3.6  4.3   CHLORIDE  106  111   CO2  19*  20   GLUCOSE  158*  178*   BUN  18  10     Recent Labs      08/10/18   0613  08/11/18   0737   ALBUMIN  4.8   --    TBILIRUBIN  0.5   --    ALKPHOSPHAT  83   --    TOTPROTEIN  7.6   --    ALTSGPT  29   --    ASTSGOT  25   --    CREATININE  0.95  0.68       Imaging:  Dx-chest-portable (1 View)    Result Date: 8/10/2018  8/10/2018 6:17 AM HISTORY/REASON FOR EXAM:  Sepsis. TECHNIQUE/EXAM DESCRIPTION AND NUMBER OF VIEWS: Single portable view of the chest. COMPARISON: 5/18/2018 FINDINGS: The cardiomediastinal silhouette is stable. Aorta is tortuous. Atherosclerotic calcification is seen.  No focal consolidation, pleural effusion or pneumothorax is identified.  Costophrenic angles are clear. Retrocardiac right basilar opacity likely represents subsegmental atelectasis.     Subsegmental right basilar atelectasis. Stable prominence of the cardiomediastinal silhouette. Atherosclerotic plaque.     Dx-foot-complete 3+ Left    Result Date: 8/10/2018  8/10/2018 7:54 AM HISTORY/REASON FOR EXAM: Bilateral foot and toe pain. Infection. TECHNIQUE/EXAM DESCRIPTION AND NUMBER OF VIEWS: 3 nonweightbearing views of the LEFT foot. COMPARISON: FINDINGS: Bone mineralization is normal. There are surgical changes consistent with prior resection of the great toe. There has been partial resection of the second toe. There is soft tissue swelling and gas within the soft tissues.     1.  Surgical change consistent with prior amputation of the great toe and a portion of the second toe. 2.  Soft tissue swelling with  "a small amount of gas within the soft tissues of the forefoot.      Micro:  Results     Procedure Component Value Units Date/Time    CULTURE TISSUE W/ GRM STAIN [[id number]] Collected:  08/10/18 1715    Order Status:  Completed Specimen:  Tissue Updated:  08/12/18 1120     Significant Indicator NEG     Source TISS     Site Left Foot     Tissue Culture No growth at 48 hours.     Gram Stain Result Moderate WBCs.  No organisms seen.      ANAEROBIC CULTURE [[id number]] Collected:  08/10/18 1715    Order Status:  Completed Specimen:  Tissue Updated:  08/12/18 1120     Significant Indicator NEG     Source TISS     Site Left Foot     Anaerobic Culture, Culture Res Culture in progress.    URINE CULTURE(NEW) [[id number]] Collected:  08/10/18 0810    Order Status:  Completed Specimen:  Urine Updated:  08/12/18 0641     Significant Indicator NEG     Source UR     Site --     Urine Culture No growth at 48 hours.    Narrative:       Indication for culture:->Emergency Room Patient    BLOOD CULTURE [[id number]] Collected:  08/10/18 0613    Order Status:  Completed Specimen:  Blood from Peripheral Updated:  08/11/18 0829     Significant Indicator NEG     Source BLD     Site PERIPHERAL     Blood Culture No Growth    Note: Blood cultures are incubated for 5 days and  are monitored continuously.Positive blood cultures  are called to the RN and reported as soon as  they are identified.      Narrative:       Per Hospital Policy: Only change Specimen Src: to \"Line\" if  specified by physician order.    BLOOD CULTURE [[id number]] Collected:  08/10/18 0630    Order Status:  Completed Specimen:  Blood from Peripheral Updated:  08/11/18 0829     Significant Indicator NEG     Source BLD     Site PERIPHERAL     Blood Culture No Growth    Note: Blood cultures are incubated for 5 days and  are monitored continuously.Positive blood cultures  are called to the RN and reported as soon as  they are identified.      Narrative:       Per Hospital Policy: " "Only change Specimen Src: to \"Line\" if  specified by physician order.    GRAM STAIN [[id number]] Collected:  08/10/18 1715    Order Status:  Completed Specimen:  Tissue Updated:  08/10/18 2244     Significant Indicator .     Source TISS     Site Left Foot     Gram Stain Result Moderate WBCs.  No organisms seen.      URINALYSIS [[id number]] Collected:  08/10/18 0810    Order Status:  Completed Specimen:  Urine Updated:  08/10/18 0825     Color Yellow     Character Clear     Specific Gravity 1.009     Ph 6.5     Glucose Negative mg/dL      Ketones Negative mg/dL      Protein Negative mg/dL      Bilirubin Negative     Urobilinogen, Urine 0.2     Nitrite Negative     Leukocyte Esterase Negative     Occult Blood Negative     Micro Urine Req see below     Comment: Microscopic examination not performed when specimen is clear  and chemically negative for protein, blood, leukocyte esterase  and nitrite.         Narrative:       Indication for culture:->Emergency Room Patient          Assessment:  Active Hospital Problems    Diagnosis   • *Diabetic foot infection (HCC) [E11.628, L08.9]   • Mental disability [F79]   • Psychiatric disorder [F99]   • Type 2 diabetes mellitus with neurologic complication, without long-term current use of insulin (HCC) [E11.49]   • Diabetic peripheral neuropathy (HCC) [E11.42]       Plan:  Left diabetic foot infection  No fevers  No leukocytosis  Status post I&D on 8/10/18  OR cultures negative to date  Blood cultures negative  Prior cultures in May 2018+ MRSA, MRSE, Peptostreptococcus and Pasteurella  Continue linezolid and Unasyn  Monitor CBC  Continue wound care  Anticipate 2 weeks of antibiotics from date of surgery    Type 2 diabetes mellitus  Hemoglobin A1c 5.8 in March 2018  Maintain blood sugars under 150 to control infection and promote wound healing    Recent left foot osteomyelitis  Completed course of antibiotics on 6/26/18    Psychiatric disorder    Discussed with internal medicine/ " Clay

## 2018-08-12 NOTE — PROGRESS NOTES
[facility] Hospitalist Progress Note    Date of Service: 2018    Chief Complaint  53 y.o. male admitted 8/10/2018 with diabetic foot infection.    Interval Problem Update  Diabetic foot infection- I&D done, culture pending, pain controlled  Diabetes-no current medications, hemoglobin A1c pending    Consultants/Specialty  Orthopedic surgery - Sasha  ID -Vaughn    Disposition  TBD        Review of Systems   Constitutional: Negative for chills, fever and malaise/fatigue.   HENT: Negative for hearing loss and sore throat.    Eyes: Negative for blurred vision.   Respiratory: Negative for cough, shortness of breath and wheezing.    Cardiovascular: Positive for leg swelling. Negative for chest pain.   Gastrointestinal: Negative for abdominal pain, diarrhea, heartburn, nausea and vomiting.   Genitourinary: Negative for dysuria.   Musculoskeletal: Negative for back pain.   Skin: Negative for rash.   Neurological: Negative for dizziness, weakness and headaches.   Psychiatric/Behavioral: The patient is not nervous/anxious.       Physical Exam  Laboratory/Imaging   Hemodynamics  Temp (24hrs), Av.4 °C (97.6 °F), Min:36.2 °C (97.2 °F), Max:36.8 °C (98.2 °F)   Temperature: 36.3 °C (97.4 °F)  Pulse  Av.1  Min: 62  Max: 122    Blood Pressure: 129/78      Respiratory      Respiration: 18, Pulse Oximetry: 96 %        RUL Breath Sounds: Diminished, RLL Breath Sounds: Diminished, LORENA Breath Sounds: Diminished, LLL Breath Sounds: Diminished    Fluids    Intake/Output Summary (Last 24 hours) at 18 1251  Last data filed at 18 2000   Gross per 24 hour   Intake              236 ml   Output             1450 ml   Net            -1214 ml       Nutrition  Orders Placed This Encounter   Procedures   • Diet Order Regular     Standing Status:   Standing     Number of Occurrences:   1     Order Specific Question:   Diet:     Answer:   Regular [1]     Physical Exam   Constitutional: He is oriented to person, place, and time.  He appears well-developed and well-nourished.   HENT:   Head: Normocephalic and atraumatic.   Eyes: Pupils are equal, round, and reactive to light. Conjunctivae are normal.   Neck: No tracheal deviation present. No thyromegaly present.   Cardiovascular: Normal rate and regular rhythm.    Pulmonary/Chest: Effort normal and breath sounds normal.   Abdominal: Soft. Bowel sounds are normal. He exhibits no distension. There is no tenderness.   Musculoskeletal: He exhibits edema.   Dressing at left foot   Lymphadenopathy:     He has no cervical adenopathy.   Neurological: He is alert and oriented to person, place, and time.   Nursing note and vitals reviewed.      Recent Labs      08/10/18   0613  08/11/18   0737   WBC  9.6  5.0   RBC  5.02  4.28*   HEMOGLOBIN  15.4  13.1*   HEMATOCRIT  43.4  38.7*   MCV  86.5  90.4   MCH  30.7  30.6   MCHC  35.5*  33.9   RDW  40.4  43.2   PLATELETCT  202  136*   MPV  9.6  10.2     Recent Labs      08/10/18   0613  08/11/18   0737   SODIUM  138  140   POTASSIUM  3.6  4.3   CHLORIDE  106  111   CO2  19*  20   GLUCOSE  158*  178*   BUN  18  10   CREATININE  0.95  0.68   CALCIUM  9.5  8.7                      Assessment/Plan     * Diabetic foot infection (HCC)- (present on admission)   Assessment & Plan    Irrigation and debridement of left foot.  8/10/2018  Zyvox and Unasyn  Follow culture          Mental disability- (present on admission)   Assessment & Plan    baseline          Psychiatric disorder- (present on admission)   Assessment & Plan    Seroquel        Type 2 diabetes mellitus with neurologic complication, without long-term current use of insulin (HCC)- (present on admission)   Assessment & Plan    Diet controlled?  hgba1c pending        Diabetic peripheral neuropathy (HCC)- (present on admission)   Assessment & Plan    no current medications          Quality-Core Measures   Reviewed items::  Radiology images reviewed, Labs reviewed, Medications reviewed and EKG reviewed  Pretty  catheter::  No Olsen  DVT prophylaxis pharmacological::  Heparin  Ulcer Prophylaxis::  No  Antibiotics:  Treating active infection/contamination beyond 24 hours perioperative coverage

## 2018-08-12 NOTE — PROGRESS NOTES
"LIMB PRESERVATION SERVICE NOTE:    Subjective:      Reason for Consultation: S/P Left Foot I and D with Dr Baez 8/10/18    History of Present Illness:     Patient is well known to LPS and outpatient wound care services.     Patient is a 52 y.o. male with a past medical history that includes borderline diabetes, anxiety, HTN, Hep C and is admitted to [facility] for a diabetic foot ulcer for which an lps consult was requested. Pt did not follow up with the [facility] clinic for dressing care and he did not receive oral antibiotics. He also did not follow up with his PCP at [facility]. Pt has Hx of noncompliance and psychiatric disorder which is likely contributory to his wounds. Pt is still not wearing the previously provided offloading shoes, and was reeducated.     Pt does not know how long they have been diabetic and has not been managing their diabetes with any medications. The pt is  S/P Left Foot I and D with Dr Baez 8/10/18. He has had prior Sx: I and D Left Great Toe 5/12/18 Dr Baez. S/P Left Great Toe Amp by Dr Easton 4/23/18. The s/p left great toe amputation site was from 4/1/18 by Dr. Baez.          Lab Results   Component Value Date/Time    HBA1C 5.8 (H) 03/30/2018 03:04 PM      Patient denies fevers chills, nausea, vomiting.      Pain:        Patient resting comfortably    Vitals  /78   Pulse 71   Temp 36.3 °C (97.4 °F)   Resp 18   Ht 1.93 m (6' 4\")   Wt 104 kg (229 lb 4.5 oz)   SpO2 96%   BMI 27.91 kg/m²       Objective:      General Appearance:  Well developed,  nourished, in no acute distress     Sensory Assessment        Patient sensation insensate bilaterally with light touch 10 of 10 points        Vascular Assessment        +2 PT bilaterally  +1 DP on Left Foot  Unable to Palpate DP on Right Foot - Found on Doppler      Orthotic, protective, supportive devices:      Offloading     Offloading Shoe ordered but does not like to wear them, Ordered Offloading Shoe - [place] Boot " problematic pt fall risk and would be non complaint with bootwear    Wound Characteristics                                                    Location: Left Foot I and D Site   Initial Evaluation  Date:8/12/2018   Tissue Type and %: 100% Red   Periwound: Pink   Drainage: Scant Serosanginous   Exposed structures Sutures   Wound Edges:   Attached 100% Approximated   Odor: None   S&S of Infection:   Edema/Erythema   Edema: Localized at Site   Sensation: Insensate                 Tests and Measures:    Labs  Recent Labs      08/10/18   0613  08/11/18   0737   WBC  9.6  5.0   RBC  5.02  4.28*   HEMOGLOBIN  15.4  13.1*   HEMATOCRIT  43.4  38.7*   MCV  86.5  90.4   MCH  30.7  30.6   MCHC  35.5*  33.9   RDW  40.4  43.2   PLATELETCT  202  136*   MPV  9.6  10.2     Recent Labs      08/10/18   0613  08/11/18   0737   SODIUM  138  140   POTASSIUM  3.6  4.3   CHLORIDE  106  111   CO2  19*  20   GLUCOSE  158*  178*   BUN  18  10     A1C 5.8%     CELIO 4/1/18     R: 1.32             L: 1.28     RLE: Triphasic/triphasic   LLE: Triphasic/triphasic         Procedures:                                         Debridement :  NA                                      Cleansed with:     NS                                                                                                        Periwound protected with: skin prep                                      Primary dressing: Adaptic                                      Secondary Dressing: foam                                      Other: elastic wrap/roll gauze     NURSING TO CHANGE LEFT FOOT SURGICAL SITE DRESSING EVERY 48 HOURS AND PRN FOR SATURATION OR DISLODGEMENT  Nursing to cleanse wound/periwound with Normal Saline (NS).  Pat periwound dry.  Apply skin prep/No Sting to periwound.  Let air dry for 1-2 minutes. Apply SINGLE layer adaptic, cut to size, to suture sites. Cover with Non Adhesive Foam and secure with Roll Gauze.  Then apply elastic bandage to secure dressings in place.    Please take Weekly Wound Photos. Notify wound team if wound deteriorates or fails to progress.    Patient Education: Implications of loss of protective sensation (LOPS) discussed with patient- including increased risk for amputation.  Advised to check foot at least daily, moisturize foot, and to always wear protective foot wear.    Plan:       Treatment Plan and Recommendations:     1. Labs\Imaging:         Reviewed     2. Treatment:              Wound Care by Nursing, LPS to Follow.                Dressing Orders Updated                                      Nursing to change every 48 and PRN for Saturation or Dislodgement                                      Antibiotics per ID                                      HWB when OOB wearing Shoe   3.Collaboration:                                                 ID managing abx,  discontinue daptomycin and ceftriaxone and transitioned to p.o. linezolid and                       Unasyn - 14 days Tx  4. Orthotics/Prosthetics:                                       pt to get orthotics/prosthetics  as OP, pt to wear shoes that do not rub on Wound sites      Anticipated discharge plans (X):              SNF:         X  Case management working on SNF placement, no accepting facilities as yet              Home Care:                       Outpatient Wound Center: X                   Self Care:                         Other:                       TBD:  Patient requires skilled therapeutic intervention for debridement, product selection and application, education, wound bed preparation and assessment.

## 2018-08-13 LAB
ALBUMIN SERPL BCP-MCNC: 3.8 G/DL (ref 3.2–4.9)
ALBUMIN/GLOB SERPL: 1.7 G/DL
ALP SERPL-CCNC: 74 U/L (ref 30–99)
ALT SERPL-CCNC: 23 U/L (ref 2–50)
ANION GAP SERPL CALC-SCNC: 5 MMOL/L (ref 0–11.9)
AST SERPL-CCNC: 15 U/L (ref 12–45)
BACTERIA TISS AEROBE CULT: NORMAL
BASOPHILS # BLD AUTO: 1.2 % (ref 0–1.8)
BASOPHILS # BLD: 0.05 K/UL (ref 0–0.12)
BILIRUB SERPL-MCNC: 0.3 MG/DL (ref 0.1–1.5)
BUN SERPL-MCNC: 13 MG/DL (ref 8–22)
CALCIUM SERPL-MCNC: 8.6 MG/DL (ref 8.5–10.5)
CHLORIDE SERPL-SCNC: 108 MMOL/L (ref 96–112)
CO2 SERPL-SCNC: 29 MMOL/L (ref 20–33)
CREAT SERPL-MCNC: 0.83 MG/DL (ref 0.5–1.4)
EOSINOPHIL # BLD AUTO: 0.45 K/UL (ref 0–0.51)
EOSINOPHIL NFR BLD: 10.4 % (ref 0–6.9)
ERYTHROCYTE [DISTWIDTH] IN BLOOD BY AUTOMATED COUNT: 44.8 FL (ref 35.9–50)
EST. AVERAGE GLUCOSE BLD GHB EST-MCNC: 114 MG/DL
GLOBULIN SER CALC-MCNC: 2.2 G/DL (ref 1.9–3.5)
GLUCOSE SERPL-MCNC: 147 MG/DL (ref 65–99)
GRAM STN SPEC: NORMAL
HBA1C MFR BLD: 5.6 % (ref 0–5.6)
HCT VFR BLD AUTO: 39.2 % (ref 42–52)
HGB BLD-MCNC: 12.7 G/DL (ref 14–18)
IMM GRANULOCYTES # BLD AUTO: 0.01 K/UL (ref 0–0.11)
IMM GRANULOCYTES NFR BLD AUTO: 0.2 % (ref 0–0.9)
LYMPHOCYTES # BLD AUTO: 1.84 K/UL (ref 1–4.8)
LYMPHOCYTES NFR BLD: 42.5 % (ref 22–41)
MCH RBC QN AUTO: 29.7 PG (ref 27–33)
MCHC RBC AUTO-ENTMCNC: 32.4 G/DL (ref 33.7–35.3)
MCV RBC AUTO: 91.8 FL (ref 81.4–97.8)
MONOCYTES # BLD AUTO: 0.27 K/UL (ref 0–0.85)
MONOCYTES NFR BLD AUTO: 6.2 % (ref 0–13.4)
NEUTROPHILS # BLD AUTO: 1.71 K/UL (ref 1.82–7.42)
NEUTROPHILS NFR BLD: 39.5 % (ref 44–72)
NRBC # BLD AUTO: 0 K/UL
NRBC BLD-RTO: 0 /100 WBC
PLATELET # BLD AUTO: 137 K/UL (ref 164–446)
PMV BLD AUTO: 10 FL (ref 9–12.9)
POTASSIUM SERPL-SCNC: 3.7 MMOL/L (ref 3.6–5.5)
PROT SERPL-MCNC: 6 G/DL (ref 6–8.2)
RBC # BLD AUTO: 4.27 M/UL (ref 4.7–6.1)
SIGNIFICANT IND 70042: NORMAL
SITE SITE: NORMAL
SODIUM SERPL-SCNC: 142 MMOL/L (ref 135–145)
SOURCE SOURCE: NORMAL
WBC # BLD AUTO: 4.3 K/UL (ref 4.8–10.8)

## 2018-08-13 PROCEDURE — A9270 NON-COVERED ITEM OR SERVICE: HCPCS | Performed by: INTERNAL MEDICINE

## 2018-08-13 PROCEDURE — 36415 COLL VENOUS BLD VENIPUNCTURE: CPT

## 2018-08-13 PROCEDURE — 770006 HCHG ROOM/CARE - MED/SURG/GYN SEMI*

## 2018-08-13 PROCEDURE — 99232 SBSQ HOSP IP/OBS MODERATE 35: CPT | Performed by: FAMILY MEDICINE

## 2018-08-13 PROCEDURE — 700111 HCHG RX REV CODE 636 W/ 250 OVERRIDE (IP): Performed by: HOSPITALIST

## 2018-08-13 PROCEDURE — 700102 HCHG RX REV CODE 250 W/ 637 OVERRIDE(OP): Performed by: FAMILY MEDICINE

## 2018-08-13 PROCEDURE — 700105 HCHG RX REV CODE 258: Performed by: HOSPITALIST

## 2018-08-13 PROCEDURE — 700102 HCHG RX REV CODE 250 W/ 637 OVERRIDE(OP): Performed by: HOSPITALIST

## 2018-08-13 PROCEDURE — 85025 COMPLETE CBC W/AUTO DIFF WBC: CPT

## 2018-08-13 PROCEDURE — 700102 HCHG RX REV CODE 250 W/ 637 OVERRIDE(OP): Performed by: INTERNAL MEDICINE

## 2018-08-13 PROCEDURE — 83036 HEMOGLOBIN GLYCOSYLATED A1C: CPT

## 2018-08-13 PROCEDURE — 700111 HCHG RX REV CODE 636 W/ 250 OVERRIDE (IP): Performed by: INTERNAL MEDICINE

## 2018-08-13 PROCEDURE — 80053 COMPREHEN METABOLIC PANEL: CPT

## 2018-08-13 PROCEDURE — 97535 SELF CARE MNGMENT TRAINING: CPT

## 2018-08-13 PROCEDURE — A9270 NON-COVERED ITEM OR SERVICE: HCPCS | Performed by: FAMILY MEDICINE

## 2018-08-13 PROCEDURE — 700105 HCHG RX REV CODE 258: Performed by: INTERNAL MEDICINE

## 2018-08-13 PROCEDURE — A9270 NON-COVERED ITEM OR SERVICE: HCPCS | Performed by: HOSPITALIST

## 2018-08-13 PROCEDURE — 700111 HCHG RX REV CODE 636 W/ 250 OVERRIDE (IP): Performed by: FAMILY MEDICINE

## 2018-08-13 PROCEDURE — 99232 SBSQ HOSP IP/OBS MODERATE 35: CPT | Performed by: INTERNAL MEDICINE

## 2018-08-13 RX ORDER — OXYCODONE HYDROCHLORIDE 5 MG/1
15 TABLET ORAL EVERY 4 HOURS PRN
Status: DISCONTINUED | OUTPATIENT
Start: 2018-08-13 | End: 2018-08-15 | Stop reason: HOSPADM

## 2018-08-13 RX ORDER — AMOXICILLIN AND CLAVULANATE POTASSIUM 875; 125 MG/1; MG/1
1 TABLET, FILM COATED ORAL EVERY 12 HOURS
Status: DISCONTINUED | OUTPATIENT
Start: 2018-08-13 | End: 2018-08-15 | Stop reason: HOSPADM

## 2018-08-13 RX ADMIN — OXYCODONE HYDROCHLORIDE 15 MG: 5 TABLET ORAL at 18:30

## 2018-08-13 RX ADMIN — HEPARIN SODIUM 5000 UNITS: 5000 INJECTION, SOLUTION INTRAVENOUS; SUBCUTANEOUS at 13:23

## 2018-08-13 RX ADMIN — MORPHINE SULFATE 4 MG: 4 INJECTION INTRAVENOUS at 21:20

## 2018-08-13 RX ADMIN — QUETIAPINE FUMARATE 100 MG: 100 TABLET ORAL at 11:42

## 2018-08-13 RX ADMIN — QUETIAPINE FUMARATE 200 MG: 100 TABLET ORAL at 17:26

## 2018-08-13 RX ADMIN — MORPHINE SULFATE 4 MG: 4 INJECTION INTRAVENOUS at 17:25

## 2018-08-13 RX ADMIN — AMOXICILLIN AND CLAVULANATE POTASSIUM 1 TABLET: 875; 125 TABLET, FILM COATED ORAL at 17:26

## 2018-08-13 RX ADMIN — LINEZOLID 600 MG: 600 TABLET, FILM COATED ORAL at 17:26

## 2018-08-13 RX ADMIN — OXYCODONE HYDROCHLORIDE 15 MG: 5 TABLET ORAL at 22:19

## 2018-08-13 RX ADMIN — OXYCODONE HYDROCHLORIDE 15 MG: 5 TABLET ORAL at 10:28

## 2018-08-13 RX ADMIN — AMPICILLIN SODIUM AND SULBACTAM SODIUM 3 G: 2; 1 INJECTION, POWDER, FOR SOLUTION INTRAMUSCULAR; INTRAVENOUS at 00:53

## 2018-08-13 RX ADMIN — OXYCODONE HYDROCHLORIDE 15 MG: 5 TABLET ORAL at 14:34

## 2018-08-13 RX ADMIN — QUETIAPINE FUMARATE 100 MG: 100 TABLET ORAL at 05:26

## 2018-08-13 RX ADMIN — MORPHINE SULFATE 4 MG: 4 INJECTION INTRAVENOUS at 09:19

## 2018-08-13 RX ADMIN — LORAZEPAM 0.5 MG: 1 TABLET ORAL at 10:29

## 2018-08-13 RX ADMIN — SENNOSIDES AND DOCUSATE SODIUM 2 TABLET: 8.6; 5 TABLET ORAL at 17:26

## 2018-08-13 RX ADMIN — MORPHINE SULFATE 4 MG: 4 INJECTION INTRAVENOUS at 01:13

## 2018-08-13 RX ADMIN — AMPICILLIN SODIUM AND SULBACTAM SODIUM 3 G: 2; 1 INJECTION, POWDER, FOR SOLUTION INTRAMUSCULAR; INTRAVENOUS at 05:19

## 2018-08-13 RX ADMIN — MORPHINE SULFATE 4 MG: 4 INJECTION INTRAVENOUS at 13:18

## 2018-08-13 RX ADMIN — OXYCODONE HYDROCHLORIDE 10 MG: 10 TABLET ORAL at 06:00

## 2018-08-13 RX ADMIN — MORPHINE SULFATE 4 MG: 4 INJECTION INTRAVENOUS at 05:17

## 2018-08-13 RX ADMIN — SODIUM CHLORIDE: 9 INJECTION, SOLUTION INTRAVENOUS at 15:11

## 2018-08-13 RX ADMIN — LORAZEPAM 0.5 MG: 1 TABLET ORAL at 22:19

## 2018-08-13 RX ADMIN — AMPICILLIN SODIUM AND SULBACTAM SODIUM 3 G: 2; 1 INJECTION, POWDER, FOR SOLUTION INTRAMUSCULAR; INTRAVENOUS at 11:42

## 2018-08-13 RX ADMIN — SODIUM CHLORIDE: 9 INJECTION, SOLUTION INTRAVENOUS at 03:44

## 2018-08-13 ASSESSMENT — PAIN SCALES - GENERAL
PAINLEVEL_OUTOF10: 10
PAINLEVEL_OUTOF10: 9
PAINLEVEL_OUTOF10: 10

## 2018-08-13 ASSESSMENT — ENCOUNTER SYMPTOMS
DIZZINESS: 0
SHORTNESS OF BREATH: 0
SENSORY CHANGE: 1
FEVER: 0
SORE THROAT: 0
NERVOUS/ANXIOUS: 0
WEAKNESS: 0
MYALGIAS: 1
BLURRED VISION: 0
HEADACHES: 0
VOMITING: 0
NAUSEA: 0
HEARTBURN: 0
WHEEZING: 0
BACK PAIN: 0
CHILLS: 0
ABDOMINAL PAIN: 0
DIARRHEA: 0
COUGH: 0

## 2018-08-13 NOTE — THERAPY
"Occupational Therapy Contact Note:    OT consult received, evaluation attempted. Pt would not perform any OOB activity because his foot is \"messed up\" and all he needs is antibiotics in order to get better. No formal evaluation completed, pt reports he is sick of people talking to him and reports that he is performing BADLs close to baseline. Pt appeared to not be receptive to skilled OT intervention Med chart indicates that pt will go to SNF for antibiotics    Mavis Mcgraw OTR/L  Pager: [phone]  "

## 2018-08-13 NOTE — THERAPY
Physical Therapy Contact Note     PT consult received, evaluation attempted; pt adamant that he is not mobilizing because his foot is 'messed up' and all he needs is antibiotics; pt does not appear appropriate or capable of participating in skilled PT intervention; anticipate pt will mobilizing near his most recent baseline with RN staff given length of stay and dx. No formal evaluation completed; please reconsult should condition change/pt willing to mobilize; given chart review, anticipate pt will require SNF placement or continued hospitalization for wound care; given hx of readmits, group home may need to be long term solution;       Sybil Rosario, PT, DPT Pager: [phone]

## 2018-08-13 NOTE — PROGRESS NOTES
Infectious Disease Progress Note    Author: MARIAMA Barnett Date & Time of service: 2018  10:47 AM    Chief Complaint:  Left diabetic foot infection    Interval History:   AF no CBC asking for pain medication, cultures negative today, tolerating IV antibiotics without any issues  - AF, WBC 4.3, no issue with abx, 12/10 BLE pain- wanting pain meds, resting calmly in bed kicking legs around.  Labs Reviewed, Medications Reviewed, Radiology Reviewed and Wound Reviewed.    Review of Systems:  Review of Systems   Constitutional: Negative for chills and fever.   HENT: Negative for sore throat.    Respiratory: Negative for shortness of breath.    Cardiovascular: Negative for chest pain.   Gastrointestinal: Negative for abdominal pain, diarrhea, nausea and vomiting.   Genitourinary: Negative for dysuria.   Musculoskeletal: Positive for joint pain and myalgias.        BLE   Skin: Negative for rash.   Neurological: Positive for sensory change. Negative for headaches.       Hemodynamics:  Temp (24hrs), Av.6 °C (97.9 °F), Min:36.3 °C (97.3 °F), Max:36.8 °C (98.2 °F)  Temperature: 36.8 °C (98.2 °F)  Pulse  Av.3  Min: 62  Max: 122   Blood Pressure: 137/96       Physical Exam:  Physical Exam   Constitutional: He is oriented to person, place, and time. He appears well-developed and well-nourished. No distress.   Disheveled   HENT:   Head: Normocephalic and atraumatic.   Eyes: Pupils are equal, round, and reactive to light. Conjunctivae and EOM are normal. No scleral icterus.   Neck: Normal range of motion. Neck supple. No tracheal deviation present.   Cardiovascular: Normal rate, regular rhythm and normal heart sounds.    No murmur heard.  Pulmonary/Chest: Effort normal and breath sounds normal. No respiratory distress. He has no wheezes.   Abdominal: Soft. Bowel sounds are normal. He exhibits no distension. There is no rebound and no guarding.   Musculoskeletal: He exhibits edema. He exhibits no  tenderness.   Left great toe, amp site- sutures in place, minimal bloody/ ss drainage without odor, trace erythema to surrounding tissue, no maceration.    LLE- trace edema, toes warm and able to wiggle.    Neurological: He is alert and oriented to person, place, and time.   Skin: Skin is warm. No erythema.   Psychiatric:   Flat and odd affect   Nursing note and vitals reviewed.      Meds:    Current Facility-Administered Medications:   •  [DISCONTINUED] oxyCODONE immediate-release **OR** oxyCODONE immediate-release  •  linezolid  •  ampicillin-sulbactam (UNASYN) IV  •  morphine injection  •  LORazepam  •  labetalol  •  ondansetron  •  ondansetron  •  promethazine  •  promethazine  •  prochlorperazine  •  senna-docusate **AND** polyethylene glycol/lytes **AND** magnesium hydroxide **AND** bisacodyl  •  acetaminophen  •  NS  •  heparin  •  QUEtiapine **AND** QUEtiapine    Labs:  Recent Labs      08/11/18   0737  08/13/18   0906   WBC  5.0  4.3*   RBC  4.28*  4.27*   HEMOGLOBIN  13.1*  12.7*   HEMATOCRIT  38.7*  39.2*   MCV  90.4  91.8   MCH  30.6  29.7   RDW  43.2  44.8   PLATELETCT  136*  137*   MPV  10.2  10.0   NEUTSPOLYS  76.30*  39.50*   LYMPHOCYTES  18.10*  42.50*   MONOCYTES  4.80  6.20   EOSINOPHILS  0.00  10.40*   BASOPHILS  0.20  1.20     Recent Labs      08/11/18   0737  08/13/18   0906   SODIUM  140  142   POTASSIUM  4.3  3.7   CHLORIDE  111  108   CO2  20  29   GLUCOSE  178*  147*   BUN  10  13     Recent Labs      08/11/18   0737  08/13/18   0906   ALBUMIN   --   3.8   TBILIRUBIN   --   0.3   ALKPHOSPHAT   --   74   TOTPROTEIN   --   6.0   ALTSGPT   --   23   ASTSGOT   --   15   CREATININE  0.68  0.83       Imaging:  No recent imaging.       Micro:  Results     Procedure Component Value Units Date/Time    CULTURE TISSUE W/ GRM STAIN [[id number]] Collected:  08/10/18 9948    Order Status:  Completed Specimen:  Tissue Updated:  08/13/18 0918     Gram Stain Result Moderate WBCs.  No organisms seen.        "Significant Indicator NEG     Source TISS     Site Left Foot     Tissue Culture No growth at 72 hours.    ANAEROBIC CULTURE [[id number]] Collected:  08/10/18 1715    Order Status:  Completed Specimen:  Tissue Updated:  08/13/18 0918     Significant Indicator NEG     Source TISS     Site Left Foot     Anaerobic Culture, Culture Res Culture in progress.    URINE CULTURE(NEW) [[id number]] Collected:  08/10/18 0810    Order Status:  Completed Specimen:  Urine Updated:  08/12/18 0641     Significant Indicator NEG     Source UR     Site --     Urine Culture No growth at 48 hours.    Narrative:       Indication for culture:->Emergency Room Patient    BLOOD CULTURE [[id number]] Collected:  08/10/18 0613    Order Status:  Completed Specimen:  Blood from Peripheral Updated:  08/11/18 0829     Significant Indicator NEG     Source BLD     Site PERIPHERAL     Blood Culture No Growth    Note: Blood cultures are incubated for 5 days and  are monitored continuously.Positive blood cultures  are called to the RN and reported as soon as  they are identified.      Narrative:       Per Hospital Policy: Only change Specimen Src: to \"Line\" if  specified by physician order.    BLOOD CULTURE [[id number]] Collected:  08/10/18 0630    Order Status:  Completed Specimen:  Blood from Peripheral Updated:  08/11/18 0829     Significant Indicator NEG     Source BLD     Site PERIPHERAL     Blood Culture No Growth    Note: Blood cultures are incubated for 5 days and  are monitored continuously.Positive blood cultures  are called to the RN and reported as soon as  they are identified.      Narrative:       Per Hospital Policy: Only change Specimen Src: to \"Line\" if  specified by physician order.    GRAM STAIN [[id number]] Collected:  08/10/18 1715    Order Status:  Completed Specimen:  Tissue Updated:  08/10/18 2244     Significant Indicator .     Source TISS     Site Left Foot     Gram Stain Result Moderate WBCs.  No organisms seen.      URINALYSIS " [[id number]] Collected:  08/10/18 0810    Order Status:  Completed Specimen:  Urine Updated:  08/10/18 0825     Color Yellow     Character Clear     Specific Gravity 1.009     Ph 6.5     Glucose Negative mg/dL      Ketones Negative mg/dL      Protein Negative mg/dL      Bilirubin Negative     Urobilinogen, Urine 0.2     Nitrite Negative     Leukocyte Esterase Negative     Occult Blood Negative     Micro Urine Req see below     Comment: Microscopic examination not performed when specimen is clear  and chemically negative for protein, blood, leukocyte esterase  and nitrite.         Narrative:       Indication for culture:->Emergency Room Patient          Assessment:  Active Hospital Problems    Diagnosis   • *Diabetic foot infection (HCC) [E11.628, L08.9]   • Mental disability [F79]   • Psychiatric disorder [F99]   • Type 2 diabetes mellitus with neurologic complication, without long-term current use of insulin (HCC) [E11.49]   • Diabetic peripheral neuropathy (HCC) [E11.42]       Plan:  Left diabetic foot infection  Afebrile  No leukocytosis  S/p I&D on 8/10 with Dr. Baez  OR cultures from 8/10 NGTD  Blood cultures from 8/10 NGTD  Prior cultures in May 2018+ MRSA, MRSE, Peptostreptococcus and Pasteurella  Continue PO Linezolid and IV Unasyn  Continue wound care  Anticipate 2 weeks of antibiotics from date of surgery, estimated end date 8/24/18    Type 2 diabetes mellitus  HgA1C 5.8% on 3/30/18  Maintain blood sugars under 150 to control infection and promote wound healing    Recent left foot osteomyelitis  Completed course of antibiotics on 6/26/18    Psychiatric disorder    Discussed with JUSTINO Alfaro RN.

## 2018-08-13 NOTE — PROGRESS NOTES
"Patient seen and examined  Cultures NGTD    Blood pressure 137/96, pulse 83, temperature 36.8 °C (98.2 °F), resp. rate 18, height 1.93 m (6' 4\"), weight 104 kg (229 lb 4.5 oz), SpO2 95 %.    Recent Labs      08/11/18   0737   WBC  5.0   RBC  4.28*   HEMOGLOBIN  13.1*   HEMATOCRIT  38.7*   MCV  90.4   MCH  30.6   MCHC  33.9   RDW  43.2   PLATELETCT  136*   MPV  10.2       No acute distress  Dressing clean dry and intact  Neurovascularly intact    POD#2    Plan:  DVT Prophylaxis- TEDS/SCDs  Weight Bearing Status-WBAT  PT/OT  Antibiotics: per ID  Case Coordination          "

## 2018-08-13 NOTE — PROGRESS NOTES
[facility] Hospitalist Progress Note    Date of Service: 2018    Chief Complaint  53 y.o. male admitted 8/10/2018 with diabetic foot infection.    Interval Problem Update  Diabetic foot infection- I&D done, culture pending, pain not controlled  Diabetes-no current medications, hemoglobin A1c pending    Consultants/Specialty  Orthopedic surgery - Sasha  ID -Vaughn    Disposition  TBD        Review of Systems   Constitutional: Negative for chills, fever and malaise/fatigue.   HENT: Negative for hearing loss and sore throat.    Eyes: Negative for blurred vision.   Respiratory: Negative for cough, shortness of breath and wheezing.    Cardiovascular: Positive for leg swelling. Negative for chest pain.   Gastrointestinal: Negative for abdominal pain, diarrhea, heartburn, nausea and vomiting.   Genitourinary: Negative for dysuria.   Musculoskeletal: Negative for back pain.   Skin: Negative for rash.   Neurological: Negative for dizziness, weakness and headaches.   Psychiatric/Behavioral: The patient is not nervous/anxious.       Physical Exam  Laboratory/Imaging   Hemodynamics  Temp (24hrs), Av.8 °C (98.2 °F), Min:36.8 °C (98.2 °F), Max:36.8 °C (98.2 °F)   Temperature: 36.8 °C (98.2 °F)  Pulse  Av.3  Min: 62  Max: 122    Blood Pressure: 137/96      Respiratory      Respiration: 18, Pulse Oximetry: 95 %        RUL Breath Sounds: Diminished, RLL Breath Sounds: Diminished, LORENA Breath Sounds: Diminished, LLL Breath Sounds: Diminished    Fluids    Intake/Output Summary (Last 24 hours) at 18 1231  Last data filed at 18 0500   Gross per 24 hour   Intake             1201 ml   Output              600 ml   Net              601 ml       Nutrition  Orders Placed This Encounter   Procedures   • Diet Order Regular     Standing Status:   Standing     Number of Occurrences:   1     Order Specific Question:   Diet:     Answer:   Regular [1]     Physical Exam   Constitutional: He is oriented to person, place, and  time. He appears well-developed and well-nourished.   HENT:   Head: Normocephalic and atraumatic.   Eyes: Pupils are equal, round, and reactive to light. Conjunctivae are normal.   Neck: No tracheal deviation present. No thyromegaly present.   Cardiovascular: Normal rate and regular rhythm.    Pulmonary/Chest: Effort normal and breath sounds normal.   Abdominal: Soft. Bowel sounds are normal. He exhibits no distension. There is no tenderness.   Musculoskeletal: He exhibits edema.   Dressing at left foot   Lymphadenopathy:     He has no cervical adenopathy.   Neurological: He is alert and oriented to person, place, and time.   Nursing note and vitals reviewed.      Recent Labs      08/11/18   0737  08/13/18   0906   WBC  5.0  4.3*   RBC  4.28*  4.27*   HEMOGLOBIN  13.1*  12.7*   HEMATOCRIT  38.7*  39.2*   MCV  90.4  91.8   MCH  30.6  29.7   MCHC  33.9  32.4*   RDW  43.2  44.8   PLATELETCT  136*  137*   MPV  10.2  10.0     Recent Labs      08/11/18   0737  08/13/18   0906   SODIUM  140  142   POTASSIUM  4.3  3.7   CHLORIDE  111  108   CO2  20  29   GLUCOSE  178*  147*   BUN  10  13   CREATININE  0.68  0.83   CALCIUM  8.7  8.6                      Assessment/Plan     * Diabetic foot infection (HCC)- (present on admission)   Assessment & Plan    Irrigation and debridement of left foot.  8/10/2018  Zyvox and Unasyn  Follow culture  Increase oxycodone to 15 mg          Mental disability- (present on admission)   Assessment & Plan    baseline          Psychiatric disorder- (present on admission)   Assessment & Plan    Seroquel        Type 2 diabetes mellitus with neurologic complication, without long-term current use of insulin (HCC)- (present on admission)   Assessment & Plan    Diet controlled?  hgba1c pending        Diabetic peripheral neuropathy (HCC)- (present on admission)   Assessment & Plan    no current medications          Quality-Core Measures   Reviewed items::  Radiology images reviewed, Labs reviewed,  Medications reviewed and EKG reviewed  Olsen catheter::  No Olsen  DVT prophylaxis pharmacological::  Heparin  Ulcer Prophylaxis::  No  Antibiotics:  Treating active infection/contamination beyond 24 hours perioperative coverage

## 2018-08-14 PROCEDURE — A9270 NON-COVERED ITEM OR SERVICE: HCPCS | Performed by: FAMILY MEDICINE

## 2018-08-14 PROCEDURE — 700111 HCHG RX REV CODE 636 W/ 250 OVERRIDE (IP): Performed by: HOSPITALIST

## 2018-08-14 PROCEDURE — 700105 HCHG RX REV CODE 258: Performed by: HOSPITALIST

## 2018-08-14 PROCEDURE — 99232 SBSQ HOSP IP/OBS MODERATE 35: CPT | Performed by: INTERNAL MEDICINE

## 2018-08-14 PROCEDURE — 700102 HCHG RX REV CODE 250 W/ 637 OVERRIDE(OP): Performed by: INTERNAL MEDICINE

## 2018-08-14 PROCEDURE — A9270 NON-COVERED ITEM OR SERVICE: HCPCS | Performed by: INTERNAL MEDICINE

## 2018-08-14 PROCEDURE — 99232 SBSQ HOSP IP/OBS MODERATE 35: CPT | Performed by: FAMILY MEDICINE

## 2018-08-14 PROCEDURE — 770006 HCHG ROOM/CARE - MED/SURG/GYN SEMI*

## 2018-08-14 PROCEDURE — 700102 HCHG RX REV CODE 250 W/ 637 OVERRIDE(OP): Performed by: FAMILY MEDICINE

## 2018-08-14 PROCEDURE — 700102 HCHG RX REV CODE 250 W/ 637 OVERRIDE(OP): Performed by: HOSPITALIST

## 2018-08-14 PROCEDURE — 700111 HCHG RX REV CODE 636 W/ 250 OVERRIDE (IP): Performed by: FAMILY MEDICINE

## 2018-08-14 PROCEDURE — A9270 NON-COVERED ITEM OR SERVICE: HCPCS | Performed by: HOSPITALIST

## 2018-08-14 RX ORDER — MORPHINE SULFATE 4 MG/ML
INJECTION, SOLUTION INTRAMUSCULAR; INTRAVENOUS
Status: ACTIVE
Start: 2018-08-14 | End: 2018-08-15

## 2018-08-14 RX ORDER — LORAZEPAM 1 MG/1
TABLET ORAL
Status: ACTIVE
Start: 2018-08-14 | End: 2018-08-15

## 2018-08-14 RX ADMIN — HEPARIN SODIUM 5000 UNITS: 5000 INJECTION, SOLUTION INTRAVENOUS; SUBCUTANEOUS at 13:51

## 2018-08-14 RX ADMIN — MORPHINE SULFATE 4 MG: 4 INJECTION INTRAVENOUS at 13:51

## 2018-08-14 RX ADMIN — LINEZOLID 600 MG: 600 TABLET, FILM COATED ORAL at 18:18

## 2018-08-14 RX ADMIN — LORAZEPAM 0.5 MG: 1 TABLET ORAL at 10:45

## 2018-08-14 RX ADMIN — AMOXICILLIN AND CLAVULANATE POTASSIUM 1 TABLET: 875; 125 TABLET, FILM COATED ORAL at 05:30

## 2018-08-14 RX ADMIN — MORPHINE SULFATE 4 MG: 4 INJECTION INTRAVENOUS at 09:34

## 2018-08-14 RX ADMIN — OXYCODONE HYDROCHLORIDE 15 MG: 5 TABLET ORAL at 06:25

## 2018-08-14 RX ADMIN — SENNOSIDES AND DOCUSATE SODIUM 2 TABLET: 8.6; 5 TABLET ORAL at 18:18

## 2018-08-14 RX ADMIN — QUETIAPINE FUMARATE 100 MG: 100 TABLET ORAL at 12:40

## 2018-08-14 RX ADMIN — LINEZOLID 600 MG: 600 TABLET, FILM COATED ORAL at 05:29

## 2018-08-14 RX ADMIN — AMOXICILLIN AND CLAVULANATE POTASSIUM 1 TABLET: 875; 125 TABLET, FILM COATED ORAL at 18:18

## 2018-08-14 RX ADMIN — QUETIAPINE FUMARATE 100 MG: 100 TABLET ORAL at 05:30

## 2018-08-14 RX ADMIN — OXYCODONE HYDROCHLORIDE 15 MG: 5 TABLET ORAL at 10:45

## 2018-08-14 RX ADMIN — SODIUM CHLORIDE: 9 INJECTION, SOLUTION INTRAVENOUS at 01:26

## 2018-08-14 RX ADMIN — MORPHINE SULFATE 4 MG: 4 INJECTION INTRAVENOUS at 05:29

## 2018-08-14 RX ADMIN — OXYCODONE HYDROCHLORIDE 15 MG: 5 TABLET ORAL at 02:20

## 2018-08-14 RX ADMIN — OXYCODONE HYDROCHLORIDE 15 MG: 5 TABLET ORAL at 15:23

## 2018-08-14 RX ADMIN — MORPHINE SULFATE 4 MG: 4 INJECTION INTRAVENOUS at 22:41

## 2018-08-14 RX ADMIN — QUETIAPINE FUMARATE 200 MG: 100 TABLET ORAL at 18:18

## 2018-08-14 RX ADMIN — OXYCODONE HYDROCHLORIDE 15 MG: 5 TABLET ORAL at 23:39

## 2018-08-14 RX ADMIN — SODIUM CHLORIDE: 9 INJECTION, SOLUTION INTRAVENOUS at 12:40

## 2018-08-14 RX ADMIN — MORPHINE SULFATE 4 MG: 4 INJECTION INTRAVENOUS at 18:19

## 2018-08-14 RX ADMIN — SENNOSIDES AND DOCUSATE SODIUM 2 TABLET: 8.6; 5 TABLET ORAL at 05:29

## 2018-08-14 RX ADMIN — HEPARIN SODIUM 5000 UNITS: 5000 INJECTION, SOLUTION INTRAVENOUS; SUBCUTANEOUS at 05:30

## 2018-08-14 RX ADMIN — LORAZEPAM 0.5 MG: 1 TABLET ORAL at 22:41

## 2018-08-14 RX ADMIN — OXYCODONE HYDROCHLORIDE 15 MG: 5 TABLET ORAL at 19:35

## 2018-08-14 RX ADMIN — MORPHINE SULFATE 4 MG: 4 INJECTION INTRAVENOUS at 01:23

## 2018-08-14 ASSESSMENT — ENCOUNTER SYMPTOMS
COUGH: 0
CHILLS: 0
BACK PAIN: 0
VOMITING: 0
NAUSEA: 0
HEARTBURN: 0
SENSORY CHANGE: 1
WHEEZING: 0
FEVER: 0
WEAKNESS: 0
ABDOMINAL PAIN: 0
SHORTNESS OF BREATH: 0
CONSTIPATION: 0
HEADACHES: 0
NERVOUS/ANXIOUS: 0
DIARRHEA: 0
MYALGIAS: 1
DIZZINESS: 0
BLURRED VISION: 0
SORE THROAT: 0

## 2018-08-14 ASSESSMENT — PAIN SCALES - GENERAL
PAINLEVEL_OUTOF10: 4
PAINLEVEL_OUTOF10: 6
PAINLEVEL_OUTOF10: 8

## 2018-08-14 NOTE — PROGRESS NOTES
Infectious Disease Progress Note    Author: MARIAMA Barnett Date & Time of service: 2018  12:48 PM    Chief Complaint:  Left diabetic foot infection    Interval History:   AF no CBC asking for pain medication, cultures negative today, tolerating IV antibiotics without any issues  - AF, WBC 4.3, no issue with abx, 12/10 BLE pain- wanting pain meds, resting calmly in bed kicking legs around.  - AF, no WBC, sleeping- easy to wake, 11/10 LLE pain, tolerating abx.  Labs Reviewed, Medications Reviewed, Radiology Reviewed and Wound Reviewed.    Review of Systems:  Review of Systems   Constitutional: Negative for chills, fever and malaise/fatigue.   HENT: Negative for hearing loss.    Respiratory: Negative for cough and shortness of breath.    Cardiovascular: Positive for leg swelling. Negative for chest pain.   Gastrointestinal: Negative for abdominal pain, constipation, diarrhea, nausea and vomiting.   Genitourinary: Negative for dysuria.   Musculoskeletal: Positive for joint pain and myalgias.        BLE   Skin: Negative for rash.   Neurological: Positive for sensory change. Negative for headaches.       Hemodynamics:  Temp (24hrs), Av.4 °C (97.5 °F), Min:36.1 °C (96.9 °F), Max:36.8 °C (98.3 °F)  Temperature: 36.1 °C (96.9 °F)  Pulse  Av.1  Min: 62  Max: 122   Blood Pressure: 143/95       Physical Exam:  Physical Exam   Constitutional: He is oriented to person, place, and time. He appears well-developed. No distress.   Disheveled   HENT:   Head: Normocephalic and atraumatic.   Eyes: Pupils are equal, round, and reactive to light. EOM are normal.   Neck: Normal range of motion. Neck supple.   Cardiovascular: Normal rate, regular rhythm, normal heart sounds and intact distal pulses.  Exam reveals no friction rub.    No murmur heard.  Pulmonary/Chest: Effort normal and breath sounds normal. No respiratory distress. He has no rales.   Abdominal: Soft. Bowel sounds are normal. He exhibits  no distension. There is no tenderness.   Musculoskeletal: He exhibits edema. He exhibits no tenderness.   Left great toe, amp site- sutures in place, minimal ss drainage without odor, no erythema, no maceration.    LLE- trace edema, toes warm and able to wiggle.    Neurological: He is alert and oriented to person, place, and time.   Skin: Skin is warm. He is not diaphoretic. No erythema.   Psychiatric: He has a normal mood and affect. Thought content normal.   Nursing note and vitals reviewed.      Meds:    Current Facility-Administered Medications:   •  [DISCONTINUED] oxyCODONE immediate-release **OR** oxyCODONE immediate-release  •  amoxicillin-clavulanate  •  linezolid  •  morphine injection  •  LORazepam  •  labetalol  •  ondansetron  •  ondansetron  •  promethazine  •  promethazine  •  prochlorperazine  •  senna-docusate **AND** polyethylene glycol/lytes **AND** magnesium hydroxide **AND** bisacodyl  •  acetaminophen  •  NS  •  heparin  •  QUEtiapine **AND** QUEtiapine    Labs:  Recent Labs      08/13/18   0906   WBC  4.3*   RBC  4.27*   HEMOGLOBIN  12.7*   HEMATOCRIT  39.2*   MCV  91.8   MCH  29.7   RDW  44.8   PLATELETCT  137*   MPV  10.0   NEUTSPOLYS  39.50*   LYMPHOCYTES  42.50*   MONOCYTES  6.20   EOSINOPHILS  10.40*   BASOPHILS  1.20     Recent Labs      08/13/18   0906   SODIUM  142   POTASSIUM  3.7   CHLORIDE  108   CO2  29   GLUCOSE  147*   BUN  13     Recent Labs      08/13/18   0906   ALBUMIN  3.8   TBILIRUBIN  0.3   ALKPHOSPHAT  74   TOTPROTEIN  6.0   ALTSGPT  23   ASTSGOT  15   CREATININE  0.83       Imaging:  No recent imaging.       Micro:  Results     Procedure Component Value Units Date/Time    CULTURE TISSUE W/ GRM STAIN [[id number]] Collected:  08/10/18 5416    Order Status:  Completed Specimen:  Tissue Updated:  08/13/18 0918     Gram Stain Result Moderate WBCs.  No organisms seen.       Significant Indicator NEG     Source TISS     Site Left Foot     Tissue Culture No growth at 72 hours.     "ANAEROBIC CULTURE [[id number]] Collected:  08/10/18 1715    Order Status:  Completed Specimen:  Tissue Updated:  08/13/18 0918     Significant Indicator NEG     Source TISS     Site Left Foot     Anaerobic Culture, Culture Res Culture in progress.    URINE CULTURE(NEW) [[id number]] Collected:  08/10/18 0810    Order Status:  Completed Specimen:  Urine Updated:  08/12/18 0641     Significant Indicator NEG     Source UR     Site --     Urine Culture No growth at 48 hours.    Narrative:       Indication for culture:->Emergency Room Patient    BLOOD CULTURE [[id number]] Collected:  08/10/18 0613    Order Status:  Completed Specimen:  Blood from Peripheral Updated:  08/11/18 0829     Significant Indicator NEG     Source BLD     Site PERIPHERAL     Blood Culture No Growth    Note: Blood cultures are incubated for 5 days and  are monitored continuously.Positive blood cultures  are called to the RN and reported as soon as  they are identified.      Narrative:       Per Hospital Policy: Only change Specimen Src: to \"Line\" if  specified by physician order.    BLOOD CULTURE [[id number]] Collected:  08/10/18 0630    Order Status:  Completed Specimen:  Blood from Peripheral Updated:  08/11/18 0829     Significant Indicator NEG     Source BLD     Site PERIPHERAL     Blood Culture No Growth    Note: Blood cultures are incubated for 5 days and  are monitored continuously.Positive blood cultures  are called to the RN and reported as soon as  they are identified.      Narrative:       Per Hospital Policy: Only change Specimen Src: to \"Line\" if  specified by physician order.    GRAM STAIN [[id number]] Collected:  08/10/18 1715    Order Status:  Completed Specimen:  Tissue Updated:  08/10/18 2244     Significant Indicator .     Source TISS     Site Left Foot     Gram Stain Result Moderate WBCs.  No organisms seen.      URINALYSIS [[id number]] Collected:  08/10/18 0810    Order Status:  Completed Specimen:  Urine Updated:  08/10/18 0825     " Color Yellow     Character Clear     Specific Gravity 1.009     Ph 6.5     Glucose Negative mg/dL      Ketones Negative mg/dL      Protein Negative mg/dL      Bilirubin Negative     Urobilinogen, Urine 0.2     Nitrite Negative     Leukocyte Esterase Negative     Occult Blood Negative     Micro Urine Req see below     Comment: Microscopic examination not performed when specimen is clear  and chemically negative for protein, blood, leukocyte esterase  and nitrite.         Narrative:       Indication for culture:->Emergency Room Patient          Assessment:  Active Hospital Problems    Diagnosis   • *Diabetic foot infection (HCC) [E11.628, L08.9]   • Mental disability [F79]   • Psychiatric disorder [F99]   • Type 2 diabetes mellitus with neurologic complication, without long-term current use of insulin (HCC) [E11.49]   • Diabetic peripheral neuropathy (HCC) [E11.42]       Plan:  Left diabetic foot infection  Afebrile  No leukocytosis  S/p I&D on 8/10 with Dr. Baez  OR cultures from 8/10 NGTD  Blood cultures from 8/10 NGTD  Prior cultures in May 2018+ MRSA, MRSE, Peptostreptococcus and Pasteurella  D/C'd IV Unasyn  Continue PO Linezolid and Augmentin  Anticipate 2 weeks of antibiotics from date of surgery, estimated end date 8/24/18  Continue wound care    Type 2 diabetes mellitus  HgA1C 5.8% on 3/30/18  Maintain blood sugars under 150 to control infection and promote wound healing    Recent left foot osteomyelitis  Completed course of antibiotics on 6/26/18    Psychiatric disorder      Discussed with JUSTINO Alfaro RN.

## 2018-08-14 NOTE — CARE PLAN
Problem: Safety  Goal: Will remain free from falls  Outcome: PROGRESSING AS EXPECTED  Bed locked and in lowest position, call light in reach    Problem: Knowledge Deficit  Goal: Knowledge of disease process/condition, treatment plan, diagnostic tests, and medications will improve  Outcome: PROGRESSING AS EXPECTED  Updated with plan of care, cont abx, pain control

## 2018-08-14 NOTE — PROGRESS NOTES
Pt non compliant with care at times and very rude and anxious at times.  Wants pain medications around the clock.  Updated with plan of care, call light in reach and encourage to call for assistance.

## 2018-08-14 NOTE — CARE PLAN
rec'd report from JARROD Johnson at 0659 and assumed care of this pt. Pt is awake/A&O x4. Pt withdrawn and reports/voices no needs/pain/complaints at this time. Safety measures in place, call light w/in reach.

## 2018-08-14 NOTE — PROGRESS NOTES
Ange Dc w/housekeeping came to let me know this pt was rude to her and refuses to allow her to clean his side of the room.

## 2018-08-14 NOTE — PROGRESS NOTES
"approx 1451 while giving meds in another pt room, BAYLEE Carney came to get me because this pt was becoming verbally abusive, threatening to pull his IV out. Called JARROD oDuglas/RUPINDER to address and then this pt called my room. Pt requesting his meds, drsg change. I told him I would be there when I finished in my other room. When I went into the room JARROD Douglas was present as well as JARROD Nicholson. The pt had pulled out his IV and told me to \"get out of my room, I don't want to see you\". I then left the room.   "

## 2018-08-14 NOTE — PROGRESS NOTES
[facility]ist Progress Note    Date of Service: 2018    Chief Complaint  53 y.o. male admitted 8/10/2018 with diabetic foot infection.    Interval Problem Update  Diabetic foot infection- I&D done, culture pending, pain better controlled  Diabetes-controlled    Consultants/Specialty  Orthopedic surgery - Sasha  ID - Deven    Disposition  TBD        Review of Systems   Constitutional: Negative for chills, fever and malaise/fatigue.   HENT: Negative for hearing loss and sore throat.    Eyes: Negative for blurred vision.   Respiratory: Negative for cough, shortness of breath and wheezing.    Cardiovascular: Positive for leg swelling. Negative for chest pain.   Gastrointestinal: Negative for abdominal pain, diarrhea, heartburn, nausea and vomiting.   Genitourinary: Negative for dysuria.   Musculoskeletal: Negative for back pain.   Skin: Negative for rash.   Neurological: Negative for dizziness, weakness and headaches.   Psychiatric/Behavioral: The patient is not nervous/anxious.       Physical Exam  Laboratory/Imaging   Hemodynamics  Temp (24hrs), Av.4 °C (97.5 °F), Min:36.1 °C (96.9 °F), Max:36.8 °C (98.3 °F)   Temperature: 36.1 °C (96.9 °F)  Pulse  Av.1  Min: 62  Max: 122    Blood Pressure: 143/95      Respiratory      Respiration: 17, Pulse Oximetry: 96 %        RUL Breath Sounds: Diminished, RLL Breath Sounds: Diminished, LORENA Breath Sounds: Diminished, LLL Breath Sounds: Diminished    Fluids    Intake/Output Summary (Last 24 hours) at 18 1049  Last data filed at 18 0946   Gross per 24 hour   Intake             1200 ml   Output             3200 ml   Net            -2000 ml       Nutrition  Orders Placed This Encounter   Procedures   • Diet Order Regular     Standing Status:   Standing     Number of Occurrences:   1     Order Specific Question:   Diet:     Answer:   Regular [1]     Physical Exam   Constitutional: He is oriented to person, place, and time. He appears well-developed  and well-nourished.   HENT:   Head: Normocephalic and atraumatic.   Eyes: Pupils are equal, round, and reactive to light. Conjunctivae are normal.   Neck: No tracheal deviation present. No thyromegaly present.   Cardiovascular: Normal rate and regular rhythm.    Pulmonary/Chest: Effort normal and breath sounds normal.   Abdominal: Soft. Bowel sounds are normal. He exhibits no distension. There is no tenderness.   Musculoskeletal: He exhibits edema.   Dressing at left foot   Lymphadenopathy:     He has no cervical adenopathy.   Neurological: He is alert and oriented to person, place, and time.   Nursing note and vitals reviewed.      Recent Labs      08/13/18   0906   WBC  4.3*   RBC  4.27*   HEMOGLOBIN  12.7*   HEMATOCRIT  39.2*   MCV  91.8   MCH  29.7   MCHC  32.4*   RDW  44.8   PLATELETCT  137*   MPV  10.0     Recent Labs      08/13/18   0906   SODIUM  142   POTASSIUM  3.7   CHLORIDE  108   CO2  29   GLUCOSE  147*   BUN  13   CREATININE  0.83   CALCIUM  8.6                      Assessment/Plan     * Diabetic foot infection (HCC)- (present on admission)   Assessment & Plan    Irrigation and debridement of left foot.  8/10/2018  Zyvox and Unasyn  Follow culture            Mental disability- (present on admission)   Assessment & Plan    baseline          Psychiatric disorder- (present on admission)   Assessment & Plan    Seroquel        Type 2 diabetes mellitus with neurologic complication, without long-term current use of insulin (HCC)- (present on admission)   Assessment & Plan    Diet controlled  hgba1c 5.6        Diabetic peripheral neuropathy (HCC)- (present on admission)   Assessment & Plan    no current medications          Quality-Core Measures   Reviewed items::  Radiology images reviewed, Labs reviewed, Medications reviewed and EKG reviewed  Olsen catheter::  No Olsen  DVT prophylaxis pharmacological::  Heparin  Ulcer Prophylaxis::  No  Antibiotics:  Treating active infection/contamination beyond 24 hours  perioperative coverage

## 2018-08-15 VITALS
HEIGHT: 76 IN | RESPIRATION RATE: 18 BRPM | OXYGEN SATURATION: 93 % | BODY MASS INDEX: 27.92 KG/M2 | TEMPERATURE: 98.1 F | DIASTOLIC BLOOD PRESSURE: 86 MMHG | WEIGHT: 229.28 LBS | SYSTOLIC BLOOD PRESSURE: 126 MMHG | HEART RATE: 90 BPM

## 2018-08-15 LAB
BACTERIA BLD CULT: NORMAL
BACTERIA BLD CULT: NORMAL
BACTERIA SPEC ANAEROBE CULT: NORMAL
SIGNIFICANT IND 70042: NORMAL
SITE SITE: NORMAL
SOURCE SOURCE: NORMAL

## 2018-08-15 PROCEDURE — A9270 NON-COVERED ITEM OR SERVICE: HCPCS | Performed by: INTERNAL MEDICINE

## 2018-08-15 PROCEDURE — A9270 NON-COVERED ITEM OR SERVICE: HCPCS | Performed by: HOSPITALIST

## 2018-08-15 PROCEDURE — A9270 NON-COVERED ITEM OR SERVICE: HCPCS | Performed by: FAMILY MEDICINE

## 2018-08-15 PROCEDURE — 700102 HCHG RX REV CODE 250 W/ 637 OVERRIDE(OP): Performed by: HOSPITALIST

## 2018-08-15 PROCEDURE — 99232 SBSQ HOSP IP/OBS MODERATE 35: CPT | Performed by: INTERNAL MEDICINE

## 2018-08-15 PROCEDURE — 700111 HCHG RX REV CODE 636 W/ 250 OVERRIDE (IP): Performed by: HOSPITALIST

## 2018-08-15 PROCEDURE — 700102 HCHG RX REV CODE 250 W/ 637 OVERRIDE(OP): Performed by: INTERNAL MEDICINE

## 2018-08-15 PROCEDURE — 700102 HCHG RX REV CODE 250 W/ 637 OVERRIDE(OP): Performed by: FAMILY MEDICINE

## 2018-08-15 PROCEDURE — 700111 HCHG RX REV CODE 636 W/ 250 OVERRIDE (IP): Performed by: FAMILY MEDICINE

## 2018-08-15 RX ADMIN — OXYCODONE HYDROCHLORIDE 15 MG: 5 TABLET ORAL at 07:41

## 2018-08-15 RX ADMIN — OXYCODONE HYDROCHLORIDE 15 MG: 5 TABLET ORAL at 03:37

## 2018-08-15 RX ADMIN — AMOXICILLIN AND CLAVULANATE POTASSIUM 1 TABLET: 875; 125 TABLET, FILM COATED ORAL at 06:00

## 2018-08-15 RX ADMIN — QUETIAPINE FUMARATE 200 MG: 100 TABLET ORAL at 17:35

## 2018-08-15 RX ADMIN — LORAZEPAM 0.5 MG: 1 TABLET ORAL at 10:58

## 2018-08-15 RX ADMIN — MORPHINE SULFATE 4 MG: 4 INJECTION INTRAVENOUS at 10:59

## 2018-08-15 RX ADMIN — AMOXICILLIN AND CLAVULANATE POTASSIUM 1 TABLET: 875; 125 TABLET, FILM COATED ORAL at 17:36

## 2018-08-15 RX ADMIN — MORPHINE SULFATE 4 MG: 4 INJECTION INTRAVENOUS at 15:24

## 2018-08-15 RX ADMIN — OXYCODONE HYDROCHLORIDE 15 MG: 5 TABLET ORAL at 17:37

## 2018-08-15 RX ADMIN — QUETIAPINE FUMARATE 100 MG: 100 TABLET ORAL at 06:00

## 2018-08-15 RX ADMIN — MORPHINE SULFATE 4 MG: 4 INJECTION INTRAVENOUS at 06:40

## 2018-08-15 RX ADMIN — LINEZOLID 600 MG: 600 TABLET, FILM COATED ORAL at 17:35

## 2018-08-15 RX ADMIN — SENNOSIDES AND DOCUSATE SODIUM 2 TABLET: 8.6; 5 TABLET ORAL at 06:00

## 2018-08-15 RX ADMIN — HEPARIN SODIUM 5000 UNITS: 5000 INJECTION, SOLUTION INTRAVENOUS; SUBCUTANEOUS at 06:00

## 2018-08-15 RX ADMIN — MORPHINE SULFATE 4 MG: 4 INJECTION INTRAVENOUS at 02:39

## 2018-08-15 RX ADMIN — SENNOSIDES AND DOCUSATE SODIUM 2 TABLET: 8.6; 5 TABLET ORAL at 17:36

## 2018-08-15 RX ADMIN — QUETIAPINE FUMARATE 100 MG: 100 TABLET ORAL at 11:50

## 2018-08-15 RX ADMIN — OXYCODONE HYDROCHLORIDE 15 MG: 5 TABLET ORAL at 11:50

## 2018-08-15 RX ADMIN — LINEZOLID 600 MG: 600 TABLET, FILM COATED ORAL at 06:00

## 2018-08-15 ASSESSMENT — ENCOUNTER SYMPTOMS
BLURRED VISION: 0
FEVER: 0
SHORTNESS OF BREATH: 0
HEARTBURN: 0
NERVOUS/ANXIOUS: 0
ABDOMINAL PAIN: 0
DIARRHEA: 0
WHEEZING: 0
BACK PAIN: 0
SORE THROAT: 0
HEADACHES: 0
COUGH: 0
NAUSEA: 0
DIZZINESS: 0
WEAKNESS: 0
VOMITING: 0
CHILLS: 0

## 2018-08-15 ASSESSMENT — PAIN SCALES - GENERAL
PAINLEVEL_OUTOF10: 8
PAINLEVEL_OUTOF10: 10
PAINLEVEL_OUTOF10: 8
PAINLEVEL_OUTOF10: 8
PAINLEVEL_OUTOF10: 10

## 2018-08-15 NOTE — PROGRESS NOTES
Patient was very rude and verbally aggressive while this RN was providing care to roommate. Patient was demanding for RN to change his dressing and complaining loudly about noise as roommate was being admitted.  RN acknowledged his complaint and assured him that he would be taken care of as soon as possible.

## 2018-08-15 NOTE — PROGRESS NOTES
Pt verbally aggressive when he lost his IV. Security called charge nurse called, charge nurse to take over care.

## 2018-08-15 NOTE — CARE PLAN
Problem: Mobility  Goal: Risk for activity intolerance will decrease    Intervention: Encourage patient to increase activity level in collaboration with Interdisciplinary Team  Pt refusing to get OOB.

## 2018-08-15 NOTE — PROGRESS NOTES
"LIMB PRESERVATION SERVICE     Date of Consultation: 8/14/2018    Interval History:    52 y.o. male with a past medical history that includes borderline diabetes, anxiety, HTN, and Hep C, admitted for left foot pain.  He was admitted in May of this year with an infected left great toe amputation site, underwent an I&D of this site on 5/12.  ID was involved recommended prolonged IV antibiotics.  Due to his difficult disposition he was kept in the hospital for the duration of his antibiotic therapy, completed on June 26, 2018.   He returned to the ED on 8/10 c/o left foot pain and was admitted.  HE had not been taking his medications.  Xrays showed gas and increased soft tissue swelling.  He was taken back to surgery on 8/10 for an I&D by Dr. Baez. ID is again involved and are managing his antibiotics.   8/14- Toe amp site well approximated, sutures intact.  OR cx NGTD    Review of Systems:   GENERAL:  he denies any fever, chills  PULMONARY: he denies shortness of breath, cough  GASTROINTESTINAL: he has no nausea, vomiting, diarrhea or constipation.   LOWER EXTREMITIES: he denies claudication. No edema.  NEUROLOGIC: Numbness in feet.  Pain well controlled    Physical Exam:   VITAL SIGNS: Blood pressure 143/95, pulse 95, temperature 36.1 °C (96.9 °F), resp. rate 17, height 1.93 m (6' 4\"), weight 104 kg (229 lb 4.5 oz), SpO2 96 %.  GENERAL: Well developed, well nourished, in no acute distress.  LOWER EXTREMITIES: Feet warm, pedal pulses palpable.  Surgical site dressing changed.  Sutures intact, minimal ss drainage, no periwound erythema, mild local edema  NEUROLOGIC: He is alert and oriented. Flat affect      Labs:   Recent Labs      08/13/18   0906   WBC  4.3*   RBC  4.27*   HEMOGLOBIN  12.7*   HEMATOCRIT  39.2*   MCV  91.8   MCH  29.7   MCHC  32.4*   RDW  44.8   PLATELETCT  137*   MPV  10.0     Recent Labs      08/13/18   0906   SODIUM  142   POTASSIUM  3.7   CHLORIDE  108   CO2  29   GLUCOSE  147*   BUN  13 "         Assessment/Plan:      1. Diabetic foot infection- S/p I&D and prolonged hospitalization in May/June of this year for IV abx.  Pt returned with infection in same site, repeat I&D 8/10.  Abx per ID.  A1c this admission 5.6.  Pt was discharged previously with offloading shoes.  Would benefit from orthotic inserts and shoes.  Doubtful he would f/u with orthotist as outpatient, will try to have shoes and inserts started while he is here.  Discharge TBD    2. Noncompliance with medical regimen- complicated by underlying psychiatric disorder.

## 2018-08-15 NOTE — PROGRESS NOTES
Order for orthotist to visit pt for fit for diabetic foot inserts and shoes has been submitted to [facility]. If any questions you can contact [facility] at ext # [phone]

## 2018-08-15 NOTE — PROGRESS NOTES
[facility]    Date of Service: 8/15/2018    Chief Complaint  53 y.o. male admitted 8/10/2018 with diabetic foot infection.    Interval Problem Update  Patient is calm, cooperative  He did not want to participate with PT OT evaluation yesterday, refusing to get out of bed  Pain is well controlled  Diabetes-controlled    Consultants/Specialty  Orthopedic surgery - Sasha  ID - Deven    Disposition  TBD        Review of Systems   Constitutional: Negative for chills, fever and malaise/fatigue.   HENT: Negative for hearing loss and sore throat.    Eyes: Negative for blurred vision.   Respiratory: Negative for cough, shortness of breath and wheezing.    Cardiovascular: Positive for leg swelling. Negative for chest pain.   Gastrointestinal: Negative for abdominal pain, diarrhea, heartburn, nausea and vomiting.   Genitourinary: Negative for dysuria.   Musculoskeletal: Negative for back pain.   Skin: Negative for rash.   Neurological: Negative for dizziness, weakness and headaches.   Psychiatric/Behavioral: The patient is not nervous/anxious.       Physical Exam  Laboratory/Imaging   Hemodynamics  Temp (24hrs), Av.5 °C (97.7 °F), Min:36.1 °C (96.9 °F), Max:36.7 °C (98.1 °F)   Temperature: 36.7 °C (98.1 °F)  Pulse  Av.3  Min: 62  Max: 122    Blood Pressure: 126/86      Respiratory      Respiration: 18, Pulse Oximetry: 93 %        RUL Breath Sounds: Diminished;Expiratory Wheezes, RLL Breath Sounds: Diminished, LORENA Breath Sounds: Diminished;Expiratory Wheezes, LLL Breath Sounds: Diminished    Fluids    Intake/Output Summary (Last 24 hours) at 08/15/18 1548  Last data filed at 08/15/18 1300   Gross per 24 hour   Intake                0 ml   Output             2750 ml   Net            -2750 ml       Nutrition  Orders Placed This Encounter   Procedures   • Diet Order Regular     Standing Status:   Standing     Number of Occurrences:   1     Order Specific Question:   Diet:     Answer:   Regular  [1]     Physical Exam   Constitutional: He is oriented to person, place, and time. He appears well-developed and well-nourished.   HENT:   Head: Normocephalic and atraumatic.   Eyes: Pupils are equal, round, and reactive to light. Conjunctivae are normal.   Neck: No tracheal deviation present. No thyromegaly present.   Cardiovascular: Normal rate and regular rhythm.    Pulmonary/Chest: Effort normal and breath sounds normal.   Abdominal: Soft. Bowel sounds are normal. He exhibits no distension. There is no tenderness.   Musculoskeletal: He exhibits edema.   Dressing at left foot   Lymphadenopathy:     He has no cervical adenopathy.   Neurological: He is alert and oriented to person, place, and time.   Nursing note and vitals reviewed.      Recent Labs      08/13/18   0906   WBC  4.3*   RBC  4.27*   HEMOGLOBIN  12.7*   HEMATOCRIT  39.2*   MCV  91.8   MCH  29.7   MCHC  32.4*   RDW  44.8   PLATELETCT  137*   MPV  10.0     Recent Labs      08/13/18   0906   SODIUM  142   POTASSIUM  3.7   CHLORIDE  108   CO2  29   GLUCOSE  147*   BUN  13   CREATININE  0.83   CALCIUM  8.6                      Assessment/Plan     * Diabetic foot infection (HCC)- (present on admission)   Assessment & Plan    Irrigation and debridement of left foot.  8/10/2018  Zyvox and Augmentin until 8/24 per ID recommendation  Follow culture            Mental disability- (present on admission)   Assessment & Plan    baseline          Psychiatric disorder- (present on admission)   Assessment & Plan    Seroquel  Periodically has episodes of verbal aggression        Type 2 diabetes mellitus with neurologic complication, without long-term current use of insulin (HCC)- (present on admission)   Assessment & Plan    Diet controlled  hgba1c 5.6  Continue to monitor blood sugar        Diabetic peripheral neuropathy (HCC)- (present on admission)   Assessment & Plan    Complaining of numbness, denies pain          Quality-Core Measures   Reviewed items::  Radiology  images reviewed, Labs reviewed, Medications reviewed and EKG reviewed  Olsen catheter::  No Olsen  DVT prophylaxis pharmacological::  Heparin  Ulcer Prophylaxis::  No  Antibiotics:  Treating active infection/contamination beyond 24 hours perioperative coverage

## 2018-08-15 NOTE — PROGRESS NOTES
"Patient seen and examined  NTGD    Blood pressure 126/86, pulse 90, temperature 36.7 °C (98.1 °F), resp. rate 18, height 1.93 m (6' 4\"), weight 104 kg (229 lb 4.5 oz), SpO2 93 %.    Recent Labs      08/13/18   0906   WBC  4.3*   RBC  4.27*   HEMOGLOBIN  12.7*   HEMATOCRIT  39.2*   MCV  91.8   MCH  29.7   MCHC  32.4*   RDW  44.8   PLATELETCT  137*   MPV  10.0       No acute distress  Dressing clean dry and intact  Neurovascularly intact    POD#5    Plan:  DVT Prophylaxis- TEDS/SCDs  Weight Bearing Status-WBAT  PT/OT  Antibiotics: per ID  Case Coordination          "

## 2018-08-16 NOTE — PROGRESS NOTES
Wilbert Kennith Yeomans Jr. patient has chosen to leave the hospital against medical advice. The attending physician has not discharged the patient. Patient is not a risk to himself or others. I have discussed with the patient the following:  Physician has not determined patient is ready for discharge.      Discharge against medical advice form has been Signed.

## 2018-08-16 NOTE — PROGRESS NOTES
"Pt with aggressive behavior verbally and physically toward staff throughout shift; 3 events in which security intervention required; Most recent event pt yelling for pain medications, states \"all these nurses are f###### idiots\", shoved tray table toward RN, flinging items around room; Attempt to deescalate unsuccessful, security intervened Pt opted to leave AMA; IV DCd, pt signed AMA form and escorted from hospital  "

## 2018-08-17 ENCOUNTER — TELEPHONE (OUTPATIENT)
Dept: INFECTIOUS DISEASES | Facility: MEDICAL CENTER | Age: 53
End: 2018-08-17

## 2018-08-17 NOTE — TELEPHONE ENCOUNTER
Pt left hospital AMA. Pt doesn't have any contact information or address due to homelessness. Unable to contact pt for HF appt.

## 2018-08-18 NOTE — DOCUMENTATION QUERY
"DOCUMENTATION QUERY    PROVIDERS: Please select “Cosign w/ note”to reply to query.    To better represent the severity of illness of your patient, please review the following information and exercise your independent professional judgment in responding to this query.      Irrigation and debridement of left foot is documented in the Operative Report.    Accodring to the documentation \"His previous  incisions were reopened and debrided of skin, subcutaneous tissue, underlying  muscle, and bone in an excisional fashion with a knife and rongeur and then irrigated with copious amounts of normal saline solution and closed.\"     For accurate coding can the bone debrided in the foot be further further specified?            The medical record reflects the following:   Clinical Findings     Treatment    Risk Factors    Location within medical record  Operative Report     Thank you,   Rocio Morgan  HIM CODER        "

## 2018-08-22 NOTE — ADDENDUM NOTE
Encounter addended by: Rocio Marshall on: 8/22/2018 11:32 AM<BR>    Actions taken: Sign clinical note

## 2018-08-22 NOTE — ADDENDUM NOTE
Encounter addended by: Salomón Baez M.D. on: 8/22/2018 12:20 PM<BR>    Actions taken: Cosign clinical note with attestation

## 2020-05-26 ENCOUNTER — HOSPITAL ENCOUNTER (EMERGENCY)
Facility: MEDICAL CENTER | Age: 55
End: 2020-05-26
Attending: EMERGENCY MEDICINE
Payer: MEDICAID

## 2020-05-26 VITALS
HEART RATE: 82 BPM | OXYGEN SATURATION: 96 % | WEIGHT: 240 LBS | SYSTOLIC BLOOD PRESSURE: 155 MMHG | HEIGHT: 76 IN | BODY MASS INDEX: 29.22 KG/M2 | TEMPERATURE: 97.8 F | RESPIRATION RATE: 16 BRPM | DIASTOLIC BLOOD PRESSURE: 102 MMHG

## 2020-05-26 DIAGNOSIS — Z76.0 MEDICATION REFILL: ICD-10-CM

## 2020-05-26 DIAGNOSIS — I10 HYPERTENSION, UNSPECIFIED TYPE: ICD-10-CM

## 2020-05-26 DIAGNOSIS — J02.9 PHARYNGITIS, UNSPECIFIED ETIOLOGY: ICD-10-CM

## 2020-05-26 LAB — S PYO DNA SPEC NAA+PROBE: NOT DETECTED

## 2020-05-26 PROCEDURE — 87651 STREP A DNA AMP PROBE: CPT

## 2020-05-26 PROCEDURE — 700111 HCHG RX REV CODE 636 W/ 250 OVERRIDE (IP): Performed by: EMERGENCY MEDICINE

## 2020-05-26 PROCEDURE — 99283 EMERGENCY DEPT VISIT LOW MDM: CPT

## 2020-05-26 RX ORDER — DEXAMETHASONE SODIUM PHOSPHATE 10 MG/ML
10 INJECTION, SOLUTION INTRAMUSCULAR; INTRAVENOUS ONCE
Status: COMPLETED | OUTPATIENT
Start: 2020-05-26 | End: 2020-05-26

## 2020-05-26 RX ORDER — PROPRANOLOL HYDROCHLORIDE 20 MG/1
20 TABLET ORAL 2 TIMES DAILY
Qty: 90 TAB | Refills: 0 | Status: SHIPPED | OUTPATIENT
Start: 2020-05-26

## 2020-05-26 RX ORDER — ACETAMINOPHEN 325 MG/1
325 TABLET ORAL EVERY 4 HOURS PRN
Qty: 30 TAB | Refills: 0 | Status: SHIPPED | OUTPATIENT
Start: 2020-05-26

## 2020-05-26 RX ORDER — ACETAMINOPHEN 325 MG/1
325 TABLET ORAL EVERY 4 HOURS PRN
Qty: 30 TAB | Refills: 0 | Status: SHIPPED | OUTPATIENT
Start: 2020-05-26 | End: 2020-05-26 | Stop reason: SDUPTHER

## 2020-05-26 RX ADMIN — DEXAMETHASONE SODIUM PHOSPHATE 10 MG: 10 INJECTION INTRAMUSCULAR; INTRAVENOUS at 10:56

## 2020-05-26 ASSESSMENT — LIFESTYLE VARIABLES: DO YOU DRINK ALCOHOL: NO

## 2020-05-26 ASSESSMENT — FIBROSIS 4 INDEX: FIB4 SCORE: 1.26

## 2020-05-26 NOTE — ED NOTES
Patient medicated per MAR. Updated on POC, awaiting test results, no questions.    Mask in place, verbalized understanding.

## 2020-05-26 NOTE — ED PROVIDER NOTES
ED Provider Note  CHIEF COMPLAINT  Chief Complaint   Patient presents with   • Sore Throat     x5 days, denies difficulty breathing, speaking in full sentences.   • Medication Refill     Propranolol 20 mg BID       HPI  Wilbert Kennith Yeomans Jr. is a 54 y.o. male who presents for 2 issues.  He states he has had a sore throat for the last 5 days.  He states it is mostly on the left side of his throat.  It hurts a little bit to swallow and he would like a spray for his throat.  He denies any difficulty breathing or swallowing.  He is eating normally.  He denies any neck pain.  No drooling.  He does complain of a subjective fever but is not actually taken his temperature.  He denies any recent known exposure to COVID-19.  Denies any cough.  Patient also he is here requesting a medication refill.  He is scheduled to see [facility] next month but has run out of his blood pressure medication.    REVIEW OF SYSTEMS  Positive for sore throat, Negative for headache, neck pain, chest pain, cough, shortness of breath, difficulty swallowing.  Patient states he used to have a history of diabetes but states his doctors took him off his meds as his blood sugars have been normal lately.    PAST MEDICAL HISTORY   has a past medical history of Anxiety, Bronchitis, Dental disorder, Diabetes (HCC), Hepatitis C (1997), Hypercholesteremia, Hypertension, and Psychiatric disorder.    SOCIAL HISTORY  Social History     Tobacco Use   • Smoking status: Current Some Day Smoker     Packs/day: 0.25     Years: 30.00     Pack years: 7.50     Types: Cigarettes   • Smokeless tobacco: Never Used   Substance and Sexual Activity   • Alcohol use: No   • Drug use: No     Comment: last time used 10+ years   • Sexual activity: Not on file       SURGICAL HISTORY   has a past surgical history that includes ventral hernia repair laparoscopic (3/30/2016); toe amputation (Left, 4/1/2018); toe amputation (Left, 4/23/2018); irrigation & debridement ortho (Left,  "5/12/2018); irrigation & debridement ortho (Left, 5/14/2018); and foot amputation (Left, 8/10/2018).    CURRENT MEDICATIONS  Reviewed.  See Encounter Summary.      ALLERGIES  No Known Allergies    PHYSICAL EXAM  VITAL SIGNS: /110   Pulse 89   Temp 36.6 °C (97.8 °F) (Temporal)   Resp 16   Ht 1.93 m (6' 4\")   Wt 108.9 kg (240 lb)   SpO2 96%   BMI 29.21 kg/m²   Constitutional:  Alert in no apparent distress.  HENT: Normocephalic, Bilateral external ears normal. Nose normal.  Speaks in full sentences without difficulty.  Some mild pharyngeal erythema but no exudate, no evidence of peritonsillar abscess, uvula is midline, no drooling or trismus,  Neck: Cervical adenopathy, no stridor, no meningeal signs  Eyes: Pupils are equal and reactive. Conjunctiva normal, non-icteric.   Thorax & Lungs: Easy unlabored respirations, there are auscultation throughout  Abdomen:  No gross signs of peritonitis, no pain with movement   Skin: Visualized skin is  Dry, No erythema, No rash.   Extremities:   No edema, No asymmetry  Neurologic: Alert, Grossly non-focal.   Psychiatric: Affect and Mood normal      COURSE & MEDICAL DECISION MAKING  Nursing notes and vital signs were reviewed. (See chart for details)    The patient presents to the Emergency Department with sore throat.  Rapid strep is been ordered.  Also give him a dose of Decadron for his throat pain.  No evidence of peritonsillar abscess and no suspicion for deep space abscess of the neck.  No meningeal signs.  No difficulty swallowing or eating and overall looks well here.  Patient is requesting some Tylenol prescription and a spray for his throat.  Patient has had no known exposure to COVID-19.  He is not having any cough or difficulty breathing.  he received a dose of Decadron here for the pain.  Patient does have elevated blood pressure here.  We will restart his blood pressure medications.  Advised him to follow-up with the [facility] for further refills of his " medications.    Rapid strep was negative.  Patient understands his follow-up plan.  Pharyngitis precautions were given.         The patient verbally agreed to the discharge precautions and follow-up plan which is documented in EPIC.    FINAL IMPRESSION  1. Medication refill     2. Pharyngitis, unspecified etiology     3. Hypertension, unspecified type

## 2020-05-26 NOTE — ED NOTES
Wilbert Kennith Yeomans Jr. discharged via ambulation with self.  Discharge instructions given and reviewed, patient educated to follow up with [facility], verbalized understanding.  Prescriptions given x 2.  All personal belongings in possession.  No questions at this time.

## 2020-05-26 NOTE — ED TRIAGE NOTES
Chief Complaint   Patient presents with   • Sore Throat     x5 days, denies difficulty breathing, speaking in full sentences.   • Medication Refill     Propranolol 20 mg BID     Patient to TCS via ambulation, with a steady gait, patient A&O x4.      Patient denies exposure to COVID-19, mask applied, appropriate precaution maintained.    Chart up for ERP.

## 2020-05-26 NOTE — DISCHARGE INSTRUCTIONS
Take the medication as directed.  The sore throat is due to a virus and therefore antibiotics will not help.  Take Tylenol for pain.  If you develop any difficulty swallowing, difficulty breathing or have a continued fever return for a recheck. Follow up with the \[facility]\"" clinic for further prescription refills.

## 2020-06-18 NOTE — PROGRESS NOTES
[facility]ist Progress Note    Date of Service: 2018    Chief Complaint  52 y.o. male admitted 2018 with diabetic left foot ulcer.    Interval Problem Update  SNF    Consultants/Specialty  I.D  ortho    Disposition  Awaiting SNF acceptance        Review of Systems   Constitutional: Negative for diaphoresis, malaise/fatigue and weight loss.   HENT: Negative for ear discharge, ear pain and nosebleeds.    Eyes: Negative for photophobia, pain and discharge.   Respiratory: Negative for sputum production, shortness of breath and wheezing.    Cardiovascular: Negative for palpitations, orthopnea and claudication.   Gastrointestinal: Negative for abdominal pain, diarrhea and vomiting.   Genitourinary: Negative for flank pain, frequency and hematuria.   Musculoskeletal: Negative for back pain, falls and joint pain.   Skin: Negative for itching.   Neurological: Negative for tingling, tremors and sensory change.      Physical Exam  Laboratory/Imaging   Hemodynamics  Temp (24hrs), Av.8 °C (98.3 °F), Min:36.2 °C (97.1 °F), Max:37.1 °C (98.8 °F)   Temperature: 36.2 °C (97.1 °F)  Pulse  Av.9  Min: 52  Max: 112   Blood Pressure: 117/78      Respiratory      Respiration: 16, Pulse Oximetry: 93 %             Fluids    Intake/Output Summary (Last 24 hours) at 18 1132  Last data filed at 18 1056   Gross per 24 hour   Intake             1318 ml   Output                0 ml   Net             1318 ml       Nutrition  Orders Placed This Encounter   Procedures   • DIET ORDER     Standing Status:   Standing     Number of Occurrences:   1     Order Specific Question:   Diet:     Answer:   Regular [1]     Physical Exam   Constitutional: He is oriented to person, place, and time. No distress.   HENT:   Right Ear: External ear normal.   Left Ear: External ear normal.   Eyes: Right eye exhibits no discharge. Left eye exhibits no discharge.   Neck: No JVD present.   Cardiovascular: Normal heart sounds.     Pulmonary/Chest: No stridor. No respiratory distress. He has no wheezes. He has no rales.   Abdominal: He exhibits no distension. There is no tenderness. There is no rebound.   Musculoskeletal: He exhibits no edema.   Neurological: He is alert and oriented to person, place, and time.   Skin: Skin is warm. He is not diaphoretic.   Dressing look clean on the left foot       Recent Labs      05/23/18   0442   WBC  5.7   RBC  4.66*   HEMOGLOBIN  14.2   HEMATOCRIT  42.0   MCV  90.1   MCH  30.5   MCHC  33.8   RDW  45.5   PLATELETCT  156*   MPV  9.8     Recent Labs      05/23/18   0442   SODIUM  138   POTASSIUM  4.2   CHLORIDE  106   CO2  25   GLUCOSE  131*   BUN  23*   CREATININE  0.79   CALCIUM  9.0                      Assessment/Plan     Diabetic ulcer of left foot associated with type 2 diabetes mellitus, with muscle involvement without evidence of necrosis (HCC)- (present on admission)   Assessment & Plan    Hx of diabetic nonhealing ulcer, hx of previous Left first toe amputation,  Failed outpatient antibiotics  MRI + OM  S/p left great amp prox phalanx on 4/1-Wound cx - group G and C strep, morganella , diphtheroids  S/p left great toe amp down to MTP joint on 4/23. Clean margins obtained  Ortho input is noted  Zosyn  daptomycin  I.D input is noted and 6weeks antibiotics  Ortho input is noted  Awaiting SNF acceptance            Acute hematogenous osteomyelitis of left foot (HCC)- (present on admission)   Assessment & Plan    As above  Continue IV abx.        Diabetic peripheral neuropathy (HCC)- (present on admission)   Assessment & Plan    Pain controlled  Continue neurontin 300mg TID        Dehiscence of surgical wound- (present on admission)   Assessment & Plan    Continue Wound care         Psychiatric disorder- (present on admission)   Assessment & Plan    Continue congentin, seroquel, prozac  Continue valproate for mood control        Type 2 diabetes mellitus with neurologic complication, without long-term  current use of insulin (HCC)- (present on admission)   Assessment & Plan    Controlled, last A1c 5.8  accu checks are noted   metformin            Quality-Core Measures   DVT prophylaxis pharmacological::  Enoxaparin (Lovenox)         Non-Graft Cartilage Fenestration Text: The cartilage was fenestrated with a 2mm punch biopsy to help facilitate healing.

## 2020-09-17 ENCOUNTER — HOSPITAL ENCOUNTER (OUTPATIENT)
Dept: RADIOLOGY | Facility: MEDICAL CENTER | Age: 55
End: 2020-09-17
Attending: PHYSICIAN ASSISTANT
Payer: MEDICAID

## 2020-09-17 DIAGNOSIS — B18.2 CHRONIC HEPATITIS C WITHOUT HEPATIC COMA (HCC): ICD-10-CM

## 2020-10-15 NOTE — PROGRESS NOTES
2 RN check was done. Pt had multiple wounds on both feet. Pts knees looked a little scrapped. Pts butt is red and blanching.  Pt knuckles were also a little red and blanching.    History of Present Illness





- Stated complaint


Stated Complaint: "HEART ISSUES"





- Chief complaint


Chief Complaint: Cardiac





- History obtained from


History obtained from: Patient





- Additonal information


Additional information: 


Patient comes emergency department for chief complaint of my defibrillator went 

off.  Patient has a history of SVT which was refractory to both pharmacologic 

treatment and ablation, so in , he had a defibrillator placed.  About 10 

years ago, he states, he was converted to a pacemaker/defibrillator for more 

consistent heart rate control.  Patient states that since that time, until this 

month, he has only had his defibrillator fire once.  However, the defibrillator 

fired on , and after looking at the tracing, his cardiologist asked him

to increase his metoprolol from 50 twice daily to 100 mg in the morning and at 

night.  Patient states he is also on flecainide but they did not change.  

Patient states he was formerly on 100 mg of metoprolol twice daily, but they 

decreased this because the patient was experiencing fatigue.  He states his 

cardiologist told him that if he continues to have problems with the SVT 

cropping up again, that they may need to go back to 100 mg twice daily of the 

metoprolol.  Patient states that he sent the tracing from tonight's episode to 

the on-call cardiologist at [facility], and that he said to take an extra 

dose metoprolol this evening, which the patient did, making a total of 100 mg of

metoprolol tonight.  The patient states that after the defibrillator went off, 

he did not have any symptoms.  He states that prior to this, he did notice some 

palpitations, but no chest pain or shortness of breath.  He states that he felt 

his pacemaker began to kick in and when that did not control the heart rhythm, 

and his defibrillator fired.  He states that he feels normal now.  Patient 

denies any recent symptoms of illness.  He not had any other medication changes.

 He states that he does not have any other heart problems, specifically, no 

coronary artery disease.  He does note that his grandfather  in his 60s of 

an MI, but states that his grandfather was a heavy drinker and smoker.  Patient 

does not smoke cigarettes, is not a diabetic, and has never been formally 

diagnosed with hypertension.  No swelling in his legs.  Patient states he had an

echocardiogram yesterday and that this was unremarkable.  No other complaints at

this time.








Review of Systems


Ten Systems: 10 systems reviewed and negative


Constitutional: reports: Reviewed and negative


Eyes: reports: Reviewed and negative


Ears: reports: Reviewed and negative


Nose: reports: Reviewed and negative


Throat: reports: Reviewed and negative


Cardiac: reports: Palpitations.  denies: Chest pain / pressure, Pedal edema


Respiratory: reports: Reviewed and negative.  denies: Dyspnea


GI: reports: Reviewed and negative.  denies: Nausea


: reports: Reviewed and negative


Skin: reports: Reviewed and negative


Musculoskeletal: reports: Reviewed and negative.  denies: Extremity swelling


Neurologic: reports: Reviewed and negative


Psychiatric: reports: Reviewed and negative


Endocrine: reports: Reviewed and negative


Immunocompromised: reports: Reviewed and negative





PD PAST MEDICAL HISTORY





- Past Medical History


Past Medical History: Yes


Cardiovascular: Other





- Past Surgical History


Past Surgical History: Yes


General: Appendectomy


Cardiovascular: Pacemaker, AICD





- Present Medications


Home Medications: 


                                Ambulatory Orders











 Medication  Instructions  Recorded  Confirmed


 


Aspirin Chewable [[place] 81 mg PO DAILY 10/15/20 10/15/20





Aspirin]   


 


Flecainide Acetate 150 mg PO BID 10/15/20 10/15/20


 


Metoprolol Tartrate 50 mg PO BID 10/15/20 10/15/20














- Allergies


Allergies/Adverse Reactions: 


                                    Allergies











Allergy/AdvReac Type Severity Reaction Status Date / Time


 


No Known Drug Allergies Allergy   Verified 10/15/20 19:58














- Social History


Does the pt smoke?: No


Smoking Status: Never smoker


Does the pt drink ETOH?: No


Does the pt have substance abuse?: No





- Immunizations


Immunizations are current?: Yes





- POLST


Patient has POLST: No





PD ED PE NORMAL





- Vitals


Vital signs reviewed: Yes





- General


General: Alert and oriented X 3, No acute distress





- HEENT


HEENT: Atraumatic, PERRL, EOMI, Moist mucous membranes





- Neck


Neck: Supple, no meningeal sign





- Cardiac


Cardiac: RRR, No murmur, Strong equal pulses





- Respiratory


Respiratory: No respiratory distress, Clear bilaterally





- Abdomen


Abdomen: Soft, Non tender, Non distended





- Derm


Derm: Normal color, Warm and dry, No rash





- Extremities


Extremities: No deformity, No edema, No calf tenderness / cord





- Neuro


Neuro: Alert and oriented X 3





- Psych


Psych: Normal mood, Normal affect





Results





- Vitals


Vitals: 


                               Vital Signs - 24 hr











  10/15/20 10/15/20 10/15/20





  19:35 19:50 20:39


 


Temperature 36.6 C  


 


Heart Rate 66 62 63


 


Respiratory 20 14 20





Rate   


 


Blood Pressure 133/94 H 113/97 H 131/91 H


 


Blood Pressure 133/94 H  





[Left]   


 


Blood Pressure 113/97 H  





[Right]   


 


O2 Saturation 100 97 100














  10/15/20





  21:20


 


Temperature 36.5 C


 


Heart Rate 62


 


Respiratory 18





Rate 


 


Blood Pressure 117/87 H


 


Blood Pressure 





[Left] 


 


Blood Pressure 





[Right] 


 


O2 Saturation 96








                                     Oxygen











O2 Source                      Room air

















- EKG (time done)


  ** 1938


Rate: Rate (enter#) (64)


Rhythm: Paced


Compare to prior EKG: Old EKG unavailable


Computer interpretation: Agree with computer





- Labs


Labs: 


                                Laboratory Tests











  10/15/20 10/15/20





  19:48 19:48


 


WBC  6.5 


 


RBC  5.85 


 


Hgb  17.0 


 


Hct  51.4 


 


MCV  87.9 


 


MCH  29.1 


 


MCHC  33.1 


 


RDW  12.7 


 


Plt Count  240 


 


MPV  9.6 


 


Neut # (Auto)  3.9 


 


Lymph # (Auto)  1.7 


 


Mono # (Auto)  0.7 


 


Eos # (Auto)  0.2 


 


Baso # (Auto)  0.0 


 


Absolute Nucleated RBC  0.00 


 


Nucleated RBC %  0.0 


 


Sodium   137


 


Potassium   4.1


 


Chloride   99 L


 


Carbon Dioxide   28


 


Anion Gap   10.0


 


BUN   21 H


 


Creatinine   1.3 H


 


Estimated GFR (MDRD)   56 L


 


Glucose   112 H


 


Calcium   10.1


 


Total Bilirubin   0.7


 


AST   23


 


ALT   21


 


Alkaline Phosphatase   76


 


Total Protein   7.7


 


Albumin   4.2


 


Globulin   3.5


 


Albumin/Globulin Ratio   1.2


 


Lipase   47














- Rads (name of study)


  ** cxr


Radiology: Final report received, EMP read indepedently, See rad report (neg)





PD MEDICAL DECISION MAKING





- ED course


Complexity details: reviewed results, re-evaluated patient, considered 

differential, d/w patient


ED course: 


The patient was very well-appearing on arrival in the emergency department and 

had a normal paced rhythm.  He was hemodynamically stable.  I did feel though 

that since he has had 2 episodes of defibrillator activation within the last 

week, he should have some screening labs done here in the emergency department. 

 EKG was also done and showed a normal paced rhythm.  The patient remained 

stable throughout his stay in the emergency unremarkable.  I discussed this with

 the patient, and offered to call the on-call cardiologist for the patient's 

cardiology group.  However, the patient's wife stated she had already spoken 

with that cardiologist and that they had followed the instructions to take the 

extra dose of metoprolol tonight.  The patient states he is going to talk to his

 cardiologist tomorrow and that he can just find out at that time what he is 

supposed to do with his metoprolol dosing.  I feel this is reasonable and the 

patient stable for discharge home.  We have discussed the usual indications for 

return.








Departure





- Departure


Disposition: 01 Home, Self Care


Clinical Impression: 


 SVT (supraventricular tachycardia)





Condition: Stable


Instructions:  ED Tachycardia Pat PSVT


Comments: 


All of your labs look good.  There is no evidence of any emergent condition 

tonight.  Please follow through with your plans to talk to your cardiologist 

office tomorrow about whether you should increase your metoprolol to 100 mg 

twice daily.  If you have any further problems with your defibrillator or any 

further chest discomfort tonight, please do not hesitate to return to the 

emergency department.


Discharge Date/Time: 10/15/20 21:21

## 2020-12-12 NOTE — PROGRESS NOTES
"Assumed care of patient at 141 3. Received report from RN. Patient is AOX4 . Assessment complete. Labs reviewed.Patient and RN discussed plan of care. Patient questions answered. Patient needs are met at this time. Bed in lowest and locked position. Upper bed rails up.  Call light is within reach. Hourly rounding in place.  /72   Pulse 70   Temp 36.9 °C (98.5 °F)   Resp 16   Ht 1.93 m (6' 4\")   Wt 103.5 kg (228 lb 2.8 oz)   SpO2 97%   BMI 27.77 kg/m²     " Should be ok by now

## 2021-10-14 NOTE — ED NOTES
Pt brought back from [facility], ambulatory with steady gait.     Pt c/o open wounds to bilateral feet x 1 month. States difficulty walking d/t pain. Pt denies recent injury to feet. Multiple open wounds noted to L foot, big toe large/swollen/red. R foot also has scattered wounds with black/brown scabs noted.     Pt a/o x4, speaking in full sentences.    [Symptom and Test Evaluation] : symptom and test evaluation

## 2022-10-28 NOTE — H&P
It honestly is hard to say without seeing her.  In terms of constipation I would recommend which she is doing in terms of 20 to 30 g of fiber a day, 80 ounces of water, Dulcolax 100 mg, MiraLAX 17 g or 1 cap twice a day.  And then a suppository as needed.  Very slow to use laxatives because they often cause rebound constipation and actually in the long-term make things worse rather than better.  There are some other prescription options for constipation but they are extremely expensive.  There is a place where enemas can be helpful.  I would be happy to refer to gastro as well to talk the constipation if she has been doing all the above without regular bowel movements.   PRIMARY CARE PROVIDER:  At the [facility].    CHIEF COMPLAINT:  Left toe wound infection.    HISTORY OF PRESENT ILLNESS:  Patient is a 52-year-old male with a history of   diabetes and osteomyelitis for which he underwent amputation of the left toe.    He was clinically improving.  He was discharged home on Augmentin; however,   he did not fill this prescription, reports that no one gave him a   prescription.  He did not follow up with his primary care.  He did not follow   up with the wound clinic until today.  He has not been compliant with his   offloading boot.  At the wound clinic today, he was noted to have wound   dehiscence and concern for infection and was referred to the emergency room.    He denies any fever or chills.  He denies any chest pain or shortness of   breath.  He denies any nausea or vomiting.  He denies any chest pain or   lightheadedness.    REVIEW OF SYSTEMS:  As above, otherwise reviewed and negative.    PAST MEDICAL HISTORY:  Significant for hypertension, dyslipidemia, hep C, type   2 diabetes, anxiety.    PAST SURGICAL HISTORY:  Ventral hernia repair, left great toe amputation on   April 1st.    SOCIAL HISTORY:  He smokes quarter of a pack per day.  No alcohol or illicit   drug use.    FAMILY HISTORY:  Reviewed, not pertinent to the presenting problem.    CURRENT MEDICATIONS:  Cogentin 1 mg b.i.d., Prozac 60 mg daily, lorazepam 0.5   mg b.i.d., metformin 500 mg b.i.d. Invega 3 mg daily, Seroquel 100 mg in the   morning, 100 mg at noon, 200 mg at bedtime.    PHYSICAL EXAMINATION:  GENERAL:  He is alert, oriented x3.  VITAL SIGNS:  Temperature is 36.1, pulse is 62, respiratory rate is 18, blood   pressure is 129/75, pulse oximetry is 98%.  HEAD AND NECK:  Pupils equal.  Supple neck.  No jugular venous distention.    Oropharynx is clear.  No cervical lymphadenopathy.  HEART:  Regular rate and rhythm, normal S1, S2.  No murmurs, rubs or gallops.  LUNGS:  Clear with symmetric air entry  bilaterally.  No chest wall tenderness.  ABDOMEN:  Soft, nontender.  Bowel sounds are positive.  No hepatosplenomegaly.  EXTREMITIES:  No edema, no clubbing, no cyanosis.  He has wound dehiscence at   the site of his previous left toe amputation with surrounding erythema and   small amount of drainage.  NEUROLOGIC:  No focal deficits.  SKIN:  He has the above noted changes in his left first toe.    DIAGNOSTICS:  Previous wound culture grew group G and C strep, Morganella and   diphtheroids.  White blood cell today is 4.9, hemoglobin 12.3, hematocrit is   7.5, platelet count 162.  Sodium is 139, potassium 4.0, chloride 109,   bicarbonate 25, glucose 91, BUN 15, creatinine 0.8.  LFTs are normal.  Lactic   acid is 0.9.  X-ray of his left foot revealed postsurgical changes, status   post amputation at the level of the proximal phalanx of the first digit,   linear amorphous calcification adjacent to the amputation, may be dystrophic.    There is minimal osseous regularity at the level of the amputation, but it is   difficult to exclude osteomyelitis, soft tissue swelling of the forefoot and   remainder of the first digit.    ASSESSMENT:  1.  Osteomyelitis, status post amputation of the left first toe now with the   wound dehiscence and likely persistent infections secondary to noncompliance.  2.  Type 2 diabetes.  3.  Psychiatric disorder.  4.  Tobacco use.    PLAN:  The patient will be admitted.  He will be started on broad-spectrum   antibiotics with IV vancomycin, ceftriaxone and Flagyl.    We will repeat cultures and deescalate antibiotics accordingly.    We will ask for infectious disease and limb preservation services   consultations.    I suspect that the patient will likely require a surgical revision.    We will continue with his home psychiatric medications.    Patient counseled on smoking cessation.    Reinforced with the patient the importance to comply with prescribed therapies   and close followup to avoid  progressive infection resulting in limb loss.    He is on Lovenox for deep venous thrombosis prophylaxis.    We will likely require more than 2 midnights stay for treatment of his medical   condition.       ____________________________________     MD SILVIA CROWLEY / BRANDY    DD:  04/20/2018 20:18:50  DT:  04/20/2018 20:52:58    D#:  [id number]  Job#:  [id number]    cc: [facility]

## 2023-11-21 NOTE — PROGRESS NOTES
NAME: Eduardo Cannon  DATE: 11/21/2023    [facility] Phase II  [id number]    Services Provided    Group Psychotherapy x 1  Education/Training x 0  Activity Therapy  x 0  Individual Therapy x 0 0 minutes  Family Therapy x 0  MD Initial Visit  x 0  MD Follow-Up   x 0    Number of participants: 4    DATA:  Patient reported he was doing alright today. He had begun taking his medication again for the past 3-4 days. He had forgotten to take it those five days in a row because he didn't like taking it in the afternoon. It would make him sleepy for work in the evening. Regarding the thoughts he had been expressing last group session, the patient reported that he had thought better of things since then. He had not intention of acting on such thoughts, and he recognized it was his tendency to have such ideas cross his mind from time to time.     Individual: No    Homework: None assigned    Group 1 (Psychotherapy: Check-ins): Therapist continued facilitation of rapport building strategies between group members. Therapist asked that each patient check in with home life and recovery efforts and identify triggers, cravings, and high risk situations that arise between group sessions.  Group members discussed barriers and benefits of attending recovery meetings.  Therapist provided empathy and support during group session. Daily Reading: None     ASSESSMENT:    Personal Assessment 0-10 Scale (10 worst)    Anxiety:  3 (feeling a little amped up)  Depression:  1 (no comment)  Cravings: 0 (marijuana at 2)     MSE within normal limits? Yes Affect: somber Mood: anxious  Pt Response:  open/receptive  Engaged in activity/Process and self-disclosed: adequate  Applies today's group topics to self: adequate  Able to give and receive feedback: adequate  Demonstrated insightful thinking: consistent and adequate  Other pt response comments:  Patient was a positive participant. They demonstrated a relatively positive attitude, a strong degree of  "Infectious Disease Progress Note    Author: Amanda Feng M.D. Date & Time of service: 2018  3:37 PM    Chief Complaint:  Left foot wound dehiscence with osteomyelitis f/u      Interval History:  52 y.o. WM admitted 2018 due to left foot wound dehiscence at amp site   AF WBC 5 denies any pain-ate all of breakfast   AF plan for repeat surgery today. No new complaints  5/15 AF WBC 4.6 cxs now polymicrobial somnolent   AF alert and anxious today, jittery states \"I don't want them to cut my leg off\"   AF less anxious today-apparently refused PICC. Seen with LPS  Labs Reviewed, Medications Reviewed, Radiology Reviewed and Wound Reviewed.    Review of Systems:  Review of Systems   Constitutional: Negative for chills and fever.   Respiratory: Negative for cough and shortness of breath.    Cardiovascular: Negative for chest pain.   Gastrointestinal: Negative for abdominal pain, nausea and vomiting.   Musculoskeletal: Negative for joint pain.   Neurological: Positive for sensory change.        Neuropathy   Psychiatric/Behavioral: Positive for depression.   All other systems reviewed and are negative.      Hemodynamics:  Temp (24hrs), Av.7 °C (98.1 °F), Min:36.3 °C (97.3 °F), Max:37.2 °C (98.9 °F)  Temperature: 36.7 °C (98.1 °F)  Pulse  Av.8  Min: 52  Max: 76  Blood Pressure: 125/82       Physical Exam:  Physical Exam   Constitutional:   disheveled   HENT:   Head: Normocephalic.   Mouth/Throat: No oropharyngeal exudate.   Poor dentition   Eyes: EOM are normal. Pupils are equal, round, and reactive to light.   Neck: Normal range of motion.   Cardiovascular: Normal heart sounds.    Pulmonary/Chest: He has no wheezes. He has no rales.   Abdominal: Bowel sounds are normal. There is no tenderness.   Musculoskeletal:   Amp site dressed with sutures, minimal drainage  Forefoot decreased edema  LUE PICC   Neurological:   Refusing to cooperate much today  Flat affect   Skin: No rash noted. He " is not diaphoretic.   DTIs to lat and dorsum left foot   Nursing note and vitals reviewed.      Meds:    Current Facility-Administered Medications:   •  PICC Line Insertion has been implemented **AND** May use Lidocaine 1% not to exceed 3 mls for local at insertion site **AND** NOTIFY MD **AND** Tip to dwell in the superior vena cava **AND** Do not use PICC Line until placement verified by Chest X Ray **AND** [CANCELED] DX-CHEST-FOR PICC LINE Perform procedure in: Patient's Room **AND** If radiologist reading of chest X-ray states any of the following the PICC should be used **AND** Further evaluation of the PICC placement can be retrieved from X-Ray and Imaging **AND** Blood draws through PICC line; draws by RN only **AND** FLUSHING GUIDELINES WHEN IN USE **AND** normal saline PF **AND** FLUSHING GUIDELINES WHEN NOT IN USE **AND** DRESSING MAINTENANCE **AND** Change needleless pressure ports and IV tubing every 72 hours per hospital policy **AND** TUBING **AND** If there is an MD order to remove the PICC line, any RN may remove the PICC line **AND** [] PATIENT EDUCATION MATERIALS **AND** NURSING COMMUNICATION  •  gabapentin  •  LORazepam  •  oxyCODONE immediate-release  •  ketorolac  •  acetaminophen  •  valproic acid  •  DAPTOmycin  •  [COMPLETED] piperacillin-tazobactam **AND** piperacillin-tazobactam  •  enoxaparin (LOVENOX) injection  •  senna-docusate **AND** polyethylene glycol/lytes **AND** magnesium hydroxide **AND** bisacodyl  •  Respiratory Care per Protocol  •  insulin regular **AND** Accu-Chek ACHS **AND** NOTIFY MD and PharmD **AND** glucose 4 g **AND** dextrose 50%  •  benztropine  •  FLUoxetine  •  QUEtiapine **AND** QUEtiapine    Labs:  No results for input(s): WBC, RBC, HEMOGLOBIN, HEMATOCRIT, MCV, MCH, RDW, PLATELETCT, MPV, NEUTSPOLYS, LYMPHOCYTES, MONOCYTES, EOSINOPHILS, BASOPHILS, RBCMORPHOLO in the last 72 hours.  Recent Labs      18   0345  18   0305   SODIUM  139  138  motivation, and adequate insight, as evidenced by his level of engagement combined with his insights. They seemed interested and attentive. They appeared to comprehend well the concepts being discussed. The patient seemed receptive of, and willing to implement, the ideas being discussed. Their thought process appeared linear and goal directed, and their speech was regular rate and rhythm.       Community Group Engagement:  No: not interested    Reported relapse since last meeting? No: no lapse    .  Appointment on 11/21/2023   Component Date Value Ref Range Status    External Amphetamine Screen Urine 11/21/2023 Positive (A)   Final    External Benzodiazepine Screen Uri* 11/21/2023 Negative   Final    External Cocaine Screen Urine 11/21/2023 Negative   Final    External THC Screen Urine 11/21/2023 Negative   Final    External Methadone Screen Urine 11/21/2023 Negative   Final    External Methamphetamine Screen Ur* 11/21/2023 Negative   Final    External Oxycodone Screen Urine 11/21/2023 Negative   Final    External Buprenorphine Screen Urine 11/21/2023 Negative   Final    External MDMA 11/21/2023 Negative   Final    External Opiates Screen Urine 11/21/2023 Negative   Final   Orders Only on 11/09/2023   Component Date Value Ref Range Status    External Amphetamine Screen Urine 11/09/2023 Positive (A)   Final    External Benzodiazepine Screen Uri* 11/09/2023 Negative   Final    External Cocaine Screen Urine 11/09/2023 Negative   Final    External THC Screen Urine 11/09/2023 Negative   Final    External Methadone Screen Urine 11/09/2023 Negative   Final    External Methamphetamine Screen Ur* 11/09/2023 Negative   Final    External Oxycodone Screen Urine 11/09/2023 Negative   Final    External Buprenorphine Screen Urine 11/09/2023 Negative   Final    External MDMA 11/09/2023 Negative   Final    External Opiates Screen Urine 11/09/2023 Negative   Final   Orders Only on 11/07/2023   Component Date Value Ref Range Status       POTASSIUM  3.8  4.0   CHLORIDE  107  105   CO2  25  28   GLUCOSE  96  106*   BUN  12  15   CPKTOTAL   --   19     Recent Labs      05/16/18   0345  05/17/18   0305   ALBUMIN  3.1*  3.4   CREATININE  0.88  0.93       Imaging:  Dx-foot-2- Left    Result Date: 4/20/2018 4/20/2018 10:19 AM HISTORY/REASON FOR EXAM:  Pain/Deformity Following Trauma Weeping great toe TECHNIQUE/EXAM DESCRIPTION AND NUMBER OF VIEWS: 2 nonweightbearing views of the LEFT foot. COMPARISON:  3/31/2018 FINDINGS: Patient is status post amputation of the first digit at the level of the proximal phalanx. There is minimal osseous irregularity at the level of the amputation. There is faint adjacent calcification which may be dystrophic.  There are mild degenerative changes of the first tarsometatarsal joint. No acute fracture or dislocation is seen. There is soft tissue swelling about the medial forefoot and remainder of the first digit.     Post surgical changes status post amputation at the level of the proximal phalanx of the first digit. Linear amorphous calcification adjacent to the amputation may be dystrophic. There is minimal osseous irregularity at the level of the amputation where it is difficult to exclude osteomyelitis. Soft tissue swelling of the forefoot and remainder of the first digit.     Mr-foot-with & W/o Left    Result Date: 5/12/2018 5/12/2018 10:44 AM HISTORY/REASON FOR EXAM:  Open wound. Open wound,?surgical site at great toe amp site, please eval for OM and/ or abscess. TECHNIQUE/EXAM DESCRIPTION: MRI of the LEFT foot with and without contrast. The study was performed on a H[employer]zing 1.5 Steph MRI scanner. T1 axial, T1 sagittal, T1 coronal, STIR sagittal, T2 fast spin-echo fat-suppressed coronal, sagittal inversion recovery, and T1 postcontrast coronal images were obtained. 20 mL Omniscan contrast was administered intravenously. COMPARISON: Foot radiograph 4/20/2018. FINDINGS: Diffuse soft tissue swelling about the dorsum  External Amphetamine Screen Urine 11/07/2023 Negative   Final    External Benzodiazepine Screen Uri* 11/07/2023 Negative   Final    External Cocaine Screen Urine 11/07/2023 Negative   Final    External THC Screen Urine 11/07/2023 Negative   Final    External Methadone Screen Urine 11/07/2023 Negative   Final    External Methamphetamine Screen Ur* 11/07/2023 Negative   Final    External Oxycodone Screen Urine 11/07/2023 Negative   Final    External Buprenorphine Screen Urine 11/07/2023 Negative   Final    External MDMA 11/07/2023 Negative   Final    External Opiates Screen Urine 11/07/2023 Negative   Final       Impression/Formulation:    ICD-10-CM ICD-9-CM   1. Alcohol use disorder, severe, dependence  F10.20 303.90   2. Bipolar affective disorder, mixed  F31.60 296.60   3. Medication management  Z79.899 V58.69        CLINICAL MANEUVERING/INTERVENTIONS: Therapist utilized a person-centered approach to build and maintain rapport with group member.   Therapist promoted safe nonjudgmental environment by providing group members with unconditional positive regard.  Assisted member in processing above session content; acknowledged and normalized patient's thoughts, feelings and concerns.  Allowed patient to freely discuss issues without interruption or judgment. Provided safe, confidential environment to facilitate the development of positive therapeutic relationship and encourage open, honest communication. Therapist assisted group member with identifying and implementing healthier coping strategies and maintaining healthier boundaries. Assisted patient in identifying risk factors which would indicate the need for higher level of care including thoughts to harm self or others and/or self-harming behavior and encouraged patient to contact this office, call 911, or present to the nearest emergency room should any of these events occur. Pt agreeable to adhere to medication regimen as prescribed and report any side effects.   Discussed crisis intervention services and means to access.  Patient adamantly and convincingly denies current suicidal or homicidal ideation or perceptual disturbance.      Therapist implemented motivational interviewing techniques to assist client with exploring and resolving ambivalence associated with commitment to change behaviors.  Yes  Therapist utilized dialectical behavior techniques to teach and model emotional regulation and relaxation.  No   Therapist applied cognitive behavioral strategies to facilitate identification of maladaptive patterns of thinking and behavior.  Yes     PLAN:    Continue [facility] Phase II  Aftercare:  [facility] Outpatient Phase 3     Please note that portions of this note were completed with a voice recognition program. Efforts were made to edit dictation, but occasionally words are mistranscribed.     This document signed by Mulugeta Santos LCSW, November 21, 2023, 13:54 EST     of foot. Increased signal within the intrinsic foot muscles, likely related to neuropathic change. There is an open ulcer extending from the great toe amputation site into the first metatarsal head with marrow replacement. No walled fluid collection seen.     Large open ulcer extending from the great toe amputation site into the first metatarsal head with osteomyelitis of the first metatarsal head. No walled fluid collection seen.      Micro:  Results     Procedure Component Value Units Date/Time    GRAM STAIN [[id number]]  (Abnormal) Collected:  05/12/18 1113    Order Status:  Completed Specimen:  Tissue Updated:  05/15/18 1559     Significant Indicator . (POS)     Source TISS     Site Left Foot     Gram Stain Result Few WBCs.  Moderate Gram positive cocci.  Rare Gram positive rods.   (A)    Narrative:       CALL  Sandoval  [facility] [place] tel. [phone],  CALLED  [facility] [place] tel. [phone] 05/14/2018, 10:22, RB PERF. RESULTS CALLED  TO:Fabiola Solares Rn    CULTURE TISSUE W/ GRM STAIN [[id number]]  (Abnormal)  (Susceptibility) Collected:  05/12/18 1113    Order Status:  Completed Specimen:  Tissue Updated:  05/15/18 1559     Significant Indicator POS (POS)     Source TISS     Site Left Foot     Tissue Culture -- (A)     Gram Stain Result Few WBCs.  Moderate Gram positive cocci.  Rare Gram positive rods.   (A)     Tissue Culture Methicillin Resistant Staphylococcus aureus  Moderate growth   (A)      Staphylococcus haemolyticus  Light growth   (A)      Pasteurella multocida  Light growth   (A)    Narrative:       CALL  Sandoval  [facility] tel. [phone],  CALLED  [facility] [place] tel. [phone] 05/14/2018, 10:22, RB PERF. RESULTS CALLED  TO:Fabiola Solares Rn    Culture & Susceptibility     METHICILLIN RESISTANT STAPHYLOCOCCUS AUREUS     Antibiotic Sensitivity Microscan Unit Status    Ampicillin/sulbactam Resistant >16/8 mcg/mL Final    Method: SENSITIVITY, JUNIOR    Clindamycin Sensitive <=0.5 mcg/mL Final    Method: SENSITIVITY, JUNIOR    Daptomycin Sensitive 1 mcg/mL  Final    Method: SENSITIVITY, JUNIOR    Erythromycin Resistant >4 mcg/mL Final    Method: SENSITIVITY, JUNIOR    Moxifloxacin Sensitive 2 mcg/mL Final    Method: SENSITIVITY, JUNIOR    Oxacillin Resistant >2 mcg/mL Final    Method: SENSITIVITY, JUNIOR    Penicillin Resistant >8 mcg/mL Final    Method: SENSITIVITY, JUNIOR    Tetracycline Sensitive <=4 mcg/mL Final    Method: SENSITIVITY, JUNIOR    Trimeth/Sulfa Sensitive <=0.5/9.5 mcg/mL Final    Method: SENSITIVITY, JUNIOR    Vancomycin Sensitive 2 mcg/mL Final    Method: SENSITIVITY, JUNIOR              STAPHYLOCOCCUS HAEMOLYTICUS     Antibiotic Sensitivity Microscan Unit Status    Ampicillin/sulbactam Resistant 16/8 mcg/mL Final    Method: SENSITIVITY, JUNIOR    Clindamycin Resistant >4 mcg/mL Final    Method: SENSITIVITY, JUNIOR    Daptomycin Sensitive <=0.5 mcg/mL Final    Method: SENSITIVITY, JUNIOR    Erythromycin Resistant >4 mcg/mL Final    Method: SENSITIVITY, JUNIOR    Moxifloxacin Intermediate 4 mcg/mL Final    Method: SENSITIVITY, JUNIOR    Oxacillin Resistant >2 mcg/mL Final    Method: SENSITIVITY, JUNIOR    Penicillin Resistant >8 mcg/mL Final    Method: SENSITIVITY, JUNIOR    Tetracycline Sensitive <=4 mcg/mL Final    Method: SENSITIVITY, JUNIOR    Trimeth/Sulfa Resistant >2/38 mcg/mL Final    Method: SENSITIVITY, JUNIOR    Vancomycin Sensitive 2 mcg/mL Final    Method: SENSITIVITY, JUNIOR                       ANAEROBIC CULTURE [[id number]]  (Abnormal) Collected:  05/12/18 1113    Order Status:  Completed Specimen:  Tissue Updated:  05/15/18 1559     Significant Indicator POS (POS)     Source TISS     Site Left Foot     Anaerobic Culture, Culture Res Growth noted after further incubation, see below for  organism identification.   (A)      Peptostreptococcus art  Moderate growth   (A)    Narrative:       CALL  Sandoval  [facility] tel. [phone],  CALLED  [facility] [place] tel. [phone] 05/14/2018, 10:22, RB PERF. RESULTS CALLED  TO:Fabiola 96082 Rn          Assessment:  Active Hospital Problems    Diagnosis   • Diabetic  ulcer of left foot associated with type 2 diabetes mellitus, with muscle involvement without evidence of necrosis (HCC) [E11.621, L97.525]   • Dehiscence of surgical wound [T81.31XA]   • Diabetic peripheral neuropathy (HCC) [E11.42]   • Psychiatric disorder [F99]   • Type 2 diabetes mellitus with neurologic complication, without long-term current use of insulin (HCC) [E11.49]       Plan:  Left foot osteomyelitis, additional work  Afebrile  No leukocytosis   S/p left great amp prox phalanx on 4/1-Wound cx - group G and C strep, morganella , diphtheroids  S/p left great toe amp down to MTP joint on 4/23. Clean margins obtained per OP note- OR cx (proximal bone) - mixed skin christine  Failed outpatient therapy-clinical osteomyelitis as probed to bone in clinic. MRI also+ostemyelitis  s/p another debridement 5/12-MRSA, staph haemolyticus(MRSE), peptostreptococcus, and Pasteurella  s/p I and D 5/14  Continue Zosyn and dapto as vanco JUNIOR is 2  Endpoint clinical-anticipate 6 weeks IV if no TMA/ BKA    DM2  Last HgA1c 5.8  Keep BS under 150 to help control current infection    Psychiatric disorder  Suspect contributing to prior noncompliance  Poor insight-denies infection at times    Prognosis for limb salvage poor  Will need placement    DW IM Dr Maria/LPS

## 2024-04-28 ENCOUNTER — PHARMACY VISIT (OUTPATIENT)
Dept: PHARMACY | Facility: MEDICAL CENTER | Age: 59
End: 2024-04-28
Payer: COMMERCIAL

## 2024-04-28 ENCOUNTER — OFFICE VISIT (OUTPATIENT)
Dept: URGENT CARE | Facility: CLINIC | Age: 59
End: 2024-04-28
Payer: MEDICAID

## 2024-04-28 VITALS
TEMPERATURE: 97.4 F | HEIGHT: 76 IN | RESPIRATION RATE: 20 BRPM | HEART RATE: 71 BPM | OXYGEN SATURATION: 98 % | BODY MASS INDEX: 28.74 KG/M2 | DIASTOLIC BLOOD PRESSURE: 88 MMHG | SYSTOLIC BLOOD PRESSURE: 156 MMHG | WEIGHT: 236 LBS

## 2024-04-28 DIAGNOSIS — I10 PRIMARY HYPERTENSION: ICD-10-CM

## 2024-04-28 DIAGNOSIS — R25.2 MUSCLE CRAMP, NOCTURNAL: ICD-10-CM

## 2024-04-28 DIAGNOSIS — Z76.0 MEDICATION REFILL: ICD-10-CM

## 2024-04-28 DIAGNOSIS — B86 SCABIES: ICD-10-CM

## 2024-04-28 DIAGNOSIS — E78.5 HYPERLIPIDEMIA, UNSPECIFIED HYPERLIPIDEMIA TYPE: ICD-10-CM

## 2024-04-28 PROCEDURE — RXMED WILLOW AMBULATORY MEDICATION CHARGE

## 2024-04-28 RX ORDER — ACETAMINOPHEN 500 MG
500-1000 TABLET ORAL EVERY 6 HOURS PRN
Qty: 30 TABLET | Refills: 0 | Status: SHIPPED | OUTPATIENT
Start: 2024-04-28

## 2024-04-28 RX ORDER — FLUVASTATIN 40 MG/1
40 CAPSULE ORAL NIGHTLY
Qty: 30 CAPSULE | Refills: 0 | Status: SHIPPED | OUTPATIENT
Start: 2024-04-28

## 2024-04-28 RX ORDER — TRIAMCINOLONE ACETONIDE 1 MG/G
CREAM TOPICAL
Qty: 45 G | Refills: 0 | Status: SHIPPED | OUTPATIENT
Start: 2024-04-28

## 2024-04-28 RX ORDER — CYCLOBENZAPRINE HCL 10 MG
10 TABLET ORAL NIGHTLY PRN
Qty: 7 TABLET | Refills: 0 | Status: SHIPPED | OUTPATIENT
Start: 2024-04-28 | End: 2024-05-05

## 2024-04-28 RX ORDER — LISINOPRIL 10 MG/1
10 TABLET ORAL DAILY
Qty: 30 TABLET | Refills: 0 | Status: SHIPPED | OUTPATIENT
Start: 2024-04-28

## 2024-04-28 RX ORDER — PERMETHRIN 50 MG/G
CREAM TOPICAL
Qty: 60 G | Refills: 0 | Status: SHIPPED | OUTPATIENT
Start: 2024-04-28

## 2024-04-28 ASSESSMENT — ENCOUNTER SYMPTOMS
FEVER: 0
SHORTNESS OF BREATH: 0

## 2024-04-28 NOTE — PROGRESS NOTES
Subjective:     CHIEF COMPLAINT  Chief Complaint   Patient presents with    Medication Refill     Patient is here today for refills on his medication, states that he is unable to get into primary care at the moment. States he needs BP medication        HPI  Wilbert Kennith Yeomans Jr. is a very pleasant 58 y.o. male who presents requesting medication refills for his blood pressure medication and cholesterol medication.  He reports that he has previously taken lisinopril as well as fluvastatin and has tolerated them well but has ran out.  He was previously established with a primary care provider at [facility] and plans to reestablish this week.  He also reports that he has been having nighttime muscle cramps and is requesting a muscle relaxer as well as Tylenol.  He reports that he has been homeless for the past week and has noticed several bug bites on his wrist and arms and is concerned for scabies.  He is otherwise feeling well at this time.  He reports that he has housing established that he will be moving into this week.    REVIEW OF SYSTEMS  Review of Systems   Constitutional:  Negative for fever.   Respiratory:  Negative for shortness of breath.    Cardiovascular:  Negative for chest pain.   Skin:  Positive for itching and rash.       PAST MEDICAL HISTORY  Patient Active Problem List    Diagnosis Date Noted    Diabetic foot infection (HCC) 08/11/2018    Mental disability 06/13/2018    Diabetic peripheral neuropathy (HCC) 05/11/2018    Psychiatric disorder 04/20/2018    Type 2 diabetes mellitus with neurologic complication, without long-term current use of insulin (HCC) 03/30/2018    Hernia, ventral 03/30/2016       SURGICAL HISTORY   has a past surgical history that includes ventral hernia repair laparoscopic (3/30/2016); toe amputation (Left, 4/1/2018); toe amputation (Left, 4/23/2018); irrigation & debridement ortho (Left, 5/12/2018); irrigation & debridement ortho (Left, 5/14/2018); and foot amputation (Left,  "8/10/2018).    ALLERGIES  No Known Allergies    CURRENT MEDICATIONS  Home Medications       Reviewed by Aimee Lancaster P.A.-C. (Physician Assistant) on 04/28/24 at 0937  Med List Status: <None>     Medication Last Dose Status   acetaminophen (TYLENOL) 325 MG Tab  Active   benztropine (COGENTIN) 1 MG Tab  Active   cyanocobalamin (VITAMIN B12) 1000 MCG Tab  Active   fluvastatin (LESCOL) 40 MG Cap  Active   folic acid (FOLVITE) 1 MG Tab  Active   LORazepam (ATIVAN) 0.5 MG Tab  Active   permethrin (ELIMITE) 5 % Cream  Active   propranolol (INDERAL) 20 MG Tab  Active   QUEtiapine (SEROQUEL) 100 MG Tab  Active   triamcinolone acetonide (KENALOG) 0.1 % Cream  Active                    SOCIAL HISTORY  Social History     Tobacco Use    Smoking status: Some Days     Current packs/day: 0.25     Average packs/day: 0.3 packs/day for 30.0 years (7.5 ttl pk-yrs)     Types: Cigarettes    Smokeless tobacco: Never   Vaping Use    Vaping Use: Never used   Substance and Sexual Activity    Alcohol use: No    Drug use: No     Comment: last time used 10+ years    Sexual activity: Not on file       FAMILY HISTORY  History reviewed. No pertinent family history.       Objective:     VITAL SIGNS: BP (!) 156/88   Pulse 71   Temp 36.3 °C (97.4 °F) (Temporal)   Resp 20   Ht 1.93 m (6' 4\")   Wt 107 kg (236 lb)   SpO2 98%   BMI 28.73 kg/m²     PHYSICAL EXAM  Physical Exam  Vitals reviewed.   Constitutional:       General: He is not in acute distress.     Appearance: Normal appearance. He is not ill-appearing or toxic-appearing.   HENT:      Head: Normocephalic and atraumatic.      Mouth/Throat:      Mouth: Mucous membranes are moist.   Eyes:      Conjunctiva/sclera: Conjunctivae normal.      Pupils: Pupils are equal, round, and reactive to light.   Pulmonary:      Effort: Pulmonary effort is normal. No respiratory distress.   Skin:     General: Skin is warm and dry.             Comments: Burrowed lesions present on ventral surface of " bilateral wrist with several small erythematous papules present.  Excoriations present.   Neurological:      General: No focal deficit present.      Mental Status: He is alert and oriented to person, place, and time.   Psychiatric:         Mood and Affect: Mood normal.         Assessment/Plan:     1. Primary hypertension  - lisinopril (PRINIVIL) 10 MG Tab; Take 1 Tablet by mouth every day.  Dispense: 30 Tablet; Refill: 0    2. Scabies  - permethrin (ELIMITE) 5 % Cream; Apply 30g (around half of the 60 gram tube) head to toe on skin and do not wash or rinse for 8-14 hours, then repeat application in 7-10 days.  Dispense: 60 g; Refill: 0  - triamcinolone acetonide (KENALOG) 0.1 % Cream; Apply to affected area(s) twice daily for no longer than 14 days  Dispense: 45 g; Refill: 0    3. Medication refill  - lisinopril (PRINIVIL) 10 MG Tab; Take 1 Tablet by mouth every day.  Dispense: 30 Tablet; Refill: 0  - fluvastatin (LESCOL) 40 MG Cap; Take 1 Capsule by mouth every evening.  Dispense: 30 Capsule; Refill: 0    4. Muscle cramp, nocturnal  - cyclobenzaprine (FLEXERIL) 10 mg Tab; Take 1 Tablet by mouth at bedtime as needed for Muscle Spasms for up to 7 days.  Dispense: 7 Tablet; Refill: 0  - acetaminophen (TYLENOL) 500 MG Tab; Take 1-2 Tablets by mouth every 6 hours as needed for Mild Pain.  Dispense: 30 Tablet; Refill: 0    5. Hyperlipidemia, unspecified hyperlipidemia type  -Follow up with PCP regarding elevated blood pressure obtained in office today  -DASH diet  -Return to clinic as needed  -Wash clothing and bedding in water over 120 F      MDM/Comments:  Patient does have a history of hypertension. Discussed with patient keeping a blood pressure log to monitor daily blood pressure readings and following the DASH diet. Patient will follow up with their PCP. Additionally, discussed the risks of uncontrolled hypertension. Patient is not displaying systemic signs of hypertension at this time. Patient has stable vital  signs and is non-toxic appearing.  Patient provided refill of lisinopril as well as fluvastatin.  Patient provided 7 days of Flexeril for muscle cramping as well as a prescription for Tylenol.  Patient does display signs of scabies has been provided permethrin cream as well as triamcinolone cream for itching.  Discussed scabies management including washing his linen and sanitizing his living space.  Discussed supportive care with hydration, rest, Tylenol/Ibuprofen as needed. Patient demonstrated understanding of treatment plan at this time and will RTC if symptoms worsen or fail to resolve.       Differential diagnosis, natural history, supportive care, and indications for immediate follow-up discussed. All questions answered. Patient agrees with the plan of care.    Follow-up as needed if symptoms worsen or fail to improve to PCP, Urgent care or Emergency Room.    I have personally reviewed prior external notes and test results pertinent to today's visit.  I have independently reviewed and interpreted all diagnostics ordered during this urgent care acute visit.   Discussed management options (risks,benefits, and alternatives to treatment). Pt expresses understanding and the treatment plan was agreed upon. Questions were encouraged and answered to pt's satisfaction.    Please note that this dictation was created using voice recognition software. I have made a reasonable attempt to correct obvious errors, but I expect that there are errors of grammar and possibly content that I did not discover before finalizing the note.

## 2024-04-29 ENCOUNTER — PHARMACY VISIT (OUTPATIENT)
Dept: PHARMACY | Facility: MEDICAL CENTER | Age: 59
End: 2024-04-29
Payer: COMMERCIAL

## 2024-04-29 PROCEDURE — RXMED WILLOW AMBULATORY MEDICATION CHARGE

## 2024-05-18 ENCOUNTER — OFFICE VISIT (OUTPATIENT)
Dept: URGENT CARE | Facility: CLINIC | Age: 59
End: 2024-05-18
Payer: MEDICAID

## 2024-05-18 VITALS
RESPIRATION RATE: 20 BRPM | HEART RATE: 88 BPM | HEIGHT: 76 IN | WEIGHT: 228.3 LBS | OXYGEN SATURATION: 97 % | SYSTOLIC BLOOD PRESSURE: 160 MMHG | BODY MASS INDEX: 27.8 KG/M2 | DIASTOLIC BLOOD PRESSURE: 88 MMHG | TEMPERATURE: 97.5 F

## 2024-05-18 DIAGNOSIS — M25.50 ARTHRALGIA, UNSPECIFIED JOINT: ICD-10-CM

## 2024-05-18 DIAGNOSIS — E11.42 TYPE 2 DIABETES MELLITUS WITH DIABETIC POLYNEUROPATHY, WITHOUT LONG-TERM CURRENT USE OF INSULIN (HCC): ICD-10-CM

## 2024-05-18 DIAGNOSIS — E11.42 DIABETIC PERIPHERAL NEUROPATHY (HCC): ICD-10-CM

## 2024-05-18 PROCEDURE — 3079F DIAST BP 80-89 MM HG: CPT | Performed by: FAMILY MEDICINE

## 2024-05-18 PROCEDURE — 3077F SYST BP >= 140 MM HG: CPT | Performed by: FAMILY MEDICINE

## 2024-05-18 PROCEDURE — 99214 OFFICE O/P EST MOD 30 MIN: CPT | Performed by: FAMILY MEDICINE

## 2024-05-18 RX ORDER — IBUPROFEN 600 MG/1
600 TABLET ORAL EVERY 8 HOURS PRN
Qty: 20 TABLET | Refills: 0 | Status: SHIPPED
Start: 2024-05-18 | End: 2024-05-18 | Stop reason: SDUPTHER

## 2024-05-18 RX ORDER — GABAPENTIN 100 MG/1
100 CAPSULE ORAL
Qty: 30 CAPSULE | Refills: 0 | Status: SHIPPED | OUTPATIENT
Start: 2024-05-18

## 2024-05-18 RX ORDER — IBUPROFEN 600 MG/1
600 TABLET ORAL EVERY 8 HOURS PRN
Qty: 20 TABLET | Refills: 0 | Status: SHIPPED | OUTPATIENT
Start: 2024-05-18

## 2024-05-18 RX ORDER — GABAPENTIN 100 MG/1
100 CAPSULE ORAL
Qty: 30 CAPSULE | Refills: 0 | Status: SHIPPED
Start: 2024-05-18 | End: 2024-05-18 | Stop reason: SDUPTHER

## 2024-05-18 NOTE — PROGRESS NOTES
"Subjective     Wilbert Kennith Yeomans Jr. is a 58 y.o. male who presents with Foot Problem (xRight foot/big toe pain/)      - This is a very pleasant 58 y.o. who has come to the walk-in clinic today for wants something for pain in his feet.  Says has long history for years of neuropathy in both his lower extremities and feels pain aching burning tingling and says has tried gabapentin before and that helped maybe a little bit.  Has tried muscle relaxers and that has not helped too much and has tried he thinks some other medications and creams but says provided no benefit.    Also says gets random aches and pains in his joint due to arthritis and currently has extra strength Tylenol but wanted to see if there is anything else he can take with it as needed.    Due to his diabetic neuropathy and being homeless and walking a lot says he has lots of calluses and bone spurs in his feet and has an amputation of his left great toe says he has not seen a podiatrist in several years but is interested in maybe touching base with them to help him get the best foot care possible so we will go ahead and do a podiatry referral.          ALLERGIES:  Patient has no known allergies.     PMH:  Past Medical History:   Diagnosis Date    Anxiety     Bronchitis     has had 3-4 times    Dental disorder     upper partial, but lost it    Diabetes (HCC)     \"borderline\"    Hepatitis C 1997    Hypercholesteremia     Hypertension     Psychiatric disorder     anxiety        PSH:  Past Surgical History:   Procedure Laterality Date    FOOT AMPUTATION Left 8/10/2018    Procedure: LEFT FOOR IRRIGATION AND DRIDEMENT;  Surgeon: Salomón Baez M.D.;  Location: SURGERY [facility];  Service: Orthopedics    IRRIGATION & DEBRIDEMENT ORTHO Left 5/14/2018    Procedure: IRRIGATION & DEBRIDEMENT ORTHO-FOOT;  Surgeon: Salomón Baez M.D.;  Location: SURGERY [facility];  Service: Orthopedics    IRRIGATION & DEBRIDEMENT ORTHO Left 5/12/2018    " Procedure: IRRIGATION & DEBRIDEMENT ORTHO LT TOE, WOUND VAC CHANGE;  Surgeon: Salomón Baez M.D.;  Location: SURGERY [facility];  Service: Orthopedics    TOE AMPUTATION Left 4/23/2018    Procedure: TOE AMPUTATION GREAT TOE;  Surgeon: Aramis Easton M.D.;  Location: SURGERY [facility];  Service: Orthopedics    TOE AMPUTATION Left 4/1/2018    Procedure: TOE AMPUTATION-GREAT TOE;  Surgeon: Salomón Baez M.D.;  Location: SURGERY [facility];  Service: Orthopedics    VENTRAL HERNIA REPAIR LAPAROSCOPIC  3/30/2016    Procedure: VENTRAL HERNIA REPAIR LAPAROSCOPIC WITH MESH;  Surgeon: Caden Cat M.D.;  Location: SURGERY [facility];  Service:        MEDS:    Current Outpatient Medications:     gabapentin (NEURONTIN) 100 MG Cap, Take 1 Capsule by mouth every evening as needed (foot nerve pain)., Disp: 30 Capsule, Rfl: 0    ibuprofen (MOTRIN) 600 MG Tab, Take 1 Tablet by mouth every 8 hours as needed (joint pain)., Disp: 20 Tablet, Rfl: 0    permethrin (ELIMITE) 5 % Cream, Apply 30g (around half of the 60 gram tube) head to toe on skin and do not wash or rinse for 8-14 hours, then repeat application in 7-10 days., Disp: 60 g, Rfl: 0    triamcinolone acetonide (KENALOG) 0.1 % Cream, Apply to affected area(s) twice daily for no longer than 14 days, Disp: 45 g, Rfl: 0    lisinopril (PRINIVIL) 10 MG Tab, Take 1 Tablet by mouth every day., Disp: 30 Tablet, Rfl: 0    fluvastatin (LESCOL) 40 MG Cap, Take 1 Capsule by mouth every evening., Disp: 30 Capsule, Rfl: 0    acetaminophen (TYLENOL) 500 MG Tab, Take 1-2 Tablets by mouth every 6 hours as needed for Mild Pain., Disp: 30 Tablet, Rfl: 0    propranolol (INDERAL) 20 MG Tab, Take 1 Tab by mouth 2 times a day., Disp: 90 Tab, Rfl: 0    benztropine (COGENTIN) 1 MG Tab, Take 1 mg by mouth 2 times a day., Disp: , Rfl:     folic acid (FOLVITE) 1 MG Tab, Take 1 mg by mouth every day., Disp: , Rfl:     cyanocobalamin (VITAMIN B12) 1000 MCG Tab, Take 1,000 mcg  "by mouth every day., Disp: , Rfl:     LORazepam (ATIVAN) 0.5 MG Tab, Take 0.5 mg by mouth 2 times a day as needed for Anxiety., Disp: , Rfl:     QUEtiapine (SEROQUEL) 100 MG Tab, Take 100 mg by mouth 3 times a day. 100 mg in morning 100 mg at noon 200 mg at bedtime , Disp: , Rfl:     ** I have documented what I find to be significant in regards to past medical, social, family and surgical history  in my HPI or under PMH/PSH/FH review section, otherwise it is noncontributory **         HPI    Review of Systems   All other systems reviewed and are negative.             Objective     BP (!) 160/88   Pulse 88   Temp 36.4 °C (97.5 °F) (Temporal)   Resp 20   Ht 1.93 m (6' 4\")   Wt 104 kg (228 lb 4.8 oz)   SpO2 97%   BMI 27.79 kg/m²      Physical Exam  Vitals and nursing note reviewed.   Constitutional:       General: He is not in acute distress.     Appearance: Normal appearance. He is well-developed.   HENT:      Head: Normocephalic.   Cardiovascular:      Rate and Rhythm: Normal rate and regular rhythm.   Pulmonary:      Effort: Pulmonary effort is normal. No respiratory distress.   Musculoskeletal:        Feet:    Feet:      Comments: Feet are without any obvious open wounds or infections at this time.  He has amputation of left great toe.  There is an area of swelling on the plantar aspect of right foot that he said has been there for years and they told him it was a bone spur or a cyst.  He has thick calluses over soles and toes pressure points.  No obvious ulcerations at this time.  Cap refill 2 seconds  Neurological:      Mental Status: He is alert.      Motor: No abnormal muscle tone.   Psychiatric:         Mood and Affect: Mood normal.         Behavior: Behavior normal.                             Assessment & Plan     1. Type 2 diabetes mellitus with diabetic polyneuropathy, without long-term current use of insulin (HCC)        2. Diabetic peripheral neuropathy (HCC)  Referral to Podiatry    gabapentin " (NEURONTIN) 100 MG Cap    ibuprofen (MOTRIN) 600 MG Tab    DISCONTINUED: gabapentin (NEURONTIN) 100 MG Cap    DISCONTINUED: ibuprofen (MOTRIN) 600 MG Tab      3. Arthralgia, unspecified joint  Referral to Podiatry    ibuprofen (MOTRIN) 600 MG Tab    DISCONTINUED: ibuprofen (MOTRIN) 600 MG Tab          - Dx, plan & d/c instructions discussed   - Discussed to keep feet clean and well-lubricated and inspect them daily  - Encouraged him to follow-up with primary care provider next week and also with podiatrist and I will place referral today.  Discussed calling [facility] referral department next week so they can help expedite his follow-up with podiatrist.    Follow up with your regular primary care providers office within a week to keep them updated and informed of this visit and for regular routine health maintenance check-ups. ER if not improving in 2-3 days or if feeling/getting worse. (If you do not have a primary care provider and need to schedule one you may call [facility] at [phone] to do this).    Any realistic side effects of medications that may have been given today reviewed.     Patient left in stable condition       Discussed if any testing, labs or imaging studies are obtained outside of the [facility] facility, it is their responsibility to contact the Urgent Care and let us know that it was done and get us the results so adequate follow up can be initiated    Pertinent prior lab work and/or imaging studies in Epic have been reviewed by me today on day of this visit and taken into account for my treatment and plan today    Pertinent PMH/PSH and/or chronic conditions and medications if any were reviewed today and taken into account for my treatment and plan today    Pertinent prior office visit notes in [facility] have been reviewed by me today on day of this visit.    Please note that this dictation may have been created using voice recognition software, if so I have made every reasonable attempt to correct obvious  errors, but I expect that there are errors of grammar and possibly content that I did not discover before finalizing the note.

## 2024-07-31 NOTE — DISCHARGE SUMMARY
CARDIOLOGY OFFICE VISIT      CHIEF COMPLAINT  Chief Complaint   Patient presents with    Follow-up     91 day       HISTORY OF PRESENT ILLNESS  HPI  81-year-old  male who is followed for valvular heart disease status post tricuspid aortic valve replacement with a #23 Saint Mono trifecta valve on February 8, 2021 and left atrial appendage exclusion with a #35 AtriCure clip. Additionally he is followed for permanent atrial fibrillation on no rate controlling medications and anticoagulated with Eliquis. He is also followed for hypothyroidism on replacement therapy and dyslipidemia on statin. He was seen in the office by Dr. Page on March 6, 2024 at which time he complained of increased fatigue. He was at that time taking amiodarone 100 mg every other day and Eliquis. The amiodarone was discontinued.        Patient was seen my office April 2024.  He had a  twelve-lead EKG reveals atrial fibrillation with controlled ventricular response with a ventricular rate of 60 bpm.  QRS durations 96 ms,  ms, QTc 420 ms.  No acute ischemic changes or infarct patterns are noted.  48-hour Holter monitor dated March 21, 2024 reveals a minimum heart rate of 33 bpm, average heart rate 58 bpm, maximum heart rate 105 bpm.  1 4 beat run of nonsustained ventricular tachycardia was noted at 1:29 PM at a rate of 146 bpm.  13 sinus pauses lasting 2 seconds to 3.8 seconds were noted with most pauses between 11 AM and 12 PM.  2D echocardiogram dated March 21, 2024 reveals an ejection fraction of 65 to 70%, mild LVH, mild left atrial enlargement, trace to mild MR, mild TR, bioprosthetic aortic valve with normal function.    Since the last office visit, he has been doing well.  He denies any symptoms of chest pain or shortness breath or palpitations.    EKG performed today shows underlying rhythm of atrial fibrillation ventricular paced rhythm at a rate of 65 bpm QRS duration 164 ms QT corrected 470 ms.  Rhythm strip shows the same  Discharge Summary    CHIEF COMPLAINT ON ADMISSION  Chief Complaint   Patient presents with   • Shortness of Breath   • Wound Check   • Back Pain       Reason for Admission  Wound check     Admission Date  8/10/2018    CODE STATUS  Full Code    HPI & HOSPITAL COURSE  This is a 53 y.o. male here with diabetic foot infection.  For details see H&P note.  Patient found to have left foot abscess.  He undergone irrigation and debridement of the left food by Dr. Graff on 8/10/2019.  He was treated with antibiotic per infectious disease service and transitioned to oral antibiotics.  Patient was followed by limb preservation service.  Unfortunately, patient left AMA before finishing antibiotic course.  He was alert oriented ×4 and did not display signs of acute psychiatric disorder when I saw him the morning before he left AMA       Discharge Date  8/15/2018      DISCHARGE DIAGNOSES  Principal Problem:    Diabetic foot infection (HCC) POA: Yes  Active Problems:    Type 2 diabetes mellitus with neurologic complication, without long-term current use of insulin (HCC) POA: Yes    Psychiatric disorder POA: Yes    Mental disability POA: Yes    Diabetic peripheral neuropathy (HCC) POA: Yes  Resolved Problems:    * No resolved hospital problems. *      FOLLOW UP  No future appointments.  No follow-up provider specified.    MEDICATIONS ON DISCHARGE     Medication List      ASK your doctor about these medications      Instructions   benztropine 1 MG Tabs  Commonly known as:  COGENTIN   Take 1 mg by mouth 2 times a day.  Dose:  1 mg     cyanocobalamin 1000 MCG Tabs  Commonly known as:  VITAMIN B12   Take 1,000 mcg by mouth every day.  Dose:  1000 mcg     fluvastatin 40 MG Caps  Commonly known as:  LESCOL   Take 40 mg by mouth every evening.  Dose:  40 mg     folic acid 1 MG Tabs  Commonly known as:  FOLVITE   Take 1 mg by mouth every day.  Dose:  1 mg     LORazepam 0.5 MG Tabs  Commonly known as:  ATIVAN   Take 0.5 mg by mouth 2  pattern.    Patient had a device interrogation today at the device clinic and it shows Medtronic Micra leadless pacemaker with battery longevity over 10 years.  No ventricular events noted.  Otherwise device functioning well.      Past Medical History  Past Medical History:   Diagnosis Date    A-fib (Multi)     Arrhythmia     BPH (benign prostatic hyperplasia)     Coronary artery disease     COVID-19 2021    COVID    Former smoker     Hyperlipidemia     Hypertension     Hypothyroid     Nonrheumatic aortic (valve) stenosis 2022    Aortic stenosis, moderate    Overweight 2022    Overweight with body mass index (BMI) of 26 to 26.9 in adult    Presence of aortocoronary bypass graft 2022    S/P CABG (coronary artery bypass graft)       Social History  Social History     Tobacco Use    Smoking status: Former     Current packs/day: 0.00     Types: Cigarettes     Quit date:      Years since quittin.    Smokeless tobacco: Never   Substance Use Topics    Alcohol use: Not Currently     Comment: social    Drug use: Never       Family History     Family History   Problem Relation Name Age of Onset    Diabetes Mother      Coronary artery disease Mother      Coronary artery disease Father      Colon cancer Brother          Allergies:  No Known Allergies     Outpatient Medications:  Current Outpatient Medications   Medication Instructions    acetaminophen (TYLENOL 8 HOUR) 650 mg, oral, Every 8 hours PRN, Do not crush, chew, or split.    amoxicillin (AMOXIL) 2,000 mg, oral, Prior to dental work     apixaban (ELIQUIS) 5 mg, oral, 2 times daily, Hold for 2 days and resume on 24    calcium carbonate-vitamin D3 600 mg-20 mcg (800 unit) tablet 1 tablet, oral, Daily    dorzolamide (Trusopt) 2 % ophthalmic solution ophthalmic (eye), 2 times daily    FLUoxetine (PROzac) 10 mg capsule 1 capsule, oral, Daily (630)    latanoprost (Xalatan) 0.005 % ophthalmic solution ophthalmic (eye), Daily RT     times a day as needed for Anxiety.  Dose:  0.5 mg     propranolol 20 MG Tabs  Commonly known as:  INDERAL   Take 20 mg by mouth 2 times a day.  Dose:  20 mg     QUEtiapine 100 MG Tabs  Commonly known as:  SEROQUEL   Take 100 mg by mouth 3 times a day. 100 mg in morning 100 mg at noon 200 mg at bedtime  Dose:  100 mg            Allergies  No Known Allergies    DIET  Orders Placed This Encounter   Procedures   • Diet Order Regular     Standing Status:   Standing     Number of Occurrences:   1     Order Specific Question:   Diet:     Answer:   Regular [1]           PROCEDURES  Irrigation and debridement of left foot.    LABORATORY  Lab Results   Component Value Date    SODIUM 142 08/13/2018    POTASSIUM 3.7 08/13/2018    CHLORIDE 108 08/13/2018    CO2 29 08/13/2018    GLUCOSE 147 (H) 08/13/2018    BUN 13 08/13/2018    CREATININE 0.83 08/13/2018        Lab Results   Component Value Date    WBC 4.3 (L) 08/13/2018    HEMOGLOBIN 12.7 (L) 08/13/2018    HEMATOCRIT 39.2 (L) 08/13/2018    PLATELETCT 137 (L) 08/13/2018      DX-FOOT-COMPLETE 3+ LEFT   Final Result      1.  Surgical change consistent with prior amputation of the great toe and a portion of the second toe.      2.  Soft tissue swelling with a small amount of gas within the soft tissues of the forefoot.      DX-CHEST-PORTABLE (1 VIEW)   Final Result      Subsegmental right basilar atelectasis.      Stable prominence of the cardiomediastinal silhouette.      Atherosclerotic plaque.               Total time of the discharge process exceeds 44 minutes.   levothyroxine (SYNTHROID, LEVOXYL) 88 mcg, oral, Daily before breakfast    multivit-min/ferrous fumarate (MULTI VITAMIN ORAL) 1 tablet, oral, Daily    rosuvastatin (Crestor) 10 mg tablet 1 tablet, oral, Daily    sulfaSALAzine (AZULFIDINE) 1,500 mg, oral, 2 times daily    triamcinolone (Kenalog) 0.1 % cream as directed          REVIEW OF SYSTEMS  Review of Systems   Cardiovascular:  Negative for chest pain, dyspnea on exertion and palpitations.   All other systems reviewed and are negative.        VITALS  Vitals:    07/31/24 1148   BP: 132/84   Pulse: 65       PHYSICAL EXAM  Constitutional:       General: Awake.      Appearance: Normal and healthy appearance. Well-developed and not in distress.   Neck:      Vascular: No JVR. JVD normal.   Pulmonary:      Effort: Pulmonary effort is normal.      Breath sounds: Normal breath sounds. No wheezing. No rhonchi. No rales.   Chest:      Chest wall: Not tender to palpatation.      Comments: Left sided device pocket- healed and well approximated. No swelling or hematoma      Cardiovascular:      PMI at left midclavicular line. Normal rate. Regular rhythm. Normal S1. Normal S2.       Murmurs: There is no murmur.      No gallop.  No click. No rub.   Pulses:     Intact distal pulses.   Edema:     Peripheral edema absent.   Abdominal:      Tenderness: There is no abdominal tenderness.   Musculoskeletal: Normal range of motion.         General: No tenderness. Skin:     General: Skin is warm and dry.   Neurological:      General: No focal deficit present.      Mental Status: Alert and oriented to person, place and time.           ASSESSMENT AND PLAN  Clinical impressions:  1. Aortic valve stenosis status post aortic valve replacement with a #23 Saint Mono trifecta valve on February 8, 2021 and left atrial appendage exclusion with a #35 AtriCure clip.  2.  Permanent atrial fibrillation on no rate controlling medications and anticoagulated with Eliquis.  3. Normal left ventricular  function per 2D echocardiogram dated March 21, 2024.  4. Dyslipidemia on statin.  5. Hypertension, controlled with a blood pressure today of 136/82.  6. Hypothyroidism on replacement therapy.  7. Benign prostatic hypertrophy.  8. Coronary artery disease status post remote coronary artery bypass graft surgery.  9. Overweight with a BMI of 26.07.     Recommendations:     From the electrophysiology standpoint he is doing well.  Will continue with current medical therapy.    Will continue with rate control strategy for atrial fibrillation.    Continue with Eliquis therapy.    Follow-up with device clinic as scheduled.    Follow my office every 6 months or sooner if needed.    Risk factor modification and lifestyle modification discussed with patient. Diet , exercise and hydration discussed with patient.    I have personally review with patient during this office visit, laboratory data, echocardiogram results, stress test results, Holter-event monitor results prior and after the last electrophysiology visit. All questions has been answered.    Please excuse any errors in grammar or translation related to this dictation.  Voice recognition software was utilized to prepare this document.

## 2024-09-16 NOTE — CARE PLAN
Per Angie pt has been hallucinating. She is asking for UA C&S. Verbal given.   Problem: Discharge Barriers/Planning  Goal: Patient's continuum of care needs will be met    Intervention: Involve patient and significant other/support system in setting and prioritizing goals for hospital stay and discharge  Possible discharge to Homeless shelter when iv abx completed.      Problem: Psychosocial Needs:  Goal: Level of anxiety will decrease    Intervention: Identify and develop with patient strategies to cope with anxiety triggers  Due med given for his anxiety, tried to calm him down.

## 2024-11-17 NOTE — WOUND TEAM
"[facility]  Inpatient Services   Wound and Skin Care Progress Note    Admission Date:   05/11/2018  HPI, PMH, SH: Reviewed  Unit where seen by Wound Team: S5    WOUND CONSULT RELATED TO: L foot medial and lateral mid foot pressure injuries    SUBJECTIVE:  \"Are they still going to change it?\"    Self Report / Pain Level: Denies sensation to Left foot, no pain at wounds.    OBJECTIVE: Pt laying in bed. No heel float boots or waffle overlay in place. Dressings in place. Sutures in place to left great toe amputation site.     WOUND TYPE, LOCATION, CHARACTERISTICS (Pressure ulcers: location, stage, POA or date identified)    Location and type of wound: Left lateral mid foot unstageable pressure ulcer, 05/17/2018        Periwound:        Pink, intact  Drainage:        None  Tissue Type and %:       70% red, 30% brown slough.  Wound Edges:       Attached  Odor:         NONE  Exposed structure(s):      FLAQUITA, due to eschar  S&S of Infection:              NONE      Measurements: (Taken 05/23/18)  Length:        0.4 cm  Width:         2.1 cm  Depth:                              ~0.1 cm of what is visible, unable to visualize entire wound                                          bed due to brown slough.     Location and type of wound: Left medial mid foot unstageable pressure ulcer, 05/17/2018        Periwound:      Pink, intact  Drainage:      None.  Tissue Type and %:     30% brown/tan slough, 70% red.  Wound Edges:     Attached  Odor:       NONE  Exposed structure(s):    FLAQUITA, due to slough  S&S of Infection:            NONE      Measurements: (Taken 05/23/2018)  Length:     1.3 cm  Width:      2.2 cm  Depth:                            FLAQUITA, due to slough      INTERVENTIONS BY WOUND TEAM: Removed dressings.  Wounds cleansed with NS and dry gauze. Measurements and pictures taken. Small pieces of honey alginate were cut to fit over areas with slough, avoiding intact skin.  Large adhesive foam was placed over " dorsal wound. A small adhesive foam was placed over lateral wound. Staff RN updated.      Interdisciplinary consultation: Patient, Staff RN    EVALUATION: Pt is an uncontrolled diabetic with neuropathy.  Pt was admitted for infected diabetic great toe ulcer. Great toe was amputated this admission. Honey alignate was placed over slough to autolytically debride the wound bed as well as for its antimicrobial properties. Adhesive foam in place to protect wound bed and absorb exudate.     Factors affecting wound healing: Diabetes, Neuropathy, Infection   Goals: Clean wound beds in 1 week.    NURSING PLAN OF CARE ORDERS (X):    Dressing changes: See Dressing Care orders: X  Skin care: See Skin Care orders: X  Rectal tube care: See Rectal Tube Care orders:   Other orders:    WOUND TEAM PLAN OF CARE (X):   NPWT change 3 x week:        Dressing changes by wound team:       Follow up as needed:       Other (explain): X Wound team will follow up in 1 week.     No

## 2024-12-16 PROCEDURE — 36415 COLL VENOUS BLD VENIPUNCTURE: CPT

## 2024-12-16 PROCEDURE — 99283 EMERGENCY DEPT VISIT LOW MDM: CPT

## 2024-12-16 ASSESSMENT — PAIN DESCRIPTION - PAIN TYPE: TYPE: ACUTE PAIN

## 2024-12-17 ENCOUNTER — APPOINTMENT (OUTPATIENT)
Dept: RADIOLOGY | Facility: MEDICAL CENTER | Age: 59
End: 2024-12-17
Attending: STUDENT IN AN ORGANIZED HEALTH CARE EDUCATION/TRAINING PROGRAM
Payer: MEDICAID

## 2024-12-17 ENCOUNTER — HOSPITAL ENCOUNTER (EMERGENCY)
Facility: MEDICAL CENTER | Age: 59
End: 2024-12-17
Attending: STUDENT IN AN ORGANIZED HEALTH CARE EDUCATION/TRAINING PROGRAM
Payer: MEDICAID

## 2024-12-17 VITALS
RESPIRATION RATE: 18 BRPM | TEMPERATURE: 97.3 F | DIASTOLIC BLOOD PRESSURE: 89 MMHG | HEART RATE: 74 BPM | SYSTOLIC BLOOD PRESSURE: 166 MMHG | OXYGEN SATURATION: 96 %

## 2024-12-17 DIAGNOSIS — M54.50 CHRONIC LOW BACK PAIN, UNSPECIFIED BACK PAIN LATERALITY, UNSPECIFIED WHETHER SCIATICA PRESENT: ICD-10-CM

## 2024-12-17 DIAGNOSIS — G89.29 CHRONIC LOW BACK PAIN, UNSPECIFIED BACK PAIN LATERALITY, UNSPECIFIED WHETHER SCIATICA PRESENT: ICD-10-CM

## 2024-12-17 DIAGNOSIS — R19.7 DIARRHEA, UNSPECIFIED TYPE: ICD-10-CM

## 2024-12-17 LAB
ALBUMIN SERPL BCP-MCNC: 4.3 G/DL (ref 3.2–4.9)
ALBUMIN/GLOB SERPL: 2 G/DL
ALP SERPL-CCNC: 80 U/L (ref 30–99)
ALT SERPL-CCNC: 25 U/L (ref 2–50)
ANION GAP SERPL CALC-SCNC: 9 MMOL/L (ref 7–16)
AST SERPL-CCNC: 31 U/L (ref 12–45)
BASOPHILS # BLD AUTO: 0.4 % (ref 0–1.8)
BASOPHILS # BLD: 0.02 K/UL (ref 0–0.12)
BILIRUB SERPL-MCNC: 0.4 MG/DL (ref 0.1–1.5)
BUN SERPL-MCNC: 19 MG/DL (ref 8–22)
CALCIUM ALBUM COR SERPL-MCNC: 8.6 MG/DL (ref 8.5–10.5)
CALCIUM SERPL-MCNC: 8.8 MG/DL (ref 8.5–10.5)
CHLORIDE SERPL-SCNC: 109 MMOL/L (ref 96–112)
CO2 SERPL-SCNC: 24 MMOL/L (ref 20–33)
CREAT SERPL-MCNC: 0.74 MG/DL (ref 0.5–1.4)
EOSINOPHIL # BLD AUTO: 0.11 K/UL (ref 0–0.51)
EOSINOPHIL NFR BLD: 2.1 % (ref 0–6.9)
ERYTHROCYTE [DISTWIDTH] IN BLOOD BY AUTOMATED COUNT: 42.8 FL (ref 35.9–50)
FLUAV RNA SPEC QL NAA+PROBE: NEGATIVE
FLUBV RNA SPEC QL NAA+PROBE: NEGATIVE
GFR SERPLBLD CREATININE-BSD FMLA CKD-EPI: 103 ML/MIN/1.73 M 2
GLOBULIN SER CALC-MCNC: 2.2 G/DL (ref 1.9–3.5)
GLUCOSE SERPL-MCNC: 129 MG/DL (ref 65–99)
HCT VFR BLD AUTO: 41.2 % (ref 42–52)
HGB BLD-MCNC: 14 G/DL (ref 14–18)
IMM GRANULOCYTES # BLD AUTO: 0.01 K/UL (ref 0–0.11)
IMM GRANULOCYTES NFR BLD AUTO: 0.2 % (ref 0–0.9)
LYMPHOCYTES # BLD AUTO: 1.97 K/UL (ref 1–4.8)
LYMPHOCYTES NFR BLD: 38 % (ref 22–41)
MCH RBC QN AUTO: 31.5 PG (ref 27–33)
MCHC RBC AUTO-ENTMCNC: 34 G/DL (ref 32.3–36.5)
MCV RBC AUTO: 92.6 FL (ref 81.4–97.8)
MONOCYTES # BLD AUTO: 0.42 K/UL (ref 0–0.85)
MONOCYTES NFR BLD AUTO: 8.1 % (ref 0–13.4)
NEUTROPHILS # BLD AUTO: 2.66 K/UL (ref 1.82–7.42)
NEUTROPHILS NFR BLD: 51.2 % (ref 44–72)
NRBC # BLD AUTO: 0 K/UL
NRBC BLD-RTO: 0 /100 WBC (ref 0–0.2)
PLATELET # BLD AUTO: 172 K/UL (ref 164–446)
PMV BLD AUTO: 9.1 FL (ref 9–12.9)
POTASSIUM SERPL-SCNC: 3.9 MMOL/L (ref 3.6–5.5)
PROT SERPL-MCNC: 6.5 G/DL (ref 6–8.2)
RBC # BLD AUTO: 4.45 M/UL (ref 4.7–6.1)
RSV RNA SPEC QL NAA+PROBE: NEGATIVE
SARS-COV-2 RNA RESP QL NAA+PROBE: NOTDETECTED
SODIUM SERPL-SCNC: 142 MMOL/L (ref 135–145)
WBC # BLD AUTO: 5.2 K/UL (ref 4.8–10.8)

## 2024-12-17 PROCEDURE — 85025 COMPLETE CBC W/AUTO DIFF WBC: CPT

## 2024-12-17 PROCEDURE — 73630 X-RAY EXAM OF FOOT: CPT | Mod: RT

## 2024-12-17 PROCEDURE — 0241U HCHG SARS-COV-2 COVID-19 NFCT DS RESP RNA 4 TRGT ED POC: CPT

## 2024-12-17 PROCEDURE — 80053 COMPREHEN METABOLIC PANEL: CPT

## 2024-12-17 RX ORDER — LISINOPRIL 10 MG/1
10 TABLET ORAL DAILY
Qty: 30 TABLET | Refills: 0 | Status: SHIPPED | OUTPATIENT
Start: 2024-12-17

## 2024-12-17 RX ORDER — LIDOCAINE 4 G/G
1 PATCH TOPICAL EVERY 24 HOURS
Qty: 12 PATCH | Refills: 0 | Status: SHIPPED | OUTPATIENT
Start: 2024-12-17 | End: 2024-12-17

## 2024-12-17 RX ORDER — BENZONATATE 100 MG/1
100 CAPSULE ORAL 3 TIMES DAILY PRN
Qty: 60 CAPSULE | Refills: 0 | Status: SHIPPED | OUTPATIENT
Start: 2024-12-17

## 2024-12-17 RX ORDER — IBUPROFEN 600 MG/1
600 TABLET, FILM COATED ORAL EVERY 8 HOURS PRN
Qty: 21 TABLET | Refills: 0 | Status: SHIPPED | OUTPATIENT
Start: 2024-12-17 | End: 2024-12-24

## 2024-12-17 RX ORDER — LIDOCAINE 4 G/G
1 PATCH TOPICAL EVERY 24 HOURS
Qty: 12 PATCH | Refills: 0 | Status: SHIPPED | OUTPATIENT
Start: 2024-12-17

## 2024-12-17 NOTE — ED NOTES
Pt discharged to home. Pt was given follow up instructions and prescriptions for Ibuprofen, Tessalon, Lidocaine, and Lisinopril. Pt verbalized understanding of all instructions for discharge and is ambulatory out of ED with steady gait. AOx4

## 2024-12-17 NOTE — DISCHARGE INSTRUCTIONS
We have given you contact information for a primary care clinic where you should follow-up.  We have given you a prescription for lidocaine patch.  You should follow-up with your primary care provider.

## 2024-12-17 NOTE — ED PROVIDER NOTES
"ER Provider Note    Scribed for Caio Florence D.O. by Gabriel Sharp. 12/17/2024  1:38 AM    Primary Care Provider: Tahir Harrington    CHIEF COMPLAINT   Chief Complaint   Patient presents with    Digit Pain     Pt reports left foot infection and right foot bone spur.  Pt reports tingling and pain to bilateral feet.    Low Back Pain     Reports shooting pain from lower back down to LE.    Diarrhea     Since Friday, pt reports no episodes within the last 24 hour.     EXTERNAL RECORDS REVIEWED  Outpatient Notes Reviewed Urgent Care note from 5/18/24 where the patient was seen for evaluation of diabetic neuropathy and foot pain.    HPI/ROS  LIMITATION TO HISTORY   Select: : None  OUTSIDE HISTORIAN(S):  None    Wilbert Kennith Yeomans Jr. is a 59 y.o. male who presents to the ED complaining of diarrhea onset 4 days ago. The patient reports that he has not had an episode of diarrhea in the past day, but had feces \"running down his leg\" from it several days ago. He notes having a left foot infection and right foot bone spur, along with tingling bilaterally to his feet. Patient describes a lower back pain which radiates down his left leg to his foot. Denies recent antibiotics. He states that he previously took Metformin for diabetes, but that he was told he no longer needs it as he does \"not have diabetes anymore\".    PAST MEDICAL HISTORY  Past Medical History:   Diagnosis Date    Anxiety     Bronchitis     has had 3-4 times    Dental disorder     upper partial, but lost it    Diabetes (HCC)     \"borderline\"    Hepatitis C 1997    Hypercholesteremia     Hypertension     Psychiatric disorder     anxiety     SURGICAL HISTORY  Past Surgical History:   Procedure Laterality Date    FOOT AMPUTATION Left 8/10/2018    Procedure: LEFT FOOR IRRIGATION AND DRIDEMENT;  Surgeon: Salomón Baez M.D.;  Location: SURGERY [facility];  Service: Orthopedics    IRRIGATION & DEBRIDEMENT ORTHO Left 5/14/2018    Procedure: " IRRIGATION & DEBRIDEMENT ORTHO-FOOT;  Surgeon: Salomón Baez M.D.;  Location: SURGERY [facility];  Service: Orthopedics    IRRIGATION & DEBRIDEMENT ORTHO Left 5/12/2018    Procedure: IRRIGATION & DEBRIDEMENT ORTHO LT TOE, WOUND VAC CHANGE;  Surgeon: Salomón Baez M.D.;  Location: SURGERY [facility];  Service: Orthopedics    TOE AMPUTATION Left 4/23/2018    Procedure: TOE AMPUTATION GREAT TOE;  Surgeon: Aramis Easton M.D.;  Location: SURGERY [facility];  Service: Orthopedics    TOE AMPUTATION Left 4/1/2018    Procedure: TOE AMPUTATION-GREAT TOE;  Surgeon: Salomón Baez M.D.;  Location: SURGERY [facility];  Service: Orthopedics    VENTRAL HERNIA REPAIR LAPAROSCOPIC  3/30/2016    Procedure: VENTRAL HERNIA REPAIR LAPAROSCOPIC WITH MESH;  Surgeon: Caden Cat M.D.;  Location: SURGERY [facility];  Service:      FAMILY HISTORY  No family history noted.    SOCIAL HISTORY   reports that he has been smoking cigarettes. He has a 7.5 pack-year smoking history. He has never used smokeless tobacco. He reports that he does not drink alcohol and does not use drugs.    CURRENT MEDICATIONS  Discharge Medication List as of 12/17/2024  3:24 AM        CONTINUE these medications which have NOT CHANGED    Details   gabapentin (NEURONTIN) 100 MG Cap Take 1 Capsule by mouth every evening as needed (foot nerve pain)., Disp-30 Capsule, R-0, Normal      permethrin (ELIMITE) 5 % Cream Apply 30g (around half of the 60 gram tube) head to toe on skin and do not wash or rinse for 8-14 hours, then repeat application in 7-10 days., Disp-60 g, R-0, Normal      triamcinolone acetonide (KENALOG) 0.1 % Cream Apply to affected area(s) twice daily for no longer than 14 days, Disp-45 g, R-0, Normal      fluvastatin (LESCOL) 40 MG Cap Take 1 Capsule by mouth every evening., Disp-30 Capsule, R-0, Normal      acetaminophen (TYLENOL) 500 MG Tab Take 1-2 Tablets by mouth every 6 hours as needed for Mild Pain., Disp-30  Tablet, R-0, Normal      propranolol (INDERAL) 20 MG Tab Take 1 Tab by mouth 2 times a day., Disp-90 Tab,R-0, Print Rx Paper      benztropine (COGENTIN) 1 MG Tab Take 1 mg by mouth 2 times a day., Historical Med      folic acid (FOLVITE) 1 MG Tab Take 1 mg by mouth every day., Historical Med      cyanocobalamin (VITAMIN B12) 1000 MCG Tab Take 1,000 mcg by mouth every day., Historical Med      LORazepam (ATIVAN) 0.5 MG Tab Take 0.5 mg by mouth 2 times a day as needed for Anxiety., Historical Med      QUEtiapine (SEROQUEL) 100 MG Tab Take 100 mg by mouth 3 times a day. 100 mg in morning  100 mg at noon  200 mg at bedtime , Historical Med           ALLERGIES  Patient has no known allergies.    PHYSICAL EXAM  BP (!) 157/95   Pulse 80   Temp 36.1 °C (97 °F) (Temporal)   Resp 18   SpO2 97%   Constitutional: Alert in no apparent distress.  HENT: No signs of trauma, Bilateral external ears normal, Nose normal.   Eyes: Pupils are equal and reactive, Conjunctiva normal, Non-icteric.   Neck: Normal range of motion, No tenderness, Supple, No stridor.   Cardiovascular: Regular rate and rhythm, no murmurs.   Thorax & Lungs: Normal breath sounds, No respiratory distress, No wheezing, No chest tenderness.   Abdomen: Bowel sounds normal, Soft, No tenderness, No masses, No pulsatile masses.   Skin: Warm, Dry, No erythema, No rash. Small area of ulceration across right 1st toe.   Back: No bony tenderness, No CVA tenderness.   Extremities: Intact distal pulses, No edema, No tenderness, No cyanosis  Musculoskeletal: Good range of motion in all major joints. No tenderness to palpation or major deformities noted. Intact bilateral distal pulses in feet.  Neurologic: Alert , Normal motor function, Normal sensory function, No focal deficits noted.     DIAGNOSTIC STUDIES    LABS  Results for orders placed or performed during the hospital encounter of 12/17/24   CBC WITH DIFFERENTIAL    Collection Time: 12/17/24  1:58 AM   Result Value Ref  Range    WBC 5.2 4.8 - 10.8 K/uL    RBC 4.45 (L) 4.70 - 6.10 M/uL    Hemoglobin 14.0 14.0 - 18.0 g/dL    Hematocrit 41.2 (L) 42.0 - 52.0 %    MCV 92.6 81.4 - 97.8 fL    MCH 31.5 27.0 - 33.0 pg    MCHC 34.0 32.3 - 36.5 g/dL    RDW 42.8 35.9 - 50.0 fL    Platelet Count 172 164 - 446 K/uL    MPV 9.1 9.0 - 12.9 fL    Neutrophils-Polys 51.20 44.00 - 72.00 %    Lymphocytes 38.00 22.00 - 41.00 %    Monocytes 8.10 0.00 - 13.40 %    Eosinophils 2.10 0.00 - 6.90 %    Basophils 0.40 0.00 - 1.80 %    Immature Granulocytes 0.20 0.00 - 0.90 %    Nucleated RBC 0.00 0.00 - 0.20 /100 WBC    Neutrophils (Absolute) 2.66 1.82 - 7.42 K/uL    Lymphs (Absolute) 1.97 1.00 - 4.80 K/uL    Monos (Absolute) 0.42 0.00 - 0.85 K/uL    Eos (Absolute) 0.11 0.00 - 0.51 K/uL    Baso (Absolute) 0.02 0.00 - 0.12 K/uL    Immature Granulocytes (abs) 0.01 0.00 - 0.11 K/uL    NRBC (Absolute) 0.00 K/uL   COMP METABOLIC PANEL    Collection Time: 12/17/24  1:58 AM   Result Value Ref Range    Sodium 142 135 - 145 mmol/L    Potassium 3.9 3.6 - 5.5 mmol/L    Chloride 109 96 - 112 mmol/L    Co2 24 20 - 33 mmol/L    Anion Gap 9.0 7.0 - 16.0    Glucose 129 (H) 65 - 99 mg/dL    Bun 19 8 - 22 mg/dL    Creatinine 0.74 0.50 - 1.40 mg/dL    Calcium 8.8 8.5 - 10.5 mg/dL    Correct Calcium 8.6 8.5 - 10.5 mg/dL    AST(SGOT) 31 12 - 45 U/L    ALT(SGPT) 25 2 - 50 U/L    Alkaline Phosphatase 80 30 - 99 U/L    Total Bilirubin 0.4 0.1 - 1.5 mg/dL    Albumin 4.3 3.2 - 4.9 g/dL    Total Protein 6.5 6.0 - 8.2 g/dL    Globulin 2.2 1.9 - 3.5 g/dL    A-G Ratio 2.0 g/dL   ESTIMATED GFR    Collection Time: 12/17/24  1:58 AM   Result Value Ref Range    GFR (CKD-EPI) 103 >60 mL/min/1.73 m 2   POC CoV-2, FLU A/B, RSV by PCR    Collection Time: 12/17/24  2:08 AM   Result Value Ref Range    POC Influenza A RNA, PCR Negative Negative    POC Influenza B RNA, PCR Negative Negative    POC RSV, by PCR Negative Negative    POC SARS-CoV-2, PCR NotDetected NotDetected     I have independently  interpreted these labs.    RADIOLOGY/PROCEDURES   The attending emergency physician has independently interpreted the diagnostic imaging associated with this visit and am waiting the final reading from the radiologist.   My preliminary interpretation is a follows: No fracture or dislocation of the right foot    Radiologist interpretation:  DX-FOOT-COMPLETE 3+ RIGHT   Final Result         1.  No acute traumatic bony injury.   2.  Pes planus        COURSE & MEDICAL DECISION MAKING     ASSESSMENT, COURSE AND PLAN  Care Narrative:       1:38 AM - Patient was seen and evaluated at bedside.  Patient presenting with nonbloody diarrhea has benign abdominal exam low suspicion for acute intra-abdominal pathology including obstruction or appendicitis.  Patient does have a small area of ulceration without surrounding erythema and tenderness or induration along his right toe.  Will obtain x-ray to assess for overt signs of osteomyelitis or occult fracture.  Given patient's recent diarrhea will obtain blood work to assess for gross hematologic or metabolic derangement.  Regarding patient's lower back pain his no fever and has a normal neurologic exam low suspicion for osteomyelitis, dural abscess or cauda equina.      3:01 AM - I reevaluated the patient at bedside. I discussed the patient's diagnostic study results which was reassuring with no acute abnormalities besides a slightly increased glucose level. I discussed plan for discharge and follow up as outlined below. The patient is stable for discharge at this time and will return for any new or worsening symptoms. Patient verbalizes understanding and support with my plan for discharge.     DISPOSITION AND DISCUSSIONS  I have discussed management of the patient with the following physicians and SAHARA's:  None    Discussion of management with other QHP or appropriate source(s): None     Escalation of care considered, and ultimately not performed: diagnostic imaging.  Considered CT  imaging of abdomen patient with benign abdominal exam no pain and no ongoing diarrhea    Barriers to care at this time, including but not limited to:  No known barriers to care .     Decision tools and prescription drugs considered including, but not limited to: Prescription for lisinopril    The patient will return for new or worsening symptoms and is stable at the time of discharge.    The patient is referred to a primary physician for blood pressure management, diabetic screening, and for all other preventative health concerns.    DISPOSITION:  Patient will be discharged home in stable condition.    FOLLOW UP:  Tahir Harrington  [address] [facility] [address] [phone]    Schedule an appointment as soon as possible for a visit       [facility], Emergency Dept  [address] [facility] [address] [phone]  Go to   If symptoms worsen    OUTPATIENT MEDICATIONS:  Discharge Medication List as of 12/17/2024  3:24 AM        START taking these medications    Details   lidocaine (ASPERFLEX) 4 % Patch Place 1 Patch on the skin every 24 hours., Disp-12 Patch, R-0, Normal      benzonatate (TESSALON) 100 MG Cap Take 1 Capsule by mouth 3 times a day as needed for Cough., Disp-60 Capsule, R-0, Normal           FINAL DIANGOSIS  1. Diarrhea, unspecified type    2. Chronic low back pain, unspecified back pain laterality, unspecified whether sciatica present       Gabriel GAUTAM (Heather), clifford scribing for, and in the presence of, Caio Florence DO.    Electronically signed by: Gabriel Sharp (Scribe), 12/17/2024    ICaio DO personally performed the services described in this documentation, as scribed by Gabriel Sharp in my presence, and it is both accurate and complete.     The note accurately reflects work and decisions made by me.  Caio Florence D.O.  12/17/2024  4:25 AM

## 2024-12-17 NOTE — ED NOTES
Patient ambulatory to [place] [address] without assistance. Patient placed on monitor. Chart up for ERP.

## 2024-12-17 NOTE — ED TRIAGE NOTES
Chief Complaint   Patient presents with    Digit Pain     Pt reports left foot infection and right foot bone spur    Low Back Pain     Reports shooting pain from lower back down to LE.    Diarrhea     Since Friday, pt reports no episodes within the last 24 hour.       Patient to triage ambulatory with a steady gait, AAOx4, Appropriate precautions in place.     Explained wait time and triage process. Placed back in ED lobby. Told to notify ED tech or RN of any changes, verbalized understanding.    BP (!) 157/95   Pulse 80   Temp 36.1 °C (97 °F) (Temporal)   Resp 18   SpO2 97%

## (undated) DEVICE — BLADE SURGICAL #15 - (50/BX 3BX/CA)

## (undated) DEVICE — BANDAGE ELASTIC 6 HONEYCOMB - 6X5YD LF (20/CA)"

## (undated) DEVICE — GOWN SURGEONS X-LARGE - DISP. (30/CA)

## (undated) DEVICE — HEAD HOLDER JUNIOR/ADULT

## (undated) DEVICE — GLOVE BIOGEL ECLIPSE PF LATEX SIZE 7.5

## (undated) DEVICE — GLOVE BIOGEL SZ 7.5 SURGICAL PF LTX - (50PR/BX 4BX/CA)

## (undated) DEVICE — PROTECTOR ULNA NERVE - (36PR/CA)

## (undated) DEVICE — KIT ANESTHESIA W/CIRCUIT & 3/LT BAG W/FILTER (20EA/CA)

## (undated) DEVICE — PACK MAJOR ORTHO - (2EA/CA)

## (undated) DEVICE — MEDICINE CUP STERILE 2 OZ - (100/CA)

## (undated) DEVICE — GLOVE BIOGEL SZ 8 SURGICAL PF LTX - (50PR/BX 4BX/CA)

## (undated) DEVICE — ELECTRODE 850 FOAM ADHESIVE - HYDROGEL RADIOTRNSPRNT (50/PK)

## (undated) DEVICE — SODIUM CHL IRRIGATION 0.9% 1000ML (12EA/CA)

## (undated) DEVICE — TOURNIQUET CUFF 34 X 4 ONE PORT DISP - STERILE (10/BX)

## (undated) DEVICE — CONTAINER SPECIMEN BAG OR - STERILE 4 OZ W/LID (100EA/CA)

## (undated) DEVICE — CANISTER SUCTION 3000ML MECHANICAL FILTER AUTO SHUTOFF MEDI-VAC NONSTERILE LF DISP  (40EA/CA)

## (undated) DEVICE — BANDAGE ELASTIC 4 HONEYCOMB - 4"X5YD LF (20/CA)"

## (undated) DEVICE — BANDAGE ELASTIC 2 X 5 YDS - STERILE VELCRO (25/CA) LATEX

## (undated) DEVICE — GLOVE BIOGEL INDICATOR SZ 8 SURGICAL PF LTX - (50/BX 4BX/CA)

## (undated) DEVICE — LACTATED RINGERS INJ 1000 ML - (14EA/CA 60CA/PF)

## (undated) DEVICE — VAC CANISTER W/GEL 500ML - FITS NEW MACHINES (10EA/CA)

## (undated) DEVICE — GOWN WARMING STANDARD FLEX - (30/CA)

## (undated) DEVICE — PACK LOWER EXTREMITY - (2/CA)

## (undated) DEVICE — PADDING CAST 4 IN STERILE - 4 X 4 YDS (24/CA)

## (undated) DEVICE — SUTURE 3-0 ETHILON PS-1 (36PK/BX)

## (undated) DEVICE — TUBING CLEARLINK DUO-VENT - C-FLO (48EA/CA)

## (undated) DEVICE — SUCTION INSTRUMENT YANKAUER BULBOUS TIP W/O VENT (50EA/CA)

## (undated) DEVICE — ELECTRODE DUAL RETURN W/ CORD - (50/PK)

## (undated) DEVICE — MASK ANESTHESIA ADULT  - (100/CA)

## (undated) DEVICE — GLOVE BIOGEL SZ 8.5 SURGICAL PF LTX - (50PR/BX 4BX/CA)

## (undated) DEVICE — SLEEVE, VASO, THIGH, MED

## (undated) DEVICE — GLOVE BIOGEL SZ 6 PF LATEX - (50EA/BX 4BX/CA)

## (undated) DEVICE — GLOVE BIOGEL INDICATOR SZ 6.5 SURGICAL PF LTX - (50PR/BX 4BX/CA)

## (undated) DEVICE — KIT ROOM DECONTAMINATION

## (undated) DEVICE — DRAPE LARGE 3 QUARTER - (20/CA)

## (undated) DEVICE — SET LEADWIRE 5 LEAD BEDSIDE DISPOSABLE ECG (1SET OF 5/EA)

## (undated) DEVICE — DRESSING KIT V.A.C. SENSA T.R.A.C. SMALL (10EA/CA)

## (undated) DEVICE — SUTURE 0 VICRYL PLUS CT-1 - 8 X 18 INCH (12/BX)

## (undated) DEVICE — SUTURE 2-0 ETHILON FS - (36/BX) 18 INCH

## (undated) DEVICE — GLOVE BIOGEL INDICATOR SZ 7.5 SURGICAL PF LTX - (50PR/BX 4BX/CA)

## (undated) DEVICE — SWAB ANAEROBIC SPEC.COLLECTOR - (25/PK 4PK/CA 100EA/CA)

## (undated) DEVICE — PAD LAP STERILE 18 X 18 - (5/PK 40PK/CA)

## (undated) DEVICE — SUTURE GENERAL

## (undated) DEVICE — SPONGE GAUZESTER 4 X 4 4PLY - (128PK/CA)

## (undated) DEVICE — GLOVE BIOGEL ECLIPSE PF LATEX SIZE 8.0  (50PR/BX)

## (undated) DEVICE — MASK, LARYNGEAL AIRWAY #5

## (undated) DEVICE — NEPTUNE 4 PORT MANIFOLD - (20/PK)

## (undated) DEVICE — DRESSING ABDOMINAL PAD STERILE 8 X 10" (360EA/CA)"

## (undated) DEVICE — TUBE, CULTURE AEROBIC

## (undated) DEVICE — SET EXTENSION WITH 2 PORTS (48EA/CA) ***PART #2C8610 IS A SUBSTITUTE*****

## (undated) DEVICE — PADDING CAST 6 IN STERILE - 6 X 4 YDS (24/CA)

## (undated) DEVICE — GLOVE BIOGEL PI ORTHO SZ 6 SURGICAL PF LF (40PR/BX)

## (undated) DEVICE — SENSOR SPO2 NEO LNCS ADHESIVE (20/BX) SEE USER NOTES

## (undated) DEVICE — GLOVE BIOGEL PI INDICATOR SZ 8.0 SURGICAL PF LF -(50/BX 4BX/CA)

## (undated) DEVICE — PAD PREP 24 X 48 CUFFED - (100/CA)

## (undated) DEVICE — DRESSING XEROFORM 1X8 - (50/BX 4BX/CA)

## (undated) DEVICE — CHLORAPREP 26 ML APPLICATOR - ORANGE TINT(25/CA)

## (undated) DEVICE — STOCKINET BIAS 6 IN STERILE - (20/CA)

## (undated) DEVICE — DRESSING 3X8 ADAPTIC GAUZE - NON-ADHERING (36/PK 6PK/BX)

## (undated) DEVICE — BANDAGE ELASTIC 4 IN X 5 YDS - LATEX FREE(10/BX 5BX/CA)

## (undated) DEVICE — WATER IRRIG. STER. 1500 ML - (9/CA)

## (undated) DEVICE — GLOVE BIOGEL ECLIPSE  PF LATEX SIZE 6.5 (50PR/BX)

## (undated) DEVICE — TOWELS CLOTH SURGICAL - (4/PK 20PK/CA)

## (undated) DEVICE — Device

## (undated) DEVICE — SODIUM CHL. IRRIGATION 0.9% 3000ML (4EA/CA 65CA/PF)

## (undated) DEVICE — GLOVE BIOGEL PI INDICATOR SZ 6.5 SURGICAL PF LF - (50/BX 4BX/CA)

## (undated) DEVICE — SPLINT PLASTER 5 IN X 30 IN - (50EA/BX 6BX/CA)

## (undated) DEVICE — DRAPE LOWER EXTREMETY - (6/CA)

## (undated) DEVICE — TRAY SRGPRP PVP IOD WT PRP - (20/CA)

## (undated) DEVICE — GLOVE BIOGEL INDICATOR SZ 7SURGICAL PF LTX - (50/BX 4BX/CA)

## (undated) DEVICE — STAPLER SKIN DISP - (6/BX 10BX/CA) VISISTAT